# Patient Record
Sex: FEMALE | Race: WHITE | NOT HISPANIC OR LATINO | URBAN - METROPOLITAN AREA
[De-identification: names, ages, dates, MRNs, and addresses within clinical notes are randomized per-mention and may not be internally consistent; named-entity substitution may affect disease eponyms.]

---

## 2017-05-04 ENCOUNTER — OUTPATIENT (OUTPATIENT)
Dept: OUTPATIENT SERVICES | Facility: HOSPITAL | Age: 62
LOS: 1 days | Discharge: HOME | End: 2017-05-04

## 2017-05-04 DIAGNOSIS — Z98.89 OTHER SPECIFIED POSTPROCEDURAL STATES: Chronic | ICD-10-CM

## 2017-05-04 DIAGNOSIS — D49.5 NEOPLASM OF UNSPECIFIED BEHAVIOR OF OTHER GENITOURINARY ORGANS: Chronic | ICD-10-CM

## 2017-05-04 DIAGNOSIS — D59.0 DRUG-INDUCED AUTOIMMUNE HEMOLYTIC ANEMIA: ICD-10-CM

## 2017-05-08 ENCOUNTER — RESULT REVIEW (OUTPATIENT)
Age: 62
End: 2017-05-08

## 2017-05-08 ENCOUNTER — APPOINTMENT (OUTPATIENT)
Dept: HEMATOLOGY ONCOLOGY | Facility: CLINIC | Age: 62
End: 2017-05-08

## 2017-05-08 VITALS
WEIGHT: 196.21 LBS | SYSTOLIC BLOOD PRESSURE: 150 MMHG | RESPIRATION RATE: 16 BRPM | TEMPERATURE: 98.2 F | OXYGEN SATURATION: 98 % | DIASTOLIC BLOOD PRESSURE: 77 MMHG | BODY MASS INDEX: 34.76 KG/M2 | HEART RATE: 89 BPM

## 2017-05-08 LAB
BASOPHILS # BLD AUTO: 0.1 K/UL — SIGNIFICANT CHANGE UP (ref 0–0.2)
BASOPHILS NFR BLD AUTO: 0.9 % — SIGNIFICANT CHANGE UP (ref 0–2)
EOSINOPHIL # BLD AUTO: 0.2 K/UL — SIGNIFICANT CHANGE UP (ref 0–0.5)
EOSINOPHIL NFR BLD AUTO: 3.3 % — SIGNIFICANT CHANGE UP (ref 0–6)
HCT VFR BLD CALC: 33.5 % — LOW (ref 34.5–45)
HGB BLD-MCNC: 10.8 G/DL — LOW (ref 11.5–15.5)
LYMPHOCYTES # BLD AUTO: 2 K/UL — SIGNIFICANT CHANGE UP (ref 1–3.3)
LYMPHOCYTES # BLD AUTO: 34.4 % — SIGNIFICANT CHANGE UP (ref 13–44)
MCHC RBC-ENTMCNC: 20.5 PG — LOW (ref 27–34)
MCHC RBC-ENTMCNC: 32.3 G/DL — SIGNIFICANT CHANGE UP (ref 32–36)
MCV RBC AUTO: 63.5 FL — LOW (ref 80–100)
MONOCYTES # BLD AUTO: 0.2 K/UL — SIGNIFICANT CHANGE UP (ref 0–0.9)
MONOCYTES NFR BLD AUTO: 3.1 % — SIGNIFICANT CHANGE UP (ref 2–14)
NEUTROPHILS # BLD AUTO: 3.3 K/UL — SIGNIFICANT CHANGE UP (ref 1.8–7.4)
NEUTROPHILS NFR BLD AUTO: 58.2 % — SIGNIFICANT CHANGE UP (ref 43–77)
PLATELET # BLD AUTO: 227 K/UL — SIGNIFICANT CHANGE UP (ref 150–400)
RBC # BLD: 5.27 M/UL — HIGH (ref 3.8–5.2)
RBC # FLD: 14.1 % — SIGNIFICANT CHANGE UP (ref 10.3–14.5)
RETICS #: 144 K/UL — HIGH (ref 25–125)
RETICS/RBC NFR: 2.7 % — HIGH (ref 0.5–2.5)
WBC # BLD: 5.7 K/UL — SIGNIFICANT CHANGE UP (ref 3.8–10.5)
WBC # FLD AUTO: 5.7 K/UL — SIGNIFICANT CHANGE UP (ref 3.8–10.5)

## 2017-07-05 ENCOUNTER — RESULT REVIEW (OUTPATIENT)
Age: 62
End: 2017-07-05

## 2017-07-05 LAB
ALBUMIN SERPL ELPH-MCNC: 4.4 G/DL
ALP BLD-CCNC: 88 U/L
ALT SERPL-CCNC: 53 U/L
ANION GAP SERPL CALC-SCNC: 14 MMOL/L
AST SERPL-CCNC: 47 U/L
BILIRUB SERPL-MCNC: 0.3 MG/DL
BUN SERPL-MCNC: 15 MG/DL
CALCIUM SERPL-MCNC: 9.4 MG/DL
CHLORIDE SERPL-SCNC: 97 MMOL/L
CO2 SERPL-SCNC: 25 MMOL/L
CREAT SERPL-MCNC: 0.87 MG/DL
GLUCOSE SERPL-MCNC: 234 MG/DL
HAPTOGLOB SERPL-MCNC: 146 MG/DL
LDH SERPL-CCNC: 193 U/L
POTASSIUM SERPL-SCNC: 4.9 MMOL/L
PROT SERPL-MCNC: 7.6 G/DL
SODIUM SERPL-SCNC: 136 MMOL/L

## 2017-11-01 ENCOUNTER — APPOINTMENT (OUTPATIENT)
Dept: GYNECOLOGIC ONCOLOGY | Facility: CLINIC | Age: 62
End: 2017-11-01
Payer: MEDICARE

## 2017-11-01 VITALS
SYSTOLIC BLOOD PRESSURE: 142 MMHG | BODY MASS INDEX: 34.55 KG/M2 | DIASTOLIC BLOOD PRESSURE: 84 MMHG | WEIGHT: 195 LBS | HEIGHT: 63 IN

## 2017-11-01 PROCEDURE — 56606 BIOPSY OF VULVA/PERINEUM: CPT

## 2017-11-01 PROCEDURE — 99213 OFFICE O/P EST LOW 20 MIN: CPT | Mod: 25

## 2017-11-01 PROCEDURE — 56605 BIOPSY OF VULVA/PERINEUM: CPT

## 2017-11-09 LAB
CORE LAB BIOPSY: NORMAL
CYTOLOGY CVX/VAG DOC THIN PREP: NORMAL

## 2017-11-14 ENCOUNTER — APPOINTMENT (OUTPATIENT)
Dept: GYNECOLOGIC ONCOLOGY | Facility: CLINIC | Age: 62
End: 2017-11-14
Payer: MEDICARE

## 2017-11-14 PROCEDURE — 99213 OFFICE O/P EST LOW 20 MIN: CPT

## 2017-11-28 ENCOUNTER — OUTPATIENT (OUTPATIENT)
Dept: OUTPATIENT SERVICES | Facility: HOSPITAL | Age: 62
LOS: 1 days | End: 2017-11-28
Payer: MEDICARE

## 2017-11-28 VITALS
RESPIRATION RATE: 16 BRPM | WEIGHT: 192.9 LBS | HEIGHT: 61.5 IN | SYSTOLIC BLOOD PRESSURE: 164 MMHG | DIASTOLIC BLOOD PRESSURE: 62 MMHG | TEMPERATURE: 98 F | HEART RATE: 91 BPM

## 2017-11-28 DIAGNOSIS — Z98.89 OTHER SPECIFIED POSTPROCEDURAL STATES: Chronic | ICD-10-CM

## 2017-11-28 DIAGNOSIS — C51.9 MALIGNANT NEOPLASM OF VULVA, UNSPECIFIED: ICD-10-CM

## 2017-11-28 DIAGNOSIS — D07.1 CARCINOMA IN SITU OF VULVA: ICD-10-CM

## 2017-11-28 DIAGNOSIS — E11.9 TYPE 2 DIABETES MELLITUS WITHOUT COMPLICATIONS: ICD-10-CM

## 2017-11-28 DIAGNOSIS — E03.9 HYPOTHYROIDISM, UNSPECIFIED: ICD-10-CM

## 2017-11-28 DIAGNOSIS — I10 ESSENTIAL (PRIMARY) HYPERTENSION: ICD-10-CM

## 2017-11-28 DIAGNOSIS — D49.5 NEOPLASM OF UNSPECIFIED BEHAVIOR OF OTHER GENITOURINARY ORGANS: Chronic | ICD-10-CM

## 2017-11-28 LAB
BUN SERPL-MCNC: 21 MG/DL — SIGNIFICANT CHANGE UP (ref 7–23)
CALCIUM SERPL-MCNC: 9.2 MG/DL — SIGNIFICANT CHANGE UP (ref 8.4–10.5)
CHLORIDE SERPL-SCNC: 95 MMOL/L — LOW (ref 98–107)
CO2 SERPL-SCNC: 29 MMOL/L — SIGNIFICANT CHANGE UP (ref 22–31)
CREAT SERPL-MCNC: 1.01 MG/DL — SIGNIFICANT CHANGE UP (ref 0.5–1.3)
GLUCOSE SERPL-MCNC: 226 MG/DL — HIGH (ref 70–99)
HBA1C BLD-MCNC: 8.8 % — HIGH (ref 4–5.6)
HCT VFR BLD CALC: 34.8 % — SIGNIFICANT CHANGE UP (ref 34.5–45)
HGB BLD-MCNC: 11.1 G/DL — LOW (ref 11.5–15.5)
MCHC RBC-ENTMCNC: 20.4 PG — LOW (ref 27–34)
MCHC RBC-ENTMCNC: 31.9 % — LOW (ref 32–36)
MCV RBC AUTO: 63.9 FL — LOW (ref 80–100)
NRBC # FLD: 0 — SIGNIFICANT CHANGE UP
PLATELET # BLD AUTO: 241 K/UL — SIGNIFICANT CHANGE UP (ref 150–400)
PMV BLD: 10.7 FL — SIGNIFICANT CHANGE UP (ref 7–13)
POTASSIUM SERPL-MCNC: 4 MMOL/L — SIGNIFICANT CHANGE UP (ref 3.5–5.3)
POTASSIUM SERPL-SCNC: 4 MMOL/L — SIGNIFICANT CHANGE UP (ref 3.5–5.3)
RBC # BLD: 5.45 M/UL — HIGH (ref 3.8–5.2)
RBC # FLD: 15.7 % — HIGH (ref 10.3–14.5)
SODIUM SERPL-SCNC: 139 MMOL/L — SIGNIFICANT CHANGE UP (ref 135–145)
WBC # BLD: 7.22 K/UL — SIGNIFICANT CHANGE UP (ref 3.8–10.5)
WBC # FLD AUTO: 7.22 K/UL — SIGNIFICANT CHANGE UP (ref 3.8–10.5)

## 2017-11-28 PROCEDURE — 93010 ELECTROCARDIOGRAM REPORT: CPT

## 2017-11-28 NOTE — H&P PST ADULT - PMH
DM type 2 (diabetes mellitus, type 2)    Fibromyalgia    HTN (hypertension)    Hypothyroid    Lichen sclerosus of female genitalia    Strabismus    Thalassemia minor    TIA (transient ischemic attack)  8/2013  blurred vision/dizziness  Vulva cancer

## 2017-11-28 NOTE — H&P PST ADULT - ENDOCRINE COMMENTS
Hypothyroid - stable on current dose of med - Hx of DM for 20 years - on Basal / bolus - A1c in 9's and SMBG -

## 2017-11-28 NOTE — H&P PST ADULT - HISTORY OF PRESENT ILLNESS
Pt is a 62 yr old female scheduled for Exam Under anesthesia Excision of Vulvar Biopsies laser Ablation with Dr Sauer 12/4/17. Pt hx of Vulvar Ca 2013 - noted recent swelling and lump with burning 1 month ago  - biopsy done in office and surgery scheduled. Pt is poorly controlled DM on Basal / Bolus

## 2017-11-28 NOTE — H&P PST ADULT - FAMILY HISTORY
Aunt  Still living? Unknown  Family history of colon cancer, Age at diagnosis: Age Unknown  Family history of diabetes mellitus in mother, Age at diagnosis: Age Unknown     Mother  Still living? Unknown  Family history of diabetes mellitus in mother, Age at diagnosis: Age Unknown     Sibling  Still living? Unknown  Family history of diabetes mellitus in mother, Age at diagnosis: Age Unknown

## 2017-11-28 NOTE — H&P PST ADULT - PSH
S/P eye surgery  June 2014, for strabismus  S/P laparoscopic cholecystectomy  20 years ago  Vulvar neoplasm

## 2017-11-28 NOTE — H&P PST ADULT - PROBLEM SELECTOR PLAN 1
Patient Pt given pre-op instructions and Famotidine and verbalized understanding.   Request MC from MD - also sent to surgeon. Pt to stay on ASA 81 po for Hx of TIA.  OR Booking notified OSN precautions.

## 2017-11-28 NOTE — H&P PST ADULT - NEUROLOGICAL DETAILS
responds to verbal commands/sensation intact/responds to pain/superficial reflexes intact/normal strength/alert and oriented x 3

## 2017-11-28 NOTE — H&P PST ADULT - NSANTHOSAYNRD_GEN_A_CORE
No. SON screening performed.  STOP BANG Legend: 0-2 = LOW Risk; 3-4 = INTERMEDIATE Risk; 5-8 = HIGH Risk

## 2017-11-28 NOTE — H&P PST ADULT - CONSTITUTIONAL DETAILS
no distress/well-nourished/well-developed/well-groomed no distress/obese/well-nourished/well-developed/well-groomed

## 2017-11-28 NOTE — H&P PST ADULT - NEGATIVE NEUROLOGICAL SYMPTOMS
no focal seizures/no tremors/no paresthesias/no confusion/no weakness/no generalized seizures/no syncope

## 2017-12-04 ENCOUNTER — OTHER (OUTPATIENT)
Age: 62
End: 2017-12-04

## 2017-12-04 ENCOUNTER — RESULT REVIEW (OUTPATIENT)
Age: 62
End: 2017-12-04

## 2017-12-04 ENCOUNTER — OUTPATIENT (OUTPATIENT)
Dept: OUTPATIENT SERVICES | Facility: HOSPITAL | Age: 62
LOS: 1 days | Discharge: HOME | End: 2017-12-04
Payer: MEDICARE

## 2017-12-04 ENCOUNTER — APPOINTMENT (OUTPATIENT)
Dept: GYNECOLOGIC ONCOLOGY | Facility: HOSPITAL | Age: 62
End: 2017-12-04

## 2017-12-04 VITALS
HEIGHT: 61.5 IN | SYSTOLIC BLOOD PRESSURE: 143 MMHG | WEIGHT: 192.9 LBS | DIASTOLIC BLOOD PRESSURE: 60 MMHG | RESPIRATION RATE: 16 BRPM | TEMPERATURE: 98 F | OXYGEN SATURATION: 93 % | HEART RATE: 72 BPM

## 2017-12-04 VITALS
DIASTOLIC BLOOD PRESSURE: 58 MMHG | SYSTOLIC BLOOD PRESSURE: 135 MMHG | RESPIRATION RATE: 16 BRPM | OXYGEN SATURATION: 96 %

## 2017-12-04 DIAGNOSIS — D07.1 CARCINOMA IN SITU OF VULVA: ICD-10-CM

## 2017-12-04 DIAGNOSIS — Z98.89 OTHER SPECIFIED POSTPROCEDURAL STATES: Chronic | ICD-10-CM

## 2017-12-04 DIAGNOSIS — D49.5 NEOPLASM OF UNSPECIFIED BEHAVIOR OF OTHER GENITOURINARY ORGANS: Chronic | ICD-10-CM

## 2017-12-04 LAB — GLUCOSE BLDC GLUCOMTR-MCNC: 289 MG/DL — HIGH (ref 70–99)

## 2017-12-04 PROCEDURE — 88331 PATH CONSLTJ SURG 1 BLK 1SPC: CPT | Mod: 26

## 2017-12-04 PROCEDURE — 88309 TISSUE EXAM BY PATHOLOGIST: CPT | Mod: 26

## 2017-12-04 PROCEDURE — 88307 TISSUE EXAM BY PATHOLOGIST: CPT | Mod: 26

## 2017-12-04 PROCEDURE — 56620 VULVECTOMY SIMPLE PARTIAL: CPT

## 2017-12-04 RX ORDER — LANOLIN ALCOHOL/MO/W.PET/CERES
1 CREAM (GRAM) TOPICAL
Qty: 0 | Refills: 0 | COMMUNITY

## 2017-12-04 RX ORDER — INSULIN ASPART 100 [IU]/ML
0 INJECTION, SOLUTION SUBCUTANEOUS
Qty: 0 | Refills: 0 | COMMUNITY

## 2017-12-04 RX ORDER — INSULIN DETEMIR 100/ML (3)
0 INSULIN PEN (ML) SUBCUTANEOUS
Qty: 0 | Refills: 0 | COMMUNITY

## 2017-12-04 RX ORDER — SODIUM CHLORIDE 9 MG/ML
1000 INJECTION, SOLUTION INTRAVENOUS
Qty: 0 | Refills: 0 | Status: DISCONTINUED | OUTPATIENT
Start: 2017-12-04 | End: 2017-12-19

## 2017-12-04 RX ORDER — SODIUM CHLORIDE 9 MG/ML
1000 INJECTION, SOLUTION INTRAVENOUS
Qty: 0 | Refills: 0 | Status: DISCONTINUED | OUTPATIENT
Start: 2017-12-04 | End: 2017-12-04

## 2017-12-04 NOTE — BRIEF OPERATIVE NOTE - PROCEDURE
<<-----Click on this checkbox to enter Procedure Vulvar biopsy  12/04/2017    Active  [id number]  Wide local excision of vulva or simple vulvectomy  12/04/2017    Active  [id number]

## 2017-12-04 NOTE — ASU PREOP CHECKLIST - 3.
Pt has a sinus infection and is taking antibiotics Dr Simmons aware Pt has a sinus infection and is taking antibiotics Dr Simmons aware and MD Sauer aware

## 2017-12-04 NOTE — ASU DISCHARGE PLAN (ADULT/PEDIATRIC). - MEDICATION SUMMARY - MEDICATIONS TO TAKE
I will START or STAY ON the medications listed below when I get home from the hospital:    Motrin 600 mg oral tablet  -- 1 tab(s) by mouth every 6 hours, As Needed  -- Indication: For pain    Tylenol 325 mg oral tablet  -- 2 tab(s) by mouth every 6 hours, As Needed  -- Indication: For pain

## 2017-12-04 NOTE — BRIEF OPERATIVE NOTE - OPERATION/FINDINGS
Area of tissue and skin thickening involving prior vulvectomy scar on L anterior vulva, Lichen sclerosis of posterior and lateral vulva  Anterior vulvar biopsy with Frozen= negative margins

## 2017-12-04 NOTE — ASU DISCHARGE PLAN (ADULT/PEDIATRIC). - ACTIVITY LEVEL
nothing per vagina/no tub baths/no heavy lifting/nothing per rectum/no intercourse/2 weeks/no sports/gym

## 2017-12-04 NOTE — ASU DISCHARGE PLAN (ADULT/PEDIATRIC). - NOTIFY
GYN Fever>100.4/Unable to Urinate/Bleeding that does not stop Pain not relieved by Medications/GYN Fever>100.4/Fever greater than 101/Unable to Urinate/Persistent Nausea and Vomiting/Bleeding that does not stop/Numbness, color, or temperature change to extremity

## 2017-12-04 NOTE — ASU DISCHARGE PLAN (ADULT/PEDIATRIC). - ASU FOLLOWUP
call  PACU  [phone] ( open  24 hour  and weekend ) , [phone] ( open 6AM to 11 PM  closed weekend)/[facility]ERT[facility] ASU (Adult):

## 2017-12-04 NOTE — ASU PREOP CHECKLIST - 5.
Report from apryl at 10:51am Report from apryl at 10:51am / Report taken from Theresa VALERA at 0651

## 2017-12-05 ENCOUNTER — TRANSCRIPTION ENCOUNTER (OUTPATIENT)
Age: 62
End: 2017-12-05

## 2017-12-05 DIAGNOSIS — D07.1 CARCINOMA IN SITU OF VULVA: ICD-10-CM

## 2017-12-05 RX ORDER — OXYCODONE AND ACETAMINOPHEN 5; 325 MG/1; MG/1
5-325 TABLET ORAL EVERY 6 HOURS
Qty: 15 | Refills: 0 | Status: DISCONTINUED | OUTPATIENT
Start: 2017-12-05 | End: 2017-12-05

## 2017-12-14 LAB — SURGICAL PATHOLOGY STUDY: SIGNIFICANT CHANGE UP

## 2017-12-19 ENCOUNTER — APPOINTMENT (OUTPATIENT)
Dept: GYNECOLOGIC ONCOLOGY | Facility: CLINIC | Age: 62
End: 2017-12-19
Payer: MEDICARE

## 2017-12-19 PROCEDURE — 99024 POSTOP FOLLOW-UP VISIT: CPT

## 2018-01-16 ENCOUNTER — APPOINTMENT (OUTPATIENT)
Dept: GYNECOLOGIC ONCOLOGY | Facility: CLINIC | Age: 63
End: 2018-01-16
Payer: MEDICARE

## 2018-01-16 PROCEDURE — 99024 POSTOP FOLLOW-UP VISIT: CPT

## 2018-01-24 ENCOUNTER — FORM ENCOUNTER (OUTPATIENT)
Age: 63
End: 2018-01-24

## 2018-01-25 ENCOUNTER — APPOINTMENT (OUTPATIENT)
Dept: ULTRASOUND IMAGING | Facility: IMAGING CENTER | Age: 63
End: 2018-01-25
Payer: MEDICARE

## 2018-01-25 ENCOUNTER — OUTPATIENT (OUTPATIENT)
Dept: OUTPATIENT SERVICES | Facility: HOSPITAL | Age: 63
LOS: 1 days | End: 2018-01-25
Payer: MEDICARE

## 2018-01-25 DIAGNOSIS — N95.0 POSTMENOPAUSAL BLEEDING: ICD-10-CM

## 2018-01-25 DIAGNOSIS — Z98.89 OTHER SPECIFIED POSTPROCEDURAL STATES: Chronic | ICD-10-CM

## 2018-01-25 DIAGNOSIS — D49.5 NEOPLASM OF UNSPECIFIED BEHAVIOR OF OTHER GENITOURINARY ORGANS: Chronic | ICD-10-CM

## 2018-01-25 PROCEDURE — 76830 TRANSVAGINAL US NON-OB: CPT | Mod: 26

## 2018-01-25 PROCEDURE — 76830 TRANSVAGINAL US NON-OB: CPT

## 2018-01-25 PROCEDURE — 76856 US EXAM PELVIC COMPLETE: CPT | Mod: 26

## 2018-01-25 PROCEDURE — 76856 US EXAM PELVIC COMPLETE: CPT

## 2018-02-20 ENCOUNTER — OUTPATIENT (OUTPATIENT)
Dept: OUTPATIENT SERVICES | Facility: HOSPITAL | Age: 63
LOS: 1 days | Discharge: HOME | End: 2018-02-20

## 2018-02-20 DIAGNOSIS — Z98.89 OTHER SPECIFIED POSTPROCEDURAL STATES: Chronic | ICD-10-CM

## 2018-02-20 DIAGNOSIS — D49.5 NEOPLASM OF UNSPECIFIED BEHAVIOR OF OTHER GENITOURINARY ORGANS: Chronic | ICD-10-CM

## 2018-02-20 DIAGNOSIS — D59.0 DRUG-INDUCED AUTOIMMUNE HEMOLYTIC ANEMIA: ICD-10-CM

## 2018-02-26 ENCOUNTER — APPOINTMENT (OUTPATIENT)
Dept: HEMATOLOGY ONCOLOGY | Facility: CLINIC | Age: 63
End: 2018-02-26
Payer: MEDICARE

## 2018-02-26 ENCOUNTER — RESULT REVIEW (OUTPATIENT)
Age: 63
End: 2018-02-26

## 2018-02-26 ENCOUNTER — OUTPATIENT (OUTPATIENT)
Dept: OUTPATIENT SERVICES | Facility: HOSPITAL | Age: 63
LOS: 1 days | End: 2018-02-26
Payer: COMMERCIAL

## 2018-02-26 VITALS
OXYGEN SATURATION: 98 % | HEART RATE: 86 BPM | SYSTOLIC BLOOD PRESSURE: 140 MMHG | BODY MASS INDEX: 35.54 KG/M2 | RESPIRATION RATE: 16 BRPM | TEMPERATURE: 98.4 F | WEIGHT: 200.62 LBS | DIASTOLIC BLOOD PRESSURE: 82 MMHG

## 2018-02-26 DIAGNOSIS — D49.5 NEOPLASM OF UNSPECIFIED BEHAVIOR OF OTHER GENITOURINARY ORGANS: Chronic | ICD-10-CM

## 2018-02-26 DIAGNOSIS — Z98.89 OTHER SPECIFIED POSTPROCEDURAL STATES: Chronic | ICD-10-CM

## 2018-02-26 DIAGNOSIS — D59.0 DRUG-INDUCED AUTOIMMUNE HEMOLYTIC ANEMIA: ICD-10-CM

## 2018-02-26 LAB
BASOPHILS # BLD AUTO: 0 K/UL — SIGNIFICANT CHANGE UP (ref 0–0.2)
BASOPHILS NFR BLD AUTO: 0.7 % — SIGNIFICANT CHANGE UP (ref 0–2)
BLD GP AB SCN SERPL QL: NEGATIVE — SIGNIFICANT CHANGE UP
DAT POLY-SP REAG RBC QL: NEGATIVE — SIGNIFICANT CHANGE UP
EOSINOPHIL # BLD AUTO: 0.2 K/UL — SIGNIFICANT CHANGE UP (ref 0–0.5)
EOSINOPHIL NFR BLD AUTO: 2.7 % — SIGNIFICANT CHANGE UP (ref 0–6)
HCT VFR BLD CALC: 34.3 % — LOW (ref 34.5–45)
HGB BLD-MCNC: 11.6 G/DL — SIGNIFICANT CHANGE UP (ref 11.5–15.5)
LYMPHOCYTES # BLD AUTO: 2.5 K/UL — SIGNIFICANT CHANGE UP (ref 1–3.3)
LYMPHOCYTES # BLD AUTO: 36.3 % — SIGNIFICANT CHANGE UP (ref 13–44)
MCHC RBC-ENTMCNC: 20.8 PG — LOW (ref 27–34)
MCHC RBC-ENTMCNC: 33.7 G/DL — SIGNIFICANT CHANGE UP (ref 32–36)
MCV RBC AUTO: 61.6 FL — LOW (ref 80–100)
MONOCYTES # BLD AUTO: 0.2 K/UL — SIGNIFICANT CHANGE UP (ref 0–0.9)
MONOCYTES NFR BLD AUTO: 3.4 % — SIGNIFICANT CHANGE UP (ref 2–14)
NEUTROPHILS # BLD AUTO: 3.9 K/UL — SIGNIFICANT CHANGE UP (ref 1.8–7.4)
NEUTROPHILS NFR BLD AUTO: 56.9 % — SIGNIFICANT CHANGE UP (ref 43–77)
PLATELET # BLD AUTO: 243 K/UL — SIGNIFICANT CHANGE UP (ref 150–400)
RBC # BLD: 5.58 M/UL — HIGH (ref 3.8–5.2)
RBC # FLD: 13.6 % — SIGNIFICANT CHANGE UP (ref 10.3–14.5)
RH IG SCN BLD-IMP: POSITIVE — SIGNIFICANT CHANGE UP
WBC # BLD: 6.8 K/UL — SIGNIFICANT CHANGE UP (ref 3.8–10.5)
WBC # FLD AUTO: 6.8 K/UL — SIGNIFICANT CHANGE UP (ref 3.8–10.5)

## 2018-02-26 PROCEDURE — 86901 BLOOD TYPING SEROLOGIC RH(D): CPT

## 2018-02-26 PROCEDURE — 99215 OFFICE O/P EST HI 40 MIN: CPT

## 2018-02-26 PROCEDURE — 86900 BLOOD TYPING SEROLOGIC ABO: CPT

## 2018-02-26 PROCEDURE — 86880 COOMBS TEST DIRECT: CPT

## 2018-02-26 PROCEDURE — 86922 COMPATIBILITY TEST ANTIGLOB: CPT

## 2018-02-26 PROCEDURE — 86850 RBC ANTIBODY SCREEN: CPT

## 2018-04-17 ENCOUNTER — APPOINTMENT (OUTPATIENT)
Dept: GYNECOLOGIC ONCOLOGY | Facility: CLINIC | Age: 63
End: 2018-04-17
Payer: MEDICARE

## 2018-04-17 VITALS
WEIGHT: 200 LBS | SYSTOLIC BLOOD PRESSURE: 122 MMHG | HEIGHT: 63 IN | DIASTOLIC BLOOD PRESSURE: 72 MMHG | BODY MASS INDEX: 35.44 KG/M2

## 2018-04-17 DIAGNOSIS — Z87.42 PERSONAL HISTORY OF OTHER DISEASES OF THE FEMALE GENITAL TRACT: ICD-10-CM

## 2018-04-17 PROCEDURE — 56605 BIOPSY OF VULVA/PERINEUM: CPT

## 2018-04-17 PROCEDURE — 58100 BIOPSY OF UTERUS LINING: CPT

## 2018-04-17 PROCEDURE — 99214 OFFICE O/P EST MOD 30 MIN: CPT | Mod: 25

## 2018-04-20 LAB — CORE LAB BIOPSY: NORMAL

## 2018-07-17 ENCOUNTER — APPOINTMENT (OUTPATIENT)
Dept: GYNECOLOGIC ONCOLOGY | Facility: CLINIC | Age: 63
End: 2018-07-17
Payer: MEDICARE

## 2018-07-17 VITALS
SYSTOLIC BLOOD PRESSURE: 128 MMHG | HEIGHT: 63 IN | WEIGHT: 193 LBS | DIASTOLIC BLOOD PRESSURE: 72 MMHG | BODY MASS INDEX: 34.2 KG/M2

## 2018-07-17 PROCEDURE — 56605 BIOPSY OF VULVA/PERINEUM: CPT

## 2018-07-17 PROCEDURE — 99213 OFFICE O/P EST LOW 20 MIN: CPT | Mod: 25

## 2018-07-20 LAB — CORE LAB BIOPSY: NORMAL

## 2018-11-27 ENCOUNTER — APPOINTMENT (OUTPATIENT)
Dept: GYNECOLOGIC ONCOLOGY | Facility: CLINIC | Age: 63
End: 2018-11-27
Payer: MEDICARE

## 2018-11-27 VITALS
SYSTOLIC BLOOD PRESSURE: 128 MMHG | DIASTOLIC BLOOD PRESSURE: 70 MMHG | BODY MASS INDEX: 33.31 KG/M2 | HEIGHT: 63 IN | WEIGHT: 188 LBS

## 2018-11-27 PROCEDURE — 99213 OFFICE O/P EST LOW 20 MIN: CPT

## 2018-11-27 RX ORDER — OXYCODONE AND ACETAMINOPHEN 5; 325 MG/1; MG/1
5-325 TABLET ORAL
Qty: 15 | Refills: 0 | Status: DISCONTINUED | COMMUNITY
Start: 2017-12-05 | End: 2018-11-27

## 2019-01-07 ENCOUNTER — RESULT REVIEW (OUTPATIENT)
Age: 64
End: 2019-01-07

## 2019-04-03 ENCOUNTER — APPOINTMENT (OUTPATIENT)
Dept: GYNECOLOGIC ONCOLOGY | Facility: CLINIC | Age: 64
End: 2019-04-03
Payer: MEDICARE

## 2019-04-03 VITALS
HEIGHT: 63 IN | WEIGHT: 195 LBS | SYSTOLIC BLOOD PRESSURE: 179 MMHG | BODY MASS INDEX: 34.55 KG/M2 | DIASTOLIC BLOOD PRESSURE: 78 MMHG

## 2019-04-03 PROCEDURE — 56605 BIOPSY OF VULVA/PERINEUM: CPT

## 2019-04-03 PROCEDURE — 99213 OFFICE O/P EST LOW 20 MIN: CPT | Mod: 25

## 2019-04-03 NOTE — HISTORY OF PRESENT ILLNESS
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 Underwent radical anterior vulvectomy and bilateral SLNMB. Diagnosed with autoimmune hemolytic anemia during recuperation and treated with steroids and rituximab with resolution. Sporadic surveillance exams in our office. \par \par Developed a recurrence in the anterior vulva scar line and underwent repeat radical excision on 12/4/17. Path with 9 mm focus of invasive cancer within HOMERO 3. Margins negative for carcinoma but positive for dysplasia. \par \par History of tobacco use.\par \par Pelvic US 1/2018 thin EE with trace fluid in endometrial cavity. \par EMBx benign and vulvar biopsy benign granulation tissue  April 2018.  \par \par Biopsy at July 2018 visit with lichen sclerosis. Clobetasol ointment was prescribed. Has less itching in the vulva but occasionally experiences more severe burning pain. \par \par Today noted more pain and irritation in the vulva. Using clobetasol intermittently. \par

## 2019-04-03 NOTE — PROCEDURE
[Vulvar Biopsy] : a vulvar biopsy [Other: ___] : [unfilled] [Patient] : the patient [Verbal Consent] : verbal consent was obtained prior to the procedure and is detailed in the patient's record [Site Verification] : site verification performed. [Time Out] : Time Out Procedure:The following was confirmed prior to the procedure: Correct patient identity, correct site, agreement on the procedure to be done and correct patient position. [Allergies Reviewed] : allergies were reviewed [1% Lidocaine ___(ml)] :  [unfilled]Uml of 1% Lidocaine [Betadine] : betadine [Silver Nitrate] : silver nitrate [Yes] : the specimen was sent to pathology [No Complications] : none [Tolerated Well] : the patient tolerated the procedure well [Post procedure instructions and information given] : post procedure instructions and information given and reviewed with patient. [FreeTextEntry1] : Biopsy of midline vulvar scar thickened white epithelium.

## 2019-04-03 NOTE — PHYSICAL EXAM
[Abnormal] : External genitalia: Abnormal [Normal] : Anus and perineum: Normal sphincter tone, no masses, no prolapse. [Fully active, able to carry on all pre-disease performance without restriction] : Status 0 - Fully active, able to carry on all pre-disease performance without restriction

## 2019-04-09 NOTE — ASU DISCHARGE PLAN (ADULT/PEDIATRIC). - ***IN THE EVENT THAT YOU DEVELOP A COMPLICATION AND YOU ARE UNABLE TO REACH YOUR OWN PHYSICIAN, YOU MAY CONTACT:
Recommend increasing trazodone to 75 mg daily to help with sleep. Please let patient know   -Dr Guevara: Covering for Dr Guzman this week.    Statement Selected

## 2019-04-11 LAB — CORE LAB BIOPSY: NORMAL

## 2019-06-11 ENCOUNTER — APPOINTMENT (OUTPATIENT)
Dept: HEMATOLOGY ONCOLOGY | Facility: CLINIC | Age: 64
End: 2019-06-11

## 2019-06-29 ENCOUNTER — OUTPATIENT (OUTPATIENT)
Dept: OUTPATIENT SERVICES | Facility: HOSPITAL | Age: 64
LOS: 1 days | Discharge: HOME | End: 2019-06-29

## 2019-06-29 DIAGNOSIS — Z98.89 OTHER SPECIFIED POSTPROCEDURAL STATES: Chronic | ICD-10-CM

## 2019-06-29 DIAGNOSIS — D59.0 DRUG-INDUCED AUTOIMMUNE HEMOLYTIC ANEMIA: ICD-10-CM

## 2019-06-29 DIAGNOSIS — D49.5 NEOPLASM OF UNSPECIFIED BEHAVIOR OF OTHER GENITOURINARY ORGANS: Chronic | ICD-10-CM

## 2019-07-02 ENCOUNTER — APPOINTMENT (OUTPATIENT)
Dept: HEMATOLOGY ONCOLOGY | Facility: CLINIC | Age: 64
End: 2019-07-02
Payer: MEDICARE

## 2019-07-02 ENCOUNTER — RESULT REVIEW (OUTPATIENT)
Age: 64
End: 2019-07-02

## 2019-07-02 VITALS
WEIGHT: 196.43 LBS | RESPIRATION RATE: 17 BRPM | BODY MASS INDEX: 34.8 KG/M2 | DIASTOLIC BLOOD PRESSURE: 76 MMHG | SYSTOLIC BLOOD PRESSURE: 145 MMHG | TEMPERATURE: 99.1 F | OXYGEN SATURATION: 96 % | HEART RATE: 83 BPM

## 2019-07-02 LAB
BASOPHILS # BLD AUTO: 0.1 K/UL — SIGNIFICANT CHANGE UP (ref 0–0.2)
BASOPHILS NFR BLD AUTO: 1.5 % — SIGNIFICANT CHANGE UP (ref 0–2)
EOSINOPHIL # BLD AUTO: 0.2 K/UL — SIGNIFICANT CHANGE UP (ref 0–0.5)
EOSINOPHIL NFR BLD AUTO: 2.2 % — SIGNIFICANT CHANGE UP (ref 0–6)
HCT VFR BLD CALC: 34.6 % — SIGNIFICANT CHANGE UP (ref 34.5–45)
HGB BLD-MCNC: 11 G/DL — LOW (ref 11.5–15.5)
LYMPHOCYTES # BLD AUTO: 2.6 K/UL — SIGNIFICANT CHANGE UP (ref 1–3.3)
LYMPHOCYTES # BLD AUTO: 35.7 % — SIGNIFICANT CHANGE UP (ref 13–44)
MCHC RBC-ENTMCNC: 20.3 PG — LOW (ref 27–34)
MCHC RBC-ENTMCNC: 31.9 G/DL — LOW (ref 32–36)
MCV RBC AUTO: 63.8 FL — LOW (ref 80–100)
MONOCYTES # BLD AUTO: 0.3 K/UL — SIGNIFICANT CHANGE UP (ref 0–0.9)
MONOCYTES NFR BLD AUTO: 3.4 % — SIGNIFICANT CHANGE UP (ref 2–14)
NEUTROPHILS # BLD AUTO: 4.2 K/UL — SIGNIFICANT CHANGE UP (ref 1.8–7.4)
NEUTROPHILS NFR BLD AUTO: 57.3 % — SIGNIFICANT CHANGE UP (ref 43–77)
PLATELET # BLD AUTO: 253 K/UL — SIGNIFICANT CHANGE UP (ref 150–400)
RBC # BLD: 5.43 M/UL — HIGH (ref 3.8–5.2)
RBC # FLD: 14.2 % — SIGNIFICANT CHANGE UP (ref 10.3–14.5)
WBC # BLD: 7.3 K/UL — SIGNIFICANT CHANGE UP (ref 3.8–10.5)
WBC # FLD AUTO: 7.3 K/UL — SIGNIFICANT CHANGE UP (ref 3.8–10.5)

## 2019-07-02 PROCEDURE — 99213 OFFICE O/P EST LOW 20 MIN: CPT

## 2019-07-02 RX ORDER — AMOXICILLIN AND CLAVULANATE POTASSIUM 875; 125 MG/1; MG/1
875-125 TABLET, COATED ORAL
Qty: 20 | Refills: 0 | Status: DISCONTINUED | COMMUNITY
Start: 2017-12-02 | End: 2019-07-02

## 2019-07-02 NOTE — ASSESSMENT
[FreeTextEntry1] : Ms. Carranza had vulvar cancer surgery on December 13, 2014,  preoperatively her hemoglobin was 10.7 with an MCV of 62.7 the patient states that she has thalassemia trait\par \par  -History of  hemolytic anemia Amrit panel  at the time of diagnosis revealed a positive IgG and negative C3.\par    Counts are stable.\par \par - History of vulvar cancer: Patient is following with Dr Radha Sauer GYN Oncologist. No evidence of recurrent disease.  Last biopsy 7/20/18 negative. \par \par -Education on smoking cessation. \par \par RTC 12 months. \par \par

## 2019-07-02 NOTE — REVIEW OF SYSTEMS
[Negative] : Allergic/Immunologic [Fatigue] : fatigue [Wheezing] : wheezing [Cough] : cough [FreeTextEntry6] : mild congestion. Smokes 1/2  pack per day

## 2019-07-02 NOTE — HISTORY OF PRESENT ILLNESS
[0 - No Distress] : Distress Level: 0 [de-identified] : Ms. Carranza had vulvar cancer surgery on December 13, 2014,  preoperatively her hemoglobin was 10.7 with an MCV of 62.7 the patient states that she has thalassemia trait. 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 Reticulocyte count was elevated consistent with hemolysis treated with steroids with no  improvement over 2 or 3 days and  was given one treatment with rituximab; with appropriate response. \par \par Surgical Final Report\par \par \par Final Diagnosis\par 1. Anterior vulva, biopsy\par - Lichen sclerosus\par - Negative for dysplasia\par \par 2. Portion of anterior vulva, resection\par - Invasive well differentiated squamous cell carcinoma, see\par synoptic summary\par _________________________________________________________________\par \par Intraoperative Consultation\par 1. Anterior vulva\par - Negative for dysplasia\par By Dr. GRIS Abad\par \par Synoptic Summary\par Case Summary (Vulva, 7th edition and FIGO 2014 Annual Report,\par January 2016)\par \par Specimen Type: Anterior vulva\par Procedure: Wide excision\par Lymph Node Sampling: Not applicable\par Tumor Site: Anterior vulva\par Tumor Size: 0.9 cm\par Tumor Focality: Unifocal\par Histologic Type: Squamous cell carcinoma\par Histologic Grade: G1\par Microscopic Tumor Extension:\par Depth of Invasion: 4 mm\par Tumor Border: Pushing\par Margins:\par Uninvolved by invasive carcinoma\par Distance of invasive carcinoma from closest posterior\par margin: 4 mm\par Involved by vulvar intraepithelial lesion, differentiated\par type: all margins\par Lymph-Vascular Invasion: Not identified\par Lymph Nodes: Not applicable\par \par Pathologic Staging:\par TNM Descriptors: Not applicable\par Primary Tumor (pT): pT1b\par Regional Lymph Nodes (pN): pNx\par \par \par \par \par \par BALJINDER CARRANZA C 2\par \par \par \par Surgical Final Report\par \par \par \par \par Distant Metastasis (pM): pMx\par FIGO Stage: [id number]\par \par Additional Pathologic Findings: Lichen sclerosus, dVIN\par Comment(s): Deeper levels on block 1E are examined.\par \par No prior changes in her medical, social or surgical history since 2/26/18.  \par \par \par  \par  [de-identified] : Patient is feeling well. Following with Dr Radha Sauer GYN Oncologist for history of vulvar cancer, stable. \par \par Denies fever, night sweats or weight loss. \par \par Flu like symptoms on a course of Zithromax, minimal improvement. Patient is using Albuterol  inhaler.\par \par No changes in her medical, surgical or social history since 5/8/17.

## 2019-07-02 NOTE — PHYSICAL EXAM
[Restricted in physically strenuous activity but ambulatory and able to carry out work of a light or sedentary nature] : Status 1- Restricted in physically strenuous activity but ambulatory and able to carry out work of a light or sedentary nature, e.g., light house work, office work [Normal] : affect appropriate [Obese] : obese

## 2019-07-03 ENCOUNTER — RESULT REVIEW (OUTPATIENT)
Age: 64
End: 2019-07-03

## 2019-07-03 LAB
ALBUMIN SERPL ELPH-MCNC: 4.5 G/DL
ALP BLD-CCNC: 84 U/L
ALT SERPL-CCNC: 28 U/L
ANION GAP SERPL CALC-SCNC: 15 MMOL/L
AST SERPL-CCNC: 27 U/L
BILIRUB SERPL-MCNC: 0.4 MG/DL
BUN SERPL-MCNC: 11 MG/DL
CALCIUM SERPL-MCNC: 9.5 MG/DL
CHLORIDE SERPL-SCNC: 100 MMOL/L
CO2 SERPL-SCNC: 26 MMOL/L
CREAT SERPL-MCNC: 0.81 MG/DL
GLUCOSE SERPL-MCNC: 120 MG/DL
LDH SERPL-CCNC: 188 U/L
POTASSIUM SERPL-SCNC: 4.3 MMOL/L
PROT SERPL-MCNC: 7.2 G/DL
SODIUM SERPL-SCNC: 141 MMOL/L

## 2019-07-25 NOTE — H&P PST ADULT - TEMPERATURE IN CELSIUS (DEGREES C)
Patient's son returned call, he said he wasn't sure if it was from Dr. Kaushik Osborne or Poornima Stinson.       [phone] 36.6

## 2019-09-06 ENCOUNTER — EMERGENCY (EMERGENCY)
Facility: HOSPITAL | Age: 64
LOS: 1 days | Discharge: HOME | End: 2019-09-06
Attending: EMERGENCY MEDICINE | Admitting: STUDENT IN AN ORGANIZED HEALTH CARE EDUCATION/TRAINING PROGRAM
Payer: MEDICARE

## 2019-09-06 VITALS
RESPIRATION RATE: 16 BRPM | HEART RATE: 95 BPM | SYSTOLIC BLOOD PRESSURE: 168 MMHG | OXYGEN SATURATION: 100 % | TEMPERATURE: 99 F | DIASTOLIC BLOOD PRESSURE: 67 MMHG

## 2019-09-06 DIAGNOSIS — Z98.89 OTHER SPECIFIED POSTPROCEDURAL STATES: Chronic | ICD-10-CM

## 2019-09-06 DIAGNOSIS — D49.5 NEOPLASM OF UNSPECIFIED BEHAVIOR OF OTHER GENITOURINARY ORGANS: Chronic | ICD-10-CM

## 2019-09-06 PROCEDURE — 99283 EMERGENCY DEPT VISIT LOW MDM: CPT | Mod: GC

## 2019-09-06 RX ORDER — FAMOTIDINE 10 MG/ML
20 INJECTION INTRAVENOUS ONCE
Refills: 0 | Status: COMPLETED | OUTPATIENT
Start: 2019-09-06 | End: 2019-09-06

## 2019-09-06 RX ORDER — LIDOCAINE 4 G/100G
10 CREAM TOPICAL ONCE
Refills: 0 | Status: COMPLETED | OUTPATIENT
Start: 2019-09-06 | End: 2019-09-06

## 2019-09-06 RX ORDER — FAMOTIDINE 10 MG/ML
20 INJECTION INTRAVENOUS ONCE
Refills: 0 | Status: DISCONTINUED | OUTPATIENT
Start: 2019-09-06 | End: 2019-09-06

## 2019-09-06 RX ORDER — SUCRALFATE 1 G
1 TABLET ORAL ONCE
Refills: 0 | Status: COMPLETED | OUTPATIENT
Start: 2019-09-06 | End: 2019-09-06

## 2019-09-06 RX ORDER — SUCRALFATE 1 G
1 TABLET ORAL
Qty: 28 | Refills: 0
Start: 2019-09-06 | End: 2019-09-12

## 2019-09-06 RX ADMIN — Medication 1 GRAM(S): at 13:47

## 2019-09-06 RX ADMIN — FAMOTIDINE 20 MILLIGRAM(S): 10 INJECTION INTRAVENOUS at 14:08

## 2019-09-06 RX ADMIN — Medication 30 MILLILITER(S): at 13:40

## 2019-09-06 RX ADMIN — LIDOCAINE 10 MILLILITER(S): 4 CREAM TOPICAL at 13:41

## 2019-09-06 NOTE — ED PROVIDER NOTE - NS ED ROS FT
GENERAL: No fever or chills, EYES: no change in vision, HEENT: no trouble swallowing or speaking, CARDIAC: no chest pain, PULMONARY: no cough or SOB, GI: no abdominal pain, no nausea, no vomiting, no diarrhea or constipation, : No changes in urination, SKIN: no rashes, NEURO: no headache,  MSK: No joint pain ~Rodrigo Mike M.D. Resident

## 2019-09-06 NOTE — ED PROVIDER NOTE - NSFOLLOWUPINSTRUCTIONS_ED_ALL_ED_FT
Please follow up with your primary care physician in 24-48 hours  You have been prescribed Carafate: Please take as instructed  Please come back if any of the following: Fever, chills, abdominal pain, nausea, vomiting, or any major concern

## 2019-09-06 NOTE — ED PROVIDER NOTE - ATTENDING CONTRIBUTION TO CARE
Dr. Morales: 63 yo female with HTN, GERD, DM, recently upper endoscopy and colonoscopy for chronic abdominal pain (told there was "stomach irritation" but no official result yet), in ED with continued "burning" sensation in abdomen, which had resolved by the time pt was evaluated by myself.  Pt states that symptoms are associated with malodorous burping as well.  No CP/SOB, fever, N/V/D.  On exam pt well appearing, in NAD, heart RRR, lungs CTAB, abd NTND, extremities without swelling, strength 5/5 in all extremities and skin without rash.  Pt currently on a PPI--gave medication to prophylactically treat in ED and then added Rx carafate that pt will continue to take with her PPI.  Pt instructed that she must follow up with her GI doctor for endoscopy results and further recommendations.

## 2019-09-06 NOTE — ED PROVIDER NOTE - PHYSICAL EXAMINATION
Gen: AAOx3, non-toxic  Head: NCAT  HEENT: EOMI, oral mucosa moist, normal conjunctiva  Lung: CTAB, no respiratory distress, speaking in full sentences  CV: RRR, no murmurs, rubs or gallops  Abd: soft, NTND, no guarding, no CVA tenderness  MSK: no visible deformities  Skin: Warm, well perfused, no rash  Psych: normal affect.   ~Rodrigo Mike M.D. Resident

## 2019-09-06 NOTE — ED PROVIDER NOTE - CLINICAL SUMMARY MEDICAL DECISION MAKING FREE TEXT BOX
64yF with HTN, GERD, diabetes, presents with intermittent "burning like sensation" with increase in malodorous burping without any abd pain. Will treat symptoms with pepcid, maalox, viscous lido and reassess

## 2019-09-06 NOTE — ED PROVIDER NOTE - OBJECTIVE STATEMENT
64yF with HTN, GERD, diabetes, presents with intermittent "burning like sensation" with increase in malodorous burping. Recently underwent a upper endoscopy and colonoscopy but haven't received results yet. Currently not endorsing any abd pain, fever, chest pain, abd pain, emesis, urinary or bowel irregularities.

## 2019-09-06 NOTE — ED ADULT TRIAGE NOTE - CHIEF COMPLAINT QUOTE
Pt. c/o intermittent epigastric pain/spasm and nausea x few months. States she got endoscopy in July but didn't get results. Denies vomiting, diarrhea or fevers.

## 2019-09-06 NOTE — ED PROVIDER NOTE - PATIENT PORTAL LINK FT
You can access the FollowMyHealth Patient Portal offered by [facility] by registering at the following website: [email or web address]. By joining [facility]’s FollowMyHealth portal, you will also be able to view your health information using other applications (apps) compatible with our system.

## 2019-09-11 ENCOUNTER — APPOINTMENT (OUTPATIENT)
Dept: GYNECOLOGIC ONCOLOGY | Facility: CLINIC | Age: 64
End: 2019-09-11
Payer: MEDICARE

## 2019-09-11 PROCEDURE — 99213 OFFICE O/P EST LOW 20 MIN: CPT | Mod: 25

## 2019-09-11 PROCEDURE — 56605 BIOPSY OF VULVA/PERINEUM: CPT

## 2019-09-11 NOTE — HISTORY OF PRESENT ILLNESS
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 Underwent radical anterior vulvectomy and bilateral SLNMB. Diagnosed with autoimmune hemolytic anemia during recuperation and treated with steroids and rituximab with resolution. Sporadic surveillance exams in our office. \par \par Developed a recurrence in the anterior vulva scar line and underwent repeat radical excision on 12/4/17. Path with 9 mm focus of invasive cancer within HOMERO 3. Margins negative for carcinoma but positive for dysplasia. \par \par History of tobacco use.\par \par Pelvic US 1/2018 thin EE with trace fluid in endometrial cavity. \par EMBx benign and vulvar biopsy benign granulation tissue  April 2018.  \par \par Biopsy at July 2018 visit with lichen sclerosis. Clobetasol ointment was prescribed. Has less itching in the vulva but occasionally experiences more severe burning pain. \par Biopsy at 4/3/19 visit benign squamous mucosa. \par \par

## 2019-09-11 NOTE — PROCEDURE
[Other: ___] : [unfilled] [Vulvar Biopsy] : a vulvar biopsy [Patient] : the patient [Verbal Consent] : verbal consent was obtained prior to the procedure and is detailed in the patient's record [Site Verification] : site verification performed. [Time Out] : Time Out Procedure:The following was confirmed prior to the procedure: Correct patient identity, correct site, agreement on the procedure to be done and correct patient position. [Allergies Reviewed] : allergies were reviewed [1% Lidocaine ___(ml)] :  [unfilled]Uml of 1% Lidocaine [Betadine] : betadine [Silver Nitrate] : silver nitrate [No Complications] : none [Yes] : the specimen was sent to pathology [Post procedure instructions and information given] : post procedure instructions and information given and reviewed with patient. [Tolerated Well] : the patient tolerated the procedure well [FreeTextEntry1] : Biopsy of midline vulvar scar thickened white epithelium.

## 2019-09-18 LAB — CORE LAB BIOPSY: NORMAL

## 2019-12-03 ENCOUNTER — APPOINTMENT (OUTPATIENT)
Dept: GYNECOLOGIC ONCOLOGY | Facility: CLINIC | Age: 64
End: 2019-12-03

## 2020-03-03 ENCOUNTER — APPOINTMENT (OUTPATIENT)
Dept: GYNECOLOGIC ONCOLOGY | Facility: CLINIC | Age: 65
End: 2020-03-03
Payer: MEDICARE

## 2020-03-03 VITALS
BODY MASS INDEX: 34.2 KG/M2 | WEIGHT: 193 LBS | SYSTOLIC BLOOD PRESSURE: 171 MMHG | HEART RATE: 97 BPM | HEIGHT: 63 IN | DIASTOLIC BLOOD PRESSURE: 75 MMHG

## 2020-03-03 PROCEDURE — 56605 BIOPSY OF VULVA/PERINEUM: CPT

## 2020-03-03 PROCEDURE — 99213 OFFICE O/P EST LOW 20 MIN: CPT | Mod: 25

## 2020-03-17 LAB — CORE LAB BIOPSY: NORMAL

## 2020-03-17 NOTE — HISTORY OF PRESENT ILLNESS
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 Underwent radical anterior vulvectomy and bilateral SLNMB. Diagnosed with autoimmune hemolytic anemia during recuperation and treated with steroids and rituximab with resolution. Sporadic surveillance exams in our office. \par \par Developed a recurrence in the anterior vulva scar line and underwent repeat radical excision on 12/4/17. Path with 9 mm focus of invasive cancer within HOMERO 3. Margins negative for carcinoma but positive for dysplasia. \par \par History of tobacco use.\par \par Pelvic US 1/2018 thin EE with trace fluid in endometrial cavity. \par EMBx benign and vulvar biopsy benign granulation tissue  April 2018.  \par \par Biopsy at July 2018 visit with lichen sclerosis. Clobetasol ointment was prescribed. Has less itching in the vulva but occasionally experiences more severe burning pain. \par Biopsy at 4/3/19 visit benign squamous mucosa. \par 9/18/19 vulvar biopsy lichen sclerosis.\par \par 3/3/2020: Leana reports continued vulvar discomfort. Using Clobetasol but not applying to inner labia. Concerned about a new "bump" on the right labia, it is painful.

## 2020-03-17 NOTE — PROCEDURE
[Other: ___] : [unfilled] [Vulvar Biopsy] : a vulvar biopsy [Verbal Consent] : verbal consent was obtained prior to the procedure and is detailed in the patient's record [Patient] : the patient [Allergies Reviewed] : allergies were reviewed [Site Verification] : site verification performed. [Time Out] : Time Out Procedure:The following was confirmed prior to the procedure: Correct patient identity, correct site, agreement on the procedure to be done and correct patient position. [Betadine] : betadine [1% Lidocaine ___(ml)] :  [unfilled]Uml of 1% Lidocaine [Yes] : the specimen was sent to pathology [Silver Nitrate] : silver nitrate [No Complications] : none [Tolerated Well] : the patient tolerated the procedure well [Post procedure instructions and information given] : post procedure instructions and information given and reviewed with patient. [FreeTextEntry1] : 4 mm Whitman punch biopsy of right labia lesion

## 2020-06-09 ENCOUNTER — APPOINTMENT (OUTPATIENT)
Dept: GYNECOLOGIC ONCOLOGY | Facility: CLINIC | Age: 65
End: 2020-06-09
Payer: MEDICARE

## 2020-06-09 VITALS
HEART RATE: 80 BPM | WEIGHT: 197 LBS | BODY MASS INDEX: 34.9 KG/M2 | SYSTOLIC BLOOD PRESSURE: 147 MMHG | DIASTOLIC BLOOD PRESSURE: 75 MMHG

## 2020-06-09 PROCEDURE — 56605 BIOPSY OF VULVA/PERINEUM: CPT

## 2020-06-09 PROCEDURE — 99213 OFFICE O/P EST LOW 20 MIN: CPT | Mod: 25

## 2020-06-09 NOTE — HISTORY OF PRESENT ILLNESS
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 Underwent radical anterior vulvectomy and bilateral SLNMB. Diagnosed with autoimmune hemolytic anemia during recuperation and treated with steroids and rituximab with resolution. Sporadic surveillance exams in our office. \par \par Developed a recurrence in the anterior vulva scar line and underwent repeat radical excision on 12/4/17. Path with 9 mm focus of invasive cancer within HOMERO 3. Margins negative for carcinoma but positive for dysplasia. \par \par History of tobacco use.\par \par Pelvic US 1/2018 thin EE with trace fluid in endometrial cavity. \par EMBx benign and vulvar biopsy benign granulation tissue  April 2018.  \par \par Biopsy at July 2018 visit with lichen sclerosis. Clobetasol ointment was prescribed. Has less itching in the vulva but occasionally experiences more severe burning pain. \par Biopsy at 4/3/19 visit benign squamous mucosa. \par 9/18/19 vulvar biopsy lichen sclerosis.\par \par 3/3/2020: Leana reports continued vulvar discomfort. Using Clobetasol but not applying to inner labia. Concerned about a new "bump" on the right labia, it is painful. \par \par 6/9/20: Biopsy at last visit with lichen sclerosis. Today she returns with a new lump on the right labia. More painful. Clobetasol is not helping.

## 2020-06-09 NOTE — PHYSICAL EXAM
[Abnormal] : External genitalia: Abnormal [Normal] : Bimanual Exam: Normal [Fully active, able to carry on all pre-disease performance without restriction] : Status 0 - Fully active, able to carry on all pre-disease performance without restriction

## 2020-06-09 NOTE — PROCEDURE
[Vulvar Biopsy] : a vulvar biopsy [Other: ___] : [unfilled] [Patient] : the patient [Site Verification] : site verification performed. [Verbal Consent] : verbal consent was obtained prior to the procedure and is detailed in the patient's record [Time Out] : Time Out Procedure:The following was confirmed prior to the procedure: Correct patient identity, correct site, agreement on the procedure to be done and correct patient position. [Allergies Reviewed] : allergies were reviewed [1% Lidocaine ___(ml)] :  [unfilled]Uml of 1% Lidocaine [Betadine] : betadine [Silver Nitrate] : silver nitrate [Yes] : the specimen was sent to pathology

## 2020-06-19 ENCOUNTER — APPOINTMENT (OUTPATIENT)
Dept: GYNECOLOGIC ONCOLOGY | Facility: CLINIC | Age: 65
End: 2020-06-19
Payer: MEDICARE

## 2020-06-19 VITALS
SYSTOLIC BLOOD PRESSURE: 155 MMHG | HEART RATE: 78 BPM | HEIGHT: 63 IN | WEIGHT: 201 LBS | DIASTOLIC BLOOD PRESSURE: 73 MMHG | BODY MASS INDEX: 35.61 KG/M2

## 2020-06-19 PROCEDURE — 99212 OFFICE O/P EST SF 10 MIN: CPT

## 2020-06-22 RX ORDER — PEN NEEDLE, DIABETIC 29 G X1/2"
31G X 8 MM NEEDLE, DISPOSABLE MISCELLANEOUS
Qty: 100 | Refills: 0 | Status: ACTIVE | COMMUNITY
Start: 2020-01-07

## 2020-06-22 RX ORDER — BLOOD-GLUCOSE METER
KIT MISCELLANEOUS
Qty: 1 | Refills: 0 | Status: ACTIVE | COMMUNITY
Start: 2020-02-27

## 2020-06-22 RX ORDER — IBUPROFEN 600 MG/1
600 TABLET, FILM COATED ORAL
Qty: 30 | Refills: 0 | Status: ACTIVE | COMMUNITY
Start: 2020-05-07

## 2020-06-26 LAB — CORE LAB BIOPSY: NORMAL

## 2020-06-30 DIAGNOSIS — L30.9 DERMATITIS, UNSPECIFIED: ICD-10-CM

## 2020-08-18 ENCOUNTER — OUTPATIENT (OUTPATIENT)
Dept: OUTPATIENT SERVICES | Facility: HOSPITAL | Age: 65
LOS: 1 days | Discharge: HOME | End: 2020-08-18

## 2020-08-18 ENCOUNTER — APPOINTMENT (OUTPATIENT)
Dept: DERMATOLOGY | Facility: CLINIC | Age: 65
End: 2020-08-18
Payer: MEDICARE

## 2020-08-18 VITALS — HEIGHT: 63 IN | WEIGHT: 193 LBS | BODY MASS INDEX: 34.2 KG/M2

## 2020-08-18 VITALS — TEMPERATURE: 97.8 F

## 2020-08-18 DIAGNOSIS — D49.5 NEOPLASM OF UNSPECIFIED BEHAVIOR OF OTHER GENITOURINARY ORGANS: Chronic | ICD-10-CM

## 2020-08-18 DIAGNOSIS — Z77.22 CONTACT WITH AND (SUSPECTED) EXPOSURE TO ENVIRONMENTAL TOBACCO SMOKE (ACUTE) (CHRONIC): ICD-10-CM

## 2020-08-18 DIAGNOSIS — Z98.89 OTHER SPECIFIED POSTPROCEDURAL STATES: Chronic | ICD-10-CM

## 2020-08-18 DIAGNOSIS — D59.0 DRUG-INDUCED AUTOIMMUNE HEMOLYTIC ANEMIA: ICD-10-CM

## 2020-08-18 DIAGNOSIS — Z87.39 PERSONAL HISTORY OF OTHER DISEASES OF THE MUSCULOSKELETAL SYSTEM AND CONNECTIVE TISSUE: ICD-10-CM

## 2020-08-18 DIAGNOSIS — F17.210 NICOTINE DEPENDENCE, CIGARETTES, UNCOMPLICATED: ICD-10-CM

## 2020-08-18 PROCEDURE — 99203 OFFICE O/P NEW LOW 30 MIN: CPT | Mod: Q5

## 2020-08-24 ENCOUNTER — EMERGENCY (EMERGENCY)
Facility: HOSPITAL | Age: 65
LOS: 1 days | Discharge: HOME | End: 2020-08-24
Attending: EMERGENCY MEDICINE | Admitting: EMERGENCY MEDICINE
Payer: MEDICARE

## 2020-08-24 ENCOUNTER — APPOINTMENT (OUTPATIENT)
Dept: HEMATOLOGY ONCOLOGY | Facility: CLINIC | Age: 65
End: 2020-08-24

## 2020-08-24 ENCOUNTER — LABORATORY RESULT (OUTPATIENT)
Age: 65
End: 2020-08-24

## 2020-08-24 VITALS
SYSTOLIC BLOOD PRESSURE: 150 MMHG | OXYGEN SATURATION: 99 % | RESPIRATION RATE: 18 BRPM | HEART RATE: 95 BPM | DIASTOLIC BLOOD PRESSURE: 52 MMHG | TEMPERATURE: 99 F

## 2020-08-24 DIAGNOSIS — Z98.89 OTHER SPECIFIED POSTPROCEDURAL STATES: Chronic | ICD-10-CM

## 2020-08-24 DIAGNOSIS — D49.5 NEOPLASM OF UNSPECIFIED BEHAVIOR OF OTHER GENITOURINARY ORGANS: Chronic | ICD-10-CM

## 2020-08-24 PROCEDURE — 99283 EMERGENCY DEPT VISIT LOW MDM: CPT | Mod: GC

## 2020-08-24 RX ORDER — OXYCODONE HYDROCHLORIDE 5 MG/1
1 TABLET ORAL
Qty: 9 | Refills: 0
Start: 2020-08-24

## 2020-08-24 NOTE — ED PROVIDER NOTE - NSFOLLOWUPINSTRUCTIONS_ED_ALL_ED_FT
Please  the oxycodone medication for your designated pharmacy and take as prescribed for your pain. Please follow-up with OB/GYN and dermatology this week at your scheduled appointments to further evaluate the vulvar lump and pain. Please return to the ED if you develop fevers, bleeding, discharge, swelling, weight loss, night sweats, or loss of consciousness.

## 2020-08-24 NOTE — PHYSICAL EXAM
[Obese] : obese [Restricted in physically strenuous activity but ambulatory and able to carry out work of a light or sedentary nature] : Status 1- Restricted in physically strenuous activity but ambulatory and able to carry out work of a light or sedentary nature, e.g., light house work, office work [Normal] : grossly intact

## 2020-08-24 NOTE — ED PROVIDER NOTE - OBJECTIVE STATEMENT
66 y/o F presenting due to biopsy complications. Pt w/ hx of lichen sclerosus and was diagnosed w/ vulvar cancer 8 years ago. Pt had surgery done to remove L side of vulva and clitoris. Since then, has been following up w/ GYN every 3-4 months to get repeat biopsies, which have been negative for recurring cancer. This past february, she had a biopsy done again and felt there was residual pain and swelling in vulva area afterward. Pt had another biopsy done in June and felt pain after this one got even worse, and noticed her vulva became erythematous w/ associated swelling. Pt feels there is a bump on the R side of vulva now and suspects it may be infected. Pain is affecting pt's daily activities, including walking, sitting and urination. Pt also reports burning w/ urination. Was prescribed steroid cream and lidocaine by OB-GYN, which has not helped with pain. Pain is currently 9/10. Pt is scheduled to see OB-GYN and dermatology this week for further evaluation. Denies any vaginal bleeding or discharge. States pain in vulva is sharp and burning. NKDA. Smokes 1 PPD.

## 2020-08-24 NOTE — HISTORY OF PRESENT ILLNESS
[de-identified] : Patient is feeling well. Following with Dr Radha Sauer GYN Oncologist for history of vulvar cancer, stable. \par \par Denies fever, night sweats or weight loss. \par \par Flu like symptoms on a course of Zithromax, minimal improvement. Patient is using Albuterol  inhaler.\par \par No changes in her medical, surgical or social history since 5/8/17.  [0 - No Distress] : Distress Level: 0 [de-identified] : Ms. Carranza had vulvar cancer surgery on December 13, 2014,  preoperatively her hemoglobin was 10.7 with an MCV of 62.7 the patient states that she has thalassemia trait. 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 Reticulocyte count was elevated consistent with hemolysis treated with steroids with no  improvement over 2 or 3 days and  was given one treatment with rituximab; with appropriate response. \par \par Surgical Final Report\par \par \par Final Diagnosis\par 1. Anterior vulva, biopsy\par - Lichen sclerosus\par - Negative for dysplasia\par \par 2. Portion of anterior vulva, resection\par - Invasive well differentiated squamous cell carcinoma, see\par synoptic summary\par _________________________________________________________________\par \par Intraoperative Consultation\par 1. Anterior vulva\par - Negative for dysplasia\par By Dr. GRIS Abad\par \par Synoptic Summary\par Case Summary (Vulva, 7th edition and FIGO 2014 Annual Report,\par January 2016)\par \par Specimen Type: Anterior vulva\par Procedure: Wide excision\par Lymph Node Sampling: Not applicable\par Tumor Site: Anterior vulva\par Tumor Size: 0.9 cm\par Tumor Focality: Unifocal\par Histologic Type: Squamous cell carcinoma\par Histologic Grade: G1\par Microscopic Tumor Extension:\par Depth of Invasion: 4 mm\par Tumor Border: Pushing\par Margins:\par Uninvolved by invasive carcinoma\par Distance of invasive carcinoma from closest posterior\par margin: 4 mm\par Involved by vulvar intraepithelial lesion, differentiated\par type: all margins\par Lymph-Vascular Invasion: Not identified\par Lymph Nodes: Not applicable\par \par Pathologic Staging:\par TNM Descriptors: Not applicable\par Primary Tumor (pT): pT1b\par Regional Lymph Nodes (pN): pNx\par \par \par \par \par \par BALJINDER CARRANZA C 2\par \par \par \par Surgical Final Report\par \par \par \par \par Distant Metastasis (pM): pMx\par FIGO Stage: [id number]\par \par Additional Pathologic Findings: Lichen sclerosus, dVIN\par Comment(s): Deeper levels on block 1E are examined.\par \par No prior changes in her medical, social or surgical history since 2/26/18.  \par \par \par  \par

## 2020-08-24 NOTE — ED PROVIDER NOTE - CLINICAL SUMMARY MEDICAL DECISION MAKING FREE TEXT BOX
64 y/o F p/w vulvar lump after previous biopsy. Will prescribe Oxycodone for pain and have her follow up w/ OB-GYN and derm at her schedule appointments this week. Will not prescribe any steroids, as Ob-GYN and derm are already in the process of managing steroid regimen. Pt will be discharged home w/ return precautions.

## 2020-08-24 NOTE — ED PROVIDER NOTE - CHIEF COMPLAINT
The patient is a 65y Female complaining of The patient is a 66 yo female complaining of a biopsy complication.

## 2020-08-24 NOTE — ED PROVIDER NOTE - GENITOURINARY VULVA
+Vulva appears erythematous with a visible lump w/ drainage about [id number] that is raised on the R vulva. TTP. No active bleeding or signs of pus drainage. L vulva has been removed surgically. Vulva appears erythematous with a visible lump w/ drainage about 1cm x 1cm that is raised on the R vulva. TTP. No active bleeding or signs of pus drainage. L vulva has been removed surgically.

## 2020-08-24 NOTE — ED ADULT TRIAGE NOTE - CHIEF COMPLAINT QUOTE
Pt with PMH of cancer of vulva (not on chemo/rad), had biopsy done in Feb, which "didn't heal" and another in June, which "didn't heal", pt c/o pain and difficulty walking due to discomfort. Denies fevers.

## 2020-08-24 NOTE — ED PROVIDER NOTE - PATIENT PORTAL LINK FT
You can access the FollowMyHealth Patient Portal offered by [facility] by registering at the following website: [email or web address]. By joining [facility] [employer] [facility]’s FollowMyHealth portal, you will also be able to view your health information using other applications (apps) compatible with our system.

## 2020-08-24 NOTE — REVIEW OF SYSTEMS
[Wheezing] : wheezing [Fatigue] : fatigue [Cough] : cough [Negative] : Allergic/Immunologic [FreeTextEntry6] : mild congestion. Smokes 1/2  pack per day

## 2020-08-25 ENCOUNTER — APPOINTMENT (OUTPATIENT)
Dept: DERMATOLOGY | Facility: CLINIC | Age: 65
End: 2020-08-25
Payer: MEDICARE

## 2020-08-25 VITALS — TEMPERATURE: 97.3 F

## 2020-08-25 VITALS — WEIGHT: 193 LBS | BODY MASS INDEX: 34.2 KG/M2 | HEIGHT: 63 IN

## 2020-08-25 DIAGNOSIS — D48.9 NEOPLASM OF UNCERTAIN BEHAVIOR, UNSPECIFIED: ICD-10-CM

## 2020-08-25 PROCEDURE — 99213 OFFICE O/P EST LOW 20 MIN: CPT | Mod: 25,Q5

## 2020-08-25 PROCEDURE — 56605 BIOPSY OF VULVA/PERINEUM: CPT

## 2020-09-08 ENCOUNTER — APPOINTMENT (OUTPATIENT)
Dept: DERMATOLOGY | Facility: CLINIC | Age: 65
End: 2020-09-08
Payer: MEDICARE

## 2020-09-08 ENCOUNTER — APPOINTMENT (OUTPATIENT)
Dept: GYNECOLOGIC ONCOLOGY | Facility: CLINIC | Age: 65
End: 2020-09-08
Payer: MEDICARE

## 2020-09-08 VITALS — BODY MASS INDEX: 34.2 KG/M2 | HEIGHT: 63 IN | WEIGHT: 193 LBS

## 2020-09-08 VITALS
SYSTOLIC BLOOD PRESSURE: 173 MMHG | BODY MASS INDEX: 34.73 KG/M2 | HEIGHT: 63 IN | DIASTOLIC BLOOD PRESSURE: 71 MMHG | WEIGHT: 196 LBS | HEART RATE: 109 BPM

## 2020-09-08 PROCEDURE — 99215 OFFICE O/P EST HI 40 MIN: CPT

## 2020-09-08 PROCEDURE — 99214 OFFICE O/P EST MOD 30 MIN: CPT

## 2020-09-08 NOTE — HISTORY OF PRESENT ILLNESS
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 Underwent radical anterior vulvectomy and bilateral SLNMB. Diagnosed with autoimmune hemolytic anemia during recuperation and treated with steroids and rituximab with resolution. Sporadic surveillance exams in our office. \par \angela Developed a recurrence in the anterior vulva scar line and underwent repeat radical excision on 12/4/17. Path with 9 mm focus of invasive cancer within HOMERO 3. Margins negative for carcinoma but positive for dysplasia. \par \par 3/3/20 - vulvar biopsy lichen sclerosis\par 6/9/20 - more discomfort in the right vulva. Exam showed a new nodule. Biopsied with results showing psoriasiform spongiotic dermatitis, no cancer. \par \par Sent to Derm for evaluation given continued symptoms and unusual biopsy. \par \par Vulvar lesion apparently continued to grow - repeat biopsy now demonstrates SCCA of the vulva.\par \par History of longstanding tobacco use.\par \par Pelvic US 1/2018 thin EE with trace fluid in endometrial cavity. \par EMBx benign and vulvar biopsy benign granulation tissue  April 2018.  \par \par Biopsy at July 2018 visit with lichen sclerosis. Clobetasol ointment was prescribed. Has less itching in the vulva but occasionally experiences more severe burning pain. \par Biopsy at 4/3/19 visit benign squamous mucosa. \par 9/18/19 vulvar biopsy lichen sclerosis.\par \par 3/3/2020: Leana reports continued vulvar discomfort. Using Clobetasol but not applying to inner labia. Concerned about a new "bump" on the right labia, it is painful. \par \par 6/9/20: Biopsy at last visit with lichen sclerosis. Today she returns with a new lump on the right labia. More painful. Clobetasol is not helping. \par \par Today, concerned with infection from biopsy site on June 9th.\par

## 2020-09-11 ENCOUNTER — APPOINTMENT (OUTPATIENT)
Dept: NUCLEAR MEDICINE | Facility: IMAGING CENTER | Age: 65
End: 2020-09-11

## 2020-09-11 ENCOUNTER — OUTPATIENT (OUTPATIENT)
Dept: OUTPATIENT SERVICES | Facility: HOSPITAL | Age: 65
LOS: 1 days | End: 2020-09-11
Payer: COMMERCIAL

## 2020-09-11 DIAGNOSIS — D49.5 NEOPLASM OF UNSPECIFIED BEHAVIOR OF OTHER GENITOURINARY ORGANS: Chronic | ICD-10-CM

## 2020-09-11 DIAGNOSIS — C51.9 MALIGNANT NEOPLASM OF VULVA, UNSPECIFIED: ICD-10-CM

## 2020-09-11 DIAGNOSIS — Z98.89 OTHER SPECIFIED POSTPROCEDURAL STATES: Chronic | ICD-10-CM

## 2020-09-11 PROCEDURE — 78815 PET IMAGE W/CT SKULL-THIGH: CPT

## 2020-09-11 PROCEDURE — A9552: CPT

## 2020-09-11 PROCEDURE — 78815 PET IMAGE W/CT SKULL-THIGH: CPT | Mod: 26,PI

## 2020-09-15 ENCOUNTER — APPOINTMENT (OUTPATIENT)
Dept: PLASTIC SURGERY | Facility: CLINIC | Age: 65
End: 2020-09-15
Payer: MEDICARE

## 2020-09-15 VITALS
HEIGHT: 63 IN | TEMPERATURE: 98.2 F | SYSTOLIC BLOOD PRESSURE: 152 MMHG | OXYGEN SATURATION: 94 % | WEIGHT: 195 LBS | DIASTOLIC BLOOD PRESSURE: 74 MMHG | HEART RATE: 84 BPM | BODY MASS INDEX: 34.55 KG/M2

## 2020-09-15 PROCEDURE — 99204 OFFICE O/P NEW MOD 45 MIN: CPT

## 2020-09-18 NOTE — PHYSICAL EXAM
[de-identified] : External genitalia: Abnormal. 2 cm raised lesion on right posterior labia majora c/w SCCA

## 2020-09-18 NOTE — HISTORY OF PRESENT ILLNESS
[FreeTextEntry1] : 64 y/o female referred by Dr. Sauer, presents today for consultation of vulvar reconstruction, pt was initially diagnosed with stage IB SCCA of the vulva n 12/2014. Underwent radical anterior vulvectomy and bilateral SLNMB. \par     Pt developed a recurrence in the anterior vulva scar line and underwent repeat radical excision on 12/4/17. Path with 9 mm focus of invasive cancer within HOMERO 3. Margins negative for carcinoma but positive for dysplasia. \par 3/3/20 - vulvar biopsy lichen sclerosis\par 6/9/20 - more discomfort in the right vulva. Exam showed a new nodule. Biopsied with results showing psoriasiform spongiotic dermatitis, no cancer. \par Sent to Derm for evaluation given continued symptoms and unusual biopsy. \par Vulvar lesion apparently continued to grow - repeat biopsy now demonstrates SCCA of the vulva.\par

## 2020-09-18 NOTE — REVIEW OF SYSTEMS
[As Noted in HPI] : as noted in HPI [Fever] : no fever [Chills] : no chills [Chest Pain] : no chest pain [Palpitations] : no palpitations [Shortness Of Breath] : no shortness of breath [Wheezing] : no wheezing [Cough] : no cough [SOB on Exertion] : no shortness of breath during exertion

## 2020-10-12 ENCOUNTER — OUTPATIENT (OUTPATIENT)
Dept: OUTPATIENT SERVICES | Facility: HOSPITAL | Age: 65
LOS: 1 days | End: 2020-10-12
Payer: MEDICARE

## 2020-10-12 VITALS
RESPIRATION RATE: 16 BRPM | HEIGHT: 62 IN | SYSTOLIC BLOOD PRESSURE: 150 MMHG | WEIGHT: 195.99 LBS | OXYGEN SATURATION: 96 % | TEMPERATURE: 97 F | HEART RATE: 98 BPM | DIASTOLIC BLOOD PRESSURE: 80 MMHG

## 2020-10-12 DIAGNOSIS — C51.9 MALIGNANT NEOPLASM OF VULVA, UNSPECIFIED: ICD-10-CM

## 2020-10-12 DIAGNOSIS — Z98.89 OTHER SPECIFIED POSTPROCEDURAL STATES: Chronic | ICD-10-CM

## 2020-10-12 DIAGNOSIS — E03.9 HYPOTHYROIDISM, UNSPECIFIED: ICD-10-CM

## 2020-10-12 DIAGNOSIS — Z96.651 PRESENCE OF RIGHT ARTIFICIAL KNEE JOINT: Chronic | ICD-10-CM

## 2020-10-12 DIAGNOSIS — E11.9 TYPE 2 DIABETES MELLITUS WITHOUT COMPLICATIONS: ICD-10-CM

## 2020-10-12 DIAGNOSIS — I10 ESSENTIAL (PRIMARY) HYPERTENSION: ICD-10-CM

## 2020-10-12 DIAGNOSIS — D49.5 NEOPLASM OF UNSPECIFIED BEHAVIOR OF OTHER GENITOURINARY ORGANS: Chronic | ICD-10-CM

## 2020-10-12 LAB
ANION GAP SERPL CALC-SCNC: 12 MMO/L — SIGNIFICANT CHANGE UP (ref 7–14)
BLD GP AB SCN SERPL QL: NEGATIVE — SIGNIFICANT CHANGE UP
BUN SERPL-MCNC: 18 MG/DL — SIGNIFICANT CHANGE UP (ref 7–23)
CALCIUM SERPL-MCNC: 9.3 MG/DL — SIGNIFICANT CHANGE UP (ref 8.4–10.5)
CHLORIDE SERPL-SCNC: 102 MMOL/L — SIGNIFICANT CHANGE UP (ref 98–107)
CO2 SERPL-SCNC: 22 MMOL/L — SIGNIFICANT CHANGE UP (ref 22–31)
CREAT SERPL-MCNC: 0.93 MG/DL — SIGNIFICANT CHANGE UP (ref 0.5–1.3)
GLUCOSE SERPL-MCNC: 130 MG/DL — HIGH (ref 70–99)
HBA1C BLD-MCNC: 6.9 % — HIGH (ref 4–5.6)
HCT VFR BLD CALC: 37 % — SIGNIFICANT CHANGE UP (ref 34.5–45)
HGB BLD-MCNC: 10.9 G/DL — LOW (ref 11.5–15.5)
MCHC RBC-ENTMCNC: 19.5 PG — LOW (ref 27–34)
MCHC RBC-ENTMCNC: 29.5 % — LOW (ref 32–36)
MCV RBC AUTO: 66.2 FL — LOW (ref 80–100)
NRBC # FLD: 0 K/UL — SIGNIFICANT CHANGE UP (ref 0–0)
PLATELET # BLD AUTO: 232 K/UL — SIGNIFICANT CHANGE UP (ref 150–400)
PMV BLD: 10.8 FL — SIGNIFICANT CHANGE UP (ref 7–13)
POTASSIUM SERPL-MCNC: 4.3 MMOL/L — SIGNIFICANT CHANGE UP (ref 3.5–5.3)
POTASSIUM SERPL-SCNC: 4.3 MMOL/L — SIGNIFICANT CHANGE UP (ref 3.5–5.3)
RBC # BLD: 5.59 M/UL — HIGH (ref 3.8–5.2)
RBC # FLD: 15 % — HIGH (ref 10.3–14.5)
RH IG SCN BLD-IMP: POSITIVE — SIGNIFICANT CHANGE UP
SODIUM SERPL-SCNC: 136 MMOL/L — SIGNIFICANT CHANGE UP (ref 135–145)
WBC # BLD: 7.86 K/UL — SIGNIFICANT CHANGE UP (ref 3.8–10.5)
WBC # FLD AUTO: 7.86 K/UL — SIGNIFICANT CHANGE UP (ref 3.8–10.5)

## 2020-10-12 PROCEDURE — 93010 ELECTROCARDIOGRAM REPORT: CPT

## 2020-10-12 RX ORDER — ACETAMINOPHEN 500 MG
2 TABLET ORAL
Qty: 0 | Refills: 0 | DISCHARGE

## 2020-10-12 RX ORDER — SODIUM CHLORIDE 9 MG/ML
3 INJECTION INTRAMUSCULAR; INTRAVENOUS; SUBCUTANEOUS EVERY 8 HOURS
Refills: 0 | Status: DISCONTINUED | OUTPATIENT
Start: 2020-10-19 | End: 2020-10-22

## 2020-10-12 RX ORDER — LEVOTHYROXINE SODIUM 125 MCG
1 TABLET ORAL
Qty: 0 | Refills: 0 | DISCHARGE

## 2020-10-12 RX ORDER — SODIUM CHLORIDE 9 MG/ML
1000 INJECTION, SOLUTION INTRAVENOUS
Refills: 0 | Status: DISCONTINUED | OUTPATIENT
Start: 2020-10-19 | End: 2020-10-20

## 2020-10-12 RX ORDER — IBUPROFEN 200 MG
1 TABLET ORAL
Qty: 0 | Refills: 0 | DISCHARGE

## 2020-10-12 NOTE — H&P PST ADULT - NSICDXFAMILYHX_GEN_ALL_CORE_FT
FAMILY HISTORY:  Mother  Still living? Unknown  Family history of diabetes mellitus in mother, Age at diagnosis: Age Unknown    Sibling  Still living? Unknown  Family history of diabetes mellitus in mother, Age at diagnosis: Age Unknown    Aunt  Still living? Unknown  Family history of colon cancer, Age at diagnosis: Age Unknown  Family history of diabetes mellitus in mother, Age at diagnosis: Age Unknown

## 2020-10-12 NOTE — H&P PST ADULT - NSICDXPASTMEDICALHX_GEN_ALL_CORE_FT
PAST MEDICAL HISTORY:  Anxiety and depression     DM type 2 (diabetes mellitus, type 2)     Fibromyalgia     HLD (hyperlipidemia)     HTN (hypertension)     Hypothyroid     Lichen sclerosus of female genitalia     Strabismus     Thalassemia minor     TIA (transient ischemic attack) 8/2013  blurred vision/dizziness    Vulva cancer

## 2020-10-12 NOTE — H&P PST ADULT - HISTORY OF PRESENT ILLNESS
64 y/o female with Type 2 DM, HTN, HLD, Hypothyroidism, Fibromyalgia, Thalassemia Minor, and lichen sclerosus of female genitalia presents to PST preop for radical vulvectomy, bilateral lymph node dissection on 10/19/20. preop dx of malignant neoplasm of vulva. pt s/p radical anterior vulvectomy in 2014 with a repeat radical excision in 2017 for recurrence. pt monitored every few months by GYN oncology. she states a vulva lesion was biopsied 2 months ago and pathology revealed recurrence now for surgery. 66 y/o female with Type 2 DM, HTN, HLD, Hypothyroidism, Fibromyalgia, Thalassemia Minor, and lichen sclerosus of female genitalia presents to PST preop for radical vulvectomy, bilateral lymph node dissection on 10/19/20. preop dx of malignant neoplasm of vulva. pt s/p radical anterior vulvectomy in 2014 with a repeat radical excision in 2017 for recurrence. pt monitored every few months by GYN oncology. she states a vulva lesion was biopsied 2 months ago and pathology revealed recurrence. now for surgery.

## 2020-10-12 NOTE — H&P PST ADULT - RS GEN PE MLT RESP DETAILS PC
no wheezes/airway patent/breath sounds equal/clear to auscultation bilaterally/respirations non-labored

## 2020-10-12 NOTE — H&P PST ADULT - NSICDXPASTSURGICALHX_GEN_ALL_CORE_FT
PAST SURGICAL HISTORY:  H/O total knee replacement, right 4 yrs ago    S/P eye surgery June 2014, for strabismus    S/P laparoscopic cholecystectomy 20 years ago    Vulvar neoplasm s/p radical anterior vulvectomy in 2014 with radical excision in 2017

## 2020-10-12 NOTE — H&P PST ADULT - NSICDXPROBLEM_GEN_ALL_CORE_FT
PROBLEM DIAGNOSES  Problem: Malignant neoplasm of vulva  Assessment and Plan:     Problem: HTN (hypertension)  Assessment and Plan:     Problem: Type 2 diabetes mellitus  Assessment and Plan:        PROBLEM DIAGNOSES  Problem: Malignant neoplasm of vulva  Assessment and Plan: preop for radical vulvectomy, bilateral lymph node dissection on 10/19/20  preop instructions given, pt verbalized understanding   pt can take prescribed omeprazole for GI prophylaxis day of surgery  chlorhexidine wash not provided- pt reports open wound to vulva   pt scheduled for COVID testing on 10/17/20    Problem: HTN (hypertension)  Assessment and Plan: pt will take amlodipine and enalapril day of surgery as prescribed   /80- medical clearance requested. pt with elevated BP and multiple co-morbidities     Problem: Type 2 diabetes mellitus  Assessment and Plan: pt will hold Novolog while NPO  she will reduce tresiba by 20% night before surgery   accu check to be assessed on admission     Problem: Hypothyroidism  Assessment and Plan: pt will take escitalopram day of surgery     Problem: Hypothyroidism  Assessment and Plan: pt will take levothyroxine AM of surgery

## 2020-10-12 NOTE — H&P PST ADULT - CARDIOVASCULAR
Message from [place]:    Dr. Jazmin Pardo   Received: Today   Message Contents   Celioraina Sanderson, [address] [facility] Front Office Pool             Pt advised she does not like the side effects of \"metformin\". She advised she would like another medication.      Best contact [phone] details… detailed exam

## 2020-10-12 NOTE — H&P PST ADULT - ENDOCRINE COMMENTS
h/o hypothyroidism- stable on current dose of levothyroxine. Type 2 DM on insulin and oral hypoglycemics. fasting BS 140mg/dl. pt reports A1c in Jan 2020 was 7.0% h/o hypothyroidism- stable on current dose of levothyroxine. Type 2 DM on insulin. fasting BS 140mg/dl. pt reports A1c in Jan 2020 was 7.0%

## 2020-10-12 NOTE — H&P PST ADULT - NEGATIVE NEUROLOGICAL SYMPTOMS
no vertigo/no headache/no difficulty walking/no facial palsy/no confusion/no transient paralysis/no paresthesias/no loss of consciousness/no hemiparesis/no weakness/no generalized seizures/no focal seizures/no syncope/no tremors

## 2020-10-16 DIAGNOSIS — Z01.818 ENCOUNTER FOR OTHER PREPROCEDURAL EXAMINATION: ICD-10-CM

## 2020-10-16 NOTE — ASU PATIENT PROFILE, ADULT - PMH
Anxiety and depression    DM type 2 (diabetes mellitus, type 2)    Fibromyalgia    HLD (hyperlipidemia)    HTN (hypertension)    Hypothyroid    Lichen sclerosus of female genitalia    Strabismus    Thalassemia minor    TIA (transient ischemic attack)  8/2013  blurred vision/dizziness  Vulva cancer

## 2020-10-16 NOTE — ASU PATIENT PROFILE, ADULT - PSH
H/O total knee replacement, right  4 yrs ago  S/P eye surgery  June 2014, for strabismus  S/P laparoscopic cholecystectomy  20 years ago  Vulvar neoplasm  s/p radical anterior vulvectomy in 2014 with radical excision in 2017

## 2020-10-17 ENCOUNTER — APPOINTMENT (OUTPATIENT)
Dept: DISASTER EMERGENCY | Facility: CLINIC | Age: 65
End: 2020-10-17

## 2020-10-18 ENCOUNTER — TRANSCRIPTION ENCOUNTER (OUTPATIENT)
Age: 65
End: 2020-10-18

## 2020-10-18 LAB — SARS-COV-2 N GENE NPH QL NAA+PROBE: NOT DETECTED

## 2020-10-19 ENCOUNTER — APPOINTMENT (OUTPATIENT)
Dept: PLASTIC SURGERY | Facility: HOSPITAL | Age: 65
End: 2020-10-19
Payer: MEDICARE

## 2020-10-19 ENCOUNTER — RESULT REVIEW (OUTPATIENT)
Age: 65
End: 2020-10-19

## 2020-10-19 ENCOUNTER — APPOINTMENT (OUTPATIENT)
Dept: GYNECOLOGIC ONCOLOGY | Facility: HOSPITAL | Age: 65
End: 2020-10-19

## 2020-10-19 ENCOUNTER — INPATIENT (INPATIENT)
Facility: HOSPITAL | Age: 65
LOS: 2 days | Discharge: HOME | End: 2020-10-22
Attending: OBSTETRICS & GYNECOLOGY | Admitting: OBSTETRICS & GYNECOLOGY
Payer: MEDICARE

## 2020-10-19 VITALS
DIASTOLIC BLOOD PRESSURE: 61 MMHG | OXYGEN SATURATION: 96 % | TEMPERATURE: 99 F | HEIGHT: 62 IN | HEART RATE: 78 BPM | WEIGHT: 195.99 LBS | SYSTOLIC BLOOD PRESSURE: 155 MMHG | RESPIRATION RATE: 16 BRPM

## 2020-10-19 DIAGNOSIS — Z98.89 OTHER SPECIFIED POSTPROCEDURAL STATES: Chronic | ICD-10-CM

## 2020-10-19 DIAGNOSIS — C51.9 MALIGNANT NEOPLASM OF VULVA, UNSPECIFIED: ICD-10-CM

## 2020-10-19 DIAGNOSIS — D49.5 NEOPLASM OF UNSPECIFIED BEHAVIOR OF OTHER GENITOURINARY ORGANS: Chronic | ICD-10-CM

## 2020-10-19 DIAGNOSIS — Z96.651 PRESENCE OF RIGHT ARTIFICIAL KNEE JOINT: Chronic | ICD-10-CM

## 2020-10-19 LAB
GLUCOSE BLDC GLUCOMTR-MCNC: 215 MG/DL — HIGH (ref 70–99)
GLUCOSE BLDC GLUCOMTR-MCNC: 272 MG/DL — HIGH (ref 70–99)
GLUCOSE BLDC GLUCOMTR-MCNC: 275 MG/DL — HIGH (ref 70–99)
GLUCOSE BLDC GLUCOMTR-MCNC: 275 MG/DL — HIGH (ref 70–99)
RH IG SCN BLD-IMP: POSITIVE — SIGNIFICANT CHANGE UP

## 2020-10-19 PROCEDURE — 15734 MUSCLE-SKIN GRAFT TRUNK: CPT

## 2020-10-19 PROCEDURE — 88341 IMHCHEM/IMCYTCHM EA ADD ANTB: CPT | Mod: 26

## 2020-10-19 PROCEDURE — 88305 TISSUE EXAM BY PATHOLOGIST: CPT | Mod: 26

## 2020-10-19 PROCEDURE — 88342 IMHCHEM/IMCYTCHM 1ST ANTB: CPT | Mod: 26

## 2020-10-19 PROCEDURE — 88304 TISSUE EXAM BY PATHOLOGIST: CPT | Mod: 26

## 2020-10-19 PROCEDURE — 88331 PATH CONSLTJ SURG 1 BLK 1SPC: CPT | Mod: 26

## 2020-10-19 PROCEDURE — 88307 TISSUE EXAM BY PATHOLOGIST: CPT | Mod: 26

## 2020-10-19 PROCEDURE — 56631 VLVCTMY RAD PRTL UNI LYMPHAD: CPT

## 2020-10-19 RX ORDER — INSULIN LISPRO 100/ML
VIAL (ML) SUBCUTANEOUS AT BEDTIME
Refills: 0 | Status: DISCONTINUED | OUTPATIENT
Start: 2020-10-19 | End: 2020-10-22

## 2020-10-19 RX ORDER — LEVOTHYROXINE SODIUM 125 MCG
150 TABLET ORAL DAILY
Refills: 0 | Status: DISCONTINUED | OUTPATIENT
Start: 2020-10-19 | End: 2020-10-22

## 2020-10-19 RX ORDER — HEPARIN SODIUM 5000 [USP'U]/ML
5000 INJECTION INTRAVENOUS; SUBCUTANEOUS EVERY 8 HOURS
Refills: 0 | Status: DISCONTINUED | OUTPATIENT
Start: 2020-10-19 | End: 2020-10-20

## 2020-10-19 RX ORDER — OXYCODONE HYDROCHLORIDE 5 MG/1
2.5 TABLET ORAL EVERY 4 HOURS
Refills: 0 | Status: DISCONTINUED | OUTPATIENT
Start: 2020-10-19 | End: 2020-10-22

## 2020-10-19 RX ORDER — GENTAMICIN SULFATE 0.1 %
1 OINTMENT (GRAM) TOPICAL
Refills: 0 | Status: DISCONTINUED | OUTPATIENT
Start: 2020-10-19 | End: 2020-10-22

## 2020-10-19 RX ORDER — DEXTROSE 50 % IN WATER 50 %
25 SYRINGE (ML) INTRAVENOUS ONCE
Refills: 0 | Status: DISCONTINUED | OUTPATIENT
Start: 2020-10-19 | End: 2020-10-22

## 2020-10-19 RX ORDER — METOPROLOL TARTRATE 50 MG
5 TABLET ORAL EVERY 6 HOURS
Refills: 0 | Status: DISCONTINUED | OUTPATIENT
Start: 2020-10-19 | End: 2020-10-20

## 2020-10-19 RX ORDER — ACETAMINOPHEN 500 MG
975 TABLET ORAL EVERY 6 HOURS
Refills: 0 | Status: DISCONTINUED | OUTPATIENT
Start: 2020-10-19 | End: 2020-10-22

## 2020-10-19 RX ORDER — MEPERIDINE HYDROCHLORIDE 50 MG/ML
12.5 INJECTION INTRAMUSCULAR; INTRAVENOUS; SUBCUTANEOUS
Refills: 0 | Status: DISCONTINUED | OUTPATIENT
Start: 2020-10-19 | End: 2020-10-20

## 2020-10-19 RX ORDER — ESCITALOPRAM OXALATE 10 MG/1
30 TABLET, FILM COATED ORAL DAILY
Refills: 0 | Status: DISCONTINUED | OUTPATIENT
Start: 2020-10-19 | End: 2020-10-22

## 2020-10-19 RX ORDER — DEXTROSE 50 % IN WATER 50 %
15 SYRINGE (ML) INTRAVENOUS ONCE
Refills: 0 | Status: DISCONTINUED | OUTPATIENT
Start: 2020-10-19 | End: 2020-10-22

## 2020-10-19 RX ORDER — KETOROLAC TROMETHAMINE 30 MG/ML
15 SYRINGE (ML) INJECTION EVERY 6 HOURS
Refills: 0 | Status: DISCONTINUED | OUTPATIENT
Start: 2020-10-19 | End: 2020-10-22

## 2020-10-19 RX ORDER — FENTANYL CITRATE 50 UG/ML
50 INJECTION INTRAVENOUS
Refills: 0 | Status: DISCONTINUED | OUTPATIENT
Start: 2020-10-19 | End: 2020-10-20

## 2020-10-19 RX ORDER — DEXTROSE 50 % IN WATER 50 %
12.5 SYRINGE (ML) INTRAVENOUS ONCE
Refills: 0 | Status: DISCONTINUED | OUTPATIENT
Start: 2020-10-19 | End: 2020-10-22

## 2020-10-19 RX ORDER — FENTANYL CITRATE 50 UG/ML
25 INJECTION INTRAVENOUS
Refills: 0 | Status: DISCONTINUED | OUTPATIENT
Start: 2020-10-19 | End: 2020-10-20

## 2020-10-19 RX ORDER — INFLUENZA VIRUS VACCINE 15; 15; 15; 15 UG/.5ML; UG/.5ML; UG/.5ML; UG/.5ML
0.5 SUSPENSION INTRAMUSCULAR ONCE
Refills: 0 | Status: DISCONTINUED | OUTPATIENT
Start: 2020-10-19 | End: 2020-10-19

## 2020-10-19 RX ORDER — INSULIN LISPRO 100/ML
VIAL (ML) SUBCUTANEOUS
Refills: 0 | Status: DISCONTINUED | OUTPATIENT
Start: 2020-10-19 | End: 2020-10-20

## 2020-10-19 RX ORDER — HALOPERIDOL DECANOATE 100 MG/ML
1 INJECTION INTRAMUSCULAR ONCE
Refills: 0 | Status: DISCONTINUED | OUTPATIENT
Start: 2020-10-19 | End: 2020-10-20

## 2020-10-19 RX ORDER — OXYCODONE HYDROCHLORIDE 5 MG/1
5 TABLET ORAL EVERY 6 HOURS
Refills: 0 | Status: DISCONTINUED | OUTPATIENT
Start: 2020-10-19 | End: 2020-10-22

## 2020-10-19 RX ORDER — ESCITALOPRAM OXALATE 10 MG/1
30 TABLET, FILM COATED ORAL DAILY
Refills: 0 | Status: DISCONTINUED | OUTPATIENT
Start: 2020-10-19 | End: 2020-10-19

## 2020-10-19 RX ORDER — GABAPENTIN 400 MG/1
400 CAPSULE ORAL THREE TIMES A DAY
Refills: 0 | Status: DISCONTINUED | OUTPATIENT
Start: 2020-10-19 | End: 2020-10-22

## 2020-10-19 RX ORDER — INFLUENZA VIRUS VACCINE 15; 15; 15; 15 UG/.5ML; UG/.5ML; UG/.5ML; UG/.5ML
0.7 SUSPENSION INTRAMUSCULAR ONCE
Refills: 0 | Status: DISCONTINUED | OUTPATIENT
Start: 2020-10-19 | End: 2020-10-22

## 2020-10-19 RX ORDER — PANTOPRAZOLE SODIUM 20 MG/1
40 TABLET, DELAYED RELEASE ORAL
Refills: 0 | Status: DISCONTINUED | OUTPATIENT
Start: 2020-10-19 | End: 2020-10-22

## 2020-10-19 RX ORDER — GLUCAGON INJECTION, SOLUTION 0.5 MG/.1ML
1 INJECTION, SOLUTION SUBCUTANEOUS ONCE
Refills: 0 | Status: DISCONTINUED | OUTPATIENT
Start: 2020-10-19 | End: 2020-10-22

## 2020-10-19 RX ORDER — SODIUM CHLORIDE 9 MG/ML
1000 INJECTION, SOLUTION INTRAVENOUS
Refills: 0 | Status: DISCONTINUED | OUTPATIENT
Start: 2020-10-19 | End: 2020-10-22

## 2020-10-19 RX ORDER — SENNA PLUS 8.6 MG/1
2 TABLET ORAL AT BEDTIME
Refills: 0 | Status: DISCONTINUED | OUTPATIENT
Start: 2020-10-19 | End: 2020-10-22

## 2020-10-19 RX ADMIN — Medication 975 MILLIGRAM(S): at 17:20

## 2020-10-19 RX ADMIN — GABAPENTIN 400 MILLIGRAM(S): 400 CAPSULE ORAL at 23:44

## 2020-10-19 RX ADMIN — Medication 6: at 12:36

## 2020-10-19 RX ADMIN — Medication 975 MILLIGRAM(S): at 16:51

## 2020-10-19 RX ADMIN — HEPARIN SODIUM 5000 UNIT(S): 5000 INJECTION INTRAVENOUS; SUBCUTANEOUS at 16:52

## 2020-10-19 RX ADMIN — OXYCODONE HYDROCHLORIDE 5 MILLIGRAM(S): 5 TABLET ORAL at 23:44

## 2020-10-19 RX ADMIN — SODIUM CHLORIDE 3 MILLILITER(S): 9 INJECTION INTRAMUSCULAR; INTRAVENOUS; SUBCUTANEOUS at 16:48

## 2020-10-19 RX ADMIN — Medication 6: at 18:31

## 2020-10-19 NOTE — CHART NOTE - NSCHARTNOTEFT_GEN_A_CORE
PA POST OP NOTE:       SUBJECTIVE:  Patient seen and examined at bedside. Patient was asleep but easily aroused. Reports pain is under good control at this time. Denies c/o nausea, vomiting, sob, cp or palpitations. Zuhair sips of apple juice. Brown draining clear urine.    Vital Signs Last 24 Hrs  T(C): 36.7 (19 Oct 2020 11:15), Max: 37.4 (19 Oct 2020 06:04)  T(F): 98.1 (19 Oct 2020 11:15), Max: 99.3 (19 Oct 2020 06:04)  HR: 73 (19 Oct 2020 13:30) (73 - 95)  BP: 122/50 (19 Oct 2020 13:30) (110/45 - 173/66)  BP(mean): 70 (19 Oct 2020 13:30) (63 - 95)  RR: 12 (19 Oct 2020 13:30) (12 - 22)  SpO2: 95% (19 Oct 2020 13:30) (92% - 100%)    PHYSICAL EXAM:    LUNGS: Clear to auscultation bilaterally; No wheezing.  HEART: Regular, rate and rhythm; No murmurs.  ABDOMEN: Soft, + BS, nondistended and non tender on palpation.  INCISION:  No active vaginal bleeding noted. LIZBETH drain intact draining serosanguinous fluid.  EXTREMITIES: No swelling or calf tenderness bilaterally. Venodynes in place.  BROWN: Urine clear.     MEDICATIONS  (STANDING):  acetaminophen   Tablet .. 975 milliGRAM(s) Oral every 6 hours  dextrose 5%. 1000 milliLiter(s) (50 mL/Hr) IV Continuous <Continuous>  dextrose 50% Injectable 12.5 Gram(s) IV Push once  dextrose 50% Injectable 25 Gram(s) IV Push once  dextrose 50% Injectable 25 Gram(s) IV Push once  heparin   Injectable 5000 Unit(s) SubCutaneous every 8 hours  insulin lispro (HumaLOG) corrective regimen sliding scale   SubCutaneous three times a day before meals  insulin lispro (HumaLOG) corrective regimen sliding scale   SubCutaneous at bedtime  lactated ringers. 1000 milliLiter(s) (30 mL/Hr) IV Continuous <Continuous>  sodium chloride 0.9% lock flush 3 milliLiter(s) IV Push every 8 hours    MEDICATIONS  (PRN):  dextrose 40% Gel 15 Gram(s) Oral once PRN Blood Glucose LESS THAN 70 milliGRAM(s)/deciliter  fentaNYL    Injectable 25 MICROGram(s) IV Push every 5 minutes PRN Moderate Pain (4 - 6)  fentaNYL    Injectable 50 MICROGram(s) IV Push every 5 minutes PRN Severe Pain (7 - 10)  glucagon  Injectable 1 milliGRAM(s) IntraMuscular once PRN Glucose LESS THAN 70 milligrams/deciliter  haloperidol    Injectable 1 milliGRAM(s) IV Push once PRN naseau/vomiting  ketorolac   Injectable 15 milliGRAM(s) IV Push every 6 hours PRN Severe Pain (7 - 10)  meperidine     Injectable 12.5 milliGRAM(s) IV Push every 10 minutes PRN Shivering  metoprolol tartrate Injectable 5 milliGRAM(s) IV Push every 6 hours PRN Hypertension  oxyCODONE    IR 5 milliGRAM(s) Oral every 6 hours PRN Severe Pain (7 - 10)  oxyCODONE    IR 2.5 milliGRAM(s) Oral every 4 hours PRN Moderate Pain (4 - 6)  senna 2 Tablet(s) Oral at bedtime PRN Constipation      ASSESMENT/PLAN: 64y/o POD#0  from Radical Vulvectomy, R LND and Gluteal fold V-Y advancement flap in stable condition.    1. ADA Clears to Regular diet as tolerate.  2. IVF: RL @125cc/hr.  3. Activity: Bedrest must lay flat unless eating until POD#2.  4. Vital Signs Q routine.  5. Pulm:  pulse Ox monitoring and encourage incentive spirometer use.  6. Pain meds as ordered.  7. LABS: CBC+BMP in AM.  8. FS and ISS as ordered.  9. Brown to gravity.  10. Continue Heparin SQ and Venodynes for DVT prophylaxis.  11. Admit to floor and continue routine post op care. PA POST OP NOTE:       SUBJECTIVE:  Patient seen and examined at bedside. Patient was asleep but easily aroused. Reports pain is under good control at this time. Denies c/o nausea, vomiting, sob, cp or palpitations. Zuhair sips of apple juice. Brown draining clear urine.    Vital Signs Last 24 Hrs  T(C): 36.7 (19 Oct 2020 11:15), Max: 37.4 (19 Oct 2020 06:04)  T(F): 98.1 (19 Oct 2020 11:15), Max: 99.3 (19 Oct 2020 06:04)  HR: 73 (19 Oct 2020 13:30) (73 - 95)  BP: 122/50 (19 Oct 2020 13:30) (110/45 - 173/66)  BP(mean): 70 (19 Oct 2020 13:30) (63 - 95)  RR: 12 (19 Oct 2020 13:30) (12 - 22)  SpO2: 95% (19 Oct 2020 13:30) (92% - 100%)    PHYSICAL EXAM:    LUNGS: Clear to auscultation bilaterally; No wheezing.  HEART: Regular, rate and rhythm; No murmurs.  ABDOMEN: Soft, + BS, nondistended and non tender on palpation.  INCISION:  No active vaginal bleeding noted. Dressing dry and intact. LIZBETH drain intact draining serosanguinous fluid.  EXTREMITIES: No swelling or calf tenderness bilaterally. Venodynes in place.  BROWN: Urine clear.     MEDICATIONS  (STANDING):  acetaminophen   Tablet .. 975 milliGRAM(s) Oral every 6 hours  dextrose 5%. 1000 milliLiter(s) (50 mL/Hr) IV Continuous <Continuous>  dextrose 50% Injectable 12.5 Gram(s) IV Push once  dextrose 50% Injectable 25 Gram(s) IV Push once  dextrose 50% Injectable 25 Gram(s) IV Push once  heparin   Injectable 5000 Unit(s) SubCutaneous every 8 hours  insulin lispro (HumaLOG) corrective regimen sliding scale   SubCutaneous three times a day before meals  insulin lispro (HumaLOG) corrective regimen sliding scale   SubCutaneous at bedtime  lactated ringers. 1000 milliLiter(s) (30 mL/Hr) IV Continuous <Continuous>  sodium chloride 0.9% lock flush 3 milliLiter(s) IV Push every 8 hours    MEDICATIONS  (PRN):  dextrose 40% Gel 15 Gram(s) Oral once PRN Blood Glucose LESS THAN 70 milliGRAM(s)/deciliter  fentaNYL    Injectable 25 MICROGram(s) IV Push every 5 minutes PRN Moderate Pain (4 - 6)  fentaNYL    Injectable 50 MICROGram(s) IV Push every 5 minutes PRN Severe Pain (7 - 10)  glucagon  Injectable 1 milliGRAM(s) IntraMuscular once PRN Glucose LESS THAN 70 milligrams/deciliter  haloperidol    Injectable 1 milliGRAM(s) IV Push once PRN naseau/vomiting  ketorolac   Injectable 15 milliGRAM(s) IV Push every 6 hours PRN Severe Pain (7 - 10)  meperidine     Injectable 12.5 milliGRAM(s) IV Push every 10 minutes PRN Shivering  metoprolol tartrate Injectable 5 milliGRAM(s) IV Push every 6 hours PRN Hypertension  oxyCODONE    IR 5 milliGRAM(s) Oral every 6 hours PRN Severe Pain (7 - 10)  oxyCODONE    IR 2.5 milliGRAM(s) Oral every 4 hours PRN Moderate Pain (4 - 6)  senna 2 Tablet(s) Oral at bedtime PRN Constipation      ASSESMENT/PLAN: 64y/o POD#0  from Radical Vulvectomy, R LND and Gluteal fold V-Y advancement flap in stable condition.    1. ADA Clears to Regular diet as tolerate.  2. IVF: RL @125cc/hr.  3. Activity: Bedrest must lay flat unless eating until POD#2.  4. Vital Signs Q routine.  5. Pulm:  pulse Ox monitoring and encourage incentive spirometer use.  6. Pain meds as ordered.  7. LABS: CBC+BMP in AM.  8. FS and ISS as ordered.  9. Brown to gravity.  10. Continue Heparin SQ and Venodynes for DVT prophylaxis.  11. Admit to floor and continue routine post op care.

## 2020-10-19 NOTE — PATIENT PROFILE ADULT - NSTOBACCOCESSATIONEDU6_GEN_A_NUR
"You can access the [facility], offered by [facility], by registering with the following website: [email or web address]"
Use the 5 A's (Ask, Advise, Assess, Assist, Arrange)

## 2020-10-19 NOTE — BRIEF OPERATIVE NOTE - NSICDXBRIEFPROCEDURE_GEN_ALL_CORE_FT
PROCEDURES:  Radical vulvectomy with right lymphadenectomy 19-Oct-2020 10:23:59  Noah Hernandez  
PROCEDURES:  Adjacent tissue transfer, genitalia, defect 10.1 sq cm to 30.0 sq cm 19-Oct-2020 11:16:05  Justin Garcia

## 2020-10-19 NOTE — BRIEF OPERATIVE NOTE - NSICDXBRIEFPREOP_GEN_ALL_CORE_FT
PRE-OP DIAGNOSIS:  Recurrent vulvar cancer 19-Oct-2020 10:24:33  Noah Hernandez  
PRE-OP DIAGNOSIS:  Recurrent vulvar cancer 19-Oct-2020 10:24:33  Noah Hernandez

## 2020-10-19 NOTE — BRIEF OPERATIVE NOTE - OPERATION/FINDINGS
Large mass noted on right side of vulva measuring approximately [id number]. Lichen sclerosis noted on right vulva and portion of left vulva. Atrophic vaginal epithelium. ICG used for R. inguinal Lymph node mapping.
v to y advancement from right thigh to vulvar defect 14 by 8

## 2020-10-19 NOTE — BRIEF OPERATIVE NOTE - NSICDXBRIEFPOSTOP_GEN_ALL_CORE_FT
POST-OP DIAGNOSIS:  Recurrent vulvar cancer 19-Oct-2020 10:24:41  Noah Hernandez  
POST-OP DIAGNOSIS:  Recurrent vulvar cancer 19-Oct-2020 10:24:41  Noah Hernandez

## 2020-10-20 ENCOUNTER — TRANSCRIPTION ENCOUNTER (OUTPATIENT)
Age: 65
End: 2020-10-20

## 2020-10-20 DIAGNOSIS — C51.9 MALIGNANT NEOPLASM OF VULVA, UNSPECIFIED: ICD-10-CM

## 2020-10-20 LAB
ANION GAP SERPL CALC-SCNC: 9 MMO/L — SIGNIFICANT CHANGE UP (ref 7–14)
BASOPHILS # BLD AUTO: 0.03 K/UL — SIGNIFICANT CHANGE UP (ref 0–0.2)
BASOPHILS NFR BLD AUTO: 0.4 % — SIGNIFICANT CHANGE UP (ref 0–2)
BUN SERPL-MCNC: 15 MG/DL — SIGNIFICANT CHANGE UP (ref 7–23)
CALCIUM SERPL-MCNC: 9 MG/DL — SIGNIFICANT CHANGE UP (ref 8.4–10.5)
CHLORIDE SERPL-SCNC: 101 MMOL/L — SIGNIFICANT CHANGE UP (ref 98–107)
CO2 SERPL-SCNC: 29 MMOL/L — SIGNIFICANT CHANGE UP (ref 22–31)
CREAT SERPL-MCNC: 0.75 MG/DL — SIGNIFICANT CHANGE UP (ref 0.5–1.3)
EOSINOPHIL # BLD AUTO: 0.06 K/UL — SIGNIFICANT CHANGE UP (ref 0–0.5)
EOSINOPHIL NFR BLD AUTO: 0.9 % — SIGNIFICANT CHANGE UP (ref 0–6)
GLUCOSE BLDC GLUCOMTR-MCNC: 138 MG/DL — HIGH (ref 70–99)
GLUCOSE BLDC GLUCOMTR-MCNC: 210 MG/DL — HIGH (ref 70–99)
GLUCOSE BLDC GLUCOMTR-MCNC: 221 MG/DL — HIGH (ref 70–99)
GLUCOSE BLDC GLUCOMTR-MCNC: 236 MG/DL — HIGH (ref 70–99)
GLUCOSE SERPL-MCNC: 138 MG/DL — HIGH (ref 70–99)
HCT VFR BLD CALC: 29 % — LOW (ref 34.5–45)
HCT VFR BLD CALC: 29.3 % — LOW (ref 34.5–45)
HGB BLD-MCNC: 8.7 G/DL — LOW (ref 11.5–15.5)
HGB BLD-MCNC: 8.9 G/DL — LOW (ref 11.5–15.5)
IMM GRANULOCYTES NFR BLD AUTO: 0.6 % — SIGNIFICANT CHANGE UP (ref 0–1.5)
LDH SERPL L TO P-CCNC: 166 U/L — SIGNIFICANT CHANGE UP (ref 135–225)
LYMPHOCYTES # BLD AUTO: 1.67 K/UL — SIGNIFICANT CHANGE UP (ref 1–3.3)
LYMPHOCYTES # BLD AUTO: 24 % — SIGNIFICANT CHANGE UP (ref 13–44)
MAGNESIUM SERPL-MCNC: 1.7 MG/DL — SIGNIFICANT CHANGE UP (ref 1.6–2.6)
MCHC RBC-ENTMCNC: 19.2 PG — LOW (ref 27–34)
MCHC RBC-ENTMCNC: 29.7 % — LOW (ref 32–36)
MCV RBC AUTO: 64.7 FL — LOW (ref 80–100)
MONOCYTES # BLD AUTO: 0.33 K/UL — SIGNIFICANT CHANGE UP (ref 0–0.9)
MONOCYTES NFR BLD AUTO: 4.7 % — SIGNIFICANT CHANGE UP (ref 2–14)
NEUTROPHILS # BLD AUTO: 4.82 K/UL — SIGNIFICANT CHANGE UP (ref 1.8–7.4)
NEUTROPHILS NFR BLD AUTO: 69.4 % — SIGNIFICANT CHANGE UP (ref 43–77)
NRBC # FLD: 0 K/UL — SIGNIFICANT CHANGE UP (ref 0–0)
PHOSPHATE SERPL-MCNC: 3 MG/DL — SIGNIFICANT CHANGE UP (ref 2.5–4.5)
PLATELET # BLD AUTO: 170 K/UL — SIGNIFICANT CHANGE UP (ref 150–400)
PMV BLD: 10.2 FL — SIGNIFICANT CHANGE UP (ref 7–13)
POTASSIUM SERPL-MCNC: 4.1 MMOL/L — SIGNIFICANT CHANGE UP (ref 3.5–5.3)
POTASSIUM SERPL-SCNC: 4.1 MMOL/L — SIGNIFICANT CHANGE UP (ref 3.5–5.3)
RBC # BLD: 4.53 M/UL — SIGNIFICANT CHANGE UP (ref 3.8–5.2)
RBC # FLD: 15.3 % — HIGH (ref 10.3–14.5)
SODIUM SERPL-SCNC: 139 MMOL/L — SIGNIFICANT CHANGE UP (ref 135–145)
WBC # BLD: 6.95 K/UL — SIGNIFICANT CHANGE UP (ref 3.8–10.5)
WBC # FLD AUTO: 6.95 K/UL — SIGNIFICANT CHANGE UP (ref 3.8–10.5)

## 2020-10-20 PROCEDURE — 99223 1ST HOSP IP/OBS HIGH 75: CPT

## 2020-10-20 RX ORDER — INSULIN LISPRO 100/ML
VIAL (ML) SUBCUTANEOUS
Refills: 0 | Status: DISCONTINUED | OUTPATIENT
Start: 2020-10-20 | End: 2020-10-22

## 2020-10-20 RX ORDER — LIRAGLUTIDE 6 MG/ML
1.8 INJECTION SUBCUTANEOUS
Refills: 0 | Status: DISCONTINUED | OUTPATIENT
Start: 2020-10-20 | End: 2020-10-20

## 2020-10-20 RX ORDER — MAGNESIUM OXIDE 400 MG ORAL TABLET 241.3 MG
400 TABLET ORAL ONCE
Refills: 0 | Status: COMPLETED | OUTPATIENT
Start: 2020-10-20 | End: 2020-10-20

## 2020-10-20 RX ORDER — INSULIN LISPRO 100/ML
5 VIAL (ML) SUBCUTANEOUS
Refills: 0 | Status: DISCONTINUED | OUTPATIENT
Start: 2020-10-20 | End: 2020-10-21

## 2020-10-20 RX ORDER — NICOTINE POLACRILEX 2 MG
1 GUM BUCCAL DAILY
Refills: 0 | Status: DISCONTINUED | OUTPATIENT
Start: 2020-10-20 | End: 2020-10-22

## 2020-10-20 RX ORDER — ENOXAPARIN SODIUM 100 MG/ML
40 INJECTION SUBCUTANEOUS DAILY
Refills: 0 | Status: DISCONTINUED | OUTPATIENT
Start: 2020-10-20 | End: 2020-10-22

## 2020-10-20 RX ORDER — AMLODIPINE BESYLATE 2.5 MG/1
10 TABLET ORAL DAILY
Refills: 0 | Status: DISCONTINUED | OUTPATIENT
Start: 2020-10-20 | End: 2020-10-22

## 2020-10-20 RX ORDER — INSULIN GLARGINE 100 [IU]/ML
40 INJECTION, SOLUTION SUBCUTANEOUS AT BEDTIME
Refills: 0 | Status: DISCONTINUED | OUTPATIENT
Start: 2020-10-20 | End: 2020-10-21

## 2020-10-20 RX ORDER — TRIAMTERENE/HYDROCHLOROTHIAZID 75 MG-50MG
1 TABLET ORAL DAILY
Refills: 0 | Status: DISCONTINUED | OUTPATIENT
Start: 2020-10-20 | End: 2020-10-22

## 2020-10-20 RX ORDER — INSULIN GLARGINE 100 [IU]/ML
80 INJECTION, SOLUTION SUBCUTANEOUS AT BEDTIME
Refills: 0 | Status: DISCONTINUED | OUTPATIENT
Start: 2020-10-20 | End: 2020-10-20

## 2020-10-20 RX ADMIN — Medication 1 APPLICATION(S): at 17:00

## 2020-10-20 RX ADMIN — Medication 4: at 12:38

## 2020-10-20 RX ADMIN — INSULIN GLARGINE 40 UNIT(S): 100 INJECTION, SOLUTION SUBCUTANEOUS at 22:00

## 2020-10-20 RX ADMIN — Medication 975 MILLIGRAM(S): at 11:32

## 2020-10-20 RX ADMIN — ENOXAPARIN SODIUM 40 MILLIGRAM(S): 100 INJECTION SUBCUTANEOUS at 21:15

## 2020-10-20 RX ADMIN — Medication 975 MILLIGRAM(S): at 23:45

## 2020-10-20 RX ADMIN — OXYCODONE HYDROCHLORIDE 5 MILLIGRAM(S): 5 TABLET ORAL at 12:25

## 2020-10-20 RX ADMIN — Medication 1 APPLICATION(S): at 05:48

## 2020-10-20 RX ADMIN — OXYCODONE HYDROCHLORIDE 5 MILLIGRAM(S): 5 TABLET ORAL at 20:12

## 2020-10-20 RX ADMIN — Medication 975 MILLIGRAM(S): at 17:00

## 2020-10-20 RX ADMIN — HEPARIN SODIUM 5000 UNIT(S): 5000 INJECTION INTRAVENOUS; SUBCUTANEOUS at 13:02

## 2020-10-20 RX ADMIN — ESCITALOPRAM OXALATE 30 MILLIGRAM(S): 10 TABLET, FILM COATED ORAL at 11:32

## 2020-10-20 RX ADMIN — OXYCODONE HYDROCHLORIDE 5 MILLIGRAM(S): 5 TABLET ORAL at 11:32

## 2020-10-20 RX ADMIN — Medication 4: at 17:01

## 2020-10-20 RX ADMIN — Medication 975 MILLIGRAM(S): at 05:45

## 2020-10-20 RX ADMIN — GABAPENTIN 400 MILLIGRAM(S): 400 CAPSULE ORAL at 21:15

## 2020-10-20 RX ADMIN — Medication 150 MICROGRAM(S): at 05:45

## 2020-10-20 RX ADMIN — MAGNESIUM OXIDE 400 MG ORAL TABLET 400 MILLIGRAM(S): 241.3 TABLET ORAL at 11:32

## 2020-10-20 RX ADMIN — OXYCODONE HYDROCHLORIDE 5 MILLIGRAM(S): 5 TABLET ORAL at 22:37

## 2020-10-20 RX ADMIN — SODIUM CHLORIDE 3 MILLILITER(S): 9 INJECTION INTRAMUSCULAR; INTRAVENOUS; SUBCUTANEOUS at 00:22

## 2020-10-20 RX ADMIN — OXYCODONE HYDROCHLORIDE 5 MILLIGRAM(S): 5 TABLET ORAL at 01:00

## 2020-10-20 RX ADMIN — GABAPENTIN 400 MILLIGRAM(S): 400 CAPSULE ORAL at 05:45

## 2020-10-20 RX ADMIN — PANTOPRAZOLE SODIUM 40 MILLIGRAM(S): 20 TABLET, DELAYED RELEASE ORAL at 05:48

## 2020-10-20 RX ADMIN — HEPARIN SODIUM 5000 UNIT(S): 5000 INJECTION INTRAVENOUS; SUBCUTANEOUS at 05:47

## 2020-10-20 RX ADMIN — Medication 5 UNIT(S): at 17:10

## 2020-10-20 RX ADMIN — GABAPENTIN 400 MILLIGRAM(S): 400 CAPSULE ORAL at 13:02

## 2020-10-20 RX ADMIN — SODIUM CHLORIDE 3 MILLILITER(S): 9 INJECTION INTRAMUSCULAR; INTRAVENOUS; SUBCUTANEOUS at 16:06

## 2020-10-20 RX ADMIN — Medication 975 MILLIGRAM(S): at 06:36

## 2020-10-20 RX ADMIN — Medication 975 MILLIGRAM(S): at 12:25

## 2020-10-20 NOTE — PROGRESS NOTE ADULT - SUBJECTIVE AND OBJECTIVE BOX
GYN ONC Progress Note    HD#2            POD#1     Interval/Overnight Events:   Pt seen at bedside.  No acute events overnight.  Denies fevers, chills, nausea, vomiting, SOB, CP, lightheadedness, dizziness, pain is well controlled. Tolerating sips of apple juice. Montgomery draining clear urine.        MEDICATIONS:   --------------------------------------------------------------------------------------  Neurologic Medications  acetaminophen   Tablet .. 975 milliGRAM(s) Oral every 6 hours  escitalopram Solution 30 milliGRAM(s) Oral daily  fentaNYL    Injectable 25 MICROGram(s) IV Push every 5 minutes PRN Moderate Pain (4 - 6)  fentaNYL    Injectable 50 MICROGram(s) IV Push every 5 minutes PRN Severe Pain (7 - 10)  gabapentin 400 milliGRAM(s) Oral three times a day  haloperidol    Injectable 1 milliGRAM(s) IV Push once PRN naseau/vomiting  ketorolac   Injectable 15 milliGRAM(s) IV Push every 6 hours PRN Severe Pain (7 - 10)  meperidine     Injectable 12.5 milliGRAM(s) IV Push every 10 minutes PRN Shivering  oxyCODONE    IR 5 milliGRAM(s) Oral every 6 hours PRN Severe Pain (7 - 10)  oxyCODONE    IR 2.5 milliGRAM(s) Oral every 4 hours PRN Moderate Pain (4 - 6)    Respiratory Medications    Cardiovascular Medications  metoprolol tartrate Injectable 5 milliGRAM(s) IV Push every 6 hours PRN Hypertension    Gastrointestinal Medications  dextrose 5%. 1000 milliLiter(s) IV Continuous <Continuous>  lactated ringers. 1000 milliLiter(s) IV Continuous <Continuous>  pantoprazole    Tablet 40 milliGRAM(s) Oral before breakfast  senna 2 Tablet(s) Oral at bedtime PRN Constipation  sodium chloride 0.9% lock flush 3 milliLiter(s) IV Push every 8 hours    Genitourinary Medications    Hematologic/Oncologic Medications  heparin   Injectable 5000 Unit(s) SubCutaneous every 8 hours  influenza  Vaccine (HIGH DOSE) 0.7 milliLiter(s) IntraMuscular once    Antimicrobial/Immunologic Medications    Endocrine/Metabolic Medications  dextrose 40% Gel 15 Gram(s) Oral once PRN Blood Glucose LESS THAN 70 milliGRAM(s)/deciliter  dextrose 50% Injectable 12.5 Gram(s) IV Push once  dextrose 50% Injectable 25 Gram(s) IV Push once  dextrose 50% Injectable 25 Gram(s) IV Push once  glucagon  Injectable 1 milliGRAM(s) IntraMuscular once PRN Glucose LESS THAN 70 milligrams/deciliter  insulin lispro (HumaLOG) corrective regimen sliding scale   SubCutaneous three times a day before meals  insulin lispro (HumaLOG) corrective regimen sliding scale   SubCutaneous at bedtime  levothyroxine 150 MICROGram(s) Oral daily    Topical/Other Medications  gentamicin 0.1% Ointment 1 Application(s) Topical two times a day    --------------------------------------------------------------------------------------    VITAL SIGNS, INS/OUTS (last 24 hours):  --------------------------------------------------------------------------------------  ICU Vital Signs Last 24 Hrs  T(C): 36.6 (20 Oct 2020 05:40), Max: 36.7 (19 Oct 2020 11:15)  T(F): 97.8 (20 Oct 2020 05:40), Max: 98.1 (19 Oct 2020 11:15)  HR: 70 (20 Oct 2020 05:40) (60 - 95)  BP: 116/46 (20 Oct 2020 05:40) (101/55 - 173/66)  BP(mean): 70 (19 Oct 2020 18:30) (61 - 95)  ABP: --  ABP(mean): --  RR: 16 (20 Oct 2020 05:40) (11 - 22)  SpO2: 98% (20 Oct 2020 05:40) (92% - 100%)    I&O's Detail    19 Oct 2020 07:01  -  20 Oct 2020 06:18  --------------------------------------------------------  IN:    Lactated Ringers: 150 mL    Oral Fluid: 120 mL  Total IN: 270 mL    OUT:    Bulb (mL): 10 mL    Indwelling Catheter - Urethral (mL): 1705 mL  Total OUT: 1715 mL    Total NET: -1445 mL        --------------------------------------------------------------------------------------    PHYSICAL EXAM:   Gen: A&Ox3, NAD  Cardio: Regular, rate and rhythm; No murmurs.  Pulm: Clear to auscultation bilaterally; No wheezing.  Abdomen: Soft, + BS, nondistended and non tender on palpation.  Incision:  No active vaginal bleeding noted. Dressing dry and intact. LIZBETH drain intact draining serosanguinous fluid.  Extremities: No swelling or calf tenderness bilaterally. Venodynes in place.  Montgomery: Urine clear.    GYN ONC Progress Note    HD#2            POD#1     Interval/Overnight Events:   Pt seen at bedside.  No acute events overnight.  Denies fevers, chills, nausea, vomiting, SOB, CP, lightheadedness, dizziness, pain is well controlled. Tolerating sips of apple juice. Montgomery draining clear urine. Has been lying flat as instructed.       MEDICATIONS:   --------------------------------------------------------------------------------------  Neurologic Medications  acetaminophen   Tablet .. 975 milliGRAM(s) Oral every 6 hours  escitalopram Solution 30 milliGRAM(s) Oral daily  fentaNYL    Injectable 25 MICROGram(s) IV Push every 5 minutes PRN Moderate Pain (4 - 6)  fentaNYL    Injectable 50 MICROGram(s) IV Push every 5 minutes PRN Severe Pain (7 - 10)  gabapentin 400 milliGRAM(s) Oral three times a day  haloperidol    Injectable 1 milliGRAM(s) IV Push once PRN naseau/vomiting  ketorolac   Injectable 15 milliGRAM(s) IV Push every 6 hours PRN Severe Pain (7 - 10)  meperidine     Injectable 12.5 milliGRAM(s) IV Push every 10 minutes PRN Shivering  oxyCODONE    IR 5 milliGRAM(s) Oral every 6 hours PRN Severe Pain (7 - 10)  oxyCODONE    IR 2.5 milliGRAM(s) Oral every 4 hours PRN Moderate Pain (4 - 6)    Respiratory Medications    Cardiovascular Medications  metoprolol tartrate Injectable 5 milliGRAM(s) IV Push every 6 hours PRN Hypertension    Gastrointestinal Medications  dextrose 5%. 1000 milliLiter(s) IV Continuous <Continuous>  lactated ringers. 1000 milliLiter(s) IV Continuous <Continuous>  pantoprazole    Tablet 40 milliGRAM(s) Oral before breakfast  senna 2 Tablet(s) Oral at bedtime PRN Constipation  sodium chloride 0.9% lock flush 3 milliLiter(s) IV Push every 8 hours    Genitourinary Medications    Hematologic/Oncologic Medications  heparin   Injectable 5000 Unit(s) SubCutaneous every 8 hours  influenza  Vaccine (HIGH DOSE) 0.7 milliLiter(s) IntraMuscular once    Antimicrobial/Immunologic Medications    Endocrine/Metabolic Medications  dextrose 40% Gel 15 Gram(s) Oral once PRN Blood Glucose LESS THAN 70 milliGRAM(s)/deciliter  dextrose 50% Injectable 12.5 Gram(s) IV Push once  dextrose 50% Injectable 25 Gram(s) IV Push once  dextrose 50% Injectable 25 Gram(s) IV Push once  glucagon  Injectable 1 milliGRAM(s) IntraMuscular once PRN Glucose LESS THAN 70 milligrams/deciliter  insulin lispro (HumaLOG) corrective regimen sliding scale   SubCutaneous three times a day before meals  insulin lispro (HumaLOG) corrective regimen sliding scale   SubCutaneous at bedtime  levothyroxine 150 MICROGram(s) Oral daily    Topical/Other Medications  gentamicin 0.1% Ointment 1 Application(s) Topical two times a day    --------------------------------------------------------------------------------------    VITAL SIGNS, INS/OUTS (last 24 hours):  --------------------------------------------------------------------------------------  ICU Vital Signs Last 24 Hrs  T(C): 36.6 (20 Oct 2020 05:40), Max: 36.7 (19 Oct 2020 11:15)  T(F): 97.8 (20 Oct 2020 05:40), Max: 98.1 (19 Oct 2020 11:15)  HR: 70 (20 Oct 2020 05:40) (60 - 95)  BP: 116/46 (20 Oct 2020 05:40) (101/55 - 173/66)  BP(mean): 70 (19 Oct 2020 18:30) (61 - 95)  ABP: --  ABP(mean): --  RR: 16 (20 Oct 2020 05:40) (11 - 22)  SpO2: 98% (20 Oct 2020 05:40) (92% - 100%)    I&O's Detail    19 Oct 2020 07:01  -  20 Oct 2020 06:18  --------------------------------------------------------  IN:    Lactated Ringers: 150 mL    Oral Fluid: 120 mL  Total IN: 270 mL    OUT:    Bulb (mL): 10 mL    Indwelling Catheter - Urethral (mL): 1705 mL  Total OUT: 1715 mL    Total NET: -1445 mL    --------------------------------------------------------------------------------------    PHYSICAL EXAM:   Gen: A&Ox3, NAD  Cardio: Regular, rate and rhythm; No murmurs.  Pulm: Clear to auscultation bilaterally; No wheezing.  Abdomen: Soft, + BS, nondistended and non tender on palpation.  Incision:  No active vaginal bleeding noted. Incision CDI. LIZBETH drain intact draining serosanguinous fluid.  Extremities: No swelling or calf tenderness bilaterally. Venodynes in place.  Montgomery: Urine clear.

## 2020-10-20 NOTE — DISCHARGE NOTE PROVIDER - NSDCFUSCHEDAPPT_GEN_ALL_CORE_FT
BALJINDER GERMAN ; 11/03/2020 ; NPP Gynonc [address] BALJINDER Collazo ; 12/08/2020 ; NPP Derm [address]

## 2020-10-20 NOTE — PROGRESS NOTE ADULT - SUBJECTIVE AND OBJECTIVE BOX
Plastic Surgery    SUBJECTIVE: Pt seen and examined on rounds with team. No acute events overnight. Pain well controlled, patient continues to be on bedrest.      VITALS  T(C): 36.6 (10-20-20 @ 05:40), Max: 36.7 (10-19-20 @ 11:15)  HR: 70 (10-20-20 @ 05:40) (60 - 95)  BP: 116/46 (10-20-20 @ 05:40) (101/55 - 173/66)  RR: 16 (10-20-20 @ 05:40) (11 - 22)  SpO2: 98% (10-20-20 @ 05:40) (92% - 100%)  CAPILLARY BLOOD GLUCOSE      POCT Blood Glucose.: 138 mg/dL (20 Oct 2020 08:16)  POCT Blood Glucose.: 215 mg/dL (19 Oct 2020 21:42)  POCT Blood Glucose.: 275 mg/dL (19 Oct 2020 18:10)  POCT Blood Glucose.: 272 mg/dL (19 Oct 2020 18:07)  POCT Blood Glucose.: 275 mg/dL (19 Oct 2020 11:49)      Is/Os    10-19 @ 07:01  -  10-20 @ 07:00  --------------------------------------------------------  IN:    Lactated Ringers: 150 mL    Oral Fluid: 120 mL  Total IN: 270 mL    OUT:    Bulb (mL): 10 mL    Indwelling Catheter - Urethral (mL): 1705 mL  Total OUT: 1715 mL    Total NET: -1445 mL          PHYSICAL EXAM:   General: NAD, Lying in bed   Neuro: Awake and alert  Incision: incision lines c/d/i, skin flaps appear healthy, soft and pink, LIZBETH with SS output      MEDICATIONS (STANDING): acetaminophen   Tablet .. 975 milliGRAM(s) Oral every 6 hours  amLODIPine   Tablet 10 milliGRAM(s) Oral daily  dextrose 5%. 1000 milliLiter(s) IV Continuous <Continuous>  dextrose 50% Injectable 12.5 Gram(s) IV Push once  dextrose 50% Injectable 25 Gram(s) IV Push once  dextrose 50% Injectable 25 Gram(s) IV Push once  escitalopram Solution 30 milliGRAM(s) Oral daily  gabapentin 400 milliGRAM(s) Oral three times a day  heparin   Injectable 5000 Unit(s) SubCutaneous every 8 hours  influenza  Vaccine (HIGH DOSE) 0.7 milliLiter(s) IntraMuscular once  insulin lispro (ADMELOG) corrective regimen sliding scale   SubCutaneous three times a day before meals  insulin lispro (HumaLOG) corrective regimen sliding scale   SubCutaneous at bedtime  levothyroxine 150 MICROGram(s) Oral daily  pantoprazole    Tablet 40 milliGRAM(s) Oral before breakfast  sodium chloride 0.9% lock flush 3 milliLiter(s) IV Push every 8 hours    MEDICATIONS (PRN):dextrose 40% Gel 15 Gram(s) Oral once PRN Blood Glucose LESS THAN 70 milliGRAM(s)/deciliter  glucagon  Injectable 1 milliGRAM(s) IntraMuscular once PRN Glucose LESS THAN 70 milligrams/deciliter  haloperidol    Injectable 1 milliGRAM(s) IV Push once PRN naseau/vomiting  ketorolac   Injectable 15 milliGRAM(s) IV Push every 6 hours PRN Severe Pain (7 - 10)  oxyCODONE    IR 5 milliGRAM(s) Oral every 6 hours PRN Severe Pain (7 - 10)  oxyCODONE    IR 2.5 milliGRAM(s) Oral every 4 hours PRN Moderate Pain (4 - 6)  senna 2 Tablet(s) Oral at bedtime PRN Constipation

## 2020-10-20 NOTE — PROGRESS NOTE ADULT - PROBLEM SELECTOR PLAN 1
Neuro: transition to po pain meds  CV: Hemodynamically stable, AM CBC/BMP/Mg/Phos  Pulm: Saturating well on room air, encourage incentive spirometry  GI: Advance to regular diet  : UOP adequate, marcelle in  ID: gentamicin ointment BID, sitz bath w/ squirt bottle as per Plastics rec  Heme: c/w HSQ and SCDs for DVT ppx  Dispo: lay flat unless eating until POD2; POD2 can get into recliner, DC POD3 Neuro: transition to po pain meds in PM  CV: Hemodynamically stable, AM CBC/BMP/Mg/Phos. Continue to monitor VS  Pulm: Saturating well on room air, encourage incentive spirometry  GI: Continue regular diet  : UOP adequate, ibanez in. d/c ibanez when starts to ambulate  ID: gentamicin ointment BID, sitz bath w/ squirt bottle as per Plastics rec  Heme: c/w HSQ and SCDs for DVT ppx. switch to lovenox after AM labs  Dispo: lay flat unless eating until POD2; POD2 can get into recliner, DC POD3    Curly pgy3

## 2020-10-20 NOTE — DISCHARGE NOTE PROVIDER - CARE PROVIDER_API CALL
Radha Sauer)  Gynecologic Oncology; Obstetrics and Gynecology  [address] [place] [address]  Phone: [phone]  Fax: [phone]  Follow Up Time:    Radha Sauer)  Gynecologic Oncology; Obstetrics and Gynecology  [address] [place] [address]  Phone: [phone]  Fax: [phone]  Follow Up Time:     Russell Bryant  PLASTIC SURGERY  [address] [place] [id number]  Phone: [phone]  Fax: [phone]  Follow Up Time:

## 2020-10-20 NOTE — CONSULT NOTE ADULT - ASSESSMENT
64 yo F with anxiety/depression, HTN, DM2 on insulin, hypothyroidism, TIA, fibromyalgia presenting for recurrent vulvar cancer for radical vulvectomy with right lymphadenectomy and v to y advancement from right thigh to vulvar defect on 10/19 medicine evaluation for comanagement.     # Recurrent Vulvar Cancer:  s/p OR on 10/19 radical vulvectomy with right lymphadenectomy and v to y advancement from right thigh to vulvar defect   - bedrest unless eating  - c/w pain control: standing Tylenol 975 mg q6h and toradol and oxycodone 2.5/5 mg prn  - local wound care per plastics and gyn   - advancing diet, monitoring for PO tolerance     # Postoperative Anemia:  pre-op Hb was 10.9 now 8.7, likely due to expected OR losses  - trend CBC; transfuse for Hb <7, bleeding or symptomatic anemia    # DM2: HbA1c on Oct 12 2020 was 6.9%  - c/w gabapentin 400 mg TID  - home Tresiba replaced for Lantus ordered for 80 units, than am  without insulin, will likely dose reduce   - c/w DM diet and ALLEGRA    # HTN: BP 10/19 elevated likely 2/2 pain/anxiety, this am BP wnl   - amlodipine 10 mg daily, enalapril 2.5 mg daily, and triamterene 37.5 mG/hydrochlorothiazide 25 mG daily, agree with hold parameters     # Hypothyroidism  - c/w synthroid 150 mcg daily   - per PET possible thyroid nodule, consider OP US    # TIA  - resume statin; at home on simvastatin 20 mg, change to atorvastatin 10 mg as simvastatin & amlodipine have increase risk of rhabdo     # Anxiety/Depression  - escitalopram 30 mg daily      # Prophylactic Measure  - SQH per primary team 66 yo F smoker, obese, with anxiety/depression, HTN, DM2 on insulin, hypothyroidism, TIA, fibromyalgia, beta-thalassemia presenting for recurrent vulvar cancer (initially dx 2014) now here for radical vulvectomy with right lymphadenectomy and v to y advancement from right thigh to vulvar defect on 10/19 medicine evaluation for comanagement.      # Recurrent Vulvar Cancer:  s/p OR on 10/19 radical vulvectomy with right lymphadenectomy and v to y advancement from right thigh to vulvar defect   - bedrest unless eating  - c/w pain control: standing Tylenol 975 mg q6h and toradol and oxycodone 2.5/5 mg prn  - local wound care per plastics and gyn   - advancing diet, monitoring for PO tolerance     # Postoperative Anemia:  pre-op Hb was 10.9 now 8.7, likely due to expected OR losses  - trend CBC; transfuse for Hb <7, bleeding or symptomatic anemia  - f/u pm [facility]    # DM2: HbA1c on Oct 12 2020 was 6.9%  - c/w gabapentin 400 mg TID  - home Tresiba replaced for Lantus ordered for 80 units, than am  without insulin, will dose reduce to 40 units and titrate  - may need mealtime standing coverage will monitor trend  - c/w DM diet and ALLEGRA    # HTN: BP 10/19 elevated likely 2/2 pain/anxiety, this am BP wnl   - amlodipine 10 mg daily, enalapril 2.5 mg daily, and triamterene 37.5 mG/hydrochlorothiazide 25 mG daily, agree with hold parameters     # Hypothyroidism  - c/w synthroid 150 mcg daily   - per PET possible thyroid nodule, consider OP US    # TIA  - resume statin; at home on simvastatin 20 mg, change to atorvastatin 10 mg as simvastatin & amlodipine have increase risk of rhabdo     # Anxiety/Depression  - escitalopram 30 mg daily      # Smoker  - wants nicotine patch, 21 mcg/day    # Prophylactic Measure  - SQH per primary team 64 yo F smoker, obese, with anxiety/depression, HTN, DM2 on insulin, hypothyroidism, TIA, fibromyalgia, beta-thalassemia presenting for recurrent vulvar cancer (initially dx 2014) now here for radical vulvectomy with right lymphadenectomy and v to y advancement from right thigh to vulvar defect on 10/19 medicine evaluation for comanagement.      # Recurrent Vulvar Cancer:  s/p OR on 10/19 radical vulvectomy with right lymphadenectomy and v to y advancement from right thigh to vulvar defect   - bedrest x 48 hours   - c/w pain control: standing Tylenol 975 mg q6h and toradol and oxycodone 2.5/5 mg prn; consider bowel regimen  - local wound care per plastics and gyn   - advancing diet, monitoring for PO tolerance   - TOV per surgical teams, likely once can get OOB    # Postoperative Anemia:  pre-op Hb was 10.9 now 8.7, likely due to expected OR losses  - trend CBC; transfuse for Hb <7, bleeding or symptomatic anemia  - f/u pm CBC    # DM2: HbA1c on Oct 12 2020 was 6.9%  - c/w gabapentin 400 mg TID  - home Tresiba replaced for Lantus ordered for 80 units, than am  without insulin, will dose reduce to 40 units and titrate prn  - may need mealtime standing coverage will monitor trend  - c/w DM diet and ALLEGRA    # HTN: BP 10/19 elevated likely 2/2 pain/anxiety, this am BP wnl 124/48  - amlodipine 10 mg daily, enalapril 2.5 mg daily, and triamterene 37.5 mG/hydrochlorothiazide 25 mG daily, agree with hold parameters     # Hypothyroidism  - c/w synthroid 150 mcg daily   - per PET possible thyroid nodule, consider OP US    # TIA  - resume statin; at home on simvastatin 20 mg, change to atorvastatin 10 mg as simvastatin & amlodipine have increase risk of rhabdo     # Anxiety/Depression  - escitalopram 30 mg daily      # Smoker  - wants nicotine patch, 21 mcg/day    # Prophylactic Measure  - SQH per primary team 64 yo F smoker, obese, with anxiety/depression, HTN, DM2 on insulin, hypothyroidism, TIA, fibromyalgia, beta-thalassemia trait, and autoimmune hemolytic anemia presenting for recurrent vulvar cancer (initially dx 2014) now here for radical vulvectomy with right lymphadenectomy and v to y advancement from right thigh to vulvar defect on 10/19 medicine evaluation for comanagement.    # Recurrent Vulvar Cancer:  s/p OR on 10/19 radical vulvectomy with right lymphadenectomy and v to y advancement from right thigh to vulvar defect   - bedrest x 48 hours   - c/w pain control: standing Tylenol 975 mg q6h and toradol and oxycodone 2.5/5 mg prn; consider bowel regimen  - local wound care per plastics and gyn   - advancing diet, monitoring for PO tolerance   - TOV per surgical teams, likely once can get OOB    # Postoperative Anemia:  pre-op Hb was 10.9 now 8.7, likely due to expected OR losses;  patient also has history of beta-thalassemia trait and DEMARIO follows at [facility] with Dr. Auguste (Amrit panel at the time of diagnosis revealed a positive IgG and negative C3; s/p steroids and rituximab)  - trend CBC; transfuse for Hb <7, bleeding or symptomatic anemia  - f/u pm CBC    # DM2: HbA1c on Oct 12 2020 was 6.9%  - c/w gabapentin 400 mg TID  - home Tresiba replaced for Lantus ordered for 80 units, than am  without insulin, will dose reduce to 40 units and titrate prn  - may need mealtime standing coverage will monitor trend  - c/w DM diet and ALLEGRA    # HTN: BP 10/19 elevated likely 2/2 pain/anxiety, this am BP wnl 124/48  - amlodipine 10 mg daily, enalapril 2.5 mg daily, and triamterene 37.5 mG/hydrochlorothiazide 25 mG daily, agree with hold parameters     # Hypothyroidism  - c/w synthroid 150 mcg daily   - per PET possible thyroid nodule, consider OP US    # TIA  - resume statin; at home on simvastatin 20 mg, change to atorvastatin 10 mg as simvastatin & amlodipine have increase risk of rhabdo     # Anxiety/Depression  - escitalopram 30 mg daily      # Smoker  - wants nicotine patch, 21 mcg/day    # Prophylactic Measure  - SQH per primary team 64 yo F smoker, obese, with anxiety/depression, HTN, DM2 on insulin, hypothyroidism, TIA, fibromyalgia, beta-thalassemia trait, and autoimmune hemolytic anemia presenting for recurrent vulvar cancer (initially dx 2014) now here for radical vulvectomy with right lymphadenectomy and v to y advancement from right thigh to vulvar defect on 10/19 medicine evaluation for comanagement.    # Recurrent Vulvar Cancer:  s/p OR on 10/19 radical vulvectomy with right lymphadenectomy and v to y advancement from right thigh to vulvar defect   - bedrest x 48 hours   - c/w pain control: standing Tylenol 975 mg q6h and toradol and oxycodone 2.5/5 mg prn; consider bowel regimen  - local wound care per plastics and gyn   - advancing diet, monitoring for PO tolerance   - TOV per surgical teams, likely once can get OOB    # Postoperative Anemia:  pre-op Hb was 10.9 now 8.7, likely due to expected OR losses;  patient also has history of beta-thalassemia trait and DEMARIO follows at [facility] with Dr. Auguste (Amrit panel at the time of diagnosis revealed a positive IgG and negative C3; s/p steroids and rituximab)  - trend CBC; transfuse for Hb <7, bleeding or symptomatic anemia  - f/u pm CBC  - can check LDH and hapto with am labs    # DM2: HbA1c on Oct 12 2020 was 6.9%  - c/w gabapentin 400 mg TID  - home Tresiba replaced for Lantus ordered for 80 units, than am  without insulin, will dose reduce to 40 units and titrate prn  - may need mealtime standing coverage will monitor trend  - c/w DM diet and ALLEGRA    # HTN: BP 10/19 elevated likely 2/2 pain/anxiety, this am BP wnl 124/48  - amlodipine 10 mg daily, enalapril 2.5 mg daily, and triamterene 37.5 mG/hydrochlorothiazide 25 mG daily, agree with hold parameters     # Hypothyroidism  - c/w synthroid 150 mcg daily   - per PET possible thyroid nodule, consider OP US    # TIA  - resume statin; at home on simvastatin 20 mg, change to atorvastatin 10 mg as simvastatin & amlodipine have increase risk of rhabdo     # Anxiety/Depression  - escitalopram 30 mg daily      # Smoker  - wants nicotine patch, 21 mcg/day    # Prophylactic Measure  - SQH per primary team 66 yo F smoker, obese, with anxiety/depression, HTN, DM2 on insulin, hypothyroidism, TIA, fibromyalgia, beta-thalassemia trait, and autoimmune hemolytic anemia presenting for recurrent vulvar cancer (initially dx 2014) now here for radical vulvectomy with right lymphadenectomy and v to y advancement from right thigh to vulvar defect on 10/19 medicine evaluation for comanagement.    # Recurrent Vulvar Cancer:  s/p OR on 10/19 radical vulvectomy with right lymphadenectomy and v to y advancement from right thigh to vulvar defect   - bedrest x 48 hours   - c/w pain control: standing Tylenol 975 mg q6h and toradol and oxycodone 2.5/5 mg prn; consider bowel regimen  - bedrest and wound care (on topical gentamycin) per plastics  - advancing diet, monitoring for PO tolerance   - TOV per surgical teams, likely once can get OOB    # Postoperative Anemia:  pre-op Hb was 10.9 now 8.7, likely due to expected OR losses;  patient also has history of beta-thalassemia trait and DEMARIO follows at [facility] with Dr. Auguste (Amrit panel at the time of diagnosis revealed a positive IgG and negative C3; s/p steroids and rituximab)  - trend CBC; transfuse for Hb <7, bleeding or symptomatic anemia  - f/u pm CBC  - can check LDH and haptoglobin with am labs    # DM2: HbA1c on Oct 12 2020 was 6.9%  - c/w gabapentin 400 mg TID  - home Tresiba replaced for Lantus ordered for 80 units, than am  without insulin, will dose reduce to 40 units and titrate prn  - may need mealtime standing coverage will monitor trend  - c/w DM diet and ALLEGRA    # HTN: BP 10/19 elevated likely 2/2 pain/anxiety, this am BP wnl 124/48  - amlodipine 10 mg daily, enalapril 2.5 mg daily, and triamterene 37.5 mG/hydrochlorothiazide 25 mG daily, agree with hold parameters     # Hypothyroidism  - c/w synthroid 150 mcg daily   - per PET possible thyroid nodule, consider OP US    # TIA  - resume statin; at home on simvastatin 20 mg, change to atorvastatin 10 mg as simvastatin & amlodipine have increase risk of rhabdo     # Anxiety/Depression  - escitalopram 30 mg daily      # Smoker  - wants nicotine patch, 21 mcg/day    # Prophylactic Measure  - SQH per primary team 64 yo F smoker, obese, with anxiety/depression, HTN, DM2 on insulin, hypothyroidism, TIA, fibromyalgia, beta-thalassemia trait, and autoimmune hemolytic anemia presenting for recurrent vulvar cancer (initially dx 2014) now here for radical vulvectomy with right lymphadenectomy and v to y advancement from right thigh to vulvar defect on 10/19 medicine evaluation for comanagement.    # Recurrent Vulvar Cancer:  s/p OR on 10/19 radical vulvectomy with right lymphadenectomy and v to y advancement from right thigh to vulvar defect   - bedrest x 48 hours   - c/w pain control: standing Tylenol 975 mg q6h and toradol x 24 hours and oxycodone 2.5/5 mg prn; consider bowel regimen  - bedrest and wound care (on topical gentamycin) per plastics  - advancing diet, monitoring for PO tolerance   - TOV per surgical teams, likely once can get OOB    # Postoperative Anemia: pre-op Hb 10.9 now 8.7 likely due to expected OR losses; patient also has history of beta-thalassemia and DEMARIO follows at [facility] with Dr. Auguste (Amrit at time of diagnosis positive IgG and negative C3 s/p steroids and rituximab).   - trend CBC, transfuse <7, bleeding or symptomatic anemia  - CBC in pm stable  - LDH added on and wnl thus hemolysis unlikely cause of Hb drop     # DM2: HbA1c on Oct 12 2020 was 6.9%  - c/w gabapentin 400 mg TID  - home Tresiba replaced for Lantus ordered for 80 units, than am  without insulin, will dose reduce to 40 units and titrate prn  - may need mealtime standing coverage will monitor trend  - c/w DM diet and ALLEGRA    # HTN: BP 10/19 elevated likely 2/2 pain/anxiety, this am BP wnl 124/48  - amlodipine 10 mg daily, enalapril 2.5 mg daily, and triamterene 37.5 mG/hydrochlorothiazide 25 mG daily, agree with hold parameters     # Hypothyroidism  - c/w synthroid 150 mcg daily   - per PET possible thyroid nodule, consider OP US    # TIA  - resume statin; at home on simvastatin 20 mg, change to atorvastatin 10 mg as simvastatin & amlodipine have increase risk of rhabdo     # Anxiety/Depression  - escitalopram 30 mg daily      # Smoker  - wants nicotine patch, 21 mcg/day    # Prophylactic Measure  - SQH per primary team 66 yo F smoker, obese, with anxiety/depression, HTN, DM2 on insulin, hypothyroidism, TIA, fibromyalgia, beta-thalassemia trait, and autoimmune hemolytic anemia presenting for recurrent vulvar cancer (initially dx 2014) now here for radical vulvectomy with right lymphadenectomy and v to y advancement from right thigh to vulvar defect on 10/19 medicine evaluation for comanagement.    # Recurrent Vulvar Cancer:  s/p OR on 10/19 radical vulvectomy with right lymphadenectomy and v to y advancement from right thigh to vulvar defect   - bedrest x 48 hours   - c/w pain control: standing Tylenol 975 mg q6h and toradol x 24 hours and oxycodone 2.5/5 mg prn; consider bowel regimen  - bedrest and wound care (on topical gentamycin) per plastics  - advancing diet, monitoring for PO tolerance   - TOV per surgical teams, likely once can get OOB    # Postoperative Anemia: pre-op Hb 10.9 now 8.7 likely due to expected OR losses; patient also has history of beta-thalassemia and AIHA follows at [facility] with Dr. Auguste (Amrit at time of diagnosis positive IgG and negative C3 s/p steroids and rituximab).   - trend CBC, transfuse <7, bleeding or symptomatic anemia  - CBC in pm stable  - LDH added on and wnl thus hemolysis unlikely cause of Hb drop     # DM2:  20+ years, orginally on orals, HbA1c on Oct 12 2020 was 6.9%; reports LA at home 80 units and 15-30 units SA with meals and also on Victoza   - c/w gabapentin 400 mg TID  - am  without pm basal insulin, will dose reduce to 40 units and titrate prn (last dose 10/18 pm prior to OR day 50 units)  - 5 units short acting with meals, titrate prn  - c/w DM diet and ALLEGRA    # HTN: BP 10/19 elevated likely 2/2 pain/anxiety, this am BP wnl 124/48  - amlodipine 10 mg daily, enalapril 2.5 mg daily, and triamterene 37.5 mG/hydrochlorothiazide 25 mG daily, agree with hold parameters     # Hypothyroidism  - c/w synthroid 150 mcg daily   - per PET possible thyroid nodule, consider OP US    # TIA  - resume statin; at home on simvastatin 20 mg, change to atorvastatin 10 mg as simvastatin & amlodipine have increase risk of rhabdo     # Anxiety/Depression  - escitalopram 30 mg daily      # Smoker  - wants nicotine patch, 21 mcg/day    # Prophylactic Measure  - SQH per primary team

## 2020-10-20 NOTE — DISCHARGE NOTE PROVIDER - NSDCCPCAREPLAN_GEN_ALL_CORE_FT
PRINCIPAL DISCHARGE DIAGNOSIS  Diagnosis: Vulvar cancer  Assessment and Plan of Treatment: Vulvectomy

## 2020-10-20 NOTE — PROGRESS NOTE ADULT - ASSESSMENT
66y/o POD#1 from Radical Vulvectomy, R LND and Gluteal fold V-Y advancement flap in stable condition.

## 2020-10-20 NOTE — DISCHARGE NOTE PROVIDER - NSDCFUADDAPPT_GEN_ALL_CORE_FT
Follow up with Dr. Mcelroy on 10/27/2020  Follow up with Dr. Sauer on 11/3/2020 Follow up with Dr. Mcelroy on 10/27/2020 at 2:15pm  Follow up with Dr. Sauer on 11/3/2020

## 2020-10-20 NOTE — CONSULT NOTE ADULT - SUBJECTIVE AND OBJECTIVE BOX
HPI:  64 yo F with anxiety/depression, HTN, DM2 on insulin, hypothyroidism, TIA, fibromyalgia presenting for recurrent vulvar cancer (initially dx 2014) for radical vulvectomy with right lymphadenectomy and v to y advancement from right thigh to vulvar defect on 10/19 medicine evaluation for comanagement.     Allergies:  No Known Allergies    MEDICATIONS  (STANDING):  acetaminophen Tablet 975 milliGRAM(s) Oral every 6 hours  amLODIPine Tablet 10 milliGRAM(s) Oral daily  enalapril 2.5 milliGRAM(s) Oral daily  escitalopram Solution 30 milliGRAM(s) Oral daily  gabapentin 400 milliGRAM(s) Oral three times a day  gentamicin 0.1% Ointment 1 Application(s) Topical two times a day  heparin Injectable 5000 Unit(s) SubCutaneous every 8 hours  influenza  Vaccine (HIGH DOSE) 0.7 milliLiter(s) IntraMuscular once  insulin glargine Injectable (LANTUS) 80 Unit(s) SubCutaneous at bedtime  insulin lispro (ADMELOG) corrective regimen sliding scale   SubCutaneous three times a day before meals  insulin lispro (HumaLOG) corrective regimen sliding scale   SubCutaneous at bedtime  levothyroxine 150 MICROGram(s) Oral daily  magnesium oxide 400 milliGRAM(s) Oral once  pantoprazole    Tablet 40 milliGRAM(s) Oral before breakfast  sodium chloride 0.9% lock flush 3 milliLiter(s) IV Push every 8 hours  triamterene 37.5 mG/hydrochlorothiazide 25 mG Tablet 1 Tablet(s) Oral daily    MEDICATIONS  (PRN):  dextrose 40% Gel 15 Gram(s) Oral once PRN Blood Glucose LESS THAN 70 milliGRAM(s)/deciliter  glucagon  Injectable 1 milliGRAM(s) IntraMuscular once PRN Glucose LESS THAN 70 milligrams/deciliter  haloperidol    Injectable 1 milliGRAM(s) IV Push once PRN naseau/vomiting  ketorolac   Injectable 15 milliGRAM(s) IV Push every 6 hours PRN Severe Pain (7 - 10)  oxyCODONE    IR 5 milliGRAM(s) Oral every 6 hours PRN Severe Pain (7 - 10)  oxyCODONE    IR 2.5 milliGRAM(s) Oral every 4 hours PRN Moderate Pain (4 - 6)  senna 2 Tablet(s) Oral at bedtime PRN Constipation    PAST MEDICAL:   Anxiety and depression  HLD (hyperlipidemia)  Fibromyalgia  Strabismus  Thalassemia minor  Hypothyroid  DM type 2 (diabetes mellitus, type 2)  TIA (transient ischemic attack) 8/2013  blurred vision/dizziness  HTN (hypertension)  Vulva cancer  Lichen sclerosus of female genitalia    SURGICAL HISTORY:  H/O total knee replacement, right 4 yrs ago  Vulvar neoplasm s/p radical anterior vulvectomy in 2014 with radical excision in 2017  S/P eye surgery Jun2014, for strabismus  S/P laparoscopic cholecystectomy 20 years ago    FAMILY HISTORY:  Family history of diabetes mellitus in mother (Aunt, Mother, Sibling)  Family history of colon cancer (Aunt)    Social History:   , lives with    No EtOH, smoker, 1 ppd x 50 years    Review of Systems:   CONSTITUTIONAL: No fever, weight loss, or fatigue  EYES: No eye pain, visual disturbances, or discharge  ENMT:  No difficulty hearing, tinnitus, vertigo; No sinus or throat pain  NECK: No pain or stiffness  BREASTS: No pain, masses, or nipple discharge  RESPIRATORY: No cough, wheezing, chills or hemoptysis; No shortness of breath  CARDIOVASCULAR: No chest pain, palpitations, dizziness, or leg swelling  GASTROINTESTINAL: No abdominal or epigastric pain. No nausea, vomiting, or hematemesis; No diarrhea or constipation. No melena or hematochezia.  GENITOURINARY: No dysuria, frequency, hematuria, or incontinence  NEUROLOGICAL: No headaches, memory loss, loss of strength, numbness, or tremors  SKIN: No itching, burning, rashes, or lesions   LYMPH NODES: No enlarged glands  ENDOCRINE: No heat or cold intolerance; No hair loss  MUSCULOSKELETAL: No joint pain or swelling; No muscle, back, or extremity pain  HEME/LYMPH: No easy bruising, or bleeding gums  ALLERGY AND IMMUNOLOGIC: No hives or eczema    T(C): 36.6 (10-20-20 @ 05:40), Max: 36.7 (10-19-20 @ 11:15)  HR: 70 (10-20-20 @ 05:40) (60 - 95)  BP: 116/46 (10-20-20 @ 05:40) (101/55 - 173/66)  RR: 16 (10-20-20 @ 05:40) (11 - 22)  SpO2: 98% (10-20-20 @ 05:40) (92% - 100%)    CAPILLARY BLOOD GLUCOSE  POCT Blood Glucose.: 138 mg/dL (20 Oct 2020 08:16)  POCT Blood Glucose.: 215 mg/dL (19 Oct 2020 21:42)  POCT Blood Glucose.: 275 mg/dL (19 Oct 2020 18:10)  POCT Blood Glucose.: 272 mg/dL (19 Oct 2020 18:07)  POCT Blood Glucose.: 275 mg/dL (19 Oct 2020 11:49)    I&O's Summary    19 Oct 2020 07:01  -  20 Oct 2020 07:00  --------------------------------------------------------  IN: 270 mL / OUT: 1715 mL / NET: -1445 mL    PHYSICAL EXAM:  GENERAL: NAD, well-developed  HEAD:  Atraumatic, Normocephalic  EYES: EOMI, PERRLA, conjunctiva and sclera clear  NECK: Supple, No JVD  CHEST/LUNG: Clear to auscultation bilaterally; no wheeze  HEART: Regular rate and rhythm; no murmurs, rubs, or gallops  ABDOMEN: Soft, Nontender, Nondistended; Bowel sounds present  EXTREMITIES:  warm and well perfused, no clubbing, cyanosis, or edema  PSYCH: AAOx3  NEUROLOGY: non-focal  SKIN: No rashes or lesions    LABS:                        8.7    6.95  )-----------( 170      ( 20 Oct 2020 07:23 )             29.3     10-20    139  |  101  |  15  ----------------------------<  138<H>  4.1   |  29  |  0.75    Ca    9.0      20 Oct 2020 07:23  Phos  3.0     10-20  Mg     1.7     10-20    Care Discussed with Providers:  Gyn Onc   HPI:  64 yo F smoker, obese, with anxiety/depression, HTN, DM2 on insulin, hypothyroidism, TIA, fibromyalgia, beta-thalassemia presenting for recurrent vulvar cancer (initially dx 2014) now here for radical vulvectomy with right lymphadenectomy and v to y advancement from right thigh to vulvar defect on 10/19 medicine evaluation for comanagement.   Patient reports initial lichen planus and cancer s/p surgery in 2014 removing labia and clitoris. Reports another small issue on L in 2017 s/p local resection.  L site was being monitored closely and getting bx; starting in Feb 2020 had a small lesion that was bx and then bx site wouldn't heal, kept following up in June another bx still not healing; reportedly path wnl.  Eventually area was becoming more mass like and painful, saw a derm, bx + and then referred for this surgery; reports thinks also moved to LN so those were dissected.   Reports DM2 x 20+ years, initially on metformin (reports couldn't digest it) and glimepiride but now on Trujeo 80 units qhs and 15-30 units with meals;  reports has some lows at home in 30s, with shaking and feeling unwell.   Report pre-OR took 50 units of LA on pm of 10/18, no insulin since.  Started taking limited PO yesterday evening.  Reports no BM, passing flatus.  Pain is 7/10, reports this is actually better than pain at home, at home was taking oxycodone 5 mg 2x per day (afternoon, evening).  No CP, SOB, f/c/n/v.  Has an appetite.     Allergies:  No Known Allergies    MEDICATIONS  (STANDING):  acetaminophen Tablet 975 milliGRAM(s) Oral every 6 hours  amLODIPine Tablet 10 milliGRAM(s) Oral daily  enalapril 2.5 milliGRAM(s) Oral daily  escitalopram Solution 30 milliGRAM(s) Oral daily  gabapentin 400 milliGRAM(s) Oral three times a day  gentamicin 0.1% Ointment 1 Application(s) Topical two times a day  heparin Injectable 5000 Unit(s) SubCutaneous every 8 hours  influenza  Vaccine (HIGH DOSE) 0.7 milliLiter(s) IntraMuscular once  insulin glargine Injectable (LANTUS) 80 Unit(s) SubCutaneous at bedtime  insulin lispro (ADMELOG) corrective regimen sliding scale   SubCutaneous three times a day before meals  insulin lispro (HumaLOG) corrective regimen sliding scale   SubCutaneous at bedtime  levothyroxine 150 MICROGram(s) Oral daily  magnesium oxide 400 milliGRAM(s) Oral once  pantoprazole    Tablet 40 milliGRAM(s) Oral before breakfast  sodium chloride 0.9% lock flush 3 milliLiter(s) IV Push every 8 hours  triamterene 37.5 mG/hydrochlorothiazide 25 mG Tablet 1 Tablet(s) Oral daily    MEDICATIONS  (PRN):  dextrose 40% Gel 15 Gram(s) Oral once PRN Blood Glucose LESS THAN 70 milliGRAM(s)/deciliter  glucagon  Injectable 1 milliGRAM(s) IntraMuscular once PRN Glucose LESS THAN 70 milligrams/deciliter  haloperidol    Injectable 1 milliGRAM(s) IV Push once PRN naseau/vomiting  ketorolac   Injectable 15 milliGRAM(s) IV Push every 6 hours PRN Severe Pain (7 - 10)  oxyCODONE    IR 5 milliGRAM(s) Oral every 6 hours PRN Severe Pain (7 - 10)  oxyCODONE    IR 2.5 milliGRAM(s) Oral every 4 hours PRN Moderate Pain (4 - 6)  senna 2 Tablet(s) Oral at bedtime PRN Constipation    PAST MEDICAL:   Anxiety and depression  HLD (hyperlipidemia)  Fibromyalgia  Strabismus  Thalassemia minor  Hypothyroid  DM type 2 (diabetes mellitus, type 2)  TIA (transient ischemic attack) 8/2013  blurred vision/dizziness  HTN (hypertension)  Vulva cancer  Lichen sclerosus of female genitalia    SURGICAL HISTORY:  H/O total knee replacement, right 4 yrs ago  Vulvar neoplasm s/p radical anterior vulvectomy in 2014 with radical excision in 2017  S/P eye surgery Jun2014, for strabismus  S/P laparoscopic cholecystectomy 20 years ago    FAMILY HISTORY:  Family history of diabetes mellitus in mother (Aunt, Mother, Sibling)  Family history of colon cancer (Aunt)    Social History:   , lives with    Has 5 kids, 39 yo son had COVID got very sick (vented, trach, HD, no needs liver and heart tx?)   No EtOH, smoker, 1 ppd x 50 years    Review of Systems:   CONSTITUTIONAL: No fever, weight loss, or fatigue  EYES: No eye pain, visual disturbances, or discharge  ENMT:  No difficulty hearing, tinnitus, vertigo; No sinus or throat pain  NECK: No pain or stiffness  RESPIRATORY: No cough, wheezing, chills or hemoptysis; No shortness of breath  CARDIOVASCULAR: No chest pain, palpitations, dizziness, or leg swelling  GASTROINTESTINAL: No abdominal or epigastric pain. No nausea, vomiting, or hematemesis; No diarrhea or constipation. No melena or hematochezia.  GENITOURINARY: No dysuria, frequency, hematuria, or incontinence  NEUROLOGICAL: No headaches, memory loss, loss of strength, numbness, or tremors  SKIN: No itching, burning, rashes, or lesions   ENDOCRINE: No heat or cold intolerance; No hair loss  MUSCULOSKELETAL: No joint pain or swelling; No muscle, back, or extremity pain  HEME/LYMPH: No easy bruising, or bleeding gums  ALLERGY AND IMMUNOLOGIC: No hives or eczema    T(C): 36.6 (10-20-20 @ 05:40), Max: 36.7 (10-19-20 @ 11:15)  HR: 70 (10-20-20 @ 05:40) (60 - 95)  BP: 116/46 (10-20-20 @ 05:40) (101/55 - 173/66)  RR: 16 (10-20-20 @ 05:40) (11 - 22)  SpO2: 98% (10-20-20 @ 05:40) (92% - 100%)    CAPILLARY BLOOD GLUCOSE  POCT Blood Glucose.: 138 mg/dL (20 Oct 2020 08:16)  POCT Blood Glucose.: 215 mg/dL (19 Oct 2020 21:42)  POCT Blood Glucose.: 275 mg/dL (19 Oct 2020 18:10)  POCT Blood Glucose.: 272 mg/dL (19 Oct 2020 18:07)  POCT Blood Glucose.: 275 mg/dL (19 Oct 2020 11:49)    I&O's Summary    19 Oct 2020 07:01  -  20 Oct 2020 07:00  --------------------------------------------------------  IN: 270 mL / OUT: 1715 mL / NET: -1445 mL    PHYSICAL EXAM:  GENERAL: NAD, obese   HEAD:  Atraumatic, Normocephalic  EYES: EOMI, PERRLA, conjunctiva and sclera clear  NECK: Supple, No JVD  CHEST/LUNG: Clear to auscultation bilaterally; no wheeze  HEART: Regular rate and rhythm; no murmurs, rubs, or gallops  ABDOMEN: Soft, Nontender, Nondistended; Bowel sounds present  EXTREMITIES:  warm and well perfused, no clubbing, cyanosis, or edema  : BETSY lawlerin LIZBETH  PSYCH: AAOx3  NEUROLOGY: non-focal  SKIN: No rashes or lesions  + ibanez     LABS:                        8.7    6.95  )-----------( 170      ( 20 Oct 2020 07:23 )             29.3     10-20    139  |  101  |  15  ----------------------------<  138<H>  4.1   |  29  |  0.75    Ca    9.0      20 Oct 2020 07:23  Phos  3.0     10-20  Mg     1.7     10-20    Care Discussed with Providers:  Gyn Onc   HPI:  66 yo F smoker, obese, with anxiety/depression, HTN, DM2 on insulin, hypothyroidism, TIA, fibromyalgia, beta-thalassemia, and autoimmune hemolytic anemia presenting for recurrent vulvar cancer (initially dx 2014) now here for radical vulvectomy with right lymphadenectomy and v to y advancement from right thigh to vulvar defect on 10/19 medicine evaluation for comanagement.   Patient reports initial lichen planus and cancer s/p surgery in 2014 removing labia and clitoris. Reports another small issue on L in 2017 s/p local resection.  L site was being monitored closely and getting bx; starting in Feb 2020 had a small lesion that was bx and then bx site wouldn't heal, kept following up in June another bx still not healing; reportedly path wnl.  Eventually area was becoming more mass like and painful, saw a derm, bx + and then referred for this surgery; reports thinks also moved to LN so those were dissected.   Reports DM2 x 20+ years, initially on metformin (reports couldn't digest it) and glimepiride but now on Trujeo 80 units qhs and 15-30 units with meals;  reports has some lows at home in 30s, with shaking and feeling unwell.   Report pre-OR took 50 units of LA on pm of 10/18, no insulin since.  Started taking limited PO yesterday evening.  Reports no BM, passing flatus.  Pain is 7/10, reports this is actually better than pain at home, at home was taking oxycodone 5 mg 2x per day (afternoon, evening).  No CP, SOB, f/c/n/v.  Has an appetite.     Allergies:  No Known Allergies    MEDICATIONS  (STANDING):  acetaminophen Tablet 975 milliGRAM(s) Oral every 6 hours  amLODIPine Tablet 10 milliGRAM(s) Oral daily  enalapril 2.5 milliGRAM(s) Oral daily  escitalopram Solution 30 milliGRAM(s) Oral daily  gabapentin 400 milliGRAM(s) Oral three times a day  gentamicin 0.1% Ointment 1 Application(s) Topical two times a day  heparin Injectable 5000 Unit(s) SubCutaneous every 8 hours  influenza  Vaccine (HIGH DOSE) 0.7 milliLiter(s) IntraMuscular once  insulin glargine Injectable (LANTUS) 80 Unit(s) SubCutaneous at bedtime  insulin lispro (ADMELOG) corrective regimen sliding scale   SubCutaneous three times a day before meals  insulin lispro (HumaLOG) corrective regimen sliding scale   SubCutaneous at bedtime  levothyroxine 150 MICROGram(s) Oral daily  magnesium oxide 400 milliGRAM(s) Oral once  pantoprazole    Tablet 40 milliGRAM(s) Oral before breakfast  sodium chloride 0.9% lock flush 3 milliLiter(s) IV Push every 8 hours  triamterene 37.5 mG/hydrochlorothiazide 25 mG Tablet 1 Tablet(s) Oral daily    MEDICATIONS  (PRN):  dextrose 40% Gel 15 Gram(s) Oral once PRN Blood Glucose LESS THAN 70 milliGRAM(s)/deciliter  glucagon  Injectable 1 milliGRAM(s) IntraMuscular once PRN Glucose LESS THAN 70 milligrams/deciliter  haloperidol    Injectable 1 milliGRAM(s) IV Push once PRN naseau/vomiting  ketorolac   Injectable 15 milliGRAM(s) IV Push every 6 hours PRN Severe Pain (7 - 10)  oxyCODONE    IR 5 milliGRAM(s) Oral every 6 hours PRN Severe Pain (7 - 10)  oxyCODONE    IR 2.5 milliGRAM(s) Oral every 4 hours PRN Moderate Pain (4 - 6)  senna 2 Tablet(s) Oral at bedtime PRN Constipation    PAST MEDICAL:   Anxiety and depression  HLD (hyperlipidemia)  Fibromyalgia  Strabismus  Thalassemia minor  Hypothyroid  DM type 2 (diabetes mellitus, type 2)  TIA (transient ischemic attack) 8/2013  blurred vision/dizziness  HTN (hypertension)  Vulva cancer  Lichen sclerosus of female genitalia    SURGICAL HISTORY:  H/O total knee replacement, right 4 yrs ago  Vulvar neoplasm s/p radical anterior vulvectomy in 2014 with radical excision in 2017  S/P eye surgery Jun2014, for strabismus  S/P laparoscopic cholecystectomy 20 years ago    FAMILY HISTORY:  Family history of diabetes mellitus in mother (Aunt, Mother, Sibling)  Family history of colon cancer (Aunt)    Social History:   , lives with    Has 5 kids, 39 yo son had COVID got very sick (vented, trach, HD, no needs liver and heart tx?)   No EtOH, smoker, 1 ppd x 50 years    Review of Systems:   CONSTITUTIONAL: No fever, weight loss, or fatigue  EYES: No eye pain, visual disturbances, or discharge  ENMT:  No difficulty hearing, tinnitus, vertigo; No sinus or throat pain  NECK: No pain or stiffness  RESPIRATORY: No cough, wheezing, chills or hemoptysis; No shortness of breath  CARDIOVASCULAR: No chest pain, palpitations, dizziness, or leg swelling  GASTROINTESTINAL: No abdominal or epigastric pain. No nausea, vomiting, or hematemesis; No diarrhea or constipation. No melena or hematochezia.  GENITOURINARY: No dysuria, frequency, hematuria, or incontinence  NEUROLOGICAL: No headaches, memory loss, loss of strength, numbness, or tremors  SKIN: No itching, burning, rashes, or lesions   ENDOCRINE: No heat or cold intolerance; No hair loss  MUSCULOSKELETAL: No joint pain or swelling; No muscle, back, or extremity pain  HEME/LYMPH: No easy bruising, or bleeding gums  ALLERGY AND IMMUNOLOGIC: No hives or eczema    T(C): 36.6 (10-20-20 @ 05:40), Max: 36.7 (10-19-20 @ 11:15)  HR: 70 (10-20-20 @ 05:40) (60 - 95)  BP: 116/46 (10-20-20 @ 05:40) (101/55 - 173/66)  RR: 16 (10-20-20 @ 05:40) (11 - 22)  SpO2: 98% (10-20-20 @ 05:40) (92% - 100%)    CAPILLARY BLOOD GLUCOSE  POCT Blood Glucose.: 138 mg/dL (20 Oct 2020 08:16)  POCT Blood Glucose.: 215 mg/dL (19 Oct 2020 21:42)  POCT Blood Glucose.: 275 mg/dL (19 Oct 2020 18:10)  POCT Blood Glucose.: 272 mg/dL (19 Oct 2020 18:07)  POCT Blood Glucose.: 275 mg/dL (19 Oct 2020 11:49)    I&O's Summary    19 Oct 2020 07:01  -  20 Oct 2020 07:00  --------------------------------------------------------  IN: 270 mL / OUT: 1715 mL / NET: -1445 mL    PHYSICAL EXAM:  GENERAL: NAD, obese   HEAD:  Atraumatic, Normocephalic  EYES: EOMI, PERRLA, conjunctiva and sclera clear  NECK: Supple, No JVD  CHEST/LUNG: Clear to auscultation bilaterally; no wheeze  HEART: Regular rate and rhythm; no murmurs, rubs, or gallops  ABDOMEN: Soft, Nontender, Nondistended; Bowel sounds present  EXTREMITIES:  warm and well perfused, no clubbing, cyanosis, or edema  : R groin LIZBETH  PSYCH: AAOx3  NEUROLOGY: non-focal  SKIN: No rashes or lesions  + ibanez     LABS:                        8.7    6.95  )-----------( 170      ( 20 Oct 2020 07:23 )             29.3     10-20    139  |  101  |  15  ----------------------------<  138<H>  4.1   |  29  |  0.75    Ca    9.0      20 Oct 2020 07:23  Phos  3.0     10-20  Mg     1.7     10-20    Care Discussed with Providers:  Gyn Onc

## 2020-10-20 NOTE — DISCHARGE NOTE PROVIDER - PROVIDER TOKENS
PROVIDER:[TOKEN:[[id number]]] PROVIDER:[TOKEN:[[id number]]],PROVIDER:[TOKEN:[[id number]]]

## 2020-10-20 NOTE — DISCHARGE NOTE PROVIDER - NSDCFUADDINST_GEN_ALL_CORE_FT
Return to your regular way of eating.  Resume normal activity as tolerated, but no heavy lifting or strenuous activity for 2 weeks.  No driving for next 2 weeks and/or while on narcotic pain medication.  Complete vaginal rest, no tampons, no douching, no tub bathing, no sexual activities for 2 weeks unless otherwise instructed by your doctor.  Call your doctor with any signs and symptoms of infection such as fever, chills, nausea or vomiting.  Call your doctor with redness or swelling at the incision site, fluid leakage or wound separation.  Call your doctor if you're unable to tolerate food or have difficulty urinating.  Call your doctor if you have pain that is not relieved by your prescribed medications.  Notify your doctor with any other concerns.  Follow up with Dr. Sauer in 2 weeks.

## 2020-10-20 NOTE — PROGRESS NOTE ADULT - ASSESSMENT
ASSESSMENT/PLAN:   BALJINDER GERMAN is a 65yFemale s/p radical vulvectomy and V to Y gluteal fold advancement flap on 10/19    - Continue bedrest for 48 hours postoperatively, then patient can progress to shuffle walking  - Continue gentamicin to incision   - Advance diet as tolerated  - Pain control  - IS  - Continue DVT prophylaxis: SQH and SCDs  - Rest of care per gyn onc    Rody Delgado PGY1  Plastic Surgery   [facility]: [id number]  [facility]: [phone]

## 2020-10-20 NOTE — DISCHARGE NOTE PROVIDER - NSDCMRMEDTOKEN_GEN_ALL_CORE_FT
amLODIPine 10 mg oral tablet: 1 tab(s) orally once a day  enalapril 2.5 mg oral tablet: 1 tab(s) orally once a day  escitalopram: 30 milligram(s)  one tab orally once a day  gabapentin 400 mg oral capsule: 1 cap(s) orally 3 times a day  levothyroxine 150 mcg (0.15 mg) oral tablet: 1 tab(s) orally once a day  Melatonin 10 mg oral capsule: 1 cap(s) orally once a day (at bedtime)  NovoLOG 100 units/mL subcutaneous solution: sliding scale.   omeprazole 40 mg oral delayed release capsule: 1 cap(s) orally once a day  oxyCODONE 5 mg oral tablet: orally once a day (at bedtime), As Needed  simvastatin 20 mg oral tablet: 1 tab(s) orally once a day (at bedtime)  Tresiba: 80 unit(s) subcutaneous at bed time  triamterene: 37.5-25 milligram(s) one tab orally once a day  Victoza: 1.8 unit(s) subcutaneous after dinner   amLODIPine 10 mg oral tablet: 1 tab(s) orally once a day  enalapril 2.5 mg oral tablet: 1 tab(s) orally once a day  escitalopram: 30 milligram(s)  one tab orally once a day  gabapentin 400 mg oral capsule: 1 cap(s) orally 3 times a day  gentamicin 0.1% topical ointment: Apply topically to affected area 2 times a day     provider contact #[phone]  ibuprofen 600 mg oral tablet: 1 tab(s) orally every 6 hours  -for mild pain MDD:4   levothyroxine 150 mcg (0.15 mg) oral tablet: 1 tab(s) orally once a day  Melatonin 10 mg oral capsule: 1 cap(s) orally once a day (at bedtime)  nicotine 21 mg/24 hr transdermal film, extended release: 21 mg/24h transdermal once a day MDD:1 patch  provider contact # [phone]  NovoLOG 100 units/mL subcutaneous solution: sliding scale.   omeprazole 40 mg oral delayed release capsule: 1 cap(s) orally once a day  oxyCODONE 5 mg oral tablet: 1 tab(s) orally every 6 hours MDD:4  senna oral tablet: 2 tab(s) orally once a day (at bedtime), As needed, Constipation  simvastatin 20 mg oral tablet: 1 tab(s) orally once a day (at bedtime)  Tresiba: 80 unit(s) subcutaneous at bed time  triamterene: 37.5-25 milligram(s) one tab orally once a day  Tylenol Extra Strength 500 mg oral tablet: 2 tab(s) orally every 6 hours MDD:8 tabs  Victoza: 1.8 unit(s) subcutaneous after dinner

## 2020-10-20 NOTE — DISCHARGE NOTE PROVIDER - HOSPITAL COURSE
Patient underwent an uncomplicated Radical Vulvectomy, R LND and Gluteal fold V-Y advancement flap for vulvar cancer. EBL 10mL, H/H as below. Patient’s postoperative course was unremarkable and she remained hemodynamically stable and afebrile throughout. Upon discharge on POD#_, the patient is ambulating and voiding spontaneously, tolerating oral intake, pain was well controlled with oral medication, and vital signs were stable. Patient underwent an uncomplicated Radical Vulvectomy, R LND and Gluteal fold V-Y advancement flap for vulvar cancer. EBL 10mL, H/H as below. Patient’s postoperative course was unremarkable and she remained hemodynamically stable and afebrile throughout. Upon discharge on POD#3, the patient is ambulating and voiding spontaneously, tolerating oral intake, pain was well controlled with oral medication, and vital signs were stable.

## 2020-10-20 NOTE — DISCHARGE NOTE PROVIDER - CARE PROVIDERS DIRECT ADDRESSES
[email or web address]

## 2020-10-21 DIAGNOSIS — E11.65 TYPE 2 DIABETES MELLITUS WITH HYPERGLYCEMIA: ICD-10-CM

## 2020-10-21 DIAGNOSIS — D62 ACUTE POSTHEMORRHAGIC ANEMIA: ICD-10-CM

## 2020-10-21 DIAGNOSIS — C51.9 MALIGNANT NEOPLASM OF VULVA, UNSPECIFIED: ICD-10-CM

## 2020-10-21 DIAGNOSIS — E03.9 HYPOTHYROIDISM, UNSPECIFIED: ICD-10-CM

## 2020-10-21 DIAGNOSIS — F41.9 ANXIETY DISORDER, UNSPECIFIED: ICD-10-CM

## 2020-10-21 DIAGNOSIS — I10 ESSENTIAL (PRIMARY) HYPERTENSION: ICD-10-CM

## 2020-10-21 DIAGNOSIS — F17.200 NICOTINE DEPENDENCE, UNSPECIFIED, UNCOMPLICATED: ICD-10-CM

## 2020-10-21 DIAGNOSIS — Z29.9 ENCOUNTER FOR PROPHYLACTIC MEASURES, UNSPECIFIED: ICD-10-CM

## 2020-10-21 DIAGNOSIS — Z86.73 PERSONAL HISTORY OF TRANSIENT ISCHEMIC ATTACK (TIA), AND CEREBRAL INFARCTION WITHOUT RESIDUAL DEFICITS: ICD-10-CM

## 2020-10-21 LAB
GLUCOSE BLDC GLUCOMTR-MCNC: 126 MG/DL — HIGH (ref 70–99)
GLUCOSE BLDC GLUCOMTR-MCNC: 154 MG/DL — HIGH (ref 70–99)
GLUCOSE BLDC GLUCOMTR-MCNC: 162 MG/DL — HIGH (ref 70–99)
GLUCOSE BLDC GLUCOMTR-MCNC: 177 MG/DL — HIGH (ref 70–99)
GLUCOSE BLDC GLUCOMTR-MCNC: 217 MG/DL — HIGH (ref 70–99)

## 2020-10-21 PROCEDURE — 99233 SBSQ HOSP IP/OBS HIGH 50: CPT

## 2020-10-21 RX ORDER — INSULIN GLARGINE 100 [IU]/ML
50 INJECTION, SOLUTION SUBCUTANEOUS AT BEDTIME
Refills: 0 | Status: DISCONTINUED | OUTPATIENT
Start: 2020-10-21 | End: 2020-10-22

## 2020-10-21 RX ORDER — INSULIN LISPRO 100/ML
10 VIAL (ML) SUBCUTANEOUS
Refills: 0 | Status: DISCONTINUED | OUTPATIENT
Start: 2020-10-21 | End: 2020-10-22

## 2020-10-21 RX ORDER — INSULIN LISPRO 100/ML
10 VIAL (ML) SUBCUTANEOUS ONCE
Refills: 0 | Status: COMPLETED | OUTPATIENT
Start: 2020-10-21 | End: 2020-10-21

## 2020-10-21 RX ADMIN — OXYCODONE HYDROCHLORIDE 5 MILLIGRAM(S): 5 TABLET ORAL at 17:25

## 2020-10-21 RX ADMIN — OXYCODONE HYDROCHLORIDE 5 MILLIGRAM(S): 5 TABLET ORAL at 12:24

## 2020-10-21 RX ADMIN — GABAPENTIN 400 MILLIGRAM(S): 400 CAPSULE ORAL at 12:23

## 2020-10-21 RX ADMIN — Medication 975 MILLIGRAM(S): at 05:49

## 2020-10-21 RX ADMIN — OXYCODONE HYDROCHLORIDE 5 MILLIGRAM(S): 5 TABLET ORAL at 11:21

## 2020-10-21 RX ADMIN — SODIUM CHLORIDE 3 MILLILITER(S): 9 INJECTION INTRAMUSCULAR; INTRAVENOUS; SUBCUTANEOUS at 17:26

## 2020-10-21 RX ADMIN — Medication 2: at 08:46

## 2020-10-21 RX ADMIN — AMLODIPINE BESYLATE 10 MILLIGRAM(S): 2.5 TABLET ORAL at 05:50

## 2020-10-21 RX ADMIN — PANTOPRAZOLE SODIUM 40 MILLIGRAM(S): 20 TABLET, DELAYED RELEASE ORAL at 05:49

## 2020-10-21 RX ADMIN — OXYCODONE HYDROCHLORIDE 5 MILLIGRAM(S): 5 TABLET ORAL at 04:49

## 2020-10-21 RX ADMIN — GABAPENTIN 400 MILLIGRAM(S): 400 CAPSULE ORAL at 05:49

## 2020-10-21 RX ADMIN — ENOXAPARIN SODIUM 40 MILLIGRAM(S): 100 INJECTION SUBCUTANEOUS at 12:22

## 2020-10-21 RX ADMIN — Medication 10 UNIT(S): at 17:34

## 2020-10-21 RX ADMIN — ESCITALOPRAM OXALATE 30 MILLIGRAM(S): 10 TABLET, FILM COATED ORAL at 12:22

## 2020-10-21 RX ADMIN — Medication 1 PATCH: at 21:29

## 2020-10-21 RX ADMIN — Medication 2.5 MILLIGRAM(S): at 05:49

## 2020-10-21 RX ADMIN — Medication 1 TABLET(S): at 05:49

## 2020-10-21 RX ADMIN — Medication 975 MILLIGRAM(S): at 12:22

## 2020-10-21 RX ADMIN — OXYCODONE HYDROCHLORIDE 5 MILLIGRAM(S): 5 TABLET ORAL at 05:30

## 2020-10-21 RX ADMIN — OXYCODONE HYDROCHLORIDE 5 MILLIGRAM(S): 5 TABLET ORAL at 18:25

## 2020-10-21 RX ADMIN — Medication 2: at 12:23

## 2020-10-21 RX ADMIN — SODIUM CHLORIDE 3 MILLILITER(S): 9 INJECTION INTRAMUSCULAR; INTRAVENOUS; SUBCUTANEOUS at 08:46

## 2020-10-21 RX ADMIN — Medication 1 PATCH: at 12:22

## 2020-10-21 RX ADMIN — Medication 975 MILLIGRAM(S): at 17:25

## 2020-10-21 RX ADMIN — SODIUM CHLORIDE 3 MILLILITER(S): 9 INJECTION INTRAMUSCULAR; INTRAVENOUS; SUBCUTANEOUS at 00:33

## 2020-10-21 RX ADMIN — Medication 2: at 17:34

## 2020-10-21 RX ADMIN — Medication 1 APPLICATION(S): at 05:49

## 2020-10-21 RX ADMIN — Medication 150 MICROGRAM(S): at 05:50

## 2020-10-21 RX ADMIN — INSULIN GLARGINE 50 UNIT(S): 100 INJECTION, SOLUTION SUBCUTANEOUS at 21:33

## 2020-10-21 RX ADMIN — GABAPENTIN 400 MILLIGRAM(S): 400 CAPSULE ORAL at 21:33

## 2020-10-21 RX ADMIN — Medication 10 UNIT(S): at 12:27

## 2020-10-21 RX ADMIN — Medication 1 APPLICATION(S): at 17:25

## 2020-10-21 RX ADMIN — Medication 5 UNIT(S): at 08:46

## 2020-10-21 NOTE — PROGRESS NOTE ADULT - SUBJECTIVE AND OBJECTIVE BOX
Plastic Surgery    SUBJECTIVE: Pt seen and examined on rounds with team. No acute events overnight. Pain well controlled    T(C): 37 (10-21-20 @ 04:00), Max: 37.4 (10-20-20 @ 19:58)  HR: 65 (10-21-20 @ 04:00) (65 - 74)  BP: 130/49 (10-21-20 @ 04:00) (124/48 - 130/49)  BP(mean): 66 (10-20-20 @ 16:32) (66 - 66)  ABP: --  ABP(mean): --  RR: 18 (10-21-20 @ 04:00) (16 - 18)  SpO2: 97% (10-21-20 @ 04:00) (95% - 100%)  Wt(kg): --  CVP(mm Hg): --  CI: --  CAPILLARY BLOOD GLUCOSE      POCT Blood Glucose.: 236 mg/dL (20 Oct 2020 21:54)  POCT Blood Glucose.: 221 mg/dL (20 Oct 2020 16:57)  POCT Blood Glucose.: 210 mg/dL (20 Oct 2020 12:16)  POCT Blood Glucose.: 138 mg/dL (20 Oct 2020 08:16)   N/A      10-20 @ 07:01  -  10-21 @ 07:00  --------------------------------------------------------  IN:    Oral Fluid: 1200 mL  Total IN: 1200 mL    OUT:    Bulb (mL): 7.5 mL    Indwelling Catheter - Urethral (mL): 1820 mL  Total OUT: 1827.5 mL    Total NET: -627.5 mL          PHYSICAL EXAM:   General: NAD, Lying in bed   Neuro: Awake and alert  Incision: incision lines c/d/i, skin flaps appear healthy, soft and pink, LIZBETH with SS output      MEDICATIONS (STANDING): acetaminophen   Tablet .. 975 milliGRAM(s) Oral every 6 hours  amLODIPine   Tablet 10 milliGRAM(s) Oral daily  dextrose 5%. 1000 milliLiter(s) IV Continuous <Continuous>  dextrose 50% Injectable 12.5 Gram(s) IV Push once  dextrose 50% Injectable 25 Gram(s) IV Push once  dextrose 50% Injectable 25 Gram(s) IV Push once  escitalopram Solution 30 milliGRAM(s) Oral daily  gabapentin 400 milliGRAM(s) Oral three times a day  heparin   Injectable 5000 Unit(s) SubCutaneous every 8 hours  influenza  Vaccine (HIGH DOSE) 0.7 milliLiter(s) IntraMuscular once  insulin lispro (ADMELOG) corrective regimen sliding scale   SubCutaneous three times a day before meals  insulin lispro (HumaLOG) corrective regimen sliding scale   SubCutaneous at bedtime  levothyroxine 150 MICROGram(s) Oral daily  pantoprazole    Tablet 40 milliGRAM(s) Oral before breakfast  sodium chloride 0.9% lock flush 3 milliLiter(s) IV Push every 8 hours    MEDICATIONS (PRN):dextrose 40% Gel 15 Gram(s) Oral once PRN Blood Glucose LESS THAN 70 milliGRAM(s)/deciliter  glucagon  Injectable 1 milliGRAM(s) IntraMuscular once PRN Glucose LESS THAN 70 milligrams/deciliter  haloperidol    Injectable 1 milliGRAM(s) IV Push once PRN naseau/vomiting  ketorolac   Injectable 15 milliGRAM(s) IV Push every 6 hours PRN Severe Pain (7 - 10)  oxyCODONE    IR 5 milliGRAM(s) Oral every 6 hours PRN Severe Pain (7 - 10)  oxyCODONE    IR 2.5 milliGRAM(s) Oral every 4 hours PRN Moderate Pain (4 - 6)  senna 2 Tablet(s) Oral at bedtime PRN Constipation

## 2020-10-21 NOTE — PROGRESS NOTE ADULT - SUBJECTIVE AND OBJECTIVE BOX
R3 Gyn ONC Progress Note POD 2 HD 3    Subjective:   Pt seen and examined at bedside. No events overnight. Pain well controlled. She has not yet ambulated. [place] in place with adequate output. Passing flatus. Tolerating regular diet. Pt denies fever, chills, chest pain, SOB, nausea, vomiting, lightheadedness, dizziness.      Objective:  T(F): 98.6 (10-21-20 @ 04:00), Max: 99.4 (10-20-20 @ 19:58)  HR: 65 (10-21-20 @ 04:00) (65 - 74)  BP: 130/49 (10-21-20 @ 04:00) (124/48 - 130/49)  RR: 18 (10-21-20 @ 04:00) (16 - 18)  SpO2: 97% (10-21-20 @ 04:00) (95% - 100%)  Wt(kg): --  I&O's Summary    19 Oct 2020 07:01  -  20 Oct 2020 07:00  --------------------------------------------------------  IN: 270 mL / OUT: 1715 mL / NET: -1445 mL    20 Oct 2020 07:01  -  21 Oct 2020 06:58  --------------------------------------------------------  IN: 1200 mL / OUT: 1827.5 mL / NET: -627.5 mL      CAPILLARY BLOOD GLUCOSE      POCT Blood Glucose.: 236 mg/dL (20 Oct 2020 21:54)  POCT Blood Glucose.: 221 mg/dL (20 Oct 2020 16:57)  POCT Blood Glucose.: 210 mg/dL (20 Oct 2020 12:16)  POCT Blood Glucose.: 138 mg/dL (20 Oct 2020 08:16)      MEDICATIONS  (STANDING):  acetaminophen   Tablet .. 975 milliGRAM(s) Oral every 6 hours  amLODIPine   Tablet 10 milliGRAM(s) Oral daily  dextrose 5%. 1000 milliLiter(s) (50 mL/Hr) IV Continuous <Continuous>  dextrose 50% Injectable 12.5 Gram(s) IV Push once  dextrose 50% Injectable 25 Gram(s) IV Push once  dextrose 50% Injectable 25 Gram(s) IV Push once  enalapril 2.5 milliGRAM(s) Oral daily  enoxaparin Injectable 40 milliGRAM(s) SubCutaneous daily  escitalopram Solution 30 milliGRAM(s) Oral daily  gabapentin 400 milliGRAM(s) Oral three times a day  gentamicin 0.1% Ointment 1 Application(s) Topical two times a day  influenza  Vaccine (HIGH DOSE) 0.7 milliLiter(s) IntraMuscular once  insulin glargine Injectable (LANTUS) 40 Unit(s) SubCutaneous at bedtime  insulin lispro (ADMELOG) corrective regimen sliding scale   SubCutaneous three times a day before meals  insulin lispro (HumaLOG) corrective regimen sliding scale   SubCutaneous at bedtime  insulin lispro Injectable (ADMELOG) 5 Unit(s) SubCutaneous three times a day before meals  levothyroxine 150 MICROGram(s) Oral daily  nicotine - 21 mG/24Hr(s) Patch 1 patch Transdermal daily  pantoprazole    Tablet 40 milliGRAM(s) Oral before breakfast  sodium chloride 0.9% lock flush 3 milliLiter(s) IV Push every 8 hours  triamterene 37.5 mG/hydrochlorothiazide 25 mG Tablet 1 Tablet(s) Oral daily    MEDICATIONS  (PRN):  dextrose 40% Gel 15 Gram(s) Oral once PRN Blood Glucose LESS THAN 70 milliGRAM(s)/deciliter  glucagon  Injectable 1 milliGRAM(s) IntraMuscular once PRN Glucose LESS THAN 70 milligrams/deciliter  ketorolac   Injectable 15 milliGRAM(s) IV Push every 6 hours PRN Severe Pain (7 - 10)  oxyCODONE    IR 5 milliGRAM(s) Oral every 6 hours PRN Severe Pain (7 - 10)  oxyCODONE    IR 2.5 milliGRAM(s) Oral every 4 hours PRN Moderate Pain (4 - 6)  senna 2 Tablet(s) Oral at bedtime PRN Constipation      Physical Exam:  Constitutional: NAD, A+O x3  CV: RR S1S2 no m/r/g  Lungs: CTA b/l, good air flow b/l  Abdomen: soft, NTND, no guarding, no rebound, normal bowel sounds  VE: No vaginal bleeding noted on pad, clean, dry, intact, LIZBETH in place with serosanguinous fluids  Extremities: no lower extremity edema or calf tenderness bilaterally; venodynes in place    LABS:  10-20    139    |  101    |  15     ----------------------------<  138<H>  4.1     |  29     |  0.75     Ca    9.0        20 Oct 2020 07:23  Phos  3.0       10-20  Mg     1.7       10-20

## 2020-10-21 NOTE — PROGRESS NOTE ADULT - ASSESSMENT
ASSESSMENT/PLAN:   BALJINDER GERMAN is a 65yFemale s/p radical vulvectomy and V to Y gluteal fold advancement flap on 10/19    - Start shuffle walking today, may be in recliner, no sitting  - Continue gentamicin to incision   - Advance diet as tolerated  - Pain control  - IS  - Continue DVT prophylaxis: SQH and SCDs  - Rest of care per gyn onc    Plastic Surgery   [facility]: [id number]  [facility]: [phone]

## 2020-10-21 NOTE — PROGRESS NOTE ADULT - ASSESSMENT
64 yo F smoker, obese, with anxiety/depression, HTN, DM2 on insulin, hypothyroidism, TIA, fibromyalgia, beta-thalassemia trait, and autoimmune hemolytic anemia presenting for recurrent vulvar cancer (initially dx 2014) now here for radical vulvectomy with right lymphadenectomy and v to y advancement from right thigh to vulvar defect on 10/19 medicine evaluation for comanagement.    # Recurrent Vulvar Cancer:  s/p OR on 10/19 radical vulvectomy with right lymphadenectomy and v to y advancement from right thigh to vulvar defect   - s/p bedrest x 48 hours now OOB to recliner and shuffling walking   - c/w pain control: standing Tylenol 975 mg q6h; toradol 15 q6h prn and oxycodone 2.5/5 mg prn; consider bowel regimen (Miralax and senna)   - drain and wound care (on topical gentamycin) per surgical teams   - tolerating regular diet   - TOV per surgical teams, likely once can get OOB    # Postoperative Anemia: pre-op Hb 10.9 now 8.7 likely due to expected OR losses; patient also has history of beta-thalassemia and AIHA follows at [facility] with Dr. Auguste (Amrit at time of diagnosis positive IgG and negative C3 s/p steroids and rituximab).   - trend CBC, transfuse <7, bleeding or symptomatic anemia  - LDH added on and wnl thus hemolysis unlikely cause of Hb drop     # DM2:  20+ years, originally on orals, HbA1c on Oct 12 2020 was 6.9%; reports LA at home 80 units and 15-30 units SA with meals and also on Victoza  - c/w gabapentin 400 mg TID  - am , will increase Lantus to 50 units   - increase Admelog to 10 untis with meals   - c/w DM diet and ISS    # HTN: BP 10/19 elevated likely 2/2 pain/anxiety, this am BP wnl 124/48  - amlodipine 10 mg daily, enalapril 2.5 mg daily, and triamterene 37.5 mG/hydrochlorothiazide 25 mG daily, agree with hold parameters     # Hypothyroidism  - c/w synthroid 150 mcg daily   - per PET possible thyroid nodule, consider OP US    # TIA  - resume statin; at home on simvastatin 20 mg, change to atorvastatin 10 mg as simvastatin & amlodipine have increase risk of rhabdo     # Anxiety/Depression  - escitalopram 30 mg daily      # Smoker  - nicotine patch, 21 mcg/day    # Prophylactic Measure  - Lovenox ppx 64 yo F smoker, obese, with anxiety/depression, HTN, DM2 on insulin, hypothyroidism, TIA, fibromyalgia, beta-thalassemia trait, and autoimmune hemolytic anemia presenting for recurrent vulvar cancer (initially dx 2014) now here for radical vulvectomy with right lymphadenectomy and v to y advancement from right thigh to vulvar defect on 10/19 medicine evaluation for comanagement.

## 2020-10-21 NOTE — PROGRESS NOTE ADULT - PROBLEM SELECTOR PLAN 3
20+ years, originally on orals, HbA1c on Oct 12 2020 was 6.9%; reports LA at home 80 units and 15-30 units SA with meals and also on Victoza  - c/w gabapentin 400 mg TID  - am , will increase Lantus to 50 units   - increase Admelog to 10 units with meals   - c/w DM diet and ISS

## 2020-10-21 NOTE — PROGRESS NOTE ADULT - SUBJECTIVE AND OBJECTIVE BOX
Patient is a 65y old  Female who presents with a chief complaint of Vulvectomy for Vulvar Cancer    SUBJECTIVE / OVERNIGHT EVENTS:    In recliner, reports doing well, pain controlled  Slight dizziness and some pain when getting up today--first time OOB to recliner  Reports was able to eat breakfast, no nausea/vomiting, +flatus, no BM  No CP, SOB, f/c/n/v  Does get easily constipated at home 2/2 oxy, takes Miralax at home     MEDICATIONS  (STANDING):  acetaminophen Tablet 975 milliGRAM(s) Oral every 6 hours  amLODIPine Tablet 10 milliGRAM(s) Oral daily  enalapril 2.5 milliGRAM(s) Oral daily  enoxaparin Injectable 40 milliGRAM(s) SubCutaneous daily  escitalopram Solution 30 milliGRAM(s) Oral daily  gabapentin 400 milliGRAM(s) Oral three times a day  gentamicin 0.1% Ointment 1 Application(s) Topical two times a day  influenza  Vaccine (HIGH DOSE) 0.7 milliLiter(s) IntraMuscular once  insulin glargine Injectable (LANTUS) 40 Unit(s) SubCutaneous at bedtime  insulin lispro (ADMELOG) corrective regimen sliding scale   SubCutaneous three times a day before meals  insulin lispro (HumaLOG) corrective regimen sliding scale   SubCutaneous at bedtime  insulin lispro Injectable (ADMELOG) 5 Unit(s) SubCutaneous three times a day before meals  levothyroxine 150 MICROGram(s) Oral daily  nicotine - 21 mG/24Hr(s) Patch 1 patch Transdermal daily  pantoprazole    Tablet 40 milliGRAM(s) Oral before breakfast  sodium chloride 0.9% lock flush 3 milliLiter(s) IV Push every 8 hours  triamterene 37.5 mG/hydrochlorothiazide 25 mG Tablet 1 Tablet(s) Oral daily    MEDICATIONS  (PRN):  dextrose 40% Gel 15 Gram(s) Oral once PRN Blood Glucose LESS THAN 70 milliGRAM(s)/deciliter  glucagon  Injectable 1 milliGRAM(s) IntraMuscular once PRN Glucose LESS THAN 70 milligrams/deciliter  ketorolac   Injectable 15 milliGRAM(s) IV Push every 6 hours PRN Severe Pain (7 - 10)  oxyCODONE    IR 5 milliGRAM(s) Oral every 6 hours PRN Severe Pain (7 - 10)  oxyCODONE    IR 2.5 milliGRAM(s) Oral every 4 hours PRN Moderate Pain (4 - 6)  senna 2 Tablet(s) Oral at bedtime PRN Constipation    T(C): 37 (10-21-20 @ 04:00), Max: 37.4 (10-20-20 @ 19:58)  HR: 65 (10-21-20 @ 04:00) (65 - 74)  BP: 130/49 (10-21-20 @ 04:00) (125/42 - 130/49)  RR: 18 (10-21-20 @ 04:00) (16 - 18)  SpO2: 97% (10-21-20 @ 04:00) (95% - 97%)    CAPILLARY BLOOD GLUCOSE  POCT Blood Glucose.: 162 mg/dL (21 Oct 2020 08:17)  POCT Blood Glucose.: 236 mg/dL (20 Oct 2020 21:54)  POCT Blood Glucose.: 221 mg/dL (20 Oct 2020 16:57)  POCT Blood Glucose.: 210 mg/dL (20 Oct 2020 12:16)    I&O's Summary    20 Oct 2020 07:01  -  21 Oct 2020 07:00  --------------------------------------------------------  IN: 1200 mL / OUT: 1827.5 mL / NET: -627.5 mL    21 Oct 2020 07:01  -  21 Oct 2020 11:45  --------------------------------------------------------  IN: 0 mL / OUT: 500 mL / NET: -500 mL    PHYSICAL EXAM:  GENERAL: NAD, well-developed  HEAD:  Atraumatic, Normocephalic  EYES: EOMI, PERRLA, conjunctiva and sclera clear  NECK: Supple, No JVD  CHEST/LUNG: Clear to auscultation bilaterally; No wheeze  HEART: Regular rate and rhythm; No murmurs, rubs, or gallops  ABDOMEN: Soft, Nontender, Nondistended; Bowel sounds present  EXTREMITIES:   warm and well perfused, No clubbing, cyanosis, or edema  PSYCH: AAOx3  NEUROLOGY: non-focal  SKIN: No rashes or lesions    LABS:                        8.9    x     )-----------( x        ( 20 Oct 2020 12:21 )             29.0     10-20    139  |  101  |  15  ----------------------------<  138<H>  4.1   |  29  |  0.75    Ca    9.0      20 Oct 2020 07:23  Phos  3.0     10-20  Mg     1.7     10-20    Consultant(s) Notes Reviewed:  plastics  Care Discussed with Providers:  Gyn Onc    Patient is a 65y old  Female who presents with a chief complaint of Vulvectomy for Vulvar Cancer    SUBJECTIVE / OVERNIGHT EVENTS:    In recliner, reports doing well, pain controlled  Slight dizziness and some pain when getting up today--first time OOB to recliner  Reports was able to eat breakfast, no nausea/vomiting, +flatus, no BM  No CP, SOB, f/c/n/v  Does get easily constipated at home 2/2 oxy, takes Miralax at home     MEDICATIONS  (STANDING):  acetaminophen Tablet 975 milliGRAM(s) Oral every 6 hours  amLODIPine Tablet 10 milliGRAM(s) Oral daily  enalapril 2.5 milliGRAM(s) Oral daily  enoxaparin Injectable 40 milliGRAM(s) SubCutaneous daily  escitalopram Solution 30 milliGRAM(s) Oral daily  gabapentin 400 milliGRAM(s) Oral three times a day  gentamicin 0.1% Ointment 1 Application(s) Topical two times a day  influenza  Vaccine (HIGH DOSE) 0.7 milliLiter(s) IntraMuscular once  insulin glargine Injectable (LANTUS) 40 Unit(s) SubCutaneous at bedtime  insulin lispro (ADMELOG) corrective regimen sliding scale   SubCutaneous three times a day before meals  insulin lispro (HumaLOG) corrective regimen sliding scale   SubCutaneous at bedtime  insulin lispro Injectable (ADMELOG) 5 Unit(s) SubCutaneous three times a day before meals  levothyroxine 150 MICROGram(s) Oral daily  nicotine - 21 mG/24Hr(s) Patch 1 patch Transdermal daily  pantoprazole    Tablet 40 milliGRAM(s) Oral before breakfast  sodium chloride 0.9% lock flush 3 milliLiter(s) IV Push every 8 hours  triamterene 37.5 mG/hydrochlorothiazide 25 mG Tablet 1 Tablet(s) Oral daily    MEDICATIONS  (PRN):  dextrose 40% Gel 15 Gram(s) Oral once PRN Blood Glucose LESS THAN 70 milliGRAM(s)/deciliter  glucagon  Injectable 1 milliGRAM(s) IntraMuscular once PRN Glucose LESS THAN 70 milligrams/deciliter  ketorolac   Injectable 15 milliGRAM(s) IV Push every 6 hours PRN Severe Pain (7 - 10)  oxyCODONE    IR 5 milliGRAM(s) Oral every 6 hours PRN Severe Pain (7 - 10)  oxyCODONE    IR 2.5 milliGRAM(s) Oral every 4 hours PRN Moderate Pain (4 - 6)  senna 2 Tablet(s) Oral at bedtime PRN Constipation    T(C): 37 (10-21-20 @ 04:00), Max: 37.4 (10-20-20 @ 19:58)  HR: 65 (10-21-20 @ 04:00) (65 - 74)  BP: 130/49 (10-21-20 @ 04:00) (125/42 - 130/49)  RR: 18 (10-21-20 @ 04:00) (16 - 18)  SpO2: 97% (10-21-20 @ 04:00) (95% - 97%)    CAPILLARY BLOOD GLUCOSE  POCT Blood Glucose.: 162 mg/dL (21 Oct 2020 08:17)  POCT Blood Glucose.: 236 mg/dL (20 Oct 2020 21:54)  POCT Blood Glucose.: 221 mg/dL (20 Oct 2020 16:57)  POCT Blood Glucose.: 210 mg/dL (20 Oct 2020 12:16)    I&O's Summary    20 Oct 2020 07:01  -  21 Oct 2020 07:00  --------------------------------------------------------  IN: 1200 mL / OUT: 1827.5 mL / NET: -627.5 mL    21 Oct 2020 07:01  -  21 Oct 2020 11:45  --------------------------------------------------------  IN: 0 mL / OUT: 500 mL / NET: -500 mL    PHYSICAL EXAM:  GENERAL: NAD, obese   CHEST/LUNG: Clear to auscultation bilaterally; no wheeze  HEART: Regular rate and rhythm; no murmurs, rubs, or gallops  ABDOMEN: Soft, Nontender, Nondistended; Bowel sounds present  EXTREMITIES:  warm and well perfused, no clubbing, cyanosis, or edema  : R groin LIZBETH  PSYCH: AAOx3  NEUROLOGY: non-focal  SKIN: No rashes or lesions  + ibanez     LABS:                        8.9    x     )-----------( x        ( 20 Oct 2020 12:21 )             29.0     10-20    139  |  101  |  15  ----------------------------<  138<H>  4.1   |  29  |  0.75    Ca    9.0      20 Oct 2020 07:23  Phos  3.0     10-20  Mg     1.7     10-20    Consultant(s) Notes Reviewed:  plastics  Care Discussed with Providers:  Gyn Onc

## 2020-10-21 NOTE — PROGRESS NOTE ADULT - PROBLEM SELECTOR PLAN 1
s/p OR on 10/19 radical vulvectomy with right lymphadenectomy and v to y advancement from right thigh to vulvar defect   - s/p bedrest x 48 hours now OOB to recliner and shuffling walking   - c/w pain control: standing Tylenol 975 mg q6h; toradol 15 q6h prn and oxycodone 2.5/5 mg prn; consider bowel regimen (Miralax and senna)   - drain and wound care (on topical gentamycin) per surgical teams   - tolerating regular diet   - TOV per surgical teams, likely once can get OOB

## 2020-10-21 NOTE — PROGRESS NOTE ADULT - ASSESSMENT
Assessment/Plan: 65y POD# 2 , s/p Radical vulvectomy, R. inguinal LN dissection, Gluteal fold with V-Y advancement. She is stable and doing well post operatively.

## 2020-10-21 NOTE — PROGRESS NOTE ADULT - PROBLEM SELECTOR PLAN 1
Neuro: Continue PO pain medication  CV: Hemodynamically stable  Pulm: Saturating well on RA. Increase incentive spirometry.  GI: Continue reg diet  : Ibanez in place. Adequate UOP. Bedrest with recline for eating. Appreciate plastic's recs on ibanez and OOB to chair today.   Heme: Continue Lovenox/Venodynes for DVT ppx.   ID: Afebrile  Endo: ISS. FS elevated yesterday. Patient changed to home insulin dosing. Will monitor FS closely.  Dispo: continue inpatient care    Noah Hernandez PGY-3

## 2020-10-22 ENCOUNTER — TRANSCRIPTION ENCOUNTER (OUTPATIENT)
Age: 65
End: 2020-10-22

## 2020-10-22 VITALS
OXYGEN SATURATION: 98 % | HEART RATE: 70 BPM | RESPIRATION RATE: 18 BRPM | TEMPERATURE: 98 F | DIASTOLIC BLOOD PRESSURE: 39 MMHG | SYSTOLIC BLOOD PRESSURE: 124 MMHG

## 2020-10-22 DIAGNOSIS — Z98.890 OTHER SPECIFIED POSTPROCEDURAL STATES: ICD-10-CM

## 2020-10-22 DIAGNOSIS — C51.9 MALIGNANT NEOPLASM OF VULVA, UNSPECIFIED: ICD-10-CM

## 2020-10-22 LAB
GLUCOSE BLDC GLUCOMTR-MCNC: 109 MG/DL — HIGH (ref 70–99)
GLUCOSE BLDC GLUCOMTR-MCNC: 142 MG/DL — HIGH (ref 70–99)

## 2020-10-22 PROCEDURE — 99232 SBSQ HOSP IP/OBS MODERATE 35: CPT

## 2020-10-22 RX ORDER — OXYCODONE HYDROCHLORIDE 5 MG/1
0.5 TABLET ORAL
Qty: 20 | Refills: 0
Start: 2020-10-22

## 2020-10-22 RX ORDER — IBUPROFEN 200 MG
1 TABLET ORAL
Qty: 20 | Refills: 0
Start: 2020-10-22

## 2020-10-22 RX ORDER — OXYCODONE HYDROCHLORIDE 5 MG/1
1 TABLET ORAL
Qty: 15 | Refills: 0
Start: 2020-10-22

## 2020-10-22 RX ORDER — IBUPROFEN 200 MG
600 TABLET ORAL EVERY 6 HOURS
Refills: 0 | Status: DISCONTINUED | OUTPATIENT
Start: 2020-10-22 | End: 2020-10-22

## 2020-10-22 RX ORDER — OXYCODONE HYDROCHLORIDE 5 MG/1
0 TABLET ORAL
Qty: 0 | Refills: 0 | DISCHARGE

## 2020-10-22 RX ORDER — GENTAMICIN SULFATE 0.1 %
1 OINTMENT (GRAM) TOPICAL
Qty: 30 | Refills: 0
Start: 2020-10-22

## 2020-10-22 RX ORDER — OXYCODONE HYDROCHLORIDE 5 MG/1
1 TABLET ORAL
Qty: 10 | Refills: 0
Start: 2020-10-22

## 2020-10-22 RX ORDER — ACETAMINOPHEN 500 MG
2 TABLET ORAL
Qty: 30 | Refills: 0
Start: 2020-10-22

## 2020-10-22 RX ORDER — SENNA PLUS 8.6 MG/1
2 TABLET ORAL
Qty: 0 | Refills: 0 | DISCHARGE
Start: 2020-10-22

## 2020-10-22 RX ORDER — NICOTINE POLACRILEX 2 MG
21 GUM BUCCAL
Qty: 10 | Refills: 0
Start: 2020-10-22

## 2020-10-22 RX ORDER — OXYCODONE HYDROCHLORIDE 5 MG/1
1 TABLET ORAL
Qty: 8 | Refills: 0
Start: 2020-10-22 | End: 2020-10-23

## 2020-10-22 RX ADMIN — ESCITALOPRAM OXALATE 30 MILLIGRAM(S): 10 TABLET, FILM COATED ORAL at 12:59

## 2020-10-22 RX ADMIN — Medication 150 MICROGRAM(S): at 05:07

## 2020-10-22 RX ADMIN — Medication 1 PATCH: at 12:58

## 2020-10-22 RX ADMIN — OXYCODONE HYDROCHLORIDE 5 MILLIGRAM(S): 5 TABLET ORAL at 03:41

## 2020-10-22 RX ADMIN — GABAPENTIN 400 MILLIGRAM(S): 400 CAPSULE ORAL at 12:59

## 2020-10-22 RX ADMIN — GABAPENTIN 400 MILLIGRAM(S): 400 CAPSULE ORAL at 05:07

## 2020-10-22 RX ADMIN — Medication 975 MILLIGRAM(S): at 00:40

## 2020-10-22 RX ADMIN — Medication 1 APPLICATION(S): at 05:07

## 2020-10-22 RX ADMIN — ENOXAPARIN SODIUM 40 MILLIGRAM(S): 100 INJECTION SUBCUTANEOUS at 12:59

## 2020-10-22 RX ADMIN — Medication 975 MILLIGRAM(S): at 05:08

## 2020-10-22 RX ADMIN — Medication 10 UNIT(S): at 12:59

## 2020-10-22 RX ADMIN — OXYCODONE HYDROCHLORIDE 5 MILLIGRAM(S): 5 TABLET ORAL at 10:03

## 2020-10-22 RX ADMIN — PANTOPRAZOLE SODIUM 40 MILLIGRAM(S): 20 TABLET, DELAYED RELEASE ORAL at 05:07

## 2020-10-22 RX ADMIN — Medication 1 TABLET(S): at 05:07

## 2020-10-22 RX ADMIN — OXYCODONE HYDROCHLORIDE 5 MILLIGRAM(S): 5 TABLET ORAL at 04:28

## 2020-10-22 RX ADMIN — Medication 975 MILLIGRAM(S): at 12:59

## 2020-10-22 RX ADMIN — Medication 10 UNIT(S): at 09:04

## 2020-10-22 RX ADMIN — SODIUM CHLORIDE 3 MILLILITER(S): 9 INJECTION INTRAMUSCULAR; INTRAVENOUS; SUBCUTANEOUS at 00:42

## 2020-10-22 RX ADMIN — OXYCODONE HYDROCHLORIDE 5 MILLIGRAM(S): 5 TABLET ORAL at 11:00

## 2020-10-22 RX ADMIN — Medication 600 MILLIGRAM(S): at 12:59

## 2020-10-22 RX ADMIN — Medication 2.5 MILLIGRAM(S): at 05:07

## 2020-10-22 RX ADMIN — AMLODIPINE BESYLATE 10 MILLIGRAM(S): 2.5 TABLET ORAL at 05:07

## 2020-10-22 RX ADMIN — SODIUM CHLORIDE 3 MILLILITER(S): 9 INJECTION INTRAMUSCULAR; INTRAVENOUS; SUBCUTANEOUS at 11:11

## 2020-10-22 NOTE — PROGRESS NOTE ADULT - PROBLEM SELECTOR PLAN 6
- resume statin; at home on simvastatin 20 mg, change to atorvastatin 10 mg as simvastatin & amlodipine have increase risk of rhabdo
- resume statin; at home on simvastatin 20 mg, change to atorvastatin 10 mg as simvastatin & amlodipine have increase risk of rhabdo

## 2020-10-22 NOTE — PROGRESS NOTE ADULT - ATTENDING COMMENTS
Patient seen and examined, agree with gyn housestaff  POD#1  Doing well  Routine postop care  Labs stable  Exam benign  Bedrest as per plastic surgery
Patient seen and examined.   Findings at surgery reviewed.   Feeling well. Pain control adequate.   Discharge instructions reviewed.   Stable to DC home.

## 2020-10-22 NOTE — DISCHARGE NOTE NURSING/CASE MANAGEMENT/SOCIAL WORK - NSDCPEWEB_GEN_ALL_CORE
[facility] for Tobacco Control website --- [email or web address] --- [email or web address]

## 2020-10-22 NOTE — PROGRESS NOTE ADULT - SUBJECTIVE AND OBJECTIVE BOX
Patient is a 65y old  Female who presents with a chief complaint of scheduled surgery    SUBJECTIVE / OVERNIGHT EVENTS:    Feeling well, pain controlled, + flatus, no BM  Tolerating diet  Able to shuffle walk to bathroom, reports dizziness on standing, +voiding s/p ibanez removal, feels may need to have BM  Feels well enough to go home if able to, reports has daughter and  at home to provide assistance   Happy with her BG readings  Wondering if she has worsening anemia due to fatigue, denies bleeding     MEDICATIONS  (STANDING):  acetaminophen Tablet 975 milliGRAM(s) Oral every 6 hours  amLODIPine   Tablet 10 milliGRAM(s) Oral daily  enalapril 2.5 milliGRAM(s) Oral daily  enoxaparin Injectable 40 milliGRAM(s) SubCutaneous daily  escitalopram Solution 30 milliGRAM(s) Oral daily  gabapentin 400 milliGRAM(s) Oral three times a day  gentamicin 0.1% Ointment 1 Application(s) Topical two times a day  ibuprofen  Tablet. 600 milliGRAM(s) Oral every 6 hours  influenza  Vaccine (HIGH DOSE) 0.7 milliLiter(s) IntraMuscular once  insulin glargine Injectable (LANTUS) 50 Unit(s) SubCutaneous at bedtime  insulin lispro (ADMELOG) corrective regimen sliding scale   SubCutaneous three times a day before meals  insulin lispro (HumaLOG) corrective regimen sliding scale   SubCutaneous at bedtime  insulin lispro Injectable (ADMELOG) 10 Unit(s) SubCutaneous three times a day before meals  levothyroxine 150 MICROGram(s) Oral daily  nicotine - 21 mG/24Hr(s) Patch 1 patch Transdermal daily  pantoprazole    Tablet 40 milliGRAM(s) Oral before breakfast  sodium chloride 0.9% lock flush 3 milliLiter(s) IV Push every 8 hours  triamterene 37.5 mG/hydrochlorothiazide 25 mG Tablet 1 Tablet(s) Oral daily    MEDICATIONS  (PRN):  dextrose 40% Gel 15 Gram(s) Oral once PRN Blood Glucose LESS THAN 70 milliGRAM(s)/deciliter  glucagon  Injectable 1 milliGRAM(s) IntraMuscular once PRN Glucose LESS THAN 70 milligrams/deciliter  oxyCODONE    IR 5 milliGRAM(s) Oral every 6 hours PRN Severe Pain (7 - 10)  oxyCODONE    IR 2.5 milliGRAM(s) Oral every 4 hours PRN Moderate Pain (4 - 6)  senna 2 Tablet(s) Oral at bedtime PRN Constipation    T(C): 36.7 (10-22-20 @ 04:00), Max: 36.8 (10-21-20 @ 16:17)  HR: 64 (10-22-20 @ 04:00) (64 - 67)  BP: 133/56 (10-22-20 @ 04:00) (122/38 - 133/56)  RR: 18 (10-22-20 @ 04:00) (15 - 18)  SpO2: 97% (10-22-20 @ 04:00) (97% - 97%)    CAPILLARY BLOOD GLUCOSE  POCT Blood Glucose.: 109 mg/dL (22 Oct 2020 08:41)  POCT Blood Glucose.: 126 mg/dL (21 Oct 2020 21:19)  POCT Blood Glucose.: 154 mg/dL (21 Oct 2020 17:30)  POCT Blood Glucose.: 177 mg/dL (21 Oct 2020 11:51)    I&O's Summary    21 Oct 2020 07:01  -  22 Oct 2020 07:00  --------------------------------------------------------  IN: 720 mL / OUT: 2695 mL / NET: -1975 mL    22 Oct 2020 07:01  -  22 Oct 2020 10:21  --------------------------------------------------------  IN: 0 mL / OUT: 100 mL / NET: -100 mL    PHYSICAL EXAM:  GENERAL: NAD, obese   CHEST/LUNG: Clear to auscultation bilaterally; no wheeze  HEART: Regular rate and rhythm; no murmurs, rubs, or gallops  ABDOMEN: Soft, Nontender, Nondistended; Bowel sounds present  EXTREMITIES:  warm and well perfused, no clubbing, cyanosis, or edema  : R groin LIZBETH  PSYCH: AAOx3  NEUROLOGY: non-focal  SKIN: No rashes or lesions    LABS: no new labs              Care Discussed with Consultants/Other Providers:  Gyn Onc

## 2020-10-22 NOTE — PHYSICAL THERAPY INITIAL EVALUATION ADULT - STAIR PATTERN, REHAB EVAL
Leading with left LE to ascend, Right LE to descend to avoid over-stretching at surgical site/step to step

## 2020-10-22 NOTE — PROGRESS NOTE ADULT - SUBJECTIVE AND OBJECTIVE BOX
Plastic Surgery    SUBJECTIVE: Pt seen and examined on rounds with team. No acute events overnight.  Montgomery removed by gyn team this morning.    VITALS  T(C): 36.7 (10-22-20 @ 04:00), Max: 36.8 (10-21-20 @ 16:17)  HR: 64 (10-22-20 @ 04:00) (64 - 67)  BP: 133/56 (10-22-20 @ 04:00) (122/38 - 133/56)  RR: 18 (10-22-20 @ 04:00) (15 - 18)  SpO2: 97% (10-22-20 @ 04:00) (97% - 97%)  CAPILLARY BLOOD GLUCOSE      POCT Blood Glucose.: 126 mg/dL (21 Oct 2020 21:19)  POCT Blood Glucose.: 154 mg/dL (21 Oct 2020 17:30)  POCT Blood Glucose.: 177 mg/dL (21 Oct 2020 11:51)  POCT Blood Glucose.: 162 mg/dL (21 Oct 2020 08:17)      Is/Os    10-21 @ 07:01  -  10-22 @ 07:00  --------------------------------------------------------  IN:    Oral Fluid: 720 mL  Total IN: 720 mL    OUT:    Bulb (mL): 10 mL    Indwelling Catheter - Urethral (mL): 2610 mL  Total OUT: 2620 mL    Total NET: -1900 mL        PHYSICAL EXAM:   General: NAD, Lying in bed   Neuro: Awake and alert  Incision: incision lines c/d/i, skin flaps appear healthy, soft and pink, LIZBETH with SS output      MEDICATIONS (STANDING): acetaminophen   Tablet .. 975 milliGRAM(s) Oral every 6 hours  amLODIPine   Tablet 10 milliGRAM(s) Oral daily  dextrose 5%. 1000 milliLiter(s) IV Continuous <Continuous>  dextrose 50% Injectable 12.5 Gram(s) IV Push once  dextrose 50% Injectable 25 Gram(s) IV Push once  dextrose 50% Injectable 25 Gram(s) IV Push once  enalapril 2.5 milliGRAM(s) Oral daily  enoxaparin Injectable 40 milliGRAM(s) SubCutaneous daily  escitalopram Solution 30 milliGRAM(s) Oral daily  gabapentin 400 milliGRAM(s) Oral three times a day  influenza  Vaccine (HIGH DOSE) 0.7 milliLiter(s) IntraMuscular once  insulin glargine Injectable (LANTUS) 50 Unit(s) SubCutaneous at bedtime  insulin lispro (ADMELOG) corrective regimen sliding scale   SubCutaneous three times a day before meals  insulin lispro (HumaLOG) corrective regimen sliding scale   SubCutaneous at bedtime  insulin lispro Injectable (ADMELOG) 10 Unit(s) SubCutaneous three times a day before meals  levothyroxine 150 MICROGram(s) Oral daily  nicotine - 21 mG/24Hr(s) Patch 1 patch Transdermal daily  pantoprazole    Tablet 40 milliGRAM(s) Oral before breakfast  sodium chloride 0.9% lock flush 3 milliLiter(s) IV Push every 8 hours  triamterene 37.5 mG/hydrochlorothiazide 25 mG Tablet 1 Tablet(s) Oral daily    MEDICATIONS (PRN):dextrose 40% Gel 15 Gram(s) Oral once PRN Blood Glucose LESS THAN 70 milliGRAM(s)/deciliter  glucagon  Injectable 1 milliGRAM(s) IntraMuscular once PRN Glucose LESS THAN 70 milligrams/deciliter  ketorolac   Injectable 15 milliGRAM(s) IV Push every 6 hours PRN Severe Pain (7 - 10)  oxyCODONE    IR 5 milliGRAM(s) Oral every 6 hours PRN Severe Pain (7 - 10)  oxyCODONE    IR 2.5 milliGRAM(s) Oral every 4 hours PRN Moderate Pain (4 - 6)  senna 2 Tablet(s) Oral at bedtime PRN Constipation      LABS                  IMAGING STUDIES

## 2020-10-22 NOTE — PROGRESS NOTE ADULT - ASSESSMENT
66 yo F smoker, obese, with anxiety/depression, HTN, DM2 on insulin, hypothyroidism, TIA, fibromyalgia, beta-thalassemia trait, and autoimmune hemolytic anemia presenting for recurrent vulvar cancer (initially dx 2014) now here for radical vulvectomy with right lymphadenectomy and v to y advancement from right thigh to vulvar defect on 10/19 medicine evaluation for comanagement.

## 2020-10-22 NOTE — PROGRESS NOTE ADULT - PROBLEM SELECTOR PROBLEM 2
Postoperative anemia due to acute blood loss
Postoperative state
Postoperative anemia due to acute blood loss

## 2020-10-22 NOTE — PROGRESS NOTE ADULT - PROBLEM SELECTOR PLAN 2
pre-op Hb 10.9 now 8.7 likely due to expected OR losses; patient also has history of beta-thalassemia and AIHA follows at [facility] with Dr. Auguste (Amrit at time of diagnosis positive IgG and negative C3 s/p steroids and rituximab).   - trend CBC, transfuse <7, bleeding or symptomatic anemia  - LDH added on and wnl thus hemolysis unlikely cause of Hb drop  - remains HD stable on BP meds, no s/s of bleeding
Fellow Note    Patient seen and examined. Agree with above.     VS reviewed  No labs    DTV@3p  Shuffling gait  Reg diet  PO analgesia  VTE ppx    Brandon RODRIGUEZ
pre-op Hb 10.9 now 8.7 likely due to expected OR losses; patient also has history of beta-thalassemia and AIHA follows at [facility] with Dr. Auguste (Amrit at time of diagnosis positive IgG and negative C3 s/p steroids and rituximab).   - trend CBC, transfuse <7, bleeding or symptomatic anemia  - LDH added on and wnl thus hemolysis unlikely cause of Hb drop

## 2020-10-22 NOTE — PROGRESS NOTE ADULT - ASSESSMENT
ASSESSMENT/PLAN:   BALJINDER GERMAN is a 65yFemale s/p radical vulvectomy and V to Y gluteal fold advancement flap on 10/19    - shuffle walking, may be in recliner, no sitting  - Continue gentamicin to incision   - DC ibanez  - Advance diet as tolerated  - Pain control  - IS  - Continue DVT prophylaxis: Lovenox and SCDs  - Rest of care per gyn onc      Plastic Surgery   [facility]: [id number]  [facility]: [phone]

## 2020-10-22 NOTE — PROGRESS NOTE ADULT - PROBLEM SELECTOR PLAN 3
20+ years, originally on orals, HbA1c on Oct 12 2020 was 6.9%; reports LA at home 80 units and 15-30 units SA with meals and also on Victoza  - c/w gabapentin 400 mg TID  - BG at goal, c/w Lantus to 50 units  and Admelog to 10 units with meals   - c/w DM diet and ISS  - has OP endocrinologist 20+ years, originally on orals, HbA1c on Oct 12 2020 was 6.9%; reports LA at home 80 units and 15-30 units SA with meals and also on Victoza  - c/w gabapentin 400 mg TID  - BG at goal, c/w Lantus to 50 units  and Admelog to 10 units with meals   - c/w DM diet and ISS  - hold Victoza while admitted   - has OP endocrinologist

## 2020-10-22 NOTE — PROGRESS NOTE ADULT - PROBLEM SELECTOR PLAN 1
Neuro: Continue PO pain medication  CV: Hemodynamically stable  Pulm: Saturating well on RA. Increase incentive spirometry.  GI: Continue reg diet  : Adequate UOP. D/c ibanez this am. Shuffle walking, recliner ok, appreciate plastics recs.  Heme: Continue Lovenox/Venodynes for DVT ppx.   ID: Afebrile, continue gentamicin ointment BID  Endo: ISS. FS elevated yesterday. Patient changed to home insulin dosing. Will monitor FS closely.  Dispo: continue routine postop care, likely d/c today. Neuro: Continue PO pain medication  CV: Hemodynamically stable  Pulm: Saturating well on RA. Increase incentive spirometry.  GI: Continue reg diet  : Adequate UOP. DTV @ 2:30p, Shuffle walking, recliner ok, appreciate plastics recs.  Heme: Continue Lovenox/Venodynes for DVT ppx.   ID: Afebrile, continue gentamicin ointment BID  Endo: ISS. FS elevated yesterday. Patient changed to home insulin dosing. Will monitor FS closely.  Dispo: continue routine postop care, discharge planning for today.    ROSCOE Valle PGY3 Neuro: Continue PO pain medication  CV: Hemodynamically stable  Pulm: Saturating well on RA. Increase incentive spirometry.  GI: Continue reg diet  : Adequate UOP. DTV @ 2:30p, Shuffle walking, recliner ok, appreciate plastics recs.  Heme: Continue Lovenox/Venodynes for DVT ppx.   ID: Afebrile, continue gentamicin ointment BID  Endo: ISS. FS elevated yesterday. Patient changed to home insulin dosing. Will monitor FS closely.  Dispo: continue routine postop care, discharge planning for today.    ROSCOE Valle PGY4

## 2020-10-22 NOTE — PHYSICAL THERAPY INITIAL EVALUATION ADULT - ASSISTIVE DEVICE FOR TRANSFER: GAIT, REHAB EVAL
utilized assistive device for ~75 feet, ambulated additional 75 feet without assistive device using shuffling gait and small steps/rolling walker

## 2020-10-22 NOTE — PROGRESS NOTE ADULT - PROBLEM SELECTOR PLAN 4
BP 10/19 elevated likely 2/2 pain/anxiety, this am BP wnl 124/48  - amlodipine 10 mg daily, enalapril 2.5 mg daily, and triamterene 37.5mG/hydrochlorothiazide 25 mG daily, agree with hold parameters
BP 10/19 elevated likely 2/2 pain/anxiety, this am BP wnl 124/48  - amlodipine 10 mg daily, enalapril 2.5 mg daily, and triamterene 37.5mG/hydrochlorothiazide 25 mG daily, agree with hold parameters

## 2020-10-22 NOTE — PHYSICAL THERAPY INITIAL EVALUATION ADULT - MD ORDER
Pt only to sit in recliner (not upright), may ambulate with shuffling gait only. Discussed at length with Plastics [id number]-->pt may perform stair negotiation.

## 2020-10-22 NOTE — DISCHARGE NOTE NURSING/CASE MANAGEMENT/SOCIAL WORK - PATIENT PORTAL LINK FT
You can access the FollowMyHealth Patient Portal offered by [facility] by registering at the following website: [email or web address]. By joining [facility]’s FollowMyHealth portal, you will also be able to view your health information using other applications (apps) compatible with our system.

## 2020-10-22 NOTE — PROGRESS NOTE ADULT - SUBJECTIVE AND OBJECTIVE BOX
GYN ONC Progress Note    HD#3            POD#4     Interval/Overnight Events:   Pt seen and examined at bedside. No events overnight. Pain well controlled. She has not yet ambulated. Ibanez in place with adequate output. Passing flatus. Tolerating regular diet. Pt denies fever, chills, chest pain, SOB, nausea, vomiting, lightheadedness, dizziness.        MEDICATIONS:   --------------------------------------------------------------------------------------  Neurologic Medications  acetaminophen   Tablet .. 975 milliGRAM(s) Oral every 6 hours  escitalopram Solution 30 milliGRAM(s) Oral daily  gabapentin 400 milliGRAM(s) Oral three times a day  ketorolac   Injectable 15 milliGRAM(s) IV Push every 6 hours PRN Severe Pain (7 - 10)  oxyCODONE    IR 5 milliGRAM(s) Oral every 6 hours PRN Severe Pain (7 - 10)  oxyCODONE    IR 2.5 milliGRAM(s) Oral every 4 hours PRN Moderate Pain (4 - 6)    Respiratory Medications    Cardiovascular Medications  amLODIPine   Tablet 10 milliGRAM(s) Oral daily  enalapril 2.5 milliGRAM(s) Oral daily  triamterene 37.5 mG/hydrochlorothiazide 25 mG Tablet 1 Tablet(s) Oral daily    Gastrointestinal Medications  dextrose 5%. 1000 milliLiter(s) IV Continuous <Continuous>  pantoprazole    Tablet 40 milliGRAM(s) Oral before breakfast  senna 2 Tablet(s) Oral at bedtime PRN Constipation  sodium chloride 0.9% lock flush 3 milliLiter(s) IV Push every 8 hours    Genitourinary Medications    Hematologic/Oncologic Medications  enoxaparin Injectable 40 milliGRAM(s) SubCutaneous daily  influenza  Vaccine (HIGH DOSE) 0.7 milliLiter(s) IntraMuscular once    Antimicrobial/Immunologic Medications    Endocrine/Metabolic Medications  dextrose 40% Gel 15 Gram(s) Oral once PRN Blood Glucose LESS THAN 70 milliGRAM(s)/deciliter  dextrose 50% Injectable 12.5 Gram(s) IV Push once  dextrose 50% Injectable 25 Gram(s) IV Push once  dextrose 50% Injectable 25 Gram(s) IV Push once  glucagon  Injectable 1 milliGRAM(s) IntraMuscular once PRN Glucose LESS THAN 70 milligrams/deciliter  insulin glargine Injectable (LANTUS) 50 Unit(s) SubCutaneous at bedtime  insulin lispro (ADMELOG) corrective regimen sliding scale   SubCutaneous three times a day before meals  insulin lispro (HumaLOG) corrective regimen sliding scale   SubCutaneous at bedtime  insulin lispro Injectable (ADMELOG) 10 Unit(s) SubCutaneous three times a day before meals  levothyroxine 150 MICROGram(s) Oral daily    Topical/Other Medications  gentamicin 0.1% Ointment 1 Application(s) Topical two times a day  nicotine - 21 mG/24Hr(s) Patch 1 patch Transdermal daily    --------------------------------------------------------------------------------------    VITAL SIGNS, INS/OUTS (last 24 hours):  --------------------------------------------------------------------------------------  ICU Vital Signs Last 24 Hrs  T(C): 36.7 (22 Oct 2020 04:00), Max: 36.8 (21 Oct 2020 16:17)  T(F): 98 (22 Oct 2020 04:00), Max: 98.3 (21 Oct 2020 16:17)  HR: 64 (22 Oct 2020 04:00) (64 - 67)  BP: 133/56 (22 Oct 2020 04:00) (122/38 - 133/56)  BP(mean): --  ABP: --  ABP(mean): --  RR: 18 (22 Oct 2020 04:00) (15 - 18)  SpO2: 97% (22 Oct 2020 04:00) (97% - 97%)    I&O's Detail    20 Oct 2020 07:01  -  21 Oct 2020 07:00  --------------------------------------------------------  IN:    Oral Fluid: 1200 mL  Total IN: 1200 mL    OUT:    Bulb (mL): 7.5 mL    Indwelling Catheter - Urethral (mL): 1820 mL  Total OUT: 1827.5 mL    Total NET: -627.5 mL      21 Oct 2020 07:01  -  22 Oct 2020 06:11  --------------------------------------------------------  IN:    Oral Fluid: 720 mL  Total IN: 720 mL    OUT:    Bulb (mL): 10 mL    Indwelling Catheter - Urethral (mL): 2610 mL  Total OUT: 2620 mL    Total NET: -1900 mL        --------------------------------------------------------------------------------------    PHYSICAL EXAM:   Constitutional: NAD, A+O x3  CV: RR S1S2 no m/r/g  Lungs: CTA b/l, good air flow b/l  Abdomen: soft, NTND, no guarding, no rebound, normal bowel sounds  VE: No vaginal bleeding noted on pad, clean, dry, intact, LIZBETH in place with serosanguinous fluids, ibanez in place   Extremities: no lower extremity edema or calf tenderness bilaterally; venodynes in place        LABS  --------------------------------------------------------------------------------------                        8.9    x     )-----------( x        ( 20 Oct 2020 12:21 )             29.0                         8.7    6.95  )-----------( 170      ( 20 Oct 2020 07:23 )             29.3     10-20    139  |  101  |  15  ----------------------------<  138<H>  4.1   |  29  |  0.75    Ca    9.0      20 Oct 2020 07:23  Phos  3.0     10-20  Mg     1.7     10-20              ---------------------------------------------------------------------------------------    OTHER LABORATORY:     IMAGING STUDIES:    GYN ONC Progress Note    HD#3            POD#4     Interval/Overnight Events:   Pt seen and examined at bedside. No events overnight. Pain well controlled. She has not yet ambulated. Ibanez in place with adequate output. Passing flatus. Tolerating regular diet. Pt denies fever, chills, chest pain, SOB, nausea, vomiting, lightheadedness, dizziness.      Was out of bed to chair yesterday.  Today per plastics will start shuffling to bathroom.  Ibanez discontinued this AM.      MEDICATIONS:   --------------------------------------------------------------------------------------  Neurologic Medications  acetaminophen   Tablet .. 975 milliGRAM(s) Oral every 6 hours  escitalopram Solution 30 milliGRAM(s) Oral daily  gabapentin 400 milliGRAM(s) Oral three times a day  ketorolac   Injectable 15 milliGRAM(s) IV Push every 6 hours PRN Severe Pain (7 - 10)  oxyCODONE    IR 5 milliGRAM(s) Oral every 6 hours PRN Severe Pain (7 - 10)  oxyCODONE    IR 2.5 milliGRAM(s) Oral every 4 hours PRN Moderate Pain (4 - 6)    Respiratory Medications    Cardiovascular Medications  amLODIPine   Tablet 10 milliGRAM(s) Oral daily  enalapril 2.5 milliGRAM(s) Oral daily  triamterene 37.5 mG/hydrochlorothiazide 25 mG Tablet 1 Tablet(s) Oral daily    Gastrointestinal Medications  dextrose 5%. 1000 milliLiter(s) IV Continuous <Continuous>  pantoprazole    Tablet 40 milliGRAM(s) Oral before breakfast  senna 2 Tablet(s) Oral at bedtime PRN Constipation  sodium chloride 0.9% lock flush 3 milliLiter(s) IV Push every 8 hours    Genitourinary Medications    Hematologic/Oncologic Medications  enoxaparin Injectable 40 milliGRAM(s) SubCutaneous daily  influenza  Vaccine (HIGH DOSE) 0.7 milliLiter(s) IntraMuscular once    Antimicrobial/Immunologic Medications    Endocrine/Metabolic Medications  dextrose 40% Gel 15 Gram(s) Oral once PRN Blood Glucose LESS THAN 70 milliGRAM(s)/deciliter  dextrose 50% Injectable 12.5 Gram(s) IV Push once  dextrose 50% Injectable 25 Gram(s) IV Push once  dextrose 50% Injectable 25 Gram(s) IV Push once  glucagon  Injectable 1 milliGRAM(s) IntraMuscular once PRN Glucose LESS THAN 70 milligrams/deciliter  insulin glargine Injectable (LANTUS) 50 Unit(s) SubCutaneous at bedtime  insulin lispro (ADMELOG) corrective regimen sliding scale   SubCutaneous three times a day before meals  insulin lispro (HumaLOG) corrective regimen sliding scale   SubCutaneous at bedtime  insulin lispro Injectable (ADMELOG) 10 Unit(s) SubCutaneous three times a day before meals  levothyroxine 150 MICROGram(s) Oral daily    Topical/Other Medications  gentamicin 0.1% Ointment 1 Application(s) Topical two times a day  nicotine - 21 mG/24Hr(s) Patch 1 patch Transdermal daily    --------------------------------------------------------------------------------------    VITAL SIGNS, INS/OUTS (last 24 hours):  --------------------------------------------------------------------------------------  ICU Vital Signs Last 24 Hrs  T(C): 36.7 (22 Oct 2020 04:00), Max: 36.8 (21 Oct 2020 16:17)  T(F): 98 (22 Oct 2020 04:00), Max: 98.3 (21 Oct 2020 16:17)  HR: 64 (22 Oct 2020 04:00) (64 - 67)  BP: 133/56 (22 Oct 2020 04:00) (122/38 - 133/56)  RR: 18 (22 Oct 2020 04:00) (15 - 18)  SpO2: 97% (22 Oct 2020 04:00) (97% - 97%)    I&O's Detail    20 Oct 2020 07:01  -  21 Oct 2020 07:00  --------------------------------------------------------  IN:    Oral Fluid: 1200 mL  Total IN: 1200 mL    OUT:    Bulb (mL): 7.5 mL    Indwelling Catheter - Urethral (mL): 1820 mL  Total OUT: 1827.5 mL    Total NET: -627.5 mL      21 Oct 2020 07:01  -  22 Oct 2020 06:11  --------------------------------------------------------  IN:    Oral Fluid: 720 mL  Total IN: 720 mL    OUT:    Bulb (mL): 10 mL    Indwelling Catheter - Urethral (mL): 2610 mL  Total OUT: 2620 mL    Total NET: -1900 mL        --------------------------------------------------------------------------------------    PHYSICAL EXAM:   Constitutional: NAD, A+O x3  CV: RR S1S2 no m/r/g  Lungs: CTA b/l, good air flow b/l  Abdomen: soft, NTND, no guarding, no rebound, normal bowel sounds  VE: No vaginal bleeding noted on pad, clean, dry, intact, LIZBETH in place with serosanguinous fluids, ibanez in place, removed  Extremities: no lower extremity edema or calf tenderness bilaterally; venodynes in place        LABS  --------------------------------------------------------------------------------------                        8.9    x     )-----------( x        ( 20 Oct 2020 12:21 )             29.0                         8.7    6.95  )-----------( 170      ( 20 Oct 2020 07:23 )             29.3     10-20    139  |  101  |  15  ----------------------------<  138<H>  4.1   |  29  |  0.75    Ca    9.0      20 Oct 2020 07:23  Phos  3.0     10-20  Mg     1.7     10-20              ---------------------------------------------------------------------------------------    OTHER LABORATORY:     IMAGING STUDIES:

## 2020-10-22 NOTE — PROGRESS NOTE ADULT - PROBLEM SELECTOR PLAN 5
- c/w synthroid 150 mcg daily   - per PET possible thyroid nodule, consider OP US
- c/w synthroid 150 mcg daily   - per PET possible thyroid nodule, consider OP US

## 2020-10-22 NOTE — DISCHARGE NOTE NURSING/CASE MANAGEMENT/SOCIAL WORK - NSDCPEEMAIL_GEN_ALL_CORE
[facility] Tobacco Control email [email or web address]

## 2020-10-22 NOTE — PROGRESS NOTE ADULT - PROBLEM SELECTOR PLAN 1
s/p OR on 10/19 radical vulvectomy with right lymphadenectomy and v to y advancement from right thigh to vulvar defect   - s/p bedrest x 48 hours now OOB to recliner and shuffling walking   - c/w pain control: standing Tylenol 975 mg q6h; toradol 15 q6h prn and oxycodone 2.5/5 mg prn; consider bowel regimen (Miralax and senna)   - drain and wound care (on topical gentamycin) per surgical teams   - tolerating regular diet   - s/p marcelle breaux on 10/22, pending TOV

## 2020-10-22 NOTE — PHYSICAL THERAPY INITIAL EVALUATION ADULT - PATIENT PROFILE REVIEW, REHAB EVAL
PT orders received:  out of bed to chair. Consult with SOTO MACKEY, pt may participate in PT evaluation./yes

## 2020-10-22 NOTE — PHYSICAL THERAPY INITIAL EVALUATION ADULT - PERTINENT HX OF CURRENT PROBLEM, REHAB EVAL
Patient is a 65 year old female admitted to [facility] on 10/19 with tumor on vulva. Pt now s/p Radical Vulvectomy, R LND and Gluteal fold V-Y advancement flap.

## 2020-10-23 PROBLEM — F41.9 ANXIETY DISORDER, UNSPECIFIED: Chronic | Status: ACTIVE | Noted: 2020-10-12

## 2020-10-23 PROBLEM — E78.5 HYPERLIPIDEMIA, UNSPECIFIED: Chronic | Status: ACTIVE | Noted: 2020-10-12

## 2020-10-27 ENCOUNTER — APPOINTMENT (OUTPATIENT)
Dept: PLASTIC SURGERY | Facility: CLINIC | Age: 65
End: 2020-10-27
Payer: MEDICARE

## 2020-10-27 ENCOUNTER — APPOINTMENT (OUTPATIENT)
Dept: GYNECOLOGIC ONCOLOGY | Facility: CLINIC | Age: 65
End: 2020-10-27

## 2020-10-27 ENCOUNTER — NON-APPOINTMENT (OUTPATIENT)
Age: 65
End: 2020-10-27

## 2020-10-27 VITALS
BODY MASS INDEX: 34.55 KG/M2 | OXYGEN SATURATION: 96 % | HEART RATE: 98 BPM | HEIGHT: 63 IN | DIASTOLIC BLOOD PRESSURE: 64 MMHG | TEMPERATURE: 98.4 F | SYSTOLIC BLOOD PRESSURE: 156 MMHG | WEIGHT: 195 LBS

## 2020-10-27 PROCEDURE — 99024 POSTOP FOLLOW-UP VISIT: CPT

## 2020-10-29 ENCOUNTER — NON-APPOINTMENT (OUTPATIENT)
Age: 65
End: 2020-10-29

## 2020-10-29 ENCOUNTER — OUTPATIENT (OUTPATIENT)
Dept: OUTPATIENT SERVICES | Facility: HOSPITAL | Age: 65
LOS: 1 days | Discharge: HOME | End: 2020-10-29

## 2020-10-29 DIAGNOSIS — Z98.89 OTHER SPECIFIED POSTPROCEDURAL STATES: Chronic | ICD-10-CM

## 2020-10-29 DIAGNOSIS — D49.5 NEOPLASM OF UNSPECIFIED BEHAVIOR OF OTHER GENITOURINARY ORGANS: Chronic | ICD-10-CM

## 2020-10-29 DIAGNOSIS — D59.0 DRUG-INDUCED AUTOIMMUNE HEMOLYTIC ANEMIA: ICD-10-CM

## 2020-10-29 DIAGNOSIS — Z96.651 PRESENCE OF RIGHT ARTIFICIAL KNEE JOINT: Chronic | ICD-10-CM

## 2020-10-29 LAB — SURGICAL PATHOLOGY STUDY: SIGNIFICANT CHANGE UP

## 2020-10-30 ENCOUNTER — RESULT REVIEW (OUTPATIENT)
Age: 65
End: 2020-10-30

## 2020-10-30 ENCOUNTER — APPOINTMENT (OUTPATIENT)
Dept: HEMATOLOGY ONCOLOGY | Facility: CLINIC | Age: 65
End: 2020-10-30

## 2020-10-30 LAB
ALBUMIN SERPL ELPH-MCNC: 4 G/DL
ALP BLD-CCNC: 77 U/L
ALT SERPL-CCNC: 12 U/L
ANION GAP SERPL CALC-SCNC: 15 MMOL/L
AST SERPL-CCNC: 12 U/L
BASOPHILS # BLD AUTO: 0.07 K/UL — SIGNIFICANT CHANGE UP (ref 0–0.2)
BASOPHILS NFR BLD AUTO: 0.9 % — SIGNIFICANT CHANGE UP (ref 0–2)
BILIRUB SERPL-MCNC: 0.7 MG/DL
BUN SERPL-MCNC: 19 MG/DL
CALCIUM SERPL-MCNC: 9.5 MG/DL
CHLORIDE SERPL-SCNC: 98 MMOL/L
CO2 SERPL-SCNC: 24 MMOL/L
CREAT SERPL-MCNC: 0.96 MG/DL
DAT C3-SP REAG RBC QL: NEGATIVE — SIGNIFICANT CHANGE UP
DAT POLY-SP REAG RBC QL: POSITIVE — SIGNIFICANT CHANGE UP
EOSINOPHIL # BLD AUTO: 0.21 K/UL — SIGNIFICANT CHANGE UP (ref 0–0.5)
EOSINOPHIL NFR BLD AUTO: 2.6 % — SIGNIFICANT CHANGE UP (ref 0–6)
FERRITIN SERPL-MCNC: 212 NG/ML
GLUCOSE SERPL-MCNC: 292 MG/DL
HAPTOGLOB SERPL-MCNC: 225 MG/DL
HCT VFR BLD CALC: 28.2 % — LOW (ref 34.5–45)
HGB BLD-MCNC: 8.8 G/DL — LOW (ref 11.5–15.5)
IMM GRANULOCYTES NFR BLD AUTO: 2.1 % — HIGH (ref 0–1.5)
IRON SATN MFR SERPL: 27 %
IRON SERPL-MCNC: 79 UG/DL
LDH SERPL-CCNC: 238 U/L
LYMPHOCYTES # BLD AUTO: 1.94 K/UL — SIGNIFICANT CHANGE UP (ref 1–3.3)
LYMPHOCYTES # BLD AUTO: 24.2 % — SIGNIFICANT CHANGE UP (ref 13–44)
MCHC RBC-ENTMCNC: 20.1 PG — LOW (ref 27–34)
MCHC RBC-ENTMCNC: 31.2 G/DL — LOW (ref 32–36)
MCV RBC AUTO: 64.4 FL — LOW (ref 80–100)
MONOCYTES # BLD AUTO: 0.4 K/UL — SIGNIFICANT CHANGE UP (ref 0–0.9)
MONOCYTES NFR BLD AUTO: 5 % — SIGNIFICANT CHANGE UP (ref 2–14)
NEUTROPHILS # BLD AUTO: 5.24 K/UL — SIGNIFICANT CHANGE UP (ref 1.8–7.4)
NEUTROPHILS NFR BLD AUTO: 65.2 % — SIGNIFICANT CHANGE UP (ref 43–77)
NRBC # BLD: 0 /100 WBCS — SIGNIFICANT CHANGE UP (ref 0–0)
PLATELET # BLD AUTO: 305 K/UL — SIGNIFICANT CHANGE UP (ref 150–400)
POTASSIUM SERPL-SCNC: 4.6 MMOL/L
PROT SERPL-MCNC: 7 G/DL
RBC # BLD: 4.38 M/UL — SIGNIFICANT CHANGE UP (ref 3.8–5.2)
RBC # FLD: 15 % — HIGH (ref 10.3–14.5)
SODIUM SERPL-SCNC: 137 MMOL/L
TIBC SERPL-MCNC: 288 UG/DL
UIBC SERPL-MCNC: 209 UG/DL
WBC # BLD: 8.03 K/UL — SIGNIFICANT CHANGE UP (ref 3.8–10.5)
WBC # FLD AUTO: 8.03 K/UL — SIGNIFICANT CHANGE UP (ref 3.8–10.5)

## 2020-10-30 PROCEDURE — 86077 PHYS BLOOD BANK SERV XMATCH: CPT

## 2020-11-03 ENCOUNTER — APPOINTMENT (OUTPATIENT)
Dept: GYNECOLOGIC ONCOLOGY | Facility: CLINIC | Age: 65
End: 2020-11-03
Payer: MEDICARE

## 2020-11-03 VITALS
WEIGHT: 191 LBS | TEMPERATURE: 97.5 F | DIASTOLIC BLOOD PRESSURE: 69 MMHG | SYSTOLIC BLOOD PRESSURE: 152 MMHG | HEART RATE: 97 BPM | BODY MASS INDEX: 33.83 KG/M2

## 2020-11-03 DIAGNOSIS — T81.49XA INFECTION FOLLOWING A PROCEDURE, OTHER SURGICAL SITE, INITIAL ENCOUNTER: ICD-10-CM

## 2020-11-03 PROCEDURE — 99024 POSTOP FOLLOW-UP VISIT: CPT

## 2020-11-03 NOTE — REASON FOR VISIT
[Post Op] : post op visit [de-identified] : 10/19/2020 [de-identified] : Radical vulvectomy, right SLNMB

## 2020-11-03 NOTE — DISCUSSION/SUMMARY
[Malodorus] : was a malodorous [Normal Skin] : normal appearance [Slow Progress] : is progressing slowly [FreeTextEntry1] : two areas of separation on the right vulva reconstruction line, purulent drainage with necrotic slough on the posterior edge. Sharply debrided. mild swelling. Groin incision well healed.  [de-identified] : Path: SCCA of the vulva. 13 mm invasion, Widely negative margins. Negative LVSI. SLN negative. dVIN of left labia minora.

## 2020-11-04 NOTE — HISTORY OF PRESENT ILLNESS
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

## 2020-11-04 NOTE — PHYSICAL EXAM
[NI] : Normal [de-identified] : vulvar incision: mostly c/d/i, 2cm dehiscence, gentamicin ointment applied.

## 2020-11-04 NOTE — REASON FOR VISIT
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

## 2020-11-10 ENCOUNTER — APPOINTMENT (OUTPATIENT)
Dept: PLASTIC SURGERY | Facility: CLINIC | Age: 65
End: 2020-11-10
Payer: MEDICARE

## 2020-11-10 VITALS
TEMPERATURE: 98.6 F | HEART RATE: 95 BPM | OXYGEN SATURATION: 99 % | SYSTOLIC BLOOD PRESSURE: 144 MMHG | BODY MASS INDEX: 33.84 KG/M2 | DIASTOLIC BLOOD PRESSURE: 65 MMHG | HEIGHT: 63 IN | WEIGHT: 191 LBS

## 2020-11-10 PROCEDURE — 99024 POSTOP FOLLOW-UP VISIT: CPT

## 2020-11-11 ENCOUNTER — NON-APPOINTMENT (OUTPATIENT)
Age: 65
End: 2020-11-11

## 2020-11-16 NOTE — HISTORY OF PRESENT ILLNESS
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

## 2020-11-16 NOTE — PHYSICAL EXAM
[NI] : Normal [de-identified] : vulvar incision: mostly c/d/i, 1cm dehiscence, gentamicin ointment applied.

## 2020-11-18 ENCOUNTER — APPOINTMENT (OUTPATIENT)
Dept: GYNECOLOGIC ONCOLOGY | Facility: CLINIC | Age: 65
End: 2020-11-18
Payer: MEDICARE

## 2020-11-18 VITALS
DIASTOLIC BLOOD PRESSURE: 69 MMHG | WEIGHT: 189 LBS | HEART RATE: 89 BPM | SYSTOLIC BLOOD PRESSURE: 168 MMHG | BODY MASS INDEX: 33.49 KG/M2 | HEIGHT: 63 IN

## 2020-11-18 PROCEDURE — 99024 POSTOP FOLLOW-UP VISIT: CPT

## 2020-11-18 NOTE — DISCUSSION/SUMMARY
[Clean] : was clean [Dry] : was dry [Intact] : was intact [None] : had no drainage [Normal Skin] : normal appearance [Doing Well] : is doing well [Excellent Pain Control] : has excellent pain control [No Sign of Infection] : is showing no signs of infection [FreeTextEntry1] : medial dehiscence areas now almost completely granulated in

## 2020-11-18 NOTE — REASON FOR VISIT
[Post Op] : post op visit [de-identified] : 10/19/2020 [de-identified] : Radical vulvectomy, right SLNMB [de-identified] : Feeling much better. Minimal pain. Less drainage from the vulva

## 2020-12-01 ENCOUNTER — APPOINTMENT (OUTPATIENT)
Dept: PLASTIC SURGERY | Facility: CLINIC | Age: 65
End: 2020-12-01
Payer: MEDICARE

## 2020-12-01 PROCEDURE — 99024 POSTOP FOLLOW-UP VISIT: CPT

## 2020-12-03 NOTE — PHYSICAL EXAM
[NI] : Normal [de-identified] : vulvar incision: mostly c/d/i, dehiscence healing well, gentamicin ointment applied.

## 2020-12-08 ENCOUNTER — APPOINTMENT (OUTPATIENT)
Dept: DERMATOLOGY | Facility: CLINIC | Age: 65
End: 2020-12-08

## 2020-12-09 ENCOUNTER — APPOINTMENT (OUTPATIENT)
Dept: GYNECOLOGIC ONCOLOGY | Facility: CLINIC | Age: 65
End: 2020-12-09
Payer: MEDICARE

## 2020-12-09 VITALS
WEIGHT: 189 LBS | SYSTOLIC BLOOD PRESSURE: 154 MMHG | TEMPERATURE: 97.5 F | DIASTOLIC BLOOD PRESSURE: 72 MMHG | HEART RATE: 80 BPM | BODY MASS INDEX: 33.48 KG/M2

## 2020-12-09 PROCEDURE — 99024 POSTOP FOLLOW-UP VISIT: CPT

## 2020-12-09 NOTE — DISCUSSION/SUMMARY
[Clean] : was clean [Dry] : was dry [Intact] : was intact [None] : had no drainage [Normal Skin] : normal appearance [Doing Well] : is doing well [Excellent Pain Control] : has excellent pain control [No Sign of Infection] : is showing no signs of infection [FreeTextEntry1] : completely granulated in

## 2020-12-09 NOTE — REASON FOR VISIT
[Post Op] : post op visit [de-identified] : 10/19/2020 [de-identified] : Radical vulvectomy, right SLNMB [de-identified] : Feeling much better. Minimal pain. No drainage

## 2020-12-22 ENCOUNTER — APPOINTMENT (OUTPATIENT)
Dept: PLASTIC SURGERY | Facility: CLINIC | Age: 65
End: 2020-12-22
Payer: MEDICARE

## 2020-12-22 VITALS
BODY MASS INDEX: 33.49 KG/M2 | WEIGHT: 189 LBS | HEIGHT: 63 IN | OXYGEN SATURATION: 98 % | DIASTOLIC BLOOD PRESSURE: 78 MMHG | HEART RATE: 80 BPM | SYSTOLIC BLOOD PRESSURE: 155 MMHG | TEMPERATURE: 98.2 F

## 2020-12-22 PROCEDURE — 99024 POSTOP FOLLOW-UP VISIT: CPT

## 2020-12-31 NOTE — REVIEW OF SYSTEMS
[Fever] : no fever [Chills] : no chills [Chest Pain] : no chest pain [Palpitations] : no palpitations [Shortness Of Breath] : no shortness of breath [Wheezing] : no wheezing [Cough] : no cough [SOB on Exertion] : no shortness of breath during exertion [As Noted in HPI] : as noted in HPI

## 2020-12-31 NOTE — PHYSICAL EXAM
[NI] : Normal [de-identified] : vulvar incisions healing well,  c/d/i, no signs of infection, drainage or erythema.\par

## 2020-12-31 NOTE — HISTORY OF PRESENT ILLNESS
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

## 2021-01-01 ENCOUNTER — NON-APPOINTMENT (OUTPATIENT)
Age: 66
End: 2021-01-01

## 2021-01-01 ENCOUNTER — RESULT REVIEW (OUTPATIENT)
Age: 66
End: 2021-01-01

## 2021-01-01 ENCOUNTER — APPOINTMENT (OUTPATIENT)
Dept: INFUSION THERAPY | Facility: HOSPITAL | Age: 66
End: 2021-01-01

## 2021-01-01 ENCOUNTER — APPOINTMENT (OUTPATIENT)
Dept: HEMATOLOGY ONCOLOGY | Facility: CLINIC | Age: 66
End: 2021-01-01

## 2021-01-01 ENCOUNTER — LABORATORY RESULT (OUTPATIENT)
Age: 66
End: 2021-01-01

## 2021-01-01 ENCOUNTER — APPOINTMENT (OUTPATIENT)
Dept: CARDIOLOGY | Facility: CLINIC | Age: 66
End: 2021-01-01
Payer: MEDICARE

## 2021-01-01 ENCOUNTER — APPOINTMENT (OUTPATIENT)
Dept: RHEUMATOLOGY | Facility: CLINIC | Age: 66
End: 2021-01-01

## 2021-01-01 ENCOUNTER — APPOINTMENT (OUTPATIENT)
Dept: HEMATOLOGY ONCOLOGY | Facility: CLINIC | Age: 66
End: 2021-01-01
Payer: MEDICARE

## 2021-01-01 ENCOUNTER — APPOINTMENT (OUTPATIENT)
Dept: RHEUMATOLOGY | Facility: CLINIC | Age: 66
End: 2021-01-01
Payer: MEDICARE

## 2021-01-01 ENCOUNTER — OUTPATIENT (OUTPATIENT)
Dept: OUTPATIENT SERVICES | Facility: HOSPITAL | Age: 66
LOS: 1 days | Discharge: HOME | End: 2021-01-01

## 2021-01-01 ENCOUNTER — OUTPATIENT (OUTPATIENT)
Dept: OUTPATIENT SERVICES | Facility: HOSPITAL | Age: 66
LOS: 1 days | End: 2021-01-01
Payer: COMMERCIAL

## 2021-01-01 ENCOUNTER — APPOINTMENT (OUTPATIENT)
Dept: NUCLEAR MEDICINE | Facility: IMAGING CENTER | Age: 66
End: 2021-01-01

## 2021-01-01 ENCOUNTER — APPOINTMENT (OUTPATIENT)
Dept: RADIOLOGY | Facility: IMAGING CENTER | Age: 66
End: 2021-01-01
Payer: MEDICARE

## 2021-01-01 ENCOUNTER — APPOINTMENT (OUTPATIENT)
Dept: NUCLEAR MEDICINE | Facility: IMAGING CENTER | Age: 66
End: 2021-01-01
Payer: MEDICARE

## 2021-01-01 ENCOUNTER — OUTPATIENT (OUTPATIENT)
Dept: OUTPATIENT SERVICES | Facility: HOSPITAL | Age: 66
LOS: 1 days | Discharge: HOME | End: 2021-01-01
Payer: MEDICARE

## 2021-01-01 ENCOUNTER — APPOINTMENT (OUTPATIENT)
Dept: COLORECTAL SURGERY | Facility: HOSPITAL | Age: 66
End: 2021-01-01
Payer: MEDICARE

## 2021-01-01 ENCOUNTER — APPOINTMENT (OUTPATIENT)
Dept: COLORECTAL SURGERY | Facility: CLINIC | Age: 66
End: 2021-01-01
Payer: MEDICARE

## 2021-01-01 ENCOUNTER — OUTPATIENT (OUTPATIENT)
Dept: OUTPATIENT SERVICES | Facility: HOSPITAL | Age: 66
LOS: 1 days | End: 2021-01-01

## 2021-01-01 ENCOUNTER — APPOINTMENT (OUTPATIENT)
Dept: CV DIAGNOSITCS | Facility: HOSPITAL | Age: 66
End: 2021-01-01
Payer: MEDICARE

## 2021-01-01 ENCOUNTER — APPOINTMENT (OUTPATIENT)
Dept: GYNECOLOGIC ONCOLOGY | Facility: CLINIC | Age: 66
End: 2021-01-01
Payer: MEDICARE

## 2021-01-01 ENCOUNTER — APPOINTMENT (OUTPATIENT)
Dept: MRI IMAGING | Facility: CLINIC | Age: 66
End: 2021-01-01
Payer: MEDICARE

## 2021-01-01 ENCOUNTER — RX RENEWAL (OUTPATIENT)
Age: 66
End: 2021-01-01

## 2021-01-01 ENCOUNTER — APPOINTMENT (OUTPATIENT)
Dept: ENDOCRINOLOGY | Facility: CLINIC | Age: 66
End: 2021-01-01

## 2021-01-01 ENCOUNTER — APPOINTMENT (OUTPATIENT)
Dept: ENDOCRINOLOGY | Facility: CLINIC | Age: 66
End: 2021-01-01
Payer: MEDICARE

## 2021-01-01 ENCOUNTER — INPATIENT (INPATIENT)
Facility: HOSPITAL | Age: 66
LOS: 24 days | End: 2022-01-07
Admitting: STUDENT IN AN ORGANIZED HEALTH CARE EDUCATION/TRAINING PROGRAM
Payer: MEDICARE

## 2021-01-01 ENCOUNTER — APPOINTMENT (OUTPATIENT)
Dept: GYNECOLOGIC ONCOLOGY | Facility: CLINIC | Age: 66
End: 2021-01-01

## 2021-01-01 ENCOUNTER — APPOINTMENT (OUTPATIENT)
Dept: RADIATION ONCOLOGY | Facility: CLINIC | Age: 66
End: 2021-01-01
Payer: MEDICARE

## 2021-01-01 ENCOUNTER — TRANSCRIPTION ENCOUNTER (OUTPATIENT)
Age: 66
End: 2021-01-01

## 2021-01-01 ENCOUNTER — EMERGENCY (EMERGENCY)
Facility: HOSPITAL | Age: 66
LOS: 1 days | Discharge: HOME | End: 2021-01-01
Attending: EMERGENCY MEDICINE
Payer: COMMERCIAL

## 2021-01-01 VITALS
DIASTOLIC BLOOD PRESSURE: 67 MMHG | TEMPERATURE: 98 F | HEART RATE: 73 BPM | SYSTOLIC BLOOD PRESSURE: 130 MMHG | OXYGEN SATURATION: 98 %

## 2021-01-01 VITALS
DIASTOLIC BLOOD PRESSURE: 75 MMHG | WEIGHT: 194 LBS | SYSTOLIC BLOOD PRESSURE: 187 MMHG | BODY MASS INDEX: 34.38 KG/M2 | HEART RATE: 77 BPM | HEIGHT: 63 IN

## 2021-01-01 VITALS
OXYGEN SATURATION: 96 % | SYSTOLIC BLOOD PRESSURE: 164 MMHG | DIASTOLIC BLOOD PRESSURE: 64 MMHG | HEART RATE: 70 BPM | TEMPERATURE: 97.4 F | RESPIRATION RATE: 16 BRPM

## 2021-01-01 VITALS
SYSTOLIC BLOOD PRESSURE: 147 MMHG | HEIGHT: 63 IN | WEIGHT: 188.47 LBS | BODY MASS INDEX: 33.39 KG/M2 | HEART RATE: 83 BPM | DIASTOLIC BLOOD PRESSURE: 73 MMHG | RESPIRATION RATE: 16 BRPM | OXYGEN SATURATION: 99 % | TEMPERATURE: 97.3 F

## 2021-01-01 VITALS
SYSTOLIC BLOOD PRESSURE: 131 MMHG | HEART RATE: 68 BPM | DIASTOLIC BLOOD PRESSURE: 58 MMHG | RESPIRATION RATE: 16 BRPM | BODY MASS INDEX: 34.16 KG/M2 | WEIGHT: 192.79 LBS | OXYGEN SATURATION: 97 % | HEIGHT: 63 IN | TEMPERATURE: 97.9 F

## 2021-01-01 VITALS
RESPIRATION RATE: 17 BRPM | SYSTOLIC BLOOD PRESSURE: 137 MMHG | TEMPERATURE: 97.6 F | DIASTOLIC BLOOD PRESSURE: 54 MMHG | HEART RATE: 66 BPM | OXYGEN SATURATION: 97 %

## 2021-01-01 VITALS
RESPIRATION RATE: 16 BRPM | HEART RATE: 82 BPM | OXYGEN SATURATION: 97 % | SYSTOLIC BLOOD PRESSURE: 118 MMHG | DIASTOLIC BLOOD PRESSURE: 63 MMHG | WEIGHT: 193.01 LBS | BODY MASS INDEX: 34.19 KG/M2

## 2021-01-01 VITALS
BODY MASS INDEX: 33.49 KG/M2 | HEART RATE: 75 BPM | RESPIRATION RATE: 17 BRPM | SYSTOLIC BLOOD PRESSURE: 106 MMHG | DIASTOLIC BLOOD PRESSURE: 58 MMHG | WEIGHT: 189.04 LBS | OXYGEN SATURATION: 97 % | TEMPERATURE: 97.5 F

## 2021-01-01 VITALS
OXYGEN SATURATION: 98 % | DIASTOLIC BLOOD PRESSURE: 70 MMHG | BODY MASS INDEX: 33.3 KG/M2 | SYSTOLIC BLOOD PRESSURE: 176 MMHG | HEIGHT: 63 IN | HEART RATE: 81 BPM | WEIGHT: 187.94 LBS | RESPIRATION RATE: 17 BRPM | TEMPERATURE: 97.2 F

## 2021-01-01 VITALS
WEIGHT: 180.1 LBS | HEART RATE: 72 BPM | HEIGHT: 63.07 IN | DIASTOLIC BLOOD PRESSURE: 74 MMHG | TEMPERATURE: 97.9 F | OXYGEN SATURATION: 97 % | BODY MASS INDEX: 31.91 KG/M2 | SYSTOLIC BLOOD PRESSURE: 132 MMHG | RESPIRATION RATE: 18 BRPM

## 2021-01-01 VITALS
DIASTOLIC BLOOD PRESSURE: 48 MMHG | TEMPERATURE: 98 F | HEART RATE: 100 BPM | HEIGHT: 63 IN | OXYGEN SATURATION: 99 % | SYSTOLIC BLOOD PRESSURE: 127 MMHG | RESPIRATION RATE: 18 BRPM

## 2021-01-01 VITALS
SYSTOLIC BLOOD PRESSURE: 148 MMHG | OXYGEN SATURATION: 98 % | TEMPERATURE: 97.9 F | HEART RATE: 73 BPM | DIASTOLIC BLOOD PRESSURE: 67 MMHG | RESPIRATION RATE: 17 BRPM

## 2021-01-01 VITALS
TEMPERATURE: 98 F | HEIGHT: 63 IN | OXYGEN SATURATION: 96 % | DIASTOLIC BLOOD PRESSURE: 66 MMHG | WEIGHT: 190.04 LBS | RESPIRATION RATE: 18 BRPM | SYSTOLIC BLOOD PRESSURE: 135 MMHG | HEART RATE: 65 BPM

## 2021-01-01 VITALS
SYSTOLIC BLOOD PRESSURE: 140 MMHG | OXYGEN SATURATION: 99 % | HEART RATE: 83 BPM | TEMPERATURE: 99 F | DIASTOLIC BLOOD PRESSURE: 53 MMHG | RESPIRATION RATE: 17 BRPM

## 2021-01-01 VITALS
HEART RATE: 68 BPM | HEIGHT: 63 IN | WEIGHT: 190 LBS | BODY MASS INDEX: 33.66 KG/M2 | SYSTOLIC BLOOD PRESSURE: 110 MMHG | TEMPERATURE: 98.2 F | DIASTOLIC BLOOD PRESSURE: 56 MMHG | OXYGEN SATURATION: 95 %

## 2021-01-01 VITALS
DIASTOLIC BLOOD PRESSURE: 73 MMHG | TEMPERATURE: 98.5 F | RESPIRATION RATE: 16 BRPM | SYSTOLIC BLOOD PRESSURE: 137 MMHG | OXYGEN SATURATION: 100 % | HEART RATE: 60 BPM

## 2021-01-01 VITALS — BODY MASS INDEX: 31.64 KG/M2 | WEIGHT: 179 LBS

## 2021-01-01 VITALS
OXYGEN SATURATION: 94 % | DIASTOLIC BLOOD PRESSURE: 62 MMHG | HEART RATE: 80 BPM | TEMPERATURE: 99 F | RESPIRATION RATE: 18 BRPM | HEIGHT: 63 IN | WEIGHT: 184.09 LBS | SYSTOLIC BLOOD PRESSURE: 132 MMHG

## 2021-01-01 VITALS
HEIGHT: 63 IN | WEIGHT: 190.04 LBS | SYSTOLIC BLOOD PRESSURE: 148 MMHG | HEART RATE: 71 BPM | RESPIRATION RATE: 16 BRPM | DIASTOLIC BLOOD PRESSURE: 71 MMHG | TEMPERATURE: 98 F

## 2021-01-01 VITALS
TEMPERATURE: 98 F | DIASTOLIC BLOOD PRESSURE: 61 MMHG | BODY MASS INDEX: 33.92 KG/M2 | HEART RATE: 69 BPM | OXYGEN SATURATION: 99 % | WEIGHT: 191.47 LBS | RESPIRATION RATE: 17 BRPM | SYSTOLIC BLOOD PRESSURE: 123 MMHG

## 2021-01-01 VITALS
BODY MASS INDEX: 31.71 KG/M2 | HEART RATE: 95 BPM | DIASTOLIC BLOOD PRESSURE: 60 MMHG | WEIGHT: 179 LBS | OXYGEN SATURATION: 98 % | HEIGHT: 63 IN | SYSTOLIC BLOOD PRESSURE: 150 MMHG | TEMPERATURE: 97.1 F

## 2021-01-01 VITALS
OXYGEN SATURATION: 97 % | BODY MASS INDEX: 31.63 KG/M2 | HEART RATE: 109 BPM | SYSTOLIC BLOOD PRESSURE: 165 MMHG | TEMPERATURE: 98 F | DIASTOLIC BLOOD PRESSURE: 88 MMHG | RESPIRATION RATE: 16 BRPM | WEIGHT: 178.57 LBS

## 2021-01-01 VITALS
HEIGHT: 63.07 IN | BODY MASS INDEX: 31.64 KG/M2 | HEART RATE: 66 BPM | SYSTOLIC BLOOD PRESSURE: 151 MMHG | DIASTOLIC BLOOD PRESSURE: 72 MMHG | OXYGEN SATURATION: 99 %

## 2021-01-01 VITALS
SYSTOLIC BLOOD PRESSURE: 143 MMHG | TEMPERATURE: 98.4 F | BODY MASS INDEX: 34.02 KG/M2 | HEIGHT: 63 IN | HEART RATE: 73 BPM | RESPIRATION RATE: 16 BRPM | DIASTOLIC BLOOD PRESSURE: 80 MMHG | WEIGHT: 192 LBS | OXYGEN SATURATION: 98 %

## 2021-01-01 VITALS
TEMPERATURE: 98 F | OXYGEN SATURATION: 97 % | DIASTOLIC BLOOD PRESSURE: 61 MMHG | RESPIRATION RATE: 16 BRPM | SYSTOLIC BLOOD PRESSURE: 125 MMHG | HEART RATE: 84 BPM

## 2021-01-01 VITALS
HEART RATE: 86 BPM | OXYGEN SATURATION: 97 % | BODY MASS INDEX: 32.25 KG/M2 | DIASTOLIC BLOOD PRESSURE: 73 MMHG | SYSTOLIC BLOOD PRESSURE: 154 MMHG | HEIGHT: 63 IN | TEMPERATURE: 98.2 F | WEIGHT: 182 LBS

## 2021-01-01 VITALS
HEART RATE: 80 BPM | BODY MASS INDEX: 31.79 KG/M2 | DIASTOLIC BLOOD PRESSURE: 67 MMHG | TEMPERATURE: 98.1 F | WEIGHT: 179.44 LBS | OXYGEN SATURATION: 98 % | HEIGHT: 63 IN | SYSTOLIC BLOOD PRESSURE: 123 MMHG | RESPIRATION RATE: 17 BRPM

## 2021-01-01 VITALS
DIASTOLIC BLOOD PRESSURE: 80 MMHG | TEMPERATURE: 97.4 F | SYSTOLIC BLOOD PRESSURE: 140 MMHG | BODY MASS INDEX: 31.89 KG/M2 | HEART RATE: 82 BPM | WEIGHT: 180 LBS | HEIGHT: 63 IN | OXYGEN SATURATION: 96 %

## 2021-01-01 VITALS
TEMPERATURE: 98.1 F | DIASTOLIC BLOOD PRESSURE: 80 MMHG | RESPIRATION RATE: 18 BRPM | WEIGHT: 181.88 LBS | OXYGEN SATURATION: 100 % | HEART RATE: 102 BPM | SYSTOLIC BLOOD PRESSURE: 179 MMHG | BODY MASS INDEX: 32.23 KG/M2 | HEIGHT: 63 IN

## 2021-01-01 VITALS
HEIGHT: 63 IN | DIASTOLIC BLOOD PRESSURE: 55 MMHG | SYSTOLIC BLOOD PRESSURE: 93 MMHG | BODY MASS INDEX: 34.16 KG/M2 | HEART RATE: 70 BPM | WEIGHT: 192.79 LBS | TEMPERATURE: 97.9 F | RESPIRATION RATE: 16 BRPM

## 2021-01-01 VITALS
TEMPERATURE: 97.6 F | SYSTOLIC BLOOD PRESSURE: 122 MMHG | BODY MASS INDEX: 31.64 KG/M2 | WEIGHT: 179 LBS | HEART RATE: 78 BPM | OXYGEN SATURATION: 97 % | DIASTOLIC BLOOD PRESSURE: 70 MMHG

## 2021-01-01 DIAGNOSIS — Z98.89 OTHER SPECIFIED POSTPROCEDURAL STATES: Chronic | ICD-10-CM

## 2021-01-01 DIAGNOSIS — Z11.52 ENCOUNTER FOR SCREENING FOR COVID-19: ICD-10-CM

## 2021-01-01 DIAGNOSIS — D07.1 CARCINOMA IN SITU OF VULVA: ICD-10-CM

## 2021-01-01 DIAGNOSIS — D49.5 NEOPLASM OF UNSPECIFIED BEHAVIOR OF OTHER GENITOURINARY ORGANS: Chronic | ICD-10-CM

## 2021-01-01 DIAGNOSIS — Z96.651 PRESENCE OF RIGHT ARTIFICIAL KNEE JOINT: Chronic | ICD-10-CM

## 2021-01-01 DIAGNOSIS — M31.5 GIANT CELL ARTERITIS WITH POLYMYALGIA RHEUMATICA: ICD-10-CM

## 2021-01-01 DIAGNOSIS — Z00.8 ENCOUNTER FOR OTHER GENERAL EXAMINATION: ICD-10-CM

## 2021-01-01 DIAGNOSIS — D59.0 DRUG-INDUCED AUTOIMMUNE HEMOLYTIC ANEMIA: ICD-10-CM

## 2021-01-01 DIAGNOSIS — Z01.818 ENCOUNTER FOR OTHER PREPROCEDURAL EXAMINATION: ICD-10-CM

## 2021-01-01 DIAGNOSIS — E78.5 HYPERLIPIDEMIA, UNSPECIFIED: ICD-10-CM

## 2021-01-01 DIAGNOSIS — C51.9 MALIGNANT NEOPLASM OF VULVA, UNSPECIFIED: ICD-10-CM

## 2021-01-01 DIAGNOSIS — Z79.52 LONG TERM (CURRENT) USE OF SYSTEMIC STEROIDS: ICD-10-CM

## 2021-01-01 DIAGNOSIS — D59.10 AUTOIMMUNE HEMOLYTIC ANEMIA, UNSPECIFIED: ICD-10-CM

## 2021-01-01 DIAGNOSIS — J91.0 MALIGNANT PLEURAL EFFUSION: ICD-10-CM

## 2021-01-01 DIAGNOSIS — E55.9 VITAMIN D DEFICIENCY, UNSPECIFIED: ICD-10-CM

## 2021-01-01 DIAGNOSIS — K64.4 RESIDUAL HEMORRHOIDAL SKIN TAGS: ICD-10-CM

## 2021-01-01 DIAGNOSIS — Z51.11 ENCOUNTER FOR ANTINEOPLASTIC CHEMOTHERAPY: ICD-10-CM

## 2021-01-01 DIAGNOSIS — R94.31 ABNORMAL ELECTROCARDIOGRAM [ECG] [EKG]: ICD-10-CM

## 2021-01-01 DIAGNOSIS — S31.109A UNSPECIFIED OPEN WOUND OF ABDOMINAL WALL, UNSPECIFIED QUADRANT WITHOUT PENETRATION INTO PERITONEAL CAVITY, INITIAL ENCOUNTER: ICD-10-CM

## 2021-01-01 DIAGNOSIS — I10 ESSENTIAL (PRIMARY) HYPERTENSION: ICD-10-CM

## 2021-01-01 DIAGNOSIS — E83.52 HYPERCALCEMIA: ICD-10-CM

## 2021-01-01 DIAGNOSIS — E86.0 DEHYDRATION: ICD-10-CM

## 2021-01-01 DIAGNOSIS — T80.89XA OTHER COMPLICATIONS FOLLOWING INFUSION, TRANSFUSION AND THERAPEUTIC INJECTION, INITIAL ENCOUNTER: ICD-10-CM

## 2021-01-01 DIAGNOSIS — E87.1 HYPO-OSMOLALITY AND HYPONATREMIA: ICD-10-CM

## 2021-01-01 DIAGNOSIS — R06.02 SHORTNESS OF BREATH: ICD-10-CM

## 2021-01-01 DIAGNOSIS — R42 DIZZINESS AND GIDDINESS: ICD-10-CM

## 2021-01-01 DIAGNOSIS — L90.0 LICHEN SCLEROSUS ET ATROPHICUS: ICD-10-CM

## 2021-01-01 DIAGNOSIS — D64.9 ANEMIA, UNSPECIFIED: ICD-10-CM

## 2021-01-01 DIAGNOSIS — E03.9 HYPOTHYROIDISM, UNSPECIFIED: ICD-10-CM

## 2021-01-01 DIAGNOSIS — D48.5 NEOPLASM OF UNCERTAIN BEHAVIOR OF SKIN: ICD-10-CM

## 2021-01-01 DIAGNOSIS — E11.65 TYPE 2 DIABETES MELLITUS WITH HYPERGLYCEMIA: ICD-10-CM

## 2021-01-01 DIAGNOSIS — R06.00 DYSPNEA, UNSPECIFIED: ICD-10-CM

## 2021-01-01 DIAGNOSIS — E66.9 OBESITY, UNSPECIFIED: ICD-10-CM

## 2021-01-01 DIAGNOSIS — M79.7 FIBROMYALGIA: ICD-10-CM

## 2021-01-01 DIAGNOSIS — E11.9 TYPE 2 DIABETES MELLITUS WITHOUT COMPLICATIONS: ICD-10-CM

## 2021-01-01 DIAGNOSIS — M19.90 UNSPECIFIED OSTEOARTHRITIS, UNSPECIFIED SITE: ICD-10-CM

## 2021-01-01 DIAGNOSIS — Z51.89 ENCOUNTER FOR OTHER SPECIFIED AFTERCARE: ICD-10-CM

## 2021-01-01 DIAGNOSIS — R79.89 OTHER SPECIFIED ABNORMAL FINDINGS OF BLOOD CHEMISTRY: ICD-10-CM

## 2021-01-01 DIAGNOSIS — I95.1 ORTHOSTATIC HYPOTENSION: ICD-10-CM

## 2021-01-01 DIAGNOSIS — E78.00 PURE HYPERCHOLESTEROLEMIA, UNSPECIFIED: ICD-10-CM

## 2021-01-01 DIAGNOSIS — E11.9 TYPE 2 DIABETES MELLITUS W/OUT COMPLICATIONS: ICD-10-CM

## 2021-01-01 DIAGNOSIS — Z13.6 ENCOUNTER FOR SCREENING FOR CARDIOVASCULAR DISORDERS: ICD-10-CM

## 2021-01-01 DIAGNOSIS — R07.89 OTHER CHEST PAIN: ICD-10-CM

## 2021-01-01 DIAGNOSIS — G62.9 POLYNEUROPATHY, UNSPECIFIED: ICD-10-CM

## 2021-01-01 DIAGNOSIS — F32.A ANXIETY DISORDER, UNSPECIFIED: ICD-10-CM

## 2021-01-01 DIAGNOSIS — Z00.00 ENCOUNTER FOR GENERAL ADULT MEDICAL EXAMINATION W/OUT ABNORMAL FINDINGS: ICD-10-CM

## 2021-01-01 DIAGNOSIS — T14.8XXA OTHER INJURY OF UNSPECIFIED BODY REGION, INITIAL ENCOUNTER: ICD-10-CM

## 2021-01-01 DIAGNOSIS — N17.9 ACUTE KIDNEY FAILURE, UNSPECIFIED: ICD-10-CM

## 2021-01-01 DIAGNOSIS — D56.3 THALASSEMIA MINOR: ICD-10-CM

## 2021-01-01 DIAGNOSIS — M25.50 PAIN IN UNSPECIFIED JOINT: ICD-10-CM

## 2021-01-01 DIAGNOSIS — R11.2 NAUSEA WITH VOMITING, UNSPECIFIED: ICD-10-CM

## 2021-01-01 DIAGNOSIS — I20.9 ANGINA PECTORIS, UNSPECIFIED: ICD-10-CM

## 2021-01-01 DIAGNOSIS — F41.9 ANXIETY DISORDER, UNSPECIFIED: ICD-10-CM

## 2021-01-01 LAB
24R-OH-CALCIDIOL SERPL-MCNC: 12.2 NG/ML — LOW (ref 30–80)
25(OH)D3 SERPL-MCNC: 5.8 NG/ML
A1C WITH ESTIMATED AVERAGE GLUCOSE RESULT: 7.5 % — HIGH (ref 4–5.6)
A1C WITH ESTIMATED AVERAGE GLUCOSE RESULT: 7.7 % — HIGH (ref 4–5.6)
A1C WITH ESTIMATED AVERAGE GLUCOSE RESULT: 8.2 % — HIGH (ref 4–5.6)
ALBUMIN SERPL ELPH-MCNC: 3.1 G/DL — LOW (ref 3.3–5)
ALBUMIN SERPL ELPH-MCNC: 3.2 G/DL — LOW (ref 3.3–5)
ALBUMIN SERPL ELPH-MCNC: 3.3 G/DL — SIGNIFICANT CHANGE UP (ref 3.3–5)
ALBUMIN SERPL ELPH-MCNC: 3.5 G/DL — SIGNIFICANT CHANGE UP (ref 3.3–5)
ALBUMIN SERPL ELPH-MCNC: 3.7 G/DL
ALBUMIN SERPL ELPH-MCNC: 3.8 G/DL
ALBUMIN SERPL ELPH-MCNC: 3.9 G/DL
ALBUMIN SERPL ELPH-MCNC: 3.9 G/DL
ALBUMIN SERPL ELPH-MCNC: 3.9 G/DL — SIGNIFICANT CHANGE UP (ref 3.3–5)
ALBUMIN SERPL ELPH-MCNC: 4.1 G/DL
ALBUMIN SERPL ELPH-MCNC: 4.1 G/DL
ALBUMIN SERPL ELPH-MCNC: 4.2 G/DL
ALBUMIN SERPL ELPH-MCNC: 4.2 G/DL
ALBUMIN SERPL ELPH-MCNC: 4.3 G/DL
ALBUMIN SERPL ELPH-MCNC: 4.3 G/DL
ALBUMIN SERPL ELPH-MCNC: 4.4 G/DL
ALP BLD-CCNC: 62 U/L
ALP BLD-CCNC: 67 U/L
ALP BLD-CCNC: 67 U/L
ALP BLD-CCNC: 72 U/L
ALP BLD-CCNC: 76 U/L
ALP BLD-CCNC: 77 U/L
ALP BLD-CCNC: 77 U/L
ALP BLD-CCNC: 84 U/L
ALP BLD-CCNC: 85 U/L
ALP BLD-CCNC: 85 U/L
ALP BLD-CCNC: 92 U/L
ALP SERPL-CCNC: 63 U/L — SIGNIFICANT CHANGE UP (ref 40–120)
ALP SERPL-CCNC: 65 U/L — SIGNIFICANT CHANGE UP (ref 40–120)
ALP SERPL-CCNC: 69 U/L — SIGNIFICANT CHANGE UP (ref 40–120)
ALP SERPL-CCNC: 70 U/L — SIGNIFICANT CHANGE UP (ref 40–120)
ALP SERPL-CCNC: 72 U/L — SIGNIFICANT CHANGE UP (ref 40–120)
ALP SERPL-CCNC: 74 U/L — SIGNIFICANT CHANGE UP (ref 40–120)
ALT FLD-CCNC: 12 U/L — SIGNIFICANT CHANGE UP (ref 4–33)
ALT FLD-CCNC: 13 U/L — SIGNIFICANT CHANGE UP (ref 10–45)
ALT FLD-CCNC: 7 U/L — SIGNIFICANT CHANGE UP (ref 4–33)
ALT FLD-CCNC: 7 U/L — SIGNIFICANT CHANGE UP (ref 4–33)
ALT FLD-CCNC: 8 U/L — SIGNIFICANT CHANGE UP (ref 4–33)
ALT FLD-CCNC: 9 U/L — SIGNIFICANT CHANGE UP (ref 4–33)
ALT SERPL-CCNC: 10 U/L
ALT SERPL-CCNC: 11 U/L
ALT SERPL-CCNC: 14 U/L
ALT SERPL-CCNC: 15 U/L
ALT SERPL-CCNC: 19 U/L
ALT SERPL-CCNC: 8 U/L
ALT SERPL-CCNC: 9 U/L
ANION GAP SERPL CALC-SCNC: 10 MMOL/L
ANION GAP SERPL CALC-SCNC: 10 MMOL/L — SIGNIFICANT CHANGE UP (ref 5–17)
ANION GAP SERPL CALC-SCNC: 10 MMOL/L — SIGNIFICANT CHANGE UP (ref 7–14)
ANION GAP SERPL CALC-SCNC: 10 MMOL/L — SIGNIFICANT CHANGE UP (ref 7–14)
ANION GAP SERPL CALC-SCNC: 11 MMOL/L
ANION GAP SERPL CALC-SCNC: 11 MMOL/L — SIGNIFICANT CHANGE UP (ref 7–14)
ANION GAP SERPL CALC-SCNC: 11 MMOL/L — SIGNIFICANT CHANGE UP (ref 7–14)
ANION GAP SERPL CALC-SCNC: 12 MMOL/L — SIGNIFICANT CHANGE UP (ref 7–14)
ANION GAP SERPL CALC-SCNC: 13 MMOL/L
ANION GAP SERPL CALC-SCNC: 13 MMOL/L — SIGNIFICANT CHANGE UP (ref 5–17)
ANION GAP SERPL CALC-SCNC: 14 MMOL/L
ANION GAP SERPL CALC-SCNC: 14 MMOL/L — SIGNIFICANT CHANGE UP (ref 7–14)
ANION GAP SERPL CALC-SCNC: 15 MMOL/L
ANION GAP SERPL CALC-SCNC: 16 MMOL/L
ANION GAP SERPL CALC-SCNC: 17 MMOL/L
ANION GAP SERPL CALC-SCNC: 17 MMOL/L
ANION GAP SERPL CALC-SCNC: 6 MMOL/L — LOW (ref 7–14)
ANION GAP SERPL CALC-SCNC: 7 MMOL/L — SIGNIFICANT CHANGE UP (ref 7–14)
ANION GAP SERPL CALC-SCNC: 7 MMOL/L — SIGNIFICANT CHANGE UP (ref 7–14)
ANION GAP SERPL CALC-SCNC: 8 MMOL/L — SIGNIFICANT CHANGE UP (ref 7–14)
ANION GAP SERPL CALC-SCNC: 9 MMOL/L — SIGNIFICANT CHANGE UP (ref 7–14)
ANISOCYTOSIS BLD QL: SIGNIFICANT CHANGE UP
ANISOCYTOSIS BLD QL: SLIGHT — SIGNIFICANT CHANGE UP
APPEARANCE UR: CLEAR — SIGNIFICANT CHANGE UP
APTT BLD: 22.7 SEC — LOW (ref 27–36.3)
APTT BLD: 26.5 SEC — LOW (ref 27.5–35.5)
AST SERPL-CCNC: 10 U/L
AST SERPL-CCNC: 10 U/L
AST SERPL-CCNC: 10 U/L — SIGNIFICANT CHANGE UP (ref 4–32)
AST SERPL-CCNC: 11 U/L
AST SERPL-CCNC: 11 U/L
AST SERPL-CCNC: 12 U/L
AST SERPL-CCNC: 13 U/L
AST SERPL-CCNC: 13 U/L
AST SERPL-CCNC: 15 U/L — SIGNIFICANT CHANGE UP (ref 10–40)
AST SERPL-CCNC: 16 U/L
AST SERPL-CCNC: 5 U/L
AST SERPL-CCNC: 6 U/L
AST SERPL-CCNC: 7 U/L
AST SERPL-CCNC: 7 U/L — SIGNIFICANT CHANGE UP (ref 4–32)
AST SERPL-CCNC: 7 U/L — SIGNIFICANT CHANGE UP (ref 4–32)
AST SERPL-CCNC: 8 U/L
AST SERPL-CCNC: 8 U/L — SIGNIFICANT CHANGE UP (ref 4–32)
AST SERPL-CCNC: 8 U/L — SIGNIFICANT CHANGE UP (ref 4–32)
AST SERPL-CCNC: 9 U/L
B PERT DNA SPEC QL NAA+PROBE: SIGNIFICANT CHANGE UP
B PERT+PARAPERT DNA PNL SPEC NAA+PROBE: SIGNIFICANT CHANGE UP
BACTERIA # UR AUTO: NEGATIVE — SIGNIFICANT CHANGE UP
BASO STIPL BLD QL SMEAR: PRESENT — SIGNIFICANT CHANGE UP
BASO STIPL BLD QL SMEAR: PRESENT — SIGNIFICANT CHANGE UP
BASOPHILS # BLD AUTO: 0 K/UL
BASOPHILS # BLD AUTO: 0 K/UL — SIGNIFICANT CHANGE UP (ref 0–0.2)
BASOPHILS # BLD AUTO: 0.01 K/UL — SIGNIFICANT CHANGE UP (ref 0–0.2)
BASOPHILS # BLD AUTO: 0.02 K/UL — SIGNIFICANT CHANGE UP (ref 0–0.2)
BASOPHILS # BLD AUTO: 0.03 K/UL — SIGNIFICANT CHANGE UP (ref 0–0.2)
BASOPHILS # BLD AUTO: 0.04 K/UL — SIGNIFICANT CHANGE UP (ref 0–0.2)
BASOPHILS # BLD AUTO: 0.06 K/UL — SIGNIFICANT CHANGE UP (ref 0–0.2)
BASOPHILS NFR BLD AUTO: 0 %
BASOPHILS NFR BLD AUTO: 0 % — SIGNIFICANT CHANGE UP (ref 0–2)
BASOPHILS NFR BLD AUTO: 0.2 % — SIGNIFICANT CHANGE UP (ref 0–2)
BASOPHILS NFR BLD AUTO: 0.2 % — SIGNIFICANT CHANGE UP (ref 0–2)
BASOPHILS NFR BLD AUTO: 0.3 % — SIGNIFICANT CHANGE UP (ref 0–2)
BASOPHILS NFR BLD AUTO: 0.4 % — SIGNIFICANT CHANGE UP (ref 0–2)
BASOPHILS NFR BLD AUTO: 0.4 % — SIGNIFICANT CHANGE UP (ref 0–2)
BASOPHILS NFR BLD AUTO: 0.5 % — SIGNIFICANT CHANGE UP (ref 0–2)
BASOPHILS NFR BLD AUTO: 0.5 % — SIGNIFICANT CHANGE UP (ref 0–2)
BASOPHILS NFR BLD AUTO: 0.6 % — SIGNIFICANT CHANGE UP (ref 0–2)
BASOPHILS NFR BLD AUTO: 0.6 % — SIGNIFICANT CHANGE UP (ref 0–2)
BASOPHILS NFR BLD AUTO: 0.7 % — SIGNIFICANT CHANGE UP (ref 0–2)
BASOPHILS NFR BLD AUTO: 0.8 % — SIGNIFICANT CHANGE UP (ref 0–2)
BASOPHILS NFR BLD AUTO: 0.9 % — SIGNIFICANT CHANGE UP (ref 0–2)
BASOPHILS NFR BLD AUTO: 0.9 % — SIGNIFICANT CHANGE UP (ref 0–2)
BASOPHILS NFR BLD AUTO: 1 % — SIGNIFICANT CHANGE UP (ref 0–2)
BASOPHILS NFR BLD AUTO: 1.3 % — SIGNIFICANT CHANGE UP (ref 0–2)
BILIRUB SERPL-MCNC: 0.2 MG/DL
BILIRUB SERPL-MCNC: 0.3 MG/DL
BILIRUB SERPL-MCNC: 0.3 MG/DL — SIGNIFICANT CHANGE UP (ref 0.2–1.2)
BILIRUB SERPL-MCNC: 0.4 MG/DL
BILIRUB SERPL-MCNC: 0.4 MG/DL — SIGNIFICANT CHANGE UP (ref 0.2–1.2)
BILIRUB SERPL-MCNC: 0.4 MG/DL — SIGNIFICANT CHANGE UP (ref 0.2–1.2)
BILIRUB SERPL-MCNC: 0.5 MG/DL
BILIRUB SERPL-MCNC: 0.5 MG/DL — SIGNIFICANT CHANGE UP (ref 0.2–1.2)
BILIRUB SERPL-MCNC: 0.5 MG/DL — SIGNIFICANT CHANGE UP (ref 0.2–1.2)
BILIRUB SERPL-MCNC: 0.6 MG/DL — SIGNIFICANT CHANGE UP (ref 0.2–1.2)
BILIRUB UR-MCNC: NEGATIVE — SIGNIFICANT CHANGE UP
BLD GP AB SCN SERPL QL: NEGATIVE — SIGNIFICANT CHANGE UP
BLD GP AB SCN SERPL QL: NEGATIVE — SIGNIFICANT CHANGE UP
BLD GP AB SCN SERPL QL: POSITIVE — SIGNIFICANT CHANGE UP
BLD GP AB SCN SERPL QL: POSITIVE — SIGNIFICANT CHANGE UP
BLOOD GAS VENOUS COMPREHENSIVE RESULT: SIGNIFICANT CHANGE UP
BORDETELLA PARAPERTUSSIS (RAPRVP): SIGNIFICANT CHANGE UP
BUN SERPL-MCNC: 15 MG/DL
BUN SERPL-MCNC: 18 MG/DL
BUN SERPL-MCNC: 19 MG/DL
BUN SERPL-MCNC: 20 MG/DL
BUN SERPL-MCNC: 20 MG/DL — SIGNIFICANT CHANGE UP (ref 7–23)
BUN SERPL-MCNC: 21 MG/DL — SIGNIFICANT CHANGE UP (ref 7–23)
BUN SERPL-MCNC: 21 MG/DL — SIGNIFICANT CHANGE UP (ref 7–23)
BUN SERPL-MCNC: 22 MG/DL — SIGNIFICANT CHANGE UP (ref 7–23)
BUN SERPL-MCNC: 22 MG/DL — SIGNIFICANT CHANGE UP (ref 7–23)
BUN SERPL-MCNC: 23 MG/DL
BUN SERPL-MCNC: 24 MG/DL — HIGH (ref 7–23)
BUN SERPL-MCNC: 25 MG/DL
BUN SERPL-MCNC: 25 MG/DL — HIGH (ref 7–23)
BUN SERPL-MCNC: 26 MG/DL
BUN SERPL-MCNC: 27 MG/DL
BUN SERPL-MCNC: 27 MG/DL — HIGH (ref 7–23)
BUN SERPL-MCNC: 28 MG/DL
BUN SERPL-MCNC: 28 MG/DL — HIGH (ref 7–23)
BUN SERPL-MCNC: 28 MG/DL — HIGH (ref 7–23)
BUN SERPL-MCNC: 29 MG/DL — HIGH (ref 7–23)
BUN SERPL-MCNC: 30 MG/DL
BUN SERPL-MCNC: 30 MG/DL — HIGH (ref 7–23)
BUN SERPL-MCNC: 31 MG/DL
BUN SERPL-MCNC: 31 MG/DL — HIGH (ref 7–23)
BUN SERPL-MCNC: 32 MG/DL — HIGH (ref 7–23)
BUN SERPL-MCNC: 33 MG/DL — HIGH (ref 7–23)
BUN SERPL-MCNC: 34 MG/DL
BUN SERPL-MCNC: 35 MG/DL — HIGH (ref 7–23)
BUN SERPL-MCNC: 37 MG/DL — HIGH (ref 7–23)
BUN SERPL-MCNC: 38 MG/DL
BUN SERPL-MCNC: 40 MG/DL — HIGH (ref 7–23)
BUN SERPL-MCNC: 40 MG/DL — HIGH (ref 7–23)
BUN SERPL-MCNC: 44 MG/DL — HIGH (ref 7–23)
BUN SERPL-MCNC: 46 MG/DL — HIGH (ref 7–23)
BUN SERPL-MCNC: 46 MG/DL — HIGH (ref 7–23)
C PNEUM DNA SPEC QL NAA+PROBE: SIGNIFICANT CHANGE UP
C3 SERPL-MCNC: 112 MG/DL
C4 SERPL-MCNC: 36 MG/DL
CA-I BLDA-SCNC: 0.93 MMOL/L — LOW (ref 1.12–1.3)
CALCIUM SERPL-MCNC: 10.2 MG/DL — SIGNIFICANT CHANGE UP (ref 8.4–10.5)
CALCIUM SERPL-MCNC: 10.2 MG/DL — SIGNIFICANT CHANGE UP (ref 8.4–10.5)
CALCIUM SERPL-MCNC: 10.3 MG/DL — SIGNIFICANT CHANGE UP (ref 8.4–10.5)
CALCIUM SERPL-MCNC: 10.4 MG/DL — SIGNIFICANT CHANGE UP (ref 8.4–10.5)
CALCIUM SERPL-MCNC: 10.5 MG/DL — SIGNIFICANT CHANGE UP (ref 8.4–10.5)
CALCIUM SERPL-MCNC: 10.6 MG/DL — HIGH (ref 8.4–10.5)
CALCIUM SERPL-MCNC: 10.9 MG/DL — HIGH (ref 8.4–10.5)
CALCIUM SERPL-MCNC: 11.2 MG/DL — HIGH (ref 8.4–10.5)
CALCIUM SERPL-MCNC: 11.5 MG/DL — HIGH (ref 8.4–10.5)
CALCIUM SERPL-MCNC: 11.7 MG/DL — HIGH (ref 8.4–10.5)
CALCIUM SERPL-MCNC: 12.3 MG/DL — HIGH (ref 8.4–10.5)
CALCIUM SERPL-MCNC: 12.5 MG/DL — HIGH (ref 8.4–10.5)
CALCIUM SERPL-MCNC: 7.6 MG/DL — LOW (ref 8.4–10.5)
CALCIUM SERPL-MCNC: 7.9 MG/DL — LOW (ref 8.4–10.5)
CALCIUM SERPL-MCNC: 7.9 MG/DL — LOW (ref 8.4–10.5)
CALCIUM SERPL-MCNC: 8 MG/DL — LOW (ref 8.4–10.5)
CALCIUM SERPL-MCNC: 8.1 MG/DL — LOW (ref 8.4–10.5)
CALCIUM SERPL-MCNC: 8.2 MG/DL
CALCIUM SERPL-MCNC: 8.3 MG/DL — LOW (ref 8.4–10.5)
CALCIUM SERPL-MCNC: 8.6 MG/DL
CALCIUM SERPL-MCNC: 8.6 MG/DL
CALCIUM SERPL-MCNC: 8.7 MG/DL
CALCIUM SERPL-MCNC: 8.7 MG/DL — SIGNIFICANT CHANGE UP (ref 8.4–10.5)
CALCIUM SERPL-MCNC: 8.9 MG/DL
CALCIUM SERPL-MCNC: 9 MG/DL
CALCIUM SERPL-MCNC: 9 MG/DL
CALCIUM SERPL-MCNC: 9.1 MG/DL
CALCIUM SERPL-MCNC: 9.2 MG/DL — SIGNIFICANT CHANGE UP (ref 8.4–10.5)
CALCIUM SERPL-MCNC: 9.4 MG/DL
CALCIUM SERPL-MCNC: 9.4 MG/DL
CALCIUM SERPL-MCNC: 9.4 MG/DL — SIGNIFICANT CHANGE UP (ref 8.4–10.5)
CALCIUM SERPL-MCNC: 9.5 MG/DL
CALCIUM SERPL-MCNC: 9.5 MG/DL
CALCIUM SERPL-MCNC: 9.5 MG/DL — SIGNIFICANT CHANGE UP (ref 8.4–10.5)
CALCIUM SERPL-MCNC: 9.9 MG/DL
CALCIUM SERPL-MCNC: 9.9 MG/DL — SIGNIFICANT CHANGE UP (ref 8.4–10.5)
CANCER AG125 SERPL-ACNC: 11 U/ML
CANCER AG125 SERPL-ACNC: 12 U/ML
CANCER AG125 SERPL-ACNC: 13 U/ML
CCP AB SER IA-ACNC: <8 UNITS
CCP AB SER IA-ACNC: <8 UNITS
CEA SERPL-MCNC: 1.6 NG/ML
CHLORIDE SERPL-SCNC: 100 MMOL/L — SIGNIFICANT CHANGE UP (ref 96–108)
CHLORIDE SERPL-SCNC: 104 MMOL/L — SIGNIFICANT CHANGE UP (ref 96–108)
CHLORIDE SERPL-SCNC: 89 MMOL/L — LOW (ref 98–107)
CHLORIDE SERPL-SCNC: 90 MMOL/L — LOW (ref 98–107)
CHLORIDE SERPL-SCNC: 91 MMOL/L
CHLORIDE SERPL-SCNC: 91 MMOL/L — LOW (ref 98–107)
CHLORIDE SERPL-SCNC: 92 MMOL/L
CHLORIDE SERPL-SCNC: 92 MMOL/L
CHLORIDE SERPL-SCNC: 92 MMOL/L — LOW (ref 98–107)
CHLORIDE SERPL-SCNC: 93 MMOL/L — LOW (ref 98–107)
CHLORIDE SERPL-SCNC: 94 MMOL/L
CHLORIDE SERPL-SCNC: 94 MMOL/L
CHLORIDE SERPL-SCNC: 94 MMOL/L — LOW (ref 96–108)
CHLORIDE SERPL-SCNC: 94 MMOL/L — LOW (ref 98–107)
CHLORIDE SERPL-SCNC: 95 MMOL/L
CHLORIDE SERPL-SCNC: 95 MMOL/L — LOW (ref 98–107)
CHLORIDE SERPL-SCNC: 97 MMOL/L
CHLORIDE SERPL-SCNC: 97 MMOL/L
CHLORIDE SERPL-SCNC: 97 MMOL/L — LOW (ref 98–107)
CHLORIDE SERPL-SCNC: 98 MMOL/L
CHLORIDE SERPL-SCNC: 98 MMOL/L
CHLORIDE SERPL-SCNC: 98 MMOL/L — SIGNIFICANT CHANGE UP (ref 98–107)
CHLORIDE SERPL-SCNC: 99 MMOL/L
CHLORIDE SERPL-SCNC: 99 MMOL/L — SIGNIFICANT CHANGE UP (ref 98–107)
CHOLEST SERPL-MCNC: 193 MG/DL — SIGNIFICANT CHANGE UP
CO2 SERPL-SCNC: 21 MMOL/L
CO2 SERPL-SCNC: 22 MMOL/L
CO2 SERPL-SCNC: 22 MMOL/L
CO2 SERPL-SCNC: 23 MMOL/L — SIGNIFICANT CHANGE UP (ref 22–31)
CO2 SERPL-SCNC: 23 MMOL/L — SIGNIFICANT CHANGE UP (ref 22–31)
CO2 SERPL-SCNC: 24 MMOL/L
CO2 SERPL-SCNC: 25 MMOL/L
CO2 SERPL-SCNC: 25 MMOL/L
CO2 SERPL-SCNC: 25 MMOL/L — SIGNIFICANT CHANGE UP (ref 22–31)
CO2 SERPL-SCNC: 25 MMOL/L — SIGNIFICANT CHANGE UP (ref 22–31)
CO2 SERPL-SCNC: 26 MMOL/L
CO2 SERPL-SCNC: 26 MMOL/L
CO2 SERPL-SCNC: 26 MMOL/L — SIGNIFICANT CHANGE UP (ref 22–31)
CO2 SERPL-SCNC: 26 MMOL/L — SIGNIFICANT CHANGE UP (ref 22–31)
CO2 SERPL-SCNC: 27 MMOL/L
CO2 SERPL-SCNC: 27 MMOL/L — SIGNIFICANT CHANGE UP (ref 22–31)
CO2 SERPL-SCNC: 28 MMOL/L — SIGNIFICANT CHANGE UP (ref 22–31)
CO2 SERPL-SCNC: 29 MMOL/L — SIGNIFICANT CHANGE UP (ref 22–31)
CO2 SERPL-SCNC: 30 MMOL/L — SIGNIFICANT CHANGE UP (ref 22–31)
CO2 SERPL-SCNC: 32 MMOL/L — HIGH (ref 22–31)
COLOR SPEC: YELLOW — SIGNIFICANT CHANGE UP
CORE LAB BIOPSY: NORMAL
COVID-19 SPIKE DOMAIN ANTIBODY INTERPRETATION: POSITIVE
CREAT SERPL-MCNC: 0.73 MG/DL — SIGNIFICANT CHANGE UP (ref 0.5–1.3)
CREAT SERPL-MCNC: 0.78 MG/DL — SIGNIFICANT CHANGE UP (ref 0.5–1.3)
CREAT SERPL-MCNC: 0.81 MG/DL
CREAT SERPL-MCNC: 0.81 MG/DL — SIGNIFICANT CHANGE UP (ref 0.5–1.3)
CREAT SERPL-MCNC: 0.85 MG/DL — SIGNIFICANT CHANGE UP (ref 0.5–1.3)
CREAT SERPL-MCNC: 0.86 MG/DL — SIGNIFICANT CHANGE UP (ref 0.5–1.3)
CREAT SERPL-MCNC: 0.88 MG/DL — SIGNIFICANT CHANGE UP (ref 0.5–1.3)
CREAT SERPL-MCNC: 0.88 MG/DL — SIGNIFICANT CHANGE UP (ref 0.5–1.3)
CREAT SERPL-MCNC: 0.9 MG/DL
CREAT SERPL-MCNC: 0.92 MG/DL
CREAT SERPL-MCNC: 0.92 MG/DL — SIGNIFICANT CHANGE UP (ref 0.5–1.3)
CREAT SERPL-MCNC: 0.92 MG/DL — SIGNIFICANT CHANGE UP (ref 0.5–1.3)
CREAT SERPL-MCNC: 0.93 MG/DL — SIGNIFICANT CHANGE UP (ref 0.5–1.3)
CREAT SERPL-MCNC: 0.94 MG/DL — SIGNIFICANT CHANGE UP (ref 0.5–1.3)
CREAT SERPL-MCNC: 0.95 MG/DL — SIGNIFICANT CHANGE UP (ref 0.5–1.3)
CREAT SERPL-MCNC: 1 MG/DL — SIGNIFICANT CHANGE UP (ref 0.5–1.3)
CREAT SERPL-MCNC: 1.01 MG/DL
CREAT SERPL-MCNC: 1.01 MG/DL — SIGNIFICANT CHANGE UP (ref 0.5–1.3)
CREAT SERPL-MCNC: 1.02 MG/DL — SIGNIFICANT CHANGE UP (ref 0.5–1.3)
CREAT SERPL-MCNC: 1.02 MG/DL — SIGNIFICANT CHANGE UP (ref 0.5–1.3)
CREAT SERPL-MCNC: 1.03 MG/DL — SIGNIFICANT CHANGE UP (ref 0.5–1.3)
CREAT SERPL-MCNC: 1.05 MG/DL — SIGNIFICANT CHANGE UP (ref 0.5–1.3)
CREAT SERPL-MCNC: 1.08 MG/DL
CREAT SERPL-MCNC: 1.09 MG/DL — SIGNIFICANT CHANGE UP (ref 0.5–1.3)
CREAT SERPL-MCNC: 1.1 MG/DL — SIGNIFICANT CHANGE UP (ref 0.5–1.3)
CREAT SERPL-MCNC: 1.13 MG/DL
CREAT SERPL-MCNC: 1.16 MG/DL
CREAT SERPL-MCNC: 1.24 MG/DL
CREAT SERPL-MCNC: 1.26 MG/DL
CREAT SERPL-MCNC: 1.27 MG/DL — SIGNIFICANT CHANGE UP (ref 0.5–1.3)
CREAT SERPL-MCNC: 1.28 MG/DL
CREAT SERPL-MCNC: 1.33 MG/DL
CREAT SERPL-MCNC: 1.37 MG/DL
CREAT SERPL-MCNC: 1.61 MG/DL
CREAT SERPL-MCNC: 1.66 MG/DL — HIGH (ref 0.5–1.3)
CRP SERPL-MCNC: 268 MG/L
CRP SERPL-MCNC: <3 MG/L
CULTURE RESULTS: NO GROWTH — SIGNIFICANT CHANGE UP
CULTURE RESULTS: SIGNIFICANT CHANGE UP
CULTURE RESULTS: SIGNIFICANT CHANGE UP
DACRYOCYTES BLD QL SMEAR: SIGNIFICANT CHANGE UP
DACRYOCYTES BLD QL SMEAR: SLIGHT — SIGNIFICANT CHANGE UP
DAT C3-SP REAG RBC QL: NEGATIVE — SIGNIFICANT CHANGE UP
DAT C3-SP REAG RBC QL: POSITIVE — SIGNIFICANT CHANGE UP
DAT C3-SP REAG RBC QL: POSITIVE — SIGNIFICANT CHANGE UP
DAT POLY-SP REAG RBC QL: POSITIVE — SIGNIFICANT CHANGE UP
DAT POLY-SP REAG RBC QL: POSITIVE — SIGNIFICANT CHANGE UP
DIFF PNL FLD: NEGATIVE — SIGNIFICANT CHANGE UP
DSDNA AB SER-ACNC: 13 IU/ML
ELLIPTOCYTES BLD QL SMEAR: SLIGHT — SIGNIFICANT CHANGE UP
ELUATE ANTIBODY 1: SIGNIFICANT CHANGE UP
ELUATE ANTIBODY 1: SIGNIFICANT CHANGE UP
EOSINOPHIL # BLD AUTO: 0 K/UL
EOSINOPHIL # BLD AUTO: 0 K/UL — SIGNIFICANT CHANGE UP (ref 0–0.5)
EOSINOPHIL # BLD AUTO: 0.01 K/UL — SIGNIFICANT CHANGE UP (ref 0–0.5)
EOSINOPHIL # BLD AUTO: 0.02 K/UL — SIGNIFICANT CHANGE UP (ref 0–0.5)
EOSINOPHIL # BLD AUTO: 0.03 K/UL — SIGNIFICANT CHANGE UP (ref 0–0.5)
EOSINOPHIL # BLD AUTO: 0.04 K/UL — SIGNIFICANT CHANGE UP (ref 0–0.5)
EOSINOPHIL # BLD AUTO: 0.05 K/UL — SIGNIFICANT CHANGE UP (ref 0–0.5)
EOSINOPHIL # BLD AUTO: 0.05 K/UL — SIGNIFICANT CHANGE UP (ref 0–0.5)
EOSINOPHIL # BLD AUTO: 0.07 K/UL — SIGNIFICANT CHANGE UP (ref 0–0.5)
EOSINOPHIL # BLD AUTO: 0.08 K/UL — SIGNIFICANT CHANGE UP (ref 0–0.5)
EOSINOPHIL # BLD AUTO: 0.11 K/UL — SIGNIFICANT CHANGE UP (ref 0–0.5)
EOSINOPHIL # BLD AUTO: 0.12 K/UL — SIGNIFICANT CHANGE UP (ref 0–0.5)
EOSINOPHIL # BLD AUTO: 0.12 K/UL — SIGNIFICANT CHANGE UP (ref 0–0.5)
EOSINOPHIL # BLD AUTO: 0.13 K/UL — SIGNIFICANT CHANGE UP (ref 0–0.5)
EOSINOPHIL # BLD AUTO: 0.14 K/UL — SIGNIFICANT CHANGE UP (ref 0–0.5)
EOSINOPHIL # BLD AUTO: 0.15 K/UL — SIGNIFICANT CHANGE UP (ref 0–0.5)
EOSINOPHIL # BLD AUTO: 0.15 K/UL — SIGNIFICANT CHANGE UP (ref 0–0.5)
EOSINOPHIL # BLD AUTO: 0.17 K/UL — SIGNIFICANT CHANGE UP (ref 0–0.5)
EOSINOPHIL # BLD AUTO: 0.17 K/UL — SIGNIFICANT CHANGE UP (ref 0–0.5)
EOSINOPHIL # BLD AUTO: 0.18 K/UL — SIGNIFICANT CHANGE UP (ref 0–0.5)
EOSINOPHIL # BLD AUTO: 0.19 K/UL — SIGNIFICANT CHANGE UP (ref 0–0.5)
EOSINOPHIL # BLD AUTO: 0.21 K/UL — SIGNIFICANT CHANGE UP (ref 0–0.5)
EOSINOPHIL NFR BLD AUTO: 0 %
EOSINOPHIL NFR BLD AUTO: 0 % — SIGNIFICANT CHANGE UP (ref 0–6)
EOSINOPHIL NFR BLD AUTO: 0.1 % — SIGNIFICANT CHANGE UP (ref 0–6)
EOSINOPHIL NFR BLD AUTO: 0.2 % — SIGNIFICANT CHANGE UP (ref 0–6)
EOSINOPHIL NFR BLD AUTO: 0.4 % — SIGNIFICANT CHANGE UP (ref 0–6)
EOSINOPHIL NFR BLD AUTO: 0.5 % — SIGNIFICANT CHANGE UP (ref 0–6)
EOSINOPHIL NFR BLD AUTO: 0.5 % — SIGNIFICANT CHANGE UP (ref 0–6)
EOSINOPHIL NFR BLD AUTO: 0.8 % — SIGNIFICANT CHANGE UP (ref 0–6)
EOSINOPHIL NFR BLD AUTO: 1 % — SIGNIFICANT CHANGE UP (ref 0–6)
EOSINOPHIL NFR BLD AUTO: 1.1 % — SIGNIFICANT CHANGE UP (ref 0–6)
EOSINOPHIL NFR BLD AUTO: 1.4 % — SIGNIFICANT CHANGE UP (ref 0–6)
EOSINOPHIL NFR BLD AUTO: 1.5 % — SIGNIFICANT CHANGE UP (ref 0–6)
EOSINOPHIL NFR BLD AUTO: 2 % — SIGNIFICANT CHANGE UP (ref 0–6)
EOSINOPHIL NFR BLD AUTO: 2.1 % — SIGNIFICANT CHANGE UP (ref 0–6)
EOSINOPHIL NFR BLD AUTO: 2.3 % — SIGNIFICANT CHANGE UP (ref 0–6)
EOSINOPHIL NFR BLD AUTO: 2.9 % — SIGNIFICANT CHANGE UP (ref 0–6)
EOSINOPHIL NFR BLD AUTO: 2.9 % — SIGNIFICANT CHANGE UP (ref 0–6)
EOSINOPHIL NFR BLD AUTO: 3.2 % — SIGNIFICANT CHANGE UP (ref 0–6)
EOSINOPHIL NFR BLD AUTO: 3.3 % — SIGNIFICANT CHANGE UP (ref 0–6)
EOSINOPHIL NFR BLD AUTO: 3.9 % — SIGNIFICANT CHANGE UP (ref 0–6)
EOSINOPHIL NFR BLD AUTO: 4 % — SIGNIFICANT CHANGE UP (ref 0–6)
EOSINOPHIL NFR BLD AUTO: 4.2 % — SIGNIFICANT CHANGE UP (ref 0–6)
EOSINOPHIL NFR BLD AUTO: 4.4 % — SIGNIFICANT CHANGE UP (ref 0–6)
EOSINOPHIL NFR BLD AUTO: 4.5 % — SIGNIFICANT CHANGE UP (ref 0–6)
EOSINOPHIL NFR BLD AUTO: 4.7 % — SIGNIFICANT CHANGE UP (ref 0–6)
EOSINOPHIL NFR BLD AUTO: 5 % — SIGNIFICANT CHANGE UP (ref 0–6)
EPI CELLS # UR: 4 /HPF — SIGNIFICANT CHANGE UP (ref 0–5)
ERYTHROCYTE [SEDIMENTATION RATE] IN BLOOD BY WESTERGREN METHOD: 22 MM/HR
ERYTHROCYTE [SEDIMENTATION RATE] IN BLOOD: 129 MM/HR — HIGH (ref 0–20)
ESTIMATED AVERAGE GLUCOSE: 169 MG/DL — HIGH (ref 68–114)
ESTIMATED AVERAGE GLUCOSE: 174 — SIGNIFICANT CHANGE UP
ESTIMATED AVERAGE GLUCOSE: 189 MG/DL — HIGH (ref 68–114)
FERRITIN SERPL-MCNC: 1361 NG/ML — HIGH (ref 15–150)
FERRITIN SERPL-MCNC: 329 NG/ML — HIGH (ref 15–150)
FERRITIN SERPL-MCNC: 804 NG/ML
FERRITIN SERPL-MCNC: 898 NG/ML
FLUAV SUBTYP SPEC NAA+PROBE: SIGNIFICANT CHANGE UP
FLUBV RNA SPEC QL NAA+PROBE: SIGNIFICANT CHANGE UP
FOLATE SERPL-MCNC: 13.7 NG/ML
FOLATE SERPL-MCNC: 15.1 NG/ML — SIGNIFICANT CHANGE UP (ref 3.1–17.5)
FOLATE SERPL-MCNC: 2.9 NG/ML
GLUCOSE BLDC GLUCOMTR-MCNC: 104 MG/DL — HIGH (ref 70–99)
GLUCOSE BLDC GLUCOMTR-MCNC: 106 MG/DL — HIGH (ref 70–99)
GLUCOSE BLDC GLUCOMTR-MCNC: 114 MG/DL — HIGH (ref 70–99)
GLUCOSE BLDC GLUCOMTR-MCNC: 117 MG/DL — HIGH (ref 70–99)
GLUCOSE BLDC GLUCOMTR-MCNC: 118 MG/DL — HIGH (ref 70–99)
GLUCOSE BLDC GLUCOMTR-MCNC: 119 MG/DL — HIGH (ref 70–99)
GLUCOSE BLDC GLUCOMTR-MCNC: 120 MG/DL — HIGH (ref 70–99)
GLUCOSE BLDC GLUCOMTR-MCNC: 120 MG/DL — HIGH (ref 70–99)
GLUCOSE BLDC GLUCOMTR-MCNC: 134 MG/DL — HIGH (ref 70–99)
GLUCOSE BLDC GLUCOMTR-MCNC: 134 MG/DL — HIGH (ref 70–99)
GLUCOSE BLDC GLUCOMTR-MCNC: 152 MG/DL — HIGH (ref 70–99)
GLUCOSE BLDC GLUCOMTR-MCNC: 154 MG/DL — HIGH (ref 70–99)
GLUCOSE BLDC GLUCOMTR-MCNC: 156 MG/DL — HIGH (ref 70–99)
GLUCOSE BLDC GLUCOMTR-MCNC: 158 MG/DL — HIGH (ref 70–99)
GLUCOSE BLDC GLUCOMTR-MCNC: 162 MG/DL — HIGH (ref 70–99)
GLUCOSE BLDC GLUCOMTR-MCNC: 166 MG/DL — HIGH (ref 70–99)
GLUCOSE BLDC GLUCOMTR-MCNC: 168 MG/DL — HIGH (ref 70–99)
GLUCOSE BLDC GLUCOMTR-MCNC: 169 MG/DL — HIGH (ref 70–99)
GLUCOSE BLDC GLUCOMTR-MCNC: 174 MG/DL — HIGH (ref 70–99)
GLUCOSE BLDC GLUCOMTR-MCNC: 178 MG/DL — HIGH (ref 70–99)
GLUCOSE BLDC GLUCOMTR-MCNC: 183 MG/DL — HIGH (ref 70–99)
GLUCOSE BLDC GLUCOMTR-MCNC: 187 MG/DL — HIGH (ref 70–99)
GLUCOSE BLDC GLUCOMTR-MCNC: 190 MG/DL — HIGH (ref 70–99)
GLUCOSE BLDC GLUCOMTR-MCNC: 197 MG/DL — HIGH (ref 70–99)
GLUCOSE BLDC GLUCOMTR-MCNC: 197 MG/DL — HIGH (ref 70–99)
GLUCOSE BLDC GLUCOMTR-MCNC: 200 MG/DL — HIGH (ref 70–99)
GLUCOSE BLDC GLUCOMTR-MCNC: 201 MG/DL — HIGH (ref 70–99)
GLUCOSE BLDC GLUCOMTR-MCNC: 201 MG/DL — HIGH (ref 70–99)
GLUCOSE BLDC GLUCOMTR-MCNC: 202 MG/DL — HIGH (ref 70–99)
GLUCOSE BLDC GLUCOMTR-MCNC: 205 MG/DL — HIGH (ref 70–99)
GLUCOSE BLDC GLUCOMTR-MCNC: 206 MG/DL — HIGH (ref 70–99)
GLUCOSE BLDC GLUCOMTR-MCNC: 209 MG/DL — HIGH (ref 70–99)
GLUCOSE BLDC GLUCOMTR-MCNC: 209 MG/DL — HIGH (ref 70–99)
GLUCOSE BLDC GLUCOMTR-MCNC: 210 MG/DL — HIGH (ref 70–99)
GLUCOSE BLDC GLUCOMTR-MCNC: 211 MG/DL — HIGH (ref 70–99)
GLUCOSE BLDC GLUCOMTR-MCNC: 213 MG/DL — HIGH (ref 70–99)
GLUCOSE BLDC GLUCOMTR-MCNC: 218 MG/DL — HIGH (ref 70–99)
GLUCOSE BLDC GLUCOMTR-MCNC: 218 MG/DL — HIGH (ref 70–99)
GLUCOSE BLDC GLUCOMTR-MCNC: 221 MG/DL — HIGH (ref 70–99)
GLUCOSE BLDC GLUCOMTR-MCNC: 226 MG/DL — HIGH (ref 70–99)
GLUCOSE BLDC GLUCOMTR-MCNC: 247 MG/DL — HIGH (ref 70–99)
GLUCOSE BLDC GLUCOMTR-MCNC: 248 MG/DL — HIGH (ref 70–99)
GLUCOSE BLDC GLUCOMTR-MCNC: 250 MG/DL — HIGH (ref 70–99)
GLUCOSE BLDC GLUCOMTR-MCNC: 251 MG/DL — HIGH (ref 70–99)
GLUCOSE BLDC GLUCOMTR-MCNC: 254 MG/DL — HIGH (ref 70–99)
GLUCOSE BLDC GLUCOMTR-MCNC: 262 MG/DL — HIGH (ref 70–99)
GLUCOSE BLDC GLUCOMTR-MCNC: 266 MG/DL — HIGH (ref 70–99)
GLUCOSE BLDC GLUCOMTR-MCNC: 274 MG/DL — HIGH (ref 70–99)
GLUCOSE BLDC GLUCOMTR-MCNC: 275 MG/DL — HIGH (ref 70–99)
GLUCOSE BLDC GLUCOMTR-MCNC: 278 MG/DL — HIGH (ref 70–99)
GLUCOSE BLDC GLUCOMTR-MCNC: 278 MG/DL — HIGH (ref 70–99)
GLUCOSE BLDC GLUCOMTR-MCNC: 284 MG/DL — HIGH (ref 70–99)
GLUCOSE BLDC GLUCOMTR-MCNC: 297 MG/DL — HIGH (ref 70–99)
GLUCOSE BLDC GLUCOMTR-MCNC: 298 MG/DL — HIGH (ref 70–99)
GLUCOSE BLDC GLUCOMTR-MCNC: 320 MG/DL — HIGH (ref 70–99)
GLUCOSE BLDC GLUCOMTR-MCNC: 323 MG/DL — HIGH (ref 70–99)
GLUCOSE BLDC GLUCOMTR-MCNC: 357 MG/DL — HIGH (ref 70–99)
GLUCOSE BLDC GLUCOMTR-MCNC: 370 MG/DL — HIGH (ref 70–99)
GLUCOSE BLDC GLUCOMTR-MCNC: 383 MG/DL — HIGH (ref 70–99)
GLUCOSE BLDC GLUCOMTR-MCNC: 53 MG/DL — CRITICAL LOW (ref 70–99)
GLUCOSE BLDC GLUCOMTR-MCNC: 55 MG/DL — LOW (ref 70–99)
GLUCOSE BLDC GLUCOMTR-MCNC: 74 MG/DL — SIGNIFICANT CHANGE UP (ref 70–99)
GLUCOSE BLDC GLUCOMTR-MCNC: 77 MG/DL — SIGNIFICANT CHANGE UP (ref 70–99)
GLUCOSE BLDC GLUCOMTR-MCNC: 81 MG/DL — SIGNIFICANT CHANGE UP (ref 70–99)
GLUCOSE BLDC GLUCOMTR-MCNC: 82 MG/DL — SIGNIFICANT CHANGE UP (ref 70–99)
GLUCOSE SERPL-MCNC: 102 MG/DL — HIGH (ref 70–99)
GLUCOSE SERPL-MCNC: 107 MG/DL — HIGH (ref 70–99)
GLUCOSE SERPL-MCNC: 110 MG/DL — HIGH (ref 70–99)
GLUCOSE SERPL-MCNC: 111 MG/DL
GLUCOSE SERPL-MCNC: 116 MG/DL
GLUCOSE SERPL-MCNC: 122 MG/DL
GLUCOSE SERPL-MCNC: 122 MG/DL — HIGH (ref 70–99)
GLUCOSE SERPL-MCNC: 132 MG/DL — HIGH (ref 70–99)
GLUCOSE SERPL-MCNC: 136 MG/DL
GLUCOSE SERPL-MCNC: 139 MG/DL — HIGH (ref 70–99)
GLUCOSE SERPL-MCNC: 140 MG/DL
GLUCOSE SERPL-MCNC: 141 MG/DL — HIGH (ref 70–99)
GLUCOSE SERPL-MCNC: 141 MG/DL — HIGH (ref 70–99)
GLUCOSE SERPL-MCNC: 142 MG/DL
GLUCOSE SERPL-MCNC: 147 MG/DL
GLUCOSE SERPL-MCNC: 149 MG/DL
GLUCOSE SERPL-MCNC: 157 MG/DL — HIGH (ref 70–99)
GLUCOSE SERPL-MCNC: 163 MG/DL — HIGH (ref 70–99)
GLUCOSE SERPL-MCNC: 165 MG/DL — HIGH (ref 70–99)
GLUCOSE SERPL-MCNC: 172 MG/DL — HIGH (ref 70–99)
GLUCOSE SERPL-MCNC: 197 MG/DL — HIGH (ref 70–99)
GLUCOSE SERPL-MCNC: 201 MG/DL — HIGH (ref 70–99)
GLUCOSE SERPL-MCNC: 219 MG/DL — HIGH (ref 70–99)
GLUCOSE SERPL-MCNC: 222 MG/DL
GLUCOSE SERPL-MCNC: 230 MG/DL — HIGH (ref 70–99)
GLUCOSE SERPL-MCNC: 240 MG/DL — HIGH (ref 70–99)
GLUCOSE SERPL-MCNC: 255 MG/DL
GLUCOSE SERPL-MCNC: 259 MG/DL
GLUCOSE SERPL-MCNC: 269 MG/DL
GLUCOSE SERPL-MCNC: 278 MG/DL — HIGH (ref 70–99)
GLUCOSE SERPL-MCNC: 282 MG/DL — HIGH (ref 70–99)
GLUCOSE SERPL-MCNC: 313 MG/DL — HIGH (ref 70–99)
GLUCOSE SERPL-MCNC: 376 MG/DL — HIGH (ref 70–99)
GLUCOSE SERPL-MCNC: 62 MG/DL — LOW (ref 70–99)
GLUCOSE SERPL-MCNC: 65 MG/DL — LOW (ref 70–99)
GLUCOSE SERPL-MCNC: 90 MG/DL
GLUCOSE SERPL-MCNC: 95 MG/DL — SIGNIFICANT CHANGE UP (ref 70–99)
GLUCOSE UR QL: NEGATIVE — SIGNIFICANT CHANGE UP
GRAM STN FLD: SIGNIFICANT CHANGE UP
HADV DNA SPEC QL NAA+PROBE: SIGNIFICANT CHANGE UP
HAPTOGLOB SERPL-MCNC: 163 MG/DL
HAPTOGLOB SERPL-MCNC: 194 MG/DL
HAPTOGLOB SERPL-MCNC: 398 MG/DL
HAPTOGLOB SERPL-MCNC: 405 MG/DL
HAV IGM SER QL: NONREACTIVE
HBV CORE IGG+IGM SER QL: NONREACTIVE
HBV CORE IGM SER QL: NONREACTIVE
HBV SURFACE AB SER QL: NONREACTIVE
HBV SURFACE AG SER QL: NONREACTIVE
HBV SURFACE AG SER QL: NONREACTIVE
HCOV 229E RNA SPEC QL NAA+PROBE: SIGNIFICANT CHANGE UP
HCOV HKU1 RNA SPEC QL NAA+PROBE: SIGNIFICANT CHANGE UP
HCOV NL63 RNA SPEC QL NAA+PROBE: SIGNIFICANT CHANGE UP
HCOV OC43 RNA SPEC QL NAA+PROBE: SIGNIFICANT CHANGE UP
HCT VFR BLD CALC: 19.5 % — CRITICAL LOW (ref 34.5–45)
HCT VFR BLD CALC: 20.2 % — CRITICAL LOW (ref 34.5–45)
HCT VFR BLD CALC: 21.6 % — LOW (ref 34.5–45)
HCT VFR BLD CALC: 21.6 % — LOW (ref 34.5–45)
HCT VFR BLD CALC: 22.4 % — LOW (ref 34.5–45)
HCT VFR BLD CALC: 22.5 % — LOW (ref 34.5–45)
HCT VFR BLD CALC: 23 % — LOW (ref 34.5–45)
HCT VFR BLD CALC: 23.4 % — LOW (ref 34.5–45)
HCT VFR BLD CALC: 23.5 % — LOW (ref 34.5–45)
HCT VFR BLD CALC: 23.9 %
HCT VFR BLD CALC: 24 % — LOW (ref 34.5–45)
HCT VFR BLD CALC: 24 % — LOW (ref 34.5–45)
HCT VFR BLD CALC: 24.1 % — LOW (ref 34.5–45)
HCT VFR BLD CALC: 24.4 % — LOW (ref 34.5–45)
HCT VFR BLD CALC: 24.9 % — LOW (ref 34.5–45)
HCT VFR BLD CALC: 24.9 % — LOW (ref 34.5–45)
HCT VFR BLD CALC: 25 % — LOW (ref 34.5–45)
HCT VFR BLD CALC: 25.1 % — LOW (ref 34.5–45)
HCT VFR BLD CALC: 25.2 % — LOW (ref 34.5–45)
HCT VFR BLD CALC: 25.4 % — LOW (ref 34.5–45)
HCT VFR BLD CALC: 25.7 % — LOW (ref 34.5–45)
HCT VFR BLD CALC: 25.9 % — LOW (ref 34.5–45)
HCT VFR BLD CALC: 26 % — LOW (ref 34.5–45)
HCT VFR BLD CALC: 26 % — LOW (ref 34.5–45)
HCT VFR BLD CALC: 26.1 % — LOW (ref 34.5–45)
HCT VFR BLD CALC: 26.2 % — LOW (ref 34.5–45)
HCT VFR BLD CALC: 26.5 % — LOW (ref 34.5–45)
HCT VFR BLD CALC: 26.5 % — LOW (ref 34.5–45)
HCT VFR BLD CALC: 26.6 % — LOW (ref 34.5–45)
HCT VFR BLD CALC: 26.6 % — LOW (ref 34.5–45)
HCT VFR BLD CALC: 26.7 % — LOW (ref 34.5–45)
HCT VFR BLD CALC: 27.1 % — LOW (ref 34.5–45)
HCT VFR BLD CALC: 27.4 % — LOW (ref 34.5–45)
HCT VFR BLD CALC: 27.8 % — LOW (ref 34.5–45)
HCT VFR BLD CALC: 27.8 % — LOW (ref 34.5–45)
HCT VFR BLD CALC: 27.9 % — LOW (ref 34.5–45)
HCT VFR BLD CALC: 28.1 % — LOW (ref 34.5–45)
HCT VFR BLD CALC: 28.2 % — LOW (ref 34.5–45)
HCT VFR BLD CALC: 28.5 % — LOW (ref 34.5–45)
HCT VFR BLD CALC: 28.7 % — LOW (ref 34.5–45)
HCT VFR BLD CALC: 29.1 % — LOW (ref 34.5–45)
HCT VFR BLD CALC: 30.6 % — LOW (ref 34.5–45)
HCT VFR BLD CALC: 31.2 % — LOW (ref 34.5–45)
HCT VFR BLD CALC: 31.7 % — LOW (ref 34.5–45)
HCT VFR BLD CALC: 32.3 % — LOW (ref 34.5–45)
HCT VFR BLD CALC: 33.2 % — LOW (ref 34.5–45)
HCV AB S/CO SERPL IA: 0.11 S/CO — SIGNIFICANT CHANGE UP (ref 0–0.99)
HCV AB SER QL: NONREACTIVE
HCV AB SER QL: NONREACTIVE
HCV AB SERPL-IMP: SIGNIFICANT CHANGE UP
HCV S/CO RATIO: 0.14 S/CO
HCV S/CO RATIO: 0.14 S/CO
HDLC SERPL-MCNC: 37 MG/DL — LOW
HGB A MFR BLD: 95.7 %
HGB A2 MFR BLD: 4.3 %
HGB BLD-MCNC: 10.1 G/DL — LOW (ref 11.5–15.5)
HGB BLD-MCNC: 10.2 G/DL — LOW (ref 11.5–15.5)
HGB BLD-MCNC: 6.4 G/DL — CRITICAL LOW (ref 11.5–15.5)
HGB BLD-MCNC: 6.6 G/DL — CRITICAL LOW (ref 11.5–15.5)
HGB BLD-MCNC: 6.9 G/DL — CRITICAL LOW (ref 11.5–15.5)
HGB BLD-MCNC: 7.1 G/DL — LOW (ref 11.5–15.5)
HGB BLD-MCNC: 7.2 G/DL — LOW (ref 11.5–15.5)
HGB BLD-MCNC: 7.3 G/DL
HGB BLD-MCNC: 7.3 G/DL — LOW (ref 11.5–15.5)
HGB BLD-MCNC: 7.3 G/DL — LOW (ref 11.5–15.5)
HGB BLD-MCNC: 7.5 G/DL — LOW (ref 11.5–15.5)
HGB BLD-MCNC: 7.6 G/DL — LOW (ref 11.5–15.5)
HGB BLD-MCNC: 7.6 G/DL — LOW (ref 11.5–15.5)
HGB BLD-MCNC: 7.7 G/DL — LOW (ref 11.5–15.5)
HGB BLD-MCNC: 7.8 G/DL — LOW (ref 11.5–15.5)
HGB BLD-MCNC: 8 G/DL — LOW (ref 11.5–15.5)
HGB BLD-MCNC: 8.1 G/DL — LOW (ref 11.5–15.5)
HGB BLD-MCNC: 8.2 G/DL — LOW (ref 11.5–15.5)
HGB BLD-MCNC: 8.3 G/DL — LOW (ref 11.5–15.5)
HGB BLD-MCNC: 8.4 G/DL — LOW (ref 11.5–15.5)
HGB BLD-MCNC: 8.4 G/DL — LOW (ref 11.5–15.5)
HGB BLD-MCNC: 8.5 G/DL — LOW (ref 11.5–15.5)
HGB BLD-MCNC: 8.5 G/DL — LOW (ref 11.5–15.5)
HGB BLD-MCNC: 8.6 G/DL — LOW (ref 11.5–15.5)
HGB BLD-MCNC: 8.7 G/DL — LOW (ref 11.5–15.5)
HGB BLD-MCNC: 8.9 G/DL — LOW (ref 11.5–15.5)
HGB BLD-MCNC: 8.9 G/DL — LOW (ref 11.5–15.5)
HGB BLD-MCNC: 9 G/DL — LOW (ref 11.5–15.5)
HGB BLD-MCNC: 9.2 G/DL — LOW (ref 11.5–15.5)
HGB BLD-MCNC: 9.2 G/DL — LOW (ref 11.5–15.5)
HGB BLD-MCNC: 9.5 G/DL — LOW (ref 11.5–15.5)
HGB BLD-MCNC: 9.8 G/DL — LOW (ref 11.5–15.5)
HGB BLD-MCNC: 9.8 G/DL — LOW (ref 11.5–15.5)
HGB FRACT BLD-IMP: NORMAL
HISTONE AB SER QL: 0.2 UNITS
HMPV RNA SPEC QL NAA+PROBE: SIGNIFICANT CHANGE UP
HPIV1 RNA SPEC QL NAA+PROBE: SIGNIFICANT CHANGE UP
HPIV2 RNA SPEC QL NAA+PROBE: SIGNIFICANT CHANGE UP
HPIV3 RNA SPEC QL NAA+PROBE: SIGNIFICANT CHANGE UP
HPIV4 RNA SPEC QL NAA+PROBE: SIGNIFICANT CHANGE UP
HYALINE CASTS # UR AUTO: 1 /LPF — SIGNIFICANT CHANGE UP (ref 0–7)
HYPOCHROMIA BLD QL: SLIGHT — SIGNIFICANT CHANGE UP
IANC: 10.11 K/UL — HIGH (ref 1.5–8.5)
IANC: 11.11 K/UL — HIGH (ref 1.5–8.5)
IANC: 7.07 K/UL — SIGNIFICANT CHANGE UP (ref 1.5–8.5)
IANC: 7.85 K/UL — SIGNIFICANT CHANGE UP (ref 1.5–8.5)
IANC: 8.42 K/UL — SIGNIFICANT CHANGE UP (ref 1.5–8.5)
IGG SUBSET TOTAL IGG: 897 MG/DL
IGG1 SER-MCNC: 409 MG/DL
IGG2 SER-MCNC: 369 MG/DL
IGG3 SER-MCNC: 43 MG/DL
IGG4 SER-MCNC: 26 MG/DL
IL6 SERPL-MCNC: <2.5 PG/ML
IMM GRANULOCYTES NFR BLD AUTO: 0.5 % — SIGNIFICANT CHANGE UP (ref 0–1.5)
IMM GRANULOCYTES NFR BLD AUTO: 0.6 % — SIGNIFICANT CHANGE UP (ref 0–1.5)
IMM GRANULOCYTES NFR BLD AUTO: 0.7 % — SIGNIFICANT CHANGE UP (ref 0–1.5)
IMM GRANULOCYTES NFR BLD AUTO: 0.8 % — SIGNIFICANT CHANGE UP (ref 0–1.5)
IMM GRANULOCYTES NFR BLD AUTO: 0.9 % — SIGNIFICANT CHANGE UP (ref 0–1.5)
IMM GRANULOCYTES NFR BLD AUTO: 1 % — SIGNIFICANT CHANGE UP (ref 0–1.5)
IMM GRANULOCYTES NFR BLD AUTO: 1.1 % — SIGNIFICANT CHANGE UP (ref 0–1.5)
IMM GRANULOCYTES NFR BLD AUTO: 1.1 % — SIGNIFICANT CHANGE UP (ref 0–1.5)
IMM GRANULOCYTES NFR BLD AUTO: 1.2 % — SIGNIFICANT CHANGE UP (ref 0–1.5)
IMM GRANULOCYTES NFR BLD AUTO: 1.7 % — HIGH (ref 0–1.5)
IMM GRANULOCYTES NFR BLD AUTO: 2.4 % — HIGH (ref 0–1.5)
IMM GRANULOCYTES NFR BLD AUTO: 2.5 % — HIGH (ref 0–1.5)
IMM GRANULOCYTES NFR BLD AUTO: 2.9 % — HIGH (ref 0–1.5)
IMM GRANULOCYTES NFR BLD AUTO: 2.9 % — HIGH (ref 0–1.5)
IMM GRANULOCYTES NFR BLD AUTO: 3.4 % — HIGH (ref 0–1.5)
IMM GRANULOCYTES NFR BLD AUTO: 3.4 % — HIGH (ref 0–1.5)
IMM GRANULOCYTES NFR BLD AUTO: 3.5 % — HIGH (ref 0–1.5)
IMM GRANULOCYTES NFR BLD AUTO: 4.1 % — HIGH (ref 0–1.5)
IMM GRANULOCYTES NFR BLD AUTO: 4.2 % — HIGH (ref 0–1.5)
IMM GRANULOCYTES NFR BLD AUTO: 4.9 % — HIGH (ref 0–1.5)
IMM GRANULOCYTES NFR BLD AUTO: 5.2 % — HIGH (ref 0–1.5)
INR BLD: 1.07 RATIO — SIGNIFICANT CHANGE UP (ref 0.88–1.16)
INR BLD: 1.18 RATIO — HIGH (ref 0.88–1.16)
INR PPP: 0.99 RATIO
IRON SATN MFR SERPL: 13 % — LOW (ref 14–50)
IRON SATN MFR SERPL: 15 %
IRON SATN MFR SERPL: 19 % — SIGNIFICANT CHANGE UP (ref 14–50)
IRON SATN MFR SERPL: 35 UG/DL — SIGNIFICANT CHANGE UP (ref 30–160)
IRON SATN MFR SERPL: 37 UG/DL — SIGNIFICANT CHANGE UP (ref 30–160)
IRON SERPL-MCNC: 118 UG/DL
IRON SERPL-MCNC: 35 UG/DL
KETONES UR-MCNC: NEGATIVE — SIGNIFICANT CHANGE UP
LACTATE SERPL-SCNC: 0.7 MMOL/L — SIGNIFICANT CHANGE UP (ref 0.5–2)
LDH SERPL-CCNC: 129 U/L
LDH SERPL-CCNC: 189 U/L
LDH SERPL-CCNC: 200 U/L
LDH SERPL-CCNC: 221 U/L
LEUKOCYTE ESTERASE UR-ACNC: ABNORMAL
LIPID PNL WITH DIRECT LDL SERPL: 130 MG/DL — HIGH
LYMPHOCYTES # BLD AUTO: 0 % — LOW (ref 13–44)
LYMPHOCYTES # BLD AUTO: 0 K/UL — LOW (ref 1–3.3)
LYMPHOCYTES # BLD AUTO: 0.12 K/UL — LOW (ref 1–3.3)
LYMPHOCYTES # BLD AUTO: 0.14 K/UL
LYMPHOCYTES # BLD AUTO: 0.14 K/UL — LOW (ref 1–3.3)
LYMPHOCYTES # BLD AUTO: 0.18 K/UL — LOW (ref 1–3.3)
LYMPHOCYTES # BLD AUTO: 0.22 K/UL — LOW (ref 1–3.3)
LYMPHOCYTES # BLD AUTO: 0.23 K/UL — LOW (ref 1–3.3)
LYMPHOCYTES # BLD AUTO: 0.28 K/UL — LOW (ref 1–3.3)
LYMPHOCYTES # BLD AUTO: 0.29 K/UL — LOW (ref 1–3.3)
LYMPHOCYTES # BLD AUTO: 0.3 K/UL — LOW (ref 1–3.3)
LYMPHOCYTES # BLD AUTO: 0.31 K/UL — LOW (ref 1–3.3)
LYMPHOCYTES # BLD AUTO: 0.32 K/UL — LOW (ref 1–3.3)
LYMPHOCYTES # BLD AUTO: 0.33 K/UL — LOW (ref 1–3.3)
LYMPHOCYTES # BLD AUTO: 0.34 K/UL — LOW (ref 1–3.3)
LYMPHOCYTES # BLD AUTO: 0.35 K/UL — LOW (ref 1–3.3)
LYMPHOCYTES # BLD AUTO: 0.39 K/UL — LOW (ref 1–3.3)
LYMPHOCYTES # BLD AUTO: 0.41 K/UL — LOW (ref 1–3.3)
LYMPHOCYTES # BLD AUTO: 0.42 K/UL — LOW (ref 1–3.3)
LYMPHOCYTES # BLD AUTO: 0.42 K/UL — LOW (ref 1–3.3)
LYMPHOCYTES # BLD AUTO: 0.47 K/UL — LOW (ref 1–3.3)
LYMPHOCYTES # BLD AUTO: 0.51 K/UL — LOW (ref 1–3.3)
LYMPHOCYTES # BLD AUTO: 0.52 K/UL — LOW (ref 1–3.3)
LYMPHOCYTES # BLD AUTO: 0.6 K/UL — LOW (ref 1–3.3)
LYMPHOCYTES # BLD AUTO: 0.6 K/UL — LOW (ref 1–3.3)
LYMPHOCYTES # BLD AUTO: 0.72 K/UL — LOW (ref 1–3.3)
LYMPHOCYTES # BLD AUTO: 0.9 % — LOW (ref 13–44)
LYMPHOCYTES # BLD AUTO: 1.33 K/UL — SIGNIFICANT CHANGE UP (ref 1–3.3)
LYMPHOCYTES # BLD AUTO: 10.7 % — LOW (ref 13–44)
LYMPHOCYTES # BLD AUTO: 10.8 % — LOW (ref 13–44)
LYMPHOCYTES # BLD AUTO: 11 % — LOW (ref 13–44)
LYMPHOCYTES # BLD AUTO: 11.1 % — LOW (ref 13–44)
LYMPHOCYTES # BLD AUTO: 11.2 % — LOW (ref 13–44)
LYMPHOCYTES # BLD AUTO: 15.1 % — SIGNIFICANT CHANGE UP (ref 13–44)
LYMPHOCYTES # BLD AUTO: 2.3 K/UL — SIGNIFICANT CHANGE UP (ref 1–3.3)
LYMPHOCYTES # BLD AUTO: 2.41 K/UL — SIGNIFICANT CHANGE UP (ref 1–3.3)
LYMPHOCYTES # BLD AUTO: 2.6 % — LOW (ref 13–44)
LYMPHOCYTES # BLD AUTO: 20 % — SIGNIFICANT CHANGE UP (ref 13–44)
LYMPHOCYTES # BLD AUTO: 3.5 % — LOW (ref 13–44)
LYMPHOCYTES # BLD AUTO: 3.6 % — LOW (ref 13–44)
LYMPHOCYTES # BLD AUTO: 3.8 % — LOW (ref 13–44)
LYMPHOCYTES # BLD AUTO: 3.9 % — LOW (ref 13–44)
LYMPHOCYTES # BLD AUTO: 30.7 % — SIGNIFICANT CHANGE UP (ref 13–44)
LYMPHOCYTES # BLD AUTO: 36.2 % — SIGNIFICANT CHANGE UP (ref 13–44)
LYMPHOCYTES # BLD AUTO: 4.5 % — LOW (ref 13–44)
LYMPHOCYTES # BLD AUTO: 5.9 % — LOW (ref 13–44)
LYMPHOCYTES # BLD AUTO: 6.7 % — LOW (ref 13–44)
LYMPHOCYTES # BLD AUTO: 7 % — LOW (ref 13–44)
LYMPHOCYTES # BLD AUTO: 7 % — LOW (ref 13–44)
LYMPHOCYTES # BLD AUTO: 7.5 % — LOW (ref 13–44)
LYMPHOCYTES # BLD AUTO: 7.8 % — LOW (ref 13–44)
LYMPHOCYTES # BLD AUTO: 7.8 % — LOW (ref 13–44)
LYMPHOCYTES # BLD AUTO: 8 % — LOW (ref 13–44)
LYMPHOCYTES # BLD AUTO: 8.3 % — LOW (ref 13–44)
LYMPHOCYTES # BLD AUTO: 8.5 % — LOW (ref 13–44)
LYMPHOCYTES # BLD AUTO: 8.7 % — LOW (ref 13–44)
LYMPHOCYTES # BLD AUTO: 9 % — LOW (ref 13–44)
LYMPHOCYTES # BLD AUTO: 9.2 % — LOW (ref 13–44)
LYMPHOCYTES # BLD AUTO: 9.5 % — LOW (ref 13–44)
LYMPHOCYTES NFR BLD AUTO: 2.6 %
M PNEUMO DNA SPEC QL NAA+PROBE: SIGNIFICANT CHANGE UP
MAGNESIUM SERPL-MCNC: 1.3 MG/DL
MAGNESIUM SERPL-MCNC: 1.3 MG/DL
MAGNESIUM SERPL-MCNC: 1.3 MG/DL — LOW (ref 1.6–2.6)
MAGNESIUM SERPL-MCNC: 1.5 MG/DL
MAGNESIUM SERPL-MCNC: 1.5 MG/DL — LOW (ref 1.6–2.6)
MAGNESIUM SERPL-MCNC: 1.5 MG/DL — LOW (ref 1.6–2.6)
MAGNESIUM SERPL-MCNC: 1.6 MG/DL
MAGNESIUM SERPL-MCNC: 1.6 MG/DL — SIGNIFICANT CHANGE UP (ref 1.6–2.6)
MAGNESIUM SERPL-MCNC: 1.7 MG/DL
MAGNESIUM SERPL-MCNC: 1.7 MG/DL
MAGNESIUM SERPL-MCNC: 1.7 MG/DL — SIGNIFICANT CHANGE UP (ref 1.6–2.6)
MAGNESIUM SERPL-MCNC: 1.7 MG/DL — SIGNIFICANT CHANGE UP (ref 1.6–2.6)
MAGNESIUM SERPL-MCNC: 1.8 MG/DL
MAGNESIUM SERPL-MCNC: 1.8 MG/DL — SIGNIFICANT CHANGE UP (ref 1.6–2.6)
MAGNESIUM SERPL-MCNC: 1.9 MG/DL — SIGNIFICANT CHANGE UP (ref 1.6–2.6)
MAGNESIUM SERPL-MCNC: 1.9 MG/DL — SIGNIFICANT CHANGE UP (ref 1.6–2.6)
MAGNESIUM SERPL-MCNC: 2 MG/DL — SIGNIFICANT CHANGE UP (ref 1.6–2.6)
MAGNESIUM SERPL-MCNC: 2.1 MG/DL — SIGNIFICANT CHANGE UP (ref 1.6–2.6)
MAN DIFF?: NORMAL
MANUAL SMEAR VERIFICATION: SIGNIFICANT CHANGE UP
MCHC RBC-ENTMCNC: 19.6 PG — LOW (ref 27–34)
MCHC RBC-ENTMCNC: 19.7 PG — LOW (ref 27–34)
MCHC RBC-ENTMCNC: 19.7 PG — LOW (ref 27–34)
MCHC RBC-ENTMCNC: 19.9 PG — LOW (ref 27–34)
MCHC RBC-ENTMCNC: 20.1 PG — LOW (ref 27–34)
MCHC RBC-ENTMCNC: 20.2 PG — LOW (ref 27–34)
MCHC RBC-ENTMCNC: 21.1 PG — LOW (ref 27–34)
MCHC RBC-ENTMCNC: 21.4 PG — LOW (ref 27–34)
MCHC RBC-ENTMCNC: 21.7 PG — LOW (ref 27–34)
MCHC RBC-ENTMCNC: 21.8 PG — LOW (ref 27–34)
MCHC RBC-ENTMCNC: 22 PG — LOW (ref 27–34)
MCHC RBC-ENTMCNC: 22 PG — LOW (ref 27–34)
MCHC RBC-ENTMCNC: 22.1 PG — LOW (ref 27–34)
MCHC RBC-ENTMCNC: 22.1 PG — LOW (ref 27–34)
MCHC RBC-ENTMCNC: 22.2 PG — LOW (ref 27–34)
MCHC RBC-ENTMCNC: 22.2 PG — LOW (ref 27–34)
MCHC RBC-ENTMCNC: 22.3 PG — LOW (ref 27–34)
MCHC RBC-ENTMCNC: 22.4 PG — LOW (ref 27–34)
MCHC RBC-ENTMCNC: 22.6 PG — LOW (ref 27–34)
MCHC RBC-ENTMCNC: 22.6 PG — LOW (ref 27–34)
MCHC RBC-ENTMCNC: 22.7 PG — LOW (ref 27–34)
MCHC RBC-ENTMCNC: 22.7 PG — LOW (ref 27–34)
MCHC RBC-ENTMCNC: 22.8 PG — LOW (ref 27–34)
MCHC RBC-ENTMCNC: 22.9 PG — LOW (ref 27–34)
MCHC RBC-ENTMCNC: 22.9 PG — LOW (ref 27–34)
MCHC RBC-ENTMCNC: 23 PG — LOW (ref 27–34)
MCHC RBC-ENTMCNC: 23 PG — LOW (ref 27–34)
MCHC RBC-ENTMCNC: 23.1 PG — LOW (ref 27–34)
MCHC RBC-ENTMCNC: 23.2 PG — LOW (ref 27–34)
MCHC RBC-ENTMCNC: 23.3 PG — LOW (ref 27–34)
MCHC RBC-ENTMCNC: 23.4 PG — LOW (ref 27–34)
MCHC RBC-ENTMCNC: 23.4 PG — LOW (ref 27–34)
MCHC RBC-ENTMCNC: 23.5 PG — LOW (ref 27–34)
MCHC RBC-ENTMCNC: 23.6 PG — LOW (ref 27–34)
MCHC RBC-ENTMCNC: 23.7 PG
MCHC RBC-ENTMCNC: 23.8 PG — LOW (ref 27–34)
MCHC RBC-ENTMCNC: 23.9 PG — LOW (ref 27–34)
MCHC RBC-ENTMCNC: 24 PG — LOW (ref 27–34)
MCHC RBC-ENTMCNC: 30.2 GM/DL — LOW (ref 32–36)
MCHC RBC-ENTMCNC: 30.3 G/DL — LOW (ref 32–36)
MCHC RBC-ENTMCNC: 30.3 GM/DL — LOW (ref 32–36)
MCHC RBC-ENTMCNC: 30.5 GM/DL
MCHC RBC-ENTMCNC: 30.5 GM/DL — LOW (ref 32–36)
MCHC RBC-ENTMCNC: 30.7 G/DL — LOW (ref 32–36)
MCHC RBC-ENTMCNC: 30.8 G/DL — LOW (ref 32–36)
MCHC RBC-ENTMCNC: 30.9 GM/DL — LOW (ref 32–36)
MCHC RBC-ENTMCNC: 30.9 GM/DL — LOW (ref 32–36)
MCHC RBC-ENTMCNC: 31 G/DL — LOW (ref 32–36)
MCHC RBC-ENTMCNC: 31 GM/DL — LOW (ref 32–36)
MCHC RBC-ENTMCNC: 31 GM/DL — LOW (ref 32–36)
MCHC RBC-ENTMCNC: 31.1 GM/DL — LOW (ref 32–36)
MCHC RBC-ENTMCNC: 31.2 G/DL — LOW (ref 32–36)
MCHC RBC-ENTMCNC: 31.2 G/DL — LOW (ref 32–36)
MCHC RBC-ENTMCNC: 31.2 GM/DL — LOW (ref 32–36)
MCHC RBC-ENTMCNC: 31.3 G/DL — LOW (ref 32–36)
MCHC RBC-ENTMCNC: 31.3 G/DL — LOW (ref 32–36)
MCHC RBC-ENTMCNC: 31.3 GM/DL — LOW (ref 32–36)
MCHC RBC-ENTMCNC: 31.4 G/DL — LOW (ref 32–36)
MCHC RBC-ENTMCNC: 31.5 G/DL — LOW (ref 32–36)
MCHC RBC-ENTMCNC: 31.5 GM/DL — LOW (ref 32–36)
MCHC RBC-ENTMCNC: 31.6 G/DL — LOW (ref 32–36)
MCHC RBC-ENTMCNC: 31.7 G/DL — LOW (ref 32–36)
MCHC RBC-ENTMCNC: 31.7 GM/DL — LOW (ref 32–36)
MCHC RBC-ENTMCNC: 31.7 GM/DL — LOW (ref 32–36)
MCHC RBC-ENTMCNC: 31.9 G/DL — LOW (ref 32–36)
MCHC RBC-ENTMCNC: 32 G/DL — SIGNIFICANT CHANGE UP (ref 32–36)
MCHC RBC-ENTMCNC: 32 G/DL — SIGNIFICANT CHANGE UP (ref 32–36)
MCHC RBC-ENTMCNC: 32 GM/DL — SIGNIFICANT CHANGE UP (ref 32–36)
MCHC RBC-ENTMCNC: 32.1 G/DL — SIGNIFICANT CHANGE UP (ref 32–36)
MCHC RBC-ENTMCNC: 32.1 GM/DL — SIGNIFICANT CHANGE UP (ref 32–36)
MCHC RBC-ENTMCNC: 32.3 GM/DL — SIGNIFICANT CHANGE UP (ref 32–36)
MCHC RBC-ENTMCNC: 32.4 G/DL — SIGNIFICANT CHANGE UP (ref 32–36)
MCHC RBC-ENTMCNC: 32.4 GM/DL — SIGNIFICANT CHANGE UP (ref 32–36)
MCHC RBC-ENTMCNC: 32.5 G/DL — SIGNIFICANT CHANGE UP (ref 32–36)
MCHC RBC-ENTMCNC: 32.7 G/DL — SIGNIFICANT CHANGE UP (ref 32–36)
MCHC RBC-ENTMCNC: 32.8 G/DL — SIGNIFICANT CHANGE UP (ref 32–36)
MCHC RBC-ENTMCNC: 32.8 G/DL — SIGNIFICANT CHANGE UP (ref 32–36)
MCHC RBC-ENTMCNC: 32.8 GM/DL — SIGNIFICANT CHANGE UP (ref 32–36)
MCHC RBC-ENTMCNC: 32.9 G/DL — SIGNIFICANT CHANGE UP (ref 32–36)
MCHC RBC-ENTMCNC: 32.9 GM/DL — SIGNIFICANT CHANGE UP (ref 32–36)
MCHC RBC-ENTMCNC: 33 G/DL — SIGNIFICANT CHANGE UP (ref 32–36)
MCV RBC AUTO: 62.1 FL — LOW (ref 80–100)
MCV RBC AUTO: 63 FL — LOW (ref 80–100)
MCV RBC AUTO: 63.4 FL — LOW (ref 80–100)
MCV RBC AUTO: 63.7 FL — LOW (ref 80–100)
MCV RBC AUTO: 63.9 FL — LOW (ref 80–100)
MCV RBC AUTO: 64.2 FL — LOW (ref 80–100)
MCV RBC AUTO: 64.3 FL — LOW (ref 80–100)
MCV RBC AUTO: 64.5 FL — LOW (ref 80–100)
MCV RBC AUTO: 64.9 FL — LOW (ref 80–100)
MCV RBC AUTO: 66.7 FL — LOW (ref 80–100)
MCV RBC AUTO: 66.8 FL — LOW (ref 80–100)
MCV RBC AUTO: 67.1 FL — LOW (ref 80–100)
MCV RBC AUTO: 67.5 FL — LOW (ref 80–100)
MCV RBC AUTO: 68 FL — LOW (ref 80–100)
MCV RBC AUTO: 68.2 FL — LOW (ref 80–100)
MCV RBC AUTO: 68.5 FL — LOW (ref 80–100)
MCV RBC AUTO: 68.8 FL — LOW (ref 80–100)
MCV RBC AUTO: 69.4 FL — LOW (ref 80–100)
MCV RBC AUTO: 69.7 FL — LOW (ref 80–100)
MCV RBC AUTO: 70.1 FL — LOW (ref 80–100)
MCV RBC AUTO: 70.5 FL — LOW (ref 80–100)
MCV RBC AUTO: 70.8 FL — LOW (ref 80–100)
MCV RBC AUTO: 71 FL — LOW (ref 80–100)
MCV RBC AUTO: 71.2 FL — LOW (ref 80–100)
MCV RBC AUTO: 71.4 FL — LOW (ref 80–100)
MCV RBC AUTO: 71.6 FL — LOW (ref 80–100)
MCV RBC AUTO: 71.9 FL — LOW (ref 80–100)
MCV RBC AUTO: 71.9 FL — LOW (ref 80–100)
MCV RBC AUTO: 72.1 FL — LOW (ref 80–100)
MCV RBC AUTO: 72.5 FL — LOW (ref 80–100)
MCV RBC AUTO: 72.6 FL — LOW (ref 80–100)
MCV RBC AUTO: 72.6 FL — LOW (ref 80–100)
MCV RBC AUTO: 72.7 FL — LOW (ref 80–100)
MCV RBC AUTO: 72.9 FL — LOW (ref 80–100)
MCV RBC AUTO: 73 FL — LOW (ref 80–100)
MCV RBC AUTO: 73 FL — LOW (ref 80–100)
MCV RBC AUTO: 73.5 FL — LOW (ref 80–100)
MCV RBC AUTO: 73.8 FL — LOW (ref 80–100)
MCV RBC AUTO: 73.9 FL — LOW (ref 80–100)
MCV RBC AUTO: 73.9 FL — LOW (ref 80–100)
MCV RBC AUTO: 74.1 FL — LOW (ref 80–100)
MCV RBC AUTO: 75.3 FL — LOW (ref 80–100)
MCV RBC AUTO: 75.5 FL — LOW (ref 80–100)
MCV RBC AUTO: 76 FL — LOW (ref 80–100)
MCV RBC AUTO: 76.7 FL — LOW (ref 80–100)
MCV RBC AUTO: 77.6 FL
METAMYELOCYTES # FLD: 0.9 % — SIGNIFICANT CHANGE UP (ref 0–1)
METAMYELOCYTES # FLD: 1 % — HIGH (ref 0–0)
METAMYELOCYTES # FLD: 2 % — HIGH (ref 0–0)
MICROCYTES BLD QL: SIGNIFICANT CHANGE UP
MICROCYTES BLD QL: SLIGHT — SIGNIFICANT CHANGE UP
MONOCYTES # BLD AUTO: 0.09 K/UL
MONOCYTES # BLD AUTO: 0.09 K/UL — SIGNIFICANT CHANGE UP (ref 0–0.9)
MONOCYTES # BLD AUTO: 0.1 K/UL — SIGNIFICANT CHANGE UP (ref 0–0.9)
MONOCYTES # BLD AUTO: 0.13 K/UL — SIGNIFICANT CHANGE UP (ref 0–0.9)
MONOCYTES # BLD AUTO: 0.13 K/UL — SIGNIFICANT CHANGE UP (ref 0–0.9)
MONOCYTES # BLD AUTO: 0.15 K/UL — SIGNIFICANT CHANGE UP (ref 0–0.9)
MONOCYTES # BLD AUTO: 0.16 K/UL — SIGNIFICANT CHANGE UP (ref 0–0.9)
MONOCYTES # BLD AUTO: 0.18 K/UL — SIGNIFICANT CHANGE UP (ref 0–0.9)
MONOCYTES # BLD AUTO: 0.19 K/UL — SIGNIFICANT CHANGE UP (ref 0–0.9)
MONOCYTES # BLD AUTO: 0.19 K/UL — SIGNIFICANT CHANGE UP (ref 0–0.9)
MONOCYTES # BLD AUTO: 0.21 K/UL — SIGNIFICANT CHANGE UP (ref 0–0.9)
MONOCYTES # BLD AUTO: 0.21 K/UL — SIGNIFICANT CHANGE UP (ref 0–0.9)
MONOCYTES # BLD AUTO: 0.22 K/UL — SIGNIFICANT CHANGE UP (ref 0–0.9)
MONOCYTES # BLD AUTO: 0.24 K/UL — SIGNIFICANT CHANGE UP (ref 0–0.9)
MONOCYTES # BLD AUTO: 0.26 K/UL — SIGNIFICANT CHANGE UP (ref 0–0.9)
MONOCYTES # BLD AUTO: 0.28 K/UL — SIGNIFICANT CHANGE UP (ref 0–0.9)
MONOCYTES # BLD AUTO: 0.33 K/UL — SIGNIFICANT CHANGE UP (ref 0–0.9)
MONOCYTES # BLD AUTO: 0.33 K/UL — SIGNIFICANT CHANGE UP (ref 0–0.9)
MONOCYTES # BLD AUTO: 0.34 K/UL — SIGNIFICANT CHANGE UP (ref 0–0.9)
MONOCYTES # BLD AUTO: 0.42 K/UL — SIGNIFICANT CHANGE UP (ref 0–0.9)
MONOCYTES # BLD AUTO: 0.43 K/UL — SIGNIFICANT CHANGE UP (ref 0–0.9)
MONOCYTES NFR BLD AUTO: 0.8 % — LOW (ref 2–14)
MONOCYTES NFR BLD AUTO: 1.7 %
MONOCYTES NFR BLD AUTO: 1.7 % — LOW (ref 2–14)
MONOCYTES NFR BLD AUTO: 1.8 % — LOW (ref 2–14)
MONOCYTES NFR BLD AUTO: 3.3 % — SIGNIFICANT CHANGE UP (ref 2–14)
MONOCYTES NFR BLD AUTO: 3.5 % — SIGNIFICANT CHANGE UP (ref 2–14)
MONOCYTES NFR BLD AUTO: 3.7 % — SIGNIFICANT CHANGE UP (ref 2–14)
MONOCYTES NFR BLD AUTO: 3.7 % — SIGNIFICANT CHANGE UP (ref 2–14)
MONOCYTES NFR BLD AUTO: 3.9 % — SIGNIFICANT CHANGE UP (ref 2–14)
MONOCYTES NFR BLD AUTO: 4 % — SIGNIFICANT CHANGE UP (ref 2–14)
MONOCYTES NFR BLD AUTO: 4.1 % — SIGNIFICANT CHANGE UP (ref 2–14)
MONOCYTES NFR BLD AUTO: 4.3 % — SIGNIFICANT CHANGE UP (ref 2–14)
MONOCYTES NFR BLD AUTO: 4.4 % — SIGNIFICANT CHANGE UP (ref 2–14)
MONOCYTES NFR BLD AUTO: 4.5 % — SIGNIFICANT CHANGE UP (ref 2–14)
MONOCYTES NFR BLD AUTO: 4.6 % — SIGNIFICANT CHANGE UP (ref 2–14)
MONOCYTES NFR BLD AUTO: 4.6 % — SIGNIFICANT CHANGE UP (ref 2–14)
MONOCYTES NFR BLD AUTO: 4.7 % — SIGNIFICANT CHANGE UP (ref 2–14)
MONOCYTES NFR BLD AUTO: 5 % — SIGNIFICANT CHANGE UP (ref 2–14)
MONOCYTES NFR BLD AUTO: 5.3 % — SIGNIFICANT CHANGE UP (ref 2–14)
MONOCYTES NFR BLD AUTO: 5.4 % — SIGNIFICANT CHANGE UP (ref 2–14)
MONOCYTES NFR BLD AUTO: 5.7 % — SIGNIFICANT CHANGE UP (ref 2–14)
MONOCYTES NFR BLD AUTO: 5.7 % — SIGNIFICANT CHANGE UP (ref 2–14)
MONOCYTES NFR BLD AUTO: 5.9 % — SIGNIFICANT CHANGE UP (ref 2–14)
MONOCYTES NFR BLD AUTO: 6.7 % — SIGNIFICANT CHANGE UP (ref 2–14)
MONOCYTES NFR BLD AUTO: 7 % — SIGNIFICANT CHANGE UP (ref 2–14)
MYELOCYTES NFR BLD: 0.9 % — HIGH (ref 0–0)
MYELOCYTES NFR BLD: 1 % — HIGH (ref 0–0)
MYELOCYTES NFR BLD: 1.7 % — HIGH (ref 0–0)
MYELOCYTES NFR BLD: 1.8 % — HIGH (ref 0–0)
MYELOCYTES NFR BLD: 2 % — HIGH (ref 0–0)
MYELOCYTES NFR BLD: 3 % — HIGH (ref 0–0)
MYELOCYTES NFR BLD: 6 % — HIGH (ref 0–0)
NEUTROPHILS # BLD AUTO: 11.04 K/UL — HIGH (ref 1.8–7.4)
NEUTROPHILS # BLD AUTO: 12.09 K/UL — HIGH (ref 1.8–7.4)
NEUTROPHILS # BLD AUTO: 2.1 K/UL — SIGNIFICANT CHANGE UP (ref 1.8–7.4)
NEUTROPHILS # BLD AUTO: 2.75 K/UL — SIGNIFICANT CHANGE UP (ref 1.8–7.4)
NEUTROPHILS # BLD AUTO: 2.75 K/UL — SIGNIFICANT CHANGE UP (ref 1.8–7.4)
NEUTROPHILS # BLD AUTO: 2.87 K/UL — SIGNIFICANT CHANGE UP (ref 1.8–7.4)
NEUTROPHILS # BLD AUTO: 2.97 K/UL — SIGNIFICANT CHANGE UP (ref 1.8–7.4)
NEUTROPHILS # BLD AUTO: 3.09 K/UL — SIGNIFICANT CHANGE UP (ref 1.8–7.4)
NEUTROPHILS # BLD AUTO: 3.12 K/UL — SIGNIFICANT CHANGE UP (ref 1.8–7.4)
NEUTROPHILS # BLD AUTO: 3.14 K/UL — SIGNIFICANT CHANGE UP (ref 1.8–7.4)
NEUTROPHILS # BLD AUTO: 3.29 K/UL — SIGNIFICANT CHANGE UP (ref 1.8–7.4)
NEUTROPHILS # BLD AUTO: 3.3 K/UL — SIGNIFICANT CHANGE UP (ref 1.8–7.4)
NEUTROPHILS # BLD AUTO: 3.44 K/UL — SIGNIFICANT CHANGE UP (ref 1.8–7.4)
NEUTROPHILS # BLD AUTO: 3.53 K/UL — SIGNIFICANT CHANGE UP (ref 1.8–7.4)
NEUTROPHILS # BLD AUTO: 3.57 K/UL — SIGNIFICANT CHANGE UP (ref 1.8–7.4)
NEUTROPHILS # BLD AUTO: 3.58 K/UL — SIGNIFICANT CHANGE UP (ref 1.8–7.4)
NEUTROPHILS # BLD AUTO: 3.62 K/UL — SIGNIFICANT CHANGE UP (ref 1.8–7.4)
NEUTROPHILS # BLD AUTO: 3.66 K/UL — SIGNIFICANT CHANGE UP (ref 1.8–7.4)
NEUTROPHILS # BLD AUTO: 3.67 K/UL — SIGNIFICANT CHANGE UP (ref 1.8–7.4)
NEUTROPHILS # BLD AUTO: 3.69 K/UL — SIGNIFICANT CHANGE UP (ref 1.8–7.4)
NEUTROPHILS # BLD AUTO: 3.72 K/UL — SIGNIFICANT CHANGE UP (ref 1.8–7.4)
NEUTROPHILS # BLD AUTO: 4.06 K/UL — SIGNIFICANT CHANGE UP (ref 1.8–7.4)
NEUTROPHILS # BLD AUTO: 4.27 K/UL — SIGNIFICANT CHANGE UP (ref 1.8–7.4)
NEUTROPHILS # BLD AUTO: 4.69 K/UL — SIGNIFICANT CHANGE UP (ref 1.8–7.4)
NEUTROPHILS # BLD AUTO: 4.92 K/UL — SIGNIFICANT CHANGE UP (ref 1.8–7.4)
NEUTROPHILS # BLD AUTO: 5.03 K/UL
NEUTROPHILS # BLD AUTO: 5.2 K/UL — SIGNIFICANT CHANGE UP (ref 1.8–7.4)
NEUTROPHILS # BLD AUTO: 5.37 K/UL — SIGNIFICANT CHANGE UP (ref 1.8–7.4)
NEUTROPHILS # BLD AUTO: 7.07 K/UL — SIGNIFICANT CHANGE UP (ref 1.8–7.4)
NEUTROPHILS # BLD AUTO: 7.49 K/UL — HIGH (ref 1.8–7.4)
NEUTROPHILS # BLD AUTO: 7.85 K/UL — HIGH (ref 1.8–7.4)
NEUTROPHILS # BLD AUTO: 8.42 K/UL — HIGH (ref 1.8–7.4)
NEUTROPHILS NFR BLD AUTO: 55.2 % — SIGNIFICANT CHANGE UP (ref 43–77)
NEUTROPHILS NFR BLD AUTO: 62.5 % — SIGNIFICANT CHANGE UP (ref 43–77)
NEUTROPHILS NFR BLD AUTO: 74 % — SIGNIFICANT CHANGE UP (ref 43–77)
NEUTROPHILS NFR BLD AUTO: 75.2 % — SIGNIFICANT CHANGE UP (ref 43–77)
NEUTROPHILS NFR BLD AUTO: 76 % — SIGNIFICANT CHANGE UP (ref 43–77)
NEUTROPHILS NFR BLD AUTO: 76.2 % — SIGNIFICANT CHANGE UP (ref 43–77)
NEUTROPHILS NFR BLD AUTO: 77.6 % — HIGH (ref 43–77)
NEUTROPHILS NFR BLD AUTO: 78 % — HIGH (ref 43–77)
NEUTROPHILS NFR BLD AUTO: 79.3 % — HIGH (ref 43–77)
NEUTROPHILS NFR BLD AUTO: 79.3 % — HIGH (ref 43–77)
NEUTROPHILS NFR BLD AUTO: 79.5 % — HIGH (ref 43–77)
NEUTROPHILS NFR BLD AUTO: 80 % — HIGH (ref 43–77)
NEUTROPHILS NFR BLD AUTO: 80 % — HIGH (ref 43–77)
NEUTROPHILS NFR BLD AUTO: 80.3 % — HIGH (ref 43–77)
NEUTROPHILS NFR BLD AUTO: 81.2 % — HIGH (ref 43–77)
NEUTROPHILS NFR BLD AUTO: 81.3 % — HIGH (ref 43–77)
NEUTROPHILS NFR BLD AUTO: 81.3 % — HIGH (ref 43–77)
NEUTROPHILS NFR BLD AUTO: 82 % — HIGH (ref 43–77)
NEUTROPHILS NFR BLD AUTO: 83.8 % — HIGH (ref 43–77)
NEUTROPHILS NFR BLD AUTO: 83.9 % — HIGH (ref 43–77)
NEUTROPHILS NFR BLD AUTO: 84.6 % — HIGH (ref 43–77)
NEUTROPHILS NFR BLD AUTO: 84.7 % — HIGH (ref 43–77)
NEUTROPHILS NFR BLD AUTO: 86 % — HIGH (ref 43–77)
NEUTROPHILS NFR BLD AUTO: 86.8 % — HIGH (ref 43–77)
NEUTROPHILS NFR BLD AUTO: 87 % — HIGH (ref 43–77)
NEUTROPHILS NFR BLD AUTO: 88.2 % — HIGH (ref 43–77)
NEUTROPHILS NFR BLD AUTO: 88.7 % — HIGH (ref 43–77)
NEUTROPHILS NFR BLD AUTO: 89.6 % — HIGH (ref 43–77)
NEUTROPHILS NFR BLD AUTO: 93.9 % — HIGH (ref 43–77)
NEUTROPHILS NFR BLD AUTO: 94.8 %
NEUTROPHILS NFR BLD AUTO: 94.8 % — HIGH (ref 43–77)
NEUTROPHILS NFR BLD AUTO: 96.4 % — HIGH (ref 43–77)
NIGHT BLUE STAIN TISS: SIGNIFICANT CHANGE UP
NITRITE UR-MCNC: NEGATIVE — SIGNIFICANT CHANGE UP
NON HDL CHOLESTEROL: 156 MG/DL — HIGH
NON-GYNECOLOGICAL CYTOLOGY STUDY: SIGNIFICANT CHANGE UP
NRBC # BLD: 0 /100 WBCS — SIGNIFICANT CHANGE UP
NRBC # BLD: 0 /100 WBCS — SIGNIFICANT CHANGE UP (ref 0–0)
NRBC # BLD: 0 /100 — SIGNIFICANT CHANGE UP (ref 0–0)
NRBC # BLD: 0 /100 — SIGNIFICANT CHANGE UP (ref 0–0)
NRBC # BLD: 1 /100 WBCS — SIGNIFICANT CHANGE UP
NRBC # BLD: 1 /100 — HIGH (ref 0–0)
NRBC # BLD: 1 /100 — HIGH (ref 0–0)
NRBC # BLD: 2 /100 — HIGH (ref 0–0)
NRBC # BLD: 2 /100 — HIGH (ref 0–0)
NRBC # BLD: SIGNIFICANT CHANGE UP /100 WBCS (ref 0–0)
NRBC # FLD: 0 K/UL — SIGNIFICANT CHANGE UP
NRBC # FLD: 0.02 K/UL — HIGH
NRBC # FLD: 0.03 K/UL — HIGH
NRBC # FLD: 0.04 K/UL — HIGH
NRBC # FLD: 0.06 K/UL — HIGH
NRBC # FLD: 0.08 K/UL — HIGH
NRBC # FLD: 0.16 K/UL — HIGH
NT-PROBNP SERPL-SCNC: 173 PG/ML — SIGNIFICANT CHANGE UP
NT-PROBNP SERPL-SCNC: 206 PG/ML — SIGNIFICANT CHANGE UP
NT-PROBNP SERPL-SCNC: 372 PG/ML — HIGH
OSMOLALITY SERPL: 277 MOSM/KG — SIGNIFICANT CHANGE UP (ref 275–295)
OVALOCYTES BLD QL SMEAR: SIGNIFICANT CHANGE UP
OVALOCYTES BLD QL SMEAR: SLIGHT — SIGNIFICANT CHANGE UP
PH UR: 5.5 — SIGNIFICANT CHANGE UP (ref 5–8)
PHOSPHATE SERPL-MCNC: 1.4 MG/DL — LOW (ref 2.5–4.5)
PHOSPHATE SERPL-MCNC: 1.4 MG/DL — LOW (ref 2.5–4.5)
PHOSPHATE SERPL-MCNC: 1.7 MG/DL — LOW (ref 2.5–4.5)
PHOSPHATE SERPL-MCNC: 1.8 MG/DL — LOW (ref 2.5–4.5)
PHOSPHATE SERPL-MCNC: 2.2 MG/DL — LOW (ref 2.5–4.5)
PHOSPHATE SERPL-MCNC: 2.2 MG/DL — LOW (ref 2.5–4.5)
PHOSPHATE SERPL-MCNC: 2.3 MG/DL — LOW (ref 2.5–4.5)
PHOSPHATE SERPL-MCNC: 2.4 MG/DL — LOW (ref 2.5–4.5)
PHOSPHATE SERPL-MCNC: 2.6 MG/DL — SIGNIFICANT CHANGE UP (ref 2.5–4.5)
PHOSPHATE SERPL-MCNC: 2.6 MG/DL — SIGNIFICANT CHANGE UP (ref 2.5–4.5)
PHOSPHATE SERPL-MCNC: 2.7 MG/DL — SIGNIFICANT CHANGE UP (ref 2.5–4.5)
PHOSPHATE SERPL-MCNC: 2.7 MG/DL — SIGNIFICANT CHANGE UP (ref 2.5–4.5)
PHOSPHATE SERPL-MCNC: 3 MG/DL — SIGNIFICANT CHANGE UP (ref 2.5–4.5)
PHOSPHATE SERPL-MCNC: 3.2 MG/DL — SIGNIFICANT CHANGE UP (ref 2.5–4.5)
PHOSPHATE SERPL-MCNC: 3.3 MG/DL — SIGNIFICANT CHANGE UP (ref 2.5–4.5)
PHOSPHATE SERPL-MCNC: 3.9 MG/DL — SIGNIFICANT CHANGE UP (ref 2.5–4.5)
PLAT MORPH BLD: NORMAL — SIGNIFICANT CHANGE UP
PLATELET # BLD AUTO: 130 K/UL — LOW (ref 150–400)
PLATELET # BLD AUTO: 143 K/UL — LOW (ref 150–400)
PLATELET # BLD AUTO: 143 K/UL — LOW (ref 150–400)
PLATELET # BLD AUTO: 154 K/UL — SIGNIFICANT CHANGE UP (ref 150–400)
PLATELET # BLD AUTO: 157 K/UL — SIGNIFICANT CHANGE UP (ref 150–400)
PLATELET # BLD AUTO: 158 K/UL — SIGNIFICANT CHANGE UP (ref 150–400)
PLATELET # BLD AUTO: 160 K/UL — SIGNIFICANT CHANGE UP (ref 150–400)
PLATELET # BLD AUTO: 160 K/UL — SIGNIFICANT CHANGE UP (ref 150–400)
PLATELET # BLD AUTO: 161 K/UL — SIGNIFICANT CHANGE UP (ref 150–400)
PLATELET # BLD AUTO: 169 K/UL — SIGNIFICANT CHANGE UP (ref 150–400)
PLATELET # BLD AUTO: 171 K/UL — SIGNIFICANT CHANGE UP (ref 150–400)
PLATELET # BLD AUTO: 172 K/UL
PLATELET # BLD AUTO: 176 K/UL — SIGNIFICANT CHANGE UP (ref 150–400)
PLATELET # BLD AUTO: 178 K/UL — SIGNIFICANT CHANGE UP (ref 150–400)
PLATELET # BLD AUTO: 180 K/UL — SIGNIFICANT CHANGE UP (ref 150–400)
PLATELET # BLD AUTO: 194 K/UL — SIGNIFICANT CHANGE UP (ref 150–400)
PLATELET # BLD AUTO: 197 K/UL — SIGNIFICANT CHANGE UP (ref 150–400)
PLATELET # BLD AUTO: 199 K/UL — SIGNIFICANT CHANGE UP (ref 150–400)
PLATELET # BLD AUTO: 201 K/UL — SIGNIFICANT CHANGE UP (ref 150–400)
PLATELET # BLD AUTO: 204 K/UL — SIGNIFICANT CHANGE UP (ref 150–400)
PLATELET # BLD AUTO: 205 K/UL — SIGNIFICANT CHANGE UP (ref 150–400)
PLATELET # BLD AUTO: 210 K/UL — SIGNIFICANT CHANGE UP (ref 150–400)
PLATELET # BLD AUTO: 210 K/UL — SIGNIFICANT CHANGE UP (ref 150–400)
PLATELET # BLD AUTO: 211 K/UL — SIGNIFICANT CHANGE UP (ref 150–400)
PLATELET # BLD AUTO: 211 K/UL — SIGNIFICANT CHANGE UP (ref 150–400)
PLATELET # BLD AUTO: 224 K/UL — SIGNIFICANT CHANGE UP (ref 150–400)
PLATELET # BLD AUTO: 241 K/UL — SIGNIFICANT CHANGE UP (ref 150–400)
PLATELET # BLD AUTO: 243 K/UL — SIGNIFICANT CHANGE UP (ref 150–400)
PLATELET # BLD AUTO: 243 K/UL — SIGNIFICANT CHANGE UP (ref 150–400)
PLATELET # BLD AUTO: 245 K/UL — SIGNIFICANT CHANGE UP (ref 150–400)
PLATELET # BLD AUTO: 245 K/UL — SIGNIFICANT CHANGE UP (ref 150–400)
PLATELET # BLD AUTO: 254 K/UL — SIGNIFICANT CHANGE UP (ref 150–400)
PLATELET # BLD AUTO: 256 K/UL — SIGNIFICANT CHANGE UP (ref 150–400)
PLATELET # BLD AUTO: 263 K/UL — SIGNIFICANT CHANGE UP (ref 150–400)
PLATELET # BLD AUTO: 268 K/UL — SIGNIFICANT CHANGE UP (ref 150–400)
PLATELET # BLD AUTO: 270 K/UL — SIGNIFICANT CHANGE UP (ref 150–400)
PLATELET # BLD AUTO: 282 K/UL — SIGNIFICANT CHANGE UP (ref 150–400)
PLATELET # BLD AUTO: 287 K/UL — SIGNIFICANT CHANGE UP (ref 150–400)
PLATELET # BLD AUTO: 292 K/UL — SIGNIFICANT CHANGE UP (ref 150–400)
PLATELET # BLD AUTO: 293 K/UL — SIGNIFICANT CHANGE UP (ref 150–400)
PLATELET # BLD AUTO: 307 K/UL — SIGNIFICANT CHANGE UP (ref 150–400)
PLATELET # BLD AUTO: 308 K/UL — SIGNIFICANT CHANGE UP (ref 150–400)
PLATELET # BLD AUTO: 311 K/UL — SIGNIFICANT CHANGE UP (ref 150–400)
PLATELET # BLD AUTO: 321 K/UL — SIGNIFICANT CHANGE UP (ref 150–400)
PLATELET # BLD AUTO: 336 K/UL — SIGNIFICANT CHANGE UP (ref 150–400)
PLATELET # BLD AUTO: 337 K/UL — SIGNIFICANT CHANGE UP (ref 150–400)
PLATELET # BLD AUTO: 343 K/UL — SIGNIFICANT CHANGE UP (ref 150–400)
PLATELET # BLD AUTO: 353 K/UL — SIGNIFICANT CHANGE UP (ref 150–400)
PLATELET COUNT - ESTIMATE: NORMAL — SIGNIFICANT CHANGE UP
PLATELET COUNT - ESTIMATE: NORMAL — SIGNIFICANT CHANGE UP
POIKILOCYTOSIS BLD QL AUTO: SIGNIFICANT CHANGE UP
POIKILOCYTOSIS BLD QL AUTO: SLIGHT — SIGNIFICANT CHANGE UP
POLYCHROMASIA BLD QL SMEAR: SLIGHT — SIGNIFICANT CHANGE UP
POTASSIUM SERPL-MCNC: 3.5 MMOL/L — SIGNIFICANT CHANGE UP (ref 3.5–5.3)
POTASSIUM SERPL-MCNC: 3.7 MMOL/L — SIGNIFICANT CHANGE UP (ref 3.5–5.3)
POTASSIUM SERPL-MCNC: 4 MMOL/L — SIGNIFICANT CHANGE UP (ref 3.5–5.3)
POTASSIUM SERPL-MCNC: 4.1 MMOL/L — SIGNIFICANT CHANGE UP (ref 3.5–5.3)
POTASSIUM SERPL-MCNC: 4.3 MMOL/L — SIGNIFICANT CHANGE UP (ref 3.5–5.3)
POTASSIUM SERPL-MCNC: 4.4 MMOL/L — SIGNIFICANT CHANGE UP (ref 3.5–5.3)
POTASSIUM SERPL-MCNC: 4.5 MMOL/L — SIGNIFICANT CHANGE UP (ref 3.5–5.3)
POTASSIUM SERPL-MCNC: 4.6 MMOL/L — SIGNIFICANT CHANGE UP (ref 3.5–5.3)
POTASSIUM SERPL-MCNC: 4.7 MMOL/L — SIGNIFICANT CHANGE UP (ref 3.5–5.3)
POTASSIUM SERPL-MCNC: 4.8 MMOL/L — SIGNIFICANT CHANGE UP (ref 3.5–5.3)
POTASSIUM SERPL-MCNC: 4.8 MMOL/L — SIGNIFICANT CHANGE UP (ref 3.5–5.3)
POTASSIUM SERPL-MCNC: 4.9 MMOL/L — SIGNIFICANT CHANGE UP (ref 3.5–5.3)
POTASSIUM SERPL-MCNC: 5 MMOL/L — SIGNIFICANT CHANGE UP (ref 3.5–5.3)
POTASSIUM SERPL-MCNC: 5.1 MMOL/L — SIGNIFICANT CHANGE UP (ref 3.5–5.3)
POTASSIUM SERPL-MCNC: 5.2 MMOL/L — SIGNIFICANT CHANGE UP (ref 3.5–5.3)
POTASSIUM SERPL-MCNC: 5.3 MMOL/L — SIGNIFICANT CHANGE UP (ref 3.5–5.3)
POTASSIUM SERPL-MCNC: 5.6 MMOL/L — HIGH (ref 3.5–5.3)
POTASSIUM SERPL-MCNC: 5.6 MMOL/L — HIGH (ref 3.5–5.3)
POTASSIUM SERPL-SCNC: 3.5 MMOL/L — SIGNIFICANT CHANGE UP (ref 3.5–5.3)
POTASSIUM SERPL-SCNC: 3.7 MMOL/L — SIGNIFICANT CHANGE UP (ref 3.5–5.3)
POTASSIUM SERPL-SCNC: 3.9 MMOL/L
POTASSIUM SERPL-SCNC: 3.9 MMOL/L
POTASSIUM SERPL-SCNC: 4 MMOL/L
POTASSIUM SERPL-SCNC: 4 MMOL/L — SIGNIFICANT CHANGE UP (ref 3.5–5.3)
POTASSIUM SERPL-SCNC: 4.1 MMOL/L — SIGNIFICANT CHANGE UP (ref 3.5–5.3)
POTASSIUM SERPL-SCNC: 4.2 MMOL/L
POTASSIUM SERPL-SCNC: 4.3 MMOL/L — SIGNIFICANT CHANGE UP (ref 3.5–5.3)
POTASSIUM SERPL-SCNC: 4.4 MMOL/L — SIGNIFICANT CHANGE UP (ref 3.5–5.3)
POTASSIUM SERPL-SCNC: 4.5 MMOL/L
POTASSIUM SERPL-SCNC: 4.5 MMOL/L — SIGNIFICANT CHANGE UP (ref 3.5–5.3)
POTASSIUM SERPL-SCNC: 4.6 MMOL/L — SIGNIFICANT CHANGE UP (ref 3.5–5.3)
POTASSIUM SERPL-SCNC: 4.7 MMOL/L — SIGNIFICANT CHANGE UP (ref 3.5–5.3)
POTASSIUM SERPL-SCNC: 4.8 MMOL/L
POTASSIUM SERPL-SCNC: 4.8 MMOL/L
POTASSIUM SERPL-SCNC: 4.8 MMOL/L — SIGNIFICANT CHANGE UP (ref 3.5–5.3)
POTASSIUM SERPL-SCNC: 4.8 MMOL/L — SIGNIFICANT CHANGE UP (ref 3.5–5.3)
POTASSIUM SERPL-SCNC: 4.9 MMOL/L
POTASSIUM SERPL-SCNC: 4.9 MMOL/L — SIGNIFICANT CHANGE UP (ref 3.5–5.3)
POTASSIUM SERPL-SCNC: 5 MMOL/L — SIGNIFICANT CHANGE UP (ref 3.5–5.3)
POTASSIUM SERPL-SCNC: 5.1 MMOL/L — SIGNIFICANT CHANGE UP (ref 3.5–5.3)
POTASSIUM SERPL-SCNC: 5.2 MMOL/L — SIGNIFICANT CHANGE UP (ref 3.5–5.3)
POTASSIUM SERPL-SCNC: 5.3 MMOL/L — SIGNIFICANT CHANGE UP (ref 3.5–5.3)
POTASSIUM SERPL-SCNC: 5.6 MMOL/L — HIGH (ref 3.5–5.3)
POTASSIUM SERPL-SCNC: 5.6 MMOL/L — HIGH (ref 3.5–5.3)
PROMYELOCYTES # FLD: 1 % — HIGH (ref 0–0)
PROT SERPL-MCNC: 5.9 G/DL — LOW (ref 6–8.3)
PROT SERPL-MCNC: 5.9 G/DL — LOW (ref 6–8.3)
PROT SERPL-MCNC: 6.3 G/DL
PROT SERPL-MCNC: 6.3 G/DL
PROT SERPL-MCNC: 6.4 G/DL — SIGNIFICANT CHANGE UP (ref 6–8.3)
PROT SERPL-MCNC: 6.5 G/DL
PROT SERPL-MCNC: 6.5 G/DL — SIGNIFICANT CHANGE UP (ref 6–8.3)
PROT SERPL-MCNC: 6.6 G/DL
PROT SERPL-MCNC: 6.6 G/DL
PROT SERPL-MCNC: 6.7 G/DL
PROT SERPL-MCNC: 6.7 G/DL
PROT SERPL-MCNC: 6.7 G/DL — SIGNIFICANT CHANGE UP (ref 6–8.3)
PROT SERPL-MCNC: 6.8 G/DL
PROT SERPL-MCNC: 6.8 G/DL
PROT SERPL-MCNC: 6.9 G/DL
PROT SERPL-MCNC: 6.9 G/DL — SIGNIFICANT CHANGE UP (ref 6–8.3)
PROT SERPL-MCNC: 7.2 G/DL
PROT SERPL-MCNC: 7.4 G/DL
PROT SERPL-MCNC: 7.4 G/DL
PROT UR-MCNC: ABNORMAL
PROTHROM AB SERPL-ACNC: 12.8 SEC — SIGNIFICANT CHANGE UP (ref 10.6–13.6)
PROTHROM AB SERPL-ACNC: 13.3 SEC — SIGNIFICANT CHANGE UP (ref 10.6–13.6)
PT BLD: 11.8 SEC
PTH RELATED PROT SERPL-MCNC: <2 PMOL/L — SIGNIFICANT CHANGE UP
PTH-INTACT FLD-MCNC: 7 PG/ML — LOW (ref 15–65)
RAPID RVP RESULT: SIGNIFICANT CHANGE UP
RBC # BLD: 2.69 M/UL — LOW (ref 3.8–5.2)
RBC # BLD: 2.96 M/UL — LOW (ref 3.8–5.2)
RBC # BLD: 2.96 M/UL — LOW (ref 3.8–5.2)
RBC # BLD: 3.05 M/UL — LOW (ref 3.8–5.2)
RBC # BLD: 3.08 M/UL
RBC # BLD: 3.09 M/UL — LOW (ref 3.8–5.2)
RBC # BLD: 3.24 M/UL — LOW (ref 3.8–5.2)
RBC # BLD: 3.26 M/UL — LOW (ref 3.8–5.2)
RBC # BLD: 3.31 M/UL — LOW (ref 3.8–5.2)
RBC # BLD: 3.32 M/UL — LOW (ref 3.8–5.2)
RBC # BLD: 3.34 M/UL — LOW (ref 3.8–5.2)
RBC # BLD: 3.35 M/UL — LOW (ref 3.8–5.2)
RBC # BLD: 3.36 M/UL — LOW (ref 3.8–5.2)
RBC # BLD: 3.42 M/UL — LOW (ref 3.8–5.2)
RBC # BLD: 3.48 M/UL — LOW (ref 3.8–5.2)
RBC # BLD: 3.48 M/UL — LOW (ref 3.8–5.2)
RBC # BLD: 3.49 M/UL — LOW (ref 3.8–5.2)
RBC # BLD: 3.5 M/UL — LOW (ref 3.8–5.2)
RBC # BLD: 3.52 M/UL — LOW (ref 3.8–5.2)
RBC # BLD: 3.53 M/UL — LOW (ref 3.8–5.2)
RBC # BLD: 3.58 M/UL — LOW (ref 3.8–5.2)
RBC # BLD: 3.58 M/UL — LOW (ref 3.8–5.2)
RBC # BLD: 3.62 M/UL — LOW (ref 3.8–5.2)
RBC # BLD: 3.62 M/UL — LOW (ref 3.8–5.2)
RBC # BLD: 3.63 M/UL — LOW (ref 3.8–5.2)
RBC # BLD: 3.64 M/UL — LOW (ref 3.8–5.2)
RBC # BLD: 3.66 M/UL — LOW (ref 3.8–5.2)
RBC # BLD: 3.68 M/UL — LOW (ref 3.8–5.2)
RBC # BLD: 3.7 M/UL — LOW (ref 3.8–5.2)
RBC # BLD: 3.74 M/UL — LOW (ref 3.8–5.2)
RBC # BLD: 3.75 M/UL — LOW (ref 3.8–5.2)
RBC # BLD: 3.76 M/UL — LOW (ref 3.8–5.2)
RBC # BLD: 3.82 M/UL — SIGNIFICANT CHANGE UP (ref 3.8–5.2)
RBC # BLD: 3.88 M/UL — SIGNIFICANT CHANGE UP (ref 3.8–5.2)
RBC # BLD: 3.93 M/UL — SIGNIFICANT CHANGE UP (ref 3.8–5.2)
RBC # BLD: 4.01 M/UL — SIGNIFICANT CHANGE UP (ref 3.8–5.2)
RBC # BLD: 4.02 M/UL — SIGNIFICANT CHANGE UP (ref 3.8–5.2)
RBC # BLD: 4.1 M/UL — SIGNIFICANT CHANGE UP (ref 3.8–5.2)
RBC # BLD: 4.12 M/UL — SIGNIFICANT CHANGE UP (ref 3.8–5.2)
RBC # BLD: 4.55 M/UL — SIGNIFICANT CHANGE UP (ref 3.8–5.2)
RBC # BLD: 4.62 M/UL — SIGNIFICANT CHANGE UP (ref 3.8–5.2)
RBC # BLD: 4.83 M/UL — SIGNIFICANT CHANGE UP (ref 3.8–5.2)
RBC # BLD: 4.88 M/UL — SIGNIFICANT CHANGE UP (ref 3.8–5.2)
RBC # BLD: 4.98 M/UL — SIGNIFICANT CHANGE UP (ref 3.8–5.2)
RBC # BLD: 5.21 M/UL — HIGH (ref 3.8–5.2)
RBC # FLD: 13.9 % — SIGNIFICANT CHANGE UP (ref 10.3–14.5)
RBC # FLD: 14 % — SIGNIFICANT CHANGE UP (ref 10.3–14.5)
RBC # FLD: 14.1 % — SIGNIFICANT CHANGE UP (ref 10.3–14.5)
RBC # FLD: 14.3 % — SIGNIFICANT CHANGE UP (ref 10.3–14.5)
RBC # FLD: 14.4 % — SIGNIFICANT CHANGE UP (ref 10.3–14.5)
RBC # FLD: 14.4 % — SIGNIFICANT CHANGE UP (ref 10.3–14.5)
RBC # FLD: 14.5 % — SIGNIFICANT CHANGE UP (ref 10.3–14.5)
RBC # FLD: 14.6 % — HIGH (ref 10.3–14.5)
RBC # FLD: 14.6 % — HIGH (ref 10.3–14.5)
RBC # FLD: 14.7 % — HIGH (ref 10.3–14.5)
RBC # FLD: 14.8 % — HIGH (ref 10.3–14.5)
RBC # FLD: 15 % — HIGH (ref 10.3–14.5)
RBC # FLD: 15.1 % — HIGH (ref 10.3–14.5)
RBC # FLD: 15.2 % — HIGH (ref 10.3–14.5)
RBC # FLD: 15.4 % — HIGH (ref 10.3–14.5)
RBC # FLD: 15.4 % — HIGH (ref 10.3–14.5)
RBC # FLD: 15.5 % — HIGH (ref 10.3–14.5)
RBC # FLD: 15.6 % — HIGH (ref 10.3–14.5)
RBC # FLD: 15.7 % — HIGH (ref 10.3–14.5)
RBC # FLD: 15.7 % — HIGH (ref 10.3–14.5)
RBC # FLD: 15.8 % — HIGH (ref 10.3–14.5)
RBC # FLD: 16.3 % — HIGH (ref 10.3–14.5)
RBC # FLD: 16.6 % — HIGH (ref 10.3–14.5)
RBC # FLD: 16.8 % — HIGH (ref 10.3–14.5)
RBC # FLD: 18.3 %
RBC # FLD: 20 % — HIGH (ref 10.3–14.5)
RBC # FLD: 20 % — HIGH (ref 10.3–14.5)
RBC # FLD: 20.1 % — HIGH (ref 10.3–14.5)
RBC # FLD: 20.1 % — HIGH (ref 10.3–14.5)
RBC # FLD: 20.5 % — HIGH (ref 10.3–14.5)
RBC # FLD: 20.6 % — HIGH (ref 10.3–14.5)
RBC # FLD: 20.7 % — HIGH (ref 10.3–14.5)
RBC # FLD: 20.8 % — HIGH (ref 10.3–14.5)
RBC # FLD: 21.2 % — HIGH (ref 10.3–14.5)
RBC # FLD: 21.4 % — HIGH (ref 10.3–14.5)
RBC # FLD: 21.8 % — HIGH (ref 10.3–14.5)
RBC # FLD: 23 % — HIGH (ref 10.3–14.5)
RBC # FLD: 23.4 % — HIGH (ref 10.3–14.5)
RBC # FLD: 23.6 % — HIGH (ref 10.3–14.5)
RBC # FLD: 23.7 % — HIGH (ref 10.3–14.5)
RBC # FLD: 23.9 % — HIGH (ref 10.3–14.5)
RBC BLD AUTO: ABNORMAL
RBC BLD AUTO: NORMAL — SIGNIFICANT CHANGE UP
RBC BLD AUTO: SIGNIFICANT CHANGE UP
RBC CASTS # UR COMP ASSIST: 7 /HPF — HIGH (ref 0–4)
RETICS #: 108.7 K/UL — SIGNIFICANT CHANGE UP (ref 25–125)
RETICS #: 45.4 K/UL — SIGNIFICANT CHANGE UP (ref 25–125)
RETICS #: 57.8 K/UL — SIGNIFICANT CHANGE UP (ref 25–125)
RETICS #: 83.4 K/UL — SIGNIFICANT CHANGE UP (ref 25–125)
RETICS/RBC NFR: 1.4 % — SIGNIFICANT CHANGE UP (ref 0.5–2.5)
RETICS/RBC NFR: 1.5 % — SIGNIFICANT CHANGE UP (ref 0.5–2.5)
RETICS/RBC NFR: 2.3 % — SIGNIFICANT CHANGE UP (ref 0.5–2.5)
RETICS/RBC NFR: 3.7 % — HIGH (ref 0.5–2.5)
RF+CCP IGG SER-IMP: NEGATIVE
RF+CCP IGG SER-IMP: NEGATIVE
RH IG SCN BLD-IMP: POSITIVE — SIGNIFICANT CHANGE UP
RHEUMATOID FACT SER QL: <10 IU/ML
RSV RNA SPEC QL NAA+PROBE: SIGNIFICANT CHANGE UP
RV+EV RNA SPEC QL NAA+PROBE: SIGNIFICANT CHANGE UP
SARS-COV-2 AB SERPL IA-ACNC: >250 U/ML
SARS-COV-2 RNA SPEC QL NAA+PROBE: SIGNIFICANT CHANGE UP
SCHISTOCYTES BLD QL AUTO: SLIGHT — SIGNIFICANT CHANGE UP
SODIUM SERPL-SCNC: 127 MMOL/L — LOW (ref 135–145)
SODIUM SERPL-SCNC: 128 MMOL/L — LOW (ref 135–145)
SODIUM SERPL-SCNC: 130 MMOL/L — LOW (ref 135–145)
SODIUM SERPL-SCNC: 131 MMOL/L
SODIUM SERPL-SCNC: 131 MMOL/L — LOW (ref 135–145)
SODIUM SERPL-SCNC: 131 MMOL/L — LOW (ref 135–145)
SODIUM SERPL-SCNC: 132 MMOL/L
SODIUM SERPL-SCNC: 132 MMOL/L
SODIUM SERPL-SCNC: 132 MMOL/L — LOW (ref 135–145)
SODIUM SERPL-SCNC: 133 MMOL/L
SODIUM SERPL-SCNC: 133 MMOL/L
SODIUM SERPL-SCNC: 133 MMOL/L — LOW (ref 135–145)
SODIUM SERPL-SCNC: 134 MMOL/L
SODIUM SERPL-SCNC: 134 MMOL/L — LOW (ref 135–145)
SODIUM SERPL-SCNC: 135 MMOL/L
SODIUM SERPL-SCNC: 135 MMOL/L — SIGNIFICANT CHANGE UP (ref 135–145)
SODIUM SERPL-SCNC: 136 MMOL/L
SODIUM SERPL-SCNC: 137 MMOL/L
SODIUM SERPL-SCNC: 137 MMOL/L
SODIUM SERPL-SCNC: 137 MMOL/L — SIGNIFICANT CHANGE UP (ref 135–145)
SODIUM SERPL-SCNC: 137 MMOL/L — SIGNIFICANT CHANGE UP (ref 135–145)
SODIUM SERPL-SCNC: 139 MMOL/L — SIGNIFICANT CHANGE UP (ref 135–145)
SP GR SPEC: 1.03 — SIGNIFICANT CHANGE UP (ref 1–1.05)
SPECIMEN SOURCE: SIGNIFICANT CHANGE UP
SURGICAL PATHOLOGY STUDY: SIGNIFICANT CHANGE UP
T4 FREE SERPL-MCNC: 0.8 NG/DL — LOW (ref 0.9–1.8)
T4 FREE SERPL-MCNC: 0.9 NG/DL — SIGNIFICANT CHANGE UP (ref 0.9–1.8)
T4 FREE SERPL-MCNC: 1 NG/DL
TIBC SERPL-MCNC: 182 UG/DL — LOW (ref 220–430)
TIBC SERPL-MCNC: 236 UG/DL
TIBC SERPL-MCNC: 278 UG/DL — SIGNIFICANT CHANGE UP (ref 220–430)
TRIGL SERPL-MCNC: 130 MG/DL — SIGNIFICANT CHANGE UP
TROPONIN T, HIGH SENSITIVITY RESULT: 16 NG/L — SIGNIFICANT CHANGE UP
TROPONIN T, HIGH SENSITIVITY RESULT: 18 NG/L — SIGNIFICANT CHANGE UP
TROPONIN T, HIGH SENSITIVITY RESULT: 20 NG/L — SIGNIFICANT CHANGE UP
TSH SERPL-ACNC: 42.8 UIU/ML
TSH SERPL-ACNC: 7.09 UIU/ML
TSH SERPL-ACNC: 7.2 UIU/ML
TSH SERPL-MCNC: 10.03 UIU/ML — HIGH (ref 0.27–4.2)
TSH SERPL-MCNC: 11.94 UIU/ML — HIGH (ref 0.27–4.2)
UIBC SERPL-MCNC: 147 UG/DL — SIGNIFICANT CHANGE UP (ref 110–370)
UIBC SERPL-MCNC: 202 UG/DL
UIBC SERPL-MCNC: 241 UG/DL — SIGNIFICANT CHANGE UP (ref 110–370)
UROBILINOGEN FLD QL: SIGNIFICANT CHANGE UP
VARIANT LYMPHS # BLD: 0.9 % — SIGNIFICANT CHANGE UP (ref 0–6)
VARIANT LYMPHS # BLD: 0.9 % — SIGNIFICANT CHANGE UP (ref 0–6)
VIT B12 SERPL-MCNC: 1071 PG/ML
VIT B12 SERPL-MCNC: 1812 PG/ML
VIT B12 SERPL-MCNC: 502 PG/ML — SIGNIFICANT CHANGE UP (ref 200–900)
VIT D25+D1,25 OH+D1,25 PNL SERPL-MCNC: 25.6 PG/ML — SIGNIFICANT CHANGE UP (ref 19.9–79.3)
WBC # BLD: 10.63 K/UL — HIGH (ref 3.8–10.5)
WBC # BLD: 11.65 K/UL — HIGH (ref 3.8–10.5)
WBC # BLD: 11.86 K/UL — HIGH (ref 3.8–10.5)
WBC # BLD: 12.33 K/UL — HIGH (ref 3.8–10.5)
WBC # BLD: 12.58 K/UL — HIGH (ref 3.8–10.5)
WBC # BLD: 12.88 K/UL — HIGH (ref 3.8–10.5)
WBC # BLD: 13.35 K/UL — HIGH (ref 3.8–10.5)
WBC # BLD: 13.72 K/UL — HIGH (ref 3.8–10.5)
WBC # BLD: 14.28 K/UL — HIGH (ref 3.8–10.5)
WBC # BLD: 14.36 K/UL — HIGH (ref 3.8–10.5)
WBC # BLD: 16.07 K/UL — HIGH (ref 3.8–10.5)
WBC # BLD: 16.76 K/UL — HIGH (ref 3.8–10.5)
WBC # BLD: 17.01 K/UL — HIGH (ref 3.8–10.5)
WBC # BLD: 2.63 K/UL — LOW (ref 3.8–10.5)
WBC # BLD: 3.42 K/UL — LOW (ref 3.8–10.5)
WBC # BLD: 3.59 K/UL — LOW (ref 3.8–10.5)
WBC # BLD: 3.61 K/UL — LOW (ref 3.8–10.5)
WBC # BLD: 3.71 K/UL — LOW (ref 3.8–10.5)
WBC # BLD: 3.74 K/UL — LOW (ref 3.8–10.5)
WBC # BLD: 3.78 K/UL — LOW (ref 3.8–10.5)
WBC # BLD: 3.84 K/UL — SIGNIFICANT CHANGE UP (ref 3.8–10.5)
WBC # BLD: 3.89 K/UL — SIGNIFICANT CHANGE UP (ref 3.8–10.5)
WBC # BLD: 4.03 K/UL — SIGNIFICANT CHANGE UP (ref 3.8–10.5)
WBC # BLD: 4.11 K/UL — SIGNIFICANT CHANGE UP (ref 3.8–10.5)
WBC # BLD: 4.21 K/UL — SIGNIFICANT CHANGE UP (ref 3.8–10.5)
WBC # BLD: 4.43 K/UL — SIGNIFICANT CHANGE UP (ref 3.8–10.5)
WBC # BLD: 4.46 K/UL — SIGNIFICANT CHANGE UP (ref 3.8–10.5)
WBC # BLD: 4.5 K/UL — SIGNIFICANT CHANGE UP (ref 3.8–10.5)
WBC # BLD: 4.69 K/UL — SIGNIFICANT CHANGE UP (ref 3.8–10.5)
WBC # BLD: 4.76 K/UL — SIGNIFICANT CHANGE UP (ref 3.8–10.5)
WBC # BLD: 4.76 K/UL — SIGNIFICANT CHANGE UP (ref 3.8–10.5)
WBC # BLD: 5.2 K/UL — SIGNIFICANT CHANGE UP (ref 3.8–10.5)
WBC # BLD: 5.27 K/UL — SIGNIFICANT CHANGE UP (ref 3.8–10.5)
WBC # BLD: 5.38 K/UL — SIGNIFICANT CHANGE UP (ref 3.8–10.5)
WBC # BLD: 5.39 K/UL — SIGNIFICANT CHANGE UP (ref 3.8–10.5)
WBC # BLD: 5.74 K/UL — SIGNIFICANT CHANGE UP (ref 3.8–10.5)
WBC # BLD: 6.09 K/UL — SIGNIFICANT CHANGE UP (ref 3.8–10.5)
WBC # BLD: 6.65 K/UL — SIGNIFICANT CHANGE UP (ref 3.8–10.5)
WBC # BLD: 6.65 K/UL — SIGNIFICANT CHANGE UP (ref 3.8–10.5)
WBC # BLD: 7.5 K/UL — SIGNIFICANT CHANGE UP (ref 3.8–10.5)
WBC # BLD: 7.89 K/UL — SIGNIFICANT CHANGE UP (ref 3.8–10.5)
WBC # BLD: 7.93 K/UL — SIGNIFICANT CHANGE UP (ref 3.8–10.5)
WBC # BLD: 7.96 K/UL — SIGNIFICANT CHANGE UP (ref 3.8–10.5)
WBC # BLD: 8.41 K/UL — SIGNIFICANT CHANGE UP (ref 3.8–10.5)
WBC # BLD: 8.61 K/UL — SIGNIFICANT CHANGE UP (ref 3.8–10.5)
WBC # BLD: 9.06 K/UL — SIGNIFICANT CHANGE UP (ref 3.8–10.5)
WBC # BLD: 9.5 K/UL — SIGNIFICANT CHANGE UP (ref 3.8–10.5)
WBC # FLD AUTO: 10.63 K/UL — HIGH (ref 3.8–10.5)
WBC # FLD AUTO: 11.65 K/UL — HIGH (ref 3.8–10.5)
WBC # FLD AUTO: 11.86 K/UL — HIGH (ref 3.8–10.5)
WBC # FLD AUTO: 12.33 K/UL — HIGH (ref 3.8–10.5)
WBC # FLD AUTO: 12.58 K/UL — HIGH (ref 3.8–10.5)
WBC # FLD AUTO: 12.88 K/UL — HIGH (ref 3.8–10.5)
WBC # FLD AUTO: 13.35 K/UL — HIGH (ref 3.8–10.5)
WBC # FLD AUTO: 13.72 K/UL — HIGH (ref 3.8–10.5)
WBC # FLD AUTO: 14.28 K/UL — HIGH (ref 3.8–10.5)
WBC # FLD AUTO: 14.36 K/UL — HIGH (ref 3.8–10.5)
WBC # FLD AUTO: 16.07 K/UL — HIGH (ref 3.8–10.5)
WBC # FLD AUTO: 16.76 K/UL — HIGH (ref 3.8–10.5)
WBC # FLD AUTO: 17.01 K/UL — HIGH (ref 3.8–10.5)
WBC # FLD AUTO: 2.63 K/UL — LOW (ref 3.8–10.5)
WBC # FLD AUTO: 3.42 K/UL — LOW (ref 3.8–10.5)
WBC # FLD AUTO: 3.59 K/UL — LOW (ref 3.8–10.5)
WBC # FLD AUTO: 3.61 K/UL — LOW (ref 3.8–10.5)
WBC # FLD AUTO: 3.71 K/UL — LOW (ref 3.8–10.5)
WBC # FLD AUTO: 3.74 K/UL — LOW (ref 3.8–10.5)
WBC # FLD AUTO: 3.78 K/UL — LOW (ref 3.8–10.5)
WBC # FLD AUTO: 3.84 K/UL — SIGNIFICANT CHANGE UP (ref 3.8–10.5)
WBC # FLD AUTO: 3.89 K/UL — SIGNIFICANT CHANGE UP (ref 3.8–10.5)
WBC # FLD AUTO: 4.03 K/UL — SIGNIFICANT CHANGE UP (ref 3.8–10.5)
WBC # FLD AUTO: 4.11 K/UL — SIGNIFICANT CHANGE UP (ref 3.8–10.5)
WBC # FLD AUTO: 4.21 K/UL — SIGNIFICANT CHANGE UP (ref 3.8–10.5)
WBC # FLD AUTO: 4.43 K/UL — SIGNIFICANT CHANGE UP (ref 3.8–10.5)
WBC # FLD AUTO: 4.46 K/UL — SIGNIFICANT CHANGE UP (ref 3.8–10.5)
WBC # FLD AUTO: 4.5 K/UL — SIGNIFICANT CHANGE UP (ref 3.8–10.5)
WBC # FLD AUTO: 4.69 K/UL — SIGNIFICANT CHANGE UP (ref 3.8–10.5)
WBC # FLD AUTO: 4.76 K/UL — SIGNIFICANT CHANGE UP (ref 3.8–10.5)
WBC # FLD AUTO: 4.76 K/UL — SIGNIFICANT CHANGE UP (ref 3.8–10.5)
WBC # FLD AUTO: 5.2 K/UL — SIGNIFICANT CHANGE UP (ref 3.8–10.5)
WBC # FLD AUTO: 5.27 K/UL — SIGNIFICANT CHANGE UP (ref 3.8–10.5)
WBC # FLD AUTO: 5.31 K/UL
WBC # FLD AUTO: 5.38 K/UL — SIGNIFICANT CHANGE UP (ref 3.8–10.5)
WBC # FLD AUTO: 5.39 K/UL — SIGNIFICANT CHANGE UP (ref 3.8–10.5)
WBC # FLD AUTO: 5.74 K/UL — SIGNIFICANT CHANGE UP (ref 3.8–10.5)
WBC # FLD AUTO: 6.09 K/UL — SIGNIFICANT CHANGE UP (ref 3.8–10.5)
WBC # FLD AUTO: 6.65 K/UL — SIGNIFICANT CHANGE UP (ref 3.8–10.5)
WBC # FLD AUTO: 6.65 K/UL — SIGNIFICANT CHANGE UP (ref 3.8–10.5)
WBC # FLD AUTO: 7.5 K/UL — SIGNIFICANT CHANGE UP (ref 3.8–10.5)
WBC # FLD AUTO: 7.89 K/UL — SIGNIFICANT CHANGE UP (ref 3.8–10.5)
WBC # FLD AUTO: 7.93 K/UL — SIGNIFICANT CHANGE UP (ref 3.8–10.5)
WBC # FLD AUTO: 7.96 K/UL — SIGNIFICANT CHANGE UP (ref 3.8–10.5)
WBC # FLD AUTO: 8.41 K/UL — SIGNIFICANT CHANGE UP (ref 3.8–10.5)
WBC # FLD AUTO: 8.61 K/UL — SIGNIFICANT CHANGE UP (ref 3.8–10.5)
WBC # FLD AUTO: 9.06 K/UL — SIGNIFICANT CHANGE UP (ref 3.8–10.5)
WBC # FLD AUTO: 9.5 K/UL — SIGNIFICANT CHANGE UP (ref 3.8–10.5)
WBC UR QL: 19 /HPF — HIGH (ref 0–5)

## 2021-01-01 PROCEDURE — 86902 BLOOD TYPE ANTIGEN DONOR EA: CPT

## 2021-01-01 PROCEDURE — 77014: CPT | Mod: 26

## 2021-01-01 PROCEDURE — 56606 BIOPSY OF VULVA/PERINEUM: CPT

## 2021-01-01 PROCEDURE — 99233 SBSQ HOSP IP/OBS HIGH 50: CPT | Mod: GC

## 2021-01-01 PROCEDURE — 82962 GLUCOSE BLOOD TEST: CPT

## 2021-01-01 PROCEDURE — 86922 COMPATIBILITY TEST ANTIGLOB: CPT

## 2021-01-01 PROCEDURE — G6002: CPT | Mod: 26

## 2021-01-01 PROCEDURE — 85027 COMPLETE CBC AUTOMATED: CPT

## 2021-01-01 PROCEDURE — 99233 SBSQ HOSP IP/OBS HIGH 50: CPT

## 2021-01-01 PROCEDURE — 99213 OFFICE O/P EST LOW 20 MIN: CPT

## 2021-01-01 PROCEDURE — 86901 BLOOD TYPING SEROLOGIC RH(D): CPT

## 2021-01-01 PROCEDURE — 99221 1ST HOSP IP/OBS SF/LOW 40: CPT

## 2021-01-01 PROCEDURE — 77427 RADIATION TX MANAGEMENT X5: CPT

## 2021-01-01 PROCEDURE — 73100 X-RAY EXAM OF WRIST: CPT | Mod: 26,LT

## 2021-01-01 PROCEDURE — 99220: CPT

## 2021-01-01 PROCEDURE — 78815 PET IMAGE W/CT SKULL-THIGH: CPT | Mod: 26,PI

## 2021-01-01 PROCEDURE — 86880 COOMBS TEST DIRECT: CPT

## 2021-01-01 PROCEDURE — 83550 IRON BINDING TEST: CPT

## 2021-01-01 PROCEDURE — U0003: CPT

## 2021-01-01 PROCEDURE — 11402 EXC TR-EXT B9+MARG 1.1-2 CM: CPT

## 2021-01-01 PROCEDURE — 99232 SBSQ HOSP IP/OBS MODERATE 35: CPT | Mod: GC

## 2021-01-01 PROCEDURE — A9552: CPT

## 2021-01-01 PROCEDURE — 74018 RADEX ABDOMEN 1 VIEW: CPT | Mod: 26

## 2021-01-01 PROCEDURE — 86860 RBC ANTIBODY ELUTION: CPT

## 2021-01-01 PROCEDURE — C1889: CPT

## 2021-01-01 PROCEDURE — 99215 OFFICE O/P EST HI 40 MIN: CPT

## 2021-01-01 PROCEDURE — 86850 RBC ANTIBODY SCREEN: CPT

## 2021-01-01 PROCEDURE — 80048 BASIC METABOLIC PNL TOTAL CA: CPT

## 2021-01-01 PROCEDURE — 99214 OFFICE O/P EST MOD 30 MIN: CPT

## 2021-01-01 PROCEDURE — 99222 1ST HOSP IP/OBS MODERATE 55: CPT | Mod: GC

## 2021-01-01 PROCEDURE — 96376 TX/PRO/DX INJ SAME DRUG ADON: CPT

## 2021-01-01 PROCEDURE — 99442: CPT

## 2021-01-01 PROCEDURE — 77301 RADIOTHERAPY DOSE PLAN IMRT: CPT | Mod: 26

## 2021-01-01 PROCEDURE — 85730 THROMBOPLASTIN TIME PARTIAL: CPT

## 2021-01-01 PROCEDURE — 46600 DIAGNOSTIC ANOSCOPY SPX: CPT

## 2021-01-01 PROCEDURE — 99213 OFFICE O/P EST LOW 20 MIN: CPT | Mod: 25

## 2021-01-01 PROCEDURE — 99232 SBSQ HOSP IP/OBS MODERATE 35: CPT

## 2021-01-01 PROCEDURE — 99223 1ST HOSP IP/OBS HIGH 75: CPT | Mod: GC

## 2021-01-01 PROCEDURE — 93000 ELECTROCARDIOGRAM COMPLETE: CPT

## 2021-01-01 PROCEDURE — 71250 CT THORAX DX C-: CPT | Mod: 26

## 2021-01-01 PROCEDURE — 32555 ASPIRATE PLEURA W/ IMAGING: CPT | Mod: LT

## 2021-01-01 PROCEDURE — 71045 X-RAY EXAM CHEST 1 VIEW: CPT | Mod: 26

## 2021-01-01 PROCEDURE — 80053 COMPREHEN METABOLIC PANEL: CPT

## 2021-01-01 PROCEDURE — 82728 ASSAY OF FERRITIN: CPT

## 2021-01-01 PROCEDURE — 83036 HEMOGLOBIN GLYCOSYLATED A1C: CPT

## 2021-01-01 PROCEDURE — 74177 CT ABD & PELVIS W/CONTRAST: CPT | Mod: 26

## 2021-01-01 PROCEDURE — 83615 LACTATE (LD) (LDH) ENZYME: CPT

## 2021-01-01 PROCEDURE — 76770 US EXAM ABDO BACK WALL COMP: CPT | Mod: 26

## 2021-01-01 PROCEDURE — 83540 ASSAY OF IRON: CPT

## 2021-01-01 PROCEDURE — 88312 SPECIAL STAINS GROUP 1: CPT | Mod: 26

## 2021-01-01 PROCEDURE — 86900 BLOOD TYPING SEROLOGIC ABO: CPT

## 2021-01-01 PROCEDURE — C9803: CPT

## 2021-01-01 PROCEDURE — 99215 OFFICE O/P EST HI 40 MIN: CPT | Mod: 25

## 2021-01-01 PROCEDURE — 36430 TRANSFUSION BLD/BLD COMPNT: CPT

## 2021-01-01 PROCEDURE — U0005: CPT

## 2021-01-01 PROCEDURE — 99072 ADDL SUPL MATRL&STAF TM PHE: CPT

## 2021-01-01 PROCEDURE — 72197 MRI PELVIS W/O & W/DYE: CPT | Mod: 26

## 2021-01-01 PROCEDURE — 77334 RADIATION TREATMENT AID(S): CPT | Mod: 26

## 2021-01-01 PROCEDURE — 99024 POSTOP FOLLOW-UP VISIT: CPT | Mod: GC

## 2021-01-01 PROCEDURE — 46255 REMOVE INT/EXT HEM 1 GROUP: CPT

## 2021-01-01 PROCEDURE — 88305 TISSUE EXAM BY PATHOLOGIST: CPT | Mod: 26

## 2021-01-01 PROCEDURE — 86870 RBC ANTIBODY IDENTIFICATION: CPT

## 2021-01-01 PROCEDURE — 99222 1ST HOSP IP/OBS MODERATE 55: CPT

## 2021-01-01 PROCEDURE — 88342 IMHCHEM/IMCYTCHM 1ST ANTB: CPT | Mod: 26

## 2021-01-01 PROCEDURE — 96375 TX/PRO/DX INJ NEW DRUG ADDON: CPT

## 2021-01-01 PROCEDURE — 99223 1ST HOSP IP/OBS HIGH 75: CPT

## 2021-01-01 PROCEDURE — 78815 PET IMAGE W/CT SKULL-THIGH: CPT

## 2021-01-01 PROCEDURE — 71275 CT ANGIOGRAPHY CHEST: CPT | Mod: 26,MA

## 2021-01-01 PROCEDURE — A9585: CPT

## 2021-01-01 PROCEDURE — 99204 OFFICE O/P NEW MOD 45 MIN: CPT

## 2021-01-01 PROCEDURE — 93010 ELECTROCARDIOGRAM REPORT: CPT

## 2021-01-01 PROCEDURE — 86077 PHYS BLOOD BANK SERV XMATCH: CPT

## 2021-01-01 PROCEDURE — 88341 IMHCHEM/IMCYTCHM EA ADD ANTB: CPT | Mod: 26

## 2021-01-01 PROCEDURE — 93306 TTE W/DOPPLER COMPLETE: CPT | Mod: 26

## 2021-01-01 PROCEDURE — 77338 DESIGN MLC DEVICE FOR IMRT: CPT | Mod: 26

## 2021-01-01 PROCEDURE — G0378: CPT

## 2021-01-01 PROCEDURE — 99285 EMERGENCY DEPT VISIT HI MDM: CPT | Mod: 25

## 2021-01-01 PROCEDURE — 93880 EXTRACRANIAL BILAT STUDY: CPT | Mod: 26

## 2021-01-01 PROCEDURE — 11104 PUNCH BX SKIN SINGLE LESION: CPT | Mod: 59

## 2021-01-01 PROCEDURE — 88173 CYTOPATH EVAL FNA REPORT: CPT | Mod: 26

## 2021-01-01 PROCEDURE — 46260 REMOVE IN/EX HEM GROUPS 2+: CPT

## 2021-01-01 PROCEDURE — 76604 US EXAM CHEST: CPT | Mod: 26,GC

## 2021-01-01 PROCEDURE — 99205 OFFICE O/P NEW HI 60 MIN: CPT

## 2021-01-01 PROCEDURE — 72197 MRI PELVIS W/O & W/DYE: CPT

## 2021-01-01 PROCEDURE — P9040: CPT

## 2021-01-01 PROCEDURE — 85025 COMPLETE CBC W/AUTO DIFF WBC: CPT

## 2021-01-01 PROCEDURE — 88112 CYTOPATH CELL ENHANCE TECH: CPT | Mod: 26

## 2021-01-01 PROCEDURE — 85045 AUTOMATED RETICULOCYTE COUNT: CPT

## 2021-01-01 PROCEDURE — 99284 EMERGENCY DEPT VISIT MOD MDM: CPT | Mod: 25

## 2021-01-01 PROCEDURE — 85610 PROTHROMBIN TIME: CPT

## 2021-01-01 PROCEDURE — 77263 THER RADIOLOGY TX PLNG CPLX: CPT

## 2021-01-01 PROCEDURE — 83010 ASSAY OF HAPTOGLOBIN QUANT: CPT

## 2021-01-01 PROCEDURE — 84100 ASSAY OF PHOSPHORUS: CPT

## 2021-01-01 PROCEDURE — 99217: CPT

## 2021-01-01 PROCEDURE — 96374 THER/PROPH/DIAG INJ IV PUSH: CPT

## 2021-01-01 PROCEDURE — 73100 X-RAY EXAM OF WRIST: CPT

## 2021-01-01 PROCEDURE — 99024 POSTOP FOLLOW-UP VISIT: CPT

## 2021-01-01 PROCEDURE — 11105 PUNCH BX SKIN EA SEP/ADDL: CPT

## 2021-01-01 PROCEDURE — 56605 BIOPSY OF VULVA/PERINEUM: CPT

## 2021-01-01 PROCEDURE — 99204 OFFICE O/P NEW MOD 45 MIN: CPT | Mod: 25

## 2021-01-01 PROCEDURE — 83735 ASSAY OF MAGNESIUM: CPT

## 2021-01-01 PROCEDURE — 32408 CORE NDL BX LNG/MED PERQ: CPT

## 2021-01-01 PROCEDURE — 77300 RADIATION THERAPY DOSE PLAN: CPT | Mod: 26

## 2021-01-01 PROCEDURE — 77470 SPECIAL RADIATION TREATMENT: CPT | Mod: 26

## 2021-01-01 PROCEDURE — 38222 DX BONE MARROW BX & ASPIR: CPT | Mod: LT

## 2021-01-01 PROCEDURE — G0463: CPT

## 2021-01-01 RX ORDER — INSULIN DEGLUDEC INJECTION 100 U/ML
100 INJECTION, SOLUTION SUBCUTANEOUS
Qty: 4 | Refills: 3 | Status: ACTIVE | COMMUNITY
Start: 1900-01-01 | End: 1900-01-01

## 2021-01-01 RX ORDER — SODIUM CHLORIDE 9 MG/ML
1000 INJECTION, SOLUTION INTRAVENOUS
Refills: 0 | Status: DISCONTINUED | OUTPATIENT
Start: 2021-01-01 | End: 2021-01-01

## 2021-01-01 RX ORDER — SIMVASTATIN 20 MG/1
1 TABLET, FILM COATED ORAL
Qty: 0 | Refills: 0 | DISCHARGE

## 2021-01-01 RX ORDER — MAGNESIUM SULFATE 500 MG/ML
2 VIAL (ML) INJECTION ONCE
Refills: 0 | Status: COMPLETED | OUTPATIENT
Start: 2021-01-01 | End: 2021-01-01

## 2021-01-01 RX ORDER — IBUPROFEN 200 MG
600 TABLET ORAL EVERY 6 HOURS
Refills: 0 | Status: DISCONTINUED | OUTPATIENT
Start: 2021-01-01 | End: 2021-01-01

## 2021-01-01 RX ORDER — ACETAMINOPHEN 500 MG
1000 TABLET ORAL ONCE
Refills: 0 | Status: COMPLETED | OUTPATIENT
Start: 2021-01-01 | End: 2021-01-01

## 2021-01-01 RX ORDER — INSULIN LISPRO 100/ML
26 VIAL (ML) SUBCUTANEOUS
Refills: 0 | Status: DISCONTINUED | OUTPATIENT
Start: 2021-01-01 | End: 2021-01-01

## 2021-01-01 RX ORDER — INSULIN LISPRO 100/ML
VIAL (ML) SUBCUTANEOUS
Refills: 0 | Status: DISCONTINUED | OUTPATIENT
Start: 2021-01-01 | End: 2022-01-01

## 2021-01-01 RX ORDER — GLUCAGON INJECTION, SOLUTION 0.5 MG/.1ML
1 INJECTION, SOLUTION SUBCUTANEOUS ONCE
Refills: 0 | Status: DISCONTINUED | OUTPATIENT
Start: 2021-01-01 | End: 2022-01-01

## 2021-01-01 RX ORDER — ONDANSETRON 8 MG/1
4 TABLET, FILM COATED ORAL ONCE
Refills: 0 | Status: COMPLETED | OUTPATIENT
Start: 2021-01-01 | End: 2021-01-01

## 2021-01-01 RX ORDER — ESCITALOPRAM OXALATE 10 MG/1
1.5 TABLET, FILM COATED ORAL
Qty: 0 | Refills: 0 | DISCHARGE

## 2021-01-01 RX ORDER — ACETAMINOPHEN 500 MG
650 TABLET ORAL ONCE
Refills: 0 | Status: COMPLETED | OUTPATIENT
Start: 2021-01-01 | End: 2021-01-01

## 2021-01-01 RX ORDER — PACLITAXEL 6 MG/ML
240 INJECTION, SOLUTION, CONCENTRATE INTRAVENOUS ONCE
Refills: 0 | Status: COMPLETED | OUTPATIENT
Start: 2021-01-01 | End: 2021-01-01

## 2021-01-01 RX ORDER — AMLODIPINE BESYLATE 2.5 MG/1
10 TABLET ORAL DAILY
Refills: 0 | Status: DISCONTINUED | OUTPATIENT
Start: 2021-01-01 | End: 2022-01-01

## 2021-01-01 RX ORDER — MORPHINE SULFATE 50 MG/1
2 CAPSULE, EXTENDED RELEASE ORAL EVERY 6 HOURS
Refills: 0 | Status: DISCONTINUED | OUTPATIENT
Start: 2021-01-01 | End: 2021-01-01

## 2021-01-01 RX ORDER — ERGOCALCIFEROL 1.25 MG/1
1 CAPSULE ORAL
Qty: 0 | Refills: 0 | DISCHARGE

## 2021-01-01 RX ORDER — ESCITALOPRAM OXALATE 10 MG/1
1 TABLET, FILM COATED ORAL
Qty: 0 | Refills: 0 | DISCHARGE

## 2021-01-01 RX ORDER — OXYCODONE AND ACETAMINOPHEN 5; 325 MG/1; MG/1
5-325 TABLET ORAL
Qty: 28 | Refills: 0 | Status: DISCONTINUED | COMMUNITY
Start: 2021-01-01 | End: 2021-01-01

## 2021-01-01 RX ORDER — INSULIN GLARGINE 100 [IU]/ML
56 INJECTION, SOLUTION SUBCUTANEOUS AT BEDTIME
Refills: 0 | Status: DISCONTINUED | OUTPATIENT
Start: 2021-01-01 | End: 2021-01-01

## 2021-01-01 RX ORDER — LEVOTHYROXINE SODIUM 0.03 MG/1
25 TABLET ORAL DAILY
Qty: 30 | Refills: 1 | Status: DISCONTINUED | COMMUNITY
Start: 2021-01-01 | End: 2021-01-01

## 2021-01-01 RX ORDER — LIRAGLUTIDE 6 MG/ML
1.8 INJECTION SUBCUTANEOUS
Qty: 0 | Refills: 0 | DISCHARGE

## 2021-01-01 RX ORDER — ACETAMINOPHEN 500 MG
650 TABLET ORAL EVERY 6 HOURS
Refills: 0 | Status: DISCONTINUED | OUTPATIENT
Start: 2021-01-01 | End: 2021-01-01

## 2021-01-01 RX ORDER — FUROSEMIDE 40 MG
20 TABLET ORAL ONCE
Refills: 0 | Status: COMPLETED | OUTPATIENT
Start: 2021-01-01 | End: 2021-01-01

## 2021-01-01 RX ORDER — PROCHLORPERAZINE MALEATE 10 MG/1
10 TABLET ORAL
Qty: 30 | Refills: 2 | Status: ACTIVE | COMMUNITY
Start: 2021-01-01 | End: 1900-01-01

## 2021-01-01 RX ORDER — OXYCODONE HYDROCHLORIDE 5 MG/1
5 TABLET ORAL ONCE
Refills: 0 | Status: DISCONTINUED | OUTPATIENT
Start: 2021-01-01 | End: 2021-01-01

## 2021-01-01 RX ORDER — LEVOTHYROXINE SODIUM 125 MCG
1 TABLET ORAL
Qty: 0 | Refills: 0 | DISCHARGE

## 2021-01-01 RX ORDER — ESCITALOPRAM OXALATE 20 MG/1
20 TABLET ORAL DAILY
Refills: 0 | Status: ACTIVE | COMMUNITY

## 2021-01-01 RX ORDER — PROCHLORPERAZINE MALEATE 5 MG
1 TABLET ORAL
Qty: 0 | Refills: 0 | DISCHARGE

## 2021-01-01 RX ORDER — TRIAMTERENE/HYDROCHLOROTHIAZID 75 MG-50MG
1 TABLET ORAL
Qty: 0 | Refills: 0 | DISCHARGE

## 2021-01-01 RX ORDER — HYDROXYZINE HYDROCHLORIDE 25 MG/1
25 TABLET ORAL
Qty: 30 | Refills: 0 | Status: DISCONTINUED | COMMUNITY
Start: 2020-09-08 | End: 2021-01-01

## 2021-01-01 RX ORDER — INSULIN GLARGINE 100 [IU]/ML
40 INJECTION, SOLUTION SUBCUTANEOUS AT BEDTIME
Refills: 0 | Status: DISCONTINUED | OUTPATIENT
Start: 2021-01-01 | End: 2021-01-01

## 2021-01-01 RX ORDER — ONDANSETRON 8 MG/1
4 TABLET, FILM COATED ORAL THREE TIMES A DAY
Refills: 0 | Status: DISCONTINUED | OUTPATIENT
Start: 2021-01-01 | End: 2021-01-01

## 2021-01-01 RX ORDER — DEXTROSE 50 % IN WATER 50 %
15 SYRINGE (ML) INTRAVENOUS ONCE
Refills: 0 | Status: DISCONTINUED | OUTPATIENT
Start: 2021-01-01 | End: 2021-01-01

## 2021-01-01 RX ORDER — FUROSEMIDE 40 MG
40 TABLET ORAL DAILY
Refills: 0 | Status: DISCONTINUED | OUTPATIENT
Start: 2021-01-01 | End: 2022-01-01

## 2021-01-01 RX ORDER — INSULIN DEGLUDEC 100 U/ML
90 INJECTION, SOLUTION SUBCUTANEOUS
Qty: 0 | Refills: 0 | DISCHARGE

## 2021-01-01 RX ORDER — INSULIN DEGLUDEC 100 U/ML
80 INJECTION, SOLUTION SUBCUTANEOUS
Qty: 0 | Refills: 0 | DISCHARGE

## 2021-01-01 RX ORDER — INSULIN LISPRO 100/ML
22 VIAL (ML) SUBCUTANEOUS
Refills: 0 | Status: DISCONTINUED | OUTPATIENT
Start: 2021-01-01 | End: 2021-01-01

## 2021-01-01 RX ORDER — INSULIN GLARGINE 100 [IU]/ML
34 INJECTION, SOLUTION SUBCUTANEOUS AT BEDTIME
Refills: 0 | Status: DISCONTINUED | OUTPATIENT
Start: 2021-01-01 | End: 2021-01-01

## 2021-01-01 RX ORDER — PREDNISONE 5 MG/1
5 TABLET ORAL
Qty: 120 | Refills: 1 | Status: ACTIVE | COMMUNITY
Start: 2021-01-01 | End: 1900-01-01

## 2021-01-01 RX ORDER — ALBUTEROL SULFATE 90 UG/1
108 (90 BASE) AEROSOL, METERED RESPIRATORY (INHALATION)
Qty: 36 | Refills: 0 | Status: DISCONTINUED | COMMUNITY
Start: 2017-08-02 | End: 2021-01-01

## 2021-01-01 RX ORDER — INSULIN LISPRO 100/ML
24 VIAL (ML) SUBCUTANEOUS
Refills: 0 | Status: DISCONTINUED | OUTPATIENT
Start: 2021-01-01 | End: 2021-01-01

## 2021-01-01 RX ORDER — POLYETHYLENE GLYCOL 3350 17 G/17G
17 POWDER, FOR SOLUTION ORAL DAILY
Refills: 0 | Status: DISCONTINUED | OUTPATIENT
Start: 2021-01-01 | End: 2022-01-01

## 2021-01-01 RX ORDER — FOSAPREPITANT DIMEGLUMINE 150 MG/5ML
150 INJECTION, POWDER, LYOPHILIZED, FOR SOLUTION INTRAVENOUS ONCE
Refills: 0 | Status: COMPLETED | OUTPATIENT
Start: 2021-01-01 | End: 2021-01-01

## 2021-01-01 RX ORDER — DEXTROSE 50 % IN WATER 50 %
25 SYRINGE (ML) INTRAVENOUS ONCE
Refills: 0 | Status: DISCONTINUED | OUTPATIENT
Start: 2021-01-01 | End: 2022-01-01

## 2021-01-01 RX ORDER — METFORMIN ER 500 MG 500 MG/1
500 TABLET ORAL
Qty: 180 | Refills: 0 | Status: DISCONTINUED | COMMUNITY
Start: 2020-02-27 | End: 2021-01-01

## 2021-01-01 RX ORDER — PREDNISONE 10 MG/1
10 TABLET ORAL
Qty: 180 | Refills: 1 | Status: ACTIVE | COMMUNITY
Start: 1900-01-01 | End: 1900-01-01

## 2021-01-01 RX ORDER — SULFAMETHOXAZOLE AND TRIMETHOPRIM 400; 80 MG/1; MG/1
400-80 TABLET ORAL DAILY
Qty: 7 | Refills: 7 | Status: ACTIVE | COMMUNITY
Start: 2021-01-01 | End: 1900-01-01

## 2021-01-01 RX ORDER — INSULIN GLARGINE 100 [IU]/ML
54 INJECTION, SOLUTION SUBCUTANEOUS AT BEDTIME
Refills: 0 | Status: DISCONTINUED | OUTPATIENT
Start: 2021-01-01 | End: 2021-01-01

## 2021-01-01 RX ORDER — SODIUM CHLORIDE 9 MG/ML
1000 INJECTION INTRAMUSCULAR; INTRAVENOUS; SUBCUTANEOUS
Refills: 0 | Status: DISCONTINUED | OUTPATIENT
Start: 2021-01-01 | End: 2021-01-01

## 2021-01-01 RX ORDER — MAGNESIUM SULFATE 500 MG/ML
1 VIAL (ML) INJECTION ONCE
Refills: 0 | Status: COMPLETED | OUTPATIENT
Start: 2021-01-01 | End: 2021-01-01

## 2021-01-01 RX ORDER — METHYLPREDNISOLONE 4 MG/1
4 TABLET ORAL
Qty: 21 | Refills: 0 | Status: DISCONTINUED | COMMUNITY
Start: 2020-05-18 | End: 2021-01-01

## 2021-01-01 RX ORDER — INSULIN LISPRO 100/ML
VIAL (ML) SUBCUTANEOUS
Refills: 0 | Status: DISCONTINUED | OUTPATIENT
Start: 2021-01-01 | End: 2021-01-01

## 2021-01-01 RX ORDER — LEVOTHYROXINE SODIUM 125 MCG
112 TABLET ORAL DAILY
Refills: 0 | Status: DISCONTINUED | OUTPATIENT
Start: 2021-01-01 | End: 2021-01-01

## 2021-01-01 RX ORDER — INSULIN GLARGINE 100 [IU]/ML
60 INJECTION, SOLUTION SUBCUTANEOUS AT BEDTIME
Refills: 0 | Status: DISCONTINUED | OUTPATIENT
Start: 2021-01-01 | End: 2021-01-01

## 2021-01-01 RX ORDER — DIPHENHYDRAMINE HCL 50 MG
25 CAPSULE ORAL ONCE
Refills: 0 | Status: COMPLETED | OUTPATIENT
Start: 2021-01-01 | End: 2021-01-01

## 2021-01-01 RX ORDER — MORPHINE SULFATE 50 MG/1
3 CAPSULE, EXTENDED RELEASE ORAL EVERY 6 HOURS
Refills: 0 | Status: DISCONTINUED | OUTPATIENT
Start: 2021-01-01 | End: 2021-01-01

## 2021-01-01 RX ORDER — INSULIN LISPRO 100/ML
VIAL (ML) SUBCUTANEOUS AT BEDTIME
Refills: 0 | Status: DISCONTINUED | OUTPATIENT
Start: 2021-01-01 | End: 2022-01-01

## 2021-01-01 RX ORDER — ONDANSETRON 8 MG/1
4 TABLET, FILM COATED ORAL EVERY 24 HOURS
Refills: 0 | Status: DISCONTINUED | OUTPATIENT
Start: 2021-01-01 | End: 2021-01-01

## 2021-01-01 RX ORDER — DIPHENHYDRAMINE HCL 50 MG
50 CAPSULE ORAL ONCE
Refills: 0 | Status: COMPLETED | OUTPATIENT
Start: 2021-01-01 | End: 2021-01-01

## 2021-01-01 RX ORDER — MORPHINE SULFATE 50 MG/1
2 CAPSULE, EXTENDED RELEASE ORAL EVERY 4 HOURS
Refills: 0 | Status: DISCONTINUED | OUTPATIENT
Start: 2021-01-01 | End: 2021-01-01

## 2021-01-01 RX ORDER — ESCITALOPRAM OXALATE 10 MG/1
10 TABLET ORAL
Qty: 30 | Refills: 0 | Status: DISCONTINUED | COMMUNITY
Start: 2019-11-10 | End: 2021-01-01

## 2021-01-01 RX ORDER — LANOLIN ALCOHOL/MO/W.PET/CERES
3 CREAM (GRAM) TOPICAL AT BEDTIME
Refills: 0 | Status: DISCONTINUED | OUTPATIENT
Start: 2021-01-01 | End: 2022-01-01

## 2021-01-01 RX ORDER — GABAPENTIN 400 MG/1
1 CAPSULE ORAL
Qty: 0 | Refills: 0 | DISCHARGE

## 2021-01-01 RX ORDER — INSULIN ASPART 100 [IU]/ML
1 INJECTION, SOLUTION SUBCUTANEOUS
Qty: 0 | Refills: 0 | DISCHARGE

## 2021-01-01 RX ORDER — CHROMIUM 200 MCG
800 TABLET ORAL DAILY
Qty: 30 | Refills: 1 | Status: ACTIVE | COMMUNITY
Start: 2021-01-01 | End: 1900-01-01

## 2021-01-01 RX ORDER — OMEPRAZOLE 10 MG/1
1 CAPSULE, DELAYED RELEASE ORAL
Qty: 0 | Refills: 0 | DISCHARGE

## 2021-01-01 RX ORDER — CHOLECALCIFEROL (VITAMIN D3) 125 MCG
2000 CAPSULE ORAL DAILY
Refills: 0 | Status: DISCONTINUED | OUTPATIENT
Start: 2021-01-01 | End: 2022-01-01

## 2021-01-01 RX ORDER — INSULIN LISPRO 100/ML
12 VIAL (ML) SUBCUTANEOUS ONCE
Refills: 0 | Status: COMPLETED | OUTPATIENT
Start: 2021-01-01 | End: 2021-01-01

## 2021-01-01 RX ORDER — ESCITALOPRAM OXALATE 10 MG/1
20 TABLET, FILM COATED ORAL DAILY
Refills: 0 | Status: DISCONTINUED | OUTPATIENT
Start: 2021-01-01 | End: 2021-01-01

## 2021-01-01 RX ORDER — DEXTROSE 50 % IN WATER 50 %
12.5 SYRINGE (ML) INTRAVENOUS ONCE
Refills: 0 | Status: DISCONTINUED | OUTPATIENT
Start: 2021-01-01 | End: 2022-01-01

## 2021-01-01 RX ORDER — VANCOMYCIN HCL 1 G
1000 VIAL (EA) INTRAVENOUS ONCE
Refills: 0 | Status: COMPLETED | OUTPATIENT
Start: 2021-01-01 | End: 2021-01-01

## 2021-01-01 RX ORDER — INSULIN GLARGINE 100 [IU]/ML
44 INJECTION, SOLUTION SUBCUTANEOUS ONCE
Refills: 0 | Status: COMPLETED | OUTPATIENT
Start: 2021-01-01 | End: 2021-01-01

## 2021-01-01 RX ORDER — TRIAMTERENE 100 MG/1
37.5 CAPSULE ORAL
Qty: 0 | Refills: 0 | DISCHARGE

## 2021-01-01 RX ORDER — CARBOPLATIN 50 MG
480 VIAL (EA) INTRAVENOUS ONCE
Refills: 0 | Status: COMPLETED | OUTPATIENT
Start: 2021-01-01 | End: 2021-01-01

## 2021-01-01 RX ORDER — INSULIN GLARGINE 100 [IU]/ML
68 INJECTION, SOLUTION SUBCUTANEOUS AT BEDTIME
Refills: 0 | Status: DISCONTINUED | OUTPATIENT
Start: 2021-01-01 | End: 2021-01-01

## 2021-01-01 RX ORDER — SODIUM HYPOCHLORITE 1.25 MG/ML
0.12 SOLUTION TOPICAL
Qty: 1 | Refills: 1 | Status: DISCONTINUED | COMMUNITY
Start: 2020-11-03 | End: 2021-01-01

## 2021-01-01 RX ORDER — GABAPENTIN 400 MG/1
400 CAPSULE ORAL THREE TIMES A DAY
Refills: 0 | Status: DISCONTINUED | OUTPATIENT
Start: 2021-01-01 | End: 2021-01-01

## 2021-01-01 RX ORDER — MORPHINE SULFATE 50 MG/1
3 CAPSULE, EXTENDED RELEASE ORAL EVERY 4 HOURS
Refills: 0 | Status: DISCONTINUED | OUTPATIENT
Start: 2021-01-01 | End: 2022-01-01

## 2021-01-01 RX ORDER — BUDESONIDE AND FORMOTEROL FUMARATE DIHYDRATE 160; 4.5 UG/1; UG/1
2 AEROSOL RESPIRATORY (INHALATION)
Refills: 0 | Status: DISCONTINUED | OUTPATIENT
Start: 2021-01-01 | End: 2022-01-01

## 2021-01-01 RX ORDER — SODIUM CHLORIDE 9 MG/ML
1000 INJECTION, SOLUTION INTRAVENOUS
Refills: 0 | Status: DISCONTINUED | OUTPATIENT
Start: 2021-01-01 | End: 2022-01-01

## 2021-01-01 RX ORDER — LEVOTHYROXINE SODIUM 112 UG/1
112 CAPSULE ORAL DAILY
Qty: 30 | Refills: 1 | Status: DISCONTINUED | COMMUNITY
Start: 2021-01-01 | End: 2021-01-01

## 2021-01-01 RX ORDER — OXYCODONE HYDROCHLORIDE 5 MG/1
5 CAPSULE ORAL
Qty: 25 | Refills: 0 | Status: DISCONTINUED | COMMUNITY
Start: 2020-09-23 | End: 2021-01-01

## 2021-01-01 RX ORDER — INSULIN LISPRO 100/ML
VIAL (ML) SUBCUTANEOUS AT BEDTIME
Refills: 0 | Status: DISCONTINUED | OUTPATIENT
Start: 2021-01-01 | End: 2021-01-01

## 2021-01-01 RX ORDER — OXYCODONE 5 MG/1
5 TABLET ORAL
Qty: 60 | Refills: 0 | Status: DISCONTINUED | COMMUNITY
Start: 2021-01-01 | End: 2021-01-01

## 2021-01-01 RX ORDER — PALONOSETRON HYDROCHLORIDE 0.25 MG/5ML
0.25 INJECTION, SOLUTION INTRAVENOUS ONCE
Refills: 0 | Status: COMPLETED | OUTPATIENT
Start: 2021-01-01 | End: 2021-01-01

## 2021-01-01 RX ORDER — INSULIN DEGLUDEC 100 U/ML
70 INJECTION, SOLUTION SUBCUTANEOUS
Qty: 0 | Refills: 0 | DISCHARGE

## 2021-01-01 RX ORDER — LACTULOSE 10 G/15ML
20 SOLUTION ORAL DAILY
Refills: 0 | Status: DISCONTINUED | OUTPATIENT
Start: 2021-01-01 | End: 2021-01-01

## 2021-01-01 RX ORDER — SODIUM,POTASSIUM PHOSPHATES 278-250MG
1 POWDER IN PACKET (EA) ORAL ONCE
Refills: 0 | Status: COMPLETED | OUTPATIENT
Start: 2021-01-01 | End: 2021-01-01

## 2021-01-01 RX ORDER — TRAMADOL HYDROCHLORIDE 50 MG/1
25 TABLET ORAL ONCE
Refills: 0 | Status: DISCONTINUED | OUTPATIENT
Start: 2021-01-01 | End: 2021-01-01

## 2021-01-01 RX ORDER — INSULIN DEGLUDEC 100 U/ML
60 INJECTION, SOLUTION SUBCUTANEOUS
Qty: 0 | Refills: 0 | DISCHARGE

## 2021-01-01 RX ORDER — INSULIN ASPART 100 [IU]/ML
0 INJECTION, SOLUTION SUBCUTANEOUS
Qty: 0 | Refills: 0 | DISCHARGE

## 2021-01-01 RX ORDER — INSULIN GLARGINE 100 [IU]/ML
38 INJECTION, SOLUTION SUBCUTANEOUS AT BEDTIME
Refills: 0 | Status: DISCONTINUED | OUTPATIENT
Start: 2021-01-01 | End: 2021-01-01

## 2021-01-01 RX ORDER — OSELTAMIVIR PHOSPHATE 75 MG/1
75 CAPSULE ORAL
Qty: 10 | Refills: 0 | Status: DISCONTINUED | COMMUNITY
Start: 2020-01-24 | End: 2021-01-01

## 2021-01-01 RX ORDER — POLYETHYLENE GLYCOL 3350 17 G/17G
17 POWDER, FOR SOLUTION ORAL DAILY
Refills: 0 | Status: DISCONTINUED | OUTPATIENT
Start: 2021-01-01 | End: 2021-01-01

## 2021-01-01 RX ORDER — OXYCODONE 10 MG/1
10 TABLET ORAL EVERY 6 HOURS
Qty: 60 | Refills: 0 | Status: DISCONTINUED | COMMUNITY
Start: 2021-01-01 | End: 2021-01-01

## 2021-01-01 RX ORDER — INSULIN GLARGINE 100 [IU]/ML
54 INJECTION, SOLUTION SUBCUTANEOUS ONCE
Refills: 0 | Status: COMPLETED | OUTPATIENT
Start: 2021-01-01 | End: 2021-01-01

## 2021-01-01 RX ORDER — CALCITONIN SALMON 200 [IU]/ML
310 INJECTION, SOLUTION INTRAMUSCULAR EVERY 12 HOURS
Refills: 0 | Status: COMPLETED | OUTPATIENT
Start: 2021-01-01 | End: 2021-01-01

## 2021-01-01 RX ORDER — FAMOTIDINE 10 MG/ML
20 INJECTION INTRAVENOUS ONCE
Refills: 0 | Status: COMPLETED | OUTPATIENT
Start: 2021-01-01 | End: 2021-01-01

## 2021-01-01 RX ORDER — INSULIN LISPRO 100/ML
1 VIAL (ML) SUBCUTANEOUS ONCE
Refills: 0 | Status: COMPLETED | OUTPATIENT
Start: 2021-01-01 | End: 2021-01-01

## 2021-01-01 RX ORDER — INSULIN GLARGINE 100 [IU]/ML
44 INJECTION, SOLUTION SUBCUTANEOUS AT BEDTIME
Refills: 0 | Status: DISCONTINUED | OUTPATIENT
Start: 2021-01-01 | End: 2022-01-01

## 2021-01-01 RX ORDER — CALCITONIN SALMON 200 [IU]/ML
310 INJECTION, SOLUTION INTRAMUSCULAR EVERY 12 HOURS
Refills: 0 | Status: DISCONTINUED | OUTPATIENT
Start: 2021-01-01 | End: 2021-01-01

## 2021-01-01 RX ORDER — LIDOCAINE AND PRILOCAINE CREAM 25; 25 MG/G; MG/G
1 CREAM TOPICAL DAILY
Refills: 0 | Status: DISCONTINUED | OUTPATIENT
Start: 2021-01-01 | End: 2022-01-01

## 2021-01-01 RX ORDER — GABAPENTIN 400 MG/1
400 CAPSULE ORAL
Qty: 270 | Refills: 3 | Status: ACTIVE | COMMUNITY
Start: 2021-01-01

## 2021-01-01 RX ORDER — SENNA PLUS 8.6 MG/1
2 TABLET ORAL AT BEDTIME
Refills: 0 | Status: DISCONTINUED | OUTPATIENT
Start: 2021-01-01 | End: 2022-01-01

## 2021-01-01 RX ORDER — DEXTROSE 50 % IN WATER 50 %
25 SYRINGE (ML) INTRAVENOUS ONCE
Refills: 0 | Status: DISCONTINUED | OUTPATIENT
Start: 2021-01-01 | End: 2021-01-01

## 2021-01-01 RX ORDER — SODIUM HYPOCHLORITE 0.125 %
1 SOLUTION, NON-ORAL MISCELLANEOUS DAILY
Refills: 0 | Status: DISCONTINUED | OUTPATIENT
Start: 2021-01-01 | End: 2022-01-01

## 2021-01-01 RX ORDER — PAMIDRONATE DISODIUM 9 MG/ML
60 INJECTION, SOLUTION INTRAVENOUS ONCE
Refills: 0 | Status: DISCONTINUED | OUTPATIENT
Start: 2021-01-01 | End: 2021-01-01

## 2021-01-01 RX ORDER — DEXTROSE 50 % IN WATER 50 %
15 SYRINGE (ML) INTRAVENOUS ONCE
Refills: 0 | Status: DISCONTINUED | OUTPATIENT
Start: 2021-01-01 | End: 2022-01-01

## 2021-01-01 RX ORDER — ESCITALOPRAM OXALATE 10 MG/1
30 TABLET, FILM COATED ORAL
Qty: 0 | Refills: 0 | DISCHARGE

## 2021-01-01 RX ORDER — SODIUM ZIRCONIUM CYCLOSILICATE 10 G/10G
10 POWDER, FOR SUSPENSION ORAL ONCE
Refills: 0 | Status: COMPLETED | OUTPATIENT
Start: 2021-01-01 | End: 2021-01-01

## 2021-01-01 RX ORDER — INSULIN LISPRO 100/ML
16 VIAL (ML) SUBCUTANEOUS ONCE
Refills: 0 | Status: COMPLETED | OUTPATIENT
Start: 2021-01-01 | End: 2021-01-01

## 2021-01-01 RX ORDER — ZOLEDRONIC ACID 5 MG/100ML
4 INJECTION, SOLUTION INTRAVENOUS ONCE
Refills: 0 | Status: COMPLETED | OUTPATIENT
Start: 2021-01-01 | End: 2021-01-01

## 2021-01-01 RX ORDER — INSULIN LISPRO 100/ML
20 VIAL (ML) SUBCUTANEOUS
Refills: 0 | Status: DISCONTINUED | OUTPATIENT
Start: 2021-01-01 | End: 2021-01-01

## 2021-01-01 RX ORDER — PANTOPRAZOLE SODIUM 20 MG/1
40 TABLET, DELAYED RELEASE ORAL
Refills: 0 | Status: DISCONTINUED | OUTPATIENT
Start: 2021-01-01 | End: 2022-01-01

## 2021-01-01 RX ORDER — DEXAMETHASONE 0.5 MG/5ML
20 ELIXIR ORAL ONCE
Refills: 0 | Status: COMPLETED | OUTPATIENT
Start: 2021-01-01 | End: 2021-01-01

## 2021-01-01 RX ORDER — CETIRIZINE HYDROCHLORIDE 10 MG/1
10 TABLET, COATED ORAL DAILY
Refills: 0 | Status: ACTIVE | COMMUNITY
Start: 2017-09-25

## 2021-01-01 RX ORDER — LACTULOSE 10 G/15ML
10 SOLUTION ORAL EVERY 6 HOURS
Qty: 237 | Refills: 1 | Status: ACTIVE | COMMUNITY
Start: 2021-01-01 | End: 1900-01-01

## 2021-01-01 RX ORDER — LEVOTHYROXINE SODIUM 125 MCG
175 TABLET ORAL DAILY
Refills: 0 | Status: DISCONTINUED | OUTPATIENT
Start: 2021-01-01 | End: 2021-01-01

## 2021-01-01 RX ORDER — METHOCARBAMOL 750 MG/1
750 TABLET, FILM COATED ORAL
Qty: 20 | Refills: 0 | Status: DISCONTINUED | COMMUNITY
Start: 2020-05-07 | End: 2021-01-01

## 2021-01-01 RX ORDER — IBUPROFEN 200 MG
1 TABLET ORAL
Qty: 0 | Refills: 0 | DISCHARGE

## 2021-01-01 RX ORDER — LIRAGLUTIDE 6 MG/ML
18 INJECTION SUBCUTANEOUS DAILY
Qty: 1 | Refills: 0 | Status: ACTIVE | COMMUNITY
Start: 1900-01-01 | End: 1900-01-01

## 2021-01-01 RX ORDER — LEVOTHYROXINE SODIUM 125 MCG
125 TABLET ORAL DAILY
Refills: 0 | Status: DISCONTINUED | OUTPATIENT
Start: 2021-01-01 | End: 2022-01-01

## 2021-01-01 RX ORDER — DESOXIMETASONE 0.5 MG/G
0.05 CREAM TOPICAL TWICE DAILY
Qty: 1 | Refills: 2 | Status: DISCONTINUED | COMMUNITY
Start: 2020-08-18 | End: 2021-01-01

## 2021-01-01 RX ORDER — METOCLOPRAMIDE HCL 10 MG
10 TABLET ORAL EVERY 6 HOURS
Refills: 0 | Status: DISCONTINUED | OUTPATIENT
Start: 2021-01-01 | End: 2022-01-01

## 2021-01-01 RX ORDER — LIRAGLUTIDE 6 MG/ML
18 INJECTION SUBCUTANEOUS DAILY
Qty: 27 | Refills: 0 | Status: DISCONTINUED | COMMUNITY
Start: 2019-10-22 | End: 2021-01-01

## 2021-01-01 RX ORDER — INSULIN GLARGINE 100 [IU]/ML
35 INJECTION, SOLUTION SUBCUTANEOUS AT BEDTIME
Refills: 0 | Status: DISCONTINUED | OUTPATIENT
Start: 2021-01-01 | End: 2021-01-01

## 2021-01-01 RX ORDER — TRAMADOL HYDROCHLORIDE AND ACETAMINOPHEN 37.5; 325 MG/1; MG/1
37.5-325 TABLET, FILM COATED ORAL
Qty: 15 | Refills: 0 | Status: DISCONTINUED | COMMUNITY
Start: 2020-05-07 | End: 2021-01-01

## 2021-01-01 RX ORDER — INSULIN LISPRO 100/ML
23 VIAL (ML) SUBCUTANEOUS
Refills: 0 | Status: DISCONTINUED | OUTPATIENT
Start: 2021-01-01 | End: 2022-01-01

## 2021-01-01 RX ORDER — LIDOCAINE AND PRILOCAINE 25; 25 MG/G; MG/G
2.5-2.5 CREAM TOPICAL
Qty: 1 | Refills: 0 | Status: DISCONTINUED | COMMUNITY
Start: 2020-08-18 | End: 2021-01-01

## 2021-01-01 RX ORDER — LIDOCAINE 5 G/100G
5 OINTMENT TOPICAL
Qty: 35 | Refills: 0 | Status: DISCONTINUED | COMMUNITY
Start: 2020-05-07 | End: 2021-01-01

## 2021-01-01 RX ORDER — OXYCODONE AND ACETAMINOPHEN 5; 325 MG/1; MG/1
1 TABLET ORAL EVERY 6 HOURS
Refills: 0 | Status: DISCONTINUED | OUTPATIENT
Start: 2021-01-01 | End: 2021-01-01

## 2021-01-01 RX ORDER — OXYCODONE HYDROCHLORIDE 15 MG/1
15 TABLET, FILM COATED, EXTENDED RELEASE ORAL
Qty: 60 | Refills: 0 | Status: DISCONTINUED | COMMUNITY
Start: 2021-01-01 | End: 2021-01-01

## 2021-01-01 RX ORDER — AMLODIPINE BESYLATE 2.5 MG/1
1 TABLET ORAL
Qty: 0 | Refills: 0 | DISCHARGE

## 2021-01-01 RX ORDER — LACTULOSE 10 G/15ML
10 SOLUTION ORAL EVERY 6 HOURS
Refills: 0 | Status: DISCONTINUED | OUTPATIENT
Start: 2021-01-01 | End: 2022-01-01

## 2021-01-01 RX ORDER — FAMOTIDINE 10 MG/ML
40 INJECTION INTRAVENOUS ONCE
Refills: 0 | Status: DISCONTINUED | OUTPATIENT
Start: 2021-01-01 | End: 2021-01-01

## 2021-01-01 RX ORDER — SIMETHICONE 80 MG/1
80 TABLET, CHEWABLE ORAL ONCE
Refills: 0 | Status: COMPLETED | OUTPATIENT
Start: 2021-01-01 | End: 2021-01-01

## 2021-01-01 RX ORDER — OXYCODONE AND ACETAMINOPHEN TABLETS 5; 300 MG/1; MG/1
5-300 TABLET ORAL EVERY 6 HOURS
Refills: 0 | Status: ACTIVE | COMMUNITY

## 2021-01-01 RX ORDER — INSULIN ASPART 100 [IU]/ML
20 INJECTION, SOLUTION SUBCUTANEOUS
Qty: 0 | Refills: 0 | DISCHARGE

## 2021-01-01 RX ORDER — IPRATROPIUM/ALBUTEROL SULFATE 18-103MCG
3 AEROSOL WITH ADAPTER (GRAM) INHALATION EVERY 6 HOURS
Refills: 0 | Status: DISCONTINUED | OUTPATIENT
Start: 2021-01-01 | End: 2022-01-01

## 2021-01-01 RX ORDER — ESCITALOPRAM OXALATE 10 MG/1
30 TABLET, FILM COATED ORAL DAILY
Refills: 0 | Status: DISCONTINUED | OUTPATIENT
Start: 2021-01-01 | End: 2022-01-01

## 2021-01-01 RX ORDER — ACETAMINOPHEN 500 MG
650 TABLET ORAL ONCE
Refills: 0 | Status: DISCONTINUED | OUTPATIENT
Start: 2021-01-01 | End: 2021-01-01

## 2021-01-01 RX ORDER — ONDANSETRON 8 MG/1
4 TABLET, FILM COATED ORAL THREE TIMES A DAY
Refills: 0 | Status: DISCONTINUED | OUTPATIENT
Start: 2021-01-01 | End: 2022-01-01

## 2021-01-01 RX ORDER — PIPERACILLIN AND TAZOBACTAM 4; .5 G/20ML; G/20ML
3.38 INJECTION, POWDER, LYOPHILIZED, FOR SOLUTION INTRAVENOUS EVERY 8 HOURS
Refills: 0 | Status: DISCONTINUED | OUTPATIENT
Start: 2021-01-01 | End: 2021-01-01

## 2021-01-01 RX ORDER — ESCITALOPRAM OXALATE 10 MG/1
10 TABLET ORAL DAILY
Refills: 0 | Status: ACTIVE | COMMUNITY

## 2021-01-01 RX ORDER — SODIUM CHLORIDE 9 MG/ML
3 INJECTION INTRAMUSCULAR; INTRAVENOUS; SUBCUTANEOUS EVERY 8 HOURS
Refills: 0 | Status: DISCONTINUED | OUTPATIENT
Start: 2021-01-01 | End: 2021-01-01

## 2021-01-01 RX ORDER — MINERAL OIL
133 OIL (ML) MISCELLANEOUS ONCE
Refills: 0 | Status: COMPLETED | OUTPATIENT
Start: 2021-01-01 | End: 2021-01-01

## 2021-01-01 RX ORDER — SIMVASTATIN 20 MG/1
20 TABLET, FILM COATED ORAL AT BEDTIME
Refills: 0 | Status: DISCONTINUED | OUTPATIENT
Start: 2021-01-01 | End: 2022-01-01

## 2021-01-01 RX ORDER — OXYCODONE HYDROCHLORIDE 15 MG/1
15 TABLET ORAL
Qty: 60 | Refills: 0 | Status: DISCONTINUED | COMMUNITY
Start: 2021-01-01 | End: 2021-01-01

## 2021-01-01 RX ORDER — INFLUENZA VIRUS VACCINE 15; 15; 15; 15 UG/.5ML; UG/.5ML; UG/.5ML; UG/.5ML
0.7 SUSPENSION INTRAMUSCULAR ONCE
Refills: 0 | Status: DISCONTINUED | OUTPATIENT
Start: 2021-01-01 | End: 2022-01-01

## 2021-01-01 RX ORDER — SODIUM,POTASSIUM PHOSPHATES 278-250MG
1 POWDER IN PACKET (EA) ORAL
Refills: 0 | Status: COMPLETED | OUTPATIENT
Start: 2021-01-01 | End: 2021-01-01

## 2021-01-01 RX ORDER — PANTOPRAZOLE SODIUM 20 MG/1
40 TABLET, DELAYED RELEASE ORAL
Refills: 0 | Status: DISCONTINUED | OUTPATIENT
Start: 2021-01-01 | End: 2021-01-01

## 2021-01-01 RX ORDER — PIPERACILLIN AND TAZOBACTAM 4; .5 G/20ML; G/20ML
3.38 INJECTION, POWDER, LYOPHILIZED, FOR SOLUTION INTRAVENOUS ONCE
Refills: 0 | Status: COMPLETED | OUTPATIENT
Start: 2021-01-01 | End: 2021-01-01

## 2021-01-01 RX ORDER — PIPERACILLIN AND TAZOBACTAM 4; .5 G/20ML; G/20ML
3.38 INJECTION, POWDER, LYOPHILIZED, FOR SOLUTION INTRAVENOUS EVERY 12 HOURS
Refills: 0 | Status: DISCONTINUED | OUTPATIENT
Start: 2021-01-01 | End: 2021-01-01

## 2021-01-01 RX ORDER — METOCLOPRAMIDE 10 MG/1
10 TABLET ORAL EVERY 6 HOURS
Qty: 30 | Refills: 1 | Status: DISCONTINUED | COMMUNITY
Start: 2021-01-01 | End: 2021-01-01

## 2021-01-01 RX ORDER — LIDOCAINE HCL 20 MG/ML
0.2 VIAL (ML) INJECTION ONCE
Refills: 0 | Status: DISCONTINUED | OUTPATIENT
Start: 2021-01-01 | End: 2021-01-01

## 2021-01-01 RX ORDER — DEXTROSE 50 % IN WATER 50 %
12.5 SYRINGE (ML) INTRAVENOUS ONCE
Refills: 0 | Status: DISCONTINUED | OUTPATIENT
Start: 2021-01-01 | End: 2021-01-01

## 2021-01-01 RX ORDER — IBUPROFEN 800 MG/1
800 TABLET, FILM COATED ORAL
Qty: 12 | Refills: 0 | Status: DISCONTINUED | COMMUNITY
Start: 2021-01-01 | End: 2021-01-01

## 2021-01-01 RX ORDER — AMOXICILLIN 500 MG/1
500 CAPSULE ORAL
Qty: 21 | Refills: 0 | Status: DISCONTINUED | COMMUNITY
Start: 2021-01-01 | End: 2021-01-01

## 2021-01-01 RX ORDER — INSULIN GLARGINE 100 [IU]/ML
30 INJECTION, SOLUTION SUBCUTANEOUS AT BEDTIME
Refills: 0 | Status: DISCONTINUED | OUTPATIENT
Start: 2021-01-01 | End: 2021-01-01

## 2021-01-01 RX ORDER — PROCHLORPERAZINE MALEATE 5 MG
10 TABLET ORAL DAILY
Refills: 0 | Status: DISCONTINUED | OUTPATIENT
Start: 2021-01-01 | End: 2021-01-01

## 2021-01-01 RX ORDER — INSULIN ASPART 100 [IU]/ML
100 INJECTION, SOLUTION INTRAVENOUS; SUBCUTANEOUS
Qty: 15 | Refills: 0 | Status: DISCONTINUED | COMMUNITY
Start: 2017-08-14 | End: 2021-01-01

## 2021-01-01 RX ORDER — SILVER SULFADIAZINE 10 MG/G
1 CREAM TOPICAL
Qty: 1 | Refills: 1 | Status: DISCONTINUED | COMMUNITY
Start: 2021-01-01 | End: 2021-01-01

## 2021-01-01 RX ORDER — BUDESONIDE, MICRONIZED 100 %
0.5 POWDER (GRAM) MISCELLANEOUS
Refills: 0 | Status: DISCONTINUED | OUTPATIENT
Start: 2021-01-01 | End: 2022-01-01

## 2021-01-01 RX ORDER — GABAPENTIN 600 MG/1
600 TABLET, COATED ORAL 3 TIMES DAILY
Qty: 90 | Refills: 0 | Status: DISCONTINUED | COMMUNITY
Start: 2021-01-01 | End: 2021-01-01

## 2021-01-01 RX ORDER — HEPARIN SODIUM 5000 [USP'U]/ML
5000 INJECTION INTRAVENOUS; SUBCUTANEOUS EVERY 8 HOURS
Refills: 0 | Status: DISCONTINUED | OUTPATIENT
Start: 2021-01-01 | End: 2022-01-01

## 2021-01-01 RX ORDER — LEVOTHYROXINE SODIUM 0.11 MG/1
112 TABLET ORAL
Qty: 30 | Refills: 1 | Status: ACTIVE | COMMUNITY
Start: 2021-01-01 | End: 1900-01-01

## 2021-01-01 RX ORDER — MAGNESIUM OXIDE 241.3 MG/1000MG
400 TABLET ORAL
Qty: 30 | Refills: 1 | Status: DISCONTINUED | COMMUNITY
Start: 2021-01-01 | End: 2021-01-01

## 2021-01-01 RX ORDER — DIPHENHYDRAMINE HCL 50 MG
25 CAPSULE ORAL DAILY
Refills: 0 | Status: DISCONTINUED | OUTPATIENT
Start: 2021-01-01 | End: 2022-01-01

## 2021-01-01 RX ORDER — GLUCAGON INJECTION, SOLUTION 0.5 MG/.1ML
1 INJECTION, SOLUTION SUBCUTANEOUS ONCE
Refills: 0 | Status: DISCONTINUED | OUTPATIENT
Start: 2021-01-01 | End: 2021-01-01

## 2021-01-01 RX ORDER — SIMVASTATIN 20 MG/1
20 TABLET, FILM COATED ORAL AT BEDTIME
Refills: 0 | Status: DISCONTINUED | OUTPATIENT
Start: 2021-01-01 | End: 2021-01-01

## 2021-01-01 RX ORDER — OXYCODONE AND ACETAMINOPHEN 5; 325 MG/1; MG/1
1 TABLET ORAL
Qty: 10 | Refills: 0
Start: 2021-01-01

## 2021-01-01 RX ORDER — AMLODIPINE BESYLATE 2.5 MG/1
10 TABLET ORAL DAILY
Refills: 0 | Status: DISCONTINUED | OUTPATIENT
Start: 2021-01-01 | End: 2021-01-01

## 2021-01-01 RX ORDER — TRIAMTERENE/HYDROCHLOROTHIAZID 75 MG-50MG
1 TABLET ORAL DAILY
Refills: 0 | Status: DISCONTINUED | OUTPATIENT
Start: 2021-01-01 | End: 2021-01-01

## 2021-01-01 RX ORDER — DEXTROSE 50 % IN WATER 50 %
12.5 SYRINGE (ML) INTRAVENOUS ONCE
Refills: 0 | Status: COMPLETED | OUTPATIENT
Start: 2021-01-01 | End: 2021-01-01

## 2021-01-01 RX ORDER — SENNA PLUS 8.6 MG/1
2 TABLET ORAL AT BEDTIME
Refills: 0 | Status: DISCONTINUED | OUTPATIENT
Start: 2021-01-01 | End: 2021-01-01

## 2021-01-01 RX ORDER — OXYCODONE HYDROCHLORIDE 5 MG/1
5 TABLET ORAL EVERY 6 HOURS
Refills: 0 | Status: DISCONTINUED | OUTPATIENT
Start: 2021-01-01 | End: 2021-01-01

## 2021-01-01 RX ORDER — LIDOCAINE 50 MG/G
5 CREAM TOPICAL
Qty: 1 | Refills: 0 | Status: DISCONTINUED | COMMUNITY
Start: 2020-09-08 | End: 2021-01-01

## 2021-01-01 RX ORDER — LANOLIN ALCOHOL/MO/W.PET/CERES
1 CREAM (GRAM) TOPICAL
Qty: 0 | Refills: 0 | DISCHARGE

## 2021-01-01 RX ORDER — GABAPENTIN 400 MG/1
400 CAPSULE ORAL THREE TIMES A DAY
Refills: 0 | Status: DISCONTINUED | OUTPATIENT
Start: 2021-01-01 | End: 2022-01-01

## 2021-01-01 RX ADMIN — PANTOPRAZOLE SODIUM 40 MILLIGRAM(S): 20 TABLET, DELAYED RELEASE ORAL at 05:50

## 2021-01-01 RX ADMIN — Medication 40 MILLIGRAM(S): at 06:22

## 2021-01-01 RX ADMIN — HEPARIN SODIUM 5000 UNIT(S): 5000 INJECTION INTRAVENOUS; SUBCUTANEOUS at 14:19

## 2021-01-01 RX ADMIN — Medication 2000 UNIT(S): at 12:01

## 2021-01-01 RX ADMIN — LACTULOSE 20 GRAM(S): 10 SOLUTION ORAL at 18:37

## 2021-01-01 RX ADMIN — ESCITALOPRAM OXALATE 30 MILLIGRAM(S): 10 TABLET, FILM COATED ORAL at 16:06

## 2021-01-01 RX ADMIN — ONDANSETRON 4 MILLIGRAM(S): 8 TABLET, FILM COATED ORAL at 21:08

## 2021-01-01 RX ADMIN — Medication 10 MILLIGRAM(S): at 15:52

## 2021-01-01 RX ADMIN — PANTOPRAZOLE SODIUM 40 MILLIGRAM(S): 20 TABLET, DELAYED RELEASE ORAL at 06:09

## 2021-01-01 RX ADMIN — PIPERACILLIN AND TAZOBACTAM 25 GRAM(S): 4; .5 INJECTION, POWDER, LYOPHILIZED, FOR SOLUTION INTRAVENOUS at 12:40

## 2021-01-01 RX ADMIN — Medication 2: at 12:30

## 2021-01-01 RX ADMIN — Medication 1 TABLET(S): at 05:02

## 2021-01-01 RX ADMIN — HEPARIN SODIUM 5000 UNIT(S): 5000 INJECTION INTRAVENOUS; SUBCUTANEOUS at 05:29

## 2021-01-01 RX ADMIN — AMLODIPINE BESYLATE 10 MILLIGRAM(S): 2.5 TABLET ORAL at 05:08

## 2021-01-01 RX ADMIN — HEPARIN SODIUM 5000 UNIT(S): 5000 INJECTION INTRAVENOUS; SUBCUTANEOUS at 05:50

## 2021-01-01 RX ADMIN — MORPHINE SULFATE 3 MILLIGRAM(S): 50 CAPSULE, EXTENDED RELEASE ORAL at 13:58

## 2021-01-01 RX ADMIN — GABAPENTIN 400 MILLIGRAM(S): 400 CAPSULE ORAL at 14:05

## 2021-01-01 RX ADMIN — GABAPENTIN 400 MILLIGRAM(S): 400 CAPSULE ORAL at 16:04

## 2021-01-01 RX ADMIN — Medication 20 MILLIGRAM(S): at 11:12

## 2021-01-01 RX ADMIN — Medication 2000 UNIT(S): at 12:23

## 2021-01-01 RX ADMIN — Medication 650 MILLIGRAM(S): at 23:20

## 2021-01-01 RX ADMIN — ONDANSETRON 4 MILLIGRAM(S): 8 TABLET, FILM COATED ORAL at 21:37

## 2021-01-01 RX ADMIN — FOSAPREPITANT DIMEGLUMINE 310 MILLIGRAM(S): 150 INJECTION, POWDER, LYOPHILIZED, FOR SOLUTION INTRAVENOUS at 11:10

## 2021-01-01 RX ADMIN — ESCITALOPRAM OXALATE 30 MILLIGRAM(S): 10 TABLET, FILM COATED ORAL at 13:32

## 2021-01-01 RX ADMIN — Medication 6: at 17:37

## 2021-01-01 RX ADMIN — Medication 3 MILLILITER(S): at 16:00

## 2021-01-01 RX ADMIN — GABAPENTIN 400 MILLIGRAM(S): 400 CAPSULE ORAL at 05:50

## 2021-01-01 RX ADMIN — Medication 133 MILLILITER(S): at 16:21

## 2021-01-01 RX ADMIN — Medication 3 MILLIGRAM(S): at 21:51

## 2021-01-01 RX ADMIN — ESCITALOPRAM OXALATE 30 MILLIGRAM(S): 10 TABLET, FILM COATED ORAL at 11:53

## 2021-01-01 RX ADMIN — Medication 20 MILLIGRAM(S): at 10:42

## 2021-01-01 RX ADMIN — Medication 20 UNIT(S): at 08:53

## 2021-01-01 RX ADMIN — GABAPENTIN 400 MILLIGRAM(S): 400 CAPSULE ORAL at 06:08

## 2021-01-01 RX ADMIN — Medication 10 MILLIGRAM(S): at 05:52

## 2021-01-01 RX ADMIN — Medication 2: at 12:06

## 2021-01-01 RX ADMIN — Medication 1 APPLICATION(S): at 13:34

## 2021-01-01 RX ADMIN — PANTOPRAZOLE SODIUM 40 MILLIGRAM(S): 20 TABLET, DELAYED RELEASE ORAL at 06:21

## 2021-01-01 RX ADMIN — Medication 10 MILLIGRAM(S): at 18:52

## 2021-01-01 RX ADMIN — Medication 24 UNIT(S): at 17:28

## 2021-01-01 RX ADMIN — GABAPENTIN 400 MILLIGRAM(S): 400 CAPSULE ORAL at 22:20

## 2021-01-01 RX ADMIN — PIPERACILLIN AND TAZOBACTAM 25 GRAM(S): 4; .5 INJECTION, POWDER, LYOPHILIZED, FOR SOLUTION INTRAVENOUS at 03:20

## 2021-01-01 RX ADMIN — INSULIN GLARGINE 44 UNIT(S): 100 INJECTION, SOLUTION SUBCUTANEOUS at 22:08

## 2021-01-01 RX ADMIN — BUDESONIDE AND FORMOTEROL FUMARATE DIHYDRATE 2 PUFF(S): 160; 4.5 AEROSOL RESPIRATORY (INHALATION) at 10:08

## 2021-01-01 RX ADMIN — SIMVASTATIN 20 MILLIGRAM(S): 20 TABLET, FILM COATED ORAL at 21:10

## 2021-01-01 RX ADMIN — POLYETHYLENE GLYCOL 3350 17 GRAM(S): 17 POWDER, FOR SOLUTION ORAL at 12:22

## 2021-01-01 RX ADMIN — PIPERACILLIN AND TAZOBACTAM 25 GRAM(S): 4; .5 INJECTION, POWDER, LYOPHILIZED, FOR SOLUTION INTRAVENOUS at 14:54

## 2021-01-01 RX ADMIN — Medication 10 MILLIGRAM(S): at 17:21

## 2021-01-01 RX ADMIN — ONDANSETRON 4 MILLIGRAM(S): 8 TABLET, FILM COATED ORAL at 07:43

## 2021-01-01 RX ADMIN — Medication 2: at 21:48

## 2021-01-01 RX ADMIN — Medication 650 MILLIGRAM(S): at 23:00

## 2021-01-01 RX ADMIN — HEPARIN SODIUM 5000 UNIT(S): 5000 INJECTION INTRAVENOUS; SUBCUTANEOUS at 21:35

## 2021-01-01 RX ADMIN — ONDANSETRON 4 MILLIGRAM(S): 8 TABLET, FILM COATED ORAL at 07:48

## 2021-01-01 RX ADMIN — PIPERACILLIN AND TAZOBACTAM 25 GRAM(S): 4; .5 INJECTION, POWDER, LYOPHILIZED, FOR SOLUTION INTRAVENOUS at 01:24

## 2021-01-01 RX ADMIN — GABAPENTIN 400 MILLIGRAM(S): 400 CAPSULE ORAL at 13:48

## 2021-01-01 RX ADMIN — Medication 3 MILLIGRAM(S): at 01:27

## 2021-01-01 RX ADMIN — Medication 5 MILLIGRAM(S): at 05:53

## 2021-01-01 RX ADMIN — Medication 2: at 17:21

## 2021-01-01 RX ADMIN — HEPARIN SODIUM 5000 UNIT(S): 5000 INJECTION INTRAVENOUS; SUBCUTANEOUS at 14:36

## 2021-01-01 RX ADMIN — ONDANSETRON 4 MILLIGRAM(S): 8 TABLET, FILM COATED ORAL at 15:19

## 2021-01-01 RX ADMIN — ZOLEDRONIC ACID 400 MILLIGRAM(S): 5 INJECTION, SOLUTION INTRAVENOUS at 18:01

## 2021-01-01 RX ADMIN — Medication 3 MILLIGRAM(S): at 21:21

## 2021-01-01 RX ADMIN — Medication 40 MILLIGRAM(S): at 06:02

## 2021-01-01 RX ADMIN — MORPHINE SULFATE 3 MILLIGRAM(S): 50 CAPSULE, EXTENDED RELEASE ORAL at 17:46

## 2021-01-01 RX ADMIN — INSULIN GLARGINE 60 UNIT(S): 100 INJECTION, SOLUTION SUBCUTANEOUS at 22:20

## 2021-01-01 RX ADMIN — Medication 1 APPLICATION(S): at 12:40

## 2021-01-01 RX ADMIN — GABAPENTIN 400 MILLIGRAM(S): 400 CAPSULE ORAL at 05:45

## 2021-01-01 RX ADMIN — HEPARIN SODIUM 5000 UNIT(S): 5000 INJECTION INTRAVENOUS; SUBCUTANEOUS at 14:42

## 2021-01-01 RX ADMIN — SIMVASTATIN 20 MILLIGRAM(S): 20 TABLET, FILM COATED ORAL at 21:21

## 2021-01-01 RX ADMIN — Medication 2.5 MILLIGRAM(S): at 05:04

## 2021-01-01 RX ADMIN — ESCITALOPRAM OXALATE 20 MILLIGRAM(S): 10 TABLET, FILM COATED ORAL at 16:08

## 2021-01-01 RX ADMIN — Medication 125 MICROGRAM(S): at 06:21

## 2021-01-01 RX ADMIN — Medication 1 TABLET(S): at 05:08

## 2021-01-01 RX ADMIN — Medication 650 MILLIGRAM(S): at 10:30

## 2021-01-01 RX ADMIN — Medication 650 MILLIGRAM(S): at 22:20

## 2021-01-01 RX ADMIN — Medication 650 MILLIGRAM(S): at 02:27

## 2021-01-01 RX ADMIN — Medication 6: at 12:39

## 2021-01-01 RX ADMIN — Medication 650 MILLIGRAM(S): at 18:47

## 2021-01-01 RX ADMIN — Medication 650 MILLIGRAM(S): at 09:10

## 2021-01-01 RX ADMIN — ONDANSETRON 4 MILLIGRAM(S): 8 TABLET, FILM COATED ORAL at 14:42

## 2021-01-01 RX ADMIN — GABAPENTIN 400 MILLIGRAM(S): 400 CAPSULE ORAL at 05:09

## 2021-01-01 RX ADMIN — PANTOPRAZOLE SODIUM 40 MILLIGRAM(S): 20 TABLET, DELAYED RELEASE ORAL at 06:02

## 2021-01-01 RX ADMIN — Medication 6: at 17:28

## 2021-01-01 RX ADMIN — Medication 125 MICROGRAM(S): at 05:17

## 2021-01-01 RX ADMIN — GABAPENTIN 400 MILLIGRAM(S): 400 CAPSULE ORAL at 21:06

## 2021-01-01 RX ADMIN — SIMVASTATIN 20 MILLIGRAM(S): 20 TABLET, FILM COATED ORAL at 21:34

## 2021-01-01 RX ADMIN — Medication 40 MILLIGRAM(S): at 17:38

## 2021-01-01 RX ADMIN — Medication 2000 UNIT(S): at 13:57

## 2021-01-01 RX ADMIN — Medication 26 UNIT(S): at 08:14

## 2021-01-01 RX ADMIN — Medication 2: at 08:30

## 2021-01-01 RX ADMIN — Medication 2000 UNIT(S): at 13:29

## 2021-01-01 RX ADMIN — ONDANSETRON 4 MILLIGRAM(S): 8 TABLET, FILM COATED ORAL at 05:51

## 2021-01-01 RX ADMIN — Medication 24 UNIT(S): at 12:36

## 2021-01-01 RX ADMIN — Medication 5 MILLIGRAM(S): at 05:57

## 2021-01-01 RX ADMIN — Medication 125 MICROGRAM(S): at 05:45

## 2021-01-01 RX ADMIN — AMLODIPINE BESYLATE 10 MILLIGRAM(S): 2.5 TABLET ORAL at 05:50

## 2021-01-01 RX ADMIN — INSULIN GLARGINE 40 UNIT(S): 100 INJECTION, SOLUTION SUBCUTANEOUS at 21:33

## 2021-01-01 RX ADMIN — GABAPENTIN 400 MILLIGRAM(S): 400 CAPSULE ORAL at 21:08

## 2021-01-01 RX ADMIN — Medication 0.5 MILLIGRAM(S): at 09:55

## 2021-01-01 RX ADMIN — GABAPENTIN 400 MILLIGRAM(S): 400 CAPSULE ORAL at 22:35

## 2021-01-01 RX ADMIN — Medication 2000 UNIT(S): at 12:05

## 2021-01-01 RX ADMIN — Medication 6: at 12:52

## 2021-01-01 RX ADMIN — Medication 20 UNIT(S): at 12:55

## 2021-01-01 RX ADMIN — Medication 650 MILLIGRAM(S): at 06:03

## 2021-01-01 RX ADMIN — Medication 4: at 08:37

## 2021-01-01 RX ADMIN — Medication 2: at 08:59

## 2021-01-01 RX ADMIN — Medication 3 MILLILITER(S): at 15:42

## 2021-01-01 RX ADMIN — Medication 2: at 08:12

## 2021-01-01 RX ADMIN — GABAPENTIN 400 MILLIGRAM(S): 400 CAPSULE ORAL at 14:46

## 2021-01-01 RX ADMIN — MORPHINE SULFATE 3 MILLIGRAM(S): 50 CAPSULE, EXTENDED RELEASE ORAL at 04:49

## 2021-01-01 RX ADMIN — ONDANSETRON 4 MILLIGRAM(S): 8 TABLET, FILM COATED ORAL at 14:17

## 2021-01-01 RX ADMIN — Medication 2.5 MILLIGRAM(S): at 05:47

## 2021-01-01 RX ADMIN — PIPERACILLIN AND TAZOBACTAM 25 GRAM(S): 4; .5 INJECTION, POWDER, LYOPHILIZED, FOR SOLUTION INTRAVENOUS at 03:55

## 2021-01-01 RX ADMIN — INSULIN GLARGINE 56 UNIT(S): 100 INJECTION, SOLUTION SUBCUTANEOUS at 23:03

## 2021-01-01 RX ADMIN — SIMVASTATIN 20 MILLIGRAM(S): 20 TABLET, FILM COATED ORAL at 21:49

## 2021-01-01 RX ADMIN — Medication 10 MILLIGRAM(S): at 17:34

## 2021-01-01 RX ADMIN — PANTOPRAZOLE SODIUM 40 MILLIGRAM(S): 20 TABLET, DELAYED RELEASE ORAL at 15:36

## 2021-01-01 RX ADMIN — Medication 40 MILLIGRAM(S): at 05:09

## 2021-01-01 RX ADMIN — Medication 650 MILLIGRAM(S): at 17:47

## 2021-01-01 RX ADMIN — GABAPENTIN 400 MILLIGRAM(S): 400 CAPSULE ORAL at 06:22

## 2021-01-01 RX ADMIN — Medication 250 MILLIGRAM(S): at 08:13

## 2021-01-01 RX ADMIN — ESCITALOPRAM OXALATE 30 MILLIGRAM(S): 10 TABLET, FILM COATED ORAL at 13:08

## 2021-01-01 RX ADMIN — GABAPENTIN 400 MILLIGRAM(S): 400 CAPSULE ORAL at 21:40

## 2021-01-01 RX ADMIN — GABAPENTIN 400 MILLIGRAM(S): 400 CAPSULE ORAL at 14:36

## 2021-01-01 RX ADMIN — Medication 5 MILLIGRAM(S): at 06:02

## 2021-01-01 RX ADMIN — Medication 3: at 13:41

## 2021-01-01 RX ADMIN — MORPHINE SULFATE 2 MILLIGRAM(S): 50 CAPSULE, EXTENDED RELEASE ORAL at 19:20

## 2021-01-01 RX ADMIN — SENNA PLUS 2 TABLET(S): 8.6 TABLET ORAL at 21:07

## 2021-01-01 RX ADMIN — Medication 2.5 MILLIGRAM(S): at 05:57

## 2021-01-01 RX ADMIN — Medication 3 MILLIGRAM(S): at 23:37

## 2021-01-01 RX ADMIN — Medication 650 MILLIGRAM(S): at 08:24

## 2021-01-01 RX ADMIN — HEPARIN SODIUM 5000 UNIT(S): 5000 INJECTION INTRAVENOUS; SUBCUTANEOUS at 21:49

## 2021-01-01 RX ADMIN — Medication 100 GRAM(S): at 15:58

## 2021-01-01 RX ADMIN — Medication 125 MICROGRAM(S): at 05:08

## 2021-01-01 RX ADMIN — SODIUM CHLORIDE 75 MILLILITER(S): 9 INJECTION INTRAMUSCULAR; INTRAVENOUS; SUBCUTANEOUS at 13:50

## 2021-01-01 RX ADMIN — HEPARIN SODIUM 5000 UNIT(S): 5000 INJECTION INTRAVENOUS; SUBCUTANEOUS at 21:07

## 2021-01-01 RX ADMIN — Medication 650 MILLIGRAM(S): at 22:34

## 2021-01-01 RX ADMIN — AMLODIPINE BESYLATE 10 MILLIGRAM(S): 2.5 TABLET ORAL at 06:09

## 2021-01-01 RX ADMIN — MORPHINE SULFATE 3 MILLIGRAM(S): 50 CAPSULE, EXTENDED RELEASE ORAL at 21:54

## 2021-01-01 RX ADMIN — PANTOPRAZOLE SODIUM 40 MILLIGRAM(S): 20 TABLET, DELAYED RELEASE ORAL at 05:51

## 2021-01-01 RX ADMIN — Medication 4: at 18:11

## 2021-01-01 RX ADMIN — Medication 650 MILLIGRAM(S): at 17:08

## 2021-01-01 RX ADMIN — MORPHINE SULFATE 3 MILLIGRAM(S): 50 CAPSULE, EXTENDED RELEASE ORAL at 04:32

## 2021-01-01 RX ADMIN — Medication 4: at 08:11

## 2021-01-01 RX ADMIN — ONDANSETRON 4 MILLIGRAM(S): 8 TABLET, FILM COATED ORAL at 14:36

## 2021-01-01 RX ADMIN — HEPARIN SODIUM 5000 UNIT(S): 5000 INJECTION INTRAVENOUS; SUBCUTANEOUS at 06:01

## 2021-01-01 RX ADMIN — Medication 3 MILLIGRAM(S): at 00:08

## 2021-01-01 RX ADMIN — Medication 25 MILLIGRAM(S): at 11:09

## 2021-01-01 RX ADMIN — HEPARIN SODIUM 5000 UNIT(S): 5000 INJECTION INTRAVENOUS; SUBCUTANEOUS at 21:41

## 2021-01-01 RX ADMIN — SENNA PLUS 2 TABLET(S): 8.6 TABLET ORAL at 21:49

## 2021-01-01 RX ADMIN — GABAPENTIN 400 MILLIGRAM(S): 400 CAPSULE ORAL at 13:29

## 2021-01-01 RX ADMIN — OXYCODONE HYDROCHLORIDE 5 MILLIGRAM(S): 5 TABLET ORAL at 15:35

## 2021-01-01 RX ADMIN — PIPERACILLIN AND TAZOBACTAM 200 GRAM(S): 4; .5 INJECTION, POWDER, LYOPHILIZED, FOR SOLUTION INTRAVENOUS at 00:41

## 2021-01-01 RX ADMIN — PANTOPRAZOLE SODIUM 40 MILLIGRAM(S): 20 TABLET, DELAYED RELEASE ORAL at 05:16

## 2021-01-01 RX ADMIN — Medication 40 MILLIGRAM(S): at 06:01

## 2021-01-01 RX ADMIN — Medication 10 MILLIGRAM(S): at 18:07

## 2021-01-01 RX ADMIN — ONDANSETRON 4 MILLIGRAM(S): 8 TABLET, FILM COATED ORAL at 13:49

## 2021-01-01 RX ADMIN — HEPARIN SODIUM 5000 UNIT(S): 5000 INJECTION INTRAVENOUS; SUBCUTANEOUS at 05:57

## 2021-01-01 RX ADMIN — HEPARIN SODIUM 5000 UNIT(S): 5000 INJECTION INTRAVENOUS; SUBCUTANEOUS at 05:05

## 2021-01-01 RX ADMIN — Medication 4: at 12:54

## 2021-01-01 RX ADMIN — INSULIN GLARGINE 68 UNIT(S): 100 INJECTION, SOLUTION SUBCUTANEOUS at 21:27

## 2021-01-01 RX ADMIN — Medication 40 MILLIGRAM(S): at 05:14

## 2021-01-01 RX ADMIN — Medication 22 UNIT(S): at 08:08

## 2021-01-01 RX ADMIN — Medication 85 MILLIMOLE(S): at 14:28

## 2021-01-01 RX ADMIN — MORPHINE SULFATE 3 MILLIGRAM(S): 50 CAPSULE, EXTENDED RELEASE ORAL at 22:27

## 2021-01-01 RX ADMIN — HEPARIN SODIUM 5000 UNIT(S): 5000 INJECTION INTRAVENOUS; SUBCUTANEOUS at 05:47

## 2021-01-01 RX ADMIN — MORPHINE SULFATE 3 MILLIGRAM(S): 50 CAPSULE, EXTENDED RELEASE ORAL at 17:58

## 2021-01-01 RX ADMIN — ONDANSETRON 4 MILLIGRAM(S): 8 TABLET, FILM COATED ORAL at 10:44

## 2021-01-01 RX ADMIN — POLYETHYLENE GLYCOL 3350 17 GRAM(S): 17 POWDER, FOR SOLUTION ORAL at 12:41

## 2021-01-01 RX ADMIN — ESCITALOPRAM OXALATE 30 MILLIGRAM(S): 10 TABLET, FILM COATED ORAL at 12:39

## 2021-01-01 RX ADMIN — GABAPENTIN 400 MILLIGRAM(S): 400 CAPSULE ORAL at 21:50

## 2021-01-01 RX ADMIN — Medication 2.5 MILLIGRAM(S): at 05:08

## 2021-01-01 RX ADMIN — GABAPENTIN 400 MILLIGRAM(S): 400 CAPSULE ORAL at 21:27

## 2021-01-01 RX ADMIN — SIMVASTATIN 20 MILLIGRAM(S): 20 TABLET, FILM COATED ORAL at 22:08

## 2021-01-01 RX ADMIN — Medication 5 MILLIGRAM(S): at 05:16

## 2021-01-01 RX ADMIN — Medication 400 MILLIGRAM(S): at 10:14

## 2021-01-01 RX ADMIN — MORPHINE SULFATE 3 MILLIGRAM(S): 50 CAPSULE, EXTENDED RELEASE ORAL at 10:36

## 2021-01-01 RX ADMIN — Medication 0.5 MILLIGRAM(S): at 21:48

## 2021-01-01 RX ADMIN — ESCITALOPRAM OXALATE 30 MILLIGRAM(S): 10 TABLET, FILM COATED ORAL at 13:50

## 2021-01-01 RX ADMIN — PANTOPRAZOLE SODIUM 40 MILLIGRAM(S): 20 TABLET, DELAYED RELEASE ORAL at 06:23

## 2021-01-01 RX ADMIN — Medication 0.5 MILLIGRAM(S): at 20:14

## 2021-01-01 RX ADMIN — ESCITALOPRAM OXALATE 30 MILLIGRAM(S): 10 TABLET, FILM COATED ORAL at 11:33

## 2021-01-01 RX ADMIN — Medication 1 APPLICATION(S): at 12:23

## 2021-01-01 RX ADMIN — Medication 20 UNIT(S): at 12:30

## 2021-01-01 RX ADMIN — POLYETHYLENE GLYCOL 3350 17 GRAM(S): 17 POWDER, FOR SOLUTION ORAL at 13:07

## 2021-01-01 RX ADMIN — POLYETHYLENE GLYCOL 3350 17 GRAM(S): 17 POWDER, FOR SOLUTION ORAL at 14:19

## 2021-01-01 RX ADMIN — GABAPENTIN 400 MILLIGRAM(S): 400 CAPSULE ORAL at 13:31

## 2021-01-01 RX ADMIN — Medication 2: at 08:45

## 2021-01-01 RX ADMIN — GABAPENTIN 400 MILLIGRAM(S): 400 CAPSULE ORAL at 21:33

## 2021-01-01 RX ADMIN — Medication 22 UNIT(S): at 17:38

## 2021-01-01 RX ADMIN — OXYCODONE HYDROCHLORIDE 5 MILLIGRAM(S): 5 TABLET ORAL at 07:48

## 2021-01-01 RX ADMIN — MORPHINE SULFATE 3 MILLIGRAM(S): 50 CAPSULE, EXTENDED RELEASE ORAL at 16:11

## 2021-01-01 RX ADMIN — HEPARIN SODIUM 5000 UNIT(S): 5000 INJECTION INTRAVENOUS; SUBCUTANEOUS at 21:09

## 2021-01-01 RX ADMIN — Medication 3 MILLIGRAM(S): at 21:37

## 2021-01-01 RX ADMIN — GABAPENTIN 400 MILLIGRAM(S): 400 CAPSULE ORAL at 16:11

## 2021-01-01 RX ADMIN — Medication 40 MILLIGRAM(S): at 05:50

## 2021-01-01 RX ADMIN — Medication 20 UNIT(S): at 17:35

## 2021-01-01 RX ADMIN — Medication 650 MILLIGRAM(S): at 21:23

## 2021-01-01 RX ADMIN — HEPARIN SODIUM 5000 UNIT(S): 5000 INJECTION INTRAVENOUS; SUBCUTANEOUS at 13:33

## 2021-01-01 RX ADMIN — HEPARIN SODIUM 5000 UNIT(S): 5000 INJECTION INTRAVENOUS; SUBCUTANEOUS at 05:52

## 2021-01-01 RX ADMIN — ESCITALOPRAM OXALATE 30 MILLIGRAM(S): 10 TABLET, FILM COATED ORAL at 14:46

## 2021-01-01 RX ADMIN — Medication 10 MILLIGRAM(S): at 17:29

## 2021-01-01 RX ADMIN — Medication 125 MICROGRAM(S): at 06:01

## 2021-01-01 RX ADMIN — Medication 3 MILLIGRAM(S): at 22:27

## 2021-01-01 RX ADMIN — Medication 20 UNIT(S): at 08:38

## 2021-01-01 RX ADMIN — SIMVASTATIN 20 MILLIGRAM(S): 20 TABLET, FILM COATED ORAL at 21:05

## 2021-01-01 RX ADMIN — HEPARIN SODIUM 5000 UNIT(S): 5000 INJECTION INTRAVENOUS; SUBCUTANEOUS at 21:20

## 2021-01-01 RX ADMIN — Medication 650 MILLIGRAM(S): at 13:30

## 2021-01-01 RX ADMIN — Medication 4: at 08:16

## 2021-01-01 RX ADMIN — HEPARIN SODIUM 5000 UNIT(S): 5000 INJECTION INTRAVENOUS; SUBCUTANEOUS at 16:08

## 2021-01-01 RX ADMIN — TRAMADOL HYDROCHLORIDE 25 MILLIGRAM(S): 50 TABLET ORAL at 20:05

## 2021-01-01 RX ADMIN — Medication 4: at 12:00

## 2021-01-01 RX ADMIN — LIDOCAINE AND PRILOCAINE CREAM 1 APPLICATION(S): 25; 25 CREAM TOPICAL at 17:21

## 2021-01-01 RX ADMIN — ONDANSETRON 4 MILLIGRAM(S): 8 TABLET, FILM COATED ORAL at 15:03

## 2021-01-01 RX ADMIN — Medication 20 UNIT(S): at 11:53

## 2021-01-01 RX ADMIN — GABAPENTIN 400 MILLIGRAM(S): 400 CAPSULE ORAL at 05:56

## 2021-01-01 RX ADMIN — Medication 125 MICROGRAM(S): at 05:56

## 2021-01-01 RX ADMIN — SENNA PLUS 2 TABLET(S): 8.6 TABLET ORAL at 21:28

## 2021-01-01 RX ADMIN — Medication 3 MILLILITER(S): at 15:45

## 2021-01-01 RX ADMIN — INSULIN GLARGINE 54 UNIT(S): 100 INJECTION, SOLUTION SUBCUTANEOUS at 22:33

## 2021-01-01 RX ADMIN — MORPHINE SULFATE 3 MILLIGRAM(S): 50 CAPSULE, EXTENDED RELEASE ORAL at 10:56

## 2021-01-01 RX ADMIN — Medication 4: at 12:19

## 2021-01-01 RX ADMIN — Medication 3: at 23:04

## 2021-01-01 RX ADMIN — ONDANSETRON 4 MILLIGRAM(S): 8 TABLET, FILM COATED ORAL at 14:14

## 2021-01-01 RX ADMIN — ONDANSETRON 4 MILLIGRAM(S): 8 TABLET, FILM COATED ORAL at 21:48

## 2021-01-01 RX ADMIN — Medication 650 MILLIGRAM(S): at 05:03

## 2021-01-01 RX ADMIN — GABAPENTIN 400 MILLIGRAM(S): 400 CAPSULE ORAL at 21:09

## 2021-01-01 RX ADMIN — MORPHINE SULFATE 3 MILLIGRAM(S): 50 CAPSULE, EXTENDED RELEASE ORAL at 01:03

## 2021-01-01 RX ADMIN — BUDESONIDE AND FORMOTEROL FUMARATE DIHYDRATE 2 PUFF(S): 160; 4.5 AEROSOL RESPIRATORY (INHALATION) at 21:50

## 2021-01-01 RX ADMIN — GABAPENTIN 400 MILLIGRAM(S): 400 CAPSULE ORAL at 16:35

## 2021-01-01 RX ADMIN — HEPARIN SODIUM 5000 UNIT(S): 5000 INJECTION INTRAVENOUS; SUBCUTANEOUS at 05:54

## 2021-01-01 RX ADMIN — MORPHINE SULFATE 3 MILLIGRAM(S): 50 CAPSULE, EXTENDED RELEASE ORAL at 17:40

## 2021-01-01 RX ADMIN — Medication 25 MILLIGRAM(S): at 21:23

## 2021-01-01 RX ADMIN — Medication 20 UNIT(S): at 12:07

## 2021-01-01 RX ADMIN — Medication 40 MILLIGRAM(S): at 06:21

## 2021-01-01 RX ADMIN — Medication 6: at 08:08

## 2021-01-01 RX ADMIN — GABAPENTIN 400 MILLIGRAM(S): 400 CAPSULE ORAL at 14:42

## 2021-01-01 RX ADMIN — AMLODIPINE BESYLATE 10 MILLIGRAM(S): 2.5 TABLET ORAL at 05:04

## 2021-01-01 RX ADMIN — Medication 20 UNIT(S): at 17:03

## 2021-01-01 RX ADMIN — Medication 1 APPLICATION(S): at 13:09

## 2021-01-01 RX ADMIN — PIPERACILLIN AND TAZOBACTAM 25 GRAM(S): 4; .5 INJECTION, POWDER, LYOPHILIZED, FOR SOLUTION INTRAVENOUS at 13:30

## 2021-01-01 RX ADMIN — SODIUM ZIRCONIUM CYCLOSILICATE 10 GRAM(S): 10 POWDER, FOR SUSPENSION ORAL at 08:42

## 2021-01-01 RX ADMIN — Medication 12.5 GRAM(S): at 20:45

## 2021-01-01 RX ADMIN — HEPARIN SODIUM 5000 UNIT(S): 5000 INJECTION INTRAVENOUS; SUBCUTANEOUS at 21:24

## 2021-01-01 RX ADMIN — Medication 10 MILLIGRAM(S): at 13:41

## 2021-01-01 RX ADMIN — Medication 20 MILLIGRAM(S): at 00:39

## 2021-01-01 RX ADMIN — HEPARIN SODIUM 5000 UNIT(S): 5000 INJECTION INTRAVENOUS; SUBCUTANEOUS at 14:05

## 2021-01-01 RX ADMIN — Medication 3 MILLILITER(S): at 09:46

## 2021-01-01 RX ADMIN — Medication 20 UNIT(S): at 18:12

## 2021-01-01 RX ADMIN — AMLODIPINE BESYLATE 10 MILLIGRAM(S): 2.5 TABLET ORAL at 05:45

## 2021-01-01 RX ADMIN — Medication 2000 UNIT(S): at 11:53

## 2021-01-01 RX ADMIN — HEPARIN SODIUM 5000 UNIT(S): 5000 INJECTION INTRAVENOUS; SUBCUTANEOUS at 13:29

## 2021-01-01 RX ADMIN — Medication 10 MILLIGRAM(S): at 06:09

## 2021-01-01 RX ADMIN — MORPHINE SULFATE 3 MILLIGRAM(S): 50 CAPSULE, EXTENDED RELEASE ORAL at 06:30

## 2021-01-01 RX ADMIN — Medication 125 MICROGRAM(S): at 05:20

## 2021-01-01 RX ADMIN — AMLODIPINE BESYLATE 10 MILLIGRAM(S): 2.5 TABLET ORAL at 05:20

## 2021-01-01 RX ADMIN — ESCITALOPRAM OXALATE 30 MILLIGRAM(S): 10 TABLET, FILM COATED ORAL at 12:10

## 2021-01-01 RX ADMIN — PIPERACILLIN AND TAZOBACTAM 25 GRAM(S): 4; .5 INJECTION, POWDER, LYOPHILIZED, FOR SOLUTION INTRAVENOUS at 02:16

## 2021-01-01 RX ADMIN — SIMVASTATIN 20 MILLIGRAM(S): 20 TABLET, FILM COATED ORAL at 21:48

## 2021-01-01 RX ADMIN — PANTOPRAZOLE SODIUM 40 MILLIGRAM(S): 20 TABLET, DELAYED RELEASE ORAL at 05:09

## 2021-01-01 RX ADMIN — Medication 1 APPLICATION(S): at 19:03

## 2021-01-01 RX ADMIN — POLYETHYLENE GLYCOL 3350 17 GRAM(S): 17 POWDER, FOR SOLUTION ORAL at 11:52

## 2021-01-01 RX ADMIN — Medication 650 MILLIGRAM(S): at 00:50

## 2021-01-01 RX ADMIN — GABAPENTIN 400 MILLIGRAM(S): 400 CAPSULE ORAL at 05:15

## 2021-01-01 RX ADMIN — Medication 650 MILLIGRAM(S): at 18:14

## 2021-01-01 RX ADMIN — HEPARIN SODIUM 5000 UNIT(S): 5000 INJECTION INTRAVENOUS; SUBCUTANEOUS at 13:53

## 2021-01-01 RX ADMIN — Medication 3 MILLIGRAM(S): at 21:50

## 2021-01-01 RX ADMIN — ONDANSETRON 4 MILLIGRAM(S): 8 TABLET, FILM COATED ORAL at 05:45

## 2021-01-01 RX ADMIN — Medication 40 MILLIGRAM(S): at 17:13

## 2021-01-01 RX ADMIN — ONDANSETRON 4 MILLIGRAM(S): 8 TABLET, FILM COATED ORAL at 18:01

## 2021-01-01 RX ADMIN — SIMVASTATIN 20 MILLIGRAM(S): 20 TABLET, FILM COATED ORAL at 21:52

## 2021-01-01 RX ADMIN — PANTOPRAZOLE SODIUM 40 MILLIGRAM(S): 20 TABLET, DELAYED RELEASE ORAL at 05:22

## 2021-01-01 RX ADMIN — PANTOPRAZOLE SODIUM 40 MILLIGRAM(S): 20 TABLET, DELAYED RELEASE ORAL at 05:02

## 2021-01-01 RX ADMIN — AMLODIPINE BESYLATE 10 MILLIGRAM(S): 2.5 TABLET ORAL at 06:21

## 2021-01-01 RX ADMIN — SENNA PLUS 2 TABLET(S): 8.6 TABLET ORAL at 21:50

## 2021-01-01 RX ADMIN — Medication 1 APPLICATION(S): at 21:39

## 2021-01-01 RX ADMIN — Medication 2000 UNIT(S): at 17:28

## 2021-01-01 RX ADMIN — Medication 6: at 19:03

## 2021-01-01 RX ADMIN — GABAPENTIN 400 MILLIGRAM(S): 400 CAPSULE ORAL at 22:08

## 2021-01-01 RX ADMIN — Medication 2: at 13:36

## 2021-01-01 RX ADMIN — PANTOPRAZOLE SODIUM 40 MILLIGRAM(S): 20 TABLET, DELAYED RELEASE ORAL at 07:11

## 2021-01-01 RX ADMIN — Medication 125 MICROGRAM(S): at 05:09

## 2021-01-01 RX ADMIN — Medication 2: at 17:32

## 2021-01-01 RX ADMIN — AMLODIPINE BESYLATE 10 MILLIGRAM(S): 2.5 TABLET ORAL at 05:53

## 2021-01-01 RX ADMIN — Medication 2000 UNIT(S): at 16:07

## 2021-01-01 RX ADMIN — ESCITALOPRAM OXALATE 30 MILLIGRAM(S): 10 TABLET, FILM COATED ORAL at 12:23

## 2021-01-01 RX ADMIN — Medication 0.5 MILLIGRAM(S): at 22:10

## 2021-01-01 RX ADMIN — ESCITALOPRAM OXALATE 30 MILLIGRAM(S): 10 TABLET, FILM COATED ORAL at 13:53

## 2021-01-01 RX ADMIN — Medication 2000 UNIT(S): at 14:32

## 2021-01-01 RX ADMIN — OXYCODONE HYDROCHLORIDE 5 MILLIGRAM(S): 5 TABLET ORAL at 15:19

## 2021-01-01 RX ADMIN — PIPERACILLIN AND TAZOBACTAM 25 GRAM(S): 4; .5 INJECTION, POWDER, LYOPHILIZED, FOR SOLUTION INTRAVENOUS at 12:19

## 2021-01-01 RX ADMIN — Medication 26 UNIT(S): at 12:20

## 2021-01-01 RX ADMIN — Medication 20 UNIT(S): at 12:39

## 2021-01-01 RX ADMIN — PIPERACILLIN AND TAZOBACTAM 25 GRAM(S): 4; .5 INJECTION, POWDER, LYOPHILIZED, FOR SOLUTION INTRAVENOUS at 01:01

## 2021-01-01 RX ADMIN — Medication 5 MILLIGRAM(S): at 06:21

## 2021-01-01 RX ADMIN — INSULIN GLARGINE 40 UNIT(S): 100 INJECTION, SOLUTION SUBCUTANEOUS at 21:40

## 2021-01-01 RX ADMIN — PANTOPRAZOLE SODIUM 40 MILLIGRAM(S): 20 TABLET, DELAYED RELEASE ORAL at 06:01

## 2021-01-01 RX ADMIN — GABAPENTIN 400 MILLIGRAM(S): 400 CAPSULE ORAL at 05:53

## 2021-01-01 RX ADMIN — SIMVASTATIN 20 MILLIGRAM(S): 20 TABLET, FILM COATED ORAL at 21:08

## 2021-01-01 RX ADMIN — HEPARIN SODIUM 5000 UNIT(S): 5000 INJECTION INTRAVENOUS; SUBCUTANEOUS at 13:51

## 2021-01-01 RX ADMIN — HEPARIN SODIUM 5000 UNIT(S): 5000 INJECTION INTRAVENOUS; SUBCUTANEOUS at 05:20

## 2021-01-01 RX ADMIN — ESCITALOPRAM OXALATE 30 MILLIGRAM(S): 10 TABLET, FILM COATED ORAL at 14:30

## 2021-01-01 RX ADMIN — Medication 1 APPLICATION(S): at 13:54

## 2021-01-01 RX ADMIN — Medication 1 TABLET(S): at 08:37

## 2021-01-01 RX ADMIN — AMLODIPINE BESYLATE 10 MILLIGRAM(S): 2.5 TABLET ORAL at 05:46

## 2021-01-01 RX ADMIN — LIDOCAINE AND PRILOCAINE CREAM 1 APPLICATION(S): 25; 25 CREAM TOPICAL at 13:15

## 2021-01-01 RX ADMIN — INSULIN GLARGINE 54 UNIT(S): 100 INJECTION, SOLUTION SUBCUTANEOUS at 23:50

## 2021-01-01 RX ADMIN — Medication 100 MILLIGRAM(S): at 10:13

## 2021-01-01 RX ADMIN — AMLODIPINE BESYLATE 10 MILLIGRAM(S): 2.5 TABLET ORAL at 05:57

## 2021-01-01 RX ADMIN — HEPARIN SODIUM 5000 UNIT(S): 5000 INJECTION INTRAVENOUS; SUBCUTANEOUS at 21:26

## 2021-01-01 RX ADMIN — Medication 5 MILLIGRAM(S): at 05:45

## 2021-01-01 RX ADMIN — Medication 40 MILLIGRAM(S): at 05:45

## 2021-01-01 RX ADMIN — PALONOSETRON HYDROCHLORIDE 0.25 MILLIGRAM(S): 0.25 INJECTION, SOLUTION INTRAVENOUS at 11:11

## 2021-01-01 RX ADMIN — AMLODIPINE BESYLATE 10 MILLIGRAM(S): 2.5 TABLET ORAL at 05:14

## 2021-01-01 RX ADMIN — HEPARIN SODIUM 5000 UNIT(S): 5000 INJECTION INTRAVENOUS; SUBCUTANEOUS at 05:03

## 2021-01-01 RX ADMIN — Medication 650 MILLIGRAM(S): at 06:45

## 2021-01-01 RX ADMIN — Medication 112 MICROGRAM(S): at 05:02

## 2021-01-01 RX ADMIN — PANTOPRAZOLE SODIUM 40 MILLIGRAM(S): 20 TABLET, DELAYED RELEASE ORAL at 05:06

## 2021-01-01 RX ADMIN — INSULIN GLARGINE 56 UNIT(S): 100 INJECTION, SOLUTION SUBCUTANEOUS at 21:08

## 2021-01-01 RX ADMIN — FAMOTIDINE 20 MILLIGRAM(S): 10 INJECTION INTRAVENOUS at 11:10

## 2021-01-01 RX ADMIN — GABAPENTIN 400 MILLIGRAM(S): 400 CAPSULE ORAL at 21:49

## 2021-01-01 RX ADMIN — GABAPENTIN 400 MILLIGRAM(S): 400 CAPSULE ORAL at 05:08

## 2021-01-01 RX ADMIN — MORPHINE SULFATE 3 MILLIGRAM(S): 50 CAPSULE, EXTENDED RELEASE ORAL at 17:42

## 2021-01-01 RX ADMIN — Medication 40 MILLIGRAM(S): at 17:21

## 2021-01-01 RX ADMIN — AMLODIPINE BESYLATE 10 MILLIGRAM(S): 2.5 TABLET ORAL at 15:39

## 2021-01-01 RX ADMIN — PIPERACILLIN AND TAZOBACTAM 25 GRAM(S): 4; .5 INJECTION, POWDER, LYOPHILIZED, FOR SOLUTION INTRAVENOUS at 00:52

## 2021-01-01 RX ADMIN — HEPARIN SODIUM 5000 UNIT(S): 5000 INJECTION INTRAVENOUS; SUBCUTANEOUS at 21:08

## 2021-01-01 RX ADMIN — Medication 1 TABLET(S): at 09:21

## 2021-01-01 RX ADMIN — Medication 5 MILLIGRAM(S): at 05:50

## 2021-01-01 RX ADMIN — PIPERACILLIN AND TAZOBACTAM 25 GRAM(S): 4; .5 INJECTION, POWDER, LYOPHILIZED, FOR SOLUTION INTRAVENOUS at 01:39

## 2021-01-01 RX ADMIN — AMLODIPINE BESYLATE 10 MILLIGRAM(S): 2.5 TABLET ORAL at 05:48

## 2021-01-01 RX ADMIN — SIMVASTATIN 20 MILLIGRAM(S): 20 TABLET, FILM COATED ORAL at 22:21

## 2021-01-01 RX ADMIN — BUDESONIDE AND FORMOTEROL FUMARATE DIHYDRATE 2 PUFF(S): 160; 4.5 AEROSOL RESPIRATORY (INHALATION) at 21:49

## 2021-01-01 RX ADMIN — ESCITALOPRAM OXALATE 30 MILLIGRAM(S): 10 TABLET, FILM COATED ORAL at 16:11

## 2021-01-01 RX ADMIN — GABAPENTIN 400 MILLIGRAM(S): 400 CAPSULE ORAL at 13:50

## 2021-01-01 RX ADMIN — Medication 125 MICROGRAM(S): at 21:42

## 2021-01-01 RX ADMIN — ONDANSETRON 4 MILLIGRAM(S): 8 TABLET, FILM COATED ORAL at 06:00

## 2021-01-01 RX ADMIN — Medication 100 MILLIGRAM(S): at 17:19

## 2021-01-01 RX ADMIN — ONDANSETRON 4 MILLIGRAM(S): 8 TABLET, FILM COATED ORAL at 21:34

## 2021-01-01 RX ADMIN — Medication 16 UNIT(S): at 17:22

## 2021-01-01 RX ADMIN — Medication 2.5 MILLIGRAM(S): at 05:02

## 2021-01-01 RX ADMIN — SENNA PLUS 2 TABLET(S): 8.6 TABLET ORAL at 21:06

## 2021-01-01 RX ADMIN — INSULIN GLARGINE 44 UNIT(S): 100 INJECTION, SOLUTION SUBCUTANEOUS at 21:47

## 2021-01-01 RX ADMIN — ESCITALOPRAM OXALATE 30 MILLIGRAM(S): 10 TABLET, FILM COATED ORAL at 14:18

## 2021-01-01 RX ADMIN — LIDOCAINE AND PRILOCAINE CREAM 1 APPLICATION(S): 25; 25 CREAM TOPICAL at 19:06

## 2021-01-01 RX ADMIN — Medication 3 MILLILITER(S): at 21:48

## 2021-01-01 RX ADMIN — Medication 3 MILLILITER(S): at 09:55

## 2021-01-01 RX ADMIN — MORPHINE SULFATE 3 MILLIGRAM(S): 50 CAPSULE, EXTENDED RELEASE ORAL at 00:50

## 2021-01-01 RX ADMIN — Medication 1 TABLET(S): at 16:08

## 2021-01-01 RX ADMIN — GABAPENTIN 400 MILLIGRAM(S): 400 CAPSULE ORAL at 21:20

## 2021-01-01 RX ADMIN — Medication 3 MILLILITER(S): at 09:25

## 2021-01-01 RX ADMIN — SIMVASTATIN 20 MILLIGRAM(S): 20 TABLET, FILM COATED ORAL at 21:37

## 2021-01-01 RX ADMIN — Medication 3 MILLILITER(S): at 20:20

## 2021-01-01 RX ADMIN — AMLODIPINE BESYLATE 10 MILLIGRAM(S): 2.5 TABLET ORAL at 06:01

## 2021-01-01 RX ADMIN — SIMVASTATIN 20 MILLIGRAM(S): 20 TABLET, FILM COATED ORAL at 21:29

## 2021-01-01 RX ADMIN — Medication 1 TABLET(S): at 06:03

## 2021-01-01 RX ADMIN — PIPERACILLIN AND TAZOBACTAM 25 GRAM(S): 4; .5 INJECTION, POWDER, LYOPHILIZED, FOR SOLUTION INTRAVENOUS at 13:49

## 2021-01-01 RX ADMIN — Medication 3: at 12:46

## 2021-01-01 RX ADMIN — INSULIN GLARGINE 44 UNIT(S): 100 INJECTION, SOLUTION SUBCUTANEOUS at 21:49

## 2021-01-01 RX ADMIN — PIPERACILLIN AND TAZOBACTAM 25 GRAM(S): 4; .5 INJECTION, POWDER, LYOPHILIZED, FOR SOLUTION INTRAVENOUS at 01:11

## 2021-01-01 RX ADMIN — OXYCODONE HYDROCHLORIDE 5 MILLIGRAM(S): 5 TABLET ORAL at 17:29

## 2021-01-01 RX ADMIN — HEPARIN SODIUM 5000 UNIT(S): 5000 INJECTION INTRAVENOUS; SUBCUTANEOUS at 14:14

## 2021-01-01 RX ADMIN — HEPARIN SODIUM 5000 UNIT(S): 5000 INJECTION INTRAVENOUS; SUBCUTANEOUS at 13:31

## 2021-01-01 RX ADMIN — Medication 3 MILLIGRAM(S): at 22:34

## 2021-01-01 RX ADMIN — ONDANSETRON 4 MILLIGRAM(S): 8 TABLET, FILM COATED ORAL at 14:39

## 2021-01-01 RX ADMIN — Medication 650 MILLIGRAM(S): at 08:08

## 2021-01-01 RX ADMIN — SODIUM CHLORIDE 75 MILLILITER(S): 9 INJECTION INTRAMUSCULAR; INTRAVENOUS; SUBCUTANEOUS at 22:25

## 2021-01-01 RX ADMIN — Medication 2: at 08:53

## 2021-01-01 RX ADMIN — Medication 50 MILLIGRAM(S): at 17:20

## 2021-01-01 RX ADMIN — SENNA PLUS 2 TABLET(S): 8.6 TABLET ORAL at 21:48

## 2021-01-01 RX ADMIN — ONDANSETRON 4 MILLIGRAM(S): 8 TABLET, FILM COATED ORAL at 06:21

## 2021-01-01 RX ADMIN — INSULIN GLARGINE 35 UNIT(S): 100 INJECTION, SOLUTION SUBCUTANEOUS at 21:44

## 2021-01-01 RX ADMIN — Medication 23 UNIT(S): at 17:22

## 2021-01-01 RX ADMIN — HEPARIN SODIUM 5000 UNIT(S): 5000 INJECTION INTRAVENOUS; SUBCUTANEOUS at 16:12

## 2021-01-01 RX ADMIN — Medication 650 MILLIGRAM(S): at 17:18

## 2021-01-01 RX ADMIN — Medication 40 MILLIGRAM(S): at 05:12

## 2021-01-01 RX ADMIN — Medication 50 MILLIGRAM(S): at 10:13

## 2021-01-01 RX ADMIN — Medication 650 MILLIGRAM(S): at 22:07

## 2021-01-01 RX ADMIN — Medication 4: at 08:25

## 2021-01-01 RX ADMIN — POLYETHYLENE GLYCOL 3350 17 GRAM(S): 17 POWDER, FOR SOLUTION ORAL at 06:12

## 2021-01-01 RX ADMIN — SIMVASTATIN 20 MILLIGRAM(S): 20 TABLET, FILM COATED ORAL at 21:51

## 2021-01-01 RX ADMIN — Medication 3 MILLIGRAM(S): at 21:56

## 2021-01-01 RX ADMIN — Medication 20 UNIT(S): at 17:51

## 2021-01-01 RX ADMIN — Medication 650 MILLIGRAM(S): at 05:53

## 2021-01-01 RX ADMIN — MORPHINE SULFATE 3 MILLIGRAM(S): 50 CAPSULE, EXTENDED RELEASE ORAL at 13:28

## 2021-01-01 RX ADMIN — Medication 1 APPLICATION(S): at 14:59

## 2021-01-01 RX ADMIN — Medication 1 PACKET(S): at 05:51

## 2021-01-01 RX ADMIN — Medication 0.5 MILLIGRAM(S): at 09:26

## 2021-01-01 RX ADMIN — Medication 20 UNIT(S): at 08:05

## 2021-01-01 RX ADMIN — Medication 1 TABLET(S): at 05:45

## 2021-01-01 RX ADMIN — Medication 10 MILLIGRAM(S): at 08:38

## 2021-01-01 RX ADMIN — Medication 40 MILLIGRAM(S): at 05:21

## 2021-01-01 RX ADMIN — Medication 2: at 12:20

## 2021-01-01 RX ADMIN — TRAMADOL HYDROCHLORIDE 25 MILLIGRAM(S): 50 TABLET ORAL at 04:56

## 2021-01-01 RX ADMIN — Medication 2: at 16:01

## 2021-01-01 RX ADMIN — Medication 1 UNIT(S): at 00:39

## 2021-01-01 RX ADMIN — Medication 650 MILLIGRAM(S): at 21:44

## 2021-01-01 RX ADMIN — INSULIN GLARGINE 40 UNIT(S): 100 INJECTION, SOLUTION SUBCUTANEOUS at 21:08

## 2021-01-01 RX ADMIN — HEPARIN SODIUM 5000 UNIT(S): 5000 INJECTION INTRAVENOUS; SUBCUTANEOUS at 06:22

## 2021-01-01 RX ADMIN — GABAPENTIN 400 MILLIGRAM(S): 400 CAPSULE ORAL at 13:51

## 2021-01-01 RX ADMIN — Medication 0.5 MILLIGRAM(S): at 20:20

## 2021-01-01 RX ADMIN — MORPHINE SULFATE 2 MILLIGRAM(S): 50 CAPSULE, EXTENDED RELEASE ORAL at 09:01

## 2021-01-01 RX ADMIN — Medication 3: at 17:17

## 2021-01-01 RX ADMIN — Medication 650 MILLIGRAM(S): at 17:38

## 2021-01-01 RX ADMIN — Medication 125 MICROGRAM(S): at 06:35

## 2021-01-01 RX ADMIN — HEPARIN SODIUM 5000 UNIT(S): 5000 INJECTION INTRAVENOUS; SUBCUTANEOUS at 13:08

## 2021-01-01 RX ADMIN — GABAPENTIN 400 MILLIGRAM(S): 400 CAPSULE ORAL at 21:51

## 2021-01-01 RX ADMIN — Medication 20 UNIT(S): at 17:43

## 2021-01-01 RX ADMIN — Medication 0: at 21:40

## 2021-01-01 RX ADMIN — Medication 1 TABLET(S): at 21:26

## 2021-01-01 RX ADMIN — Medication 0.5 MILLIGRAM(S): at 09:42

## 2021-01-01 RX ADMIN — MORPHINE SULFATE 3 MILLIGRAM(S): 50 CAPSULE, EXTENDED RELEASE ORAL at 06:16

## 2021-01-01 RX ADMIN — AMLODIPINE BESYLATE 10 MILLIGRAM(S): 2.5 TABLET ORAL at 05:02

## 2021-01-01 RX ADMIN — Medication 4: at 17:34

## 2021-01-01 RX ADMIN — Medication 40 MILLIGRAM(S): at 18:12

## 2021-01-01 RX ADMIN — Medication 2: at 08:04

## 2021-01-01 RX ADMIN — INSULIN GLARGINE 30 UNIT(S): 100 INJECTION, SOLUTION SUBCUTANEOUS at 22:30

## 2021-01-01 RX ADMIN — MORPHINE SULFATE 3 MILLIGRAM(S): 50 CAPSULE, EXTENDED RELEASE ORAL at 23:34

## 2021-01-01 RX ADMIN — LIDOCAINE AND PRILOCAINE CREAM 1 APPLICATION(S): 25; 25 CREAM TOPICAL at 21:42

## 2021-01-01 RX ADMIN — Medication 2: at 08:29

## 2021-01-01 RX ADMIN — Medication 10 MILLIGRAM(S): at 15:37

## 2021-01-01 RX ADMIN — MORPHINE SULFATE 3 MILLIGRAM(S): 50 CAPSULE, EXTENDED RELEASE ORAL at 22:55

## 2021-01-01 RX ADMIN — Medication 1 APPLICATION(S): at 12:42

## 2021-01-01 RX ADMIN — MORPHINE SULFATE 2 MILLIGRAM(S): 50 CAPSULE, EXTENDED RELEASE ORAL at 09:25

## 2021-01-01 RX ADMIN — Medication 112 MICROGRAM(S): at 05:57

## 2021-01-01 RX ADMIN — Medication 650 MILLIGRAM(S): at 17:00

## 2021-01-01 RX ADMIN — Medication 1 PACKET(S): at 17:09

## 2021-01-01 RX ADMIN — INSULIN GLARGINE 54 UNIT(S): 100 INJECTION, SOLUTION SUBCUTANEOUS at 21:20

## 2021-01-01 RX ADMIN — GABAPENTIN 400 MILLIGRAM(S): 400 CAPSULE ORAL at 13:53

## 2021-01-01 RX ADMIN — Medication 650 MILLIGRAM(S): at 22:24

## 2021-01-01 RX ADMIN — Medication 650 MILLIGRAM(S): at 07:47

## 2021-01-01 RX ADMIN — ESCITALOPRAM OXALATE 30 MILLIGRAM(S): 10 TABLET, FILM COATED ORAL at 12:06

## 2021-01-01 RX ADMIN — SODIUM CHLORIDE 75 MILLILITER(S): 9 INJECTION INTRAMUSCULAR; INTRAVENOUS; SUBCUTANEOUS at 08:40

## 2021-01-01 RX ADMIN — Medication 125 MICROGRAM(S): at 07:11

## 2021-01-01 RX ADMIN — GABAPENTIN 400 MILLIGRAM(S): 400 CAPSULE ORAL at 05:20

## 2021-01-01 RX ADMIN — HEPARIN SODIUM 5000 UNIT(S): 5000 INJECTION INTRAVENOUS; SUBCUTANEOUS at 13:49

## 2021-01-01 RX ADMIN — PANTOPRAZOLE SODIUM 40 MILLIGRAM(S): 20 TABLET, DELAYED RELEASE ORAL at 05:45

## 2021-01-01 RX ADMIN — Medication 1 PACKET(S): at 10:30

## 2021-01-01 RX ADMIN — Medication 10 MILLIGRAM(S): at 05:56

## 2021-01-01 RX ADMIN — Medication 1 TABLET(S): at 06:09

## 2021-01-01 RX ADMIN — Medication 650 MILLIGRAM(S): at 09:24

## 2021-01-01 RX ADMIN — Medication 20 UNIT(S): at 12:54

## 2021-01-01 RX ADMIN — INSULIN GLARGINE 30 UNIT(S): 100 INJECTION, SOLUTION SUBCUTANEOUS at 21:11

## 2021-01-01 RX ADMIN — FAMOTIDINE 20 MILLIGRAM(S): 10 INJECTION INTRAVENOUS at 17:20

## 2021-01-01 RX ADMIN — ONDANSETRON 4 MILLIGRAM(S): 8 TABLET, FILM COATED ORAL at 14:30

## 2021-01-01 RX ADMIN — Medication 600 MILLIGRAM(S): at 17:29

## 2021-01-01 RX ADMIN — PIPERACILLIN AND TAZOBACTAM 25 GRAM(S): 4; .5 INJECTION, POWDER, LYOPHILIZED, FOR SOLUTION INTRAVENOUS at 06:12

## 2021-01-01 RX ADMIN — PANTOPRAZOLE SODIUM 40 MILLIGRAM(S): 20 TABLET, DELAYED RELEASE ORAL at 05:57

## 2021-01-01 RX ADMIN — GABAPENTIN 400 MILLIGRAM(S): 400 CAPSULE ORAL at 21:26

## 2021-01-01 RX ADMIN — Medication 2.5 MILLIGRAM(S): at 06:09

## 2021-01-01 RX ADMIN — Medication 650 MILLIGRAM(S): at 18:04

## 2021-01-01 RX ADMIN — Medication 20 UNIT(S): at 17:32

## 2021-01-01 RX ADMIN — Medication 10 MILLIGRAM(S): at 13:42

## 2021-01-01 RX ADMIN — Medication 1 APPLICATION(S): at 13:32

## 2021-01-01 RX ADMIN — ESCITALOPRAM OXALATE 30 MILLIGRAM(S): 10 TABLET, FILM COATED ORAL at 13:30

## 2021-01-01 RX ADMIN — Medication 10 MILLIGRAM(S): at 17:39

## 2021-01-01 RX ADMIN — Medication 1 TABLET(S): at 05:57

## 2021-01-01 RX ADMIN — Medication 2000 UNIT(S): at 14:18

## 2021-01-01 RX ADMIN — Medication 5 MILLIGRAM(S): at 05:21

## 2021-01-01 RX ADMIN — Medication 650 MILLIGRAM(S): at 19:10

## 2021-01-01 RX ADMIN — Medication 10: at 12:35

## 2021-01-01 RX ADMIN — SODIUM CHLORIDE 75 MILLILITER(S): 9 INJECTION INTRAMUSCULAR; INTRAVENOUS; SUBCUTANEOUS at 16:08

## 2021-01-01 RX ADMIN — Medication 650 MILLIGRAM(S): at 04:10

## 2021-01-01 RX ADMIN — Medication 20 UNIT(S): at 08:15

## 2021-01-01 RX ADMIN — GABAPENTIN 400 MILLIGRAM(S): 400 CAPSULE ORAL at 06:01

## 2021-01-01 RX ADMIN — Medication 20 UNIT(S): at 17:40

## 2021-01-01 RX ADMIN — HEPARIN SODIUM 5000 UNIT(S): 5000 INJECTION INTRAVENOUS; SUBCUTANEOUS at 14:31

## 2021-01-01 RX ADMIN — Medication 650 MILLIGRAM(S): at 01:27

## 2021-01-01 RX ADMIN — CALCITONIN SALMON 310 INTERNATIONAL UNIT(S): 200 INJECTION, SOLUTION INTRAMUSCULAR at 18:06

## 2021-01-01 RX ADMIN — MORPHINE SULFATE 3 MILLIGRAM(S): 50 CAPSULE, EXTENDED RELEASE ORAL at 22:08

## 2021-01-01 RX ADMIN — Medication 20 UNIT(S): at 07:53

## 2021-01-01 RX ADMIN — Medication 3 MILLILITER(S): at 22:12

## 2021-01-01 RX ADMIN — Medication 10 MILLIGRAM(S): at 05:02

## 2021-01-01 RX ADMIN — Medication 20 UNIT(S): at 12:35

## 2021-01-01 RX ADMIN — SIMVASTATIN 20 MILLIGRAM(S): 20 TABLET, FILM COATED ORAL at 21:24

## 2021-01-01 RX ADMIN — Medication 2: at 16:53

## 2021-01-01 RX ADMIN — Medication 250 MILLIGRAM(S): at 16:16

## 2021-01-01 RX ADMIN — ONDANSETRON 4 MILLIGRAM(S): 8 TABLET, FILM COATED ORAL at 18:38

## 2021-01-01 RX ADMIN — Medication 2: at 17:39

## 2021-01-01 RX ADMIN — Medication 2: at 21:38

## 2021-01-01 RX ADMIN — SIMVASTATIN 20 MILLIGRAM(S): 20 TABLET, FILM COATED ORAL at 21:41

## 2021-01-01 RX ADMIN — Medication 1 PACKET(S): at 00:00

## 2021-01-01 RX ADMIN — Medication 50 GRAM(S): at 13:29

## 2021-01-01 RX ADMIN — Medication 1 APPLICATION(S): at 12:06

## 2021-01-01 RX ADMIN — Medication 650 MILLIGRAM(S): at 16:07

## 2021-01-01 RX ADMIN — TRAMADOL HYDROCHLORIDE 25 MILLIGRAM(S): 50 TABLET ORAL at 21:05

## 2021-01-01 RX ADMIN — Medication 4: at 12:25

## 2021-01-01 RX ADMIN — GABAPENTIN 400 MILLIGRAM(S): 400 CAPSULE ORAL at 14:18

## 2021-01-01 RX ADMIN — Medication 1 PACKET(S): at 11:53

## 2021-01-01 RX ADMIN — Medication 5 MILLIGRAM(S): at 16:07

## 2021-01-01 RX ADMIN — Medication 5 MILLIGRAM(S): at 05:09

## 2021-01-01 RX ADMIN — Medication 10 MILLIGRAM(S): at 05:04

## 2021-01-01 RX ADMIN — Medication 2000 UNIT(S): at 12:43

## 2021-01-01 RX ADMIN — SIMVASTATIN 20 MILLIGRAM(S): 20 TABLET, FILM COATED ORAL at 21:36

## 2021-01-01 RX ADMIN — Medication 8: at 17:02

## 2021-01-01 RX ADMIN — HEPARIN SODIUM 5000 UNIT(S): 5000 INJECTION INTRAVENOUS; SUBCUTANEOUS at 21:28

## 2021-01-01 RX ADMIN — PANTOPRAZOLE SODIUM 40 MILLIGRAM(S): 20 TABLET, DELAYED RELEASE ORAL at 05:29

## 2021-01-01 RX ADMIN — HEPARIN SODIUM 5000 UNIT(S): 5000 INJECTION INTRAVENOUS; SUBCUTANEOUS at 05:14

## 2021-01-01 RX ADMIN — HEPARIN SODIUM 5000 UNIT(S): 5000 INJECTION INTRAVENOUS; SUBCUTANEOUS at 21:51

## 2021-01-01 RX ADMIN — SIMETHICONE 80 MILLIGRAM(S): 80 TABLET, CHEWABLE ORAL at 02:20

## 2021-01-01 RX ADMIN — Medication 5 MILLIGRAM(S): at 05:29

## 2021-01-01 RX ADMIN — Medication 175 MICROGRAM(S): at 08:49

## 2021-01-01 RX ADMIN — Medication 4: at 11:52

## 2021-01-01 RX ADMIN — Medication 10 MILLIGRAM(S): at 17:08

## 2021-01-01 RX ADMIN — Medication 3 MILLIGRAM(S): at 21:38

## 2021-01-01 RX ADMIN — MORPHINE SULFATE 3 MILLIGRAM(S): 50 CAPSULE, EXTENDED RELEASE ORAL at 23:43

## 2021-01-01 RX ADMIN — Medication 650 MILLIGRAM(S): at 11:30

## 2021-01-01 RX ADMIN — Medication 2: at 08:23

## 2021-01-01 RX ADMIN — LACTULOSE 10 GRAM(S): 10 SOLUTION ORAL at 15:33

## 2021-01-01 RX ADMIN — Medication 10 MILLIGRAM(S): at 05:08

## 2021-01-01 RX ADMIN — Medication 10 MILLIGRAM(S): at 18:35

## 2021-01-01 RX ADMIN — AMLODIPINE BESYLATE 10 MILLIGRAM(S): 2.5 TABLET ORAL at 05:56

## 2021-01-01 RX ADMIN — Medication 2.5 MILLIGRAM(S): at 15:37

## 2021-01-01 RX ADMIN — Medication 1 TABLET(S): at 05:04

## 2021-01-01 RX ADMIN — HEPARIN SODIUM 5000 UNIT(S): 5000 INJECTION INTRAVENOUS; SUBCUTANEOUS at 13:21

## 2021-01-01 RX ADMIN — GABAPENTIN 400 MILLIGRAM(S): 400 CAPSULE ORAL at 13:33

## 2021-01-01 RX ADMIN — Medication 24 UNIT(S): at 12:52

## 2021-01-01 RX ADMIN — GABAPENTIN 400 MILLIGRAM(S): 400 CAPSULE ORAL at 05:05

## 2021-01-01 RX ADMIN — CALCITONIN SALMON 310 INTERNATIONAL UNIT(S): 200 INJECTION, SOLUTION INTRAMUSCULAR at 18:18

## 2021-01-01 RX ADMIN — AMLODIPINE BESYLATE 10 MILLIGRAM(S): 2.5 TABLET ORAL at 05:29

## 2021-01-01 RX ADMIN — SENNA PLUS 2 TABLET(S): 8.6 TABLET ORAL at 22:24

## 2021-01-01 RX ADMIN — GABAPENTIN 400 MILLIGRAM(S): 400 CAPSULE ORAL at 13:07

## 2021-01-01 RX ADMIN — HEPARIN SODIUM 5000 UNIT(S): 5000 INJECTION INTRAVENOUS; SUBCUTANEOUS at 21:48

## 2021-01-01 RX ADMIN — Medication 1 APPLICATION(S): at 12:02

## 2021-01-01 RX ADMIN — Medication 1: at 08:17

## 2021-01-01 RX ADMIN — INSULIN GLARGINE 56 UNIT(S): 100 INJECTION, SOLUTION SUBCUTANEOUS at 21:42

## 2021-01-01 RX ADMIN — Medication 175 MICROGRAM(S): at 16:08

## 2021-01-01 RX ADMIN — HEPARIN SODIUM 5000 UNIT(S): 5000 INJECTION INTRAVENOUS; SUBCUTANEOUS at 06:08

## 2021-01-01 RX ADMIN — ONDANSETRON 4 MILLIGRAM(S): 8 TABLET, FILM COATED ORAL at 10:08

## 2021-01-01 RX ADMIN — Medication 10 MILLIGRAM(S): at 17:18

## 2021-01-01 RX ADMIN — Medication 24 UNIT(S): at 19:04

## 2021-01-01 RX ADMIN — Medication 650 MILLIGRAM(S): at 12:38

## 2021-01-01 RX ADMIN — HEPARIN SODIUM 5000 UNIT(S): 5000 INJECTION INTRAVENOUS; SUBCUTANEOUS at 05:09

## 2021-01-01 RX ADMIN — GABAPENTIN 400 MILLIGRAM(S): 400 CAPSULE ORAL at 21:48

## 2021-01-01 RX ADMIN — Medication 2: at 12:35

## 2021-01-01 RX ADMIN — Medication 10 MILLIGRAM(S): at 05:57

## 2021-01-01 RX ADMIN — Medication 650 MILLIGRAM(S): at 23:15

## 2021-01-01 RX ADMIN — Medication 650 MILLIGRAM(S): at 00:00

## 2021-01-01 RX ADMIN — GABAPENTIN 400 MILLIGRAM(S): 400 CAPSULE ORAL at 21:38

## 2021-01-01 RX ADMIN — Medication 1 APPLICATION(S): at 16:10

## 2021-01-01 RX ADMIN — HEPARIN SODIUM 5000 UNIT(S): 5000 INJECTION INTRAVENOUS; SUBCUTANEOUS at 21:37

## 2021-01-01 RX ADMIN — SODIUM CHLORIDE 75 MILLILITER(S): 9 INJECTION INTRAMUSCULAR; INTRAVENOUS; SUBCUTANEOUS at 18:08

## 2021-01-01 RX ADMIN — Medication 23 UNIT(S): at 08:29

## 2021-01-01 RX ADMIN — Medication 2.5 MILLIGRAM(S): at 05:54

## 2021-01-01 RX ADMIN — PACLITAXEL 166.67 MILLIGRAM(S): 6 INJECTION, SOLUTION, CONCENTRATE INTRAVENOUS at 12:42

## 2021-01-01 RX ADMIN — Medication 20 MILLIGRAM(S): at 18:06

## 2021-01-01 RX ADMIN — Medication 125 MICROGRAM(S): at 05:50

## 2021-01-01 RX ADMIN — INSULIN GLARGINE 40 UNIT(S): 100 INJECTION, SOLUTION SUBCUTANEOUS at 21:22

## 2021-01-01 RX ADMIN — Medication 2: at 12:03

## 2021-01-01 RX ADMIN — Medication 112 MICROGRAM(S): at 06:09

## 2021-01-01 RX ADMIN — HEPARIN SODIUM 5000 UNIT(S): 5000 INJECTION INTRAVENOUS; SUBCUTANEOUS at 06:03

## 2021-01-01 RX ADMIN — Medication 20 UNIT(S): at 08:23

## 2021-01-01 RX ADMIN — SIMVASTATIN 20 MILLIGRAM(S): 20 TABLET, FILM COATED ORAL at 21:35

## 2021-01-01 RX ADMIN — Medication 650 MILLIGRAM(S): at 05:45

## 2021-01-01 RX ADMIN — Medication 1 APPLICATION(S): at 16:07

## 2021-01-01 RX ADMIN — Medication 3 MILLILITER(S): at 04:25

## 2021-01-01 RX ADMIN — ESCITALOPRAM OXALATE 30 MILLIGRAM(S): 10 TABLET, FILM COATED ORAL at 12:02

## 2021-01-01 RX ADMIN — HEPARIN SODIUM 5000 UNIT(S): 5000 INJECTION INTRAVENOUS; SUBCUTANEOUS at 05:08

## 2021-01-01 RX ADMIN — GABAPENTIN 400 MILLIGRAM(S): 400 CAPSULE ORAL at 21:35

## 2021-01-01 RX ADMIN — GABAPENTIN 400 MILLIGRAM(S): 400 CAPSULE ORAL at 21:32

## 2021-01-01 RX ADMIN — Medication 650 MILLIGRAM(S): at 17:52

## 2021-01-01 RX ADMIN — Medication 1 APPLICATION(S): at 11:34

## 2021-01-01 RX ADMIN — Medication 20 UNIT(S): at 12:00

## 2021-01-01 RX ADMIN — Medication 3 MILLIGRAM(S): at 21:30

## 2021-01-01 RX ADMIN — Medication 10 MILLIGRAM(S): at 17:37

## 2021-01-01 RX ADMIN — ONDANSETRON 4 MILLIGRAM(S): 8 TABLET, FILM COATED ORAL at 05:09

## 2021-01-01 RX ADMIN — Medication 3 MILLILITER(S): at 20:14

## 2021-01-01 RX ADMIN — Medication 40 MILLIGRAM(S): at 17:47

## 2021-01-01 RX ADMIN — GABAPENTIN 400 MILLIGRAM(S): 400 CAPSULE ORAL at 05:02

## 2021-01-01 RX ADMIN — AMLODIPINE BESYLATE 10 MILLIGRAM(S): 2.5 TABLET ORAL at 16:05

## 2021-01-01 RX ADMIN — ESCITALOPRAM OXALATE 30 MILLIGRAM(S): 10 TABLET, FILM COATED ORAL at 12:41

## 2021-01-01 RX ADMIN — GABAPENTIN 400 MILLIGRAM(S): 400 CAPSULE ORAL at 05:26

## 2021-01-01 RX ADMIN — Medication 650 MILLIGRAM(S): at 17:20

## 2021-01-01 RX ADMIN — MORPHINE SULFATE 3 MILLIGRAM(S): 50 CAPSULE, EXTENDED RELEASE ORAL at 07:39

## 2021-01-01 RX ADMIN — TRAMADOL HYDROCHLORIDE 25 MILLIGRAM(S): 50 TABLET ORAL at 05:50

## 2021-01-01 RX ADMIN — AMLODIPINE BESYLATE 10 MILLIGRAM(S): 2.5 TABLET ORAL at 05:09

## 2021-01-01 RX ADMIN — Medication 25 GRAM(S): at 12:40

## 2021-01-01 RX ADMIN — MORPHINE SULFATE 3 MILLIGRAM(S): 50 CAPSULE, EXTENDED RELEASE ORAL at 16:41

## 2021-01-01 RX ADMIN — ONDANSETRON 4 MILLIGRAM(S): 8 TABLET, FILM COATED ORAL at 14:06

## 2021-01-01 RX ADMIN — CALCITONIN SALMON 310 INTERNATIONAL UNIT(S): 200 INJECTION, SOLUTION INTRAMUSCULAR at 06:03

## 2021-01-01 RX ADMIN — Medication 5 MILLIGRAM(S): at 06:01

## 2021-01-01 RX ADMIN — CALCITONIN SALMON 310 INTERNATIONAL UNIT(S): 200 INJECTION, SOLUTION INTRAMUSCULAR at 20:41

## 2021-01-01 RX ADMIN — Medication 12 UNIT(S): at 08:38

## 2021-01-01 RX ADMIN — INSULIN GLARGINE 34 UNIT(S): 100 INJECTION, SOLUTION SUBCUTANEOUS at 21:33

## 2021-01-01 RX ADMIN — ESCITALOPRAM OXALATE 20 MILLIGRAM(S): 10 TABLET, FILM COATED ORAL at 16:35

## 2021-01-01 RX ADMIN — Medication 40 MILLIGRAM(S): at 05:33

## 2021-01-01 RX ADMIN — Medication 1 APPLICATION(S): at 11:54

## 2021-01-01 RX ADMIN — HEPARIN SODIUM 5000 UNIT(S): 5000 INJECTION INTRAVENOUS; SUBCUTANEOUS at 22:09

## 2021-01-01 RX ADMIN — GABAPENTIN 400 MILLIGRAM(S): 400 CAPSULE ORAL at 14:14

## 2021-01-01 RX ADMIN — Medication 20 UNIT(S): at 17:13

## 2021-01-01 RX ADMIN — Medication 23 UNIT(S): at 12:25

## 2021-01-01 RX ADMIN — Medication 2000 UNIT(S): at 13:32

## 2021-01-01 RX ADMIN — ONDANSETRON 4 MILLIGRAM(S): 8 TABLET, FILM COATED ORAL at 15:59

## 2021-01-01 RX ADMIN — SIMVASTATIN 20 MILLIGRAM(S): 20 TABLET, FILM COATED ORAL at 21:26

## 2021-01-01 RX ADMIN — SIMVASTATIN 20 MILLIGRAM(S): 20 TABLET, FILM COATED ORAL at 22:20

## 2021-01-01 RX ADMIN — GABAPENTIN 400 MILLIGRAM(S): 400 CAPSULE ORAL at 14:31

## 2021-01-01 RX ADMIN — Medication 2: at 22:22

## 2021-01-01 RX ADMIN — INSULIN GLARGINE 40 UNIT(S): 100 INJECTION, SOLUTION SUBCUTANEOUS at 21:49

## 2021-01-01 RX ADMIN — Medication 40 MILLIGRAM(S): at 17:26

## 2021-01-01 RX ADMIN — Medication 600 MILLIGRAM(S): at 15:36

## 2021-01-01 RX ADMIN — HEPARIN SODIUM 5000 UNIT(S): 5000 INJECTION INTRAVENOUS; SUBCUTANEOUS at 14:47

## 2021-01-01 RX ADMIN — GABAPENTIN 400 MILLIGRAM(S): 400 CAPSULE ORAL at 21:24

## 2021-01-01 RX ADMIN — ONDANSETRON 4 MILLIGRAM(S): 8 TABLET, FILM COATED ORAL at 21:24

## 2021-01-01 RX ADMIN — PIPERACILLIN AND TAZOBACTAM 25 GRAM(S): 4; .5 INJECTION, POWDER, LYOPHILIZED, FOR SOLUTION INTRAVENOUS at 18:37

## 2021-01-01 RX ADMIN — ONDANSETRON 4 MILLIGRAM(S): 8 TABLET, FILM COATED ORAL at 06:01

## 2021-01-01 RX ADMIN — Medication 125 MICROGRAM(S): at 05:52

## 2021-01-01 RX ADMIN — Medication 125 MICROGRAM(S): at 05:30

## 2021-01-01 RX ADMIN — GABAPENTIN 400 MILLIGRAM(S): 400 CAPSULE ORAL at 13:22

## 2021-01-01 RX ADMIN — SENNA PLUS 2 TABLET(S): 8.6 TABLET ORAL at 21:34

## 2021-01-01 RX ADMIN — MORPHINE SULFATE 3 MILLIGRAM(S): 50 CAPSULE, EXTENDED RELEASE ORAL at 17:12

## 2021-01-01 RX ADMIN — HEPARIN SODIUM 5000 UNIT(S): 5000 INJECTION INTRAVENOUS; SUBCUTANEOUS at 22:20

## 2021-01-01 RX ADMIN — Medication 3 MILLIGRAM(S): at 02:12

## 2021-01-01 RX ADMIN — Medication 6: at 17:40

## 2021-02-10 PROBLEM — D48.5 NEOPLASM OF UNCERTAIN BEHAVIOR OF SKIN: Status: ACTIVE | Noted: 2020-08-25

## 2021-02-10 NOTE — PROCEDURE
[Vulvar Biopsy] : a vulvar biopsy [Other: ___] : [unfilled] [Patient] : the patient [Written consent] : written consent was obtained prior to the procedure and is detailed in the patient's record [Site Verification] : site verification performed. [Time Out] : Time Out Procedure:The following was confirmed prior to the procedure: Correct patient identity, correct site, agreement on the procedure to be done and correct patient position. [Allergies Reviewed] : allergies were reviewed [1% Lidocaine ___(ml)] :  [unfilled]Uml of 1% Lidocaine [Betadine] : betadine [Yes] : the specimen was sent to pathology [Silver Nitrate] : silver nitrate [No Complications] : none [Tolerated Well] : the patient tolerated the procedure well [Post procedure instructions and information given] : post procedure instructions and information given and reviewed with patient. [FreeTextEntry1] : Biopsies of left labia and anterior perianal skin

## 2021-02-10 NOTE — PHYSICAL EXAM
[Normal] : No focal neurologic defects observed [Abnormal] : Anus, perineum, and rectum: Abnormal [de-identified] : white and slightly ulcerated plaques on left labia minora extending onto posterior fourchette [de-identified] : white epithelium with a small raised area on an anterior hemorrhoid [Fully active, able to carry on all pre-disease performance without restriction] : Status 0 - Fully active, able to carry on all pre-disease performance without restriction

## 2021-02-10 NOTE — HISTORY OF PRESENT ILLNESS
[FreeTextEntry1] : History of locally recurrence vulvar cancer. \par \par Initial diagnosis of IB SCCA anterior vulva in 2014 treated with radical anterior vulvectomy and bilateral SLNMB. \par Developed autoimmune hemolytic anemia during recovery. \par \par Recurrence with microscopic invasive SCCA within dysplasia in scar line resected in 12/2017. \par \par 3/2020 began to have increased vulvar pain and itching. Biopsies in 3/2020 and 6/2020 with lichen sclerosis only. \par \par Ultimately visible lesion developed and biopsy in 9/2020 demonstrated recurrence. \par \par Underwent radical excision and V-Y advancement flap reconstruction with right SLNMB 10/18/20. Negative margins. Uncomplicated postoperative recuperation. dVIN on right sided biopsy. \par \par She has a longstanding history of tobacco use. \par \par Today returns for a surveillance visit. \par Complains of increased burning and irritation of the left vulva. Also notes more hemorrhoidal pain.

## 2021-02-11 NOTE — PHYSICAL THERAPY INITIAL EVALUATION ADULT - ADDITIONAL COMMENTS
Patients wife on verbal calling in regards to symptoms that started yesterday, he has black stools, very loose and liquid. He did have some abdominal pain. He did try pepto bismol this morning which did not help much, please see GI screening per below. They have not contacted GI in regards to this, wanted to start with Dr. Ashton. Advised that they should contact Dr. Dent's office, to Dr. Ashotn any recommendations at this time for patient, patient to come in and see you or refer to GI?        1-How many stools and what is the consistency? 8-10 episodes of loose stools  2- Are having the same symptoms when seen by the doctor? NA  3- What color is the stool,  bright red or black or tarry? Black and tarry  4-Any nausea or vomiting ? Nausea   5-Any cramping or rectal pain or abdominal pain? Abdominal pain/cramping and abdomen feels hard  6-Is the blood on the toilet paper /in the stool/ in the toilet water? NA  7-Are they on any blood thinners? Aspirin   8-Any weakness or lethargy? Weakness, no dizziness     Patient reports she lives with her , daughter and grandsons in a private house, 4 steps to enter and 1 flight within. Patient reports she was previously independent in all ADLs and did not use an assistive device for ambulation.     Patient was left sitting in recliner as found, all lines/tubes intact and call salazar within reach, [facility]

## 2021-03-17 NOTE — HISTORY OF PRESENT ILLNESS
[FreeTextEntry1] : Patient is 65 yo female, presents for consultation of vulvar cancer possible mets to anorectal. Patient with h/o vulvar cancer dx in 2014. Had a recent follow up with Dr. NESSA Sauer last month, had a biopsy of vulva and perianal skin done, path of perianal skin showed SCC in situ. Patient having perianal pain, soreness, bleeding and clear drainage which began in January 2021. She also has abdominal bloating and constipation, BM every 2-3 days. Patient had colonoscopy 2 years ago but reports it was incomplete. \par

## 2021-03-17 NOTE — PHYSICAL EXAM
[Normal Breath Sounds] : Normal breath sounds [Normal Heart Sounds] : normal heart sounds [Normal Rate and Rhythm] : normal rate and rhythm [Alert] : alert [Oriented to Person] : oriented to person [Oriented to Place] : oriented to place [Oriented to Time] : oriented to time [Anxious] : anxious [Abdomen Masses] : No abdominal masses [Tender] : nontender [de-identified] : round, +BS [de-identified] : Vulvar skin changes noted, right anterior hemorrhoid with suspicious lesion on anoscopy [de-identified] : well nourished [de-identified] : NC/AT [de-identified] : +ROM/HAMIDA [de-identified] : intact

## 2021-03-17 NOTE — ASSESSMENT
[FreeTextEntry1] : 58-year-old female with a history of squamous cell carcinoma of the vulva presents for evaluation of suspicious lesion on anus.\par \par I explained to the patient that I will perform an excisional biopsy which includes her right anterior hemorrhoid as well as excision of gluteal skin tag. The risks and benefits including but not limited to bleeding infection as well as worsening incontinence were explained. The patient understands and is willing to proceed.\par \par The patient should ultimately also have a colonoscopy.\par \par I had an extensive discussion of the risks and benefits of performing a colonoscopy with the patient.  I explained the risks, which include but are not limited to, bleeding, infection, as well as perforation requiring emergent operative intervention and likely a colostomy. the patient understands these risks and is willing to proceed with their colonoscopy.\par

## 2021-03-17 NOTE — REVIEW OF SYSTEMS
[As Noted in HPI] : as noted in HPI [Arthralgias] : arthralgias [Negative] : Heme/Lymph [FreeTextEntry5] : HTN, HLD [FreeTextEntry8] : vulvar cancer [de-identified] : DM, hypothyroidism

## 2021-03-25 NOTE — H&P PST ADULT - NSICDXPROBLEM_GEN_ALL_CORE_FT
PROBLEM DIAGNOSES  Problem: Residual hemorrhoidal skin tag  Assessment and Plan: coming in for hemorrhoidectomy excision of skin tags  excisional biopsy    Problem: Diabetes mellitus, type 2  Assessment and Plan: reduce Tresiba 20% finggerstick DOS

## 2021-03-25 NOTE — H&P PST ADULT - LAST ECHOCARDIOGRAM
Office Visit Note  The patient is an 88 year old  female who presents to the office today for  6-month followup of chronic health concerns including essential  hypertension, impaired fasting glucose, hyperparathyroidism,  hypothyroidism, GERD and degenerative arthritis. The patient reports that  overall she has been feeling well. She has been working on making healthy  food choices and portion control. The patient has had labs completed  prior to her appointment. These will be reviewed with her. She also  needs refills on her medications.   1. Essential hypertension, well controlled at this time on Lisinopril 30  mg 1 tab p.o. daily and Metoprolol 50 mg 1 tab p.o. daily. The patient reports complaints of chronic fatigue, feelings of lightheadedness and disequilibrium. She reports that she does not tolerate activity as well as she would like. She states that she is no longer ambulating to the end of the driveway to  her mail do to feelings of \"woozy\".  She does have chronic headaches related to her neck pain and arthritic  condition. Patient reports that she is working with a chiropractor at this time.  2. Impaired fasting glucose, stable at this time. Diet controlled. The  patient continues to adhere to a diet with healthy food choices and  portion control.  Kidney function remains stable. She has not noted any concerns with delayed wound healing. Denies having any visual changes. She  chronically is struggling with feelings of lightheadedness and disequilibrium, had been under the care of audiology. Reports that audiology indicated no additional treatments would be indicated. Patient reports that she is frustrated and would like to pursue additional diagnostic workup.  3. Hypothyroidism. Patient's TSH level is therapeutic, despite this being WNL patient does have multiple complaints referencing fatigue, headache, intermittent heart palpitations, dry skin, and overall just not feeling well.  4. Degenerative  arthritis, mainly to shoulders and cervical spine. The  patient had been taking Celebrex. She is unsure if all that therapeutic. She chose not to take the medication when she found out the expense. Therefore, never picked it up from the pharmacy. She also believes that the Celebrex was causing GI upset. Therefore, she has resorted to taking Ibuprofen 800 mg p.o. B.i.d. and she finds this to be fairly therapeutic without any GI upset.  5. Hyperparathyroidism. We are finding that the patient's overall  calcium level is stable.  She has had diagnostic workup in reference to hyperparathyroidism, including a thyroid/neck ultrasound as well as CT scan which was negative for any parathyroid adenomas.    Additional review of systems: Patient reports that her balance is bad yet not related to dizziness. Patient reports that she utilizes her walker at all times. Patient indicates that she does have underlying history of significant arthritis to bilateral knees, hands, fingers, and wrist. Patient reports that the left knee is quite painful; patient reports now that she has healed from the skin excision of melanoma she is cleared to proceed with left total knee replacement. Patient reports that she is quite concerned about pursuing this and is not ready to proceed. Patient states that her low back has been \"pretty good\" and patient plans to continue seeing the chiropractor. Patient feels as though she still does express intermittent swelling to bilateral ankles left greater than right. Patient feels as though her balance isn't the greatest and she does tend to stumble at times. Patient does take Excedrin 1 tablet p.o. q.24 hours for headaches in addition to 4 ibuprofen b.i.d. for arthritic pain in the left knee and other joints. Patient is also adds that she does experience ringing in her left ear in addition to urinary incontinence. Patient does wear a protective incontinence pad at all times.    Past Medical History:    Diagnosis Date   • Arthritis     degenerative   • Body mass index (BMI) of 36.0-36.9 in adult 8/8/2016   • Chronic migraine 8/27/2015   • CKD (chronic kidney disease) stage 3, GFR 30-59 ml/min 2/20/2017   • Diverticulosis    • Essential hypertension    • GERD (gastroesophageal reflux disease)    • History of CHF (congestive heart failure) 1/5/2017   • Hyperparathyroidism (CMS/[facility])    • Hypothyroidism    • Left leg cellulitis 3/20/15   • Melanoma (CMS/[facility]) Aug. 2016    left lower extremity   • Osteopenia 07/26/2010   • Venous stasis dermatitis of left lower extremity 8/27/2015   • Vertigo    • Vitamin D deficiency          MEDICATIONS:  Current Outpatient Prescriptions   Medication Sig Dispense Refill   • Cholecalciferol (VITAMIN D3) 5000 UNITS Tab Take 1 tablet by mouth daily.     • levothyroxine (SYNTHROID, LEVOTHROID) 137 MCG tablet Take 1 tablet by mouth daily. 30 tablet 5   • lisinopril (PRINIVIL,ZESTRIL) 30 MG tablet Take 1 tablet by mouth daily. Indications: High Blood Pressure Disorder 30 tablet 5   • metoPROLOL (TOPROL-XL) 50 MG 24 hr tablet Take 1 tablet by mouth daily. Indications: High Blood Pressure Disorder 30 tablet 5   • NYSTATIN, TOPICAL, Powder Apply to area as directed three times daily 1 each 1   • Carboxymeth-Glycerin-Polysorb (REFRESH OPTIVE ADVANCED OP) Apply to eye 3 times daily.     • triamcinolone (ARISTOCORT) 0.1 % ointment Indications: Inflammation Apply in the morning and the evening as needed to the left lower leg. 30 g 2   • Aspirin-Acetaminophen-Caffeine (EXCEDRIN EXTRA STRENGTH PO) Take 2 tablets by mouth every 6 hours as needed. Take for headaches.       No current facility-administered medications for this visit.      ALLERGIES:   Allergen Reactions   • Adhesive Other (See Comments)     tape adhesive  red raised inflammation, itching  looks like a burn   • Amoxicillin RASH   • Blue Dyes Other (See Comments)     Disorientation, confuseion, dizziness   • Green Dyes Other (See  Comments)     Disorientation, confuseion, dizziness   • Red Dye Other (See Comments)     Causes disorientation, confusion, dizziness   • Wound Dressing Adhesive RASH   • Yellow Dye      Yellow dye No. 6 (Sunset yellow). Dyspnea.        IMMUNIZATIONS:  Immunization History   Administered Date(s) Administered   • Hepatitis B Adult 2017, 2017, 2017   • Influenza 2000, 10/31/2002, 10/26/2009, 10/21/2010, 2011, 10/05/2012, 2013, 2016   • Influenza High Dose Pres Free 2016   • Pneumococcal Conjugate 13 Valent 2015   • Pneumococcal Polysaccharide Adult 10/26/1999   • Td:Adult type tetanus/diphtheria 2000, 2010   • Tdap 2013   :    DIAGNOSTIC LABORATORY:  Lab Results   Component Value Date    SODIUM 142 2017    POTASSIUM 4.8 2017    CHLORIDE 107 2017    CO2 26 2017    BUN 21 (H) 2017    CREATININE 0.90 2017    GLUCOSE 97 2017       TSH (mcUnits/mL)   Date Value   2017 4.263     Lab Results   Component Value Date    CHOLESTEROL 169 2017    HDL 45 (L) 2017    CALCLDL 100 2017    TRIGLYCERIDE 120 2017        PAST FAMILY MEDICAL HISTORY:  Mother with history of bladder cancer, diagnosed at age 75,  at  age 76. Father with history of HTN and MI,  in his 80s. She had  6 sisters, one with history of breast cancer diagnosed in her 50s,   at age 58, another sister with history of hyperlipidemia and  another sister with history of thyroid disease. She had 3 brothers, one  with history of leukemia,  at the age of 39, 2 with underlying  history of heart disease,  secondary to MI, one at age 60 and one  at age 58.  Family History   Problem Relation Age of Onset   • Cancer Mother 75     bladder   • Hypertension Father    • Heart Father      MI    • Cancer Sister      breast   • Lipids Sister    • Thyroid Sister    • Cancer Brother      leukemia   • Cancer  Daughter 45     breast   • Diabetes Son    • Endocrine Disorder Sister      Lupus   • Breast cancer Daughter      bilateral; recurrent (chemo therapy)     Past Surgical History:   Procedure Laterality Date   • ANES I&D HEMATOMA/SEROMA/FLUID COLLEC Left 10/04/2016    left groin evacuation, hematoma, seroma and lymph node   • BIOPSY OF SKIN LESION Left 07/28/2016    left lower extremity-punch biopsy   • CHOLECYSTECTOMY  age 48   • COLONOSCOPY W BIOPSY  02/17/2009   • DEXA BONE DENSITY AXIAL SKELETON  07/26/2010   • EYE SURGERY  2011    bilateral cataract   • FLEXIBLE SIGMOIDOSCOPY  02/17/2009   • HYSTERECTOMY  age 48    bilateral oophorectomy   • SENTINEL LYMPH NODE BIOPSY Left 08/12/16   • SKIN LESION EXCISION Left 08/12/16    excision left lower leg melanoma   • TOTAL KNEE REPLACEMENT      Knee replacement, right         SOCIAL HISTORY:  The patient is . She resides on her own. She does have 2  children. She does have issues with hearing. She ambulates with a cane  as well as a walker. Her bathroom does have assistive devices in regards  to grab bars. She states that she does not utilize a tub. She manages  her own medications and meal preparation. She denies any recreational  drug use or other prescription narcotics that have not been prescribed to  her. She does not have a regular exercise regimen. She is a nonsmoker  and does not consume any alcohol.  Social History     Social History   • Marital status:      Spouse name: N/A   • Number of children: 2   • Years of education: 8     Occupational History   • Retired      factory work, housewife     Social History Main Topics   • Smoking status: Never Smoker   • Smokeless tobacco: Never Used   • Alcohol use No   • Drug use: No   • Sexual activity: Not Currently     Partners: Male     Other Topics Concern   •  Service No   • Blood Transfusions No   • Caffeine Concern No   • Occupational Exposure No   • Hobby Hazards No   • Sleep Concern No   •  Stress Concern No   • Weight Concern No   • Special Diet No   • Back Care No   • Exercise No   • Bike Helmet No   • Seat Belt Yes   • Self-Exams No     Social History Narrative   • No narrative on file     PHYSICAL EXAMINATION:  GENERAL: The patient is a pleasant, well-nourished, well-developed  88 year old   female who is in no acute distress. She is able to  clearly articulate her needs and concerns. She is clean and well groomed.  Mood and affect appropriate.  Ambulatory with a wheeled walker  Visit Vitals  /86 (BP [place] [facility] [place] Position: Sitting, Cuff Size: Regular)   Pulse 84   Ht 5' 4\" (1.626 m)   Wt 99.3 kg   BMI 37.59 kg/m²       SKIN: Overall is pink, warm and dry without concerning rash.  Patient does have underlying history of melanoma to the left lower extremities which is now status post excision. Patient has healed now without incident and has been discharged from Gen. surgery.  HEENT: Normocephalic. Eyes: Pupils are equal and reactive to light.  Sclerae and conjunctivae without injection, crusting, or drainage. All  extraocular movements are intact. The patient is wearing glasses. Ears:  Bilateral TMs are intact. Light reflex is present. Bony landmarks  identified. Oral mucosa is pink and moist. Posterior oropharynx without  erythema.  NECK: Supple, nontender, without any lymphadenopathy, no thyromegaly, no  nuchal rigidity. There is +2 carotid pulse, no carotid bruit.  CARDIOVASCULAR: Regular heart rate and rhythm without murmur. S1 and S2 heart tones are present.  LUNGS: Lung sounds are clear throughout bilaterally without wheezes,  rhonchi or rales. Respirations are even and unlabored. Chest expansion  was symmetrical.  ABDOMEN: Obese, soft, nontender with active and present bowel sounds x4  quadrants. No rebound or guarding. No masses or hepatosplenomegaly. No  pulsatile masses or bruit. No CVA tenderness.  PERIPHERAL VASCULAR: Cap refill less than 3 seconds. No dependent  edema. No clubbing or cyanosis. No calf pain or tenderness, +2 dorsalis pedal  and posterior tibial pulses.    ASSESSMENT AND PLAN:  1. Essential hypertension, well controlled on current medication regimen.  The patient is encouraged to continue with her healthy lifestyle choices.  She is to continue with the Metoprolol XL50 mg 1 tab p.o. daily in addition  to the Lisinopril 30 mg 1 tab p.o. daily. She is tolerating these well  without any adverse side effects. Refills provided x6 months. The  patient is to follow up in 6 months' time with fasting basic metabolic  panel prior.  2. History of dyslipidemia, stable at this time. Diet control.  3. Hypothyroidism: TSH level is therapeutic. Patient is to continue with current medication regimen. Repeat TSH with reflex in 6 months time with reevaluation.  4. Vitamin D deficiency. The patient is tolerating Vitamin D3 2000  international units p.o. OTC daily. Her values have stabilized. She is  to continue with this and follow up in 6 months' time.  5. Impaired fasting glucose, stable at this time. The patient encouraged  to continue with healthy lifestyle choices, specific to weight loss,  portion control and healthy food choices. Will repeat a basic metabolic  panel, fasting in 6 months' time with followup appointment.  6. Generalized arthralgia to neck and shoulder secondary to arthritis.  The patient did not feel Celebrex was therapeutic and was far too  expensive.   7.Health maintenance: colorectal cancer screening is up to date as of 02/17/2009, to be repeated on a p.r.n. basis. Mammogram updated 5/18/1917. She is status post hysterectomy with bilateral oophorectomy. Pap and pelvic are no longer indicated. Cholesterol and glucose are checked every 6 months. Abdominal aortic aneurysm screening is not indicated. Eye exam is to be updated 4/4/2017, per patient report. Bone density scan completed 4/4/2017.   Patient also has underlying history of hyperparathyroidism. EKG  and nutritional services are not indicated.   Patient is declining Zostavax. The patient is a nonsmoker. Patient does have advance directives on file.  Annual subsequent wellness exam to be repeated in 02/2018. [employer] information is provided.  8. Hyperparathyroidism. Stable at this time  9. Melanoma left lower extremity: Status post excision. Discharged from Gen. surgery.  10. Chronic neck pain and migraine headaches. Patient will continue with Tylenol arthritis OTC at this time  Dana was seen today for medication refill.    Diagnoses and all orders for this visit:    Chronic migraine    Neck pain    Benign essential hypertension  -     BASIC METABOLIC PANEL; Future  -     lisinopril (PRINIVIL,ZESTRIL) 30 MG tablet; Take 1 tablet by mouth daily. Indications: High Blood Pressure Disorder  -     metoPROLOL (TOPROL-XL) 50 MG 24 hr tablet; Take 1 tablet by mouth daily. Indications: High Blood Pressure Disorder    Gastroesophageal reflux disease without esophagitis    Diverticulosis of large intestine without hemorrhage    CKD (chronic kidney disease) stage 3, GFR 30-59 ml/min  -     BASIC METABOLIC PANEL; Future    Arthritis    Osteopenia of multiple sites    Peripheral edema    Acquired hypothyroidism  -     THYROID STIMULATING HORMONE; Future  -     levothyroxine (SYNTHROID, LEVOTHROID) 137 MCG tablet; Take 1 tablet by mouth daily.    Mixed hyperlipidemia  -     LIPID PANEL WITH REFLEX; Future  -     SGPT; Future    Impaired fasting glucose  -     BASIC METABOLIC PANEL; Future    Hyperparathyroidism (CMS/HCC)  -     BASIC METABOLIC PANEL; Future    Malignant melanoma of left lower extremity including hip (CMS/HCC)    Hx of vertigo    Obesity (BMI 30-39.9)    History of CHF (congestive heart failure)           denies

## 2021-03-25 NOTE — H&P PST ADULT - HISTORY OF PRESENT ILLNESS
66yr old female with residual hemorrhoidal skin tags with hx of  vulva cancer tx with surgery and multiple biopsies. Pt started with  symptoms of pruritis and discomfort in rectal area which needs further  evaluation. So Pt is coming in for hemorrhoidectomy excision of skin  tags and biopsies. Med hx sig for Diabetes HTN Thalassemia minor  TIA 2013. Pt has covid vaccine x2 recently.    Note; covid test 4/2/21 [facility]

## 2021-03-25 NOTE — H&P PST ADULT - NSICDXPASTSURGICALHX_GEN_ALL_CORE_FT
PAST SURGICAL HISTORY:  H/O total knee replacement, right 2016    S/P eye surgery June 2014, for strabismus    S/P laparoscopic cholecystectomy 2000    Vulvar neoplasm s/p radical anterior vulvectomy in 2014 with radical excision in 2017

## 2021-03-25 NOTE — H&P PST ADULT - NSICDXPASTMEDICALHX_GEN_ALL_CORE_FT
PAST MEDICAL HISTORY:  Anxiety and depression     DM type 2 (diabetes mellitus, type 2) 1995    Fibromyalgia     GERD (gastroesophageal reflux disease)     HLD (hyperlipidemia)     HTN (hypertension)     Hypothyroid     Lichen sclerosus of female genitalia     Obesity     Strabismus     Thalassemia minor     TIA (transient ischemic attack) 8/2013  blurred vision/dizziness no treatment    Vulva cancer bilateral 2014 reoccurance 2017

## 2021-03-30 PROBLEM — K21.9 GASTRO-ESOPHAGEAL REFLUX DISEASE WITHOUT ESOPHAGITIS: Chronic | Status: ACTIVE | Noted: 2021-01-01

## 2021-03-30 PROBLEM — E66.9 OBESITY, UNSPECIFIED: Chronic | Status: ACTIVE | Noted: 2021-01-01

## 2021-04-05 NOTE — ASU PATIENT PROFILE, ADULT - PMH
Anxiety and depression    DM type 2 (diabetes mellitus, type 2)  1995  Fibromyalgia    GERD (gastroesophageal reflux disease)    HLD (hyperlipidemia)    HTN (hypertension)    Hypothyroid    Lichen sclerosus of female genitalia    Obesity    Strabismus    Thalassemia minor    TIA (transient ischemic attack)  8/2013  blurred vision/dizziness no treatment  Vulva cancer  bilateral 2014 reoccurance 2017

## 2021-04-05 NOTE — ASU DISCHARGE PLAN (ADULT/PEDIATRIC) - PROCEDURE
Hemorrhoidectomy, excision of skin tags Hemorrhoidectomy, excision of skin tags, punch biopsies of perianal skin

## 2021-04-05 NOTE — BRIEF OPERATIVE NOTE - NSICDXBRIEFPROCEDURE_GEN_ALL_CORE_FT
PROCEDURES:  Hemorrhoidectomy, external, thrombotic 05-Apr-2021 15:12:08  Ara Mao  Punch biopsy 05-Apr-2021 15:12:36 Perianal Ara Mao

## 2021-04-05 NOTE — ASU DISCHARGE PLAN (ADULT/PEDIATRIC) - PAIN MANAGEMENT
Take over the counter pain medication/Prescriptions electronically submitted to pharmacy from [facility] 0 OF 10.

## 2021-04-05 NOTE — BRIEF OPERATIVE NOTE - SPECIMENS
perianal skin tag; right perianal punch biopsy; left perianal punch biopsy; anterior hemorrhoid with suspicious lesion

## 2021-04-05 NOTE — ASU DISCHARGE PLAN (ADULT/PEDIATRIC) - CARE PROVIDER_API CALL
Carlitos Little)  ColonRectal Surgery; Surgery  [address], [address]  [place] [address]  Phone: [phone]  Fax: [phone]  Follow Up Time:    No

## 2021-04-05 NOTE — PRE-ANESTHESIA EVALUATION ADULT - HEART RATE (BEATS/MIN)
71
aerobic capacity/endurance/gait, locomotion, and balance/muscle strength/ventilation and respiration/gas exchange

## 2021-04-05 NOTE — BRIEF OPERATIVE NOTE - OPERATION/FINDINGS
Excision of anterior hemorrhoid with abnormal lesion, excision of right perianal skin tag, right and left perianal punch biopsies.

## 2021-04-05 NOTE — ASU DISCHARGE PLAN (ADULT/PEDIATRIC) - NURSING INSTRUCTIONS
******************************************************************************************  Next dose of TYLENOL may be taken at or after_8:15_PM if needed. DO NOT take any additional products containing TYLENOL or ACETAMINOPHEN, such as VICODIN, PERCOCET, NORCO, EXCEDRIN, and any over-the-counter cold medications until this time. DO NOT CONSUME MORE THAN 5024-8597 MG OF TYLENOL (acetaminophen) in a 24-hour period.    Follow preprinted instructions from your doctor

## 2021-04-05 NOTE — ASU PATIENT PROFILE, ADULT - PSH
H/O total knee replacement, right  2016  S/P eye surgery  June 2014, for strabismus  S/P laparoscopic cholecystectomy  2000  Vulvar neoplasm  s/p radical anterior vulvectomy in 2014 with radical excision in 2017

## 2021-04-19 NOTE — HISTORY OF PRESENT ILLNESS
[FreeTextEntry1] : 66-year-old female here for his first postoperative visit after undergoing excisional biopsy of lesion on her hemorrhoid, excision of skin tag bleeders, and punch biopsies of perianal area. She only complains of sensitivity in the area. She denies fever.

## 2021-04-19 NOTE — ASSESSMENT
[FreeTextEntry1] : 58-year-old female postop excisional biopsy and Punch biopsies of the perianal areas recovering. No pathology report is available for review at this time.

## 2021-05-14 NOTE — VITALS
[Maximal Pain Intensity: 5/10] : 5/10 [Least Pain Intensity: 4/10] : 4/10 [Pain Description/Quality: ___] : Pain description/quality: [unfilled] [Pain Location: ___] : Pain Location: [unfilled] [Pain Interferes with ADLs] : Pain interferes with activities of daily living. [NoTreatment Scheduled] : no treatment scheduled [80: Normal activity with effort; some signs or symptoms of disease.] : 80: Normal activity with effort; some signs or symptoms of disease.  [ECOG Performance Status: 1 - Restricted in physically strenuous activity but ambulatory and able to carry out work of a light or sedentary nature] : Performance Status: 1 - Restricted in physically strenuous activity but ambulatory and able to carry out work of a light or sedentary nature, e.g., light house work, office work

## 2021-05-16 PROBLEM — D07.1 VIN III (VULVAR INTRAEPITHELIAL NEOPLASIA III): Status: ACTIVE | Noted: 2017-11-14

## 2021-05-16 NOTE — HISTORY OF PRESENT ILLNESS
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 tumor was poorly differentiated, 4mm with 4mm depth of invasion. The nearest margin was 6mm.\par She has had multiple biopsies over the years. In dec,2017 she underwent wide local excision of a 9mm G1 scc with 4 mm invasion.\par MArgin was 4mm with HOMERO at all margins. No LVSi was seen.\par She continue to be followed with multiple biopsies over the years\par  In June,2020 she had more discomfort in the right vulva.with a new nodule on exam. Bx read as psoriasiform spongiotic dermatitis.   She was een in Dermatology and rebiopsied. Bx done in Aug2020  showed SCCA of the vulva\par She was seen for gyn onc f/u and eval in Sept,2020 exam revealed 2 cm raised lesion post labia majora and extensive white epithelium posterior fourchette.  PET done without evidence of metastatic diseas(OCt,2020)  definitive surgery with plastics reconstruction planned.\par She underwent surgery with right inguinal node assessment right partial vulvectomy left labial scar excision and additional deep margin excision. as planned on Oct19,2020. Pathology confirmed 2 benign right sentinel nodes,VIN3 of leftt labial sca and a 2.4 cm G1 scc with 13 mm depth of invasion.  Closest margin was 9 mm,.  the was no LVSI and no high grade HOMERO at margins.\par When seen for f/i on Feb10,2021 white and slightly ulcerated plaques on left labia minor were seen and biopsied. these revealed early evolving CIS associated with lechen sclerosis et atrophicus in both the left vulva and perianal skin. \par She was seen by Dr. Hahn who noted right anterior hemorrhoid with suspicious lesion on anoscopy. 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 \par Given these extensive changes, she has been referred for radiation consult.\par She is feeling well in herself. She does report feeling a ball in her groin which she has not felt before.

## 2021-05-16 NOTE — DISEASE MANAGEMENT
[Pathological] : TNM Stage: p [N/A] : Currently not applicable [FreeTextEntry4] : vulva [TTNM] : is [NTNM] : 0 [MTNM] : x

## 2021-05-16 NOTE — REVIEW OF SYSTEMS
[Constipation: Grade 1 - Occasional or intermittent symptoms; occasional use of stool softeners, laxatives, dietary modification, or enema] : Constipation: Grade 1 - Occasional or intermittent symptoms; occasional use of stool softeners, laxatives, dietary modification, or enema [Dermatitis Radiation: Grade 1 - Faint erythema or dry desquamation] : Dermatitis Radiation: Grade 1 - Faint erythema or dry desquamation [Negative] : Genitourinary

## 2021-05-16 NOTE — PHYSICAL EXAM
[General Appearance - In No Acute Distress] : in no acute distress [] : no respiratory distress [Abdomen Soft] : soft [de-identified] : inguinal lesion fibrosis vs node [de-identified] : s/p mutiple partial vulvectomies with no obvious leson sween now s/p hemorrhoidectomy

## 2021-07-13 NOTE — REVIEW OF SYSTEMS
Pt. slept throughout most of the shift. No complaints of pain. Will continue to monitor. Plan for left heart cath on Monday.      Problem: Patient Centered Care  Goal: Patient preferences are identified and integrated in the patient's plan of care  Descript bleeding, hypotension and signs of decreased cardiac output  - Evaluate effectiveness of vasoactive medications to optimize hemodynamic stability  - Monitor arterial and/or venous puncture sites for bleeding and/or hematoma  - Assess quality of pulses, ski [Diarrhea: Grade 1 - Increase of <4 stools per day over baseline; mild increase in ostomy output compared to baseline] : Diarrhea: Grade 1 - Increase of <4 stools per day over baseline; mild increase in ostomy output compared to baseline [Fatigue: Grade 0] : Fatigue: Grade 0

## 2021-07-13 NOTE — PHYSICAL EXAM
[] : no respiratory distress [Normal] : oriented to person, place and time, the affect was normal, the mood was normal and not anxious

## 2021-07-13 NOTE — VITALS
[Maximal Pain Intensity: 6/10] : 6/10 [Least Pain Intensity: 6/10] : 6/10 [Pain Description/Quality: ___] : Pain description/quality: [unfilled] [Pain Duration: ___] : Pain duration: [unfilled] [80: Normal activity with effort; some signs or symptoms of disease.] : 80: Normal activity with effort; some signs or symptoms of disease.  [ECOG Performance Status: 1 - Restricted in physically strenuous activity but ambulatory and able to carry out work of a light or sedentary nature] : Performance Status: 1 - Restricted in physically strenuous activity but ambulatory and able to carry out work of a light or sedentary nature, e.g., light house work, office work

## 2021-07-15 NOTE — HISTORY OF PRESENT ILLNESS
[FreeTextEntry1] : This is a 66 year old lady with a PMH of HTN, HLD, Hypothyroidism and SCC presents today for cardiac evaluation. Patient states that she is overall feeling good. Patient with recent re-occurence of Squamous Cell carcinoma in the Vulva area. Patient currently undergoing radiation treatment and plans to start chemotherapy. Patient admits to atypical chest discomfort last week. The symptoms onset at random and resolved instantly. Patient denies dyspnea, palpitations, nausea, vomiting, dizziness and lightheadedness.\par

## 2021-07-20 NOTE — REVIEW OF SYSTEMS
[Diarrhea: Grade 1 - Increase of <4 stools per day over baseline; mild increase in ostomy output compared to baseline] : Diarrhea: Grade 1 - Increase of <4 stools per day over baseline; mild increase in ostomy output compared to baseline [Fatigue: Grade 0] : Fatigue: Grade 0 [Dermatitis Radiation: Grade 1 - Faint erythema or dry desquamation] : Dermatitis Radiation: Grade 1 - Faint erythema or dry desquamation

## 2021-07-20 NOTE — VITALS
[Least Pain Intensity: 6/10] : 6/10 [Pain Description/Quality: ___] : Pain description/quality: [unfilled] [Pain Duration: ___] : Pain duration: [unfilled] [80: Normal activity with effort; some signs or symptoms of disease.] : 80: Normal activity with effort; some signs or symptoms of disease.  [ECOG Performance Status: 1 - Restricted in physically strenuous activity but ambulatory and able to carry out work of a light or sedentary nature] : Performance Status: 1 - Restricted in physically strenuous activity but ambulatory and able to carry out work of a light or sedentary nature, e.g., light house work, office work [Maximal Pain Intensity: 7/10] : 7/10 [Pain Location: ___] : Pain Location: [unfilled] [Opioid] : opioid [NSAID/Non-Opioid] : NSAID/Non-Opioid

## 2021-07-26 NOTE — HISTORY OF PRESENT ILLNESS
[FreeTextEntry1] : Mrs. Carranza is a 58 yr old woman with h/o squamous cell carcinoma dating back to 2014, with second occurrence in 2017, 3rd occurrence in 2019, and now with extensive carcinoma in situ involving vulva and perianal are. Additionally, a groin lesion is noted.\par \par 7/20/21: Tolerating treatment. Reports mild fatigue. Has increasing vulva pain, taking ibuprofen 600mg BID and oxycodone at night. Had episode of urinary leakage after chemo which subsided. Moving bowels normally. No vaginal bleeding or discharge.  Tolerating chemotherapy. Skin care discussed and will provide Aquaphor.\par \par 7/13/21: Tolerating treatment. She had episodes of diarrhea yesterday and today. Discussed antidiarrheal medications, LRD, dietary support prn. Pain has been managed with Motrin. Will start chemo this week\par

## 2021-07-26 NOTE — DISEASE MANAGEMENT
[Pathological] : TNM Stage: p [FreeTextEntry4] : recurrent [TTNM] : is [NTNM] : 0 [MTNM] : x [N/A] : Currently not applicable [de-identified] : 7731 [de-identified] : 8584 [de-identified] : pelvis/vulva

## 2021-07-26 NOTE — DISEASE MANAGEMENT
[Pathological] : TNM Stage: p [TTNM] : is [FreeTextEntry4] : recurrent [NTNM] : 0 [MTNM] : x [N/A] : Currently not applicable [de-identified] : 400 [de-identified] : 9412 [de-identified] : pelvis/vulva

## 2021-07-26 NOTE — PHYSICAL EXAM
[] : no respiratory distress [Normal] : oriented to person, place and time, the affect was normal, the mood was normal and not anxious [de-identified] : ri palpable inguinal adenopathy extending to mons subcutaneous lesion rt vulva tender

## 2021-07-26 NOTE — HISTORY OF PRESENT ILLNESS
[FreeTextEntry1] : Mrs. Carranza is a 58 yr old woman with h/o squamous cell carcinoma dating back to 2014, with second occurrence in 2017, 3rd occurrence in 2019, and now with extensive carcinoma in situ involving vulva and perianal are. Additionally, a groin lesion is noted.\par \par 7/13/21: Tolerating treatment. She had episodes of diarrhea yesterday and today. Discussed antidiarrheal medications, LRD, dietary support prn. Pain has been managed with Motrin. Will start chemo this week\par

## 2021-07-27 NOTE — VITALS
[Maximal Pain Intensity: 7/10] : 7/10 [Least Pain Intensity: 6/10] : 6/10 [Pain Description/Quality: ___] : Pain description/quality: [unfilled] [Pain Duration: ___] : Pain duration: [unfilled] [Pain Location: ___] : Pain Location: [unfilled] [Opioid] : opioid [NSAID/Non-Opioid] : NSAID/Non-Opioid [80: Normal activity with effort; some signs or symptoms of disease.] : 80: Normal activity with effort; some signs or symptoms of disease.  [ECOG Performance Status: 1 - Restricted in physically strenuous activity but ambulatory and able to carry out work of a light or sedentary nature] : Performance Status: 1 - Restricted in physically strenuous activity but ambulatory and able to carry out work of a light or sedentary nature, e.g., light house work, office work

## 2021-08-03 NOTE — PHYSICAL EXAM
[] : no respiratory distress [Normal] : oriented to person, place and time, the affect was normal, the mood was normal and not anxious [de-identified] : ri palpable inguinal adenopathy extending to mons subcutaneous lesion rt vulva tender

## 2021-08-03 NOTE — DISEASE MANAGEMENT
[Pathological] : TNM Stage: p [N/A] : Currently not applicable [FreeTextEntry4] : recurrent [TTNM] : is [NTNM] : 0 [MTNM] : x [de-identified] : 7523 [de-identified] : 5138 [de-identified] : pelvis/vulva

## 2021-08-03 NOTE — HISTORY OF PRESENT ILLNESS
[FreeTextEntry1] : Mrs. Carranza is a 58 yr old woman with h/o squamous cell carcinoma dating back to 2014, with second occurrence in 2017, 3rd occurrence in 2019, and now with extensive carcinoma in situ involving vulva and perianal are. Additionally, a groin lesion is noted.\par \par 7/27/21: Tolerating treatment. Reports burning pain to the vulva. Taking oxycodone 10 mg at night which has been helping with pain and sleep. Occasional nausea, taking antiemetic prn. No episodes of emesis. She is experiencing dysuria and also burning to the vulva after urination, using tiffanie-bottle and Aquaphor. Had multiple episodes of diarrhea over the weekend, took Pepto first then Imodium which helped. Last BM was yesterday. \par \par 7/20/21: Tolerating treatment. Reports mild fatigue. Has increasing vulva pain, taking ibuprofen 600mg BID and oxycodone at night. Had episode of urinary leakage after chemo which subsided. Moving bowels normally. No vaginal bleeding or discharge.  Tolerating chemotherapy. Skin care discussed and will provide Aquaphor.\par \par 7/13/21: Tolerating treatment. She had episodes of diarrhea yesterday and today. Discussed antidiarrheal medications, LRD, dietary support prn. Pain has been managed with Motrin. Will start chemo this week\par

## 2021-08-09 NOTE — DISEASE MANAGEMENT
[Pathological] : TNM Stage: p [N/A] : Currently not applicable [FreeTextEntry4] : recurrent [TTNM] : is [NTNM] : 0 [MTNM] : x [de-identified] : 9408 [de-identified] : 7112 [de-identified] : pelvis/vulva

## 2021-08-09 NOTE — REVIEW OF SYSTEMS
[Diarrhea: Grade 1 - Increase of <4 stools per day over baseline; mild increase in ostomy output compared to baseline] : Diarrhea: Grade 1 - Increase of <4 stools per day over baseline; mild increase in ostomy output compared to baseline [Fatigue: Grade 0] : Fatigue: Grade 0 [Genital Edema: Grade 1 - Mild swelling or obscuration of anatomic architecture on close inspection] : Genital Edema: Grade 1 - Mild swelling or obscuration of anatomic architecture on close inspection [Dermatitis Radiation: Grade 2 - Moderate to brisk erythema; patchy moist desquamation, mostly confined to skin folds and creases; moderate edema] : Dermatitis Radiation: Grade 2 - Moderate to brisk erythema; patchy moist desquamation, mostly confined to skin folds and creases; moderate edema

## 2021-08-09 NOTE — HISTORY OF PRESENT ILLNESS
[FreeTextEntry1] : Mrs. 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 Additionally, a groin lesion is noted.\par \par 8/3/21: Tolerating treatment. Burning pain to vulva persists, along with dysuria. Also notes fatigue. Continuing to take antiemetic prn for nausea. Endorses poor appetite, and feeling a little lightheaded. Ate crackers after arrival, and this improved her lightheadedness. BP initially low at 93/55, repeated with orthostatics, unremarkable. Sitting /58, and standing /52. Continues to have intermittent diarrhea, relieved by imodium. Last BM was Sunday, had 4 loose stools then. No BMs yesterday. \par \par 7/27/21: Tolerating treatment. Reports burning pain to the vulva. Taking oxycodone 10 mg at night which has been helping with pain and sleep. Occasional nausea, taking antiemetic prn. No episodes of emesis.She is experiencing dysuria and also burning to the vulva after urination, using tiffanie-bottle and Aquaphor. Had multiple episodes of diarrhea over the weekend, took Pepto first then Imodium which helped. Last BM was yesterday. \par \par 7/20/21: Tolerating treatment. Reports mild fatigue. Has increasing vulva pain, taking ibuprofen 600mg BID and oxycodone at night. Had episode of urinary leakage after chemo which subsided. Moving bowels normally. No vaginal bleeding or discharge.  Tolerating chemotherapy. Skin care discussed and will provide Aquaphor.\par \par 7/13/21: Tolerating treatment. She had episodes of diarrhea yesterday and today. Discussed antidiarrheal medications, LRD, dietary support prn. Pain has been managed with Motrin. Will start chemo this week\par

## 2021-08-09 NOTE — PHYSICAL EXAM
[] : no respiratory distress [Normal] : oriented to person, place and time, the affect was normal, the mood was normal and not anxious [de-identified] : ri palpable inguinal adenopathy extending to mons subcutaneous lesion rt vulva tender

## 2021-08-13 NOTE — REVIEW OF SYSTEMS
[Fatigue] : fatigue [Negative] : Allergic/Immunologic [Lower Ext Edema] : no lower extremity edema [SOB on Exertion] : no shortness of breath during exertion [Abdominal Pain] : no abdominal pain [Constipation] : no constipation [Diarrhea] : no diarrhea [Skin Rash] : no skin rash [Difficulty Walking] : no difficulty walking

## 2021-08-13 NOTE — HISTORY OF PRESENT ILLNESS
[Disease: _____________________] : Disease: [unfilled] [Home] : at home, [unfilled] , at the time of the visit. [Medical Office: ([facility])___] : at the medical office located in  [Verbal consent obtained from patient] : the patient, [unfilled] [de-identified] : 65 y/o F with h/o SCC dating back to 2014 with second occurrence in 2017 and 3rd in 2019 and now with extensive carcinoma in situ of vulva and perianal area along with groin lesion.  \par \par She is s/p anterior partial vulvectomy with bilateral sentinel node excision on 12/12/2014 and pathology showed tumor was poorly differentiated, 4mm with 4mm depth of invasion. The nearest margin was 6mm.  She has had multiple biopsies over the years and in Dec 2017, she underwent wide local excision of a 9 mm G1 scc with 4 mm invasion.  Margin was 4mm with HOMERO at all margins. No LVSi was seen.  She continue to be followed with multiple biopsies over the year.   In June,2020 she had more discomfort in the right vulva with a new nodule on exam and biopsy read as psoriasiform spongiotic dermatitis. She was seen in Dermatology and rebiopsied in Aug 2020 showed SCCA of the vulva.  She was seen for Gyn onc f/u and eval in Sept 2020 exam revealed 2 cm raised lesion post labia majora and extensive white epithelium posterior fourchette. PET done without evidence of metastatic disease (Oct 2020) definitive surgery with plastics reconstruction planned.  She underwent surgery with right inguinal node assessment right partial vulvectomy left labial scar excision and additional deep margin excision as planned on Oct 19, 2020. Pathology confirmed 2 benign right sentinel nodes, VIN3 of left labial sca and a 2.4 cm G1 scc with 13 mm depth of invasion. Closest margin was 9 mm,. the was no LVSI and no high grade HOMERO at margins.   When seen for f/u on Feb10,2021 white and slightly ulcerated plaques on left labia minor were seen and biopsied. these revealed early evolving CIS associated with lechen sclerosis et atrophicus in both the left vulva and perianal skin.   She was seen by Dr. Hahn who noted right anterior hemorrhoid with suspicious lesion on anoscopy. 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   Given these extensive changes, she has been referred for Med Onc consult\par \par \par \par PMH:  HLD, HTN, Hypothyroidism, DM\par PSH:  Lap Minnie, Knee replacement, radial partial vulvectomy, tonsillectomy \par Fhx:  Maternal Aunt Colon Cancer, Mother Vulva Cancer \par Social:  former smoker, lives with spouse, occasional alcohol,  [FreeTextEntry1] : RT 7/12-8/24/2021 w/weekly Cisplatin started 7/15/2021 [de-identified] : Leana was called for follow up and in the interim, her RT/chemo was held due to side effects and she is set to resume RT today.  She received a blood transfusion last week and states she had chills and n/v when she went home after that but she did not call to discuss with us. She still has burning and clear discharge from her groin lesion which is being monitored daily with Dr Guerrero but she denies any fevers/chills over this week.  She continues to take Motrin and Oxycodone for the pain which help and has been taking 10 mg of Oxycodone.  She endorses a good appetite and keeps well hydrated although last CMP showed signs of mild dehydration.

## 2021-08-16 NOTE — REASON FOR VISIT
[Routine On-Treatment] : a routine on-treatment visit for [Routine Follow-Up] : routine follow-up visit for [Other: ___] : [unfilled]

## 2021-08-17 NOTE — VITALS
[Maximal Pain Intensity: 7/10] : 7/10 [Opioid] : opioid [NSAID/Non-Opioid] : NSAID/Non-Opioid [80: Normal activity with effort; some signs or symptoms of disease.] : 80: Normal activity with effort; some signs or symptoms of disease.

## 2021-08-17 NOTE — REVIEW OF SYSTEMS
[Diarrhea: Grade 1 - Increase of <4 stools per day over baseline; mild increase in ostomy output compared to baseline] : Diarrhea: Grade 1 - Increase of <4 stools per day over baseline; mild increase in ostomy output compared to baseline [Genital Edema: Grade 1 - Mild swelling or obscuration of anatomic architecture on close inspection] : Genital Edema: Grade 1 - Mild swelling or obscuration of anatomic architecture on close inspection [Dermatitis Radiation: Grade 2 - Moderate to brisk erythema; patchy moist desquamation, mostly confined to skin folds and creases; moderate edema] : Dermatitis Radiation: Grade 2 - Moderate to brisk erythema; patchy moist desquamation, mostly confined to skin folds and creases; moderate edema [Fatigue: Grade 2 - Fatigue not relieved by rest; limiting instrumental ADL] : Fatigue: Grade 2 - Fatigue not relieved by rest; limiting instrumental ADL

## 2021-08-23 NOTE — HISTORY OF PRESENT ILLNESS
[FreeTextEntry1] : Mrs. 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 Additionally, a groin lesion is noted.\par \par 8/17/21: Burning persists in vulvar area. No dysuria. Intermittent diarrhea. Taking oxycodone and ibuprofen for pain , imodium for diarrhea. PRBC transfusion last week and IV hydration  over weekend. Labs due today with med/onc\par Moist desquamation vulva\par Rt inguinal adenopathy slightly decreased\par 8/3/21: Tolerating treatment. Burning pain to vulva persists, along with dysuria. Also notes fatigue. Continuing to take antiemetic prn for nausea. Endorses poor appetite, and feeling a little lightheaded. Ate crackers after arrival, and this improved her lightheadedness. BP initially low at 93/55, repeated with orthostatics, unremarkable. Sitting /58, and standing /52. Continues to have intermittent diarrhea, relieved by imodium. Last BM was Sunday, had 4 loose stools then. No BMs yesterday. \par \par 7/27/21: Tolerating treatment. Reports burning pain to the vulva. Taking oxycodone 10 mg at night which has been helping with pain and sleep. Occasional nausea, taking antiemetic prn. No episodes of emesis.She is experiencing dysuria and also burning to the vulva after urination, using tiffanie-bottle and Aquaphor. Had multiple episodes of diarrhea over the weekend, took Pepto first then Imodium which helped. Last BM was yesterday. \par \par 7/20/21: Tolerating treatment. Reports mild fatigue. Has increasing vulva pain, taking ibuprofen 600mg BID and oxycodone at night. Had episode of urinary leakage after chemo which subsided. Moving bowels normally. No vaginal bleeding or discharge.  Tolerating chemotherapy. Skin care discussed and will provide Aquaphor.\par \par 7/13/21: Tolerating treatment. She had episodes of diarrhea yesterday and today. Discussed antidiarrheal medications, LRD, dietary support prn. Pain has been managed with Motrin. Will start chemo this week\par

## 2021-08-23 NOTE — PHYSICAL EXAM
[Normal] : oriented to person, place and time, the affect was normal, the mood was normal and not anxious [] : no respiratory distress [General Appearance - In No Acute Distress] : in no acute distress [General Appearance - Alert] : alert [de-identified] : ri palpable inguinal adenopathy extending to mons subcutaneous lesion rt vulva tender [de-identified] : moderate erythema, desquamation to treated  site

## 2021-08-23 NOTE — HISTORY OF PRESENT ILLNESS
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  \par \par She is s/p anterior partial vulvectomy with bilateral sentinel node excision on 12/12/2014 and pathology showed tumor was poorly differentiated, 4mm with 4mm depth of invasion. The nearest margin was 6mm.  She has had multiple biopsies over the years and in Dec 2017, she underwent wide local excision of a 9 mm G1 scc with 4 mm invasion.  Margin was 4mm with HOMERO at all margins. No LVSi was seen.  She continue to be followed with multiple biopsies over the year.   In June,2020 she had more discomfort in the right vulva with a new nodule on exam and biopsy read as psoriasiform spongiotic dermatitis. She was seen in Dermatology and rebiopsied in Aug 2020 showed SCCA of the vulva.  She was seen for Gyn onc f/u and eval in Sept 2020 exam revealed 2 cm raised lesion post labia majora and extensive white epithelium posterior fourchette. PET done without evidence of metastatic disease (Oct 2020) definitive surgery with plastics reconstruction planned.  She underwent surgery with right inguinal node assessment right partial vulvectomy left labial scar excision and additional deep margin excision as planned on Oct 19, 2020. Pathology confirmed 2 benign right sentinel nodes, VIN3 of left labial sca and a 2.4 cm G1 scc with 13 mm depth of invasion. Closest margin was 9 mm,. the was no LVSI and no high grade HOMERO at margins.   When seen for f/u on Feb10,2021 white and slightly ulcerated plaques on left labia minor were seen and biopsied. these revealed early evolving CIS associated with lechen sclerosis et atrophicus in both the left vulva and perianal skin.   She was seen by Dr. Hahn who noted right anterior hemorrhoid with suspicious lesion on anoscopy. 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   Given these extensive changes, she has been referred for Med Onc consult\par \par \par \par PMH:  HLD, HTN, Hypothyroidism, DM\par PSH:  Lap Minnie, Knee replacement, radial partial vulvectomy, tonsillectomy \par Fhx:  Maternal Aunt Colon Cancer, Mother Vulva Cancer \par Social:  former smoker, lives with spouse, occasional alcohol,  [FreeTextEntry1] : RT 7/12-8/24/2021 w/weekly Cisplatin started 7/15/2021 cycle 4 today  [de-identified] : Leana is seen for follow up since resuming radiation in the treatment room. She continues to have RT related side effects, mainly increased feeling of burning and sensitivity in the groin and vulvar skin areas after RT.  She takes Oxycodone 1-2 x a day and it helps.  She has mild loose stools that is stable. She is trying to eat/drink frequently throughout the day. She denies any mouth sores, CP, palpitations, cough, HERNANDEZ, abdominal pain, LE edema, dizziness, urinary symptoms, weight/appetite changes and keeps active.\par \par

## 2021-08-23 NOTE — PHYSICAL EXAM
[Normal] : oriented to person, place and time, the affect was normal, the mood was normal and not anxious [] : no respiratory distress [General Appearance - Alert] : alert [General Appearance - In No Acute Distress] : in no acute distress [de-identified] : ri palpable inguinal adenopathy extending to mons subcutaneous lesion rt vulva tender [de-identified] : moderate erythema, desquamation to treated  site

## 2021-08-25 PROBLEM — T80.89XA: Status: ACTIVE | Noted: 2021-01-01

## 2021-08-25 NOTE — HISTORY OF PRESENT ILLNESS
[FreeTextEntry1] : Mrs. 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 Additionally, a groin lesion is noted.\par \par 8/24/21: Burning persists, denies any dysuria except for when urine occurs contacts skin. Still experiences intermittent diarrhea relieved by imodium. Denies any vaginal bleeding or discharge. Rt inguinal adenopathy persists\par \par 8/17/21: Burning persists in vulvar area. No dysuria. Intermittent diarrhea. Taking oxycodone and ibuprofen for pain , imodium for diarrhea. PRBC transfusion last week and IV hydration over weekend. Labs due today with med/onc\par Moist desquamation vulva\par Rt inguinal adenopathy slightly decreased\par 8/3/21: Tolerating treatment. Burning pain to vulva persists, along with dysuria. Also notes fatigue. Continuing to take antiemetic prn for nausea. Endorses poor appetite, and feeling a little lightheaded. Ate crackers after arrival, and this improved her lightheadedness. BP initially low at 93/55, repeated with orthostatics, unremarkable. Sitting /58, and standing /52. Continues to have intermittent diarrhea, relieved by imodium. Last BM was Sunday, had 4 loose stools then. No BMs yesterday. \par \par 7/27/21: Tolerating treatment. Reports burning pain to the vulva. Taking oxycodone 10 mg at night which has been helping with pain and sleep. Occasional nausea, taking antiemetic prn. No episodes of emesis.She is experiencing dysuria and also burning to the vulva after urination, using tiffanie-bottle and Aquaphor. Had multiple episodes of diarrhea over the weekend, took Pepto first then Imodium which helped. Last BM was yesterday. \par \par 7/20/21: Tolerating treatment. Reports mild fatigue. Has increasing vulva pain, taking ibuprofen 600mg BID and oxycodone at night. Had episode of urinary leakage after chemo which subsided. Moving bowels normally. No vaginal bleeding or discharge. Tolerating chemotherapy. Skin care discussed and will provide Aquaphor.\par \par 7/13/21: Tolerating treatment. She had episodes of diarrhea yesterday and today. Discussed antidiarrheal medications, LRD, dietary support prn. Pain has been managed with Motrin. Will start chemo this week\par

## 2021-08-26 PROBLEM — Z00.00 HEALTH CARE MAINTENANCE: Status: ACTIVE | Noted: 2021-01-01

## 2021-08-26 NOTE — HISTORY OF PRESENT ILLNESS
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  \par \par She is s/p anterior partial vulvectomy with bilateral sentinel node excision on 12/12/2014 and pathology showed tumor was poorly differentiated, 4mm with 4mm depth of invasion. The nearest margin was 6mm.  She has had multiple biopsies over the years and in Dec 2017, she underwent wide local excision of a 9 mm G1 scc with 4 mm invasion.  Margin was 4mm with HOMERO at all margins. No LVSi was seen.  She continue to be followed with multiple biopsies over the year.   In June,2020 she had more discomfort in the right vulva with a new nodule on exam and biopsy read as psoriasiform spongiotic dermatitis. She was seen in Dermatology and rebiopsied in Aug 2020 showed SCCA of the vulva.  She was seen for Gyn onc f/u and eval in Sept 2020 exam revealed 2 cm raised lesion post labia majora and extensive white epithelium posterior fourchette. PET done without evidence of metastatic disease (Oct 2020) definitive surgery with plastics reconstruction planned.  She underwent surgery with right inguinal node assessment right partial vulvectomy left labial scar excision and additional deep margin excision as planned on Oct 19, 2020. Pathology confirmed 2 benign right sentinel nodes, VIN3 of left labial sca and a 2.4 cm G1 scc with 13 mm depth of invasion. Closest margin was 9 mm,. the was no LVSI and no high grade HOMERO at margins.   When seen for f/u on Feb10,2021 white and slightly ulcerated plaques on left labia minor were seen and biopsied. these revealed early evolving CIS associated with lechen sclerosis et atrophicus in both the left vulva and perianal skin.   She was seen by Dr. Hahn who noted right anterior hemorrhoid with suspicious lesion on anoscopy. 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   Given these extensive changes, she has been referred for Med Onc consult\par \par \par \par PMH:  HLD, HTN, Hypothyroidism, DM\par PSH:  Lap Minnie, Knee replacement, radial partial vulvectomy, tonsillectomy \par Fhx:  Maternal Aunt Colon Cancer, Mother Vulva Cancer \par Social:  former smoker, lives with spouse, occasional alcohol,  [FreeTextEntry1] : RT 7/12-9/1/2021 w/weekly Cisplatin started 7/15/2021 cycle 5 today  [de-identified] : Leana is seen for follow up and was here yesterday for fluids due to hyponatremia and KRISTIN.  She continues to c/o burning pain around RT site as well as discharge from her inguinal node.  She notes her fatigue is increasing and has been having intermittent nausea for which Reglan helps.  She continues to have frequent BMs and has been using Motrin alternating with Oxycodone for pain.  Her RT is set to finish mid next week. She denies any mouth sores, CP, palpitations, cough, HERNANDEZ, abdominal pain, LE edema, dizziness, urinary symptoms. \par

## 2021-08-26 NOTE — REVIEW OF SYSTEMS
[Fatigue] : fatigue [Negative] : Allergic/Immunologic [Recent Change In Weight] : ~T no recent weight change [Lower Ext Edema] : no lower extremity edema [SOB on Exertion] : no shortness of breath during exertion [Abdominal Pain] : no abdominal pain [Constipation] : no constipation [Diarrhea] : no diarrhea [Skin Rash] : no skin rash [Difficulty Walking] : no difficulty walking

## 2021-08-31 NOTE — REVIEW OF SYSTEMS
[Skin Hyperpigmentation: Grade 2 - Hyperpigmentation covering >10% BSA; associated psychosocial impact] : Skin Hyperpigmentation: Grade 2 - Hyperpigmentation covering >10% BSA; associated psychosocial impact [Dermatitis Radiation: Grade 2 - Moderate to brisk erythema; patchy moist desquamation, mostly confined to skin folds and creases; moderate edema] : Dermatitis Radiation: Grade 2 - Moderate to brisk erythema; patchy moist desquamation, mostly confined to skin folds and creases; moderate edema

## 2021-08-31 NOTE — VITALS
[Maximal Pain Intensity: 9/10] : 9/10 [Least Pain Intensity: 8/10] : 8/10 [Opioid] : opioid [80: Normal activity with effort; some signs or symptoms of disease.] : 80: Normal activity with effort; some signs or symptoms of disease.  [ECOG Performance Status: 1 - Restricted in physically strenuous activity but ambulatory and able to carry out work of a light or sedentary nature] : Performance Status: 1 - Restricted in physically strenuous activity but ambulatory and able to carry out work of a light or sedentary nature, e.g., light house work, office work

## 2021-09-03 NOTE — ED ADULT NURSE REASSESSMENT NOTE - NS ED NURSE REASSESS COMMENT FT1
Pt awake alert and orientedx4 Premed prior to PRBC. Afebrile Denies chest pain or SOB Denies any discomfort.

## 2021-09-03 NOTE — ED ADULT NURSE REASSESSMENT NOTE - NS ED NURSE REASSESS COMMENT FT1
Pt c/o vag discomfort s/p radiation Med for pain Made comfortable Oriented to CDU Assist to BR for toileting Amb with steady gait. Denies chest pain or SOB PRBC not avail at 1650.

## 2021-09-03 NOTE — ED ADULT NURSE REASSESSMENT NOTE - NS ED NURSE REASSESS COMMENT FT1
Report received from [facility]. Patient resting comfortably, PRBCs infusing. Patient has no complaints of transfusion reaction/no signs or symptoms of transfusion reaction noted.

## 2021-09-03 NOTE — ED CDU PROVIDER INITIAL DAY NOTE - MEDICAL DECISION MAKING DETAILS
**ATTENDING MEDICAL DECISION MAKING/SYNTHESIS (Dr. Noah Zuniga): I have reviewed the Chief Concern(s), the HPI, the ROS, and have directly performed and confirmed the findings on the Physical Examination. I have reviewed the medical decision making with all providers, as applicable. The PROBLEM REPRESENTATION at this time is: 66-year-old woman with past medical/surgical history of vulvar cancer s/p surgical resection and chemotherapy, beta-thalassemia minor, Diabetes Mellitus t1, hypothyroidism, Hypertension, hyperlipidemia, fibromyalgia, and other medical problems noted in the EMR now presenting with concern for days for progressively worsening fatigue, malaise and generalized weakness. Noted with symptomatic anemia on outpatient workup.  Same noted in ED. To CDU for blood component therapy (PRBC transfusion and replacement). NO adverse events prior to arrival in the CDU. The MOST LIKELY DIAGNOSIS, and the LIST OF DIFFERENTIAL DIAGNOSES, includes (but is not limited to) the following: CAUSES FOR SYMPTOMATIC ANEMIA: aplastic anemia (bone marrow suppression), nutritional deficiency e.g. microcytic v. macrocytic anemia from B6, B12, iron, folate, or other nutritional deficit, hemolytic anemia, anemia of chronic disease, sequestration or equivalent process, acute known or occult hemorrhage e,g. GI bleed or equivalent (secondary to UGI bleed in context of possible PUD, DUD, gastritis, or equivalent, LGI bleed secondary to polyp, AV malformation, fistula or equivalent), coagulopathy (congenital or acquired, known or occult), chemotherapy-associated or other ADR/SE, OTHER CAUSES FOR FATIGUE/WEAKNESS OR SEQUELA OF ANEMIA: acute coronary syndrome, unstable angina, transient ischemic attack, cerebrovascular accident, other infarction or ischemia, electrolyte-metabolic-endocrine derangements, dehydration. The likelihood of each of these diagnoses has been appropriately considered in the context of this patient's presentation and my evaluation. PLAN: as described in EMR, including diagnostics, therapeutics and consultation as clinically warranted. I will continue to reevaluate the patient, including the results of all testing, and monitor response to therapy throughout the patient's course in the CDU.

## 2021-09-03 NOTE — ED PROVIDER NOTE - CARE PLAN
Goal:	**ATTENDING ADDENDUM (Dr. Noah Zuniga): Goals of care include resolution of emergent/urgent symptoms and concerns, and restoration to baseline level of homeostasis.   1 Principal Discharge DX:	Anemia  Goal:	**ATTENDING ADDENDUM (Dr. Noah Zuniga): Goals of care include resolution of emergent/urgent symptoms and concerns, and restoration to baseline level of homeostasis.

## 2021-09-03 NOTE — ED CDU PROVIDER INITIAL DAY NOTE - ATTENDING CONTRIBUTION TO CARE
**CDU ATTENDING ADDENDUM (Dr. Noah Zuniga): I attest that I have directly examined this patient and reviewed and formulated the diagnostic and therapeutic management plan in collaboration with the advanced practitioner (NP, PA). Please see MDM note and remainder of EMR for findings from CC, HPI, ROS, and PE. ([place])

## 2021-09-03 NOTE — ED PROVIDER NOTE - CHIEF COMPLAINT
The patient is a 66y Female complaining of  The patient is a 66-year-old woman presenting with concern for low hemoglobin and hematocrit as noted on outpatient diagnostic testing.

## 2021-09-03 NOTE — CONSULT NOTE ADULT - ASSESSMENT
66F HTN, T2DM, with h/o SCC, extensive carcinoma in situ of vulva, sent to ER for acute on chronic anemia, eval of weakness:     #microcytic anemia, delayed transfusion reaction   -h/o beta thal minor and hemolytic anemia in past. previously seen by Dr. Auguste for hemolytic anemia  -sent in for debilitating fatigue, found to have acute on chronic anemia, hgb 7.1 and baseline 9-10  -recommend checking LDH, hapto, retic, direct shalonda profile, direct bili normal, lowers suspicion for hemolysis   -can transfuse 1 unit PRBC given significant weakness, recommend pre-med with tylenol 650 mg PO and IV benadryl 50 mg x1 given h/o delayed transfusion reaction   -recommend she take folic acid daily for h/o beta thal     #SCC, vulva carcinoma  -h/o SCC dating back to 2014 with second occurrence in 2017 and 3rd in 2019 and now with extensive carcinoma in situ of vulva and perianal area along with groin lesion  -s/p anterior partial vulvectomy with bilateral sentinel node excision on 12/12/2014 and pathology showed tumor was poorly differentiated, 4mm with 4mm depth of invasion. The nearest margin was 6mm.   -She has had multiple biopsies over the years and in Dec 2017, she underwent wide local excision of a 9 mm G1 scc with 4 mm invasion. Margin was 4mm with HOMERO at all margins. No LVSi was seen.   -She continue to be followed with multiple biopsies over the year.   -In June,2020 she had more discomfort in the right vulva with a new nodule on exam and biopsy read as psoriasiform spongiotic dermatitis. She was seen in Dermatology and rebiopsied in Aug 2020 showed SCCA of the vulva. S  -he was seen for Gyn onc f/u and eval in Sept 2020 exam revealed 2 cm raised lesion post labia majora and extensive white epithelium posterior fourchette. PET done without evidence of metastatic disease (Oct 2020) definitive surgery with plastics reconstruction planned.   -She underwent surgery with right inguinal node assessment right partial vulvectomy left labial scar excision and additional deep margin excision as planned on Oct 19, 2020.   -Pathology confirmed 2 benign right sentinel nodes, VIN3 of left labial sca and a 2.4 cm G1 scc with 13 mm depth of invasion. Closest margin was 9 mm,. there was no LVSI and no high grade HOMERO at margins.   -When seen for f/u on Feb 10,2021 white and slightly ulcerated plaques on left labia minor were seen and biopsied. these revealed early evolving CIS associated with lechen sclerosis et atrophicus in both the left vulva and perianal skin.   -She was seen by Dr. Hahn who noted right anterior hemorrhoid with suspicious lesion on anoscopy. 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   Given extensive carcinoma in situ involving vulva and perianal are, she was referred for radiation and med onc eval   -undergoing RT with cisplatin. completed cisplatin on 8/26   -can f/u with Dr. Carpenter and rad onc as outpt. no plan for inpatient chemo     Onc will continue to follow     Buddy Thompson Heme/onc PGY5 [phone]    66F HTN, T2DM, with h/o SCC, extensive carcinoma in situ of vulva, sent to ER for acute on chronic anemia, eval of weakness:     #microcytic anemia, delayed transfusion reaction   -h/o beta thal minor and hemolytic anemia in past. previously seen by Dr. Auguste for hemolytic anemia  -sent in for debilitating fatigue, found to have acute on chronic anemia, hgb 7.1 and baseline 9-10  -recommend checking LDH, hapto, retic, direct shalonda profile, direct bili normal, lowers suspicion for hemolysis   -can transfuse 1 unit PRBC given significant weakness, recommend pre-med with tylenol, benadryl, solumedrol, given h/o delayed transfusion reaction   -recommend she take folic acid daily for h/o beta thal     #SCC, vulva carcinoma  -h/o SCC dating back to 2014 with second occurrence in 2017 and 3rd in 2019 and now with extensive carcinoma in situ of vulva and perianal area along with groin lesion  -s/p anterior partial vulvectomy with bilateral sentinel node excision on 12/12/2014 and pathology showed tumor was poorly differentiated, 4mm with 4mm depth of invasion. The nearest margin was 6mm.   -She has had multiple biopsies over the years and in Dec 2017, she underwent wide local excision of a 9 mm G1 scc with 4 mm invasion. Margin was 4mm with HOMERO at all margins. No LVSi was seen.   -She continue to be followed with multiple biopsies over the year.   -In June,2020 she had more discomfort in the right vulva with a new nodule on exam and biopsy read as psoriasiform spongiotic dermatitis. She was seen in Dermatology and rebiopsied in Aug 2020 showed SCCA of the vulva. S  -he was seen for Gyn onc f/u and eval in Sept 2020 exam revealed 2 cm raised lesion post labia majora and extensive white epithelium posterior fourchette. PET done without evidence of metastatic disease (Oct 2020) definitive surgery with plastics reconstruction planned.   -She underwent surgery with right inguinal node assessment right partial vulvectomy left labial scar excision and additional deep margin excision as planned on Oct 19, 2020.   -Pathology confirmed 2 benign right sentinel nodes, VIN3 of left labial sca and a 2.4 cm G1 scc with 13 mm depth of invasion. Closest margin was 9 mm,. there was no LVSI and no high grade HOMERO at margins.   -When seen for f/u on Feb 10,2021 white and slightly ulcerated plaques on left labia minor were seen and biopsied. these revealed early evolving CIS associated with lechen sclerosis et atrophicus in both the left vulva and perianal skin.   -She was seen by Dr. Hahn who noted right anterior hemorrhoid with suspicious lesion on anoscopy. 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   Given extensive carcinoma in situ involving vulva and perianal are, she was referred for radiation and med onc eval   -undergoing RT with cisplatin. completed cisplatin on 8/26   -can f/u with Dr. Carpenter and rad onc as outpt. no plan for inpatient chemo     Onc will continue to follow     Buddy Thompson Heme/onc PGY5 [phone]    66F HTN, T2DM, with h/o SCC, extensive carcinoma in situ of vulva, sent to ER for acute on chronic anemia, eval of weakness:     #microcytic anemia, delayed transfusion reaction   -h/o beta thal minor and hemolytic anemia in past. previously seen by Dr. Auguste for hemolytic anemia  -sent in for debilitating fatigue, found to have acute on chronic anemia, hgb 7.1 and baseline 9. not too far from baseline, may be from recent chemo  -recommend checking LDH, hapto, retic, direct shalonda profile, direct bili normal, lowers suspicion for hemolysis   -can transfuse 1 unit PRBC given significant weakness, recommend pre-med with tylenol, benadryl, solumedrol, given h/o delayed transfusion reaction   -recommend she take folic acid daily for h/o beta thal     #SCC, vulva carcinoma  -h/o SCC dating back to 2014 with second occurrence in 2017 and 3rd in 2019 and now with extensive carcinoma in situ of vulva and perianal area along with groin lesion  -s/p anterior partial vulvectomy with bilateral sentinel node excision on 12/12/2014 and pathology showed tumor was poorly differentiated, 4mm with 4mm depth of invasion. The nearest margin was 6mm.   -She has had multiple biopsies over the years and in Dec 2017, she underwent wide local excision of a 9 mm G1 scc with 4 mm invasion. Margin was 4mm with HOMERO at all margins. No LVSi was seen.   -She continue to be followed with multiple biopsies over the year.   -In June,2020 she had more discomfort in the right vulva with a new nodule on exam and biopsy read as psoriasiform spongiotic dermatitis. She was seen in Dermatology and rebiopsied in Aug 2020 showed SCCA of the vulva. S  -he was seen for Gyn onc f/u and eval in Sept 2020 exam revealed 2 cm raised lesion post labia majora and extensive white epithelium posterior fourchette. PET done without evidence of metastatic disease (Oct 2020) definitive surgery with plastics reconstruction planned.   -She underwent surgery with right inguinal node assessment right partial vulvectomy left labial scar excision and additional deep margin excision as planned on Oct 19, 2020.   -Pathology confirmed 2 benign right sentinel nodes, VIN3 of left labial sca and a 2.4 cm G1 scc with 13 mm depth of invasion. Closest margin was 9 mm,. there was no LVSI and no high grade HOMERO at margins.   -When seen for f/u on Feb 10,2021 white and slightly ulcerated plaques on left labia minor were seen and biopsied. these revealed early evolving CIS associated with lechen sclerosis et atrophicus in both the left vulva and perianal skin.   -She was seen by Dr. Hahn who noted right anterior hemorrhoid with suspicious lesion on anoscopy. 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   Given extensive carcinoma in situ involving vulva and perianal are, she was referred for radiation and med onc eval   -undergoing RT with cisplatin. completed cisplatin on 8/26   -can f/u with Dr. Carpenter and rad onc as outpt. no plan for inpatient chemo     Onc will continue to follow     Buddy Thompson Heme/onc PGY5 [phone]

## 2021-09-03 NOTE — ED PROVIDER NOTE - CLINICAL SUMMARY MEDICAL DECISION MAKING FREE TEXT BOX
**ATTENDING MEDICAL DECISION MAKING/SYNTHESIS (Dr. Noah Zuniga): I have reviewed the Chief Concern(s), the HPI, the ROS, and have directly performed and confirmed the findings on the Physical Examination. I have reviewed the medical decision making with all providers, as applicable. The PROBLEM REPRESENTATION at this time is: 66-year-old woman with past medical/surgical history of vulvar cancer s/p surgical resection and chemotherapy, beta-thalassemia minor, Diabetes Mellitus t1, Hypertension, hyperlipidemia, fibromyalgia, and other medical problems noted in the EMR now presenting with concern for days for progressively worsening fatigue, malaise and generalized weakness. Noted with symptomatic anemia on outpatient workup. Here for blood component therapy (PRBC transfusion and replacement). The MOST LIKELY DIAGNOSIS, and the LIST OF DIFFERENTIAL DIAGNOSES, includes (but is not limited to) the following: CAUSES FOR SYMPTOMATIC ANEMIA:   OTHER CAUSES FOR FATIGUE/WEAKNESS:    The likelihood of each of these diagnoses has been appropriately considered in the context of this patient's presentation and my evaluation. PLAN: as described in EMR, including diagnostics, therapeutics and consultation as clinically warranted. I will continue to reevaluate the patient, including the results of all testing, and monitor response to therapy throughout the patient's course in the ED. **ATTENDING MEDICAL DECISION MAKING/SYNTHESIS (Dr. Noah Zuniga): I have reviewed the Chief Concern(s), the HPI, the ROS, and have directly performed and confirmed the findings on the Physical Examination. I have reviewed the medical decision making with all providers, as applicable. The PROBLEM REPRESENTATION at this time is: 66-year-old woman with past medical/surgical history of vulvar cancer s/p surgical resection and chemotherapy, beta-thalassemia minor, Diabetes Mellitus t1, hypothyroidism, Hypertension, hyperlipidemia, fibromyalgia, and other medical problems noted in the EMR now presenting with concern for days for progressively worsening fatigue, malaise and generalized weakness. Noted with symptomatic anemia on outpatient workup. Here for blood component therapy (PRBC transfusion and replacement). The MOST LIKELY DIAGNOSIS, and the LIST OF DIFFERENTIAL DIAGNOSES, includes (but is not limited to) the following: CAUSES FOR SYMPTOMATIC ANEMIA: aplastic anemia (bone marrow suppression), nutritional deficiency e.g. microcytic v. macrocytic anemia from B6, B12, iron, folate, or other nutritional deficit, hemolytic anemia, anemia of chronic disease, sequestration or equivalent process, acute known or occult hemorrhage e,g. GI bleed or equivalent (secondary to UGI bleed in context of possible PUD, DUD, gastritis, or equivalent, LGI bleed secondary to polyp, AV malformation, fistula or equivalent), coagulopathy (congenital or acquired, known or occult), chemotherapy-associated or other ADR/SE, OTHER CAUSES FOR FATIGUE/WEAKNESS OR SEQUELA OF ANEMIA: acute coronary syndrome, unstable angina, transient ischemic attack, cerebrovascular accident, other infarction or ischemia, electrolyte-metabolic-endocrine derangements, dehydration. The likelihood of each of these diagnoses has been appropriately considered in the context of this patient's presentation and my evaluation. PLAN: as described in EMR, including diagnostics, therapeutics and consultation as clinically warranted. I will continue to reevaluate the patient, including the results of all testing, and monitor response to therapy throughout the patient's course in the ED.

## 2021-09-03 NOTE — ED CDU PROVIDER DISPOSITION NOTE - PATIENT PORTAL LINK FT
You can access the FollowMyHealth Patient Portal offered by [facility] by registering at the following website: [email or web address]. By joining [employer]’s FollowMyHealth portal, you will also be able to view your health information using other applications (apps) compatible with our system.

## 2021-09-03 NOTE — ED PROVIDER NOTE - NS ED ROS FT
**ATTENDING ADDENDUM (Dr. Noah Zuniga): During my interview with the patient, I have personally obtained and/or have directly verified the elements in the past medical/surgical history and other histories as noted earlier in the EMR, in conjunction with the other members (EM resident/PA/NP) of the patient care team. I have also personally obtained and/or have directly verified/reviewed the review of systems as documented below, in conjunction with the other members (EM resident/PA/NP) of the patient care team. **ATTENDING ADDENDUM (Dr. Noah Zuniga): During my interview with the patient, I have personally obtained and/or have directly verified the elements in the past medical/surgical history and other histories as noted earlier in the EMR, in conjunction with the other members (EM resident/PA/NP) of the patient care team. I have also personally obtained and/or have directly verified/reviewed the review of systems as documented below, in conjunction with the other members (EM resident/PA/NP) of the patient care team.    CONSTITUTIONAL:  +FATIGUE No weight loss, fever, chills, weakness.  HEENT:  Eyes:  No visual loss, blurred vision, double vision or yellow sclerae. Ears, Nose, Throat:  No hearing loss, sneezing, congestion, runny nose or sore throat.  SKIN:  No rash or itching.  CARDIOVASCULAR:  No chest pain, chest pressure or chest discomfort. No palpitations.  RESPIRATORY:  No shortness of breath, cough or sputum.  GASTROINTESTINAL:  No anorexia, nausea, vomiting or diarrhea. No abdominal pain or blood.  GENITOURINARY:  Denies hematuria, dysuria.   NEUROLOGICAL:  No headache, dizziness, syncope, paralysis, ataxia, numbness or tingling in the extremities. No change in bowel or bladder control.  MUSCULOSKELETAL:  No muscle, back pain, joint pain or stiffness.  HEMATOLOGIC:  No anemia, bleeding or bruising.  LYMPHATICS:  No enlarged nodes.   PSYCHIATRIC:  No history of depression or anxiety.  ENDOCRINOLOGIC:  No reports of sweating, cold or heat intolerance. No polyuria or polydipsia.  ALLERGIES:  No history of asthma, hives, eczema or rhinitis. CONSTITUTIONAL:  +FATIGUE No weight loss, fever, chills, weakness.  HEENT:  Eyes:  No visual loss, blurred vision, double vision or yellow sclerae. Ears, Nose, Throat:  No hearing loss, sneezing, congestion, runny nose or sore throat.  SKIN:  No rash or itching.  CARDIOVASCULAR:  No chest pain, chest pressure or chest discomfort. No palpitations.  RESPIRATORY:  No shortness of breath, cough or sputum.  GASTROINTESTINAL:  No anorexia, nausea, vomiting or diarrhea. No abdominal pain or blood.  GENITOURINARY:  Denies hematuria, dysuria.   NEUROLOGICAL:  No headache, dizziness, syncope, paralysis, ataxia, numbness or tingling in the extremities. No change in bowel or bladder control.  MUSCULOSKELETAL:  No muscle, back pain, joint pain or stiffness.  HEMATOLOGIC:  No anemia, bleeding or bruising.  LYMPHATICS:  No enlarged nodes.   PSYCHIATRIC:  No history of depression or anxiety.  ENDOCRINOLOGIC:  No reports of sweating, cold or heat intolerance. No polyuria or polydipsia.  ALLERGIES:  No history of asthma, hives, eczema or rhinitis.  **ATTENDING ADDENDUM (Dr. Noah Zuniga): During my interview with the patient, I have personally obtained and/or have directly verified the elements in the past medical/surgical history and other histories as noted earlier in the EMR, in conjunction with the other members (IM resident/PA/NP) of the patient care team. I have also personally obtained and/or have directly verified/reviewed the review of systems as documented below, in conjunction with the other members (IM resident/PA/NP) of the patient care team.

## 2021-09-03 NOTE — ED ADULT NURSE REASSESSMENT NOTE - NS ED NURSE REASSESS COMMENT FT1
Spoke to blood bank at #[id number], patient has antibodies and it will be "a few hours" before blood is ready to be transfused. This is communicated to SOTO Desai in CDU. Report given to SOTO Desai. Also it is given in report that patient is to be pre-medicated with the ordered medications approximately 15 minutes prior to starting the transfusion as per doctor Peter.

## 2021-09-03 NOTE — ED PROVIDER NOTE - ASSOCIATED SYMPTOMS
**ATTENDING ADDENDUM (Dr. Noah Zuniga): POSITIVE low hemoglobin and hematocrit as noted on outpatient workup

## 2021-09-03 NOTE — ED PROVIDER NOTE - NEUROLOGICAL, MLM
Alert and oriented, no focal deficits, no motor or sensory deficits. **ATTENDING ADDENDUM (Dr. Noah Zuniga): NO new focal or generalized weakness. NO new numbness, tingling, weakness, or paresthesias.

## 2021-09-03 NOTE — ED CDU PROVIDER INITIAL DAY NOTE - GASTROINTESTINAL, MLM
Abdomen soft, non-tender **CDU ATTENDING ADDENDUM (Dr. Noah Zuniga): NO guarding, rebound, or rigidity. NO pulsatile or non-pulsatile masses. NO hernias. NO obvious hepatosplenomegaly.

## 2021-09-03 NOTE — ED CDU PROVIDER INITIAL DAY NOTE - EYES, MLM
Clear bilaterally, pupils equal, round and reactive to light. **CDU ATTENDING ADDENDUM (Dr. Noah Zuniga): Extraocular muscle movements intact. Clear corneas bilaterally, pupils equal and round. POSITIVE bilateral conjunctival pallor.

## 2021-09-03 NOTE — ED PROVIDER NOTE - ENDOCRINE [+], MLM
**ATTENDING ADDENDUM (Dr. Noah Zuniga): POSITIVE history of hypothyroidism and Diabetes Mellitus t2.

## 2021-09-03 NOTE — ED PROVIDER NOTE - MUSCULOSKELETAL, MLM
Spine appears normal, range of motion is not limited, no muscle or joint tenderness **ATTENDING ADDENDUM (Dr. Noah Zuniga): Symmetrically equal strength, 5/5, in the bilateral upper and lower extremities. NO cords, soft-tissue swelling or calf tenderness in the bilateral lower extremities.

## 2021-09-03 NOTE — ED CDU PROVIDER INITIAL DAY NOTE - OBJECTIVE STATEMENT
66F PMH of vulvar ca s/p surgery on radiation/chemo with cisplatin (last chemo 8/26/21), T2DM, HTN, HLD, fibromyalgia presenting to ED for low H/H (hg 7.2) on outpatient labs. Feels weak x 1 week, has had multiple transfusions in the past, usually feels this fatigued when her blood is low she states. Denies fever, chills, SOB, CP, cough, abdominal pain, N/V/D, hematuria, bloody stool. 66F PMH of vulvar ca s/p surgery on radiation/chemo with cisplatin (last chemo 8/26/21), T2DM, HTN, HLD, fibromyalgia presenting to ED for low H/H (hg 7.2) on outpatient labs. Feels weak x 1 week, has had multiple transfusions in the past, usually feels this fatigued when her blood is low she states. Denies fever, chills, SOB, CP, cough, abdominal pain, N/V/D, hematuria, bloody stool.  **CDU ATTENDING ADDENDUM (Dr. Noah Zuniga): I attest that I have directly examined this patient and elicited a comparable history of present illness and review of systems with my collaborating provider (NP/PA). I attest that I have made significant contributions to the documentation where necessary and as noted in the EMR. Of note, and in addition to the above, I was the initial ED Provider of record for this patient's presentation prior to the CDU admission. Please refer to ED Provider Note for additional details.

## 2021-09-03 NOTE — CONSULT NOTE ADULT - SUBJECTIVE AND OBJECTIVE BOX
HPI: 66F PMH of vulvar ca s/p surgery on radiation/chemo with cisplatin (last chemo 8/26/21), T2DM, HTN, HLD, fibromyalgia presenting to ED for low H/H (hg 7.2) on outpatient labs. Feels weak x 1 week, has had multiple transfusions in the past, usually feels this fatigued when her blood is low she states. Denies fever, chills, SOB, CP, cough, abdominal pain, N/V/D, hematuria, bloody stool.    Allergies  No Known Allergies    MEDICATIONS  (STANDING):  diphenhydrAMINE   Injectable 50 milliGRAM(s) IV Push once  magnesium sulfate  IVPB 2 Gram(s) IV Intermittent Once    MEDICATIONS  (PRN):    PAST MEDICAL & SURGICAL HISTORY:  Lichen sclerosus of female genitalia    Vulva cancer  bilateral 2014 reoccurance 2017    HTN (hypertension)    TIA (transient ischemic attack)  8/2013  blurred vision/dizziness no treatment    DM type 2 (diabetes mellitus, type 2)  1995    Hypothyroid    Thalassemia minor    Strabismus    Fibromyalgia    HLD (hyperlipidemia)    Anxiety and depression    GERD (gastroesophageal reflux disease)    Obesity    S/P laparoscopic cholecystectomy  2000    S/P eye surgery  June 2014, for strabismus    Vulvar neoplasm  s/p radical anterior vulvectomy in 2014 with radical excision in 2017    H/O total knee replacement, right  2016      FAMILY HISTORY:  Family history of colon cancer (Aunt)    Family history of diabetes mellitus in mother (Aunt, Mother, Sibling)        SOCIAL HISTORY: No EtOH, no tobacco    REVIEW OF SYSTEMS:    CONSTITUTIONAL: no fever. (+) weakness  EYES/ENT: No visual changes;  no throat pain   NECK: No pain or stiffness  RESPIRATORY: no sob  CARDIOVASCULAR: No chest pain or palpitations  GASTROINTESTINAL: No abdominal or epigastric pain. No NV/D/C  GENITOURINARY: No dysuria, change in frequency or hematuria  NEUROLOGICAL: No numbness or weakness  SKIN: No itching, burning, rashes, or lesions   MSK: (+) joint pain  Allergy: no urticaria     Height (cm): 160 (09-03 @ 11:09)  Weight (kg): 83.5 (09-03 @ 11:09)  BMI (kg/m2): 32.6 (09-03 @ 11:09)  BSA (m2): 1.87 (09-03 @ 11:09)    T(F): 98.7 (09-03-21 @ 11:09), Max: 98.7 (09-03-21 @ 11:09)  HR: 80 (09-03-21 @ 11:09)  BP: 132/62 (09-03-21 @ 11:09)  RR: 18 (09-03-21 @ 11:09)  SpO2: 94% (09-03-21 @ 11:09)  Wt(kg): --    GENERAL: NAD  HEENT: EOMI, MMM,   Pulm: no inc wob  CV: RRR  ABDOMEN: soft, nt, nd  MSK: nl ROM  EXTREMITIES:  no appreciable edema in b/l LE  Neuro: A&Ox3, no focal deficits  SKIN: warm and dry, no visible rash                          7.1    5.27  )-----------( 194      ( 03 Sep 2021 12:11 )             22.4       09-03    132<L>  |  94<L>  |  22  ----------------------------<  197<H>  4.3   |  25  |  1.09    Ca    9.4      03 Sep 2021 12:11  Phos  2.7     09-03  Mg     1.3     09-03    TPro  6.9  /  Alb  3.9  /  TBili  0.5  /  DBili  x   /  AST  15  /  ALT  13  /  AlkPhos  74  09-03      Magnesium, Serum: 1.3 mg/dL (09-03 @ 12:11)  Phosphorus Level, Serum: 2.7 mg/dL (09-03 @ 12:11)

## 2021-09-03 NOTE — ED PROVIDER NOTE - RESPIRATORY, MLM
Breath sounds clear and equal bilaterally. **ATTENDING ADDENDUM (Dr. Noah Zuniga): NO wheezing, rales, rhonchi, crackles, stridor, drooling, retractions, nasal flaring, or tripoding.

## 2021-09-03 NOTE — ED PROVIDER NOTE - EYES, MLM
Clear bilaterally, pupils equal, round and reactive to light. **ATTENDING ADDENDUM (Dr. Noah Zuniga): Extraocular muscle movements intact. Clear corneas bilaterally, pupils equal and round. POSITIVE pale conjunctiva bilaterally. NO scleral icterus bilaterally.

## 2021-09-03 NOTE — ED PROVIDER NOTE - LAB INTERPRETATION
**ATTENDING ADDENDUM (Dr. Noah Zuniga): Labs reviewed and analyzed. Pertinent findings include: POSITIVE anemia. NO severe electrolyte-metabolic-endocrine derangements or profound leukocytosis.

## 2021-09-03 NOTE — CONSULT NOTE ADULT - ATTENDING COMMENTS
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 recommend checking LDH, hapto, retic. Direct bili normal, lowers suspicion for hemolysis. can transfuse 1 unit PRBC given significant weakness, recommend pre-med with tylenol, benadryl, solumedrol, given h/o delayed transfusion reaction

## 2021-09-03 NOTE — ED PROVIDER NOTE - PHYSICAL EXAMINATION
**ATTENDING ADDENDUM (Dr. Noah Zuniga): I have reviewed and substantially contributed to the elements of the PE as documented above. I have directly performed an examination of this patient in conjunction with the other members (EM resident/PA/NP) of the patient care team. I have personally reviewed the patient's vital signs at the time of the patient's initial presentation to the ED and repeatedly throughout the ED course. T(C): 37.1 (09-03-21 @ 11:09), Max: 37.1 (09-03-21 @ 11:09)  HR: 80 (09-03-21 @ 11:09) (80 - 80)  BP: 132/62 (09-03-21 @ 11:09) (132/62 - 132/62)  RR: 18 (09-03-21 @ 11:09) (18 - 18)  SpO2: 94% (09-03-21 @ 11:09) (94% - 94%)    GENERAL: Laying comfortably, NAD  EYES: pale conjunctiva. EOMI, PERRL, no scleral icterus  NECK: No JVD  LUNG: Clear to auscultation bilaterally; No wheeze, crackles or rhonci  HEART: Regular rate and rhythm; No murmurs, rubs, or gallops  ABDOMEN: Soft, Nontender, Nondistended  EXTREMITIES:  No LE edema, Peripheral Pulses intact, No clubbing, cyanosis, or edema  PSYCH: AAOx3  NEUROLOGY: non-focal, strength 5/5 in all extremities, sensation intact  SKIN: No rashes or lesions    **ATTENDING ADDENDUM (Dr. Noah Zuniga): I have reviewed and substantially contributed to the elements of the PE as documented above. I have directly performed an examination of this patient in conjunction with the other members (EM resident/PA/NP) of the patient care team. I have personally reviewed the patient's vital signs at the time of the patient's initial presentation to the ED and repeatedly throughout the ED course. T(C): 37.1 (09-03-21 @ 11:09), Max: 37.1 (09-03-21 @ 11:09)  HR: 80 (09-03-21 @ 11:09) (80 - 80)  BP: 132/62 (09-03-21 @ 11:09) (132/62 - 132/62)  RR: 18 (09-03-21 @ 11:09) (18 - 18)  SpO2: 94% (09-03-21 @ 11:09) (94% - 94%)    GENERAL: Laying comfortably, NAD  EYES: pale conjunctiva. EOMI, PERRL, no scleral icterus  NECK: No JVD  LUNG: Clear to auscultation bilaterally; No wheeze, crackles or rhonci  HEART: Regular rate and rhythm; No murmurs, rubs, or gallops  ABDOMEN: Soft, Nontender, Nondistended  EXTREMITIES:  No LE edema, Peripheral Pulses intact, No clubbing, cyanosis, or edema  PSYCH: AAOx3  NEUROLOGY: non-focal, strength 5/5 in all extremities, sensation intact  SKIN: No rashes or lesions  **ATTENDING ADDENDUM (Dr. Noah Zuniga): I have reviewed and substantially contributed to the elements of the PE as documented above. I have directly performed an examination of this patient in conjunction with the other members (IM resident/PA/NP) of the patient care team. I have personally reviewed the patient's vital signs at the time of the patient's initial presentation to the ED and repeatedly throughout the ED course.

## 2021-09-03 NOTE — ED CDU PROVIDER INITIAL DAY NOTE - PHYSICAL EXAMINATION
T(C): 37.1 (09-03-21 @ 11:09), Max: 37.1 (09-03-21 @ 11:09)  HR: 80 (09-03-21 @ 11:09) (80 - 80)  BP: 132/62 (09-03-21 @ 11:09) (132/62 - 132/62)  RR: 18 (09-03-21 @ 11:09) (18 - 18)  SpO2: 94% (09-03-21 @ 11:09) (94% - 94%)    GENERAL: Laying comfortably, NAD  EYES: pale conjunctiva. EOMI, PERRL, no scleral icterus  NECK: No JVD  LUNG: Clear to auscultation bilaterally; No wheeze, crackles or rhonci  HEART: Regular rate and rhythm; No murmurs, rubs, or gallops  ABDOMEN: Soft, Nontender, Nondistended  EXTREMITIES:  No LE edema, Peripheral Pulses intact, No clubbing, cyanosis, or edema  PSYCH: AAOx3  NEUROLOGY: non-focal, strength 5/5 in all extremities, sensation intact  SKIN: No rashes or lesions T(C): 37.1 (09-03-21 @ 11:09), Max: 37.1 (09-03-21 @ 11:09)  HR: 80 (09-03-21 @ 11:09) (80 - 80)  BP: 132/62 (09-03-21 @ 11:09) (132/62 - 132/62)  RR: 18 (09-03-21 @ 11:09) (18 - 18)  SpO2: 94% (09-03-21 @ 11:09) (94% - 94%)    GENERAL: Laying comfortably, NAD  EYES: pale conjunctiva. EOMI, PERRL, no scleral icterus  NECK: No JVD  LUNG: Clear to auscultation bilaterally; No wheeze, crackles or rhonci  HEART: Regular rate and rhythm; No murmurs, rubs, or gallops  ABDOMEN: Soft, Nontender, Nondistended  EXTREMITIES:  No LE edema, Peripheral Pulses intact, No clubbing, cyanosis, or edema  PSYCH: AAOx3  NEUROLOGY: non-focal, strength 5/5 in all extremities, sensation intact  SKIN: No rashes or lesions  **CDU ATTENDING ADDENDUM (Dr. Noah Zuniga): I have reviewed and substantially contributed to the elements of the PE as documented above. I have directly performed an examination of this patient in conjunction with the other members (EM resident/PA/NP) of the patient care team.

## 2021-09-03 NOTE — ED PROVIDER NOTE - CROS ED ENDOCRINE ALL NEG
Render Post-Care Instructions In Note?: no
Duration Of Freeze Thaw-Cycle (Seconds): 0
Post-Care Instructions: I reviewed with the patient in detail post-care instructions. Patient is to wear sunprotection, and avoid picking at any of the treated lesions. Pt may apply Vaseline to crusted or scabbing areas.
Number Of Freeze-Thaw Cycles: 3 freeze-thaw cycles
Detail Level: Simple
Consent: The patient's consent was obtained including but not limited to risks of crusting, scabbing, blistering, scarring, darker or lighter pigmentary change, recurrence, incomplete removal and infection.
- - -

## 2021-09-03 NOTE — ED CDU PROVIDER INITIAL DAY NOTE - PROGRESS NOTE DETAILS
Pt PO temp 100.1F 30min post transfusion. States that she feels a little warm, although denies chest pain, sob, rash, pruritis. Otherwise patient states that she feels a little improved overall after 1 unit PRBC. Repeat temp 98.1F. Discussed with Dr. Mckeon, will continue to monitor. - Griselda Anaya PA-C

## 2021-09-03 NOTE — ED PROVIDER NOTE - GASTROINTESTINAL, MLM
Abdomen soft, non-tender **ATTENDING ADDENDUM (Dr. Noah Zuniga): non-distended. NO guarding, rebound, or rigidity. NO pulsatile or non-pulsatile masses. NO hernias. NO obvious hepatosplenomegaly.

## 2021-09-03 NOTE — ED CDU PROVIDER INITIAL DAY NOTE - THROAT FINDINGS
**CDU ATTENDING ADDENDUM (Dr. Noah Zuniga): POSITIVE pale lips./no exudate/no redness/uvula midline/NO DROOLING/NO TONGUE ELEVATION

## 2021-09-03 NOTE — ED PROVIDER NOTE - PLAN OF CARE
**ATTENDING ADDENDUM (Dr. Noah Zuniga): Goals of care include resolution of emergent/urgent symptoms and concerns, and restoration to baseline level of homeostasis.

## 2021-09-03 NOTE — ED CDU PROVIDER DISPOSITION NOTE - CLINICAL COURSE
66F PMH of vulvar ca s/p surgery on radiation/chemo with cisplatin (last chemo 8/26/21), T2DM, HTN, HLD, fibromyalgia presenting to ED for low H/H (hg 7.2) on outpatient labs. Feels weak x 1 week, has had multiple transfusions in the past, usually feels this fatigued when her blood is low she states. Denies fever, chills, SOB, CP, cough, abdominal pain, N/V/D, hematuria, bloody stool. sent to CDU for transfusion of PRBCs. 66F PMH of vulvar ca s/p surgery on radiation/chemo with cisplatin (last chemo 8/26/21), T2DM, HTN, HLD, fibromyalgia presenting to ED for low H/H (hg 7.2) on outpatient labs. Feels weak x 1 week, has had multiple transfusions in the past, usually feels this fatigued when her blood is low she states. Denies fever, chills, SOB, CP, cough, abdominal pain, N/V/D, hematuria, bloody stool. sent to CDU for transfusion of PRBCs.    ED course: patient premedicated prior to 1 unit of blood transfusion and repeat CBC showed hbg 7.7 therefore heme onc recommended hbg goal >8. PAtient required another premedication and then 2nd unit with improvement of H/H. Cleared for discharge home per ED attending Dr. Yu

## 2021-09-03 NOTE — ED PROVIDER NOTE - SIGNIFICANT NEGATIVE FINDINGS
**ATTENDING ADDENDUM (Dr. Noah Zuniga): NO chest pain, palpitations, shortness of breath, dyspnea on exertion, abdominal pain, back pain, syncope, or near-syncope.

## 2021-09-03 NOTE — ED CDU PROVIDER INITIAL DAY NOTE - MUSCULOSKELETAL, MLM
Spine appears normal, range of motion is not limited, no muscle or joint tenderness **CDU ATTENDING ADDENDUM (Dr. Noah Zuniga): NO focal or generalized weakness. NO numbness, tingling, weakness, or paresthesias. Symmetrically equal strength, 5/5, in the bilateral upper and lower extremities.

## 2021-09-03 NOTE — ED PROVIDER NOTE - OBJECTIVE STATEMENT
66F PMH of vulvar ca s/p surgery on radiation/chemo with cisplatin (last chemo 8/26/21), T2DM, HTN, HLD, fibromyalgia presenting to ED for low H/H (hg 7.2) on outpatient labs. Feels weak x 1 week, has had multiple transfusions in the past, usually feels this fatigued when her blood is low she states. Denies fever, chills, SOB, CP, cough, abdominal pain, N/V/D, hematuria, bloody stool. 66F PMH of vulvar ca s/p surgery on radiation/chemo with cisplatin (last chemo 8/26/21), T2DM, HTN, HLD, fibromyalgia presenting to ED for low H/H (hg 7.2) on outpatient labs. Feels weak x 1 week, has had multiple transfusions in the past, usually feels this fatigued when her blood is low she states. Denies fever, chills, SOB, CP, cough, abdominal pain, N/V/D, hematuria, bloody stool.  **ATTENDING ADDENDUM (Dr. Noah Zuniga): I attest that I have directly and personally interviewed and examined this patient and elicited a comparable history of present illness and review of systems as documented, along with my IM resident on rotation in the ED. I attest that I have made significant contributions to the documentation where necessary and as noted in the EMR.

## 2021-09-03 NOTE — ED PROVIDER NOTE - PROGRESS NOTE DETAILS
**ATTENDING ADDENDUM (Dr. Noah Zuniga): personally assessed at time of ED presentation. Without acute signs or symptoms but with concern for progressively worsening anemia. Agree with goals/plan of ED care as described in EMR, including diagnostics, therapeutics and consultation as clinically warranted. Will continue to observe and monitor closely. Anticipatory guidance provided by ED team at time of initial presentation. **ATTENDING ADDENDUM (Dr. Noah Zuniga): patient serially evaluated throughout ED course by ED team. NO acute deterioration up to this time in the ED. Personally reassessed; assessed by CDU team and Hematology/Oncology fellow. ED diagnostics up to this time acknowledged, reviewed and noted. Agree with admission to CDU for therapeutic intensity, including transfusion of 2-3 PRBC units. Extensive anticipatory guidance provided to patient +/or family member(s) personally by me and other ED team members throughout ED course. Agree with CDU admission at this time. Will continue to observe and monitor closely until definitive placement is made.

## 2021-09-03 NOTE — ED ADULT NURSE NOTE - OBJECTIVE STATEMENT
66F to ED c/o of need for blood transfusion. Patient had Hgb of 7.3 earlier this week as per EMS. Patient was scheduled to get a transfusion tomorrow but felt weak today, and dizzy while ambulating. Patient denies SOB, chest pain, fever. Patient states possible transfusion reaction during her last transfusion where she experienced chills. Patient has hx of cancer and finished her final chemo treatment recently.   18 gauge to RAC. Patient is given blankets for warmth, and verbalizes understanding of how to use call bell.

## 2021-09-03 NOTE — ED CDU PROVIDER INITIAL DAY NOTE - ENDOCRINE [+], MLM
**CDU ATTENDING ADDENDUM (Dr. Noah Zuniga): POSITIVE history of hypothyroidism and Diabetes Mellitus t2.

## 2021-09-03 NOTE — ED CDU PROVIDER DISPOSITION NOTE - NSFOLLOWUPINSTRUCTIONS_ED_ALL_ED_FT
1. Rest. Stay hydrated.  2. Continue your current home medications.  3. Follow up with your Hematologist/Oncologist in 2-3 days. Follow-up with your medical doctor in 2-3 days.  Bring your results with you for follow-up.  4. Return to the ER if you have any worsening symptoms, chest pain, difficulty breathing, fevers, chills, weakness, or any other concerns.

## 2021-09-03 NOTE — ED CLERICAL - NS ED CLERK NOTE PRE-ARRIVAL INFORMATION; ADDITIONAL PRE-ARRIVAL INFORMATION
CC/Reason For referral: Anemia, Body Aches, Fatigue, Weakness  Preferred Consultant(if applicable):  Who admits for you (if needed):  Do you have documents you would like to fax over? No  Would you still like to speak to an ED attending? Yes

## 2021-09-03 NOTE — ED ADULT NURSE NOTE - NSIMPLEMENTINTERV_GEN_ALL_ED
Implemented All Fall Risk Interventions:  [place] to call system. Call bell, personal items and telephone within reach. Instruct patient to call for assistance. Room bathroom lighting operational. Non-slip footwear when patient is off stretcher. Physically safe environment: no spills, clutter or unnecessary equipment. Stretcher in lowest position, wheels locked, appropriate side rails in place. Provide visual cue, wrist band, yellow gown, etc. Monitor gait and stability. Monitor for mental status changes and reorient to person, place, and time. Review medications for side effects contributing to fall risk. Reinforce activity limits and safety measures with patient and family.

## 2021-09-03 NOTE — ED PROVIDER NOTE - ATTENDING CONTRIBUTION TO CARE
**ATTENDING ADDENDUM (Dr. Noah Zuniga): I attest that I have directly examined this patient and reviewed and formulated the diagnostic and therapeutic management plan in collaboration with the IM resident on rotation in the ED. Please see MDM note and remainder of EMR for findings from CC, HPI, ROS, and PE. **ATTENDING ADDENDUM (Dr. Noah Zuniga): I attest that I have directly examined this patient and reviewed and formulated the diagnostic and therapeutic management plan in collaboration with the IM resident on rotation in the ED. Please see MDM note and remainder of EMR for findings from CC, HPI, ROS, and PE. (Peter)

## 2021-09-03 NOTE — ED CDU PROVIDER INITIAL DAY NOTE - NSCAREINITIATED _GEN_ER
"Patient presents for second venofer infusion. IV placed without difficulty and she tolerated venofer without issue. She has multiple c/o today and did call her PCP. She was advised to go to the ED for chest pain, however refuses. SHe also refused to go when we discussed it today reporting \"nothing is new, just worse.\" Gudelia Gilliland    "
Noah Zuniga(Attending)

## 2021-09-03 NOTE — ED CDU PROVIDER INITIAL DAY NOTE - NS ED ROS FT
CONSTITUTIONAL:  +FATIGUE No weight loss, fever, chills, weakness.  HEENT:  Eyes:  No visual loss, blurred vision, double vision or yellow sclerae. Ears, Nose, Throat:  No hearing loss, sneezing, congestion, runny nose or sore throat.  SKIN:  No rash or itching.  CARDIOVASCULAR:  No chest pain, chest pressure or chest discomfort. No palpitations.  RESPIRATORY:  No shortness of breath, cough or sputum.  GASTROINTESTINAL:  No anorexia, nausea, vomiting or diarrhea. No abdominal pain or blood.  GENITOURINARY:  Denies hematuria, dysuria.   NEUROLOGICAL:  No headache, dizziness, syncope, paralysis, ataxia, numbness or tingling in the extremities. No change in bowel or bladder control.  MUSCULOSKELETAL:  No muscle, back pain, joint pain or stiffness.  HEMATOLOGIC:  No anemia, bleeding or bruising.  LYMPHATICS:  No enlarged nodes.   PSYCHIATRIC:  No history of depression or anxiety.  ENDOCRINOLOGIC:  No reports of sweating, cold or heat intolerance. No polyuria or polydipsia.  ALLERGIES:  No history of asthma, hives, eczema or rhinitis. CONSTITUTIONAL:  +FATIGUE No weight loss, fever, chills, weakness.  HEENT:  Eyes:  No visual loss, blurred vision, double vision or yellow sclerae. Ears, Nose, Throat:  No hearing loss, sneezing, congestion, runny nose or sore throat.  SKIN:  No rash or itching.  CARDIOVASCULAR:  No chest pain, chest pressure or chest discomfort. No palpitations.  RESPIRATORY:  No shortness of breath, cough or sputum.  GASTROINTESTINAL:  No anorexia, nausea, vomiting or diarrhea. No abdominal pain or blood.  GENITOURINARY:  Denies hematuria, dysuria.   NEUROLOGICAL:  No headache, dizziness, syncope, paralysis, ataxia, numbness or tingling in the extremities. No change in bowel or bladder control.  MUSCULOSKELETAL:  No muscle, back pain, joint pain or stiffness.  HEMATOLOGIC:  No anemia, bleeding or bruising.  LYMPHATICS:  No enlarged nodes.   PSYCHIATRIC:  No history of depression or anxiety.  ENDOCRINOLOGIC:  No reports of sweating, cold or heat intolerance. No polyuria or polydipsia.  ALLERGIES:  No history of asthma, hives, eczema or rhinitis.  **CDU ATTENDING ADDENDUM (Dr. Noah Zuniga): During my interview with the patient, I have personally obtained and/or have directly verified the elements in the past medical/surgical history and other histories as noted earlier in the EMR, in conjunction with the other members (EM resident/PA/NP) of the patient care team. I have also personally obtained and/or have directly verified/reviewed the review of systems as documented below, in conjunction with the other members (EM resident/PA/NP) of the patient care team.

## 2021-09-04 NOTE — ED CDU PROVIDER SUBSEQUENT DAY NOTE - PHYSICAL EXAMINATION
GENERAL: Laying comfortably, NAD  	EYES: pale conjunctiva. no scleral icterus  	LUNG: Clear to auscultation bilaterally; No wheeze, crackles or rhonci  	HEART: Regular rate and rhythm; No murmurs, rubs, or gallops  	ABDOMEN: Soft, Nontender, Nondistended  	EXTREMITIES:  No LE edema, moving all extremities   	PSYCH: AAOx3, appropriate mood and affect   	NEUROLOGY: Moving all extremities  SKIN: No visible rashes or lesions  PSYCH: Appropriate mood and affect

## 2021-09-04 NOTE — ED ADULT NURSE REASSESSMENT NOTE - NS ED NURSE REASSESS COMMENT FT1
Patient educated on blood transfusion reaction. Instructed to notify RN or provider if signs of symptom of reaction arise. Consent in chart. Pt premedicated as ordered prior to infusion. VSS, afebrile. IV is clean/dry/intact, flushes without difficulty.

## 2021-09-04 NOTE — ED ADULT NURSE REASSESSMENT NOTE - NS ED NURSE REASSESS COMMENT FT1
report received from Eren VALERA. Patient is a/ox4, reporting 7/10 pain to vulva region, and nausea. Medicated as ordered, no acute distress noted. Safety maintained.

## 2021-09-04 NOTE — ED CDU PROVIDER SUBSEQUENT DAY NOTE - NS ED ROS FT
Constitutional: No fever or chills +fatigue   Eyes: No visual changes, eye pain  CV: No chest pain or lower extremity edema  Resp: No SOB no cough  GI: No abd pain. No nausea or vomiting.   : No dysuria, hematuria.   MSK: No musculoskeletal pain  Skin: No rash  Psych: No complaints   Neuro: No headache. + generalized weakness.

## 2021-09-04 NOTE — ED CDU PROVIDER SUBSEQUENT DAY NOTE - PROGRESS NOTE DETAILS
pt feeling better after one unit PRBCs  Hgb increased to 7.7.  pt had low grade fever during transfusion after premedication.  Will discuss with Hematology for plan and poss d/c. BRITTNEY Paige: spoke to heme onc Dr. Perez who recommended to transfuse above 8 therefore suggested to premedicate and administer another unit of blood then repeat CBC

## 2021-09-04 NOTE — ED CDU PROVIDER SUBSEQUENT DAY NOTE - HISTORY
No interval changes since initial CDU provider note. Pt feels well without complaint. NAD VSS. Pending repeat CBC post PRBC. Heme following.- BRITTNEY Anaya

## 2021-09-17 PROBLEM — M25.50 JOINT PAIN: Status: ACTIVE | Noted: 2021-01-01

## 2021-09-17 NOTE — REASON FOR VISIT
[Follow-Up Visit] : a follow-up [Urgent Visit] : an urgent  [Spouse] : spouse [FreeTextEntry2] : Vulvar cancer

## 2021-09-17 NOTE — PHYSICAL EXAM
[Capable of only limited self care, confined to bed or chair more than 50% of waking hours] : Status 3- Capable of only limited self care, confined to bed or chair more than 50% of waking hours [Obese] : obese [Normal] : normal spine exam without palpable tenderness, no kyphosis or scoliosis [de-identified] : nontoxic, comfortable appearing  [de-identified] : L wrist swelling with warm to touch, improved compared to last week

## 2021-09-17 NOTE — PHYSICAL EXAM
[Normal] : affect appropriate [Capable of only limited self care, confined to bed or chair more than 50% of waking hours] : Status 3- Capable of only limited self care, confined to bed or chair more than 50% of waking hours [Obese] : obese [de-identified] : nontoxic  [de-identified] : L wrist swelling with warm to touch

## 2021-09-17 NOTE — REVIEW OF SYSTEMS
[Fatigue] : fatigue [Negative] : Allergic/Immunologic [Chills] : chills [Night Sweats] : night sweats [Recent Change In Weight] : ~T recent weight change [Joint Pain] : joint pain [Joint Stiffness] : joint stiffness [Muscle Weakness] : muscle weakness [Lower Ext Edema] : no lower extremity edema [SOB on Exertion] : no shortness of breath during exertion [Abdominal Pain] : no abdominal pain [Constipation] : no constipation [Diarrhea] : no diarrhea [Skin Rash] : no skin rash [Difficulty Walking] : no difficulty walking

## 2021-09-17 NOTE — REVIEW OF SYSTEMS
[Chills] : chills [Night Sweats] : night sweats [Fatigue] : fatigue [Joint Pain] : joint pain [Joint Stiffness] : joint stiffness [Muscle Weakness] : muscle weakness [Negative] : Allergic/Immunologic [Chest Pain] : chest pain [Palpitations] : no palpitations [Lower Ext Edema] : no lower extremity edema [Shortness Of Breath] : no shortness of breath [Wheezing] : no wheezing [Cough] : no cough [SOB on Exertion] : no shortness of breath during exertion [Abdominal Pain] : no abdominal pain [Constipation] : no constipation [Diarrhea] : no diarrhea [Skin Rash] : no skin rash [Dizziness] : no dizziness [Difficulty Walking] : no difficulty walking [Insomnia] : no insomnia [Anxiety] : no anxiety

## 2021-09-17 NOTE — HISTORY OF PRESENT ILLNESS
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  \par \par She is s/p anterior partial vulvectomy with bilateral sentinel node excision on 12/12/2014 and pathology showed tumor was poorly differentiated, 4mm with 4mm depth of invasion. The nearest margin was 6mm.  She has had multiple biopsies over the years and in Dec 2017, she underwent wide local excision of a 9 mm G1 scc with 4 mm invasion.  Margin was 4mm with HOMERO at all margins. No LVSi was seen.  She continue to be followed with multiple biopsies over the year.   In June,2020 she had more discomfort in the right vulva with a new nodule on exam and biopsy read as psoriasiform spongiotic dermatitis. She was seen in Dermatology and rebiopsied in Aug 2020 showed SCCA of the vulva.  She was seen for Gyn onc f/u and eval in Sept 2020 exam revealed 2 cm raised lesion post labia majora and extensive white epithelium posterior fourchette. PET done without evidence of metastatic disease (Oct 2020) definitive surgery with plastics reconstruction planned.  She underwent surgery with right inguinal node assessment right partial vulvectomy left labial scar excision and additional deep margin excision as planned on Oct 19, 2020. Pathology confirmed 2 benign right sentinel nodes, VIN3 of left labial sca and a 2.4 cm G1 scc with 13 mm depth of invasion. Closest margin was 9 mm,. the was no LVSI and no high grade HOMERO at margins.   When seen for f/u on Feb10,2021 white and slightly ulcerated plaques on left labia minor were seen and biopsied. these revealed early evolving CIS associated with lechen sclerosis et atrophicus in both the left vulva and perianal skin.   She was seen by Dr. Hahn who noted right anterior hemorrhoid with suspicious lesion on anoscopy. 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   Given these extensive changes, she has been referred for Med Onc consult\par \par \par \par PMH:  HLD, HTN, Hypothyroidism, DM\par PSH:  Lap Minnie, Knee replacement, radial partial vulvectomy, tonsillectomy \par Fhx:  Maternal Aunt Colon Cancer, Mother Vulva Cancer \par Social:  former smoker, lives with spouse, occasional alcohol,  [FreeTextEntry1] : RT 7/12-9/1/2021 w/weekly Cisplatin started 7/15/2021 cycle 5 today  [de-identified] : Leana is seen for urgent visit today.  Patient was being observed after her second unit of PRBCs and c/o mid sternal CP.  She states it felt like pressure, nonradiating and not associated with n/v, diaphoresis, dizziness, palpitations, SOB or back pain.  It was short lived and self resolved after a couple of minutes.  \par \par

## 2021-09-17 NOTE — HISTORY OF PRESENT ILLNESS
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  \par \par She is s/p anterior partial vulvectomy with bilateral sentinel node excision on 12/12/2014 and pathology showed tumor was poorly differentiated, 4mm with 4mm depth of invasion. The nearest margin was 6mm.  She has had multiple biopsies over the years and in Dec 2017, she underwent wide local excision of a 9 mm G1 scc with 4 mm invasion.  Margin was 4mm with HOMERO at all margins. No LVSi was seen.  She continue to be followed with multiple biopsies over the year.   In June,2020 she had more discomfort in the right vulva with a new nodule on exam and biopsy read as psoriasiform spongiotic dermatitis. She was seen in Dermatology and rebiopsied in Aug 2020 showed SCCA of the vulva.  She was seen for Gyn onc f/u and eval in Sept 2020 exam revealed 2 cm raised lesion post labia majora and extensive white epithelium posterior fourchette. PET done without evidence of metastatic disease (Oct 2020) definitive surgery with plastics reconstruction planned.  She underwent surgery with right inguinal node assessment right partial vulvectomy left labial scar excision and additional deep margin excision as planned on Oct 19, 2020. Pathology confirmed 2 benign right sentinel nodes, VIN3 of left labial sca and a 2.4 cm G1 scc with 13 mm depth of invasion. Closest margin was 9 mm,. the was no LVSI and no high grade HOMERO at margins.   When seen for f/u on Feb10,2021 white and slightly ulcerated plaques on left labia minor were seen and biopsied. these revealed early evolving CIS associated with lechen sclerosis et atrophicus in both the left vulva and perianal skin.   She was seen by Dr. Hahn who noted right anterior hemorrhoid with suspicious lesion on anoscopy. 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   Given these extensive changes, she has been referred for Med Onc consult\par \par \par \par PMH:  HLD, HTN, Hypothyroidism, DM\par PSH:  Lap Minnie, Knee replacement, radial partial vulvectomy, tonsillectomy \par Fhx:  Maternal Aunt Colon Cancer, Mother Vulva Cancer \par Social:  former smoker, lives with spouse, occasional alcohol,  [FreeTextEntry1] : RT 7/12-9/1/2021 w/weekly Cisplatin started 7/15/2021 cycle 5 today  [de-identified] : Leana is seen for follow up and in the interim, she was admitted to [facility] 9/3/2021 and received transfusion.  She was discharged and then a day later, developed body aches, L shoulder soreness/swelling which then shifted to R wrist, upper thighs and today her L wrist is swollen.  She does have a history of untreated Fibromyalgia and also hemolytic anemia for which she had seen Heme but has not followed up in over 1.5 years.  She was prescribed Cymbalta by her PMD for the fibromyalgia but she self discontinued it due to side effects.  She has been taking Ibuprofen 3-4 times a day, using Reglan PRN and Oxycodone 20 mg Q7-8 hours for body aches/joint pains which helps make the pain more tolerable but does not take it away.  Since pain meds make her drowsy, she has had poor po intake.  She presents in wheelchair today and needed her 's help in getting OOB to car today although she does feel better today compared to yesterday.   She states her need to take Oxycodone for breakthrough pain is decreasing and her L wrist looks less swollen.   She denies any mouth sores, CP, palpitations, cough, HERNANDEZ, abdominal pain, LE edema, dizziness, urinary symptoms. \par

## 2021-09-21 PROBLEM — N17.9 ACUTE RENAL FAILURE: Status: ACTIVE | Noted: 2021-01-01

## 2021-09-21 PROBLEM — M19.90 INFLAMMATORY ARTHROPATHY: Status: ACTIVE | Noted: 2021-01-01

## 2021-09-22 NOTE — HISTORY OF PRESENT ILLNESS
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  \par \par She is s/p anterior partial vulvectomy with bilateral sentinel node excision on 12/12/2014 and pathology showed tumor was poorly differentiated, 4mm with 4mm depth of invasion. The nearest margin was 6mm.  She has had multiple biopsies over the years and in Dec 2017, she underwent wide local excision of a 9 mm G1 scc with 4 mm invasion.  Margin was 4mm with HOMERO at all margins. No LVSi was seen.  She continue to be followed with multiple biopsies over the year.   In June,2020 she had more discomfort in the right vulva with a new nodule on exam and biopsy read as psoriasiform spongiotic dermatitis. She was seen in Dermatology and rebiopsied in Aug 2020 showed SCCA of the vulva.  She was seen for Gyn onc f/u and eval in Sept 2020 exam revealed 2 cm raised lesion post labia majora and extensive white epithelium posterior fourchette. PET done without evidence of metastatic disease (Oct 2020) definitive surgery with plastics reconstruction planned.  She underwent surgery with right inguinal node assessment right partial vulvectomy left labial scar excision and additional deep margin excision as planned on Oct 19, 2020. Pathology confirmed 2 benign right sentinel nodes, VIN3 of left labial sca and a 2.4 cm G1 scc with 13 mm depth of invasion. Closest margin was 9 mm,. the was no LVSI and no high grade HOMERO at margins.   When seen for f/u on Feb10,2021 white and slightly ulcerated plaques on left labia minor were seen and biopsied. these revealed early evolving CIS associated with lechen sclerosis et atrophicus in both the left vulva and perianal skin.   She was seen by Dr. Hahn who noted right anterior hemorrhoid with suspicious lesion on anoscopy. 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   Given these extensive changes, she has been referred for Med Onc consult\par \par \par \par PMH:  HLD, HTN, Hypothyroidism, DM\par PSH:  Lap Minnie, Knee replacement, radial partial vulvectomy, tonsillectomy \par Fhx:  Maternal Aunt Colon Cancer, Mother Vulva Cancer \par Social:  former smoker, lives with spouse, occasional alcohol,  [FreeTextEntry1] : RT 7/12-9/1/2021 w/weekly Cisplatin started 7/15/2021--> RT completed 9/1/2021 [de-identified] : Leana is seen for urgent visit today while here for IV hydration.  She states her joint/body pains are much improved and she has not needed to take any narcotics over the last couple of weeks, her constipation is improved.  She continues to have nausea but her antiemetics help with that.  She is moving around more at home and has not been bedbound as much compared to last couple of weeks.  She admits to poor po intake due to decreased appetite and states she feels dizzy when she changes positions.  She denies any mouth sores, CP, palpitations, cough, HERNANDEZ, constipation/diarrhea, abdominal pain, LE edema, vaginal discharge or bleeding, urinary symptoms, excessive bruising, headaches, arthralgias, myalgias and keeps active. \par \par

## 2021-09-22 NOTE — REVIEW OF SYSTEMS
[Fatigue] : fatigue [Joint Pain] : joint pain [Negative] : Allergic/Immunologic [Recent Change In Weight] : ~T recent weight change [Palpitations] : no palpitations [Lower Ext Edema] : no lower extremity edema [Shortness Of Breath] : no shortness of breath [Wheezing] : no wheezing [Cough] : no cough [SOB on Exertion] : no shortness of breath during exertion [Abdominal Pain] : no abdominal pain [Constipation] : no constipation [Diarrhea] : no diarrhea [Skin Rash] : no skin rash [Dizziness] : no dizziness [Difficulty Walking] : no difficulty walking [Insomnia] : no insomnia [Anxiety] : no anxiety

## 2021-09-22 NOTE — PHYSICAL EXAM
[Obese] : obese [Normal] : affect appropriate [Ambulatory and capable of all self care but unable to carry out any work activities] : Status 2- Ambulatory and capable of all self care but unable to carry out any work activities. Up and about more than 50% of waking hours [de-identified] : nontoxic, comfortable appearing  [de-identified] : L wrist swelling almost normal

## 2021-09-26 NOTE — ASSESSMENT
[FreeTextEntry1] : Ms. Carranza is a 67 y/o postmenopausal F with long history of vulvar SCC that was initially diagnosed in 2014, s/p partial vulvectomy and b/l sentinel node excision with several recurrences in 2017, 2020 and and currently with extensive SCC carcinoma in situ involving the vulva and perianal area. \par \par I reviewed the patient's clinical course, pathology findings and imaging studies with the patient and her  in detail, and based on these findings our recommendations for concurrent chemoradiation with weekly chemotherapy with cisplatin. We discussed the rationale, schedule, potential side effects, risks, benefits and potential outcomes. We reviewed the potential side effects and toxicities of chemotherapy with low dose cisplatin which include but not limited to: fatigue, nausea, vomiting, neurotoxicities, decreased appetite, hair loss, cytopenias with increased risk of infections, renal failure, and allergic reactions. We reviewed the potential need for growth factors and transfusions if necessary.\par \par  The patient has been seen by Dr. Guerrero with plans underway for simulation for RT treatment planning. We will coordinate her care with radiation onc to start weekly cisplatin concurrently once RT planning is complete.\par \par The patient and her  demonstrated good understanding of all that was discussed above, and agreed with the plan.\par \par Plan:\par -prechemotherapy bloodwork and EKG today\par -chemotherapy teaching and scheduling provided\par -to start weekly cisplatin in coordination with radiation oncology, tentatively 2-3 weeks\par \par All of the patient and her daughter's questions and concerns were addressed in full\par \par

## 2021-09-26 NOTE — REASON FOR VISIT
[Initial Consultation] : an initial consultation [Spouse] : spouse [FreeTextEntry2] : vulvar cancer

## 2021-09-26 NOTE — PHYSICAL EXAM
[Restricted in physically strenuous activity but ambulatory and able to carry out work of a light or sedentary nature] : Status 1- Restricted in physically strenuous activity but ambulatory and able to carry out work of a light or sedentary nature, e.g., light house work, office work [Normal] : affect appropriate [de-identified] : s/p post-surgical changes s/p vulvectomies  [de-identified] : +inguinal adenopathy, mildly tender on palpation

## 2021-09-26 NOTE — REVIEW OF SYSTEMS
[Anxiety] : anxiety [Negative] : Allergic/Immunologic [FreeTextEntry8] : vaginal and b/l groin pain

## 2021-09-26 NOTE — HISTORY OF PRESENT ILLNESS
[de-identified] : Referred by Dr. Radha Sauer\par \par Ms. Carranza is a 65 y/o postmenopausal F with recently diagnosed recurrent vulvar cancer who is referred to medical oncology for treatment considerations. \par \par The patient underwent anterior partial vulvectomy with b/l sentinel node excision on 12/12/2014. Pathology was c/w poorly differentiated SCC, 4mm with 4mm depth of invasion. \par Over the subsequent years, she underwent multiple biopsies and in 12/2017 she had wide local excision of a 9mm G1 SCC with 4mm invasion. Margins were negative (4mm) with HOMERO involvement, no LVI. She was continued to be followed up with multiple biopsies.\par \par In June 2020, she experienced increased discomfort in the R vulva with a new nodule noted on exam. Pathology from the biopsy was c/w psoriasiform spongiotic dermatitis. She was seen in dermatology and the lesion was re-boipsied in 8/2020, which was c/w SCC of the vulva. \par \par She was seen in gyn onc clinic for f/u in 9/2020 and a 2cm raised lesion post labia majora was noted with extensive white epithelium. PET CT scan demonstrated no evidence of metastatic disease in 10/2020 and definitive surgery with plastics reconstruction was planned.\par \par She underwent surgery with right inguinal node assessment, right partial vulvectomy, left labial scar excision and additional deep margin excision in 10/2020 with Dr. Sauer. Pathology confirmed 2 benign right sentinel nodes, VIN3 of left labial scar, and a 2.4 cm G1 SCC with 13 mm depth of invasion. Closest margin was 9 mm,. the was no LVSI and no high grade HOMERO at margins.\par \par She was seen for f/u in February 2021, and areas of white and slightly ulcerated plaques on left labia minor were seen and biopsied. Pathology from these biopsies revealed early evolving CIS associated with lechen sclerosis et atrophicus in both the left vulva and perianal skin. She was seen by Dr. Hanh who noted right anterior hemorrhoid with a suspicious lesion on anoscopy. She the underwent excision of a skin tag and hemorrhoidectomy on 4/21/2021. 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 Given these extensive changes, she was referred to medical oncology and radiation oncology for further treatment considerations. \par \par Surgical pathology 4/21/21:\par \par  Pathology             Final\par \par No Documents Attached\par \par \par \par \par   Curt Accession Number : [id number]\par \par BALJINDER CARRANZA C                    3\par \par \par \par Surgical Final Report\par \par \par \par \par Final Diagnosis\par 1. Perianal, shave biopsy\par - Intradermal melanocytic nevus\par \par Note: HMB-45 and Ki-67 immunohistochemical stains have been\par examined.\par \par 2. Anterior hemorrhoid, hemorrhoidectomy\par - Ulcerated squamous epithelium with focal reactive cytologic atypia and underlying vascular ectasia consistent with hemorrhoid\par - Focal changes of lichen sclerosus\par \par Note: p16 and Ki-67 immunohistochemical stains have been examined.\par \par 3. Perianal, right, shave biopsy\par - Squamous cell carcinoma in situ\par \par 4. Perianal, left, shave biopsy\par - Lichen sclerosus\par \par She was seen by Dr. Guerrero in radiation oncology for consideration of RT. She underwent further imaging studies with MRI pelvis and PET scan as summarized below. She was recommended concurrent chemoradiation to the pelvis/vulva. \par \par MRI pelvis 5/24/21:\par 3.5 x 1.2 cm scar at the right vulva without masslike enhancement or restricted diffusion to suggest residual/recurrent tumor. No other discrete mass or skin thickening in the vulva or perianal region. No discrete anal or rectal mass.\par No uterine or adnexal mass. Normal thickness endometrium. Peritoneocele is seen entering the rectovaginal space.\par -Metastatic right external iliac lymph node measures 3.0 x 2.2 cm (6-17), FDG avid\par -Metastatic right inguinal lymph nodes measure up to 3.3 x 2.8 cm (6-28), FDG avid\par \par PET CT: 5/25/21:\par 1. FDG-avid right anterior obturator lymph node, increased in size with new FDG avidity, and new FDG-avid, partially necrotic right inguinal lymphadenopathy, compatible with metastatic disease. A new small FDG-avid left inguinal lymph node is indeterminate. Further evaluation may be performed with ultrasound-guided percutaneous needle biopsy.\par 2. Nonspecific mildly FDG-avid skin thickening, perianal region unchanged. Resolution of FDG avid thickening along left posterior aspect of vulva.\par 3. Nonspecific, mildly FDG-avid skin thickening, left lower anterior abdominal wall, unchanged. Please correlate with direct visualization.\par 4. Few nonspecific small FDG-avid bilateral level I and II cervical lymph nodes, slightly increased in size, with new FDG avidity.\par 5. Right maxillary sinus polyp or retention cyst, new as compared to prior study. Small focus of increased FDG activity, inferior aspect of right maxillary sinus, not well evaluated on CT, is indeterminate, possibly related to dental disease. Dental exam and ENT consultation suggested for further evaluation.\par 6. Nonspecific diffuse thyroid hypermetabolism, unchanged, suggests thyroiditis.\par \par She is feeling well overall. However she had slowly progression vaginal labial pain and increasing R groin discomfort for the past month. She denies fevers, chills, n/v, abdominal pain, neuropathy, vaginal discharge or bleeding, urinary symptoms, cough, CP, SOB.\par \par  \par

## 2021-09-26 NOTE — RESULTS/DATA
[FreeTextEntry1] : Imaging studies and pathology findings reviewed in detail \par \par PET CT scan 5/25/21:\par \par FINDINGS:\par \par HEAD/NECK: Physiologic FDG activity in visualized brain.\par \par Right maxillary sinus polyp or retention cyst, new as compared to prior study. Small focus of increased FDG activity, inferior aspect of right maxillary sinus, not well evaluated on CT (SUV 4.0; image 22).\par \par Few small FDG-avid bilateral level I and II cervical lymph nodes, slightly increased in size, with new FDG avidity, measuring up to 1.3 x 0.8 cm with SUV 3.9 in left level Ib region (image 39); previously 1.2 x 0.4 cm and not FDG-avid.\par \par Mild, diffuse FDG activity in the thyroid gland, unchanged (SUV 4.5; image 54; previous SUV 5.7).\par \par LUNGS: No abnormal FDG activity. No lung nodule.\par \par PLEURA/PERICARDIUM:No abnormal FDG activity. No pleural or pericardial effusion.\par \par HEPATOBILIARY/PANCREAS: Physiologic FDG activity. Liver SUV mean is 3.2; previously 3.1. Cholecystectomy.\par \par SPLEEN: Physiologic FDG activity. Normal in size.\par \par ADRENAL GLANDS: No abnormal FDG activity. Non-FDG-avid 2 cm low-attenuation right adrenal nodule, unchanged, likely an adenoma. Unremarkable left adrenal gland.\par \par KIDNEYS/URINARY BLADDER: Physiologic excreted FDG activity.\par \par PELVIC ORGANS: Resolution of FDG-avid skin thickening, left posterior vulvar region. Mildly FDG-avid skin thickening, perianal region, unchanged (SUV 3.8; image 236; previous SUV 3.2).\par \par ABDOMINOPELVIC NODES: FDG-avid right anterior obturator lymph node is increased in size and demonstrates new FDG avidity, measuring 2.4 x 1.7 cm (SUV 7.9; image 210); previously 1.7 x 1.0 cm.\par \par Few adjacent FDG-avid right inguinal lymph nodes are new as compared to prior study, measuring up to approximately 3.2 x 2.5 cm with SUV 9.0 (image 233). This lymph node demonstrates decreased FDG activity centrally, compatible with necrosis. New small FDG-avid left inguinal lymph nodes adjacent to surgical clips, measures 1.0 x 0.8 cm (SUV 2.9; image 230). New surgical clips in right inguinal region.\par \par Minimally FDG avid small bilateral inguinal lymph nodes. A reference 0.8 cm right inguinal node (SUV 2.8; image 214). A 1 cm left inguinal node (SUV 2.4; image 212).\par \par BOWEL/PERITONEUM/MESENTERY: No abnormal FDG activity.\par \par BONES/SOFT TISSUES: No abnormal FDG activity.\par \par Mildly FDG-avid skin thickening, left lower anterior abdominal wall, also present previously, is not significantly changed (SUV 2.8; image 199; previous SUV 2.7).\par \par IMPRESSION: Compared to FDG-PET/CT scan dated 9/11/2020:\par \par 1. FDG-avid right anterior obturator lymph node, increased in size with new FDG avidity, and new FDG-avid, partially necrotic right inguinal lymphadenopathy, compatible with metastatic disease. A new small FDG-avid left inguinal lymph node is indeterminate. Further evaluation may be performed with ultrasound-guided percutaneous needle biopsy.\par \par 2. Nonspecific mildly FDG-avid skin thickening, perianal region unchanged. Resolution of FDG avid thickening along left posterior aspect of vulva.\par \par 3. Nonspecific, mildly FDG-avid skin thickening, left lower anterior abdominal wall, unchanged. Please correlate with direct visualization.\par \par 4. Few nonspecific small FDG-avid bilateral level I and II cervical lymph nodes, slightly increased in size, with new FDG avidity.\par \par 5. Right maxillary sinus polyp or retention cyst, new as compared to prior study. Small focus of increased FDG activity, inferior aspect of right maxillary sinus, not well evaluated on CT, is indeterminate, possibly related to dental disease. Dental exam and ENT consultation suggested for further evaluation.\par \par 6. Nonspecific diffuse thyroid hypermetabolism, unchanged, suggests thyroiditis.\par

## 2021-09-27 NOTE — CONSULT LETTER
[Dear  ___] : Dear  [unfilled], [Consult Letter:] : I had the pleasure of evaluating your patient, [unfilled]. [Consult Closing:] : Thank you very much for allowing me to participate in the care of this patient.  If you have any questions, please do not hesitate to contact me. [Sincerely,] : Sincerely, [FreeTextEntry2] : Jessica Sales MD  [FreeTextEntry1] : Please see below for summary of  recent rheumatology evaluation and recommendations.\par \par 65 yo f  with HTN, T2DM, Obesity, Hemolytic anemia,  mild depression, chronic pain and recent hx squamous cell carcinoma of vulva- s/p Cisplatin and radiation tx completed 921  referred to rheum for c/o polyarthritis following chemo/ radiation, with  severe anemia.. 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  Initially presented with diffuse symmetrical large and small joint overt synovitis.. s/p steroids/ NSAIDs with some but incomplete response. Now reports sx more prominent in shoulder/ girdle distribution.  Peripheral swelling greatly resolved\par Absolutely denies HA, visual change, jaw claudication or scalp tenderness or cp/ arm pain or abd angina. \par Symptoms highly suggestive of PMR at this time w/out evidence of GCA. Emphasized need to monitor for this and call immediately if new sx arise\par Will start low dose pred- ideally 10 mg and repeat labs in 1 wk.. \par NOTE: \par neg RF, CCP, nl Hep C Uric acid nl 6.5, nl Fe and folate, with elevated ferritin 804, Haptoglobin 225-398, nl retic ct and LDH (repeatedly)\par \par 2) Hemolytic anemia:  + Amrit (repeatedly) severe in past with evidence of hemolysis and elevated retic ct requiring high dose steroids and RTX several ys ago.  At this time haptoglobin high, LDH nl and nl retic ct without evidence of hemolysis despite profound anemia.  \par Severe inflammatory state suggest anemia of chronic disease but working closely with Hem/ onc as well to monitor.  Will get full serologies to eval for other autoimmune condition, assess complements and APS studies  \par \par 3) Hx significant  Squamous Cell Carcinoma (dx Jan 2021)  of the vulvar... on chemo/radiation w/ wkly cisplatin 7/21-9/1/21.. x 32 tx radiation/ 6 chemo.. lymph nodes R groin.. continue to be enlarged \par \par 3) Obesity BMI 31 with \par T2DM:  monitor glucose closely for increase r/t steroids and adjust insulin as needed \par HTN well controlled on current regimen\par HLD:  also controlled at this time \par \par 4) Hypothyroidism:  profound w/ TSH 42  on 9/10/21... adjusting replacement dose w/ endo\par \par 5) Chronic pain:  neuropathic pain at times severe worse with overt inflammation. On/ off opiates and titrating gabapentin to effective and tolerated dose.  \par \par \par Plan: \par - start prednisone 5 mg twice daily.. check sugar once daily on steroids.. \par \par - adjust as needed with steroids..\par \par - Labs in 1 wk from now\par \par - continue other meds as ordered.. \par \par  [FreeTextEntry3] : Carolyn Fischer DNP, ANP-C\par Division or Rheumatology\par [facility]\par \par

## 2021-09-27 NOTE — ASSESSMENT
[FreeTextEntry1] : 65 yo f  with HTN, T2DM, Obesity, Hemolytic anemia,  mild depression, chronic pain and recent hx squamous cell carcinoma of vulva- s/p Cisplatin and radiation tx completed 921  referred to rheum for c/o polyarthritis following chemo/ radiation, with  severe anemia.. 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  Initially presented with diffuse symmetrical large and small joint overt synovitis.. s/p steroids/ NSAIDs with some but incomplete response. Now reports sx more prominent in shoulder/ girdle distribution.  Peripheral swelling greatly resolved\par Absolutely denies HA, visual change, jaw claudication or scalp tenderness or cp/ arm pain or abd angina. \par Symptoms highly suggestive of PMR at this time w/out evidence of GCA. Emphasized need to monitor for this and call immediately if new sx arise\par Will start low dose pred- ideally 10 mg and repeat labs in 1 wk.. \par NOTE: \par neg RF, CCP, nl Hep C Uric acid nl 6.5, nl Fe and folate, with elevated ferritin 804, Haptoglobin 225-398, nl retic ct and LDH (repeatedly)\par \par 2) Hemolytic anemia:  + Amrit (repeatedly) severe in past with evidence of hemolysis and elevated retic ct requiring high dose steroids and RTX several ys ago.  At this time haptoglobin high, LDH nl and nl retic ct without evidence of hemolysis despite profound anemia.  \par Severe inflammatory state suggest anemia of chronic disease but working closely with Hem/ onc as well to monitor.  Will get full serologies to eval for other autoimmune condition, assess complements and APS studies  \par \par 3) Hx significant  Squamous Cell Carcinoma (dx Jan 2021)  of the vulvar... on chemo/radiation w/ wkly cisplatin 7/21-9/1/21.. x 32 tx radiation/ 6 chemo.. lymph nodes R groin.. continue to be enlarged \par \par 3) Obesity BMI 31 with \par T2DM:  monitor glucose closely for increase r/t steroids and adjust insulin as needed \par HTN well controlled on current regimen\par HLD:  also controlled at this time \par \par 4) Hypothyroidism:  profound w/ TSH 42  on 9/10/21... adjusting replacement dose w/ endo\par \par 5) Chronic pain:  neuropathic pain at times severe worse with overt inflammation. On/ off opiates and titrating gabapentin to effective and tolerated dose.  \par \par \par Plan: \par - start prednisone 5 mg twice daily.. check sugar once daily on steroids.. \par \par - adjust as needed with steroids..\par \par - Labs in 1 wk from now\par \par - continue other meds as ordered.. \par \par

## 2021-09-27 NOTE — DATA REVIEWED
[FreeTextEntry1] : Labs: \par 9/14 Hb 6.6 w/ MCV 74 nl wbc and plt 170-190 neg RF, CCP, nl Hep C , ,Uric acid nl 6.5, nl Fe and folate, with elevated ferritin 804, Haptoglobin 225-398, nl retic ct and LDH (repeatedly)\par \par past: \par repeatedly + Amrit and warm antibody ID1/2, neg , CEA , HCV, HIV

## 2021-09-27 NOTE — HISTORY OF PRESENT ILLNESS
[0 - No Distress] : Distress Level: 0 [de-identified] : Patient is feeling well. Following with Dr Carpenter GYN Oncologist for history of vulvar cancer, stable. 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 Recurrent disease on 10/2020, treated with chemotherapy and RT. Last treatment was 9/1/21 , concurrent chemo/RT. 7/15/21 to 9/1/21. (Cisplatin). Patient required frequent blood transfusions approx 6 in the last few weeks. \par \par Patient's hemoglobin on 9/25/21 was 6.4 g/dl, complaints of dizziness, denies chest pain, palpitations. \par \par Denies fever, night sweats or weight loss. \par \par \par No changes in her medical, surgical or social history since 7/2/2019.  [de-identified] : Ms. Carranza had vulvar cancer surgery on December 13, 2014,  preoperatively her hemoglobin was 10.7 with an MCV of 62.7 the patient states that she has thalassemia trait. 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 Reticulocyte count was elevated consistent with hemolysis treated with steroids with no  improvement over 2 or 3 days and  was given one treatment with rituximab; with appropriate response. \par \par Surgical Final Report\par \par \par Final Diagnosis\par 1. Anterior vulva, biopsy\par - Lichen sclerosus\par - Negative for dysplasia\par \par 2. Portion of anterior vulva, resection\par - Invasive well differentiated squamous cell carcinoma, see\par synoptic summary\par _________________________________________________________________\par \par Intraoperative Consultation\par 1. Anterior vulva\par - Negative for dysplasia\par By Dr. GRIS Abad\par \par Synoptic Summary\par Case Summary (Vulva, 7th edition and FIGO 2014 Annual Report,\par January 2016)\par \par Specimen Type: Anterior vulva\par Procedure: Wide excision\par Lymph Node Sampling: Not applicable\par Tumor Site: Anterior vulva\par Tumor Size: 0.9 cm\par Tumor Focality: Unifocal\par Histologic Type: Squamous cell carcinoma\par Histologic Grade: G1\par Microscopic Tumor Extension:\par Depth of Invasion: 4 mm\par Tumor Border: Pushing\par Margins:\par Uninvolved by invasive carcinoma\par Distance of invasive carcinoma from closest posterior\par margin: 4 mm\par Involved by vulvar intraepithelial lesion, differentiated\par type: all margins\par Lymph-Vascular Invasion: Not identified\par Lymph Nodes: Not applicable\par \par Pathologic Staging:\par TNM Descriptors: Not applicable\par Primary Tumor (pT): pT1b\par Regional Lymph Nodes (pN): pNx\par \par \par \par \par \par BALJINDER CARRANZA C 2\par \par \par \par Surgical Final Report\par \par \par \par \par Distant Metastasis (pM): pMx\par FIGO Stage: sH0ePtIn\par \par Additional Pathologic Findings: Lichen sclerosus, dVIN\par Comment(s): Deeper levels on block 1E are examined.\par \par \par \par  \par

## 2021-09-27 NOTE — PHYSICAL EXAM
[General Appearance - Alert] : alert [General Appearance - In No Acute Distress] : in no acute distress [Sclera] : the sclera and conjunctiva were normal [PERRL With Normal Accommodation] : pupils were equal in size, round, and reactive to light [Extraocular Movements] : extraocular movements were intact [Outer Ear] : the ears and nose were normal in appearance [Oropharynx] : the oropharynx was normal [Neck Appearance] : the appearance of the neck was normal [Neck Cervical Mass (___cm)] : no neck mass was observed [Jugular Venous Distention Increased] : there was no jugular-venous distention [Thyroid Diffuse Enlargement] : the thyroid was not enlarged [Thyroid Nodule] : there were no palpable thyroid nodules [Auscultation Breath Sounds / Voice Sounds] : lungs were clear to auscultation bilaterally [Heart Rate And Rhythm] : heart rate was normal and rhythm regular [Heart Sounds] : normal S1 and S2 [Heart Sounds Gallop] : no gallops [Murmurs] : no murmurs [Heart Sounds Pericardial Friction Rub] : no pericardial rub [Bowel Sounds] : normal bowel sounds [Abdomen Soft] : soft [Abdomen Tenderness] : non-tender [Abdomen Mass (___ Cm)] : no abdominal mass palpated [Cervical Lymph Nodes Enlarged Posterior Bilaterally] : posterior cervical [Cervical Lymph Nodes Enlarged Anterior Bilaterally] : anterior cervical [Supraclavicular Lymph Nodes Enlarged Bilaterally] : supraclavicular [Axillary Lymph Nodes Enlarged Bilaterally] : axillary [Inguinal Lymph Nodes Enlarged Bilaterally] : inguinal [No CVA Tenderness] : no ~M costovertebral angle tenderness [No Spinal Tenderness] : no spinal tenderness [Skin Color & Pigmentation] : normal skin color and pigmentation [Skin Turgor] : normal skin turgor [] : no rash [Sensation] : the sensory exam was normal to light touch and pinprick [No Focal Deficits] : no focal deficits [Oriented To Time, Place, And Person] : oriented to person, place, and time [Impaired Insight] : insight and judgment were intact [Affect] : the affect was normal [FreeTextEntry1] : despite events, calm, appropriate, pleasant

## 2021-09-27 NOTE — ASSESSMENT
[FreeTextEntry1] : 65 yo woman with vulvar cancer on chemoradiation with weekly Cisplatin between 7/15/21 to 9/1/21. History of hemolytic anemia. \par \par  -History of  hemolytic anemia Amrit panel  at the time of diagnosis revealed a positive IgG and negative C3.\par   \par Hemolysis work up done by Dr Carpenter on 9/10/21- was negative, repeated today. \par \par Patient's hemoglobin on 9/25/21 was 6.4 g/dl, complaints of dizziness, denies chest pain, palpitations. \par 9/27/21 - 6.9g/dl. Patient was scheduled for two units of PRBCs. \par \par Patient is feeling well. Following with Dr Carpenter GYN Oncologist for history of vulvar cancer, stable. 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 Recurrent disease on 10/2020, treated with chemotherapy and RT. Last treatment was 9/1/21 , concurrent chemo/RT. 7/15/21 to 9/1/21. (Cisplatin). Patient required frequent blood transfusions approx 6 in the last few weeks. \par \par \par RTC 4 months. \par \par

## 2021-09-27 NOTE — REVIEW OF SYSTEMS
[Fatigue] : fatigue [Dizziness] : dizziness [Negative] : Respiratory [Wheezing] : no wheezing [Cough] : no cough

## 2021-09-27 NOTE — REVIEW OF SYSTEMS
[Feeling Poorly] : feeling poorly [Feeling Tired] : feeling tired [Arthralgias] : arthralgias [Joint Pain] : joint pain [Joint Swelling] : joint swelling [Joint Stiffness] : joint stiffness [Depression] : depression [As Noted in HPI] : as noted in HPI [Negative] : Heme/Lymph [FreeTextEntry5] : dizziness  [FreeTextEntry7] : wt stable [de-identified] : hx depression fairly well controlled despite events over past yr [de-identified] : + shalonda w/ evidence of hemolysis in past- not active now

## 2021-09-27 NOTE — HISTORY OF PRESENT ILLNESS
[FreeTextEntry1] : (Initial HPI 9/21/21) \par 67 yo f  referred to rheum for c/o polyarthritis following chemo/ radiation, with  severe anemia.. \par L shoulder most severely limited ROM and persistent pain  with b/l wrist pain/ w/ overt severe swelling "like a vanessa"  eventually with pain and stiffness in thighs.. still present but not as intense.. tx w/ NSAIds and analgesic but incomplete response\par Absolutely denies HA, visual change, jaw claudication or scalp tenderness \par Hx significant  Squamous Cell Carcinoma (dx Jan 2021)  of the vulvar... on chemo/radiation w/ wkly cisplatin 7/21-9/1/21.. x 32 tx radiation/ 6 chemo.. lymph nodes R groin.. contnue to be enlarged \par Hx hemolytic anemia ys ago 2016..with recent severe anemia now at 6.6 Hb s/p 2 u PRBC to 8 Hb (baseline 10),  required steroids/ RTX in past ... .. now with severe anemia associated w/ dizziness and malaise.. requiring blood transfusion.  Amrit + repeatedly and thalassemia trait,  Updated haptoglobin high / LDH nl, Iron 45  w/ ferritin 804- retic ct nl- NOT suggestive of active hemolysis... \par Aside from lymphadenopathy in groin, wt stable, denies other areas, no night sweats, fever, chills- though consider temp insensitivity, overall feeling poorly- fatigue overwhelming.  \par Neuropathic pain throughout arms/ legs- feet (not hands) on gabapentin, adjusting dose for efficacy as tolerated, NO weakness currently on 400 mg TID\par Denies:  rash, raynauds, mucositis, cardiopulmonary, GI/ or renal sx\par \par When first presented with joint swelling 9/14 Hb 6.6 w/ MCV 74 nl wbc and plt 170-190 neg RF, CCP, nl Hep C , ,Uric acid nl 6.5, nl Fe and folate, with elevated ferritin 804, Haptoglobin 225-398, - was given steroids w/ immediate and nearly full resolution. \par \par Meds: \par - gabapentin  Never

## 2021-10-06 PROBLEM — R42 DIZZINESS: Status: ACTIVE | Noted: 2021-01-01

## 2021-10-06 PROBLEM — D56.3 THALASSEMIA MINOR: Status: ACTIVE | Noted: 2021-01-01

## 2021-10-13 NOTE — ASSESSMENT
[FreeTextEntry1] : 67 yo f  with HTN, T2DM, Obesity, Hemolytic anemia,  mild depression, chronic pain and recent hx squamous cell carcinoma of vulva- s/p Cisplatin and radiation tx completed 921  referred to rheum for c/o polyarthritis following chemo/ radiation, with  severe anemia.. 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  Initially presented with diffuse symmetrical large and small joint overt synovitis.. s/p steroids/ NSAIDs with some but incomplete response. Now reports sx more prominent in shoulder/ girdle distribution.  Peripheral swelling greatly resolved\par Absolutely denies HA, visual change, jaw claudication or scalp tenderness or cp/ arm pain or abd angina. \par Symptoms highly suggestive of PMR at this time w/out evidence of GCA. Emphasized need to monitor for this and call immediately if new sx arise\par Will increase pred to 10 mg BID for now and get US of great vessels Dr. Kwong, appt scheduled for end of day \par NOTE: \par neg RF, CCP, nl Hep C Uric acid nl 6.5, nl Fe and folate, with elevated ferritin 804, Haptoglobin 225-398, nl retic ct and LDH (repeatedly)\par \par Discussion:  Seen today with Dr. Diandra Nicholson who agrees with assessment and plan as described. Will review results with her when available \par \par 2) Hemolytic anemia:  + Amrit (repeatedly) severe in past with evidence of hemolysis and elevated retic ct requiring high dose steroids and RTX several ys ago.  At this time haptoglobin high, LDH nl and nl retic ct without evidence of hemolysis despite profound anemia.  \par Severe inflammatory state suggest anemia of chronic disease but working closely with Hem/ onc as well to monitor.  Will get full serologies to eval for other autoimmune condition, assess complements and APS studies  \par \par 3) Hx significant  Squamous Cell Carcinoma (dx Jan 2021)  of the vulvar... on chemo/radiation w/ wkly cisplatin 7/21-9/1/21.. x 32 tx radiation/ 6 chemo.. lymph nodes R groin.. continue to be enlarged \par \par 3) Obesity BMI 31 with \par T2DM:  monitor glucose closely for increase r/t steroids and adjust insulin as needed \par HTN well controlled on current regimen\par HLD:  also controlled at this time \par \par 4) Hypothyroidism:  profound w/ TSH 42  on 9/10/21... adjusting replacement dose w/ endo- up to 64 now, needs to contact endo immediately \par \par 5) Chronic pain:  neuropathic pain at times severe worse with overt inflammation. On/ off opiates and titrating gabapentin to effective and tolerated dose.  \par \par \par Plan: \par - Continue prednisone 10 mg twice daily.. check sugar once daily on steroids.. \par \par - adjust as needed with steroids.especially if anything changes.. Any worsening of head ache, visual loss and or scalp tenderness, jaw pain when chewing.. Call immediately if these present .\par \par - US of neck and temporal arteries/ and great vessels to assess for possible GCA.. \par \par - You need updated eye exam to look for inflammation of optic nerve ideally this week.  Call immediately if you can't get into see the eye dr\par \par - you need to talk to your primary care provider:  YOUR THYROID IS NOT CONTROLLED.. Your T4 is low and your TSH is 64 (should be less than 5) \par \par - You do not have lupus and there is no sign of hemolysis at this time. That is not why you are anemic.. i believe the inflammation is causing the severe anemia  \par \par - continue other meds as ordered.. \par \par

## 2021-10-13 NOTE — HISTORY OF PRESENT ILLNESS
[FreeTextEntry1] : 10/5/21\par - joints controlled\par - anemia stable slightly better without tx.. \par - Thyroid NOT controlled with TSH 64... needs to be seen by endo.. will be following.. \par \par 1) Inflammatory polyarthropathy\par 2) Hypothyroidism.. \par 3) vullvar cancer\par 4) Hemolytic anemia\par 5) Constipation\par 6) Anxiety / depression \par ___________________________________________________________________________\par \par (Initial HPI 9/21/21) \par 67 yo f  referred to rheum for c/o polyarthritis following chemo/ radiation, with  severe anemia.. \par L shoulder most severely limited ROM and persistent pain  with b/l wrist pain/ w/ overt severe swelling "like a vanessa"  eventually with pain and stiffness in thighs.. still present but not as intense.. tx w/ NSAIds and analgesic but incomplete response\par Absolutely denies HA, visual change, jaw claudication or scalp tenderness \par Hx significant  Squamous Cell Carcinoma (dx Jan 2021)  of the vulvar... on chemo/radiation w/ wkly cisplatin 7/21-9/1/21.. x 32 tx radiation/ 6 chemo.. lymph nodes R groin.. contnue to be enlarged \par Hx hemolytic anemia ys ago 2016..with recent severe anemia now at 6.6 Hb s/p 2 u PRBC to 8 Hb (baseline 10),  required steroids/ RTX in past ... .. now with severe anemia associated w/ dizziness and malaise.. requiring blood transfusion.  Amrit + repeatedly and thalassemia trait,  Updated haptoglobin high / LDH nl, Iron 45  w/ ferritin 804- retic ct nl- NOT suggestive of active hemolysis... \par Aside from lymphadenopathy in groin, wt stable, denies other areas, no night sweats, fever, chills- though consider temp insensitivity, overall feeling poorly- fatigue overwhelming.  \par Neuropathic pain throughout arms/ legs- feet (not hands) on gabapentin, adjusting dose for efficacy as tolerated, NO weakness currently on 400 mg TID\par Denies:  rash, raynauds, mucositis, cardiopulmonary, GI/ or renal sx\par \par When first presented with joint swelling 9/14 Hb 6.6 w/ MCV 74 nl wbc and plt 170-190 neg RF, CCP, nl Hep C , ,Uric acid nl 6.5, nl Fe and folate, with elevated ferritin 804, Haptoglobin 225-398, - was given steroids w/ immediate and nearly full resolution. \par \par Meds: \par - gabapentin

## 2021-10-13 NOTE — REVIEW OF SYSTEMS
[Feeling Poorly] : feeling poorly [Feeling Tired] : feeling tired [Arthralgias] : arthralgias [Joint Pain] : joint pain [Joint Swelling] : joint swelling [Joint Stiffness] : joint stiffness [Depression] : depression [As Noted in HPI] : as noted in HPI [Negative] : Heme/Lymph [FreeTextEntry5] : dizziness  [FreeTextEntry7] : wt stable [de-identified] : hx depression fairly well controlled despite events over past yr [de-identified] : + shalonda w/ evidence of hemolysis in past- not active now

## 2021-10-14 NOTE — PHYSICAL EXAM
[Alert] : alert [Obese] : obese [No Acute Distress] : no acute distress [Normal Sclera/Conjunctiva] : normal sclera/conjunctiva [No Proptosis] : no proptosis [No Lid Lag] : no lid lag [Supple] : the neck was supple [Thyroid Not Enlarged] : the thyroid was not enlarged [No Thyroid Nodules] : no palpable thyroid nodules [No Respiratory Distress] : no respiratory distress [Clear to Auscultation] : lungs were clear to auscultation bilaterally [Normal S1, S2] : normal S1 and S2 [No Murmurs] : no murmurs [Normal Rate] : heart rate was normal [Regular Rhythm] : with a regular rhythm [Normal Bowel Sounds] : normal bowel sounds [Not Tender] : non-tender [Not Distended] : not distended [Soft] : abdomen soft [Normal Gait] : normal gait [No Rash] : no rash [No Skin Lesions] : no skin lesions [Normal Reflexes] : deep tendon reflexes were 2+ and symmetric [Oriented x3] : oriented to person, place, and time [Acanthosis Nigricans] : no acanthosis nigricans [de-identified] : mild periorbital edema noted. [de-identified] : no macroglossia.

## 2021-10-14 NOTE — HISTORY OF PRESENT ILLNESS
[FreeTextEntry1] : BALJINDER GERMAN is a 65 yo female with past medical history of hypothyroidism, HTN, HLD, type 2 DM, h/o hemolytic anemia, vulvar squamous cell carcinoma s/p chemo and RT, fibromyalgia, obesity, and polyarthropathy who presents to clinic today for management of hypothyroidism.  \par \par Patient states she is being followed for treatment of vulvar cancer and she has completed RT and chemo in September 2021. Around that time, patient admits due to the frequent workup and chemo sessions, she became forgetful about her thyroid medication and admits she may have skipped doses of her levothyroxine for about 1-2 months. She was initially diagnosed with hypothyroidism around 20 years ago, used to be taking stable dose of levothyroxine 150 mcg for about 4 years however then stopped taking medication around the end of August 2021. She noted another doctor restarted her on levothyroxine  25mcg daily about a month ago. Patient however notes 2 days ago, she had conversation with PCP and dose was increased to 75mcg daily.\par She says she was administering levothyroxine with meals and with her other medications in the morning.\par in terms of hypothyroidism, patient denies heat or cold intolerance, dyspnea, dysphagia, dysphonia. She admits to occasional constipation and has struggled with weight gain for a few years. \par \par in regards to DM, she has been diabetic for many years, she checks glucose 3x/day, am fasting usually ranges from 130-159, today it is 140mg/dl. pre lunch ranges from 150-180s and pre dinner ranges from. Patient denies any recent hypoglycemic episodes or symptoms. She is taking Tresiba 60U qhs, novolog insulin sliding scale premeals (unclear how much on average patient is taking) and Victoza 1.8mg daily. She notes PCP is managing her DM care.\par \par PMH: as above\par PSH: 2020- vulvar resection, hemorrhoidectomy in April 2021, cholecystectomy, s/p right knee replacement, eye surgery 8 yrs ago for "lazy eye"\par FamHx: no known thyroid disease or cancer, mom- dm, father - DM, brother- DM\par Social Hx: quit tobacco 1 year ago (smoked cigs intermittently for about 40 years), denies ETOH or recreational drug use.  with 5 kids.\par ALL: nkda-\par Home Meds: prednisone 20mg daily, gabapentin 400mg tid, amlodipine 10mg daily, cetirizinre 10mg prn, enalapril 2.5mg po daily, escitalopram 10mg daily, folic acid 800mcg daily, levothyroxine 75mcg daily, novolog 3 tid, omepraziole 40, simvastatin 20mg tresiba 60IU qhs, triamterene-hctz 37.25mg daily, novolog sliding scale premeals, Victoza 1.8mg daily. \par \par Patient pharmacy: [facility] on [address], [place].

## 2021-10-14 NOTE — ASSESSMENT
[Levothyroxine] : The patient was instructed to take Levothyroxine on an empty stomach, separate from vitamins, and wait at least 30 minutes before eating [FreeTextEntry1] : 65 yo female with Hashimoto hypothyroidism with TFTs in hypothyroid range from underlying noncompliance.\par \par 1. Primary hypothyroidism secondary to Hashimoto's\par Patient is biochemically and clinically hypothyroid due to medication noncompliance for past 1-2 months,\par Patient however not in myxedematous state at this time. TSH noted 64, not at goal\par 2 days prior to this visit, PCP recently increased dose of levothyroxine from 25mcg to 75mcg daily\par Agree with continuing on recent dose levothyroxine 75mcg daily. \par Counseled patient regarding correct administration of levothyroxine (to be taken fasting in morning, separate from all other medications, and to avoid meals for 1 hour afterwards).\par Also emphasized importance of medication compliance with thyroid medication and educated regarding consequences of worsening of thyroid function, including myxedema coma and death.\par Informed patient and her spouse should she experience worsening symptoms, she needs to go to ER immediately.\par Will repeat TFTs prior to next clinic visit and will adjust dose accordingly.\par \par RTC in 1 month with repeat TFTs.

## 2021-10-28 PROBLEM — Z79.52 LONG TERM (CURRENT) USE OF SYSTEMIC STEROIDS: Status: ACTIVE | Noted: 2021-01-01

## 2021-10-29 NOTE — ADDENDUM
[FreeTextEntry1] : Confirmed CT done Tuesday OCt 26 at [facility] in [place].\par Awaitng results.\par Discussed plan for PET in December and followup before that.

## 2021-10-29 NOTE — HISTORY OF PRESENT ILLNESS
[Home] : at home, [unfilled] , at the time of the visit. [Medical Office: ([facility])___] : at the medical office located in  [Verbal consent obtained from patient] : the patient, [unfilled] [FreeTextEntry1] : Note: this PTE was a telehealth / phone visit.

## 2021-10-29 NOTE — DISEASE MANAGEMENT
[Pathological] : TNM Stage: p [N/A] : Currently not applicable [TTNM] : is [NTNM] : 0 [MTNM] : x [de-identified] : cGy [de-identified] : Pelvis/Vulva

## 2021-10-30 PROBLEM — Z13.6 SCREENING FOR CARDIOVASCULAR CONDITION: Status: ACTIVE | Noted: 2021-01-01

## 2021-10-30 PROBLEM — R94.31 ABNORMAL EKG: Status: ACTIVE | Noted: 2021-01-01

## 2021-10-30 PROBLEM — R07.89 ATYPICAL CHEST PAIN: Status: ACTIVE | Noted: 2021-01-01

## 2021-10-30 PROBLEM — E78.5 HLD (HYPERLIPIDEMIA): Status: ACTIVE | Noted: 2021-01-01

## 2021-10-30 PROBLEM — Z13.6 ENCOUNTER FOR SCREENING FOR VASCULAR DISEASE: Status: ACTIVE | Noted: 2021-01-01

## 2021-11-02 NOTE — HISTORY OF PRESENT ILLNESS
[de-identified] : Referred by Dr. Radha Sauer\par \par Ms. Carranza is a 65 y/o postmenopausal F with recently diagnosed recurrent vulvar cancer who is referred to medical oncology for treatment considerations. \par \par The patient underwent anterior partial vulvectomy with b/l sentinel node excision on 12/12/2014. Pathology was c/w poorly differentiated SCC, 4mm with 4mm depth of invasion. \par Over the subsequent years, she underwent multiple biopsies and in 12/2017 she had wide local excision of a 9mm G1 SCC with 4mm invasion. Margins were negative (4mm) with HOMERO involvement, no LVI. She was continued to be followed up with multiple biopsies.\par \par In June 2020, she experienced increased discomfort in the R vulva with a new nodule noted on exam. Pathology from the biopsy was c/w psoriasiform spongiotic dermatitis. She was seen in dermatology and the lesion was re-boipsied in 8/2020, which was c/w SCC of the vulva. \par \par She was seen in gyn onc clinic for f/u in 9/2020 and a 2cm raised lesion post labia majora was noted with extensive white epithelium. PET CT scan demonstrated no evidence of metastatic disease in 10/2020 and definitive surgery with plastics reconstruction was planned.\par \par She underwent surgery with right inguinal node assessment, right partial vulvectomy, left labial scar excision and additional deep margin excision in 10/2020 with Dr. Sauer. Pathology confirmed 2 benign right sentinel nodes, VIN3 of left labial scar, and a 2.4 cm G1 SCC with 13 mm depth of invasion. Closest margin was 9 mm,. the was no LVSI and no high grade HOMERO at margins.\par \par She was seen for f/u in February 2021, and areas of white and slightly ulcerated plaques on left labia minor were seen and biopsied. Pathology from these biopsies revealed early evolving CIS associated with lechen sclerosis et atrophicus in both the left vulva and perianal skin. She was seen by Dr. Hahn who noted right anterior hemorrhoid with a suspicious lesion on anoscopy. She the underwent excision of a skin tag and hemorrhoidectomy on 4/21/2021. 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 Given these extensive changes, she was referred to medical oncology and radiation oncology for further treatment considerations. \par \par Surgical pathology 4/21/21:\par \par  Pathology             Final\par \par No Documents Attached\par \par \par \par \par   Curt Accession Number : [id number]\par \par BALJINDER CARRANZA C                    3\par \par \par \par Surgical Final Report\par \par \par \par \par Final Diagnosis\par 1. Perianal, shave biopsy\par - Intradermal melanocytic nevus\par \par Note: HMB-45 and Ki-67 immunohistochemical stains have been\par examined.\par \par 2. Anterior hemorrhoid, hemorrhoidectomy\par - Ulcerated squamous epithelium with focal reactive cytologic atypia and underlying vascular ectasia consistent with hemorrhoid\par - Focal changes of lichen sclerosus\par \par Note: p16 and Ki-67 immunohistochemical stains have been examined.\par \par 3. Perianal, right, shave biopsy\par - Squamous cell carcinoma in situ\par \par 4. Perianal, left, shave biopsy\par - Lichen sclerosus\par \par She was seen by Dr. Guerrero in radiation oncology for consideration of RT. She underwent further imaging studies with MRI pelvis and PET scan as summarized below. She was recommended concurrent chemoradiation to the pelvis/vulva. \par \par MRI pelvis 5/24/21:\par 3.5 x 1.2 cm scar at the right vulva without masslike enhancement or restricted diffusion to suggest residual/recurrent tumor. No other discrete mass or skin thickening in the vulva or perianal region. No discrete anal or rectal mass.\par No uterine or adnexal mass. Normal thickness endometrium. Peritoneocele is seen entering the rectovaginal space.\par -Metastatic right external iliac lymph node measures 3.0 x 2.2 cm (6-17), FDG avid\par -Metastatic right inguinal lymph nodes measure up to 3.3 x 2.8 cm (6-28), FDG avid\par \par PET CT: 5/25/21:\par 1. FDG-avid right anterior obturator lymph node, increased in size with new FDG avidity, and new FDG-avid, partially necrotic right inguinal lymphadenopathy, compatible with metastatic disease. A new small FDG-avid left inguinal lymph node is indeterminate. Further evaluation may be performed with ultrasound-guided percutaneous needle biopsy.\par 2. Nonspecific mildly FDG-avid skin thickening, perianal region unchanged. Resolution of FDG avid thickening along left posterior aspect of vulva.\par 3. Nonspecific, mildly FDG-avid skin thickening, left lower anterior abdominal wall, unchanged. Please correlate with direct visualization.\par 4. Few nonspecific small FDG-avid bilateral level I and II cervical lymph nodes, slightly increased in size, with new FDG avidity.\par 5. Right maxillary sinus polyp or retention cyst, new as compared to prior study. Small focus of increased FDG activity, inferior aspect of right maxillary sinus, not well evaluated on CT, is indeterminate, possibly related to dental disease. Dental exam and ENT consultation suggested for further evaluation.\par 6. Nonspecific diffuse thyroid hypermetabolism, unchanged, suggests thyroiditis.\par \par She is feeling well overall. However she had slowly progression vaginal labial pain and increasing R groin discomfort for the past month. She denies fevers, chills, n/v, abdominal pain, neuropathy, vaginal discharge or bleeding, urinary symptoms, cough, CP, SOB.\par \par  \par  [de-identified] : Patient seen in the treatment room, who is accompanied by her . She presents for weekly cisplatin, C2. She feels more tired this week, diarrhea has improved with anti-diarrheals. She denies nausea but decreased appetite. She is trying to stay well-hydrated. She also has increased vulvar pain and is taking oxycodone and ibuprofen with improvement. She has discomfort in her groin lymph nodes, stable. Taking Motrin and oxycodone with good effect. \par \par She denies fevers, chills, n/v, abdominal pain, neuropathy, vaginal discharge or bleeding, urinary symptoms, cough, CP, SOB.

## 2021-11-02 NOTE — PHYSICAL EXAM
[Restricted in physically strenuous activity but ambulatory and able to carry out work of a light or sedentary nature] : Status 1- Restricted in physically strenuous activity but ambulatory and able to carry out work of a light or sedentary nature, e.g., light house work, office work [Normal] : affect appropriate [de-identified] : s/p post-surgical changes s/p vulvectomies  [de-identified] : +inguinal adenopathy, mildly tender on palpation

## 2021-11-02 NOTE — ASSESSMENT
[FreeTextEntry1] : Ms. Carranza is a 65 y/o postmenopausal F with long history of vulvar SCC that was initially diagnosed in 2014, s/p partial vulvectomy and b/l sentinel node excision with several recurrences in 2017, 2020 and and currently with extensive SCC carcinoma in situ involving the vulva and perianal area. \par \par She continues to tolerate treatment however this week has increased nausea, vulvar burning and watery diarrhea. We again discussed use of antidiarrheals and anti-emetics, and different ways to manage side effects and toxicities associated with chemotherapy and RT with supportive measures and medications. \par \par Plan:\par -bloodwork reviewed with the patient\par -proceed with C3 weekly cisplatin\par -reviewed use of antidiarrheals for diarrhea; antiemetic regimen  \par -f/u Dr. Guerrero for continued daily RT \par -f/u Dr. Gordillo for atypical CP and abnormal EKG findings\par -f/u Dr. Sauer in gyn onc clinic \par -RTC 1 week \par \par All of the patient and her 's questions and concerns were addressed in full\par \par

## 2021-11-02 NOTE — ASSESSMENT
[FreeTextEntry1] : Ms. Carranza is a 67 y/o postmenopausal F with long history of vulvar SCC that was initially diagnosed in 2014, s/p partial vulvectomy and b/l sentinel node excision with several recurrences in 2017, 2020 and and currently with extensive SCC carcinoma in situ involving the vulva and perianal area. \par \par She is here to start weekly cisplatin for concurrent chemoradiation. \par We again reviewed the potential side effects and toxicities of chemotherapy with low dose cisplatin. We discussed ways to manage side effects and toxicities associated with chemotherapy in detail, including fatigue, decreased appetite, nausea, myalgias and bowel regimen with supportive care and medications.\par \par Plan:\par -proceed with C1 weekly cisplatin\par -trial of antidiarrheals for loose BMs, likely related to RT \par -f/u Dr. Guerrero for continued daily RT \par -patient to see cardiology for atypical CP \par -f/u Dr. Sauer in gyn onc clinic \par -RTC 1 week \par \par All of the patient and her 's questions and concerns were addressed in full\par \par

## 2021-11-02 NOTE — HISTORY OF PRESENT ILLNESS
[de-identified] : Referred by Dr. Radha Sauer\par \par Ms. Carranza is a 67 y/o postmenopausal F with recently diagnosed recurrent vulvar cancer who is referred to medical oncology for treatment considerations. \par \par The patient underwent anterior partial vulvectomy with b/l sentinel node excision on 12/12/2014. Pathology was c/w poorly differentiated SCC, 4mm with 4mm depth of invasion. \par Over the subsequent years, she underwent multiple biopsies and in 12/2017 she had wide local excision of a 9mm G1 SCC with 4mm invasion. Margins were negative (4mm) with HOMERO involvement, no LVI. She was continued to be followed up with multiple biopsies.\par \par In June 2020, she experienced increased discomfort in the R vulva with a new nodule noted on exam. Pathology from the biopsy was c/w psoriasiform spongiotic dermatitis. She was seen in dermatology and the lesion was re-boipsied in 8/2020, which was c/w SCC of the vulva. \par \par She was seen in gyn onc clinic for f/u in 9/2020 and a 2cm raised lesion post labia majora was noted with extensive white epithelium. PET CT scan demonstrated no evidence of metastatic disease in 10/2020 and definitive surgery with plastics reconstruction was planned.\par \par She underwent surgery with right inguinal node assessment, right partial vulvectomy, left labial scar excision and additional deep margin excision in 10/2020 with Dr. Sauer. Pathology confirmed 2 benign right sentinel nodes, VIN3 of left labial scar, and a 2.4 cm G1 SCC with 13 mm depth of invasion. Closest margin was 9 mm,. the was no LVSI and no high grade HOMERO at margins.\par \par She was seen for f/u in February 2021, and areas of white and slightly ulcerated plaques on left labia minor were seen and biopsied. Pathology from these biopsies revealed early evolving CIS associated with lechen sclerosis et atrophicus in both the left vulva and perianal skin. She was seen by Dr. Hahn who noted right anterior hemorrhoid with a suspicious lesion on anoscopy. She the underwent excision of a skin tag and hemorrhoidectomy on 4/21/2021. 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 Given these extensive changes, she was referred to medical oncology and radiation oncology for further treatment considerations. \par \par Surgical pathology 4/21/21:\par \par  Pathology             Final\par \par No Documents Attached\par \par \par \par \par   Curt Accession Number : [id number]\par \par BALJINDER CARRANZA C                    3\par \par \par \par Surgical Final Report\par \par \par \par \par Final Diagnosis\par 1. Perianal, shave biopsy\par - Intradermal melanocytic nevus\par \par Note: HMB-45 and Ki-67 immunohistochemical stains have been\par examined.\par \par 2. Anterior hemorrhoid, hemorrhoidectomy\par - Ulcerated squamous epithelium with focal reactive cytologic atypia and underlying vascular ectasia consistent with hemorrhoid\par - Focal changes of lichen sclerosus\par \par Note: p16 and Ki-67 immunohistochemical stains have been examined.\par \par 3. Perianal, right, shave biopsy\par - Squamous cell carcinoma in situ\par \par 4. Perianal, left, shave biopsy\par - Lichen sclerosus\par \par She was seen by Dr. Guerrero in radiation oncology for consideration of RT. She underwent further imaging studies with MRI pelvis and PET scan as summarized below. She was recommended concurrent chemoradiation to the pelvis/vulva. \par \par MRI pelvis 5/24/21:\par 3.5 x 1.2 cm scar at the right vulva without masslike enhancement or restricted diffusion to suggest residual/recurrent tumor. No other discrete mass or skin thickening in the vulva or perianal region. No discrete anal or rectal mass.\par No uterine or adnexal mass. Normal thickness endometrium. Peritoneocele is seen entering the rectovaginal space.\par -Metastatic right external iliac lymph node measures 3.0 x 2.2 cm (6-17), FDG avid\par -Metastatic right inguinal lymph nodes measure up to 3.3 x 2.8 cm (6-28), FDG avid\par \par PET CT: 5/25/21:\par 1. FDG-avid right anterior obturator lymph node, increased in size with new FDG avidity, and new FDG-avid, partially necrotic right inguinal lymphadenopathy, compatible with metastatic disease. A new small FDG-avid left inguinal lymph node is indeterminate. Further evaluation may be performed with ultrasound-guided percutaneous needle biopsy.\par 2. Nonspecific mildly FDG-avid skin thickening, perianal region unchanged. Resolution of FDG avid thickening along left posterior aspect of vulva.\par 3. Nonspecific, mildly FDG-avid skin thickening, left lower anterior abdominal wall, unchanged. Please correlate with direct visualization.\par 4. Few nonspecific small FDG-avid bilateral level I and II cervical lymph nodes, slightly increased in size, with new FDG avidity.\par 5. Right maxillary sinus polyp or retention cyst, new as compared to prior study. Small focus of increased FDG activity, inferior aspect of right maxillary sinus, not well evaluated on CT, is indeterminate, possibly related to dental disease. Dental exam and ENT consultation suggested for further evaluation.\par 6. Nonspecific diffuse thyroid hypermetabolism, unchanged, suggests thyroiditis.\par \par She is feeling well overall. However she had slowly progression vaginal labial pain and increasing R groin discomfort for the past month. She denies fevers, chills, n/v, abdominal pain, neuropathy, vaginal discharge or bleeding, urinary symptoms, cough, CP, SOB.\par \par  \par  [de-identified] : Patient seen in the treatment room, who is accompanied by her . Here to start cisplatin, C1.\par She feels very well, and since last visit started radiation treatment this past Monday. Has had increased loose BMs since starting 3 days ago, but not watery diarrhea. Going to try peptobismol. She has noticed that her groin lymph nodes look bigger and sometimes causes discomfort. Taking Motrin with good effect. \par \par She denies fevers, chills, n/v, abdominal pain, neuropathy, vaginal discharge or bleeding, urinary symptoms, cough, CP, SOB.

## 2021-11-02 NOTE — ASSESSMENT
[FreeTextEntry1] : Ms. Carranza is a 65 y/o postmenopausal F with long history of vulvar SCC that was initially diagnosed in 2014, s/p partial vulvectomy and b/l sentinel node excision with several recurrences in 2017, 2020 and and currently with extensive SCC carcinoma in situ involving the vulva and perianal area. \par \par She is tolerating treatment without significant toxicities so far. Grade 1 fatigue, vulvar pain, and diarrhea.\par We will proceed with weekly cisplatin for concurrent chemoradiation. \par We again discussed ways to manage side effects and toxicities associated with chemotherapy in detail, including fatigue, decreased appetite, nausea, myalgias and bowel regimen with supportive care and medications.\par \par Plan:\par -bloodwork reviewed with the patient\par -proceed with C2 weekly cisplatin\par -trial of antidiarrheals for loose BMs, likely related to RT \par -f/u Dr. Guerrero for continued daily RT \par -patient to see cardiology for atypical CP and abnormal EKG findings\par -f/u Dr. Sauer in gyn onc clinic \par -RTC 1 week \par \par All of the patient and her 's questions and concerns were addressed in full\par \par

## 2021-11-02 NOTE — REASON FOR VISIT
[Follow-Up Visit] : a follow-up [Initial Consultation] : an initial consultation [Spouse] : spouse [FreeTextEntry2] : vulvar cancer

## 2021-11-02 NOTE — PHYSICAL EXAM
[Restricted in physically strenuous activity but ambulatory and able to carry out work of a light or sedentary nature] : Status 1- Restricted in physically strenuous activity but ambulatory and able to carry out work of a light or sedentary nature, e.g., light house work, office work [Normal] : affect appropriate [de-identified] : s/p post-surgical changes s/p vulvectomies  [de-identified] : +inguinal adenopathy, mildly tender on palpation

## 2021-11-02 NOTE — HISTORY OF PRESENT ILLNESS
[de-identified] : Referred by Dr. Radha Sauer\par \par Ms. Carranza is a 65 y/o postmenopausal F with recently diagnosed recurrent vulvar cancer who is referred to medical oncology for treatment considerations. \par \par The patient underwent anterior partial vulvectomy with b/l sentinel node excision on 12/12/2014. Pathology was c/w poorly differentiated SCC, 4mm with 4mm depth of invasion. \par Over the subsequent years, she underwent multiple biopsies and in 12/2017 she had wide local excision of a 9mm G1 SCC with 4mm invasion. Margins were negative (4mm) with HOMERO involvement, no LVI. She was continued to be followed up with multiple biopsies.\par \par In June 2020, she experienced increased discomfort in the R vulva with a new nodule noted on exam. Pathology from the biopsy was c/w psoriasiform spongiotic dermatitis. She was seen in dermatology and the lesion was re-boipsied in 8/2020, which was c/w SCC of the vulva. \par \par She was seen in gyn onc clinic for f/u in 9/2020 and a 2cm raised lesion post labia majora was noted with extensive white epithelium. PET CT scan demonstrated no evidence of metastatic disease in 10/2020 and definitive surgery with plastics reconstruction was planned.\par \par She underwent surgery with right inguinal node assessment, right partial vulvectomy, left labial scar excision and additional deep margin excision in 10/2020 with Dr. Sauer. Pathology confirmed 2 benign right sentinel nodes, VIN3 of left labial scar, and a 2.4 cm G1 SCC with 13 mm depth of invasion. Closest margin was 9 mm,. the was no LVSI and no high grade HOMERO at margins.\par \par She was seen for f/u in February 2021, and areas of white and slightly ulcerated plaques on left labia minor were seen and biopsied. Pathology from these biopsies revealed early evolving CIS associated with lechen sclerosis et atrophicus in both the left vulva and perianal skin. She was seen by Dr. Hahn who noted right anterior hemorrhoid with a suspicious lesion on anoscopy. She the underwent excision of a skin tag and hemorrhoidectomy on 4/21/2021. 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 Given these extensive changes, she was referred to medical oncology and radiation oncology for further treatment considerations. \par \par Surgical pathology 4/21/21:\par \par  Pathology             Final\par \par No Documents Attached\par \par \par \par \par   Curt Accession Number : [id number]\par \par BALJINDER CARRANZA C                    3\par \par \par \par Surgical Final Report\par \par \par \par \par Final Diagnosis\par 1. Perianal, shave biopsy\par - Intradermal melanocytic nevus\par \par Note: HMB-45 and Ki-67 immunohistochemical stains have been\par examined.\par \par 2. Anterior hemorrhoid, hemorrhoidectomy\par - Ulcerated squamous epithelium with focal reactive cytologic atypia and underlying vascular ectasia consistent with hemorrhoid\par - Focal changes of lichen sclerosus\par \par Note: p16 and Ki-67 immunohistochemical stains have been examined.\par \par 3. Perianal, right, shave biopsy\par - Squamous cell carcinoma in situ\par \par 4. Perianal, left, shave biopsy\par - Lichen sclerosus\par \par She was seen by Dr. Guerrero in radiation oncology for consideration of RT. She underwent further imaging studies with MRI pelvis and PET scan as summarized below. She was recommended concurrent chemoradiation to the pelvis/vulva. \par \par MRI pelvis 5/24/21:\par 3.5 x 1.2 cm scar at the right vulva without masslike enhancement or restricted diffusion to suggest residual/recurrent tumor. No other discrete mass or skin thickening in the vulva or perianal region. No discrete anal or rectal mass.\par No uterine or adnexal mass. Normal thickness endometrium. Peritoneocele is seen entering the rectovaginal space.\par -Metastatic right external iliac lymph node measures 3.0 x 2.2 cm (6-17), FDG avid\par -Metastatic right inguinal lymph nodes measure up to 3.3 x 2.8 cm (6-28), FDG avid\par \par PET CT: 5/25/21:\par 1. FDG-avid right anterior obturator lymph node, increased in size with new FDG avidity, and new FDG-avid, partially necrotic right inguinal lymphadenopathy, compatible with metastatic disease. A new small FDG-avid left inguinal lymph node is indeterminate. Further evaluation may be performed with ultrasound-guided percutaneous needle biopsy.\par 2. Nonspecific mildly FDG-avid skin thickening, perianal region unchanged. Resolution of FDG avid thickening along left posterior aspect of vulva.\par 3. Nonspecific, mildly FDG-avid skin thickening, left lower anterior abdominal wall, unchanged. Please correlate with direct visualization.\par 4. Few nonspecific small FDG-avid bilateral level I and II cervical lymph nodes, slightly increased in size, with new FDG avidity.\par 5. Right maxillary sinus polyp or retention cyst, new as compared to prior study. Small focus of increased FDG activity, inferior aspect of right maxillary sinus, not well evaluated on CT, is indeterminate, possibly related to dental disease. Dental exam and ENT consultation suggested for further evaluation.\par 6. Nonspecific diffuse thyroid hypermetabolism, unchanged, suggests thyroiditis.\par \par She is feeling well overall. However she had slowly progression vaginal labial pain and increasing R groin discomfort for the past month. She denies fevers, chills, n/v, abdominal pain, neuropathy, vaginal discharge or bleeding, urinary symptoms, cough, CP, SOB.\par \par  \par  [de-identified] : Patient seen in the treatment room, who is accompanied by her . Here for weekly cisplatin.\par She had more symptoms this week. Having watery diarrhea since the weekend, which improves with imodium, sometimes clear liquid. \par Also had increased nausea two days after chemotherapy, which improves with compazine but not completely. She is maintaining oral intake.\par She also feels skin is more sore b/l groin area and perineal area. Applying aquaphor is helpful. \par She is taking oxycodone prn for discomfort in the groin. \par \par She denies fevers, chills, n/v, abdominal pain, neuropathy, rectal bleeding, urinary symptoms, cough, CP, SOB.\par

## 2021-11-05 PROBLEM — M31.5 GIANT CELL ARTERITIS WITH POLYMYALGIA RHEUMATICA: Status: ACTIVE | Noted: 2021-01-01

## 2021-11-05 PROBLEM — F41.9 ANXIETY AND DEPRESSION: Status: ACTIVE | Noted: 2021-01-01

## 2021-11-05 PROBLEM — R79.89 LOW VITAMIN D LEVEL: Status: ACTIVE | Noted: 2021-01-01

## 2021-11-05 NOTE — ASSESSMENT
[FreeTextEntry1] : 65 yo f  with HTN, T2DM, Obesity, Hemolytic anemia,  mild depression, chronic pain and recent hx squamous cell carcinoma of vulva- s/p Cisplatin and radiation tx completed 921  referred to rheum for c/o polyarthritis following chemo/ radiation, with  severe anemia.. 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 - again today 10/21  Hb 7.3 w/ MCV 77,  ESR 22/ nl CRP, nl haptoglobin, LDH, with Iron 118, nl B12/ folate w/ Ferritin still  elevated 898 and vit D 5.8.  STill no evidence of hemolysis.. despite severe anemia persisting \par Carotid US/ GCA protocol without thickening by Dr. Kwong though TA not well visualized \par -Initially presented with diffuse symmetrical large and small joint overt synovitis.. s/p steroids/ NSAIDs with some but incomplete response. Now reports sx more prominent in shoulder/ girdle distribution.  Peripheral swelling greatly resolved\par Still absolutely denies HA, visual change, jaw claudication or scalp tenderness or cp/ arm pain or abd angina. \par Symptoms highly suggestive of PMR at this time w/out evidence of GCA. Emphasized need to monitor for this and call immediately if new sx arise\par Will increase pred to 10 mg BID for now and get US of great vessels Dr. Kwong, scheduled last visit\par NOTE: \par neg RF, CCP, nl Hep C Uric acid nl 6.5, nl Fe and folate, with elevated ferritin 804, Haptoglobin 225-398, nl retic ct and LDH (repeatedly)\par \par \par 2) Hemolytic anemia:  + Amrit (repeatedly) severe in past with evidence of hemolysis and elevated retic ct requiring high dose steroids and RTX several ys ago.  At this time haptoglobin high, LDH nl and nl retic ct  and APS w/u neg without evidence of hemolysis despite profound anemia, still 10/21 \par Severe inflammatory state now improved on steroids.. but anemia persists.  Iron levels also adequate.  \par Still overall suggestion of anemia of chronic disease but working closely with Hem/ onc as well to monitor (need to call to discuss)  \par \par 3) Hx significant  Squamous Cell Carcinoma (dx Jan 2021)  of the vulvar... on chemo/radiation w/ wkly cisplatin 7/21-9/1/21.. x 32 tx radiation/ 6 chemo.. lymph nodes R groin.. continue to be enlarged \par \par 3) Obesity BMI 31 with \par T2DM:  monitor glucose closely for increase r/t steroids and adjust insulin as needed \par HTN well controlled on current regimen\par HLD:  also controlled at this time \par \par 4) Hypothyroidism:  profound w/ TSH 42  on 9/10/21... adjusting replacement dose w/ endo- up to 64 now, needs to continue to work with endo \par \par 5) Chronic pain:  neuropathic pain at times severe worse with overt inflammation. On/ off opiates and titrating gabapentin to effective and tolerated dose.  \par \par 6) Low Vit D:  measure today 5!  will order high dose replacement \par \par \par Plan: \par - for Muscle cramps: \par Start w/ 500 mg Mg Oxide\par Increase natural potassium with banana or orange daily\par Tonic water- the brand Q has the highest quinine which helps to prevent muscle cramps\par Stay well hydrated... keep your urine clear \par Call if these strategies don't work\par \par - Continue prednisone 10 mg twice daily.. check sugar once daily on steroids.. \par \par - adjust as needed with steroids.especially if anything changes.. Any worsening of head ache, visual loss and or scalp tenderness, jaw pain when chewing.. Call immediately if these present .\par \par - waiting on results.. US of neck and temporal arteries/ and great vessels to assess for possible GCA..w/u was negative but study was unable to assess TA b/l  \par \par - You do not have lupus and there is no sign of hemolysis at this time. That is not why you are anemic.. i believe the inflammation is causing the severe anemia  but need to review w/ hem/onc\par \par - continue other meds as ordered.. \par

## 2021-11-05 NOTE — REVIEW OF SYSTEMS
[Feeling Poorly] : feeling poorly [Feeling Tired] : feeling tired [Arthralgias] : arthralgias [Joint Pain] : joint pain [Joint Swelling] : joint swelling [Joint Stiffness] : joint stiffness [Depression] : depression [As Noted in HPI] : as noted in HPI [Negative] : Heme/Lymph [FreeTextEntry5] : dizziness  [FreeTextEntry7] : wt stable [de-identified] : hx depression fairly well controlled despite events over past yr [de-identified] : + shalonda w/ evidence of hemolysis in past- not active now

## 2021-11-05 NOTE — HISTORY OF PRESENT ILLNESS
[FreeTextEntry1] : 10/28/21 \par - still anemic but stable, no Fe infusions needed \par - mild dizziness but not severe-\par - No overt joint inflammation again.. \par - Muscle cramping still present.. \par - TSH remains elevated, working w/ endo\par \par 1) Inflammatory polyarthropathy\par 2) Hypothyroidism.. \par 3) vullvar cancer\par 4) Hemolytic anemia\par 5) Constipation\par 6) Anxiety / depression \par ___________________________________________________________________________\par \par (Initial HPI 9/21/21) \par 65 yo f  referred to rheum for c/o polyarthritis following chemo/ radiation, with  severe anemia.. \par L shoulder most severely limited ROM and persistent pain  with b/l wrist pain/ w/ overt severe swelling "like a vanessa"  eventually with pain and stiffness in thighs.. still present but not as intense.. tx w/ NSAIds and analgesic but incomplete response\par Absolutely denies HA, visual change, jaw claudication or scalp tenderness \par Hx significant  Squamous Cell Carcinoma (dx Jan 2021)  of the vulvar... on chemo/radiation w/ wkly cisplatin 7/21-9/1/21.. x 32 tx radiation/ 6 chemo.. lymph nodes R groin.. contnue to be enlarged \par Hx hemolytic anemia ys ago 2016..with recent severe anemia now at 6.6 Hb s/p 2 u PRBC to 8 Hb (baseline 10),  required steroids/ RTX in past ... .. now with severe anemia associated w/ dizziness and malaise.. requiring blood transfusion.  Amrit + repeatedly and thalassemia trait,  Updated haptoglobin high / LDH nl, Iron 45  w/ ferritin 804- retic ct nl- NOT suggestive of active hemolysis... \par Aside from lymphadenopathy in groin, wt stable, denies other areas, no night sweats, fever, chills- though consider temp insensitivity, overall feeling poorly- fatigue overwhelming.  \par Neuropathic pain throughout arms/ legs- feet (not hands) on gabapentin, adjusting dose for efficacy as tolerated, NO weakness currently on 400 mg TID\par Denies:  rash, raynauds, mucositis, cardiopulmonary, GI/ or renal sx\par \par When first presented with joint swelling 9/14 Hb 6.6 w/ MCV 74 nl wbc and plt 170-190 neg RF, CCP, nl Hep C , ,Uric acid nl 6.5, nl Fe and folate, with elevated ferritin 804, Haptoglobin 225-398, - was given steroids w/ immediate and nearly full resolution. \par \par Meds: \par - gabapentin

## 2021-11-05 NOTE — PHYSICAL EXAM
[General Appearance - Alert] : alert [General Appearance - In No Acute Distress] : in no acute distress [Sclera] : the sclera and conjunctiva were normal [PERRL With Normal Accommodation] : pupils were equal in size, round, and reactive to light [Extraocular Movements] : extraocular movements were intact [Outer Ear] : the ears and nose were normal in appearance [Oropharynx] : the oropharynx was normal [Neck Appearance] : the appearance of the neck was normal [Neck Cervical Mass (___cm)] : no neck mass was observed [Jugular Venous Distention Increased] : there was no jugular-venous distention [Thyroid Diffuse Enlargement] : the thyroid was not enlarged [Thyroid Nodule] : there were no palpable thyroid nodules [Auscultation Breath Sounds / Voice Sounds] : lungs were clear to auscultation bilaterally [Heart Rate And Rhythm] : heart rate was normal and rhythm regular [Heart Sounds] : normal S1 and S2 [Heart Sounds Gallop] : no gallops [Murmurs] : no murmurs [Heart Sounds Pericardial Friction Rub] : no pericardial rub [Cervical Lymph Nodes Enlarged Posterior Bilaterally] : posterior cervical [Cervical Lymph Nodes Enlarged Anterior Bilaterally] : anterior cervical [Supraclavicular Lymph Nodes Enlarged Bilaterally] : supraclavicular [Axillary Lymph Nodes Enlarged Bilaterally] : axillary [Inguinal Lymph Nodes Enlarged Bilaterally] : inguinal [No CVA Tenderness] : no ~M costovertebral angle tenderness [No Spinal Tenderness] : no spinal tenderness [Skin Color & Pigmentation] : normal skin color and pigmentation [Skin Turgor] : normal skin turgor [] : no rash [Sensation] : the sensory exam was normal to light touch and pinprick [No Focal Deficits] : no focal deficits [Oriented To Time, Place, And Person] : oriented to person, place, and time [Impaired Insight] : insight and judgment were intact [Affect] : the affect was normal [FreeTextEntry1] : despite events, calm, appropriate, pleasant

## 2021-11-09 NOTE — PROCEDURE
[Bone Marrow Biopsy] : bone marrow biopsy [Bone Marrow Aspiration] : bone marrow aspiration  [Patient] : the patient [Verbal Consent Obtained] : verbal consent was obtained prior to the procedure [Patient identification verified] : patient identification verified [Procedure verified and consent obtained] : procedure verified and consent obtained [Laterality verified and correct site marked] : laterality verified and correct site marked [Left] : site: left [Correct positioning] : correct positioning [Prone] : prone [Superior iliac spine was identified] : the superior iliac spine was identified. [The left posterior iliac crest was prepped with betadine and draped, using sterile technique.] : The left posterior iliac crest was prepped with betadine and draped, using sterile technique. [Lidocaine was injected and into the periosteum overlying the site.] : Lidocaine was injected and into the periosteum overlying the site. [Aspirate] : aspirate [Cytogenetics] : cytogenetics [FISH] : FISH [Biopsy] : biopsy [Flow Cytometry] : flow cytometry [] : The patient was instructed to remove the bandage the following AM. The patient may bathe. Acetaminophen may be taken for discomfort, as per package directions.If there are any other problems, the patient was instructed to call the office. The patient verbalized understanding, and is aware of the office contact numbers. [FreeTextEntry1] : Hx of vulvar cancer with recurrent disease on 10/2020 s/p chemo & RT, last dose 9/1/21. Now requiring frequent transfusions. [FreeTextEntry2] : 7 cc of lidocaine was used for the procedure.\par \par WBC: 6.09\par Hgb: 8.0\par Hct: 25.4\par Plts: 160\par \par Bone marrow aspiration and biopsy were done. MDS panel sent

## 2021-11-09 NOTE — REASON FOR VISIT
[Bone Marrow Biopsy] : bone marrow biopsy [Bone Marrow Aspiration] : bone marrow aspiration [FreeTextEntry2] : Hx of vulvar cancer with recurrent disease on 10/2020 s/p chemo & RT, last dose 9/1/21. Now requiring frequent transfusions.

## 2021-11-12 PROBLEM — T14.8XXA OPEN WOUND: Status: ACTIVE | Noted: 2021-01-01

## 2021-11-15 NOTE — REVIEW OF SYSTEMS
[As Noted in HPI] : as noted in HPI [Fatigue] : fatigue [Dry Skin] : dry skin [Decreased Appetite] : appetite not decreased [Recent Weight Gain (___ Lbs)] : no recent weight gain [Recent Weight Loss (___ Lbs)] : no recent weight loss [Fever] : no fever [Chills] : no chills [Visual Field Defect] : no visual field defect [Blurred Vision] : no blurred vision [Redness] : no redness  [Dysphagia] : no dysphagia [Neck Pain] : no neck pain [Dysphonia] : no dysphonia [Nasal Congestion] : no nasal congestion [Chest Pain] : no chest pain [Slow Heart Rate] : heart rate is not slow [Leg Claudication] : no leg claudication [Palpitations] : no palpitations [Fast Heart Rate] : heart rate is not fast [Lower Ext Edema] : no lower extremity edema [Shortness Of Breath] : no shortness of breath [Cough] : no cough [Orthopnea] : no orthopnea [Joint Pain] : no joint pain [Acanthosis] : no acanthosis  [Acne] : no acne [Hirsutism] : no hirsutism [Hair Loss] : no hair loss [Cold Intolerance] : no cold intolerance

## 2021-11-15 NOTE — ASSESSMENT
[Levothyroxine] : The patient was instructed to take Levothyroxine on an empty stomach, separate from vitamins, and wait at least 30 minutes before eating [Importance of Diet and Exercise] : importance of diet and exercise to improve glycemic control, achieve weight loss and improve cardiovascular health [Weight Loss] : weight loss [Diabetes Foot Care] : diabetes foot care [Carbohydrate Consistent Diet] : carbohydrate consistent diet [Action and use of Insulin] : action and use of short and long-acting insulin [Self Monitoring of Blood Glucose] : self monitoring of blood glucose [Injection Technique, Storage, Sharps Disposal] : injection technique, storage, and sharps disposal [Retinopathy Screening] : Patient was referred to ophthalmology for retinopathy screening [FreeTextEntry1] : 1. Primary hypothyroidism secondary to Hashimoto's\par Patient appears clinically improved, previous TFTs from Oct 2021 in subclinical hypothyroidism\par Continue on levothyroxine 112mcg daily for now, explained need to repeat TFTs in 6 weeks since last blood test to reevaluate dose. Advised to do repeat thyroid function blood test around 12/10/2021\par Also emphasized importance of medication compliance with thyroid medication and educated regarding consequences of worsening of thyroid function, including myxedema coma and death.\par Ordered baseline thyroid sonogram to evaluate for nodules.\par \par 2. Type 2 DM \par Increase Tresiba to 70U at bedtime in view of patient-reported persistent hyperglycemia after increased prednisone dose \par Continue Novolog 20U premeal for now and continue Victoza 1.8mg daily.\par Educated patient about signs and symptoms of hypoglycemia as well as hypoglycemia management \par \par RTC in 1 month with repeat TFTs.

## 2021-11-15 NOTE — HISTORY OF PRESENT ILLNESS
[FreeTextEntry1] : BALJINDER GERMAN is a 65 yo female with past medical history of hypothyroidism, HTN, HLD, type 2 DM, h/o hemolytic anemia, vulvar squamous cell carcinoma s/p chemo and RT, fibromyalgia, obesity, and polyarthropathy who presents to clinic today for follow up of hypothyroidism.  \par \par Patient was initially diagnosed with hypothyroidism around 20 years ago, at last endocrine visit on 10/14/21, patient noted she was previously taking stable dose of levothyroxine 150 mcg for about 4 years however she stopped taking medication around the end of August 2021. She had noted complaints of occasional constipation and weight gain but denied any other hypothyroid symptoms at that time. She was instructed to take levothyroxine 75mcg daily. She was then called on 10/29/21 and noted marked improvement in TFTs, TSH was slightly above normal range, indicative of subclinical hypothyroidism and dose was increased to 112mcg daily.\par Today she states she is feeling better and taking her levothyroxine as instructed. She denies any new complaints, however she notes she is following up with hematology and rheumatology for workup of low Hgb. \par \par In regards to type 2 DM, she has been diabetic for many years, she checks glucose 3x/day, am fasting usually ranges from 180-200s; today it is 140mg/dl. pre lunch ranges from 150-180s and pre dinner ranges in 160s-180s. Patient denies any recent hypoglycemic episodes or symptoms. She is taking Tresiba 60U qhs, Novolog 20U premeal and Victoza 1.8mg daily. She is on daily prednisone therapy as recommended by rheumatologist, but she spreads out dose and takes prednisone 10mg in morning and 10mg in evening.\par \par Home Meds: prednisone 20mg daily, gabapentin 400mg tid, amlodipine 10mg daily, cetirizinre 10mg prn, enalapril 2.5mg po daily, escitalopram 10mg daily, folic acid 800mcg daily, levothyroxine 75mcg daily, novolog 20 tid, omepraziole 40, simvastatin 20mg daily, tresiba 60IU qhs, triamterene-hctz 37.25mg daily, novolog 20U premeals, Victoza 1.8mg daily.

## 2021-11-15 NOTE — PHYSICAL EXAM
[Alert] : alert [Obese] : obese [No Acute Distress] : no acute distress [Normal Sclera/Conjunctiva] : normal sclera/conjunctiva [No Proptosis] : no proptosis [No Lid Lag] : no lid lag [Supple] : the neck was supple [Thyroid Not Enlarged] : the thyroid was not enlarged [No Thyroid Nodules] : no palpable thyroid nodules [No Respiratory Distress] : no respiratory distress [Clear to Auscultation] : lungs were clear to auscultation bilaterally [Normal S1, S2] : normal S1 and S2 [No Murmurs] : no murmurs [Normal Rate] : heart rate was normal [Regular Rhythm] : with a regular rhythm [Normal Bowel Sounds] : normal bowel sounds [Not Tender] : non-tender [Not Distended] : not distended [Soft] : abdomen soft [Normal Gait] : normal gait [No Rash] : no rash [No Skin Lesions] : no skin lesions [Normal Reflexes] : deep tendon reflexes were 2+ and symmetric [Oriented x3] : oriented to person, place, and time [Acanthosis Nigricans] : no acanthosis nigricans [de-identified] : mild periorbital edema noted. [de-identified] : no macroglossia.

## 2021-11-21 NOTE — DISEASE MANAGEMENT
[Pathological] : TNM Stage: p [N/A] : Currently not applicable [FreeTextEntry4] : recurrent [NTNM] : 0 [TTNM] : is [MTNM] : x [de-identified] : 5840 [de-identified] : Pelvis/Vulva

## 2021-11-21 NOTE — DISEASE MANAGEMENT
[Pathological] : TNM Stage: p [N/A] : Currently not applicable [TTNM] : is [NTNM] : 0 [MTNM] : x [de-identified] : cGy [de-identified] : Pelvis/Vulva

## 2021-11-21 NOTE — HISTORY OF PRESENT ILLNESS
[FreeTextEntry1] : Mrs. 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 Additionally, a groin lesion is noted.\par \par 8/31/21  Burning in vulvar area persists. Takes oxycodone for associated discomfort.  Diarrhea persists, but continues to be relieved by imodium. Right inguinal adenopathy is improved as compared to last week as per patient and per our exam. Denies any vaginal bleeding or discharge. Advised to bathe, dry and use silvadine to right inguinal area to assess fully if the swelling decreases. On this basis, we will assess next week in f/u to see if further radiation or surgical excision is required for any further residual disease. \par \par 8/24/21: Burning persists, denies any dysuria except for when urine occurs contacts skin. Still experiences intermittent diarrhea relieved by imodium. Denies any vaginal bleeding or discharge. Rt inguinal adenopathy persists\par \par 8/17/21: Burning persists in vulvar area. No dysuria. Intermittent diarrhea. Taking oxycodone and ibuprofen for pain , imodium for diarrhea. PRBC transfusion last week and IV hydration over weekend. Labs due today with med/onc\par Moist desquamation vulva\par Rt inguinal adenopathy slightly decreased\par 8/3/21: Tolerating treatment. Burning pain to vulva persists, along with dysuria. Also notes fatigue. Continuing to take antiemetic prn for nausea. Endorses poor appetite, and feeling a little lightheaded. Ate crackers after arrival, and this improved her lightheadedness. BP initially low at 93/55, repeated with orthostatics, unremarkable. Sitting /58, and standing /52. Continues to have intermittent diarrhea, relieved by imodium. Last BM was Sunday, had 4 loose stools then. No BMs yesterday. \par \par 7/27/21: Tolerating treatment. Reports burning pain to the vulva. Taking oxycodone 10 mg at night which has been helping with pain and sleep. Occasional nausea, taking antiemetic prn. No episodes of emesis.She is experiencing dysuria and also burning to the vulva after urination, using tiffanie-bottle and Aquaphor. Had multiple episodes of diarrhea over the weekend, took Pepto first then Imodium which helped. Last BM was yesterday. \par \par 7/20/21: Tolerating treatment. Reports mild fatigue. Has increasing vulva pain, taking ibuprofen 600mg BID and oxycodone at night. Had episode of urinary leakage after chemo which subsided. Moving bowels normally. No vaginal bleeding or discharge. Tolerating chemotherapy. Skin care discussed and will provide Aquaphor.\par \par 7/13/21: Tolerating treatment. She had episodes of diarrhea yesterday and today. Discussed antidiarrheal medications, LRD, dietary support prn. Pain has been managed with Motrin. Will start chemo this week\par

## 2021-11-21 NOTE — DISEASE MANAGEMENT
[Pathological] : TNM Stage: p [N/A] : Currently not applicable [FreeTextEntry4] : recurrent [NTNM] : 0 [TTNM] : is [MTNM] : x [de-identified] : 8870 [de-identified] : Pelvis/Vulva

## 2021-12-09 PROBLEM — C51.9 VULVAR CANCER, CARCINOMA: Status: ACTIVE | Noted: 2018-02-26

## 2021-12-09 NOTE — ASSESSMENT
[FreeTextEntry1] : 67 yo woman with vulvar cancer on chemoradiation with weekly Cisplatin between 7/15/21 to 9/1/21. History of hemolytic anemia. \par \par  -History of  hemolytic anemia Amrit panel  at the time of diagnosis revealed a positive IgG and negative C3.\par   \par Hemolysis work up done by Dr Carpenter on 9/10/21- was negative, repeated today. \par \par Patient's hemoglobin on 9/25/21 was 6.4 g/dl, complaints of dizziness, denies chest pain, palpitations. \par 9/27/21 - 6.9g/dl. Patient was scheduled for two units of PRBCs. 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 Recurrent disease on 10/2020, treated with chemotherapy and RT. Last treatment was 9/1/21 , concurrent chemo/RT. 7/15/21 to 9/1/21. (Cisplatin). Patient required frequent blood transfusions approx 6 in the last few weeks. \par \par Complete anemia work up repeated today. Hemoglobin electrophoresis c/w thalassemia trait. \par \par Bone marrow biopsy 11/9/21 c/w erythroid hyperplasia, megakaryocytes normal, no evidence of dysplasia. FISH pending. \par \par 12/9/21- Hb 8.2 g/dl, symptomatic. Patient will be scheduled for one unit PRBCs. \par \par RTC 6 weeks. \par \par

## 2021-12-09 NOTE — HISTORY OF PRESENT ILLNESS
[de-identified] : Patient is feeling well. Following with Dr Carpenter GYN Oncologist for history of vulvar cancer, stable. 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 Recurrent disease on 10/2020, treated with chemotherapy and RT. Last treatment was 9/1/21 , concurrent chemo/RT. 7/15/21 to 9/1/21. (Cisplatin). Patient required frequent blood transfusions approx 6 in the last few weeks. \par \par Patient's hemoglobin on 9/25/21 was 6.4 g/dl, complaints of dizziness, denies chest pain, palpitations. \par \par Denies fever, night sweats or weight loss. \par \par \par No changes in her medical, surgical or social history since 9/27/2021.  [de-identified] : Ms. Carranza had vulvar cancer surgery on December 13, 2014,  preoperatively her hemoglobin was 10.7 with an MCV of 62.7 the patient states that she has thalassemia trait. 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 Reticulocyte count was elevated consistent with hemolysis treated with steroids with no  improvement over 2 or 3 days and  was given one treatment with rituximab; with appropriate response. \par \par Surgical Final Report\par \par \par Final Diagnosis\par 1. Anterior vulva, biopsy\par - Lichen sclerosus\par - Negative for dysplasia\par \par 2. Portion of anterior vulva, resection\par - Invasive well differentiated squamous cell carcinoma, see\par synoptic summary\par _________________________________________________________________\par \par Intraoperative Consultation\par 1. Anterior vulva\par - Negative for dysplasia\par By Dr. GRIS Abad\par \par Synoptic Summary\par Case Summary (Vulva, 7th edition and FIGO 2014 Annual Report,\par January 2016)\par \par Specimen Type: Anterior vulva\par Procedure: Wide excision\par Lymph Node Sampling: Not applicable\par Tumor Site: Anterior vulva\par Tumor Size: 0.9 cm\par Tumor Focality: Unifocal\par Histologic Type: Squamous cell carcinoma\par Histologic Grade: G1\par Microscopic Tumor Extension:\par Depth of Invasion: 4 mm\par Tumor Border: Pushing\par Margins:\par Uninvolved by invasive carcinoma\par Distance of invasive carcinoma from closest posterior\par margin: 4 mm\par Involved by vulvar intraepithelial lesion, differentiated\par type: all margins\par Lymph-Vascular Invasion: Not identified\par Lymph Nodes: Not applicable\par \par Pathologic Staging:\par TNM Descriptors: Not applicable\par Primary Tumor (pT): pT1b\par Regional Lymph Nodes (pN): pNx\par \par \par \par \par \par BALJINDER CARRANZA C 2\par \par \par \par Surgical Final Report\par \par \par \par \par Distant Metastasis (pM): pMx\par FIGO Stage: [id number]\par \par Additional Pathologic Findings: Lichen sclerosus, dVIN\par Comment(s): Deeper levels on block 1E are examined.\par \par \par \par  \par

## 2021-12-13 PROBLEM — D64.9 ANEMIA: Status: ACTIVE | Noted: 2021-01-01

## 2021-12-13 NOTE — PHARMACOTHERAPY INTERVENTION NOTE - COMMENTS
Medication history is incomplete. Medications verified with [facility]. Awaiting call back from patient's daughter and spouse to confirm patient's home medications. Please call spectra [phone] if you have any questions.

## 2021-12-13 NOTE — H&P ADULT - HISTORY OF PRESENT ILLNESS
67 yo F w/ PMHx of HTN, HLD, DM, fibromyalgia, Vulvar CA s/p vulva surgery in 2020, completed chemo & RT in 9/21, now p/w SOB, dizziness & nausea for the last couple of weeks. Pt states she initially started experiencing an intermittent steady jabbing type of pain in her upper abdomen/ epigastric region, described as feeling like a gas pocket non-radiating, 9/10 severity, which can last for several hours at a time. Pt feels pain worsen with inspiration or rolling onto her side or when getting up. Pain eases up when she takes motrin & resting. ALso admits to a pleuritic type of chest pain at times. Pt also reports feeling accompanying lightheadedness, generalized weakness, dizziness, nausea & SOB. Pt reports HERNANDEZ after taking a couple of steps and feels the SOB has become constant over the last couple of days. Since thanksgiving she also reports on 2 occasions she felt like she'd pass out when symptoms got bad.     65 yo F w/ PMHx of HTN, HLD, DM, fibromyalgia, Vulvar CA s/p vulva surgery in 2020, completed chemo & RT in 9/21, found to be anemic Hgb 8.2 so she received 1U PRBC on saturday, 12/11 prior to that received PRBC in 9/21 now p/w SOB, dizziness & nausea for the last couple of weeks. Pt states she initially started experiencing an intermittent steady jabbing type of pain in her upper abdomen/ epigastric region, described as feeling like a gas pocket non-radiating, 9/10 severity, which can last for several hours at a time. Pt feels pain worsen with inspiration or rolling onto her side or when getting up. Pain eases up when she takes motrin & resting. ALso admits to a pleuritic type of chest pain at times. Pt also reports feeling accompanying lightheadedness, generalized weakness, dizziness, nausea & SOB. Pt reports HERNANDEZ after taking a couple of steps and feels the SOB has become constant over the last couple of days. Since thanksgiving she also reports on 2 occasions she felt like she'd pass out when symptoms got bad.

## 2021-12-13 NOTE — H&P ADULT - RS GEN PE MLT RESP DETAILS PC
airway patent/breath sounds equal/good air movement/diminished breath sounds, L/diminished breath sounds, R/respirations labored

## 2021-12-13 NOTE — H&P ADULT - ASSESSMENT
67 yo F w/ PMHx of HTN, HLD, DM, fibromyalgia, Vulvar CA s/p vulva surgery in 2020, completed chemo & RT in 9/21, now p/w SOB, dizziness & nausea for the last couple of weeks.  CTPA- No pulmonary embolus is noted. Multiple nodules are noted within both lungs. Primary consideration is metastasis. Findings suggestive of malignant pleural effusions bilaterally.  EKG- NSR @ 97 TWI/ Flat T's v5-6, I, L, II, III, F

## 2021-12-13 NOTE — H&P ADULT - PROBLEM SELECTOR PLAN 4
continue gabapentin, prednisone continue amlodipine, enalapril, Triamterene Continue Basal Insulin, Check Fingersticks with Meals and cover with ISS TID, check HgbA1C

## 2021-12-13 NOTE — ED PROVIDER NOTE - PHYSICAL EXAMINATION
gen: well appearing  Mentation: AAO x 3  psych: mood appropriate  ENT: airway patent  Eyes: conjunctivae clear bilaterally  Cardio: RRR, no m/r/g  Resp: normal BS b/l, tachypneic  GI: s/nt/nd  : no CVA tenderness  Neuro: sensation and motor function intact  MSK: normal movement of all extremities  Lymph/Vasc: no LE edema gen: well appearing  Mentation: AAO x 3  psych: mood appropriate  ENT: airway patent  Eyes: conjunctivae clear bilaterally  Cardio: RRR, no m/r/g  Resp: normal BS b/l, tachypneic  GI: s/nt/nd, no guarding or rigidity  : no CVA tenderness  Neuro: sensation and motor function intact  MSK: normal movement of all extremities  Lymph/Vasc: no LE edema

## 2021-12-13 NOTE — ED ADULT NURSE NOTE - CHIEF COMPLAINT QUOTE
C/o intermittent dizziness, SOB, and nausea that began "a few weeks ago," reports going to [facility] on Saturday, received blood transfusion. Denies fever, chills, cough. PMHx: vulva cancer, DM2, HTN. Last chemo/radiation on 9/1. Finger stick 166.

## 2021-12-13 NOTE — ED ADULT NURSE REASSESSMENT NOTE - NS ED NURSE REASSESS COMMENT FT1
report received from night shift RN, Pt appears in no acute distress. Pt denies chest pain, sob, n/v/d, fever or chills.

## 2021-12-13 NOTE — ED PROVIDER NOTE - ATTENDING CONTRIBUTION TO CARE
I performed a face-to-face evaluation of the patient and performed a history and physical examination along with the resident or ACP, and/or medical student above.  I agree with the history and physical examination as documented by the resident or ACP, and/or medical student above.  Walker:   65yo F w/ pmh as above p/w sob, general weakness and lightheadedness. ECG with new ST depressions and TWIs. DDx includes PE vs ACS, less likely infectious. Labs, imaging, meds, likely admission.

## 2021-12-13 NOTE — H&P ADULT - PROBLEM SELECTOR PLAN 3
continue amlodipine, enalapril, Triamterene Continue Basal Insulin, Check Fingersticks with Meals and cover with ISS TID, check HgbA1C check Echo Has new TWI inversions on lateral leads. Likely related to L pleural effusion  -Will need cards clearence in AM  -Trop negative so far

## 2021-12-13 NOTE — H&P ADULT - PROBLEM SELECTOR PLAN 1
Admit to Telemetry, monitor for arrhythmia, CT done, consider Pulmonary eval Admit to Telemetry, monitor for arrhythmia, CT done, Call IR c/s in am, Call Heme/ Onc c/s in am Admit to Telemetry, monitor for arrhythmia, CT done, Call IR c/s in am, Call Heme/ Onc c/s in am  -Consult Pulm in AM

## 2021-12-13 NOTE — H&P ADULT - GIT GEN HX ROS MEA POS PC
See HPI, Had a bad episode of constipation 3 months ago, had a syncopal episode and went to an ED for staples on her head after she hit her head on a tub/nausea/constipation/abdominal pain

## 2021-12-13 NOTE — ED PROVIDER NOTE - OBJECTIVE STATEMENT
65 y/o F with PMH of vulvar ca s/p surgery and chemo/radiation (last session in Sept), HTN, HLD, insulin dependent DM, fibromyalgia presenting with lightheadedness, SOB, generalized weakness, and nausea. Patient reports symptoms for last 4-5 days, progressively worsening. Also has had decreased PO intake for last 2 days. No dizziness/room spinning sensation. Denies any chest pain, LE swelling, v/d 67 y/o F with PMH of vulvar ca s/p surgery and chemo/radiation (last session in Sept), HTN, HLD, insulin dependent DM, fibromyalgia presenting with lightheadedness, SOB, generalized weakness, and nausea. Patient reports symptoms for last 4-5 days, progressively worsening. Also has had decreased PO intake for last 2 days with some abd discomfort. No dizziness/room spinning sensation. Denies any chest pain, LE swelling, v/d 67 y/o F with PMH of vulvar ca s/p surgery and chemo/radiation (last session in Sept), HTN, HLD, insulin dependent DM, fibromyalgia presenting with lightheadedness, SOB, generalized weakness, and nausea. Patient reports symptoms for last 4-5 days, progressively worsening. Also has had decreased PO intake for last 2 days with some abd discomfort. No dizziness/room spinning sensation. Denies any chest pain, LE swelling, v/d  Patient notably had blood transfusion on 12/11 but symptoms persisted after

## 2021-12-13 NOTE — H&P ADULT - RS GEN HX ROS MEA POS PC
See HPI, admits to a mild cough once in a blue moon tony to clear throat/dyspnea/cough/pleuritic chest pain

## 2021-12-13 NOTE — PHARMACOTHERAPY INTERVENTION NOTE - COMMENTS
Medication history is complete. Patient reports taking enalapril 2.5 mg (1 tablet once daily), however per [facility], enalapril has not been dispensed since November 2020. Please call spectra [phone] if you have any questions.

## 2021-12-13 NOTE — ED ADULT TRIAGE NOTE - CHIEF COMPLAINT QUOTE
C/o intermittent dizziness, SOB, and nausea that began "a few weeks ago," reports going to [facility] on Saturday, received blood transfusion. Denies fever, chills, cough. PMHx: vulva cancer, DM2, HTN. Last chemo/radiation on 9/1. Finger stick 166.

## 2021-12-13 NOTE — H&P ADULT - NSHPSOCIALHISTORY_GEN_ALL_CORE
, lives with   Smoked 1ppd x 50 years on/off, stopped 1 year ago  Denies ETOH or drug use  Received her COVID vaccine x2 in 4/21

## 2021-12-13 NOTE — ED PROVIDER NOTE - CLINICAL SUMMARY MEDICAL DECISION MAKING FREE TEXT BOX
DO Brennan PGY-3: 65 y/o F presenting with SOB, weakness, lightheadedness. Symptoms may be due to anemia, electrolyte derangement. Given hx of CA, also considering PE. EKG with new T wave inversions in inferolateral leads. Labs, trop, t/s, CTA chest. Dispo pending results DO Brennan PGY-3: 67 y/o F presenting with SOB, weakness, lightheadedness. Symptoms may be due to anemia, electrolyte derangement. Given hx of CA, also considering PE. EKG with new T wave inversions in inferolateral leads. Also r/o DKA given hx of diabetes and abd pain/nausea though initial fingerstick 166. Labs, trop, t/s, CTA chest. Dispo pending results

## 2021-12-13 NOTE — H&P ADULT - PROBLEM SELECTOR PLAN 2
Continue Basal Insulin, Check Fingersticks with Meals and cover with ISS TID, check HgbA1C monitor CBC, check stool for occult blood, anemia w/u, call Heme/ Onc c/s in am

## 2021-12-13 NOTE — ED ADULT NURSE NOTE - NSFALLRSKASSESSTYPE_ED_ALL_ED
PROGRESS NOTE



Patient was seen this morning.  Code blue was called this morning.  Patient had

developed bradycardia.  He was eventually intubated.  His serum creatinine is noted to

be 2.69 mg/dL today, up from 2.27 and 2.3 yesterday.  The patient's urine output had

dropped to 10 mL an hour yesterday for 3-4 hours.  He was given a dose of IV Lasix and

it looks like the urine output has picked up since then, staying at about 30-40 mL an

hour.  Patient has been started on amiodarone drip.  The dobutamine is currently on

hold.  He has been intubated.  Blood pressure had dropped into the 80s, currently

staying at about 100-112 mmHg systolic.



LABS:

Reviewed.  Sodium was 127, potassium 4.0, chloride 93, CO2 is 20, BUN of 96, serum

creatinine 2.69 mg/dL today.  Hemoglobin was 11.2, white cell count is 16.6.



ASSESSMENT:

1. Acute kidney injury, ATN secondary to contrast nephropathy and hemodynamic

    instability.  Currently nonoliguric.  Patient was oliguric yesterday.  We will

    continue to monitor his urine output.  He is status post code blue with severe

    bradycardia, being considered for possible pacemaker placement.

2. Hypervolemic hyponatremia, expect further improvement with ongoing diuresis.

3. Acute myocardial infarction, status post cardiac catheterization and right coronary

    stent placement.

4. Cardiomyopathy, ejection fraction 45% to 50%.

5. Possible pneumonia maintained on antibiotics.

6. Metabolic acidosis maintained on oral sodium bicarb, currently improved and stable.



PLAN:

Continue with the oral sodium bicarb.  Monitor electrolytes.  May continue off

dobutamine.  Will follow up on repeat labs.  I will also discontinue the magnesium as

serum magnesium is elevated.





MMODL / IJN: [id number] / Job#: [id number] Initial (On Arrival)

## 2021-12-13 NOTE — ED ADULT NURSE NOTE - OBJECTIVE STATEMENT
65 y/o female, a&ox4, NAD, received to rm 5 with c/o SOB. Pt reports SOB and dizziness for the past few weeks. Pmh of Vulvar cancer, last chemo and radiation treatment 09/21. Recently received blood transfusion a few days ago at [facility] for low hemoglobin and hematocrit levels. NSR on cardiac monitor. Lung sounds are clear and equal bilaterally, symmetrical chest rising. Skin appropriate for ethnicity. Right side of abdomen distended, nonrigid, pt endorses recent diagnosis of abdominal hernia. 67 y/o female, a&ox4, NAD, received to rm 5 with c/o SOB. Pt reports SOB, nausea, and dizziness for the past few weeks. Pmh of Vulvar cancer, last chemo and radiation treatment 09/21. Recently received blood transfusion a few days ago at [facility] for low hemoglobin and hematocrit levels. NSR on cardiac monitor. Lung sounds are clear and equal bilaterally, symmetrical chest rising. Skin appropriate for ethnicity. Right side of abdomen distended, nonrigid, pt endorses recent diagnosis of abdominal hernia. 20G IV placed to right AC, labs collected and sent off. Pt medicated as per MD orders.

## 2021-12-13 NOTE — H&P ADULT - NSHPOUTPATIENTPROVIDERS_GEN_ALL_CORE
PCP- Dr Foster in [facility]  Heme- Dr Leana Mart, Chemo- Dr Mary Carpenter, Rad- Dr Margie Guerrero, Onc- Dr Anders (has not seen in a while)

## 2021-12-13 NOTE — H&P ADULT - ATTENDING COMMENTS
I agree with the above H&P from ACP/Resident/Intern. In addition:  HPI: 67 yo F w/ PMHx of HTN, HLD, DM, fibromyalgia, Vulvar CA s/p vulva surgery in 2020, completed chemo & RT in 9/21, found to be anemic Hgb 8.2 so she received 1U PRBC on saturday, 12/11 prior to that received PRBC in 9/21 now p/w SOB, dizziness & nausea for the last couple of weeks.  Workup here shows that she has multiple new lung nodules suspicious for lung metatasis and new pleural effusion. Currently has some L sided chest pain and dyspnea. Last chemo was sept for recurrent vavular cancer.  Labs: Reviewed  Imaging: Reviewed  EKG: Reviewed  PHYSICAL EXAM:  GENERAL: NAD, well-developed, well-nourished  CHEST/LUNG: Dull lung sounds on L, Crackles on right  HEART: Regular rate and rhythm; No murmurs, rubs, or gallops, (+)S1, S2  ABDOMEN: Soft, Nontender, Nondistended; Normal Bowel sounds   EXTREMITIES:  2+ Peripheral Pulses, No clubbing, cyanosis, or edema  A/P: 67 yo F w/ PMHx of HTN, HLD, DM, fibromyalgia, Vulvar CA s/p vulva surgery in 2020, completed chemo & RT in 9/21 for recurrent cancer now found to have new L pleural effusion and possible new lung mets.  -Will need IR to drain pleural effusion: Cytology, cell studies  -Will need Onc and pulm to comment on new pulm nodules

## 2021-12-14 NOTE — CHART NOTE - NSCHARTNOTEFT_GEN_A_CORE
Case discussed with LISA Ramos. Clinical service requested therapeutic thoracentesis performed with diagnostic fluid sent. Procedure previously approved and planned for tomorrow.     Lung nodule biopsy also requested by heme Onc. This can be performed however CT abd pelvis should be obtained first to assess for possible metastatic foci. Fluid sent from the thora may confirm malignancy. Lung nodule biopsy can be performed if no other appropriate sites are seen on additional imaging and additional tissue is needed after thora.

## 2021-12-14 NOTE — PATIENT PROFILE ADULT - NSFALLSECTIONLABEL_GEN_A_CORE
. Maurice Blunt (MD), Obstetrics and Gynecology  [address] [place] [address]  Phone: [phone]  Fax: [phone]

## 2021-12-14 NOTE — CHART NOTE - NSCHARTNOTEFT_GEN_A_CORE
Patient Age: Female     Patient Gender:Yes     Procedure (including site/side if known):Diagnostic/therapeutic throacentesis       Diagnosis/Indication:pleural effusion     Interventional Radiology Attending Physician:Dr. Meyer    Ordering Attending Physician:Dr. deluna    Pertinent medical history:                      9.2    7.96  )-----------( 245      ( 14 Dec 2021 08:15 )             28.7   12-14    135  |  97<L>  |  21  ----------------------------<  163<H>  3.7   |  30  |  0.95    Ca    10.5      14 Dec 2021 08:15  Phos  3.3     12-14  Mg     1.70     12-14    TPro  5.9<L>  /  Alb  3.1<L>  /  TBili  0.5  /  DBili  x   /  AST  7   /  ALT  7   /  AlkPhos  70  12-14      Pertinent Labs:  - Please send cytopathology as well as regular thoracentesis studies. :- Please send cytopathology as well as regular thoracentesis studies.       Patient and Family aware? yes          Attending/Resident/NP/PA    Contact:                [id number]               Date:            12/14/2021                        time:1926

## 2021-12-14 NOTE — CONSULT NOTE ADULT - SUBJECTIVE AND OBJECTIVE BOX
HPI:  Patient is a 65 y/o F w/ a PMHx of HTN, HLD, T2DM, fibromyalgia, recurrent vulvar cancer (recently received chemo/RT from 7/2021-9/2021), and anemia (requiring intermittent transfusions recently) who presented with shortness of breath, dizziness, and nausea for multiple weeks. Patient stated that she initially started experiencing an intermittent steady jabbing type of pain in her upper abdomen/epigastric region. Pain was described as feeling like a gas pocket, non-radiating, 9/10 severity, which can last for several hours at a time. Patient felt that the pain worsened with inspiration or rolling onto her side or when getting up. Pain eased up when she takes Motrin or when she rested. She also endorsed a pleuritic chest pain at times. On ROS, she reported occasional lightheadedness, generalized weakness, dizziness, nausea, and shortness of breath. Pt reported dyspnea on exertion after taking a couple of steps and feels the shortness of breath has become constant over the last couple of days PTA. Given her multiple symptoms, she decided to go to the ED for further evaluation. In the ED, patient was afebrile, mildly tachycardic, and was saturating well on RA. A CTA Chest was obtained which showed multiple nodules within both lungs concerning for metastasis as well as findings suggestive of malignant pleural effusions bilaterally. Patient was ultimately admitted to telemetry for further management. Given patient's history of vulvar cancer and concern for new metastases, Oncology was consulted for further evaluation.    Patient seen this afternoon. Above history was confirmed. In addition to above, patient also endorses a R inguinal wound that has had some discharge lately. No recent fevers. No reports of chest pain or dyspnea at time of visit.    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 She is s/p anterior partial vulvectomy with bilateral sentinel node excision on 12/12/2014 and pathology showed tumor was poorly differentiated, 4mm with 4mm depth of invasion. The nearest margin was 6mm. She has had multiple biopsies over the years and in Dec 2017, she underwent wide local excision of a 9 mm G1 scc with 4 mm invasion. Margin was 4mm with HOMERO at all margins. No LVSi was seen. She continue to be followed with multiple biopsies over the year. In June,2020 she had more discomfort in the right vulva with a new nodule on exam and biopsy read as psoriasiform spongiotic dermatitis. She was seen in Dermatology and rebiopsied in Aug 2020 showed SCCA of the vulva. She was seen for Gyn onc f/u and eval in Sept 2020 exam revealed 2 cm raised lesion post labia majora and extensive white epithelium posterior fourchette. PET done without evidence of metastatic disease (Oct 2020) definitive surgery with plastics reconstruction planned. She underwent surgery with right inguinal node assessment right partial vulvectomy left labial scar excision and additional deep margin excision as planned on Oct 19, 2020. Pathology confirmed 2 benign right sentinel nodes, VIN3 of left labial sca and a 2.4 cm G1 scc with 13 mm depth of invasion. Closest margin was 9 mm,. the was no LVSI and no high grade HOMERO at margins. When seen for f/u on Feb10,2021 white and slightly ulcerated plaques on left labia minor were seen and biopsied. these revealed early evolving CIS associated with lechen sclerosis et atrophicus in both the left vulva and perianal skin. She was seen by Colorectal Surgery who noted a right anterior hemorrhoid with suspicious lesion on anoscopy. 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 She was then referred to Medical Oncology and Radiation Oncology. She received chemo/RT with weekly Cisplatin from 7/12/21-9/1/21. Patient follows with Dr. Paulo Carpenter at the [facility]    Of note, patient was recently evaluated for anemia requiring occasional transfusions. Her workup was notable for a hemoglobin electrophoresis which was c/w beta thalassemia minor. She had a BMBx on 11/9/21 (minimal marrow obtained to evaluate) which showed erythroid hyperplasia, megakaryocytes normal, no evidence of dysplasia. Patient also follows with Dr. Leana Auguste at the [facility].    PAST MEDICAL & SURGICAL HISTORY:  Lichen sclerosus of female genitalia    Vulva cancer  bilateral 2014 reoccurance 2017    HTN (hypertension)    TIA (transient ischemic attack)  8/2013  blurred vision/dizziness no treatment    DM type 2 (diabetes mellitus, type 2)  1995    Hypothyroid    Thalassemia minor    Strabismus    Fibromyalgia    HLD (hyperlipidemia)    Anxiety and depression    GERD (gastroesophageal reflux disease)    Obesity    S/P laparoscopic cholecystectomy  2000    S/P eye surgery  June 2014, for strabismus    Vulvar neoplasm  s/p radical anterior vulvectomy in 2014 with radical excision in 2017    H/O total knee replacement, right  2016    Review of Systems:   CONSTITUTIONAL: No fever or chills  EYES: No eye pain or discharge  ENMT:  No sinus or throat pain  NECK: No pain or stiffness  RESPIRATORY: No shortness of breath or cough  CARDIOVASCULAR: No chest pain or palpitations  GASTROINTESTINAL: No vomiting or abdominal pain  GENITOURINARY: No dysuria or hematuria  NEUROLOGICAL: No headaches or confusion  SKIN: No itching or rash  MUSCULOSKELETAL: No joint pain or back pain    Allergies    No Known Allergies    Intolerances    Social History: Former smoker (Smoked 1ppd on and off for ~ 50 years, Quit ~ 1.5 years ago), No EtOH or illicit drug use    FAMILY HISTORY:  Family history of colon cancer (Aunt)    Family history of diabetes mellitus in mother (Aunt, Mother, Sibling)        MEDICATIONS  (STANDING):  amLODIPine   Tablet 10 milliGRAM(s) Oral daily  dextrose 40% Gel 15 Gram(s) Oral once  dextrose 5%. 1000 milliLiter(s) (50 mL/Hr) IV Continuous <Continuous>  dextrose 5%. 1000 milliLiter(s) (100 mL/Hr) IV Continuous <Continuous>  dextrose 50% Injectable 25 Gram(s) IV Push once  dextrose 50% Injectable 12.5 Gram(s) IV Push once  dextrose 50% Injectable 25 Gram(s) IV Push once  enalapril 2.5 milliGRAM(s) Oral daily  escitalopram 30 milliGRAM(s) Oral daily  gabapentin 400 milliGRAM(s) Oral three times a day  glucagon  Injectable 1 milliGRAM(s) IntraMuscular once  heparin   Injectable 5000 Unit(s) SubCutaneous every 8 hours  influenza  Vaccine (HIGH DOSE) 0.7 milliLiter(s) IntraMuscular once  insulin glargine Injectable (LANTUS) 56 Unit(s) SubCutaneous at bedtime  insulin lispro (ADMELOG) corrective regimen sliding scale   SubCutaneous three times a day before meals  levothyroxine 112 MICROGram(s) Oral daily  pantoprazole    Tablet 40 milliGRAM(s) Oral before breakfast  predniSONE   Tablet 10 milliGRAM(s) Oral two times a day  simvastatin 20 milliGRAM(s) Oral at bedtime  triamterene 37.5 mG/hydrochlorothiazide 25 mG Tablet 1 Tablet(s) Oral daily    MEDICATIONS  (PRN):  melatonin 3 milliGRAM(s) Oral at bedtime PRN Insomnia      CAPILLARY BLOOD GLUCOSE      POCT Blood Glucose.: 208 mg/dL (14 Dec 2021 16:57)  POCT Blood Glucose.: 263 mg/dL (14 Dec 2021 12:32)  POCT Blood Glucose.: 165 mg/dL (14 Dec 2021 07:48)  POCT Blood Glucose.: 246 mg/dL (14 Dec 2021 01:44)  POCT Blood Glucose.: 252 mg/dL (13 Dec 2021 22:39)    I&O's Summary    Vital Signs Last 24 Hrs  T(C): 37.1 (14 Dec 2021 12:36), Max: 37.7 (13 Dec 2021 18:15)  T(F): 98.8 (14 Dec 2021 12:36), Max: 99.9 (13 Dec 2021 18:15)  HR: 89 (14 Dec 2021 12:36) (89 - 110)  BP: 105/51 (14 Dec 2021 12:36) (105/51 - 158/66)  BP(mean): --  RR: 18 (14 Dec 2021 12:36) (17 - 18)  SpO2: 98% (14 Dec 2021 12:36) (98% - 99%)    PHYSICAL EXAM:  GENERAL: NAD, Sitting in bed  HEENT: NC/AT, Sclera anicteric  NECK: Supple  CHEST/LUNG: Respirations grossly nonlabored on RA  HEART: RRR; +S1/S2  ABDOMEN: +BS, Soft, NT  EXTREMITIES: No LE edema  NEUROLOGY: Awake and alert, Answering questions and following commands appropriately  SKIN: + Malodorous R inguinal wound/tract  PSYCH: Calm and cooperative    LABS:                        9.2    7.96  )-----------( 245      ( 14 Dec 2021 08:15 )             28.7     12-14    135  |  97<L>  |  21  ----------------------------<  163<H>  3.7   |  30  |  0.95    Ca    10.5      14 Dec 2021 08:15  Phos  3.3     12-14  Mg     1.70     12-14    TPro  5.9<L>  /  Alb  3.1<L>  /  TBili  0.5  /  DBili  x   /  AST  7   /  ALT  7   /  AlkPhos  70  12-14    RADIOLOGY & ADDITIONAL TESTS:  Studies reviewed.

## 2021-12-14 NOTE — PROGRESS NOTE ADULT - ASSESSMENT
65 yo F w/ PMHx of HTN, HLD, DM, fibromyalgia, Vulvar CA s/p vulva surgery in 2020, completed chemo & RT in 9/21, now p/w SOB, dizziness & nausea for the last couple of weeks.  CTPA- No pulmonary embolus is noted. Multiple nodules are noted within both lungs. Primary consideration is metastasis. Findings suggestive of malignant pleural effusions bilaterally.  EKG- NSR @ 97 TWI/ Flat T's v5-6, I, L, II, III, F

## 2021-12-14 NOTE — CONSULT NOTE ADULT - SUBJECTIVE AND OBJECTIVE BOX
HPI:  67 yo F w/ PMHx of HTN, HLD, DM, fibromyalgia, Vulvar CA s/p vulva surgery in 2020, completed chemo & RT in 9/21, found to be anemic Hgb 8.2 so she received 1U PRBC on saturday, 12/11 prior to that received PRBC in 9/21 now p/w SO and dizziness for the last couple of weeks.       PAST MEDICAL & SURGICAL HISTORY:  Lichen sclerosus of female genitalia    Vulva cancer  bilateral 2014 reoccurance 2017    HTN (hypertension)    TIA (transient ischemic attack)  8/2013  blurred vision/dizziness no treatment    DM type 2 (diabetes mellitus, type 2)  1995    Hypothyroid    Thalassemia minor    Strabismus    Fibromyalgia    HLD (hyperlipidemia)    Anxiety and depression    GERD (gastroesophageal reflux disease)    Obesity    S/P laparoscopic cholecystectomy  2000    S/P eye surgery  June 2014, for strabismus    Vulvar neoplasm  s/p radical anterior vulvectomy in 2014 with radical excision in 2017    H/O total knee replacement, right  2016        FAMILY HISTORY:  Family history of colon cancer (Aunt)    Family history of diabetes mellitus in mother (Aunt, Mother, Sibling)        SOCIAL HISTORY:  Smoking: [ ] Never Smoked [ ] Former Smoker (__ packs x ___ years) [ ] Current Smoker  (__ packs x ___ years)  Substance Use: [ ] Never Used [ ] Used ____  EtOH Use:  Marital Status: [ ] Single [ ]  [ ]  [ ]   Sexual History:   Occupation:  Recent Travel:  Country of Birth:  Advance Directives:    Allergies    No Known Allergies    Intolerances        HOME MEDICATIONS:  Home Medications:  amLODIPine 10 mg oral tablet: 1 tab(s) orally once a day (13 Dec 2021 19:09)  enalapril 2.5 mg oral tablet: 1 tab(s) orally once a day    **Per pharmacy, rx last dispensed November 2020 (13 Dec 2021 19:09)  escitalopram 20 mg oral tablet: 1.5 tabs (30 mg) orally once a day (13 Dec 2021 19:09)  gabapentin 400 mg oral capsule: 1 cap(s) orally 3 times a day    **Per pharmacy, most recent rx written for gabapentin 600 mg (1 tab orally three times daily) on 9/6/21 x 30 day supply (13 Dec 2021 19:09)  ibuprofen 600 mg oral tablet: 1 tab(s) orally every 6 hours, As Needed - for mild pain (13 Dec 2021 19:09)  levothyroxine 112 mcg (0.112 mg) oral tablet: 1 tab(s) orally once a day (13 Dec 2021 19:09)  NovoLOG 100 units/mL subcutaneous solution: 15 - 20 unit(s) subcutaneous 3 times a day (with meals)  (13 Dec 2021 19:09)  omeprazole 40 mg oral delayed release capsule: 1 cap(s) orally once a day (13 Dec 2021 19:09)  predniSONE 10 mg oral tablet: 1 tab(s) orally 2 times a day  (13 Dec 2021 19:09)  prochlorperazine 10 mg oral tablet: 1 tab(s) orally , As Needed for Nausea (13 Dec 2021 19:09)  simvastatin 20 mg oral tablet: 1 tab(s) orally once a day (at bedtime)    **Per pharmacy, rx filled 3/26/21 x 30 day supply (13 Dec 2021 19:09)  Tresiba FlexTouch 200 units/mL subcutaneous solution: 70 unit(s) subcutaneous once a day (at bedtime) (13 Dec 2021 19:09)  triamterene-hydrochlorothiazide 37.5 mg-25 mg oral tablet: 1 tab(s) orally once a day (13 Dec 2021 19:09)  Victoza 18 mg/3 mL subcutaneous solution: 1.8 milligram(s) subcutaneous once a day    **Per pharmacy, rx filled 11/3/21 x 30 day supply (13 Dec 2021 19:09)      REVIEW OF SYSTEMS:  Constitutional: [ ] negative [ ] fevers [ ] chills [ ] weight loss [ ] weight gain  HEENT: [ ] negative [ ] dry eyes [ ] eye irritation [ ] postnasal drip [ ] nasal congestion  CV: [ ] negative  [ ] chest pain [ ] orthopnea [ ] palpitations [ ] murmur  Resp: [ ] negative [ ] cough [ ] shortness of breath [ ] dyspnea [ ] wheezing [ ] sputum [ ] hemoptysis  GI: [ ] negative [ ] nausea [ ] vomiting [ ] diarrhea [ ] constipation [ ] abd pain [ ] dysphagia   : [ ] negative [ ] dysuria [ ] nocturia [ ] hematuria [ ] increased urinary frequency  Musculoskeletal: [ ] negative [ ] back pain [ ] myalgias [ ] arthralgias [ ] fracture  Skin: [ ] negative [ ] rash [ ] itch  Neurological: [ ] negative [ ] headache [ ] dizziness [ ] syncope [ ] weakness [ ] numbness  Psychiatric: [ ] negative [ ] anxiety [ ] depression  Endocrine: [ ] negative [ ] diabetes [ ] thyroid problem  Hematologic/Lymphatic: [ ] negative [ ] anemia [ ] bleeding problem  Allergic/Immunologic: [ ] negative [ ] itchy eyes [ ] nasal discharge [ ] hives [ ] angioedema  [ ] All other systems negative  [ ] Unable to assess ROS because ________    OBJECTIVE:  ICU Vital Signs Last 24 Hrs  T(C): 37.1 (14 Dec 2021 12:36), Max: 37.7 (13 Dec 2021 18:15)  T(F): 98.8 (14 Dec 2021 12:36), Max: 99.9 (13 Dec 2021 18:15)  HR: 89 (14 Dec 2021 12:36) (89 - 110)  BP: 105/51 (14 Dec 2021 12:36) (105/51 - 158/66)  BP(mean): --  ABP: --  ABP(mean): --  RR: 18 (14 Dec 2021 12:36) (17 - 18)  SpO2: 98% (14 Dec 2021 12:36) (98% - 100%)        CAPILLARY BLOOD GLUCOSE      POCT Blood Glucose.: 263 mg/dL (14 Dec 2021 12:32)      PHYSICAL EXAM:  General:   HEENT:   Lymph Nodes:  Neck:   Respiratory:   Cardiovascular:   Abdomen:   Extremities:   Skin:   Neurological:  Psychiatry:    LINES:     HOSPITAL MEDICATIONS:  Standing Meds:  amLODIPine   Tablet 10 milliGRAM(s) Oral daily  dextrose 40% Gel 15 Gram(s) Oral once  dextrose 5%. 1000 milliLiter(s) IV Continuous <Continuous>  dextrose 5%. 1000 milliLiter(s) IV Continuous <Continuous>  dextrose 50% Injectable 25 Gram(s) IV Push once  dextrose 50% Injectable 12.5 Gram(s) IV Push once  dextrose 50% Injectable 25 Gram(s) IV Push once  enalapril 2.5 milliGRAM(s) Oral daily  escitalopram 30 milliGRAM(s) Oral daily  gabapentin 400 milliGRAM(s) Oral three times a day  glucagon  Injectable 1 milliGRAM(s) IntraMuscular once  heparin   Injectable 5000 Unit(s) SubCutaneous every 8 hours  influenza  Vaccine (HIGH DOSE) 0.7 milliLiter(s) IntraMuscular once  insulin glargine Injectable (LANTUS) 56 Unit(s) SubCutaneous at bedtime  insulin lispro (ADMELOG) corrective regimen sliding scale   SubCutaneous three times a day before meals  levothyroxine 112 MICROGram(s) Oral daily  pantoprazole    Tablet 40 milliGRAM(s) Oral before breakfast  predniSONE   Tablet 10 milliGRAM(s) Oral two times a day  simvastatin 20 milliGRAM(s) Oral at bedtime  triamterene 37.5 mG/hydrochlorothiazide 25 mG Tablet 1 Tablet(s) Oral daily      PRN Meds:  melatonin 3 milliGRAM(s) Oral at bedtime PRN      LABS:                        9.2    7.96  )-----------( 245      ( 14 Dec 2021 08:15 )             28.7     Hgb Trend: 9.2<--, 8.9<--, 8.2<--  12-14    135  |  97<L>  |  21  ----------------------------<  163<H>  3.7   |  30  |  0.95    Ca    10.5      14 Dec 2021 08:15  Phos  3.3     12-14  Mg     1.70     12-14    TPro  5.9<L>  /  Alb  3.1<L>  /  TBili  0.5  /  DBili  x   /  AST  7   /  ALT  7   /  AlkPhos  70  12-14    Creatinine Trend: 0.95<--, 1.02<--        Venous Blood Gas:  12-13 @ 07:59  7.45/45/30/31/49.4  VBG Lactate: 1.1      MICROBIOLOGY:       RADIOLOGY:  [ ] Reviewed and interpreted by me    PULMONARY FUNCTION TESTS:    EKG: HPI:  65 yo F w/ PMHx of HTN, HLD, DM, fibromyalgia, Vulvar CA s/p vulva surgery in 2020, completed chemo & RT in 9/21, found to be anemic Hgb 8.2 so she received 1U PRBC on saturday, 12/11 prior to that received PRBC in 9/21 now p/w SO and dizziness for the last couple of weeks. Patient reports that she has been having worsening shortness of breath for ~2 weeks, at all times, even with rest, but dyspnea does get worse with ambulation and when lying flat. She also has bilateral flank pain and epigastric pain that gets worse with inspiration. She has daily cough, usually morning cough with small amounts of clear sputum. Denies any fevers, chills, abdominal pain. Denies any lower extremity pain or swelling and has had recent sonogram showing of her heart, reportedly normal.       PAST MEDICAL & SURGICAL HISTORY:  Lichen sclerosus of female genitalia    Vulva cancer  bilateral 2014 reoccurance 2017    HTN (hypertension)    TIA (transient ischemic attack)  8/2013  blurred vision/dizziness no treatment    DM type 2 (diabetes mellitus, type 2)  1995    Hypothyroid    Thalassemia minor    Strabismus    Fibromyalgia    HLD (hyperlipidemia)    Anxiety and depression    GERD (gastroesophageal reflux disease)    Obesity    S/P laparoscopic cholecystectomy  2000    S/P eye surgery  June 2014, for strabismus    Vulvar neoplasm  s/p radical anterior vulvectomy in 2014 with radical excision in 2017    H/O total knee replacement, right  2016        FAMILY HISTORY:  Family history of colon cancer (Aunt)    Family history of diabetes mellitus in mother (Aunt, Mother, Sibling)        SOCIAL HISTORY:  Smoking: [ ] Never Smoked [x] Former Smoker (1 packs x >30 years years) quit two years ago [ ] Current Smoker  (__ packs x ___ years)  Substance Use: [x] Never Used [ ] Used ____  EtOH Use: never    Allergies    No Known Allergies    Intolerances        HOME MEDICATIONS:  Home Medications:  amLODIPine 10 mg oral tablet: 1 tab(s) orally once a day (13 Dec 2021 19:09)  enalapril 2.5 mg oral tablet: 1 tab(s) orally once a day    **Per pharmacy, rx last dispensed November 2020 (13 Dec 2021 19:09)  escitalopram 20 mg oral tablet: 1.5 tabs (30 mg) orally once a day (13 Dec 2021 19:09)  gabapentin 400 mg oral capsule: 1 cap(s) orally 3 times a day    **Per pharmacy, most recent rx written for gabapentin 600 mg (1 tab orally three times daily) on 9/6/21 x 30 day supply (13 Dec 2021 19:09)  ibuprofen 600 mg oral tablet: 1 tab(s) orally every 6 hours, As Needed - for mild pain (13 Dec 2021 19:09)  levothyroxine 112 mcg (0.112 mg) oral tablet: 1 tab(s) orally once a day (13 Dec 2021 19:09)  NovoLOG 100 units/mL subcutaneous solution: 15 - 20 unit(s) subcutaneous 3 times a day (with meals)  (13 Dec 2021 19:09)  omeprazole 40 mg oral delayed release capsule: 1 cap(s) orally once a day (13 Dec 2021 19:09)  predniSONE 10 mg oral tablet: 1 tab(s) orally 2 times a day  (13 Dec 2021 19:09)  prochlorperazine 10 mg oral tablet: 1 tab(s) orally , As Needed for Nausea (13 Dec 2021 19:09)  simvastatin 20 mg oral tablet: 1 tab(s) orally once a day (at bedtime)    **Per pharmacy, rx filled 3/26/21 x 30 day supply (13 Dec 2021 19:09)  Tresiba FlexTouch 200 units/mL subcutaneous solution: 70 unit(s) subcutaneous once a day (at bedtime) (13 Dec 2021 19:09)  triamterene-hydrochlorothiazide 37.5 mg-25 mg oral tablet: 1 tab(s) orally once a day (13 Dec 2021 19:09)  Victoza 18 mg/3 mL subcutaneous solution: 1.8 milligram(s) subcutaneous once a day    **Per pharmacy, rx filled 11/3/21 x 30 day supply (13 Dec 2021 19:09)      REVIEW OF SYSTEMS:  Constitutional: [ ] negative [ ] fevers [ ] chills [ ] weight loss [ ] weight gain  HEENT: [ ] negative [ ] dry eyes [ ] eye irritation [ ] postnasal drip [ ] nasal congestion  CV: [ ] negative  [ ] chest pain [ ] orthopnea [ ] palpitations [ ] murmur  Resp: [ ] negative [ ] cough [ ] shortness of breath [ ] dyspnea [ ] wheezing [ ] sputum [ ] hemoptysis  GI: [ ] negative [ ] nausea [ ] vomiting [ ] diarrhea [ ] constipation [ ] abd pain [ ] dysphagia   : [ ] negative [ ] dysuria [ ] nocturia [ ] hematuria [ ] increased urinary frequency  Musculoskeletal: [ ] negative [ ] back pain [ ] myalgias [ ] arthralgias [ ] fracture  Skin: [ ] negative [ ] rash [ ] itch  Neurological: [ ] negative [ ] headache [ ] dizziness [ ] syncope [ ] weakness [ ] numbness  Psychiatric: [ ] negative [ ] anxiety [ ] depression  Endocrine: [ ] negative [ ] diabetes [ ] thyroid problem  Hematologic/Lymphatic: [ ] negative [ ] anemia [ ] bleeding problem  Allergic/Immunologic: [ ] negative [ ] itchy eyes [ ] nasal discharge [ ] hives [ ] angioedema  [ ] All other systems negative  [ ] Unable to assess ROS because ________    OBJECTIVE:  ICU Vital Signs Last 24 Hrs  T(C): 37.1 (14 Dec 2021 12:36), Max: 37.7 (13 Dec 2021 18:15)  T(F): 98.8 (14 Dec 2021 12:36), Max: 99.9 (13 Dec 2021 18:15)  HR: 89 (14 Dec 2021 12:36) (89 - 110)  BP: 105/51 (14 Dec 2021 12:36) (105/51 - 158/66)  BP(mean): --  ABP: --  ABP(mean): --  RR: 18 (14 Dec 2021 12:36) (17 - 18)  SpO2: 98% (14 Dec 2021 12:36) (98% - 100%)        CAPILLARY BLOOD GLUCOSE      POCT Blood Glucose.: 263 mg/dL (14 Dec 2021 12:32)      PHYSICAL EXAM:  General:   HEENT:   Lymph Nodes:  Neck:   Respiratory:   Cardiovascular:   Abdomen:   Extremities:   Skin:   Neurological:  Psychiatry:    LINES:     HOSPITAL MEDICATIONS:  Standing Meds:  amLODIPine   Tablet 10 milliGRAM(s) Oral daily  dextrose 40% Gel 15 Gram(s) Oral once  dextrose 5%. 1000 milliLiter(s) IV Continuous <Continuous>  dextrose 5%. 1000 milliLiter(s) IV Continuous <Continuous>  dextrose 50% Injectable 25 Gram(s) IV Push once  dextrose 50% Injectable 12.5 Gram(s) IV Push once  dextrose 50% Injectable 25 Gram(s) IV Push once  enalapril 2.5 milliGRAM(s) Oral daily  escitalopram 30 milliGRAM(s) Oral daily  gabapentin 400 milliGRAM(s) Oral three times a day  glucagon  Injectable 1 milliGRAM(s) IntraMuscular once  heparin   Injectable 5000 Unit(s) SubCutaneous every 8 hours  influenza  Vaccine (HIGH DOSE) 0.7 milliLiter(s) IntraMuscular once  insulin glargine Injectable (LANTUS) 56 Unit(s) SubCutaneous at bedtime  insulin lispro (ADMELOG) corrective regimen sliding scale   SubCutaneous three times a day before meals  levothyroxine 112 MICROGram(s) Oral daily  pantoprazole    Tablet 40 milliGRAM(s) Oral before breakfast  predniSONE   Tablet 10 milliGRAM(s) Oral two times a day  simvastatin 20 milliGRAM(s) Oral at bedtime  triamterene 37.5 mG/hydrochlorothiazide 25 mG Tablet 1 Tablet(s) Oral daily      PRN Meds:  melatonin 3 milliGRAM(s) Oral at bedtime PRN      LABS:                        9.2    7.96  )-----------( 245      ( 14 Dec 2021 08:15 )             28.7     Hgb Trend: 9.2<--, 8.9<--, 8.2<--  12-14    135  |  97<L>  |  21  ----------------------------<  163<H>  3.7   |  30  |  0.95    Ca    10.5      14 Dec 2021 08:15  Phos  3.3     12-14  Mg     1.70     12-14    TPro  5.9<L>  /  Alb  3.1<L>  /  TBili  0.5  /  DBili  x   /  AST  7   /  ALT  7   /  AlkPhos  70  12-14    Creatinine Trend: 0.95<--, 1.02<--        Venous Blood Gas:  12-13 @ 07:59  7.45/45/30/31/49.4  VBG Lactate: 1.1      MICROBIOLOGY:       RADIOLOGY:  [ ] Reviewed and interpreted by me    PULMONARY FUNCTION TESTS:    EKG: HPI:  65 yo F w/ PMHx of HTN, HLD, DM, fibromyalgia, Vulvar CA s/p vulva surgery in 2020, completed chemo & RT in 9/21, found to be anemic Hgb 8.2 so she received 1U PRBC on saturday, 12/11 prior to that received PRBC in 9/21 now p/w SO and dizziness for the last couple of weeks. Patient reports that she has been having worsening shortness of breath for ~2 weeks, at all times, even with rest, but dyspnea does get worse with ambulation and when lying flat. She also has bilateral flank pain and epigastric pain that gets worse with inspiration. She has daily cough, usually morning cough with small amounts of clear sputum. Denies any fevers, chills, abdominal pain. Denies any lower extremity pain or swelling and has had recent sonogram showing of her heart, reportedly normal.       PAST MEDICAL & SURGICAL HISTORY:  Lichen sclerosus of female genitalia    Vulva cancer  bilateral 2014 reoccurance 2017    HTN (hypertension)    TIA (transient ischemic attack)  8/2013  blurred vision/dizziness no treatment    DM type 2 (diabetes mellitus, type 2)  1995    Hypothyroid    Thalassemia minor    Strabismus    Fibromyalgia    HLD (hyperlipidemia)    Anxiety and depression    GERD (gastroesophageal reflux disease)    Obesity    S/P laparoscopic cholecystectomy  2000    S/P eye surgery  June 2014, for strabismus    Vulvar neoplasm  s/p radical anterior vulvectomy in 2014 with radical excision in 2017    H/O total knee replacement, right  2016        FAMILY HISTORY:  Family history of colon cancer (Aunt)    Family history of diabetes mellitus in mother (Aunt, Mother, Sibling)        SOCIAL HISTORY:  Smoking: [ ] Never Smoked [x] Former Smoker (1 packs x >30 years years) quit two years ago [ ] Current Smoker  (__ packs x ___ years)  Substance Use: [x] Never Used [ ] Used ____  EtOH Use: never    Allergies    No Known Allergies    Intolerances        HOME MEDICATIONS:  Home Medications:  amLODIPine 10 mg oral tablet: 1 tab(s) orally once a day (13 Dec 2021 19:09)  enalapril 2.5 mg oral tablet: 1 tab(s) orally once a day    **Per pharmacy, rx last dispensed November 2020 (13 Dec 2021 19:09)  escitalopram 20 mg oral tablet: 1.5 tabs (30 mg) orally once a day (13 Dec 2021 19:09)  gabapentin 400 mg oral capsule: 1 cap(s) orally 3 times a day    **Per pharmacy, most recent rx written for gabapentin 600 mg (1 tab orally three times daily) on 9/6/21 x 30 day supply (13 Dec 2021 19:09)  ibuprofen 600 mg oral tablet: 1 tab(s) orally every 6 hours, As Needed - for mild pain (13 Dec 2021 19:09)  levothyroxine 112 mcg (0.112 mg) oral tablet: 1 tab(s) orally once a day (13 Dec 2021 19:09)  NovoLOG 100 units/mL subcutaneous solution: 15 - 20 unit(s) subcutaneous 3 times a day (with meals)  (13 Dec 2021 19:09)  omeprazole 40 mg oral delayed release capsule: 1 cap(s) orally once a day (13 Dec 2021 19:09)  predniSONE 10 mg oral tablet: 1 tab(s) orally 2 times a day  (13 Dec 2021 19:09)  prochlorperazine 10 mg oral tablet: 1 tab(s) orally , As Needed for Nausea (13 Dec 2021 19:09)  simvastatin 20 mg oral tablet: 1 tab(s) orally once a day (at bedtime)    **Per pharmacy, rx filled 3/26/21 x 30 day supply (13 Dec 2021 19:09)  Tresiba FlexTouch 200 units/mL subcutaneous solution: 70 unit(s) subcutaneous once a day (at bedtime) (13 Dec 2021 19:09)  triamterene-hydrochlorothiazide 37.5 mg-25 mg oral tablet: 1 tab(s) orally once a day (13 Dec 2021 19:09)  Victoza 18 mg/3 mL subcutaneous solution: 1.8 milligram(s) subcutaneous once a day    **Per pharmacy, rx filled 11/3/21 x 30 day supply (13 Dec 2021 19:09)      REVIEW OF SYSTEMS:  Constitutional: [x] negative [ ] fevers [ ] chills [ ] weight loss [ ] weight gain  HEENT: [x] negative [ ] dry eyes [ ] eye irritation [ ] postnasal drip [ ] nasal congestion  CV: [x] negative  [ ] chest pain [ ] orthopnea [ ] palpitations [ ] murmur  Resp: [ ] negative [x] cough [x] shortness of breath [x] dyspnea [ ] wheezing [x] sputum [ ] hemoptysis  GI: [ ] negative [x] nausea [ ] vomiting [ ] diarrhea [ ] constipation [x] abd pain, epigastric in nature, worse with inspiration [ ] dysphagia   : [x] negative [ ] dysuria [ ] nocturia [ ] hematuria [ ] increased urinary frequency  Musculoskeletal: [x] negative [ ] back pain [ ] myalgias [ ] arthralgias [ ] fracture  Skin: [x] negative [ ] rash [ ] itch  Neurological: [ ] negative [ ] headache [x] dizziness [ ] syncope [ ] weakness [ ] numbness  Psychiatric: [x] negative [ ] anxiety [ ] depression  Endocrine: [x] negative [ ] diabetes [ ] thyroid problem  [x] All other systems negative  [ ] Unable to assess ROS because ________    OBJECTIVE:  ICU Vital Signs Last 24 Hrs  T(C): 37.1 (14 Dec 2021 12:36), Max: 37.7 (13 Dec 2021 18:15)  T(F): 98.8 (14 Dec 2021 12:36), Max: 99.9 (13 Dec 2021 18:15)  HR: 89 (14 Dec 2021 12:36) (89 - 110)  BP: 105/51 (14 Dec 2021 12:36) (105/51 - 158/66)  BP(mean): --  ABP: --  ABP(mean): --  RR: 18 (14 Dec 2021 12:36) (17 - 18)  SpO2: 98% (14 Dec 2021 12:36) (98% - 100%)        CAPILLARY BLOOD GLUCOSE      POCT Blood Glucose.: 263 mg/dL (14 Dec 2021 12:32)      PHYSICAL EXAM:  General: resting in bed comfortably, non-toxic appearing  HEENT: perrla   Neck: supple  Respiratory: decreased breath sounds in L lung up to two-thirds of the way up, R lung clear to auscultation b/l, no rales, rhonchi, wheezing  Cardiovascular: normal S1, s2, RRR  Abdomen: soft non-tender, non-distended  Extremities: no edema  Skin: intact  Neurological: intact, A&Ox4    LINES:     HOSPITAL MEDICATIONS:  Standing Meds:  amLODIPine   Tablet 10 milliGRAM(s) Oral daily  dextrose 40% Gel 15 Gram(s) Oral once  dextrose 5%. 1000 milliLiter(s) IV Continuous <Continuous>  dextrose 5%. 1000 milliLiter(s) IV Continuous <Continuous>  dextrose 50% Injectable 25 Gram(s) IV Push once  dextrose 50% Injectable 12.5 Gram(s) IV Push once  dextrose 50% Injectable 25 Gram(s) IV Push once  enalapril 2.5 milliGRAM(s) Oral daily  escitalopram 30 milliGRAM(s) Oral daily  gabapentin 400 milliGRAM(s) Oral three times a day  glucagon  Injectable 1 milliGRAM(s) IntraMuscular once  heparin   Injectable 5000 Unit(s) SubCutaneous every 8 hours  influenza  Vaccine (HIGH DOSE) 0.7 milliLiter(s) IntraMuscular once  insulin glargine Injectable (LANTUS) 56 Unit(s) SubCutaneous at bedtime  insulin lispro (ADMELOG) corrective regimen sliding scale   SubCutaneous three times a day before meals  levothyroxine 112 MICROGram(s) Oral daily  pantoprazole    Tablet 40 milliGRAM(s) Oral before breakfast  predniSONE   Tablet 10 milliGRAM(s) Oral two times a day  simvastatin 20 milliGRAM(s) Oral at bedtime  triamterene 37.5 mG/hydrochlorothiazide 25 mG Tablet 1 Tablet(s) Oral daily      PRN Meds:  melatonin 3 milliGRAM(s) Oral at bedtime PRN      LABS:                        9.2    7.96  )-----------( 245      ( 14 Dec 2021 08:15 )             28.7     Hgb Trend: 9.2<--, 8.9<--, 8.2<--  12-14    135  |  97<L>  |  21  ----------------------------<  163<H>  3.7   |  30  |  0.95    Ca    10.5      14 Dec 2021 08:15  Phos  3.3     12-14  Mg     1.70     12-14    TPro  5.9<L>  /  Alb  3.1<L>  /  TBili  0.5  /  DBili  x   /  AST  7   /  ALT  7   /  AlkPhos  70  12-14    Creatinine Trend: 0.95<--, 1.02<--        Venous Blood Gas:  12-13 @ 07:59  7.45/45/30/31/49.4  VBG Lactate: 1.1      MICROBIOLOGY:       RADIOLOGY:  < from: CT Angio Chest PE Protocol w/ IV Cont (12.13.21 @ 12:01) >  IMPRESSION: No pulmonary embolus is noted.    Multiple nodules are noted within both lungs. Primary consideration is   metastasis.    Findings suggestive of malignant pleural effusions bilaterally.    --- End of Report ---    < end of copied text >   HPI:  65 yo F w/ PMHx of HTN, HLD, DM, fibromyalgia, Vulvar CA s/p vulva surgery in 2020, completed chemo & RT in 9/21, found to be anemic Hgb 8.2 so she received 1U PRBC on saturday, 12/11 prior to that received PRBC in 9/21 now p/w SO and dizziness for the last couple of weeks. Patient reports that she has been having worsening shortness of breath for ~2 weeks, at all times, even with rest, but dyspnea does get worse with ambulation and when lying flat. She also has bilateral flank pain and epigastric pain that gets worse with inspiration. She has daily cough, usually morning cough with small amounts of clear sputum. Denies any fevers, chills, abdominal pain. Denies any lower extremity pain or swelling and has had recent sonogram showing of her heart, reportedly normal.       PAST MEDICAL & SURGICAL HISTORY:  Lichen sclerosus of female genitalia    Vulva cancer  bilateral 2014 reoccurance 2017    HTN (hypertension)    TIA (transient ischemic attack)  8/2013  blurred vision/dizziness no treatment    DM type 2 (diabetes mellitus, type 2)  1995    Hypothyroid    Thalassemia minor    Strabismus    Fibromyalgia    HLD (hyperlipidemia)    Anxiety and depression    GERD (gastroesophageal reflux disease)    Obesity    S/P laparoscopic cholecystectomy  2000    S/P eye surgery  June 2014, for strabismus    Vulvar neoplasm  s/p radical anterior vulvectomy in 2014 with radical excision in 2017    H/O total knee replacement, right  2016        FAMILY HISTORY:  Family history of colon cancer (Aunt)    Family history of diabetes mellitus in mother (Aunt, Mother, Sibling)        SOCIAL HISTORY:  Smoking: [ ] Never Smoked [x] Former Smoker (1 packs x >30 years years) quit two years ago [ ] Current Smoker  (__ packs x ___ years)  Substance Use: [x] Never Used [ ] Used ____  EtOH Use: never    Allergies    No Known Allergies    Intolerances        HOME MEDICATIONS:  Home Medications:  amLODIPine 10 mg oral tablet: 1 tab(s) orally once a day (13 Dec 2021 19:09)  enalapril 2.5 mg oral tablet: 1 tab(s) orally once a day    **Per pharmacy, rx last dispensed November 2020 (13 Dec 2021 19:09)  escitalopram 20 mg oral tablet: 1.5 tabs (30 mg) orally once a day (13 Dec 2021 19:09)  gabapentin 400 mg oral capsule: 1 cap(s) orally 3 times a day    **Per pharmacy, most recent rx written for gabapentin 600 mg (1 tab orally three times daily) on 9/6/21 x 30 day supply (13 Dec 2021 19:09)  ibuprofen 600 mg oral tablet: 1 tab(s) orally every 6 hours, As Needed - for mild pain (13 Dec 2021 19:09)  levothyroxine 112 mcg (0.112 mg) oral tablet: 1 tab(s) orally once a day (13 Dec 2021 19:09)  NovoLOG 100 units/mL subcutaneous solution: 15 - 20 unit(s) subcutaneous 3 times a day (with meals)  (13 Dec 2021 19:09)  omeprazole 40 mg oral delayed release capsule: 1 cap(s) orally once a day (13 Dec 2021 19:09)  predniSONE 10 mg oral tablet: 1 tab(s) orally 2 times a day  (13 Dec 2021 19:09)  prochlorperazine 10 mg oral tablet: 1 tab(s) orally , As Needed for Nausea (13 Dec 2021 19:09)  simvastatin 20 mg oral tablet: 1 tab(s) orally once a day (at bedtime)    **Per pharmacy, rx filled 3/26/21 x 30 day supply (13 Dec 2021 19:09)  Tresiba FlexTouch 200 units/mL subcutaneous solution: 70 unit(s) subcutaneous once a day (at bedtime) (13 Dec 2021 19:09)  triamterene-hydrochlorothiazide 37.5 mg-25 mg oral tablet: 1 tab(s) orally once a day (13 Dec 2021 19:09)  Victoza 18 mg/3 mL subcutaneous solution: 1.8 milligram(s) subcutaneous once a day    **Per pharmacy, rx filled 11/3/21 x 30 day supply (13 Dec 2021 19:09)      REVIEW OF SYSTEMS:  Constitutional: [x] negative [ ] fevers [ ] chills [ ] weight loss [ ] weight gain  HEENT: [x] negative [ ] dry eyes [ ] eye irritation [ ] postnasal drip [ ] nasal congestion  CV: [x] negative  [ ] chest pain [ ] orthopnea [ ] palpitations [ ] murmur  Resp: [ ] negative [x] cough [x] shortness of breath [x] dyspnea [ ] wheezing [x] sputum [ ] hemoptysis  GI: [ ] negative [x] nausea [ ] vomiting [ ] diarrhea [ ] constipation [x] abd pain, epigastric in nature, worse with inspiration [ ] dysphagia   : [x] negative [ ] dysuria [ ] nocturia [ ] hematuria [ ] increased urinary frequency  Musculoskeletal: [x] negative [ ] back pain [ ] myalgias [ ] arthralgias [ ] fracture  Skin: [x] negative [ ] rash [ ] itch  Neurological: [ ] negative [ ] headache [x] dizziness [ ] syncope [ ] weakness [ ] numbness  Psychiatric: [x] negative [ ] anxiety [ ] depression  Endocrine: [x] negative [ ] diabetes [ ] thyroid problem  [x] All other systems negative  [ ] Unable to assess ROS because ________    OBJECTIVE:  ICU Vital Signs Last 24 Hrs  T(C): 37.1 (14 Dec 2021 12:36), Max: 37.7 (13 Dec 2021 18:15)  T(F): 98.8 (14 Dec 2021 12:36), Max: 99.9 (13 Dec 2021 18:15)  HR: 89 (14 Dec 2021 12:36) (89 - 110)  BP: 105/51 (14 Dec 2021 12:36) (105/51 - 158/66)  BP(mean): --  ABP: --  ABP(mean): --  RR: 18 (14 Dec 2021 12:36) (17 - 18)  SpO2: 98% (14 Dec 2021 12:36) (98% - 100%)        CAPILLARY BLOOD GLUCOSE      POCT Blood Glucose.: 263 mg/dL (14 Dec 2021 12:32)      PHYSICAL EXAM:  General: resting in bed comfortably, non-toxic appearing  HEENT: perrla   Neck: supple  Respiratory: decreased breath sounds in L lung up to two-thirds of the way up, R lung clear to auscultation b/l, no rales, rhonchi, wheezing  Cardiovascular: normal S1, s2, RRR  Abdomen: soft non-tender, non-distended  Extremities: no edema  Skin: intact  Neurological: intact, A&Ox4    LINES:     HOSPITAL MEDICATIONS:  Standing Meds:  amLODIPine   Tablet 10 milliGRAM(s) Oral daily  dextrose 40% Gel 15 Gram(s) Oral once  dextrose 5%. 1000 milliLiter(s) IV Continuous <Continuous>  dextrose 5%. 1000 milliLiter(s) IV Continuous <Continuous>  dextrose 50% Injectable 25 Gram(s) IV Push once  dextrose 50% Injectable 12.5 Gram(s) IV Push once  dextrose 50% Injectable 25 Gram(s) IV Push once  enalapril 2.5 milliGRAM(s) Oral daily  escitalopram 30 milliGRAM(s) Oral daily  gabapentin 400 milliGRAM(s) Oral three times a day  glucagon  Injectable 1 milliGRAM(s) IntraMuscular once  heparin   Injectable 5000 Unit(s) SubCutaneous every 8 hours  influenza  Vaccine (HIGH DOSE) 0.7 milliLiter(s) IntraMuscular once  insulin glargine Injectable (LANTUS) 56 Unit(s) SubCutaneous at bedtime  insulin lispro (ADMELOG) corrective regimen sliding scale   SubCutaneous three times a day before meals  levothyroxine 112 MICROGram(s) Oral daily  pantoprazole    Tablet 40 milliGRAM(s) Oral before breakfast  predniSONE   Tablet 10 milliGRAM(s) Oral two times a day  simvastatin 20 milliGRAM(s) Oral at bedtime  triamterene 37.5 mG/hydrochlorothiazide 25 mG Tablet 1 Tablet(s) Oral daily      PRN Meds:  melatonin 3 milliGRAM(s) Oral at bedtime PRN      LABS:                        9.2    7.96  )-----------( 245      ( 14 Dec 2021 08:15 )             28.7     Hgb Trend: 9.2<--, 8.9<--, 8.2<--  12-14    135  |  97<L>  |  21  ----------------------------<  163<H>  3.7   |  30  |  0.95    Ca    10.5      14 Dec 2021 08:15  Phos  3.3     12-14  Mg     1.70     12-14    TPro  5.9<L>  /  Alb  3.1<L>  /  TBili  0.5  /  DBili  x   /  AST  7   /  ALT  7   /  AlkPhos  70  12-14    Creatinine Trend: 0.95<--, 1.02<--        Venous Blood Gas:  12-13 @ 07:59  7.45/45/30/31/49.4  VBG Lactate: 1.1      MICROBIOLOGY:       RADIOLOGY:  < from: CT Angio Chest PE Protocol w/ IV Cont (12.13.21 @ 12:01) >  Several small lymph nodes are present in the pretracheal space and the AP   window.    Heart is normal in size. Calcification the coronary arteries is noted. No   pericardial effusion is noted. Pulmonary arteries are normal in caliber.   No filling defects are noted.    No endobronchial lesions are noted. Multiple nodules are noted within   both lungs. The largest one is noted in the right lower lobe and measures   approximately 2.6 x 1.5 cm. Small size loculated pleural effusions are   noted bilaterally, left more than right. Thickening/nodularity is noted   involving the pleura bilaterally.    Below the diaphragm, visualized portions of the abdomen demonstrates 2.4   cm right adrenal gland nodule which is unchanged when compared to   previous exam. Small low-attenuation lesions in the left kidney are too   small to be adequately characterized on this exam. Patient is status post   cholecystectomy.    Degenerative changes of the spine are noted.    < end of copied text >   HPI:  67 yo F w/ PMHx of HTN, HLD, DM, fibromyalgia, Vulvar CA s/p vulva surgery in 2020, completed chemo & RT in 9/21, found to be anemic Hgb 8.2 so she received 1U PRBC on saturday, 12/11 prior to that received PRBC in 9/21 now p/w SO and dizziness for the last couple of weeks. Patient reports that she has been having worsening shortness of breath for ~2 weeks, at all times, even with rest, but dyspnea does get worse with ambulation and when lying flat. She also has bilateral flank pain and epigastric pain that gets worse with inspiration. She has daily cough, usually morning cough with small amounts of clear sputum. Denies any fevers, chills, abdominal pain. Denies any lower extremity pain or swelling and has had recent sonogram showing of her heart, reportedly normal.       PAST MEDICAL & SURGICAL HISTORY:  Lichen sclerosus of female genitalia Vulva cancer  bilateral 2014 reoccurance 2017  HTN (hypertension)  TIA (transient ischemic attack)  8/2013  blurred vision/dizziness no treatment  DM type 2 (diabetes mellitus, type 2) 1995  Hypothyroid  Thalassemia minor  Strabismus  Fibromyalgia  HLD (hyperlipidemia)  Anxiety and depression  GERD (gastroesophageal reflux disease)  Obesity  S/P laparoscopic cholecystectomy 2000  S/P eye surgery June 2014, for strabismus  Vulvar neoplasm s/p radical anterior vulvectomy in 2014 with radical excision in 2017  H/O total knee replacement, right 2016    FAMILY HISTORY:  Family history of colon cancer (Aunt)    Family history of diabetes mellitus in mother (Aunt, Mother, Sibling)        SOCIAL HISTORY:  Smoking: [ ] Never Smoked [x] Former Smoker (1 packs x >30 years years) quit two years ago [ ] Current Smoker  (__ packs x ___ years)  Substance Use: [x] Never Used [ ] Used ____  EtOH Use: never    Allergies    No Known Allergies    Intolerances        HOME MEDICATIONS:  Home Medications:  amLODIPine 10 mg oral tablet: 1 tab(s) orally once a day (13 Dec 2021 19:09)  enalapril 2.5 mg oral tablet: 1 tab(s) orally once a day    **Per pharmacy, rx last dispensed November 2020 (13 Dec 2021 19:09)  escitalopram 20 mg oral tablet: 1.5 tabs (30 mg) orally once a day (13 Dec 2021 19:09)  gabapentin 400 mg oral capsule: 1 cap(s) orally 3 times a day    **Per pharmacy, most recent rx written for gabapentin 600 mg (1 tab orally three times daily) on 9/6/21 x 30 day supply (13 Dec 2021 19:09)  ibuprofen 600 mg oral tablet: 1 tab(s) orally every 6 hours, As Needed - for mild pain (13 Dec 2021 19:09)  levothyroxine 112 mcg (0.112 mg) oral tablet: 1 tab(s) orally once a day (13 Dec 2021 19:09)  NovoLOG 100 units/mL subcutaneous solution: 15 - 20 unit(s) subcutaneous 3 times a day (with meals)  (13 Dec 2021 19:09)  omeprazole 40 mg oral delayed release capsule: 1 cap(s) orally once a day (13 Dec 2021 19:09)  predniSONE 10 mg oral tablet: 1 tab(s) orally 2 times a day  (13 Dec 2021 19:09)  prochlorperazine 10 mg oral tablet: 1 tab(s) orally , As Needed for Nausea (13 Dec 2021 19:09)  simvastatin 20 mg oral tablet: 1 tab(s) orally once a day (at bedtime)    **Per pharmacy, rx filled 3/26/21 x 30 day supply (13 Dec 2021 19:09)  Tresiba FlexTouch 200 units/mL subcutaneous solution: 70 unit(s) subcutaneous once a day (at bedtime) (13 Dec 2021 19:09)  triamterene-hydrochlorothiazide 37.5 mg-25 mg oral tablet: 1 tab(s) orally once a day (13 Dec 2021 19:09)  Victoza 18 mg/3 mL subcutaneous solution: 1.8 milligram(s) subcutaneous once a day    **Per pharmacy, rx filled 11/3/21 x 30 day supply (13 Dec 2021 19:09)      REVIEW OF SYSTEMS:  Constitutional: [x] negative [ ] fevers [ ] chills [ ] weight loss [ ] weight gain  HEENT: [x] negative [ ] dry eyes [ ] eye irritation [ ] postnasal drip [ ] nasal congestion  CV: [x] negative  [ ] chest pain [ ] orthopnea [ ] palpitations [ ] murmur  Resp: [ ] negative [x] cough [x] shortness of breath [x] dyspnea [ ] wheezing [x] sputum [ ] hemoptysis  GI: [ ] negative [x] nausea [ ] vomiting [ ] diarrhea [ ] constipation [x] abd pain, epigastric in nature, worse with inspiration [ ] dysphagia   : [x] negative [ ] dysuria [ ] nocturia [ ] hematuria [ ] increased urinary frequency  Musculoskeletal: [x] negative [ ] back pain [ ] myalgias [ ] arthralgias [ ] fracture  Skin: [x] negative [ ] rash [ ] itch  Neurological: [ ] negative [ ] headache [x] dizziness [ ] syncope [ ] weakness [ ] numbness  Psychiatric: [x] negative [ ] anxiety [ ] depression  Endocrine: [x] negative [ ] diabetes [ ] thyroid problem  [x] All other systems negative  [ ] Unable to assess ROS because ________    OBJECTIVE:  ICU Vital Signs Last 24 Hrs  T(C): 37.1 (14 Dec 2021 12:36), Max: 37.7 (13 Dec 2021 18:15)  T(F): 98.8 (14 Dec 2021 12:36), Max: 99.9 (13 Dec 2021 18:15)  HR: 89 (14 Dec 2021 12:36) (89 - 110)  BP: 105/51 (14 Dec 2021 12:36) (105/51 - 158/66)  BP(mean): --  ABP: --  ABP(mean): --  RR: 18 (14 Dec 2021 12:36) (17 - 18)  SpO2: 98% (14 Dec 2021 12:36) (98% - 100%)        CAPILLARY BLOOD GLUCOSE      POCT Blood Glucose.: 263 mg/dL (14 Dec 2021 12:32)      PHYSICAL EXAM:  General: resting in bed comfortably, non-toxic appearing  HEENT: perrla   Neck: supple  Respiratory: decreased breath sounds in L lung up to two-thirds of the way up, R lung clear to auscultation b/l, no rales, rhonchi, wheezing  Cardiovascular: normal S1, s2, RRR  Abdomen: soft non-tender, non-distended  Extremities: no edema  Skin: intact  Neurological: intact, A&Ox4    LINES:     HOSPITAL MEDICATIONS:  Standing Meds:  amLODIPine   Tablet 10 milliGRAM(s) Oral daily  dextrose 40% Gel 15 Gram(s) Oral once  dextrose 5%. 1000 milliLiter(s) IV Continuous <Continuous>  dextrose 5%. 1000 milliLiter(s) IV Continuous <Continuous>  dextrose 50% Injectable 25 Gram(s) IV Push once  dextrose 50% Injectable 12.5 Gram(s) IV Push once  dextrose 50% Injectable 25 Gram(s) IV Push once  enalapril 2.5 milliGRAM(s) Oral daily  escitalopram 30 milliGRAM(s) Oral daily  gabapentin 400 milliGRAM(s) Oral three times a day  glucagon  Injectable 1 milliGRAM(s) IntraMuscular once  heparin   Injectable 5000 Unit(s) SubCutaneous every 8 hours  influenza  Vaccine (HIGH DOSE) 0.7 milliLiter(s) IntraMuscular once  insulin glargine Injectable (LANTUS) 56 Unit(s) SubCutaneous at bedtime  insulin lispro (ADMELOG) corrective regimen sliding scale   SubCutaneous three times a day before meals  levothyroxine 112 MICROGram(s) Oral daily  pantoprazole    Tablet 40 milliGRAM(s) Oral before breakfast  predniSONE   Tablet 10 milliGRAM(s) Oral two times a day  simvastatin 20 milliGRAM(s) Oral at bedtime  triamterene 37.5 mG/hydrochlorothiazide 25 mG Tablet 1 Tablet(s) Oral daily      PRN Meds:  melatonin 3 milliGRAM(s) Oral at bedtime PRN      LABS:                        9.2    7.96  )-----------( 245      ( 14 Dec 2021 08:15 )             28.7     Hgb Trend: 9.2<--, 8.9<--, 8.2<--  12-14    135  |  97<L>  |  21  ----------------------------<  163<H>  3.7   |  30  |  0.95    Ca    10.5      14 Dec 2021 08:15  Phos  3.3     12-14  Mg     1.70     12-14    TPro  5.9<L>  /  Alb  3.1<L>  /  TBili  0.5  /  DBili  x   /  AST  7   /  ALT  7   /  AlkPhos  70  12-14    Creatinine Trend: 0.95<--, 1.02<--        Venous Blood Gas:  12-13 @ 07:59  7.45/45/30/31/49.4  VBG Lactate: 1.1      MICROBIOLOGY:       RADIOLOGY:  < from: CT Angio Chest PE Protocol w/ IV Cont (12.13.21 @ 12:01) >  Several small lymph nodes are present in the pretracheal space and the AP   window.    Heart is normal in size. Calcification the coronary arteries is noted. No   pericardial effusion is noted. Pulmonary arteries are normal in caliber.   No filling defects are noted.    No endobronchial lesions are noted. Multiple nodules are noted within   both lungs. The largest one is noted in the right lower lobe and measures   approximately 2.6 x 1.5 cm. Small size loculated pleural effusions are   noted bilaterally, left more than right. Thickening/nodularity is noted   involving the pleura bilaterally.    Below the diaphragm, visualized portions of the abdomen demonstrates 2.4   cm right adrenal gland nodule which is unchanged when compared to   previous exam. Small low-attenuation lesions in the left kidney are too   small to be adequately characterized on this exam. Patient is status post   cholecystectomy.    Degenerative changes of the spine are noted.    < end of copied text >

## 2021-12-14 NOTE — CHART NOTE - NSCHARTNOTEFT_GEN_A_CORE
pulm nodules/pleural effusion: pulm following:- Agree with proceeding with diagnostic and therapeutic thoracentesis.: approved byDr. Meyer:1st thoracentesi and ct abdomen and pelvis then will determine which area is best to biopsy****************

## 2021-12-14 NOTE — PROGRESS NOTE ADULT - SUBJECTIVE AND OBJECTIVE BOX
Patient is a 66y old  Female who presents with a chief complaint of SOB dizziness & nausea x couple of weeks (14 Dec 2021 14:21)      SUBJECTIVE / OVERNIGHT EVENTS: Pt seen and examined at 10:50am, no overnight events, pt reports sob, on nasal canula oxygen, denies any chest pain, reports upper abdominal pain, dizziness upon standing and nausea but no vomiting. No other new issues reported.    MEDICATIONS  (STANDING):  amLODIPine   Tablet 10 milliGRAM(s) Oral daily  dextrose 40% Gel 15 Gram(s) Oral once  dextrose 5%. 1000 milliLiter(s) (50 mL/Hr) IV Continuous <Continuous>  dextrose 5%. 1000 milliLiter(s) (100 mL/Hr) IV Continuous <Continuous>  dextrose 50% Injectable 25 Gram(s) IV Push once  dextrose 50% Injectable 12.5 Gram(s) IV Push once  dextrose 50% Injectable 25 Gram(s) IV Push once  enalapril 2.5 milliGRAM(s) Oral daily  escitalopram 30 milliGRAM(s) Oral daily  gabapentin 400 milliGRAM(s) Oral three times a day  glucagon  Injectable 1 milliGRAM(s) IntraMuscular once  heparin   Injectable 5000 Unit(s) SubCutaneous every 8 hours  influenza  Vaccine (HIGH DOSE) 0.7 milliLiter(s) IntraMuscular once  insulin glargine Injectable (LANTUS) 56 Unit(s) SubCutaneous at bedtime  insulin lispro (ADMELOG) corrective regimen sliding scale   SubCutaneous three times a day before meals  levothyroxine 112 MICROGram(s) Oral daily  pantoprazole    Tablet 40 milliGRAM(s) Oral before breakfast  predniSONE   Tablet 10 milliGRAM(s) Oral two times a day  simvastatin 20 milliGRAM(s) Oral at bedtime  triamterene 37.5 mG/hydrochlorothiazide 25 mG Tablet 1 Tablet(s) Oral daily    MEDICATIONS  (PRN):  melatonin 3 milliGRAM(s) Oral at bedtime PRN Insomnia      Vital Signs Last 24 Hrs  T(C): 37.1 (14 Dec 2021 12:36), Max: 37.7 (13 Dec 2021 18:15)  T(F): 98.8 (14 Dec 2021 12:36), Max: 99.9 (13 Dec 2021 18:15)  HR: 89 (14 Dec 2021 12:36) (89 - 110)  BP: 105/51 (14 Dec 2021 12:36) (105/51 - 158/66)  BP(mean): --  RR: 18 (14 Dec 2021 12:36) (17 - 18)  SpO2: 98% (14 Dec 2021 12:36) (98% - 99%)  CAPILLARY BLOOD GLUCOSE      POCT Blood Glucose.: 263 mg/dL (14 Dec 2021 12:32)  POCT Blood Glucose.: 165 mg/dL (14 Dec 2021 07:48)  POCT Blood Glucose.: 246 mg/dL (14 Dec 2021 01:44)  POCT Blood Glucose.: 252 mg/dL (13 Dec 2021 22:39)    I&O's Summary      PHYSICAL EXAM:  GENERAL: NAD, well-developed  CHEST/LUNG: Decreased bs on the left mid to lower lung field, rt base with minimal crackles  HEART: Regular rate and rhythm  ABDOMEN: Soft, mild tenderness upper abdomen, Nondistended  EXTREMITIES: no LE edema  PSYCH: Calm  NEUROLOGY: AAOx3  SKIN: No rashes or lesions    LABS:                        9.2    7.96  )-----------( 245      ( 14 Dec 2021 08:15 )             28.7     12-14    135  |  97<L>  |  21  ----------------------------<  163<H>  3.7   |  30  |  0.95    Ca    10.5      14 Dec 2021 08:15  Phos  3.3     12-14  Mg     1.70     12-14    TPro  5.9<L>  /  Alb  3.1<L>  /  TBili  0.5  /  DBili  x   /  AST  7   /  ALT  7   /  AlkPhos  70  12-14              RADIOLOGY & ADDITIONAL TESTS:  < from: CT Angio Chest PE Protocol w/ IV Cont (12.13.21 @ 12:01) >  CT ANGIO CHEST PULM ART WAWIC                            < end of copied text >  < from: CT Angio Chest PE Protocol w/ IV Cont (12.13.21 @ 12:01) >  No pulmonary embolus is noted.    Multiple nodules are noted within both lungs. Primary consideration is   metastasis.    Findings suggestive of malignant pleural effusions bilaterally.    < end of copied text >    Imaging Personally Reviewed:    Consultant(s) Notes Reviewed:      Care Discussed with Consultants/Other Providers:

## 2021-12-14 NOTE — CONSULT NOTE ADULT - ASSESSMENT
Ms. Carranza 65 yo F w/ PMHx of HTN, HLD, DM, fibromyalgia, Vulvar CA s/p vulva surgery in 2020, completed chemo & RT in 9/21, anemic, now presents w/ shortness of breath, found to have  Ms. Carranza 65 yo F w/ PMHx of HTN, HLD, DM, fibromyalgia, Vulvar CA s/p vulva surgery in 2020, completed chemo & RT in 9/21, anemic, now presents w/ shortness of breath, found to have bilateral pleural effusions, L>R and pulmonary nodules, pulmonary consulted for pulmonary nodules.    #Pulmonary Nodules  -bilateral pulm nodules, largest measuring 2.6x1.5cm in the RLL. Given hx of vulvar cancer, there is a concern but metastasis, but she also has extensive smoking history (<30 pack years), and thus there is also a concern for primary lung cancer.   -Agree with proceeding with diagnostic and therapeutic thoracentesis. Please send cytopathology as well as regular thoracentesis studies.   -Discussed with IR, their plan is to not proceed with tissue biopsy at this time given patient already has a primary malignancy.  -Await pleural studies, if negative, can reconsider biopsy of lung nodules  -Plan to be discussed with attending.     Radha Mesa, PGY3  Pulmonary Consult Resident Ms. Carranza 67 yo F w/ PMHx of HTN, HLD, DM, fibromyalgia, Vulvar CA s/p vulva surgery in 2020, completed chemo & RT in 9/21, anemic, now presents w/ shortness of breath, found to have bilateral pleural effusions, L>R and pulmonary nodules, pulmonary consulted for pulmonary nodules.    #Pulmonary Nodules  - Bilateral pulm nodules, largest measuring 2.6x1.5cm in the RLL with concern for metastasis of vulvar cancer  - Agree with proceeding with diagnostic and therapeutic thoracentesis.   - Please send cytopathology as well as regular thoracentesis studies.   - If cytopathology is negative she may require biopsy of nodules    Radha Mesa, PGY3  Pulmonary Consult Resident

## 2021-12-14 NOTE — CONSULT NOTE ADULT - ASSESSMENT
Patient is a 65 y/o F w/ a PMHx of HTN, HLD, T2DM, fibromyalgia, recurrent vulvar cancer (recently received chemo/RT from 7/2021-9/2021), and anemia (requiring intermittent transfusions recently) who presented with shortness of breath, dizziness, and nausea for multiple weeks, found to have multiple lung nodules and B/L pleural effusions concerning for malignancy, and was admitted to telemetry for further management. Oncology was consulted for further evaluation.    *** NOTE INCOMPLETE *** Patient is a 65 y/o F w/ a PMHx of HTN, HLD, T2DM, fibromyalgia, recurrent vulvar cancer (recently received chemo/RT from 7/2021-9/2021), and anemia (requiring intermittent transfusions recently) who presented with shortness of breath, dizziness, and nausea for multiple weeks, found to have multiple lung nodules and B/L pleural effusions concerning for malignancy, and was admitted to telemetry for further management. Oncology was consulted for further evaluation.    # Dyspnea  - CTA Chest (12/13): No pulmonary embolus is noted. Multiple nodules are noted within both lungs. Primary consideration is metastasis. Findings suggestive of malignant pleural effusions bilaterally.  - Pulmonary consulted for further evaluation. Follow-up w/ IR/Pulm if a therapeutic thoracentesis is needed  - Unclear if pt's recent dyspnea is primarily caused by her effusions. Follow-up TTE  - Recommend PT consult  - Pt is currently saturating well on RA. Continue to closely monitor    # Recurrent vulvar cancer  - History as above. Last recurrence was earlier this year. She received chemo/RT with weekly Cisplatin from 7/12/21-9/1/21.  - Imaging findings on admission are concerning for progressing malignancy  - Pt is currently scheduled for a diagnostic thoracentesis. Recommend obtaining a biopsy of one of the lung nodules (if feasible) as this can help establish a diagnosis and can be sent for molecular testing if cancer is confirmed  - Please check a CT A+P w/ IV contrast for staging  - Pt noted to have a malodorous R inguinal wound/tract on exam. Recommend a Wound Care consult. Consider antibiotics pending imaging results  - There are no plans for systemic therapy while inpatient  - Continue supportive care as per primary team  - Primary Oncologist: Dr. Paulo Carpenter. Continue outpatient follow-up  - Will continue to follow    # Microcytic Anemia  - Occasionally requiring transfusions (last on 12/11)  - Recently underwent a hematologic evaluation as an outpatient. Her workup was notable for a hemoglobin electrophoresis which was c/w beta thalassemia minor. She had a BMBx on 11/9/21 (minimal marrow obtained to evaluate) which showed erythroid hyperplasia, megakaryocytes normal, no evidence of dysplasia.   - Monitor CBC and keep active T+S. Transfuse for Hgb < 7 or if symptomatic  - Primary Hematologist: Dr. Leana Auguste. Continue outpatient follow-up    Plan d/w patient and primary team BRITTNEY Dudley, PGY6  Hematology-Oncology Fellow  Pager: [phone] / [phone]

## 2021-12-14 NOTE — CHART NOTE - NSCHARTNOTEFT_GEN_A_CORE
66F vulvar ca s/p surgery,chemo, RT, now with shortness of breath, new lung nodules suspicious for lung metatasis and new left pleural effusion, IR consulted for left diagnostic thoracentesis    plan for  above procedure tomorrow. can put order for IR procedure under   NPO AMN for sedation in case needs sedation, but likely under local  please recheck CBC BMP Coags 4 AM  ensure covid swab within 72 hours of procedure    Mike Montesinos MD  Interventional Radiology PGY4    -EMERGENT: [facility] IR Pager: [phone].  [facility] IR Pager: [phone] [id number]  -Nonemergent consults:  [facility] order "Consult- Interventional Radiology"  -Available on [employer] TEAMS for questions 66F vulvar ca s/p surgery,chemo, RT, now with shortness of breath, new lung nodules suspicious for lung metatasis and new left pleural effusion, IR consulted for left diagnostic thoracentesis    plan for  above procedure tomorrow. can put order for IR procedure under [facility]    NPO AMN for sedation in case needs sedation, but likely under local  please recheck CBC BMP Coags 4 AM  ensure covid swab within 72 hours of procedure    Mike Montesinos MD  Interventional Radiology PGY4    -EMERGENT: [facility] IR Pager: [phone].  [facility] IR Pager: [phone] [id number]  -Nonemergent consults:  [facility] order "Consult- Interventional Radiology"  -Available on [employer] TEAMS for questions

## 2021-12-15 NOTE — PROGRESS NOTE ADULT - PROBLEM SELECTOR PLAN 2
wound care consulted, cont dressing, f/u wound culture  cont zosyn renaly dosing, had 1 dose of vanco yesterday, f/u blood culture wound care consulted, cont dressing, f/u wound culture  cont zosyn renal dosing, had 1 dose of vanco yesterday, f/u blood culture  ID consulted, f/u ID consult recs

## 2021-12-15 NOTE — PROGRESS NOTE ADULT - SUBJECTIVE AND OBJECTIVE BOX
Patient is a 66y old  Female who presents with a chief complaint of SOB dizziness & nausea x couple of weeks (15 Dec 2021 13:47)      SUBJECTIVE / OVERNIGHT EVENTS: Pt seen and examined at 11:10am, no overnight events, tele with sinus rhythm, pt reports that sob is better today after thoracentesis, still with dizziness upon standing, reports rt groin wound painful as she just had wound evaluation and packing done, denies any abdominal pain, chest pain, nausea or vomiting. No other new issues reported.        MEDICATIONS  (STANDING):  amLODIPine   Tablet 10 milliGRAM(s) Oral daily  dextrose 40% Gel 15 Gram(s) Oral once  dextrose 5%. 1000 milliLiter(s) (50 mL/Hr) IV Continuous <Continuous>  dextrose 5%. 1000 milliLiter(s) (100 mL/Hr) IV Continuous <Continuous>  dextrose 50% Injectable 25 Gram(s) IV Push once  dextrose 50% Injectable 12.5 Gram(s) IV Push once  dextrose 50% Injectable 25 Gram(s) IV Push once  enalapril 2.5 milliGRAM(s) Oral daily  escitalopram 30 milliGRAM(s) Oral daily  gabapentin 400 milliGRAM(s) Oral three times a day  glucagon  Injectable 1 milliGRAM(s) IntraMuscular once  heparin   Injectable 5000 Unit(s) SubCutaneous every 8 hours  influenza  Vaccine (HIGH DOSE) 0.7 milliLiter(s) IntraMuscular once  insulin glargine Injectable (LANTUS) 56 Unit(s) SubCutaneous at bedtime  insulin lispro (ADMELOG) corrective regimen sliding scale   SubCutaneous three times a day before meals  levothyroxine 112 MICROGram(s) Oral daily  pantoprazole    Tablet 40 milliGRAM(s) Oral before breakfast  piperacillin/tazobactam IVPB.. 3.375 Gram(s) IV Intermittent every 8 hours  predniSONE   Tablet 10 milliGRAM(s) Oral two times a day  simvastatin 20 milliGRAM(s) Oral at bedtime  triamterene 37.5 mG/hydrochlorothiazide 25 mG Tablet 1 Tablet(s) Oral daily    MEDICATIONS  (PRN):  acetaminophen     Tablet .. 650 milliGRAM(s) Oral every 6 hours PRN Temp greater or equal to 38C (100.4F), Mild Pain (1 - 3), Moderate Pain (4 - 6)  melatonin 3 milliGRAM(s) Oral at bedtime PRN Insomnia      Vital Signs Last 24 Hrs  T(C): 36.6 (15 Dec 2021 12:32), Max: 38.3 (14 Dec 2021 21:25)  T(F): 97.9 (15 Dec 2021 12:32), Max: 100.9 (14 Dec 2021 21:25)  HR: 75 (15 Dec 2021 12:32) (64 - 84)  BP: 129/50 (15 Dec 2021 12:32) (117/50 - 133/50)  BP(mean): --  RR: 18 (15 Dec 2021 12:32) (18 - 18)  SpO2: 98% (15 Dec 2021 12:32) (97% - 100%)  CAPILLARY BLOOD GLUCOSE      POCT Blood Glucose.: 210 mg/dL (15 Dec 2021 12:42)  POCT Blood Glucose.: 219 mg/dL (15 Dec 2021 07:41)  POCT Blood Glucose.: 188 mg/dL (14 Dec 2021 22:05)  POCT Blood Glucose.: 208 mg/dL (14 Dec 2021 16:57)    I&O's Summary      PHYSICAL EXAM:  GENERAL: NAD, well-developed  CHEST/LUNG: Better aeration on the left mid to lower lung field although with crackles, rt base with minimal crackles  HEART: Regular rate and rhythm  ABDOMEN: Soft, nontender, Nondistended  EXTREMITIES: no LE edema  PSYCH: Calm  NEUROLOGY: AAOx3  SKIN: Rt groin wound with packing+    LABS:                        8.0    7.89  )-----------( 256      ( 15 Dec 2021 04:32 )             25.7     12-15    134<L>  |  95<L>  |  35<H>  ----------------------------<  219<H>  4.1   |  30  |  1.66<H>    Ca    10.2      15 Dec 2021 04:32  Phos  3.9     12-15  Mg     1.60     12-15    TPro  5.9<L>  /  Alb  3.2<L>  /  TBili  0.4  /  DBili  x   /  AST  8   /  ALT  7   /  AlkPhos  63  12-15    PT/INR - ( 15 Dec 2021 04:32 )   PT: 13.3 sec;   INR: 1.18 ratio         PTT - ( 15 Dec 2021 04:32 )  PTT:22.7 sec      Urinalysis Basic - ( 15 Dec 2021 08:52 )    Color: Yellow / Appearance: Clear / S.027 / pH: x  Gluc: x / Ketone: Negative  / Bili: Negative / Urobili: <2 mg/dL   Blood: x / Protein: 30 mg/dL / Nitrite: Negative   Leuk Esterase: Large / RBC: 7 /HPF / WBC 19 /HPF   Sq Epi: x / Non Sq Epi: 4 /HPF / Bacteria: Negative        RADIOLOGY & ADDITIONAL TESTS:  < from: US Kidney and Bladder (12.15.21 @ 12:14) >  No hydronephrosis.    < end of copied text >    Imaging Personally Reviewed:    Consultant(s) Notes Reviewed:      Care Discussed with Consultants/Other Providers:   Patient is a 66y old  Female who presents with a chief complaint of SOB dizziness & nausea x couple of weeks (15 Dec 2021 13:47)      SUBJECTIVE / OVERNIGHT EVENTS: Pt seen and examined at 11:10am, had fever to 100.9 overnight, tele with sinus rhythm, pt reports that sob is better today after thoracentesis, still with dizziness upon standing, reports rt groin wound painful as she just had wound evaluation and packing done, denies any abdominal pain, chest pain, nausea or vomiting. No other new issues reported.        MEDICATIONS  (STANDING):  amLODIPine   Tablet 10 milliGRAM(s) Oral daily  dextrose 40% Gel 15 Gram(s) Oral once  dextrose 5%. 1000 milliLiter(s) (50 mL/Hr) IV Continuous <Continuous>  dextrose 5%. 1000 milliLiter(s) (100 mL/Hr) IV Continuous <Continuous>  dextrose 50% Injectable 25 Gram(s) IV Push once  dextrose 50% Injectable 12.5 Gram(s) IV Push once  dextrose 50% Injectable 25 Gram(s) IV Push once  enalapril 2.5 milliGRAM(s) Oral daily  escitalopram 30 milliGRAM(s) Oral daily  gabapentin 400 milliGRAM(s) Oral three times a day  glucagon  Injectable 1 milliGRAM(s) IntraMuscular once  heparin   Injectable 5000 Unit(s) SubCutaneous every 8 hours  influenza  Vaccine (HIGH DOSE) 0.7 milliLiter(s) IntraMuscular once  insulin glargine Injectable (LANTUS) 56 Unit(s) SubCutaneous at bedtime  insulin lispro (ADMELOG) corrective regimen sliding scale   SubCutaneous three times a day before meals  levothyroxine 112 MICROGram(s) Oral daily  pantoprazole    Tablet 40 milliGRAM(s) Oral before breakfast  piperacillin/tazobactam IVPB.. 3.375 Gram(s) IV Intermittent every 8 hours  predniSONE   Tablet 10 milliGRAM(s) Oral two times a day  simvastatin 20 milliGRAM(s) Oral at bedtime  triamterene 37.5 mG/hydrochlorothiazide 25 mG Tablet 1 Tablet(s) Oral daily    MEDICATIONS  (PRN):  acetaminophen     Tablet .. 650 milliGRAM(s) Oral every 6 hours PRN Temp greater or equal to 38C (100.4F), Mild Pain (1 - 3), Moderate Pain (4 - 6)  melatonin 3 milliGRAM(s) Oral at bedtime PRN Insomnia      Vital Signs Last 24 Hrs  T(C): 36.6 (15 Dec 2021 12:32), Max: 38.3 (14 Dec 2021 21:25)  T(F): 97.9 (15 Dec 2021 12:32), Max: 100.9 (14 Dec 2021 21:25)  HR: 75 (15 Dec 2021 12:32) (64 - 84)  BP: 129/50 (15 Dec 2021 12:32) (117/50 - 133/50)  BP(mean): --  RR: 18 (15 Dec 2021 12:32) (18 - 18)  SpO2: 98% (15 Dec 2021 12:32) (97% - 100%)  CAPILLARY BLOOD GLUCOSE      POCT Blood Glucose.: 210 mg/dL (15 Dec 2021 12:42)  POCT Blood Glucose.: 219 mg/dL (15 Dec 2021 07:41)  POCT Blood Glucose.: 188 mg/dL (14 Dec 2021 22:05)  POCT Blood Glucose.: 208 mg/dL (14 Dec 2021 16:57)    I&O's Summary      PHYSICAL EXAM:  GENERAL: NAD, well-developed  CHEST/LUNG: Better aeration on the left mid to lower lung field although with crackles, rt base with minimal crackles  HEART: Regular rate and rhythm  ABDOMEN: Soft, nontender, Nondistended  EXTREMITIES: no LE edema  PSYCH: Calm  NEUROLOGY: AAOx3  SKIN: Rt groin wound with packing+    LABS:                        8.0    7.89  )-----------( 256      ( 15 Dec 2021 04:32 )             25.7     12-15    134<L>  |  95<L>  |  35<H>  ----------------------------<  219<H>  4.1   |  30  |  1.66<H>    Ca    10.2      15 Dec 2021 04:32  Phos  3.9     12-15  Mg     1.60     12-15    TPro  5.9<L>  /  Alb  3.2<L>  /  TBili  0.4  /  DBili  x   /  AST  8   /  ALT  7   /  AlkPhos  63  12-15    PT/INR - ( 15 Dec 2021 04:32 )   PT: 13.3 sec;   INR: 1.18 ratio         PTT - ( 15 Dec 2021 04:32 )  PTT:22.7 sec      Urinalysis Basic - ( 15 Dec 2021 08:52 )    Color: Yellow / Appearance: Clear / S.027 / pH: x  Gluc: x / Ketone: Negative  / Bili: Negative / Urobili: <2 mg/dL   Blood: x / Protein: 30 mg/dL / Nitrite: Negative   Leuk Esterase: Large / RBC: 7 /HPF / WBC 19 /HPF   Sq Epi: x / Non Sq Epi: 4 /HPF / Bacteria: Negative        RADIOLOGY & ADDITIONAL TESTS:  < from: US Kidney and Bladder (12.15.21 @ 12:14) >  No hydronephrosis.    < end of copied text >    Imaging Personally Reviewed:  < from: Xray Chest 1 View- PORTABLE-Routine (Xray Chest 1 View- PORTABLE-Routine .) (12.15.21 @ 10:51) >   XR CHEST PORTABLE ROUTINE 1V                            < end of copied text >  < from: Xray Chest 1 View- PORTABLE-Routine (Xray Chest 1 View- PORTABLE-Routine .) (12.15.21 @ 10:51) >  No pneumothorax.    < end of copied text >    Consultant(s) Notes Reviewed:      Care Discussed with Consultants/Other Providers:

## 2021-12-15 NOTE — PHYSICAL THERAPY INITIAL EVALUATION ADULT - PATIENT PROFILE REVIEW, REHAB EVAL
PT orders received: no formal activity orders. Consult with RN Viktoria CLAYTON, patient may participate in PT evaluation./yes

## 2021-12-15 NOTE — PROGRESS NOTE ADULT - PROBLEM SELECTOR PLAN 1
Cont on tele, monitor for arrhythmia,   -CT showed No pulmonary embolus is noted. Multiple nodules are noted within both lungs. Primary consideration is   metastasis. Findings suggestive of malignant pleural effusions bilaterally.  -s/p Thoracentesis on 12/15 by IR, drained appr 660cc pleural fluid drained, pleural fluid studies pending   - Heme/ Onc consult appreciated,  IR to do bx of lung nodules after ct a/p  - Pulm consult appreciated Cont on tele, monitor for arrhythmia,   -CT showed No pulmonary embolus is noted. Multiple nodules are noted within both lungs. Primary consideration is   metastasis. Findings suggestive of malignant pleural effusions bilaterally.  -s/p Thoracentesis on 12/15 by IR, drained appr 660cc pleural fluid, pleural fluid studies in progress  - Heme/ Onc consult appreciated,  IR to do bx of lung nodules after ct a/p, will do ct tomorrow given fabrice  - Pulm consult appreciated  -cxr showed no pneumothorax  -f/u echo

## 2021-12-15 NOTE — PHYSICAL THERAPY INITIAL EVALUATION ADULT - GENERAL OBSERVATIONS, REHAB EVAL
Pt received semisupine in bed, +2L oxygen via nasal cannula, +IV, +telemetry/pulse oximeter, in NAD. Pt agreeable to participate in PT evaluation.

## 2021-12-15 NOTE — CONSULT NOTE ADULT - ASSESSMENT
66y old  Female w/ SOB dizziness & nausea   with left pleural effusion  w/ PMHx of HTN, HLD, DM, fibromyalgia, Vulvar CA s/p vulva surgery in 2020, completed chemo & RT in 9/21, found to be anemic Hgb 8.2 so she received 1U PRBC on saturday, 12/11 prior to that received PRBC in 9/21 now p/w SOB, dizziness & nausea for the last couple of weeks.   pain in her upper abdomen/ epigastric region,   pleuritic type of chest pain at times. fup with IM cardiology  right groin wound in radiated field s/p pet in may showing met to groin/ obturator node, s/p chemo may have caused collection, now with odor and increased drainage , not bleeding   may have sub acute /chronic infection- culture pending  packed with ag aquacel, irrigated well with NS/ dakins  topical lido2.5%prilocaine for dressing changes to wound    will need vna  followup with Oncology for anemia, chemo;  thallesemia, s/p prbc inr 1.18, pt 13.3 nl plt    no bleeding at this time, no surgical debridement needed  [id number]/kg, protein 1.2-1.5g/kg,  , Obesity management  offload  moisture barrier  compression 2 layer kerlex, ace  DVT prophylaxisis  established/new problem improved, stable , worsened  additional workup  data reviewed lab, tests, records, image  complexity high mod  risk high -  due to dx, complexity data, risk  time 70 min 223

## 2021-12-15 NOTE — CONSULT NOTE ADULT - ASSESSMENT
66F w/ HTN, HLD, T2DM, fibromyalgia, recurrent vulvar cancer (recently received chemo/RT from 7/2021-9/2021), and anemia (requiring intermittent transfusions recently) who presented with shortness of breath, dizziness, and nausea for multiple weeks, found to have multiple lung nodules and B/L pleural effusions concerning for malignancy. ID consulted for R groin wound.    #Right inguinal wound  Wound care consulted, no debridement necessary  Febrile to 100.9 on 11/14  s/p vanc x1  - f/u blood and wound c/s  - c/w zosyn (12/15-) pending c/s  - f/u CTAP    Plan is not final until attested by the attending.    Marissa Hankins  Internal Medicine, PGY-1   66F w/ HTN, T2DM, fibromyalgia, hypothyroid, thalassemia, and recurrent vulvar cancer (recently received chemo/RT from 7/2021-9/2021) who presented with shortness of breath, dizziness, and nausea for multiple weeks, found to have multiple lung nodules and B/L pleural effusions concerning for malignancy. ID consulted for R groin wound.    #Right inguinal wound  Wound care consulted, no debridement necessary  Febrile to 100.9 on 11/14  s/p vanc x1  - f/u blood and wound c/s  - c/w zosyn (12/15-) pending c/s  - f/u CTAP    Plan is not final until attested by the attending.    Marissa Hankins  Internal Medicine, PGY-1   66F w/ HTN, T2DM, fibromyalgia, hypothyroid, thalassemia, and recurrent vulvar cancer (recently received chemo/RT from 7/2021-9/2021) who presented with shortness of breath, dizziness, and nausea for multiple weeks, found to have multiple lung nodules and B/L pleural effusions concerning for malignancy. ID consulted for R groin wound.    #Right inguinal wound  Wound care consulted, no debridement necessary  Febrile to 100.9 on 11/14  s/p vanc x1  - f/u blood and wound c/s  - c/w zosyn (12/15-) pending c/s  - f/u CT AP    Plan is not final until attested by the attending.    Marissa Hankins  Internal Medicine, PGY-1

## 2021-12-15 NOTE — PRE PROCEDURE NOTE - HISTORY OF PRESENT ILLNESS
Interventional Radiology  Pre-Procedure Note    This is a 66y  Female  with left pleural effusion    HPI:  65 yo F w/ PMHx of HTN, HLD, DM, fibromyalgia, Vulvar CA s/p vulva surgery in 2020, completed chemo & RT in 9/21, found to be anemic Hgb 8.2 so she received 1U PRBC on saturday, 12/11 prior to that received PRBC in 9/21 now p/w SOB, dizziness & nausea for the last couple of weeks. Pt states she initially started experiencing an intermittent steady jabbing type of pain in her upper abdomen/ epigastric region, described as feeling like a gas pocket non-radiating, 9/10 severity, which can last for several hours at a time. Pt feels pain worsen with inspiration or rolling onto her side or when getting up. Pain eases up when she takes motrin & resting. ALso admits to a pleuritic type of chest pain at times. Pt also reports feeling accompanying lightheadedness, generalized weakness, dizziness, nausea & SOB. Pt reports HERNANDEZ after taking a couple of steps and feels the SOB has become constant over the last couple of days. Since thanksgiving she also reports on 2 occasions she felt like she'd pass out when symptoms got bad.     (13 Dec 2021 17:15)      PAST MEDICAL & SURGICAL HISTORY:  Lichen sclerosus of female genitalia    Vulva cancer  bilateral 2014 reoccurance 2017    HTN (hypertension)    TIA (transient ischemic attack)  8/2013  blurred vision/dizziness no treatment    DM type 2 (diabetes mellitus, type 2)  1995    Hypothyroid    Thalassemia minor    Strabismus    Fibromyalgia    HLD (hyperlipidemia)    Anxiety and depression    GERD (gastroesophageal reflux disease)    Obesity    S/P laparoscopic cholecystectomy  2000    S/P eye surgery  June 2014, for strabismus    Vulvar neoplasm  s/p radical anterior vulvectomy in 2014 with radical excision in 2017    H/O total knee replacement, right  2016        Social History:     FAMILY HISTORY:  Family history of colon cancer (Aunt)    Family history of diabetes mellitus in mother (Aunt, Mother, Sibling)        Allergies: No Known Allergies      Current Medications: acetaminophen     Tablet .. 650 milliGRAM(s) Oral every 6 hours PRN  amLODIPine   Tablet 10 milliGRAM(s) Oral daily  dextrose 40% Gel 15 Gram(s) Oral once  dextrose 5%. 1000 milliLiter(s) IV Continuous <Continuous>  dextrose 5%. 1000 milliLiter(s) IV Continuous <Continuous>  dextrose 50% Injectable 25 Gram(s) IV Push once  dextrose 50% Injectable 12.5 Gram(s) IV Push once  dextrose 50% Injectable 25 Gram(s) IV Push once  enalapril 2.5 milliGRAM(s) Oral daily  escitalopram 30 milliGRAM(s) Oral daily  gabapentin 400 milliGRAM(s) Oral three times a day  glucagon  Injectable 1 milliGRAM(s) IntraMuscular once  heparin   Injectable 5000 Unit(s) SubCutaneous every 8 hours  influenza  Vaccine (HIGH DOSE) 0.7 milliLiter(s) IntraMuscular once  insulin glargine Injectable (LANTUS) 56 Unit(s) SubCutaneous at bedtime  insulin lispro (ADMELOG) corrective regimen sliding scale   SubCutaneous three times a day before meals  levothyroxine 112 MICROGram(s) Oral daily  melatonin 3 milliGRAM(s) Oral at bedtime PRN  pantoprazole    Tablet 40 milliGRAM(s) Oral before breakfast  piperacillin/tazobactam IVPB.. 3.375 Gram(s) IV Intermittent every 8 hours  predniSONE   Tablet 10 milliGRAM(s) Oral two times a day  simvastatin 20 milliGRAM(s) Oral at bedtime  triamterene 37.5 mG/hydrochlorothiazide 25 mG Tablet 1 Tablet(s) Oral daily      Labs:                         8.0    7.89  )-----------( 256      ( 15 Dec 2021 04:32 )             25.7       12-15    134<L>  |  95<L>  |  35<H>  ----------------------------<  219<H>  4.1   |  30  |  1.66<H>    Ca    10.2      15 Dec 2021 04:32  Phos  3.9     12-15  Mg     1.60     12-15    TPro  5.9<L>  /  Alb  3.2<L>  /  TBili  0.4  /  DBili  x   /  AST  8   /  ALT  7   /  AlkPhos  63  12-15    < from: CT Angio Chest PE Protocol w/ IV Cont (12.13.21 @ 12:01) >    ACC: [id number] EXAM:  CT ANGIO CHEST PULM [facility]                          PROCEDURE DATE:  12/13/2021          INTERPRETATION:  Clinical information: Evaluate for pulmonary embolus.   Exam is compared to PET/CT scan of 5/25/2021.    CT angiogram of the chest was obtained following administration of   intravenous contrast. Approximately 70 cc of Omnipaque 350 was   administered and 30 cc was discarded. Coronal, sagittal and MIP images   were submitted for review.    Several small lymph nodes are present in the pretracheal space and the AP   window.    Heart is normal in size. Calcification the coronary arteries is noted. No   pericardial effusion is noted. Pulmonary arteries are normal in caliber.   No filling defects are noted.    No endobronchial lesions are noted. Multiple nodules are noted within   both lungs. The largest one is noted in the right lower lobe and measures   approximately 2.6 x 1.5 cm. Small size loculated pleural effusions are   noted bilaterally, left more than right. Thickening/nodularity is noted   involving the pleura bilaterally.    Below the diaphragm, visualized portions of the abdomen demonstrates 2.4   cm right adrenal gland nodule which is unchanged when compared to   previous exam. Small low-attenuation lesions in the left kidney are too   small to be adequately characterized on this exam. Patient is status post   cholecystectomy.    Degenerative changes of the spine are noted.    IMPRESSION: No pulmonary embolus is noted.    Multiple nodules are noted within both lungs. Primary consideration is   metastasis.    Findings suggestive of malignant pleural effusions bilaterally.    --- End of Report ---            POLY PARRA MD; Attending Radiologist  This document has been electronically signed. Dec 13 2021  1:24PM    < end of copied text >      Assessment/Plan:   This is a 66y Female  presents with loculated pleural effusions  Patient presents to IR for left thoracentesis.  Procedure/ risks/ benefits/ goals/ alternatives were explained. All questions answered. Informed content obtained from patient. Consent placed in chart.

## 2021-12-15 NOTE — PROGRESS NOTE ADULT - SUBJECTIVE AND OBJECTIVE BOX
Patient is a 66y old  Female who presents with a chief complaint of SOB dizziness & nausea x couple of weeks (15 Dec 2021 15:52)    SUBJECTIVE / OVERNIGHT EVENTS:  Chart reviewed and previous events noted. Patient had a thoracentesis earlier today (~ 660cc drained). Patient seen in the afternoon. She reports that her breathing felt better after the procedure. She continues to experience dizziness with standing. She also reports some R groin discomfort after her wound was packed. No complaints of chest pain. Patient had a Tmax of 100.9F overnight.    MEDICATIONS  (STANDING):  amLODIPine   Tablet 10 milliGRAM(s) Oral daily  dextrose 40% Gel 15 Gram(s) Oral once  dextrose 5%. 1000 milliLiter(s) (50 mL/Hr) IV Continuous <Continuous>  dextrose 5%. 1000 milliLiter(s) (100 mL/Hr) IV Continuous <Continuous>  dextrose 50% Injectable 25 Gram(s) IV Push once  dextrose 50% Injectable 12.5 Gram(s) IV Push once  dextrose 50% Injectable 25 Gram(s) IV Push once  enalapril 2.5 milliGRAM(s) Oral daily  escitalopram 30 milliGRAM(s) Oral daily  gabapentin 400 milliGRAM(s) Oral three times a day  glucagon  Injectable 1 milliGRAM(s) IntraMuscular once  heparin   Injectable 5000 Unit(s) SubCutaneous every 8 hours  influenza  Vaccine (HIGH DOSE) 0.7 milliLiter(s) IntraMuscular once  insulin glargine Injectable (LANTUS) 56 Unit(s) SubCutaneous at bedtime  insulin lispro (ADMELOG) corrective regimen sliding scale   SubCutaneous three times a day before meals  levothyroxine 112 MICROGram(s) Oral daily  pantoprazole    Tablet 40 milliGRAM(s) Oral before breakfast  piperacillin/tazobactam IVPB.. 3.375 Gram(s) IV Intermittent every 12 hours  predniSONE   Tablet 10 milliGRAM(s) Oral two times a day  simvastatin 20 milliGRAM(s) Oral at bedtime  sodium chloride 0.9%. 1000 milliLiter(s) (75 mL/Hr) IV Continuous <Continuous>  triamterene 37.5 mG/hydrochlorothiazide 25 mG Tablet 1 Tablet(s) Oral daily    MEDICATIONS  (PRN):  acetaminophen     Tablet .. 650 milliGRAM(s) Oral every 6 hours PRN Temp greater or equal to 38C (100.4F), Mild Pain (1 - 3), Moderate Pain (4 - 6)  melatonin 3 milliGRAM(s) Oral at bedtime PRN Insomnia      CAPILLARY BLOOD GLUCOSE      POCT Blood Glucose.: 254 mg/dL (15 Dec 2021 16:53)  POCT Blood Glucose.: 210 mg/dL (15 Dec 2021 12:42)  POCT Blood Glucose.: 219 mg/dL (15 Dec 2021 07:41)  POCT Blood Glucose.: 188 mg/dL (14 Dec 2021 22:05)    I&O's Summary    Vital Signs Last 24 Hrs  T(C): 36.7 (15 Dec 2021 17:24), Max: 38.3 (14 Dec 2021 21:25)  T(F): 98 (15 Dec 2021 17:24), Max: 100.9 (14 Dec 2021 21:25)  HR: 78 (15 Dec 2021 17:24) (64 - 84)  BP: 143/60 (15 Dec 2021 17:24) (117/50 - 143/60)  BP(mean): --  RR: 18 (15 Dec 2021 17:24) (18 - 18)  SpO2: 98% (15 Dec 2021 17:24) (97% - 100%)    PHYSICAL EXAM:  GENERAL: NAD, Sitting in bed  HEENT: NC/AT, Sclera anicteric  NECK: Supple  CHEST/LUNG: Respirations grossly nonlabored on 2L NC  HEART: RRR; +S1/S2  ABDOMEN: +BS, Soft, NT  EXTREMITIES: No LE edema  NEUROLOGY: Awake and alert, Answering questions and following commands appropriately  SKIN: R inguinal wound/tract (packed)  PSYCH: Calm and cooperative    LABS:                        8.0    7.89  )-----------( 256      ( 15 Dec 2021 04:32 )             25.7     12-15    134<L>  |  95<L>  |  35<H>  ----------------------------<  219<H>  4.1   |  30  |  1.66<H>    Ca    10.2      15 Dec 2021 04:32  Phos  3.9     12-15  Mg     1.60     12-15    TPro  5.9<L>  /  Alb  3.2<L>  /  TBili  0.4  /  DBili  x   /  AST  8   /  ALT  7   /  AlkPhos  63  12-15    PT/INR - ( 15 Dec 2021 04:32 )   PT: 13.3 sec;   INR: 1.18 ratio         PTT - ( 15 Dec 2021 04:32 )  PTT:22.7 sec      Urinalysis Basic - ( 15 Dec 2021 08:52 )    Color: Yellow / Appearance: Clear / S.027 / pH: x  Gluc: x / Ketone: Negative  / Bili: Negative / Urobili: <2 mg/dL   Blood: x / Protein: 30 mg/dL / Nitrite: Negative   Leuk Esterase: Large / RBC: 7 /HPF / WBC 19 /HPF   Sq Epi: x / Non Sq Epi: 4 /HPF / Bacteria: Negative    RADIOLOGY & ADDITIONAL TESTS:  Studies reviewed.

## 2021-12-15 NOTE — PHYSICAL THERAPY INITIAL EVALUATION ADULT - PERTINENT HX OF CURRENT PROBLEM, REHAB EVAL
Pt is a 66 year old female presenting with SOB, dizziness and nausea x 2-3 weeks. Found to have + malignant pleural effusions. PMH: HTN, HLD, DM, fibromyalgia, Vulvar cancer s/p vulva surgery in 2020, completed chemo & RT in 9/21, Anemia s/p PRBC on 12/11.

## 2021-12-15 NOTE — PHYSICAL THERAPY INITIAL EVALUATION ADULT - ADDITIONAL COMMENTS
Pt lives in a house with 4 exterior steps to enter, + 1 flight of stairs within home. Prior to admission, pt ambulated independently, no assistive device.    Pt was left semisupine with head of bed elevated to 30°, all lines intact and call bell within reach, RN aware.

## 2021-12-15 NOTE — PROGRESS NOTE ADULT - SUBJECTIVE AND OBJECTIVE BOX
Interval Events:  -Overnight Creatinine worsened to 1.66, spiked fever to 100.9, and was started on zosyn.   -This morning patient felt more short of breath. remains on 2L, saturating %. Continues to have flank pain worse with inspiration Patient denies abdominal pain, nausea, vomiting, headaches.     OBJECTIVE:  ICU Vital Signs Last 24 Hrs  T(C): 36.6 (15 Dec 2021 12:32), Max: 38.3 (14 Dec 2021 21:25)  T(F): 97.9 (15 Dec 2021 12:32), Max: 100.9 (14 Dec 2021 21:25)  HR: 75 (15 Dec 2021 12:32) (64 - 84)  BP: 129/50 (15 Dec 2021 12:32) (117/50 - 133/50)  BP(mean): --  ABP: --  ABP(mean): --  RR: 18 (15 Dec 2021 12:32) (18 - 18)  SpO2: 98% (15 Dec 2021 12:32) (97% - 100%)        CAPILLARY BLOOD GLUCOSE      POCT Blood Glucose.: 210 mg/dL (15 Dec 2021 12:42)      PHYSICAL EXAM:  GENERAL: NAD, well-developed  CHEST/LUNG: Decreased bs on the left mid to lower lung field, rt base with minimal crackles  HEART: Regular rate and rhythm  ABDOMEN: Soft, mild tenderness upper abdomen, Nondistended  EXTREMITIES: no LE edema  PSYCH: Calm  NEUROLOGY: AAOx3  SKIN: No rashes or lesions      HOSPITAL MEDICATIONS:  MEDICATIONS  (STANDING):  amLODIPine   Tablet 10 milliGRAM(s) Oral daily  dextrose 40% Gel 15 Gram(s) Oral once  dextrose 5%. 1000 milliLiter(s) (50 mL/Hr) IV Continuous <Continuous>  dextrose 5%. 1000 milliLiter(s) (100 mL/Hr) IV Continuous <Continuous>  dextrose 50% Injectable 25 Gram(s) IV Push once  dextrose 50% Injectable 12.5 Gram(s) IV Push once  dextrose 50% Injectable 25 Gram(s) IV Push once  enalapril 2.5 milliGRAM(s) Oral daily  escitalopram 30 milliGRAM(s) Oral daily  gabapentin 400 milliGRAM(s) Oral three times a day  glucagon  Injectable 1 milliGRAM(s) IntraMuscular once  heparin   Injectable 5000 Unit(s) SubCutaneous every 8 hours  influenza  Vaccine (HIGH DOSE) 0.7 milliLiter(s) IntraMuscular once  insulin glargine Injectable (LANTUS) 56 Unit(s) SubCutaneous at bedtime  insulin lispro (ADMELOG) corrective regimen sliding scale   SubCutaneous three times a day before meals  levothyroxine 112 MICROGram(s) Oral daily  pantoprazole    Tablet 40 milliGRAM(s) Oral before breakfast  piperacillin/tazobactam IVPB.. 3.375 Gram(s) IV Intermittent every 8 hours  predniSONE   Tablet 10 milliGRAM(s) Oral two times a day  simvastatin 20 milliGRAM(s) Oral at bedtime  triamterene 37.5 mG/hydrochlorothiazide 25 mG Tablet 1 Tablet(s) Oral daily    MEDICATIONS  (PRN):  acetaminophen     Tablet .. 650 milliGRAM(s) Oral every 6 hours PRN Temp greater or equal to 38C (100.4F), Mild Pain (1 - 3), Moderate Pain (4 - 6)  melatonin 3 milliGRAM(s) Oral at bedtime PRN Insomnia      LABS:                        8.0    7.89  )-----------( 256      ( 15 Dec 2021 04:32 )             25.7     Hgb Trend: 8.0<--, 8.1<--, 9.2<--, 8.9<--, 8.2<--  12-15    134<L>  |  95<L>  |  35<H>  ----------------------------<  219<H>  4.1   |  30  |  1.66<H>    Ca    10.2      15 Dec 2021 04:32  Phos  3.9     12-15  Mg     1.60     12-15    TPro  5.9<L>  /  Alb  3.2<L>  /  TBili  0.4  /  DBili  x   /  AST  8   /  ALT  7   /  AlkPhos  63  12-15    Creatinine Trend: 1.66<--, 0.95<--, 1.02<--  PT/INR - ( 15 Dec 2021 04:32 )   PT: 13.3 sec;   INR: 1.18 ratio         PTT - ( 15 Dec 2021 04:32 )  PTT:22.7 sec  Urinalysis Basic - ( 15 Dec 2021 08:52 )    Color: Yellow / Appearance: Clear / S.027 / pH: x  Gluc: x / Ketone: Negative  / Bili: Negative / Urobili: <2 mg/dL   Blood: x / Protein: 30 mg/dL / Nitrite: Negative   Leuk Esterase: Large / RBC: 7 /HPF / WBC 19 /HPF   Sq Epi: x / Non Sq Epi: 4 /HPF / Bacteria: Negative            MICROBIOLOGY:       RADIOLOGY:  [ ] Reviewed and interpreted by me    PULMONARY FUNCTION TESTS:    EKG:

## 2021-12-15 NOTE — PROGRESS NOTE ADULT - PROBLEM SELECTOR PLAN 9
continue levothyroxine  tsh elevated, mildly low free t4, will rpt in am continue levothyroxine  tsh elevated, mildly low free t4, will increase levothyroxine to 125mcg if pt has been on 112mcg dose for >4 weeks

## 2021-12-15 NOTE — CONSULT NOTE ADULT - SUBJECTIVE AND OBJECTIVE BOX
Patient is a 66y old  Female who presents with a chief complaint of SOB dizziness & nausea x couple of weeks (14 Dec 2021 16:38)  with left pleural effusion    w/ PMHx of HTN, HLD, DM, fibromyalgia, Vulvar CA s/p vulva surgery in 2020, completed chemo & RT in 9/21, found to be anemic Hgb 8.2 so she received 1U PRBC on saturday, 12/11 prior to that received PRBC in 9/21 now p/w SOB, dizziness & nausea for the last couple of weeks. Pt states she initially started experiencing an intermittent steady jabbing type of pain in her upper abdomen/ epigastric region, described as feeling like a gas pocket non-radiating, 9/10 severity, which can last for several hours at a time. Pt feels pain worsen with inspiration or rolling onto her side or when getting up. Pain eases up when she takes motrin & resting. ALso admits to a pleuritic type of chest pain at times. Pt also reports feeling accompanying lightheadedness, generalized weakness, dizziness, nausea & SOB. Pt reports HERNANDEZ after taking a couple of steps and feels the SOB has become constant over the last couple of days. Since thanksgiving she also reports on 2 occasions she felt like she'd pass out when symptoms got bad.     (13 Dec 2021 17:15) right groin wound "popped" 2 weeks ago very painful, smell, ooze,     PAST MEDICAL & SURGICAL HISTORY:  Lichen sclerosus of female genitalia  Vulva cancer  bilateral 2014 reoccurrence 2017  HTN (hypertension)  TIA (transient ischemic attack)  8/2013  blurred vision/dizziness no treatment  DM type 2 (diabetes mellitus, type 2)  1995  Hypothyroid  Thalassemia minor  Strabismus  Fibromyalgia  HLD (hyperlipidemia)  Anxiety and depression  GERD (gastroesophageal reflux disease)  Obesity  S/P laparoscopic cholecystectomy  2000  S/P eye surgery  June 2014, for strabismus  Vulvar neoplasm  s/p radical anterior vulvectomy in 2014 with radical excision in 2017  H/O total knee replacement, right  2016    Social History: 5 children non smoker    FAMILY HISTORY:Family history of colon cancer (Aunt)  Family history of diabetes mellitus in mother (Aunt, Mother, Sibling)  Allergies: No Known Allergies      Current Medications: acetaminophen     Tablet .. 650 milliGRAM(s) Oral every 6 hours PRN  amLODIPine   Tablet 10 milliGRAM(s) Oral daily  dextrose 40% Gel 15 Gram(s) Oral once  dextrose 5%. 1000 milliLiter(s) IV Continuous <Continuous>  dextrose 5%. 1000 milliLiter(s) IV Continuous <Continuous>  dextrose 50% Injectable 25 Gram(s) IV Push once  dextrose 50% Injectable 12.5 Gram(s) IV Push once  dextrose 50% Injectable 25 Gram(s) IV Push once  enalapril 2.5 milliGRAM(s) Oral daily  escitalopram 30 milliGRAM(s) Oral daily  gabapentin 400 milliGRAM(s) Oral three times a day  glucagon  Injectable 1 milliGRAM(s) IntraMuscular once  heparin   Injectable 5000 Unit(s) SubCutaneous every 8 hours  influenza  Vaccine (HIGH DOSE) 0.7 milliLiter(s) IntraMuscular once  insulin glargine Injectable (LANTUS) 56 Unit(s) SubCutaneous at bedtime  insulin lispro (ADMELOG) corrective regimen sliding scale   SubCutaneous three times a day before meals  levothyroxine 112 MICROGram(s) Oral daily  melatonin 3 milliGRAM(s) Oral at bedtime PRN  pantoprazole    Tablet 40 milliGRAM(s) Oral before breakfast  piperacillin/tazobactam IVPB.. 3.375 Gram(s) IV Intermittent every 8 hours  predniSONE   Tablet 10 milliGRAM(s) Oral two times a day  simvastatin 20 milliGRAM(s) Oral at bedtime  triamterene 37.5 mG/hydrochlorothiazide 25 mG Tablet 1 Tablet(s) Oral daily      Labs:                         8.0    7.89  )-----------( 256      ( 15 Dec 2021 04:32 )             25.7       12-15    134<L>  |  95<L>  |  35<H>  ----------------------------<  219<H>  4.1   |  30  |  1.66<H>    Ca    10.2      15 Dec 2021 04:32  Phos  3.9     12-15  Mg     1.60     12-15    TPro  5.9<L>  /  Alb  3.2<L>  /  TBili  0.4  /  DBili  x   /  AST  8   /  ALT  7   /  AlkPhos  63  12-15    < from: CT Angio Chest PE Protocol w/ IV Cont (12.13.21 @ 12:01) >    ACC: [id number] EXAM:  CT ANGIO CHEST PULM ART [facility]                          PROCEDURE DATE:  12/13/2021    INTERPRETATION:  Clinical information: Evaluate for pulmonary embolus.   Exam is compared to PET/CT scan of 5/25/2021.    CT angiogram of the chest was obtained following administration of   intravenous contrast. Approximately 70 cc of Omnipaque 350 was   administered and 30 cc was discarded. Coronal, sagittal and MIP images   were submitted for review.    Several small lymph nodes are present in the pretracheal space and the AP   window.  Heart is normal in size. Calcification the coronary arteries is noted. No   pericardial effusion is noted. Pulmonary arteries are normal in caliber.   No filling defects are noted.  No endobronchial lesions are noted. Multiple nodules are noted within   both lungs. The largest one is noted in the right lower lobe and measures   approximately 2.6 x 1.5 cm. Small size loculated pleural effusions are   noted bilaterally, left more than right. Thickening/nodularity is noted   involving the pleura bilaterally.    Below the diaphragm, visualized portions of the abdomen demonstrates 2.4   cm right adrenal gland nodule which is unchanged when compared to   previous exam. Small low-attenuation lesions in the left kidney are too   small to be adequately characterized on this exam. Patient is status post   cholecystectomy.  Degenerative changes of the spine are noted.  IMPRESSION: No pulmonary embolus is noted.  Multiple nodules are noted within both lungs. Primary consideration is   metastasis.  Findings suggestive of malignant pleural effusions bilaterally.    --- End of Report ---    POLY PARRA MD; Attending Radiologist  This document has been electronically signed. Dec 13 2021  1:24PM    < end of copied text >      Assessment/Plan:   This is a 66y Female  presents with loculated pleural effusions  Patient presents to IR for left thoracentesis.  Procedure/ risks/ benefits/ goals/ alternatives were explained. All questions answered. Informed content obtained from patient. Consent placed in chart.      (15 Dec 2021 10:13)       REVIEW OF SYSTEMS- see pmh and hpi  Allergies    No Known Allergies     MEDICATIONS  (STANDING):  amLODIPine   Tablet 10 milliGRAM(s) Oral daily  dextrose 40% Gel 15 Gram(s) Oral once  dextrose 5%. 1000 milliLiter(s) (50 mL/Hr) IV Continuous <Continuous>  dextrose 5%. 1000 milliLiter(s) (100 mL/Hr) IV Continuous <Continuous>  dextrose 50% Injectable 25 Gram(s) IV Push once  dextrose 50% Injectable 12.5 Gram(s) IV Push once  dextrose 50% Injectable 25 Gram(s) IV Push once  enalapril 2.5 milliGRAM(s) Oral daily  escitalopram 30 milliGRAM(s) Oral daily  gabapentin 400 milliGRAM(s) Oral three times a day  glucagon  Injectable 1 milliGRAM(s) IntraMuscular once  heparin   Injectable 5000 Unit(s) SubCutaneous every 8 hours  influenza  Vaccine (HIGH DOSE) 0.7 milliLiter(s) IntraMuscular once  insulin glargine Injectable (LANTUS) 56 Unit(s) SubCutaneous at bedtime  insulin lispro (ADMELOG) corrective regimen sliding scale   SubCutaneous three times a day before meals  levothyroxine 112 MICROGram(s) Oral daily  pantoprazole    Tablet 40 milliGRAM(s) Oral before breakfast  piperacillin/tazobactam IVPB.. 3.375 Gram(s) IV Intermittent every 8 hours  predniSONE   Tablet 10 milliGRAM(s) Oral two times a day  simvastatin 20 milliGRAM(s) Oral at bedtime  triamterene 37.5 mG/hydrochlorothiazide 25 mG Tablet 1 Tablet(s) Oral daily    MEDICATIONS  (PRN):  acetaminophen     Tablet .. 650 milliGRAM(s) Oral every 6 hours PRN Temp greater or equal to 38C (100.4F), Mild Pain (1 - 3), Moderate Pain (4 - 6)  melatonin 3 milliGRAM(s) Oral at bedtime PRN Insomnia      FAMILY HISTORY:  Family history of colon cancer (Aunt)    Family history of diabetes mellitus in mother (Aunt, Mother, Sibling)             I&O's Summary      Vital Signs Last 24 Hrs  T(C): 36.6 (15 Dec 2021 12:32), Max: 38.3 (14 Dec 2021 21:25)  T(F): 97.9 (15 Dec 2021 12:32), Max: 100.9 (14 Dec 2021 21:25)  HR: 75 (15 Dec 2021 12:32) (64 - 84)  BP: 129/50 (15 Dec 2021 12:32) (117/50 - 133/50)  BP(mean): --  RR: 18 (15 Dec 2021 12:32) (18 - 18)  SpO2: 98% (15 Dec 2021 12:32) (97% - 100%)    bmi 30.5 wt 78    PHYSICAL EXAM: sad  Constitutional:nad  ENMT:perrla pale  Back:nl  Respiratory:clear apex , reduced on left  Cardiovascular:rrr  Gastrointestinal:non tender soft distended, scars  gu s/p vulvectomy, right groin ulcer 3.5x1.5x2.5 tender un 11ock 2.5cm, no candida, no purulent drainage  Extremities: non tender , no edema, 1+pulses  Skin:as noted  Musculoskeletal: rom knees intact  psych calm    CBC Full  -  ( 15 Dec 2021 04:32 )  WBC Count : 7.89 K/uL  RBC Count : 3.48 M/uL  Hemoglobin : 8.0 g/dL  Hematocrit : 25.7 %  Platelet Count - Automated : 256 K/uL  Mean Cell Volume : 73.9 fL  Mean Cell Hemoglobin : 23.0 pg  Mean Cell Hemoglobin Concentration : 31.1 gm/dL  Auto Neutrophil # : 7.07 K/uL  Auto Lymphocyte # : 0.30 K/uL  Auto Monocyte # : 0.26 K/uL  Auto Eosinophil # : 0.01 K/uL  Auto Basophil # : 0.02 K/uL  Auto Neutrophil % : 89.6 %  Auto Lymphocyte % : 3.8 %  Auto Monocyte % : 3.3 %  Auto Eosinophil % : 0.1 %  Auto Basophil % : 0.3 %      12-15    134<L>  |  95<L>  |  35<H>  ----------------------------<  219<H>  4.1   |  30  |  1.66<H>    Ca    10.2      15 Dec 2021 04:32  Phos  3.9     12-15  Mg     1.60     12-15    TPro  5.9<L>  /  Alb  3.2<L>  /  TBili  0.4  /  DBili  x   /  AST  8   /  ALT  7   /  AlkPhos  63  12-15      eGFR if AA36  eGFR if non AA31    < from: NM PET/CT Onc FDG Skull to Thigh, Inital (05.25.21 @ 10:53) >  EXAM:  PETCT NETO-ESTEBAN ONC FDG INIT        PROCEDURE DATE:  05/25/2021           INTERPRETATION:  PROCEDURE:  PET/CT SKULL BASE-MID THIGH IMAGING    CLINICAL INFORMATION: 66-year-old female with vulvar cancer status with extensive carcinoma in situ and palpable inguinal lymph node 6 months status post surgery. PET/CT is done as part of the subsequent treatment strategy evaluation.    RADIOPHARMACEUTICAL:  11.91 mCi F-18, FDG, I.V.    TECHNIQUE:  Fasting blood sugar measured prior to injection ofradiopharmaceutical was 197 mg/dl. Following intravenous injection of the radiopharmaceutical and an uptake period of approximately 70 minutes, FDG-PET/CT was obtained on a [employer] 710 from skull base to mid thigh. Oral contrast was given duringthe uptake period. CT was performed during shallow respiration. The CT protocol was optimized for PET attenuation correction and anatomic localization. The CT protocol was not designed to produce and cannot replace state-of-the-art diagnostic CT images with specific imaging protocols for different body parts and indications. Images were reviewed on a dedicated workstation using multiplanar reconstruction.    The standardized uptake values (SUV) are normalized to patient body weight and indicate the highest activity concentration (SUVmax) in a given disease site. All image numbers refer to axial image number.    COMPARISON:  FDG PET/CT dated 9/11/2020.    FINDINGS:    HEAD/NECK: Physiologic FDG activity in visualized brain.    Right maxillary sinus polyp or retention cyst, new as compared to prior study. Small focus of increased FDG activity, inferior aspect of right maxillary sinus, not well evaluated on CT (SUV 4.0; image 22).    Few small FDG-avid bilateral level I and II cervical lymphnodes, slightly increased in size, with new FDG avidity, measuring up to 1.3 x 0.8 cm with SUV 3.9 in left level Ib region (image 39); previously 1.2 x 0.4 cm and not FDG-avid.    Mild, diffuse FDG activity in the thyroid gland, unchanged (SUV 4.5; image 54; previous SUV 5.7).    LUNGS: No abnormal FDG activity. No lung nodule.    PLEURA/PERICARDIUM:No abnormal FDG activity. No pleural or pericardial effusion.    HEPATOBILIARY/PANCREAS: Physiologic FDG activity. Liver SUV mean is 3.2; previously 3.1. Cholecystectomy.    SPLEEN: Physiologic FDG activity. Normal in size.    ADRENAL GLANDS: No abnormal FDG activity. Non-FDG-avid 2 cm low-attenuation right adrenal nodule, unchanged, likely an adenoma. Unremarkable left adrenal gland.    KIDNEYS/URINARY BLADDER: Physiologic excreted FDG activity.    PELVIC ORGANS: Resolution of FDG-avid skin thickening, left posterior vulvar region. Mildly FDG-avid skin thickening, perianal region, unchanged (SUV 3.8; image 236; previous SUV 3.2).    ABDOMINOPELVIC NODES: FDG-avid right anterior obturator lymph node is increased in size and demonstrates new FDG avidity, measuring 2.4 x 1.7 cm (SUV 7.9; image 210); previously 1.7 x 1.0 cm.    Few adjacent FDG-avid right inguinal lymph nodes are new as compared to prior study, measuring up to approximately 3.2 x 2.5 cm with SUV 9.0 (image 233). This lymph node demonstrates decreased FDG activity centrally, compatible with necrosis. New small FDG-avid left inguinal lymph nodes adjacent to surgical clips, measures 1.0 x 0.8 cm (SUV 2.9; image 230). New surgical clips in right inguinal region.    Minimally FDG avid small bilateral inguinal lymph nodes. A reference 0.8 cm right inguinal node (SUV 2.8; image 214). A 1 cm left inguinal node (SUV 2.4; [id number]).    BOWEL/PERITONEUM/MESENTERY: No abnormal FDG activity.    BONES/SOFT TISSUES: No abnormal FDG activity.    Mildly FDG-avid skin thickening, left lower anterior abdominal wall, also present previously, is not significantly changed (SUV 2.8; image 199; previous SUV 2.7).    IMPRESSION: Compared to FDG-PET/CT scan dated 9/11/2020:    1. FDG-avid right anterior obturator lymph node, increased in size with new FDG avidity, and new FDG-avid, partially necrotic right inguinal lymphadenopathy, compatible with metastatic disease. A new small FDG-avid left inguinal lymph node is indeterminate. Further evaluation may be performed with ultrasound-guided percutaneous needle biopsy.    2. Nonspecific mildly FDG-avid skin thickening, perianal regionunchanged. Resolution of FDG avid thickening along left posterior aspect of vulva.    3. Nonspecific, mildly FDG-avid skin thickening, left lower anterior abdominal wall, unchanged. Please correlate with direct visualization.    4. Few nonspecific small FDG-avid bilateral level I and II cervical lymph nodes, slightly increased in size, with new FDG avidity.    5. Right maxillary sinus polyp or retention cyst, new as compared to prior study. Small focus of increased FDG activity, inferior aspect of right maxillary sinus, not well evaluated on CT, is indeterminate, possibly related to dental disease. Dental exam and ENT consultation suggested for further evaluation.    6. Nonspecific diffuse thyroid hypermetabolism, unchanged, suggests thyroiditis.           HERMILO TOMLINSON MD; Attending Nuclear Medicine   This document has been electronically signed. May 27 2021  1:13PM    < end of copied text >

## 2021-12-15 NOTE — CONSULT NOTE ADULT - SUBJECTIVE AND OBJECTIVE BOX
HPI:  65 yo F w/ PMHx of HTN, HLD, DM, fibromyalgia, Vulvar CA s/p vulva surgery in , completed chemo & RT in , found to be anemic Hgb 8.2 so she received 1U PRBC on saturday,  prior to that received PRBC in  now p/w SOB, dizziness & nausea for the last couple of weeks. Pt states she initially started experiencing an intermittent steady jabbing type of pain in her upper abdomen/ epigastric region, described as feeling like a gas pocket non-radiating, 9/10 severity, which can last for several hours at a time. Pt feels pain worsen with inspiration or rolling onto her side or when getting up. Pain eases up when she takes motrin & resting. ALso admits to a pleuritic type of chest pain at times. Pt also reports feeling accompanying lightheadedness, generalized weakness, dizziness, nausea & SOB. Pt reports HERNANDEZ after taking a couple of steps and feels the SOB has become constant over the last couple of days. Since  she also reports on 2 occasions she felt like she'd pass out when symptoms got bad.     (13 Dec 2021 17:15)    PAST MEDICAL & SURGICAL HISTORY:  Lichen sclerosus of female genitalia    Vulva cancer  bilateral  reoccurance     HTN (hypertension)    TIA (transient ischemic attack)  2013  blurred vision/dizziness no treatment    DM type 2 (diabetes mellitus, type 2)      Hypothyroid    Thalassemia minor    Strabismus    Fibromyalgia    HLD (hyperlipidemia)    Anxiety and depression    GERD (gastroesophageal reflux disease)    Obesity    S/P laparoscopic cholecystectomy      S/P eye surgery  2014, for strabismus    Vulvar neoplasm  s/p radical anterior vulvectomy in  with radical excision in 2017    H/O total knee replacement, right  2016        Allergies  No Known Allergies        ANTIMICROBIALS:  piperacillin/tazobactam IVPB.. 3.375 every 8 hours      OTHER MEDS: MEDICATIONS  (STANDING):  acetaminophen     Tablet .. 650 every 6 hours PRN  amLODIPine   Tablet 10 daily  dextrose 40% Gel 15 once  dextrose 50% Injectable 25 once  dextrose 50% Injectable 12.5 once  dextrose 50% Injectable 25 once  enalapril 2.5 daily  escitalopram 30 daily  gabapentin 400 three times a day  glucagon  Injectable 1 once  heparin   Injectable 5000 every 8 hours  influenza  Vaccine (HIGH DOSE) 0.7 once  insulin glargine Injectable (LANTUS) 56 at bedtime  insulin lispro (ADMELOG) corrective regimen sliding scale  three times a day before meals  levothyroxine 112 daily  melatonin 3 at bedtime PRN  pantoprazole    Tablet 40 before breakfast  predniSONE   Tablet 10 two times a day  simvastatin 20 at bedtime  triamterene 37.5 mG/hydrochlorothiazide 25 mG Tablet 1 daily      SOCIAL HISTORY:  [ ] etoh [ ] tobacco [ ] former smoker [ ] IVDU    FAMILY HISTORY:  Family history of colon cancer (Aunt)    Family history of diabetes mellitus in mother (Aunt, Mother, Sibling)        REVIEW OF SYSTEMS  [  ] ROS unobtainable because:    [  ] All other systems negative except as noted below:	    Constitutional:  [ ] fever [ ] chills  [ ] weight loss  [ ] weakness  Skin:  [ ] rash [ ] phlebitis	  Eyes: [ ] icterus [ ] pain  [ ] discharge	  ENMT: [ ] sore throat  [ ] thrush [ ] ulcers [ ] exudates  Respiratory: [ ] dyspnea [ ] hemoptysis [ ] cough [ ] sputum	  Cardiovascular:  [ ] chest pain [ ] palpitations [ ] edema	  Gastrointestinal:  [ ] nausea [ ] vomiting [ ] diarrhea [ ] constipation [ ] pain	  Genitourinary:  [ ] dysuria [ ] frequency [ ] hematuria [ ] discharge [ ] flank pain  [ ] incontinence  Musculoskeletal:  [ ] myalgias [ ] arthralgias [ ] arthritis  [ ] back pain  Neurological:  [ ] headache [ ] seizures  [ ] confusion/altered mental status  Psychiatric:  [ ] anxiety [ ] depression	  Hematology/Lymphatics:  [ ] lymphadenopathy  Endocrine:  [ ] adrenal [ ] thyroid  Allergic/Immunologic:	 [ ] transplant [ ] seasonal    Vital Signs Last 24 Hrs  T(F): 97.9 (12-15-21 @ 12:32), Max: 100.9 (21 @ 21:25)    Vital Signs Last 24 Hrs  HR: 75 (12-15-21 @ 12:32) (64 - 84)  BP: 129/50 (12-15-21 @ 12:32) (117/50 - 133/50)  RR: 18 (12-15-21 @ 12:32)  SpO2: 98% (12-15-21 @ 12:32) (97% - 100%)  Wt(kg): --    PHYSICAL EXAM:  Constitutional: non-toxic, no distress  HEAD/EYES: anicteric, no conjunctival injection  ENT:  supple, no thrush  Cardiovascular:   normal S1, S2, no murmur, no edema  Respiratory:  clear BS bilaterally, no wheezes, no rales  GI:  soft, non-tender, normal bowel sounds  :  no ibanez, no CVA tenderness  Musculoskeletal:  no synovitis, normal ROM  Neurologic: awake and alert, normal strength, no focal findings  Skin:  no rash, no erythema, no phlebitis  Heme/Onc: no lymphadenopathy   Psychiatric:  awake, alert, appropriate mood                                8.0    7.89  )-----------( 256      ( 15 Dec 2021 04:32 )             25.7       12-15    134<L>  |  95<L>  |  35<H>  ----------------------------<  219<H>  4.1   |  30  |  1.66<H>    Ca    10.2      15 Dec 2021 04:32  Phos  3.9     12-15  Mg     1.60     12-15    TPro  5.9<L>  /  Alb  3.2<L>  /  TBili  0.4  /  DBili  x   /  AST  8   /  ALT  7   /  AlkPhos  63  12-15      Urinalysis Basic - ( 15 Dec 2021 08:52 )    Color: Yellow / Appearance: Clear / S.027 / pH: x  Gluc: x / Ketone: Negative  / Bili: Negative / Urobili: <2 mg/dL   Blood: x / Protein: 30 mg/dL / Nitrite: Negative   Leuk Esterase: Large / RBC: 7 /HPF / WBC 19 /HPF   Sq Epi: x / Non Sq Epi: 4 /HPF / Bacteria: Negative        MICROBIOLOGY:    RADIOLOGY:  ACC: [id number] EXAM:  CT ANGIO CHEST PULM [facility]                          PROCEDURE DATE:  2021      INTERPRETATION:  Clinical information: Evaluate for pulmonary embolus.   Exam is compared to PET/CT scan of 2021.    CT angiogram of the chest was obtained following administration of   intravenous contrast. Approximately 70 cc of Omnipaque 350 was   administered and 30 cc was discarded. Coronal, sagittal and MIP images   were submitted for review.    Several small lymph nodes are present in the pretracheal space and the AP   window.    Heart is normal in size. Calcification the coronary arteries is noted. No   pericardial effusion is noted. Pulmonary arteries are normal in caliber.   No filling defects are noted.    No endobronchial lesions are noted. Multiple nodules are noted within   both lungs. The largest one is noted in the right lower lobe and measures   approximately 2.6 x 1.5 cm. Small size loculated pleural effusions are   noted bilaterally, left more than right. Thickening/nodularity is noted   involving the pleura bilaterally.    Below the diaphragm, visualized portions of the abdomen demonstrates 2.4   cm right adrenal gland nodule which is unchanged when compared to   previous exam. Small low-attenuation lesions in the left kidney are too   small to be adequately characterized on this exam. Patient is status post   cholecystectomy.    Degenerative changes of the spine are noted.    IMPRESSION: No pulmonary embolus is noted.    Multiple nodules are noted within both lungs. Primary consideration is   metastasis.    Findings suggestive of malignant pleural effusions bilaterally.   HPI:  65 yo F w/ PMHx of HTN, HLD, DM, fibromyalgia, Vulvar CA s/p vulva surgery in , completed chemo & RT in , found to be anemic Hgb 8.2 so she received 1U PRBC on saturday,  prior to that received PRBC in  now p/w SOB, dizziness & nausea for the last couple of weeks. Pt states she initially started experiencing an intermittent steady jabbing type of pain in her upper abdomen/ epigastric region, described as feeling like a gas pocket non-radiating, 9/10 severity, which can last for several hours at a time. Pt feels pain worsen with inspiration or rolling onto her side or when getting up. Pain eases up when she takes motrin & resting. ALso admits to a pleuritic type of chest pain at times. Pt also reports feeling accompanying lightheadedness, generalized weakness, dizziness, nausea & SOB. Pt reports HERNANDEZ after taking a couple of steps and feels the SOB has become constant over the last couple of days. Since  she also reports on 2 occasions she felt like she'd pass out when symptoms got bad.     (13 Dec 2021 17:15)    Patient reports first noticing inguinal wound over a month ago. She had pain in the region and noticed a hole in her R groin. She saw her rad-onc who gave her a week of antibiotics. She states that the wound has maybe gotten a little bigger and has become progressively more painful.    PAST MEDICAL & SURGICAL HISTORY:  Lichen sclerosus of female genitalia    Vulva cancer  bilateral  reoccurance     HTN (hypertension)    TIA (transient ischemic attack)  2013  blurred vision/dizziness no treatment    DM type 2 (diabetes mellitus, type 2)      Hypothyroid    Thalassemia minor    Strabismus    Fibromyalgia    HLD (hyperlipidemia)    Anxiety and depression    GERD (gastroesophageal reflux disease)    Obesity    S/P laparoscopic cholecystectomy      S/P eye surgery  2014, for strabismus    Vulvar neoplasm  s/p radical anterior vulvectomy in  with radical excision in     H/O total knee replacement, right  2016        Allergies  No Known Allergies        ANTIMICROBIALS:  piperacillin/tazobactam IVPB.. 3.375 every 8 hours      OTHER MEDS: MEDICATIONS  (STANDING):  acetaminophen     Tablet .. 650 every 6 hours PRN  amLODIPine   Tablet 10 daily  dextrose 40% Gel 15 once  dextrose 50% Injectable 25 once  dextrose 50% Injectable 12.5 once  dextrose 50% Injectable 25 once  enalapril 2.5 daily  escitalopram 30 daily  gabapentin 400 three times a day  glucagon  Injectable 1 once  heparin   Injectable 5000 every 8 hours  influenza  Vaccine (HIGH DOSE) 0.7 once  insulin glargine Injectable (LANTUS) 56 at bedtime  insulin lispro (ADMELOG) corrective regimen sliding scale  three times a day before meals  levothyroxine 112 daily  melatonin 3 at bedtime PRN  pantoprazole    Tablet 40 before breakfast  predniSONE   Tablet 10 two times a day  simvastatin 20 at bedtime  triamterene 37.5 mG/hydrochlorothiazide 25 mG Tablet 1 daily      SOCIAL HISTORY:  [ ] etoh [ ] tobacco [ ] former smoker [ ] IVDU    FAMILY HISTORY:  Family history of colon cancer (Aunt)    Family history of diabetes mellitus in mother (Aunt, Mother, Sibling)        REVIEW OF SYSTEMS  [  ] ROS unobtainable because:    [ x ] All other systems negative except as noted below:	    Constitutional:  [ ] fever [ ] chills  [ ] weight loss  [ ] weakness  Skin:  [ ] rash [ ] phlebitis	  Eyes: [ ] icterus [ ] pain  [ ] discharge	  ENMT: [ ] sore throat  [ ] thrush [ ] ulcers [ ] exudates  Respiratory: [ x ] dyspnea, improved [ ] hemoptysis [ ] cough [ ] sputum	  Cardiovascular:  [ ] chest pain [ ] palpitations [ ] edema	  Gastrointestinal:  [ ] nausea [ ] vomiting [ ] diarrhea [ ] constipation [ x ] RUQ pain	  Genitourinary:  [ ] dysuria [ ] frequency [ ] hematuria [ ] discharge [ ] flank pain  [ ] incontinence  Musculoskeletal:  [ ] myalgias [ ] arthralgias [ ] arthritis  [ ] back pain  Neurological:  [ ] headache [ ] seizures  [ ] confusion/altered mental status  Psychiatric:  [ ] anxiety [ ] depression	  Hematology/Lymphatics:  [ ] lymphadenopathy  Endocrine:  [ ] adrenal [ ] thyroid  Allergic/Immunologic:	 [ ] transplant [ ] seasonal    Vital Signs Last 24 Hrs  T(F): 97.9 (12-15-21 @ 12:32), Max: 100.9 (21 @ 21:25)    Vital Signs Last 24 Hrs  HR: 75 (12-15-21 @ 12:32) (64 - 84)  BP: 129/50 (12-15-21 @ 12:32) (117/50 - 133/50)  RR: 18 (12-15-21 @ 12:32)  SpO2: 98% (12-15-21 @ 12:32) (97% - 100%)  Wt(kg): --    PHYSICAL EXAM:  Constitutional: non-toxic, no distress  HEAD/EYES: anicteric, no conjunctival injection  ENT:  supple, no thrush  Cardiovascular:   normal S1, S2, no murmur, no edema  Respiratory:  clear BS bilaterally, no wheezes, no rales  GI:  soft, non-tender, normal bowel sounds  :  no ibanez, no CVA tenderness  Musculoskeletal:  no synovitis, normal ROM  Neurologic: awake and alert, normal strength, no focal findings  Skin:  +R inguinal tract with packing, no erythema surrounding tract, TTP  Heme/Onc: no lymphadenopathy   Psychiatric:  awake, alert, appropriate mood                            8.0    7.89  )-----------( 256      ( 15 Dec 2021 04:32 )             25.7       12-15    134<L>  |  95<L>  |  35<H>  ----------------------------<  219<H>  4.1   |  30  |  1.66<H>    Ca    10.2      15 Dec 2021 04:32  Phos  3.9     12-15  Mg     1.60     12-15    TPro  5.9<L>  /  Alb  3.2<L>  /  TBili  0.4  /  DBili  x   /  AST  8   /  ALT  7   /  AlkPhos  63  12-15      Urinalysis Basic - ( 15 Dec 2021 08:52 )    Color: Yellow / Appearance: Clear / S.027 / pH: x  Gluc: x / Ketone: Negative  / Bili: Negative / Urobili: <2 mg/dL   Blood: x / Protein: 30 mg/dL / Nitrite: Negative   Leuk Esterase: Large / RBC: 7 /HPF / WBC 19 /HPF   Sq Epi: x / Non Sq Epi: 4 /HPF / Bacteria: Negative        MICROBIOLOGY:    RADIOLOGY:  ACC: [id number] EXAM:  CT ANGIO CHEST PULM [facility]                          PROCEDURE DATE:  2021      INTERPRETATION:  Clinical information: Evaluate for pulmonary embolus.   Exam is compared to PET/CT scan of 2021.    CT angiogram of the chest was obtained following administration of   intravenous contrast. Approximately 70 cc of Omnipaque 350 was   administered and 30 cc was discarded. Coronal, sagittal and MIP images   were submitted for review.    Several small lymph nodes are present in the pretracheal space and the AP   window.    Heart is normal in size. Calcification the coronary arteries is noted. No   pericardial effusion is noted. Pulmonary arteries are normal in caliber.   No filling defects are noted.    No endobronchial lesions are noted. Multiple nodules are noted within   both lungs. The largest one is noted in the right lower lobe and measures   approximately 2.6 x 1.5 cm. Small size loculated pleural effusions are   noted bilaterally, left more than right. Thickening/nodularity is noted   involving the pleura bilaterally.    Below the diaphragm, visualized portions of the abdomen demonstrates 2.4   cm right adrenal gland nodule which is unchanged when compared to   previous exam. Small low-attenuation lesions in the left kidney are too   small to be adequately characterized on this exam. Patient is status post   cholecystectomy.    Degenerative changes of the spine are noted.    IMPRESSION: No pulmonary embolus is noted.    Multiple nodules are noted within both lungs. Primary consideration is   metastasis.    Findings suggestive of malignant pleural effusions bilaterally.   HPI:  65 yo F w/ PMHx of HTN, HLD, DM, fibromyalgia, Vulvar CA s/p vulva surgery in , completed chemo & RT in , found to be anemic Hgb 8.2 so she received 1U PRBC on saturday,  prior to that received PRBC in  now p/w SOB, dizziness & nausea for the last couple of weeks. Pt states she initially started experiencing an intermittent steady jabbing type of pain in her upper abdomen/ epigastric region, described as feeling like a gas pocket non-radiating, 9/10 severity, which can last for several hours at a time. Pt feels pain worsen with inspiration or rolling onto her side or when getting up. Pain eases up when she takes motrin & resting. ALso admits to a pleuritic type of chest pain at times. Pt also reports feeling accompanying lightheadedness, generalized weakness, dizziness, nausea & SOB. Pt reports HERNANDEZ after taking a couple of steps and feels the SOB has become constant over the last couple of days. Since  she also reports on 2 occasions she felt like she'd pass out when symptoms got bad.     (13 Dec 2021 17:15)    Patient reports first noticing inguinal wound over a month ago. She had pain in the region and noticed a hole in her R groin. She saw her rad-onc who gave her a week of antibiotics (bactrim per HIE). She states that the wound has maybe gotten a little bigger and has become progressively more painful.    PAST MEDICAL & SURGICAL HISTORY:  Lichen sclerosus of female genitalia    Vulva cancer  bilateral  reoccurance     HTN (hypertension)    TIA (transient ischemic attack)  2013  blurred vision/dizziness no treatment    DM type 2 (diabetes mellitus, type 2)      Hypothyroid    Thalassemia minor    Strabismus    Fibromyalgia    HLD (hyperlipidemia)    Anxiety and depression    GERD (gastroesophageal reflux disease)    Obesity    S/P laparoscopic cholecystectomy      S/P eye surgery  2014, for strabismus    Vulvar neoplasm  s/p radical anterior vulvectomy in  with radical excision in     H/O total knee replacement, right  2016        Allergies  No Known Allergies        ANTIMICROBIALS:  piperacillin/tazobactam IVPB.. 3.375 every 8 hours      OTHER MEDS: MEDICATIONS  (STANDING):  acetaminophen     Tablet .. 650 every 6 hours PRN  amLODIPine   Tablet 10 daily  dextrose 40% Gel 15 once  dextrose 50% Injectable 25 once  dextrose 50% Injectable 12.5 once  dextrose 50% Injectable 25 once  enalapril 2.5 daily  escitalopram 30 daily  gabapentin 400 three times a day  glucagon  Injectable 1 once  heparin   Injectable 5000 every 8 hours  influenza  Vaccine (HIGH DOSE) 0.7 once  insulin glargine Injectable (LANTUS) 56 at bedtime  insulin lispro (ADMELOG) corrective regimen sliding scale  three times a day before meals  levothyroxine 112 daily  melatonin 3 at bedtime PRN  pantoprazole    Tablet 40 before breakfast  predniSONE   Tablet 10 two times a day  simvastatin 20 at bedtime  triamterene 37.5 mG/hydrochlorothiazide 25 mG Tablet 1 daily      SOCIAL HISTORY:  [ ] etoh [ ] tobacco [ ] former smoker [ ] IVDU    FAMILY HISTORY:  Family history of colon cancer (Aunt)    Family history of diabetes mellitus in mother (Aunt, Mother, Sibling)        REVIEW OF SYSTEMS  [  ] ROS unobtainable because:    [ x ] All other systems negative except as noted below:	    Constitutional:  [ ] fever [ ] chills  [ ] weight loss  [ ] weakness  Skin:  [ ] rash [ ] phlebitis	  Eyes: [ ] icterus [ ] pain  [ ] discharge	  ENMT: [ ] sore throat  [ ] thrush [ ] ulcers [ ] exudates  Respiratory: [ x ] dyspnea, improved [ ] hemoptysis [ ] cough [ ] sputum	  Cardiovascular:  [ ] chest pain [ ] palpitations [ ] edema	  Gastrointestinal:  [ ] nausea [ ] vomiting [ ] diarrhea [ ] constipation [ x ] RUQ pain	  Genitourinary:  [ ] dysuria [ ] frequency [ ] hematuria [ ] discharge [ ] flank pain  [ ] incontinence  Musculoskeletal:  [ ] myalgias [ ] arthralgias [ ] arthritis  [ ] back pain  Neurological:  [ ] headache [ ] seizures  [ ] confusion/altered mental status  Psychiatric:  [ ] anxiety [ ] depression	  Hematology/Lymphatics:  [ ] lymphadenopathy  Endocrine:  [ ] adrenal [ ] thyroid  Allergic/Immunologic:	 [ ] transplant [ ] seasonal    Vital Signs Last 24 Hrs  T(F): 97.9 (12-15-21 @ 12:32), Max: 100.9 (21 @ 21:25)    Vital Signs Last 24 Hrs  HR: 75 (12-15-21 @ 12:32) (64 - 84)  BP: 129/50 (12-15-21 @ 12:32) (117/50 - 133/50)  RR: 18 (12-15-21 @ 12:32)  SpO2: 98% (12-15-21 @ 12:32) (97% - 100%)  Wt(kg): --    PHYSICAL EXAM:  Constitutional: non-toxic, no distress  HEAD/EYES: anicteric, no conjunctival injection  ENT:  supple, no thrush  Cardiovascular:   normal S1, S2, no murmur, no edema  Respiratory:  clear BS bilaterally, no wheezes, no rales  GI:  soft, non-tender, normal bowel sounds  :  no ibanez, no CVA tenderness  Musculoskeletal:  no synovitis, normal ROM  Neurologic: awake and alert, normal strength, no focal findings  Skin:  +R inguinal tract with packing, no erythema surrounding tract, TTP  Heme/Onc: no lymphadenopathy   Psychiatric:  awake, alert, appropriate mood                            8.0    7.89  )-----------( 256      ( 15 Dec 2021 04:32 )             25.7       12-15    134<L>  |  95<L>  |  35<H>  ----------------------------<  219<H>  4.1   |  30  |  1.66<H>    Ca    10.2      15 Dec 2021 04:32  Phos  3.9     12-15  Mg     1.60     12-15    TPro  5.9<L>  /  Alb  3.2<L>  /  TBili  0.4  /  DBili  x   /  AST  8   /  ALT  7   /  AlkPhos  63  12-15      Urinalysis Basic - ( 15 Dec 2021 08:52 )    Color: Yellow / Appearance: Clear / S.027 / pH: x  Gluc: x / Ketone: Negative  / Bili: Negative / Urobili: <2 mg/dL   Blood: x / Protein: 30 mg/dL / Nitrite: Negative   Leuk Esterase: Large / RBC: 7 /HPF / WBC 19 /HPF   Sq Epi: x / Non Sq Epi: 4 /HPF / Bacteria: Negative        MICROBIOLOGY:    RADIOLOGY:  ACC: [id number] EXAM:  CT ANGIO CHEST PULM On license of [facility]                          PROCEDURE DATE:  2021      INTERPRETATION:  Clinical information: Evaluate for pulmonary embolus.   Exam is compared to PET/CT scan of 2021.    CT angiogram of the chest was obtained following administration of   intravenous contrast. Approximately 70 cc of Omnipaque 350 was   administered and 30 cc was discarded. Coronal, sagittal and MIP images   were submitted for review.    Several small lymph nodes are present in the pretracheal space and the AP   window.    Heart is normal in size. Calcification the coronary arteries is noted. No   pericardial effusion is noted. Pulmonary arteries are normal in caliber.   No filling defects are noted.    No endobronchial lesions are noted. Multiple nodules are noted within   both lungs. The largest one is noted in the right lower lobe and measures   approximately 2.6 x 1.5 cm. Small size loculated pleural effusions are   noted bilaterally, left more than right. Thickening/nodularity is noted   involving the pleura bilaterally.    Below the diaphragm, visualized portions of the abdomen demonstrates 2.4   cm right adrenal gland nodule which is unchanged when compared to   previous exam. Small low-attenuation lesions in the left kidney are too   small to be adequately characterized on this exam. Patient is status post   cholecystectomy.    Degenerative changes of the spine are noted.    IMPRESSION: No pulmonary embolus is noted.    Multiple nodules are noted within both lungs. Primary consideration is   metastasis.    Findings suggestive of malignant pleural effusions bilaterally.

## 2021-12-15 NOTE — PROGRESS NOTE ADULT - ASSESSMENT
Ms. Carranza 65 yo F w/ PMHx of HTN, HLD, DM, fibromyalgia, Vulvar CA s/p vulva surgery in 2020, completed chemo & RT in 9/21, anemic, now presents w/ shortness of breath, found to have bilateral pleural effusions, L>R and pulmonary nodules, pulmonary consulted for pulmonary nodules.    #Pulmonary Nodules  - Bilateral pulm nodules, largest measuring 2.6x1.5cm in the RLL with concern for metastasis of vulvar cancer  - s/p diagnostic and therapeutic thoracentesis by IR 12/15, studies look exudative in nature  - f/u cytopathology  - If cytopathology is negative she may require biopsy of nodules    #KRISTIN  - Cr 1.66, increased from 0.95   - kidney US w/o any hydronephrosis. Can be related due vanc/zosyn combo on 12/14. Would hold off on any more vanc.   - renally dose meds, including Zosyn  - recheck BMP  - monitor Is/Os    Radha Mesa, PGY3  Pulmonary Consult Resident

## 2021-12-16 NOTE — PROGRESS NOTE ADULT - PROBLEM SELECTOR PLAN 2
wound care consulted, cont dressing  cont zosyn renal dosing, s/p 1 dose of vanco,    blood culture neg to date  ID consult appreciated  wound culture with strep mitis/oralis

## 2021-12-16 NOTE — PROGRESS NOTE ADULT - PROBLEM SELECTOR PLAN 1
Cont on tele, monitor for arrhythmia,   -CT showed No pulmonary embolus is noted. Multiple nodules are noted within both lungs. Primary consideration is   metastasis. Findings suggestive of malignant pleural effusions bilaterally.  -s/p Thoracentesis on 12/15 by IR, drained appr 660cc pleural fluid, pleural fluid studies in progress  - Heme/ Onc consult appreciated,  IR to do bx of lung nodules after ct a/p, will get ct today  - Pulm consult appreciated  -cxr showed no pneumothorax  -f/u echo

## 2021-12-16 NOTE — PROGRESS NOTE ADULT - SUBJECTIVE AND OBJECTIVE BOX
Follow Up:      Inverval History/ROS:Patient is a 66y old  Female who presents with a chief complaint of SOB dizziness & nausea x couple of weeks (16 Dec 2021 15:20)    No fever  C/o right groin pain    Allergies    No Known Allergies    Intolerances        ANTIMICROBIALS:  piperacillin/tazobactam IVPB.. 3.375 every 12 hours      OTHER MEDS:  acetaminophen     Tablet .. 650 milliGRAM(s) Oral every 6 hours PRN  amLODIPine   Tablet 10 milliGRAM(s) Oral daily  Dakins Solution - 1/4 Strength 1 Application(s) Topical daily  dextrose 40% Gel 15 Gram(s) Oral once  dextrose 5%. 1000 milliLiter(s) IV Continuous <Continuous>  dextrose 5%. 1000 milliLiter(s) IV Continuous <Continuous>  dextrose 50% Injectable 25 Gram(s) IV Push once  dextrose 50% Injectable 12.5 Gram(s) IV Push once  dextrose 50% Injectable 25 Gram(s) IV Push once  enalapril 2.5 milliGRAM(s) Oral daily  escitalopram 30 milliGRAM(s) Oral daily  gabapentin 400 milliGRAM(s) Oral three times a day  glucagon  Injectable 1 milliGRAM(s) IntraMuscular once  heparin   Injectable 5000 Unit(s) SubCutaneous every 8 hours  influenza  Vaccine (HIGH DOSE) 0.7 milliLiter(s) IntraMuscular once  insulin glargine Injectable (LANTUS) 56 Unit(s) SubCutaneous at bedtime  insulin lispro (ADMELOG) corrective regimen sliding scale   SubCutaneous three times a day before meals  levothyroxine 125 MICROGram(s) Oral daily  lidocaine/prilocaine Cream 1 Application(s) Topical daily PRN  melatonin 3 milliGRAM(s) Oral at bedtime PRN  oxycodone    5 mG/acetaminophen 325 mG 1 Tablet(s) Oral every 6 hours PRN  pantoprazole    Tablet 40 milliGRAM(s) Oral before breakfast  predniSONE   Tablet 10 milliGRAM(s) Oral two times a day  simvastatin 20 milliGRAM(s) Oral at bedtime  sodium chloride 0.9%. 1000 milliLiter(s) IV Continuous <Continuous>  triamterene 37.5 mG/hydrochlorothiazide 25 mG Tablet 1 Tablet(s) Oral daily      Vital Signs Last 24 Hrs  T(C): 36.9 (16 Dec 2021 11:24), Max: 36.9 (16 Dec 2021 11:24)  T(F): 98.4 (16 Dec 2021 11:24), Max: 98.4 (16 Dec 2021 11:24)  HR: 78 (16 Dec 2021 11:24) (65 - 81)  BP: 134/52 (16 Dec 2021 11:24) (133/53 - 137/74)  BP(mean): --  RR: 18 (16 Dec 2021 11:24) (17 - 18)  SpO2: 98% (16 Dec 2021 11:24) (95% - 98%)    PHYSICAL EXAM:  General: [x ] non-toxic  HEAD/EYES: [ ] PERRL [x ] white sclera [ ] icterus  ENT:  [ ] normal [x ] supple [ ] thrush [ ] pharyngeal exudate  Cardiovascular:   [ ] murmur [x ] normal [ ] PPM/AICD  Respiratory:  [ x] clear to ausculation bilaterally  GI:  [ x] soft, non-tender, normal bowel sounds  :  [ ] ibanez [ x] no CVA tenderness   Musculoskeletal:  [ ] no synovitis  Neurologic:  [ ] non-focal exam   Skin:  [ x] right groin - open wound- no purulence  No erythema  Lymph: [ ] no lymphadenopathy  Psychiatric:  [ ] appropriate affect [ ] alert & oriented  Lines:  [x ] no phlebitis [ ] central line                                8.5    7.93  )-----------( 282      ( 16 Dec 2021 06:47 )             26.2       12-16    131<L>  |  94<L>  |  37<H>  ----------------------------<  240<H>  4.6   |  28  |  1.27    Ca    10.4      16 Dec 2021 06:47  Phos  3.0     12-16  Mg     1.60     12-16    TPro  5.9<L>  /  Alb  3.2<L>  /  TBili  0.4  /  DBili  x   /  AST  8   /  ALT  7   /  AlkPhos  63  12-15      Urinalysis Basic - ( 15 Dec 2021 08:52 )    Color: Yellow / Appearance: Clear / S.027 / pH: x  Gluc: x / Ketone: Negative  / Bili: Negative / Urobili: <2 mg/dL   Blood: x / Protein: 30 mg/dL / Nitrite: Negative   Leuk Esterase: Large / RBC: 7 /HPF / WBC 19 /HPF   Sq Epi: x / Non Sq Epi: 4 /HPF / Bacteria: Negative        MICROBIOLOGY:Culture Results:   No growth (12-15-21 @ 12:23)  Culture Results:   Testing in progress (12-15-21 @ 12:23)  Culture Results:   No growth to date. (12-15-21 @ 06:02)  Culture Results:   Numerous Streptococcus mitis/oralis group  "Susceptibilities not performed" (12-15-21 @ 02:05)      RADIOLOGY:

## 2021-12-16 NOTE — PROGRESS NOTE ADULT - SUBJECTIVE AND OBJECTIVE BOX
Patient is a 66y old  Female who presents with a chief complaint of SOB dizziness & nausea x couple of weeks (15 Dec 2021 18:49)      SUBJECTIVE / OVERNIGHT EVENTS: Pt seen and examined at 11am, no overnight events, tele with sinus rhythm, pt reports that sob is much better today, off of oxygen, sats good on RA,  still with dizziness upon standing, reports rt sided upper abdominal pain, denies any chest pain, nausea or vomiting. No other new issues reported.        MEDICATIONS  (STANDING):  amLODIPine   Tablet 10 milliGRAM(s) Oral daily  Dakins Solution - 1/4 Strength 1 Application(s) Topical daily  dextrose 40% Gel 15 Gram(s) Oral once  dextrose 5%. 1000 milliLiter(s) (50 mL/Hr) IV Continuous <Continuous>  dextrose 5%. 1000 milliLiter(s) (100 mL/Hr) IV Continuous <Continuous>  dextrose 50% Injectable 25 Gram(s) IV Push once  dextrose 50% Injectable 12.5 Gram(s) IV Push once  dextrose 50% Injectable 25 Gram(s) IV Push once  enalapril 2.5 milliGRAM(s) Oral daily  escitalopram 30 milliGRAM(s) Oral daily  gabapentin 400 milliGRAM(s) Oral three times a day  glucagon  Injectable 1 milliGRAM(s) IntraMuscular once  heparin   Injectable 5000 Unit(s) SubCutaneous every 8 hours  influenza  Vaccine (HIGH DOSE) 0.7 milliLiter(s) IntraMuscular once  insulin glargine Injectable (LANTUS) 56 Unit(s) SubCutaneous at bedtime  insulin lispro (ADMELOG) corrective regimen sliding scale   SubCutaneous three times a day before meals  levothyroxine 125 MICROGram(s) Oral daily  pantoprazole    Tablet 40 milliGRAM(s) Oral before breakfast  piperacillin/tazobactam IVPB.. 3.375 Gram(s) IV Intermittent every 12 hours  predniSONE   Tablet 10 milliGRAM(s) Oral two times a day  simvastatin 20 milliGRAM(s) Oral at bedtime  sodium chloride 0.9%. 1000 milliLiter(s) (75 mL/Hr) IV Continuous <Continuous>  triamterene 37.5 mG/hydrochlorothiazide 25 mG Tablet 1 Tablet(s) Oral daily    MEDICATIONS  (PRN):  acetaminophen     Tablet .. 650 milliGRAM(s) Oral every 6 hours PRN Temp greater or equal to 38C (100.4F), Mild Pain (1 - 3), Moderate Pain (4 - 6)  lidocaine/prilocaine Cream 1 Application(s) Topical daily PRN Dressing changes  melatonin 3 milliGRAM(s) Oral at bedtime PRN Insomnia  oxycodone    5 mG/acetaminophen 325 mG 1 Tablet(s) Oral every 6 hours PRN Severe Pain (7 - 10)      Vital Signs Last 24 Hrs  T(C): 36.9 (16 Dec 2021 11:24), Max: 36.9 (16 Dec 2021 11:24)  T(F): 98.4 (16 Dec 2021 11:24), Max: 98.4 (16 Dec 2021 11:24)  HR: 78 (16 Dec 2021 11:24) (65 - 81)  BP: 134/52 (16 Dec 2021 11:24) (133/53 - 143/60)  BP(mean): --  RR: 18 (16 Dec 2021 11:24) (17 - 18)  SpO2: 98% (16 Dec 2021 11:24) (95% - 98%)  CAPILLARY BLOOD GLUCOSE      POCT Blood Glucose.: 240 mg/dL (16 Dec 2021 11:47)  POCT Blood Glucose.: 203 mg/dL (16 Dec 2021 08:15)  POCT Blood Glucose.: 297 mg/dL (15 Dec 2021 20:50)  POCT Blood Glucose.: 254 mg/dL (15 Dec 2021 16:53)    I&O's Summary      PHYSICAL EXAM:  GENERAL: NAD, well-developed  CHEST/LUNG: Left lung field mid to lower lung field with crackles, rt base with crackles  HEART: Regular rate and rhythm  ABDOMEN: Soft, tenderness in the RUQ, Nondistended  EXTREMITIES: no LE edema  PSYCH: Calm  NEUROLOGY: AAOx3  SKIN: Rt groin wound with packing+    LABS:                        8.5    7.93  )-----------( 282      ( 16 Dec 2021 06:47 )             26.2     12-16    131<L>  |  94<L>  |  37<H>  ----------------------------<  240<H>  4.6   |  28  |  1.27    Ca    10.4      16 Dec 2021 06:47  Phos  3.0     12-16  Mg     1.60     12-16    TPro  5.9<L>  /  Alb  3.2<L>  /  TBili  0.4  /  DBili  x   /  AST  8   /  ALT  7   /  AlkPhos  63  12-15    PT/INR - ( 15 Dec 2021 04:32 )   PT: 13.3 sec;   INR: 1.18 ratio         PTT - ( 15 Dec 2021 04:32 )  PTT:22.7 sec      Urinalysis Basic - ( 15 Dec 2021 08:52 )    Color: Yellow / Appearance: Clear / S.027 / pH: x  Gluc: x / Ketone: Negative  / Bili: Negative / Urobili: <2 mg/dL   Blood: x / Protein: 30 mg/dL / Nitrite: Negative   Leuk Esterase: Large / RBC: 7 /HPF / WBC 19 /HPF   Sq Epi: x / Non Sq Epi: 4 /HPF / Bacteria: Negative        RADIOLOGY & ADDITIONAL TESTS:    Imaging Personally Reviewed:    Consultant(s) Notes Reviewed:      Care Discussed with Consultants/Other Providers:

## 2021-12-16 NOTE — PROGRESS NOTE ADULT - ASSESSMENT
67 year old with vulvar cancer s/p surgery and chemotherapy.    She presented with shortness of breath and was found to have new PEs.     She has a chronic groin wound.  She had recent fever  Prior PET scan with some avid lymph nodes to right groin (noted in May)    I suspect prior radiation injury, mets to lymph node, ? necrosis of lymph nodes are key factors in development of wound/ sinus tract and are factors that are limiting wound healing.    I do not think infection is the cuase of the wound or the reason it is not healing.    Wound culture with strep species of unclear significance.    Out of an abundance of caution, can give 10 d course of antibiotics. can finish course with augmentin 875 q 12     Outpt wound care.     Will sign off  Call with questions

## 2021-12-17 NOTE — CONSULT NOTE ADULT - SUBJECTIVE AND OBJECTIVE BOX
HPI:  67 yo F with HTN, HLD, type 2DM, hypothyroidism, fibromyalgia, vulvar cancer s/p surgery 2020, RT/chemo 2021, anemia presenting with HERNANDEZ and dizziness with imaging concerning for metastatic disease and pleural effusions bilaterally. Endocrine was consulted for T2DM management.    Patient was diagnosed with *** in ***. Has been on insulin for *** years.   Diabetes complications include:  Reports family history of DM in ***.    Denies blurry vision, polyuria, polydipsia, and paresthesias.  Patient has been receiving lantus (56 units QHS) with low dose lispro correction scale only. Glucoses are above goal consistently in 200-300s. No hypos.    Hypothyroidism    TSH was   Levothyroxine was increased to 125 mcg this admission.    Patient is on prednisone 10 mg bid    Endocrinologist:    Last HbA1c: 7.7%    Home DM regimen:    Inpatient DM regimen:      PAST MEDICAL & SURGICAL HISTORY:  Lichen sclerosus of female genitalia    Vulva cancer  bilateral 2014 reoccurance 2017    HTN (hypertension)    TIA (transient ischemic attack)  8/2013  blurred vision/dizziness no treatment    DM type 2 (diabetes mellitus, type 2)  1995    Hypothyroid    Thalassemia minor    Strabismus    Fibromyalgia    HLD (hyperlipidemia)    Anxiety and depression    GERD (gastroesophageal reflux disease)    Obesity    S/P laparoscopic cholecystectomy  2000    S/P eye surgery  June 2014, for strabismus    Vulvar neoplasm  s/p radical anterior vulvectomy in 2014 with radical excision in 2017    H/O total knee replacement, right  2016        FAMILY HISTORY:  Family history of colon cancer (Aunt)    Family history of diabetes mellitus in mother (Aunt, Mother, Sibling)        Social History:  Tobacco:  ETOH:  Drug use:    Outpatient Medications:    MEDICATIONS  (STANDING):  amLODIPine   Tablet 10 milliGRAM(s) Oral daily  Dakins Solution - 1/4 Strength 1 Application(s) Topical daily  dextrose 40% Gel 15 Gram(s) Oral once  dextrose 5%. 1000 milliLiter(s) (50 mL/Hr) IV Continuous <Continuous>  dextrose 5%. 1000 milliLiter(s) (100 mL/Hr) IV Continuous <Continuous>  dextrose 50% Injectable 25 Gram(s) IV Push once  dextrose 50% Injectable 25 Gram(s) IV Push once  dextrose 50% Injectable 12.5 Gram(s) IV Push once  enalapril 2.5 milliGRAM(s) Oral daily  escitalopram 30 milliGRAM(s) Oral daily  gabapentin 400 milliGRAM(s) Oral three times a day  glucagon  Injectable 1 milliGRAM(s) IntraMuscular once  heparin   Injectable 5000 Unit(s) SubCutaneous every 8 hours  influenza  Vaccine (HIGH DOSE) 0.7 milliLiter(s) IntraMuscular once  insulin glargine Injectable (LANTUS) 56 Unit(s) SubCutaneous at bedtime  insulin lispro (ADMELOG) corrective regimen sliding scale   SubCutaneous three times a day before meals  levothyroxine 125 MICROGram(s) Oral daily  pantoprazole    Tablet 40 milliGRAM(s) Oral before breakfast  piperacillin/tazobactam IVPB.. 3.375 Gram(s) IV Intermittent every 12 hours  predniSONE   Tablet 10 milliGRAM(s) Oral two times a day  simvastatin 20 milliGRAM(s) Oral at bedtime  triamterene 37.5 mG/hydrochlorothiazide 25 mG Tablet 1 Tablet(s) Oral daily    MEDICATIONS  (PRN):  acetaminophen     Tablet .. 650 milliGRAM(s) Oral every 6 hours PRN Temp greater or equal to 38C (100.4F), Mild Pain (1 - 3), Moderate Pain (4 - 6)  lidocaine/prilocaine Cream 1 Application(s) Topical daily PRN Dressing changes  melatonin 3 milliGRAM(s) Oral at bedtime PRN Insomnia  oxycodone    5 mG/acetaminophen 325 mG 1 Tablet(s) Oral every 6 hours PRN Severe Pain (7 - 10)      Allergies    No Known Allergies    Intolerances        Review of Systems:  Constitutional: No fever, good appetite/po intake  Eyes: No blurry vision, diplopia  Neuro: No tremors  HEENT: No pain  Cardiovascular: No chest pain, palpitations  Respiratory: No SOB, no cough  GI: No nausea, vomiting,   : No dysuria, hematuria  Skin: no rash  Psych: no depression  Endocrine: no polyuria, polydipsia  Hem/lymph: no swelling  Osteoporosis: no fractures    ALL OTHER SYSTEMS REVIEWED AND NEGATIVE    PHYSICAL EXAM:  VITALS: T(C): 37.4 (12-17-21 @ 05:00)  T(F): 99.3 (12-17-21 @ 05:00), Max: 99.3 (12-17-21 @ 05:00)  HR: 99 (12-17-21 @ 05:00) (96 - 102)  BP: 166/65 (12-17-21 @ 05:00) (146/48 - 166/65)  RR:  (18 - 18)  SpO2:  (98% - 99%)  Wt(kg): --  GENERAL: NAD, well-groomed, well-developed  EYES: No proptosis, extraocular movements intact,  no lid lag, anicteric  HEENT:  Atraumatic, Normocephalic, moist mucous membranes  THYROID: Normal size, no palpable nodules, no thyromegaly  RESPIRATORY: Clear to auscultation bilaterally; No rales, rhonchi, wheezing, or rubs  CARDIOVASCULAR: Regular rate and rhythm; No murmurs; no peripheral edema  GI: Soft, nontender, non distended, normal bowel sounds  SKIN: Dry, intact, No rashes or lesions  EXTREMITIES: No foot ulcers, distal pedal pulses intact bilaterally  NEURO: sensation intact, no tremors  PSYCH: reactive affect, euthymic mood  CUSHING'S SIGNS: no striae or visible bruising                              8.1    8.41  )-----------( 268      ( 17 Dec 2021 07:45 )             26.6       12-17    128<L>  |  89<L>  |  31<H>  ----------------------------<  376<H>  4.8   |  25  |  1.02    EGFR if : 65  EGFR if non : 56<L>    Ca    10.6<H>      12-17  Mg     1.60     12-17  Phos  2.6     12-17    TPro  5.9<L>  /  Alb  3.2<L>  /  TBili  0.4  /  DBili  x   /  AST  8   /  ALT  7   /  AlkPhos  63  12-15      Thyroid Function Tests:  12-16 @ 06:47 TSH 11.94 FreeT4 0.9 T3 -- Anti TPO -- Anti Thyroglobulin Ab -- TSI --  12-14 @ 08:15 TSH 10.03 FreeT4 0.8 T3 -- Anti TPO -- Anti Thyroglobulin Ab -- TSI --      12-14 Chol 193 Direct LDL -- LDL calculated 130<H> HDL 37<L> Trig 130    Radiology:       A/P: 67 yo F with HTN, HLD, type 2DM, hypothyroidism, fibromyalgia, vulvar cancer s/p surgery 2020, RT/chemo 2021, anemia presenting with HERNANDEZ and dizziness with imaging concerning for metastatic disease and pleural effusions bilaterally. Endocrine was consulted for T2DM management.    #Type 2 Diabetes Mellitus  - A1c 7.7%; home regimen:  - pt will be NPO on 12/17 at [place]  - Recommend [ ] units of lantus QHS - will monitor for now, may need BID dosing on high doses of lantus  - Recommend [ ] units of lispro TIDQAC  - Recommend moderate lispro correction scale TIDQAC and QHS  - Please check FSG before meals and QHS, or q6h while NPO    #Hypertension  - goal BP <130/80  - Management as per primary team    #Hyperlipidemia  - continue simvastatin 20 mg  - f/u outpatient    #Hypothyroidism      #Chronic steroid use    Nasrin Madrigal MD  Attending Physician   Department of Endocrinology, Diabetes and Metabolism   Pager: [phone]    If before 9AM or after 5PM, or on weekends/holidays, please call the Endocrine answering service for assistance ([phone]).  For nonurgent matters, please email [email or web address] for [email or web address].          HPI:  65 yo F with HTN, HLD, type 2DM, hypothyroidism, fibromyalgia, vulvar cancer s/p surgery 2020, RT/chemo 2021, anemia presenting with HERNANDEZ and dizziness with imaging concerning for metastatic disease and pleural effusions bilaterally. Endocrine was consulted for T2DM management.    Patient was diagnosed with T2DM 20 years ago. Has been on insulin for 15 years.   No known microvascular complications. She is up to date with ophtho.  Reports family history of DM in both parents.  Denies blurry vision, polyuria, polydipsia, and paresthesias.  Patient has been receiving lantus (56 units QHS) with low dose lispro correction scale only. Glucoses are above goal consistently in 200-300s.     In terms of her history of hypothyroidism, pt reports taking 112 mcg prior to the hospital. Has been on it for a few years. Denies hx of thyroid cancer, head/neck radiation. TSH was 11.9, FT4 0.9 on 12/16/21, Levothyroxine was increased to 125 mcg this admission.    Patient is on prednisone 10 mg bid for at least two months, states it is for nerve pain. Was not on steroids prior to this. Thinks the dose will be tapered in 1/2022.    Endocrinologist: Dr. Armstrong on [facility] in [place]    Last HbA1c: 7.7%    Home DM regimen:  Tresiba 60 units  novolog 15-30 units with meals (ISF 1:30 but pt does not use scale exactly)  Victoza 1.8 mg daily    Inpatient DM regimen:  lantus 56 unit QHS  LDCS with meals    PAST MEDICAL & SURGICAL HISTORY:  Lichen sclerosus of female genitalia    Vulva cancer  bilateral 2014 reoccurance 2017    HTN (hypertension)    TIA (transient ischemic attack)  8/2013  blurred vision/dizziness no treatment    DM type 2 (diabetes mellitus, type 2)  1995    Hypothyroid    Thalassemia minor    Strabismus    Fibromyalgia    HLD (hyperlipidemia)    Anxiety and depression    GERD (gastroesophageal reflux disease)    Obesity    S/P laparoscopic cholecystectomy  2000    S/P eye surgery  June 2014, for strabismus    Vulvar neoplasm  s/p radical anterior vulvectomy in 2014 with radical excision in 2017    H/O total knee replacement, right  2016        FAMILY HISTORY:  Family history of colon cancer (Aunt)    Family history of diabetes mellitus in mother (Aunt, Mother, Sibling)        Social History:  no etoh, drugs or tobacco    MEDICATIONS  (STANDING):  amLODIPine   Tablet 10 milliGRAM(s) Oral daily  Dakins Solution - 1/4 Strength 1 Application(s) Topical daily  dextrose 40% Gel 15 Gram(s) Oral once  dextrose 5%. 1000 milliLiter(s) (50 mL/Hr) IV Continuous <Continuous>  dextrose 5%. 1000 milliLiter(s) (100 mL/Hr) IV Continuous <Continuous>  dextrose 50% Injectable 25 Gram(s) IV Push once  dextrose 50% Injectable 25 Gram(s) IV Push once  dextrose 50% Injectable 12.5 Gram(s) IV Push once  enalapril 2.5 milliGRAM(s) Oral daily  escitalopram 30 milliGRAM(s) Oral daily  gabapentin 400 milliGRAM(s) Oral three times a day  glucagon  Injectable 1 milliGRAM(s) IntraMuscular once  heparin   Injectable 5000 Unit(s) SubCutaneous every 8 hours  influenza  Vaccine (HIGH DOSE) 0.7 milliLiter(s) IntraMuscular once  insulin glargine Injectable (LANTUS) 56 Unit(s) SubCutaneous at bedtime  insulin lispro (ADMELOG) corrective regimen sliding scale   SubCutaneous three times a day before meals  levothyroxine 125 MICROGram(s) Oral daily  pantoprazole    Tablet 40 milliGRAM(s) Oral before breakfast  piperacillin/tazobactam IVPB.. 3.375 Gram(s) IV Intermittent every 12 hours  predniSONE   Tablet 10 milliGRAM(s) Oral two times a day  simvastatin 20 milliGRAM(s) Oral at bedtime  triamterene 37.5 mG/hydrochlorothiazide 25 mG Tablet 1 Tablet(s) Oral daily    MEDICATIONS  (PRN):  acetaminophen     Tablet .. 650 milliGRAM(s) Oral every 6 hours PRN Temp greater or equal to 38C (100.4F), Mild Pain (1 - 3), Moderate Pain (4 - 6)  lidocaine/prilocaine Cream 1 Application(s) Topical daily PRN Dressing changes  melatonin 3 milliGRAM(s) Oral at bedtime PRN Insomnia  oxycodone    5 mG/acetaminophen 325 mG 1 Tablet(s) Oral every 6 hours PRN Severe Pain (7 - 10)      Allergies    No Known Allergies    Intolerances        Review of Systems:  Constitutional: No fever, good appetite/po intake  Eyes: No blurry vision, diplopia  Neuro: No tremors  HEENT: No pain  Cardiovascular: No chest pain, palpitations  Respiratory: No SOB, no cough  GI: No nausea, vomiting,   : No dysuria, hematuria  Skin: no rash  Psych: no depression  Endocrine: no polyuria, polydipsia  Hem/lymph: no swelling  Osteoporosis: no fractures    ALL OTHER SYSTEMS REVIEWED AND NEGATIVE    PHYSICAL EXAM:  VITALS: T(C): 37.4 (12-17-21 @ 05:00)  T(F): 99.3 (12-17-21 @ 05:00), Max: 99.3 (12-17-21 @ 05:00)  HR: 99 (12-17-21 @ 05:00) (96 - 102)  BP: 166/65 (12-17-21 @ 05:00) (146/48 - 166/65)  RR:  (18 - 18)  SpO2:  (98% - 99%)  Wt(kg): --  GENERAL: NAD, well-groomed, well-developed  EYES: No proptosis, extraocular movements intact,  no lid lag, anicteric  HEENT:  Atraumatic, Normocephalic, moist mucous membranes  THYROID: Normal size, no palpable nodules, no thyromegaly  RESPIRATORY: no distress; on NL; No rales, rhonchi, wheezing, or rubs  CARDIOVASCULAR: Regular rate and rhythm; No murmurs; no peripheral edema  GI: Soft, nontender, non distended, normal bowel sounds  SKIN: Dry, intact, No rashes or lesions  EXTREMITIES: No foot ulcers, distal pedal pulses intact bilaterally  NEURO: sensation intact, no tremors  PSYCH: reactive affect, euthymic mood  CUSHING'S SIGNS: no striae or visible bruising                              8.1    8.41  )-----------( 268      ( 17 Dec 2021 07:45 )             26.6       12-17    128<L>  |  89<L>  |  31<H>  ----------------------------<  376<H>  4.8   |  25  |  1.02    EGFR if : 65  EGFR if non : 56<L>    Ca    10.6<H>      12-17  Mg     1.60     12-17  Phos  2.6     12-17    TPro  5.9<L>  /  Alb  3.2<L>  /  TBili  0.4  /  DBili  x   /  AST  8   /  ALT  7   /  AlkPhos  63  12-15      Thyroid Function Tests:  12-16 @ 06:47 TSH 11.94 FreeT4 0.9 T3 -- Anti TPO -- Anti Thyroglobulin Ab -- TSI --  12-14 @ 08:15 TSH 10.03 FreeT4 0.8 T3 -- Anti TPO -- Anti Thyroglobulin Ab -- TSI --      12-14 Chol 193 Direct LDL -- LDL calculated 130<H> HDL 37<L> Trig 130    Radiology:

## 2021-12-17 NOTE — PROGRESS NOTE ADULT - PROBLEM SELECTOR PLAN 2
wound care consulted, cont dressing  cont zosyn renal dosing, s/p 1 dose of vanco,    blood culture neg to date  ID consult appreciated  wound culture with strep mitis/oralis  per ID cont zosyn, can complete with augmentin

## 2021-12-17 NOTE — PROGRESS NOTE ADULT - ASSESSMENT
Patient is a 67 y/o F w/ a PMHx of HTN, HLD, T2DM, fibromyalgia, recurrent vulvar cancer (recently received chemo/RT from 7/2021-9/2021), and anemia (requiring intermittent transfusions recently) who presented with shortness of breath, dizziness, and nausea for multiple weeks, found to have multiple lung nodules and B/L pleural effusions concerning for malignancy, and was admitted to telemetry for further management, s/p thoracentesis on 12/15 (~ 660cc drained). Oncology was consulted for further evaluation.    # Dyspnea  - CTA Chest (12/13): No pulmonary embolus is noted. Multiple nodules are noted within both lungs. Primary consideration is metastasis. Findings suggestive of malignant pleural effusions bilaterally.  - Appreciate IR evaluation. Pt is s/p thoracentesis on 12/15 (~ 660cc drained). Follow-up cytopathology   - Appreciate Pulmonary evaluation. Continue to follow-up recs  - TTE (12/14) showed a hyperdynamic left ventricle  - Appreciate PT evaluation  - Continue supportive care as per primary team  - Pt initially felt relief after her thoracentesis; however, she reports feeling more short of breath over the last 24 hours. Recommend checking a CXR to assess for reaccumulation of pleural effusion.  - Wean supplemental O2 as tolerated    # Recurrent vulvar cancer  - Last recurrence was earlier this year. She received chemo/RT with weekly Cisplatin from 7/12/21-9/1/21.  - Imaging findings on admission are concerning for progressing malignancy  - Pt s/p thoracentesis as noted above. Follow-up cytopathology   - Follow-up CT A+P (ordered)  - Pending CT A+P results, recommend obtaining a biopsy of one of the lung nodules (if feasible) as this can help establish a diagnosis and can be sent for molecular testing if cancer is confirmed  - Pt with a R inguinal wound/tract on exam. Appreciate Wound Care and ID evaluations. Continue to follow-up recs  - There are no plans for systemic therapy while inpatient  - Continue supportive care as per primary team  - Primary Oncologist: Dr. Paulo Carpenter. Continue outpatient follow-up  - Will continue to follow    # Microcytic Anemia  - Occasionally requiring transfusions (last on 12/11)  - Recently underwent a hematologic evaluation as an outpatient. Her workup was notable for a hemoglobin electrophoresis which was c/w beta thalassemia minor. She had a BMBx on 11/9/21 (minimal marrow obtained to evaluate) which showed erythroid hyperplasia, megakaryocytes normal, no evidence of dysplasia.   - Monitor CBC and keep active T+S. Transfuse for Hgb < 7 or if symptomatic  - Primary Hematologist: Dr. Leana Auguste. Continue outpatient follow-up    Plan d/w primary team BRITTNEY Dudley, PGY6  Hematology-Oncology Fellow  Pager: [phone] / [phone] Patient is a 67 y/o F w/ a PMHx of HTN, HLD, T2DM, fibromyalgia, recurrent vulvar cancer (recently received chemo/RT from 7/2021-9/2021), and anemia (requiring intermittent transfusions recently) who presented with shortness of breath, dizziness, and nausea for multiple weeks, found to have multiple lung nodules and B/L pleural effusions concerning for malignancy, and was admitted to telemetry for further management, s/p thoracentesis on 12/15 (~ 660cc drained). Oncology was consulted for further evaluation.    # Dyspnea  - CTA Chest (12/13): No pulmonary embolus is noted. Multiple nodules are noted within both lungs. Primary consideration is metastasis. Findings suggestive of malignant pleural effusions bilaterally.  - Appreciate IR evaluation. Pt is s/p thoracentesis on 12/15 (~ 660cc drained). Follow-up cytopathology   - Appreciate Pulmonary evaluation. Continue to follow-up recs  - TTE (12/14) showed a hyperdynamic left ventricle  - Appreciate PT evaluation  - Continue supportive care as per primary team  - Pt initially felt relief after her thoracentesis; however, she reports feeling more short of breath over the last 24 hours. Repeat CXR (12/17) showed a persistent pleural effusion and new right pleural effusion.  - Recommend Pulmonary re-evaluation  - Wean supplemental O2 as tolerated    # Constipation  - Pt reports that her last BM was ~ 3 days ago  - Please start patient on a bowel regimen  - Continue to monitor    # Recurrent vulvar cancer  - Last recurrence was earlier this year. She received chemo/RT with weekly Cisplatin from 7/12/21-9/1/21.  - Imaging findings on admission are concerning for progressing malignancy  - Pt s/p thoracentesis as noted above. Follow-up cytopathology   - Follow-up CT A+P (ordered)  - Pending CT A+P results, recommend obtaining a biopsy of one of the lung nodules (if feasible) as this can help establish a diagnosis and can be sent for molecular testing if cancer is confirmed  - Pt with a R inguinal wound/tract on exam. Appreciate Wound Care and ID evaluations. Continue to follow-up recs  - There are no plans for systemic therapy while inpatient  - Continue supportive care as per primary team  - Primary Oncologist: Dr. Paulo Carpenter. Continue outpatient follow-up  - Will continue to follow    # Microcytic Anemia  - Occasionally requiring transfusions (last on 12/11)  - Recently underwent a hematologic evaluation as an outpatient. Her workup was notable for a hemoglobin electrophoresis which was c/w beta thalassemia minor. She had a BMBx on 11/9/21 (minimal marrow obtained to evaluate) which showed erythroid hyperplasia, megakaryocytes normal, no evidence of dysplasia.   - Monitor CBC and keep active T+S. Transfuse for Hgb < 7 or if symptomatic  - Primary Hematologist: Dr. Leana Auguste. Continue outpatient follow-up    Plan d/w primary team BRITTNEY Dudley, PGY6  Hematology-Oncology Fellow  Pager: [phone] / [phone]

## 2021-12-17 NOTE — PROGRESS NOTE ADULT - PROBLEM SELECTOR PLAN 1
Cont on tele, monitor for arrhythmia,   -CT showed No pulmonary embolus is noted. Multiple nodules are noted within both lungs. Primary consideration is   metastasis. Findings suggestive of malignant pleural effusions bilaterally.  -s/p Thoracentesis on 12/15 by IR, drained appr 660cc pleural fluid, exudative, f/u path   - Heme/ Onc consult appreciated,  IR to do bx of lung nodules after ct a/p, awaiting for ct  - Pulm consult appreciated  -cxr showed no pneumothorax  -f/u echo  -rpt cxr today

## 2021-12-17 NOTE — CONSULT NOTE ADULT - ASSESSMENT
A/P: 65 yo F with HTN, HLD, type 2DM, hypothyroidism, fibromyalgia, vulvar cancer s/p surgery 2020, RT/chemo 2021, anemia presenting with HERNANDEZ and dizziness with imaging concerning for metastatic disease and pleural effusions bilaterally. Endocrine was consulted for T2DM management.    #Type 2 Diabetes Mellitus c/b steroid induced hyperglycemia:   - hyperglycemic here due to prednisone + no standing prandial insulin   - A1c 7.7%; home regimen: tresiba 60 units QHS, novolog 15-30 units with meals (ISF 1:30, but usually just estimates), victoza 1.8 mg daily  - Recommend increasing lantus to 68 units QHS- will monitor for now, may need BID dosing on high doses of lantus  - Recommend starting lispro 20 units with meals  - Recommend moderate lispro correction scale TIDQAC and QHS  - Please check FSG before meals and QHS, or q6h while NPO  - inpatient glycemic goals 100-180    #Hypertension  - goal BP <130/80  - Management as per primary team    #Hyperlipidemia  - continue simvastatin 20 mg  - f/u outpatient    #Hypothyroidism  - agree with levothyroxine 125 mcg daily, take daily on empty stomach  - repeat TFTs as outpatient in 6-8 weeks    #Chronic steroid use  - on prednisone 10 mg bid for at least two months, ordered here. Pt cannot stop steroids abruptly as she will have secondary adrenal insufficiency at this point  - pt to follow up in 1/2022 with her doctors with steroid taper  - if any hemodynamic instability, please order IV hydrocortisone 50 mg q6h for stress steroids    Discussed with primary team  adjusted insulin orders as above    Nasrin Madrigal MD  Attending Physician   Department of Endocrinology, Diabetes and Metabolism   Pager: [phone]    If before 9AM or after 5PM, or on weekends/holidays, please call the Endocrine answering service for assistance ([phone]).  For nonurgent matters, please email [email or web address] for assistance.

## 2021-12-17 NOTE — PROGRESS NOTE ADULT - PROBLEM SELECTOR PLAN 8
Continue Basal Insulin, Check Fingersticks with Meals and cover with ISS TID,    HgbA1C 7.7  glucose elevated pt also on steroids, will get endo consult, insulin per endo recs  f/u endo recs

## 2021-12-17 NOTE — PROGRESS NOTE ADULT - SUBJECTIVE AND OBJECTIVE BOX
Patient is a 66y old  Female who presents with a chief complaint of SOB dizziness & nausea x couple of weeks (17 Dec 2021 13:48)    SUBJECTIVE / OVERNIGHT EVENTS:  Chart reviewed and previous events noted. Patient seen this AM. She reports that she has been feeling more short of breath over the last 24 hours. She continues to experience dizziness with standing. No complaints of chest pain. Patient was afebrile overnight.    MEDICATIONS  (STANDING):  amLODIPine   Tablet 10 milliGRAM(s) Oral daily  Dakins Solution - 1/4 Strength 1 Application(s) Topical daily  dextrose 40% Gel 15 Gram(s) Oral once  dextrose 5%. 1000 milliLiter(s) (50 mL/Hr) IV Continuous <Continuous>  dextrose 5%. 1000 milliLiter(s) (100 mL/Hr) IV Continuous <Continuous>  dextrose 50% Injectable 25 Gram(s) IV Push once  dextrose 50% Injectable 25 Gram(s) IV Push once  dextrose 50% Injectable 12.5 Gram(s) IV Push once  enalapril 2.5 milliGRAM(s) Oral daily  escitalopram 30 milliGRAM(s) Oral daily  gabapentin 400 milliGRAM(s) Oral three times a day  glucagon  Injectable 1 milliGRAM(s) IntraMuscular once  heparin   Injectable 5000 Unit(s) SubCutaneous every 8 hours  influenza  Vaccine (HIGH DOSE) 0.7 milliLiter(s) IntraMuscular once  insulin glargine Injectable (LANTUS) 56 Unit(s) SubCutaneous at bedtime  insulin lispro (ADMELOG) corrective regimen sliding scale   SubCutaneous three times a day before meals  levothyroxine 125 MICROGram(s) Oral daily  pantoprazole    Tablet 40 milliGRAM(s) Oral before breakfast  piperacillin/tazobactam IVPB.. 3.375 Gram(s) IV Intermittent every 12 hours  predniSONE   Tablet 10 milliGRAM(s) Oral two times a day  simvastatin 20 milliGRAM(s) Oral at bedtime  triamterene 37.5 mG/hydrochlorothiazide 25 mG Tablet 1 Tablet(s) Oral daily    MEDICATIONS  (PRN):  acetaminophen     Tablet .. 650 milliGRAM(s) Oral every 6 hours PRN Temp greater or equal to 38C (100.4F), Mild Pain (1 - 3), Moderate Pain (4 - 6)  lidocaine/prilocaine Cream 1 Application(s) Topical daily PRN Dressing changes  melatonin 3 milliGRAM(s) Oral at bedtime PRN Insomnia  oxycodone    5 mG/acetaminophen 325 mG 1 Tablet(s) Oral every 6 hours PRN Severe Pain (7 - 10)      CAPILLARY BLOOD GLUCOSE      POCT Blood Glucose.: 329 mg/dL (17 Dec 2021 12:18)  POCT Blood Glucose.: 345 mg/dL (17 Dec 2021 08:21)  POCT Blood Glucose.: 329 mg/dL (16 Dec 2021 20:35)  POCT Blood Glucose.: 241 mg/dL (16 Dec 2021 16:44)    I&O's Summary    Vital Signs Last 24 Hrs  T(C): 37.4 (17 Dec 2021 05:00), Max: 37.4 (17 Dec 2021 05:00)  T(F): 99.3 (17 Dec 2021 05:00), Max: 99.3 (17 Dec 2021 05:00)  HR: 99 (17 Dec 2021 05:00) (96 - 102)  BP: 166/65 (17 Dec 2021 05:00) (146/48 - 166/65)  BP(mean): --  RR: 18 (17 Dec 2021 05:00) (18 - 18)  SpO2: 98% (17 Dec 2021 05:00) (98% - 99%)    PHYSICAL EXAM:  GENERAL: NAD, Sitting in bed  HEENT: NC/AT, Sclera anicteric  NECK: Supple  CHEST/LUNG: Mild tachypnea, No wheezing appreciated  HEART: RRR; +S1/S2  ABDOMEN: +BS, Soft, NT  EXTREMITIES: No LE edema  NEUROLOGY: Awake and alert, Answering questions and following commands appropriately  SKIN: R inguinal wound/tract (packed)  PSYCH: Calm and cooperative    LABS:                        8.1    8.41  )-----------( 268      ( 17 Dec 2021 07:45 )             26.6     12-17    128<L>  |  89<L>  |  31<H>  ----------------------------<  376<H>  4.8   |  25  |  1.02    Ca    10.6<H>      17 Dec 2021 07:45  Phos  2.6     12-17  Mg     1.60     12-17    RADIOLOGY & ADDITIONAL TESTS:  Studies reviewed. Patient is a 66y old  Female who presents with a chief complaint of SOB dizziness & nausea x couple of weeks (17 Dec 2021 13:48)    SUBJECTIVE / OVERNIGHT EVENTS:  Chart reviewed and previous events noted. Patient seen this AM. She reports that she has been feeling more short of breath over the last 24 hours. She continues to experience dizziness with standing. She also endorses constipation. She states her last BM was ~ 3 days ago. No complaints of chest pain. Patient was afebrile overnight.    MEDICATIONS  (STANDING):  amLODIPine   Tablet 10 milliGRAM(s) Oral daily  Dakins Solution - 1/4 Strength 1 Application(s) Topical daily  dextrose 40% Gel 15 Gram(s) Oral once  dextrose 5%. 1000 milliLiter(s) (50 mL/Hr) IV Continuous <Continuous>  dextrose 5%. 1000 milliLiter(s) (100 mL/Hr) IV Continuous <Continuous>  dextrose 50% Injectable 25 Gram(s) IV Push once  dextrose 50% Injectable 25 Gram(s) IV Push once  dextrose 50% Injectable 12.5 Gram(s) IV Push once  enalapril 2.5 milliGRAM(s) Oral daily  escitalopram 30 milliGRAM(s) Oral daily  gabapentin 400 milliGRAM(s) Oral three times a day  glucagon  Injectable 1 milliGRAM(s) IntraMuscular once  heparin   Injectable 5000 Unit(s) SubCutaneous every 8 hours  influenza  Vaccine (HIGH DOSE) 0.7 milliLiter(s) IntraMuscular once  insulin glargine Injectable (LANTUS) 56 Unit(s) SubCutaneous at bedtime  insulin lispro (ADMELOG) corrective regimen sliding scale   SubCutaneous three times a day before meals  levothyroxine 125 MICROGram(s) Oral daily  pantoprazole    Tablet 40 milliGRAM(s) Oral before breakfast  piperacillin/tazobactam IVPB.. 3.375 Gram(s) IV Intermittent every 12 hours  predniSONE   Tablet 10 milliGRAM(s) Oral two times a day  simvastatin 20 milliGRAM(s) Oral at bedtime  triamterene 37.5 mG/hydrochlorothiazide 25 mG Tablet 1 Tablet(s) Oral daily    MEDICATIONS  (PRN):  acetaminophen     Tablet .. 650 milliGRAM(s) Oral every 6 hours PRN Temp greater or equal to 38C (100.4F), Mild Pain (1 - 3), Moderate Pain (4 - 6)  lidocaine/prilocaine Cream 1 Application(s) Topical daily PRN Dressing changes  melatonin 3 milliGRAM(s) Oral at bedtime PRN Insomnia  oxycodone    5 mG/acetaminophen 325 mG 1 Tablet(s) Oral every 6 hours PRN Severe Pain (7 - 10)      CAPILLARY BLOOD GLUCOSE      POCT Blood Glucose.: 329 mg/dL (17 Dec 2021 12:18)  POCT Blood Glucose.: 345 mg/dL (17 Dec 2021 08:21)  POCT Blood Glucose.: 329 mg/dL (16 Dec 2021 20:35)  POCT Blood Glucose.: 241 mg/dL (16 Dec 2021 16:44)    I&O's Summary    Vital Signs Last 24 Hrs  T(C): 37.4 (17 Dec 2021 05:00), Max: 37.4 (17 Dec 2021 05:00)  T(F): 99.3 (17 Dec 2021 05:00), Max: 99.3 (17 Dec 2021 05:00)  HR: 99 (17 Dec 2021 05:00) (96 - 102)  BP: 166/65 (17 Dec 2021 05:00) (146/48 - 166/65)  BP(mean): --  RR: 18 (17 Dec 2021 05:00) (18 - 18)  SpO2: 98% (17 Dec 2021 05:00) (98% - 99%)    PHYSICAL EXAM:  GENERAL: NAD, Sitting in bed  HEENT: NC/AT, Sclera anicteric  NECK: Supple  CHEST/LUNG: Mild tachypnea, No wheezing appreciated  HEART: RRR; +S1/S2  ABDOMEN: +BS, Soft, NT  EXTREMITIES: No LE edema  NEUROLOGY: Awake and alert, Answering questions and following commands appropriately  SKIN: R inguinal wound/tract (packed)  PSYCH: Calm and cooperative    LABS:                        8.1    8.41  )-----------( 268      ( 17 Dec 2021 07:45 )             26.6     12-17    128<L>  |  89<L>  |  31<H>  ----------------------------<  376<H>  4.8   |  25  |  1.02    Ca    10.6<H>      17 Dec 2021 07:45  Phos  2.6     12-17  Mg     1.60     12-17    RADIOLOGY & ADDITIONAL TESTS:  Studies reviewed.

## 2021-12-17 NOTE — PROGRESS NOTE ADULT - SUBJECTIVE AND OBJECTIVE BOX
Patient is a 66y old  Female who presents with a chief complaint of SOB dizziness & nausea x couple of weeks (17 Dec 2021 14:05)      SUBJECTIVE / OVERNIGHT EVENTS: Pt seen and examined at 11am, no overnight events, tele with sinus rhythm, pt reports increasing sob today, felt better after thoracentesis but today is again sob, still with dizziness upon standing, reports rt sided flank/back pain, denies any dysuria, urinary frequency, chest pain, nausea or vomiting. No other new issues reported.        MEDICATIONS  (STANDING):  amLODIPine   Tablet 10 milliGRAM(s) Oral daily  Dakins Solution - 1/4 Strength 1 Application(s) Topical daily  dextrose 40% Gel 15 Gram(s) Oral once  dextrose 5%. 1000 milliLiter(s) (50 mL/Hr) IV Continuous <Continuous>  dextrose 5%. 1000 milliLiter(s) (100 mL/Hr) IV Continuous <Continuous>  dextrose 50% Injectable 25 Gram(s) IV Push once  dextrose 50% Injectable 25 Gram(s) IV Push once  dextrose 50% Injectable 12.5 Gram(s) IV Push once  enalapril 2.5 milliGRAM(s) Oral daily  escitalopram 30 milliGRAM(s) Oral daily  gabapentin 400 milliGRAM(s) Oral three times a day  glucagon  Injectable 1 milliGRAM(s) IntraMuscular once  heparin   Injectable 5000 Unit(s) SubCutaneous every 8 hours  influenza  Vaccine (HIGH DOSE) 0.7 milliLiter(s) IntraMuscular once  insulin glargine Injectable (LANTUS) 56 Unit(s) SubCutaneous at bedtime  insulin lispro (ADMELOG) corrective regimen sliding scale   SubCutaneous three times a day before meals  levothyroxine 125 MICROGram(s) Oral daily  pantoprazole    Tablet 40 milliGRAM(s) Oral before breakfast  piperacillin/tazobactam IVPB.. 3.375 Gram(s) IV Intermittent every 12 hours  predniSONE   Tablet 10 milliGRAM(s) Oral two times a day  simvastatin 20 milliGRAM(s) Oral at bedtime  triamterene 37.5 mG/hydrochlorothiazide 25 mG Tablet 1 Tablet(s) Oral daily    MEDICATIONS  (PRN):  acetaminophen     Tablet .. 650 milliGRAM(s) Oral every 6 hours PRN Temp greater or equal to 38C (100.4F), Mild Pain (1 - 3), Moderate Pain (4 - 6)  lidocaine/prilocaine Cream 1 Application(s) Topical daily PRN Dressing changes  melatonin 3 milliGRAM(s) Oral at bedtime PRN Insomnia  oxycodone    5 mG/acetaminophen 325 mG 1 Tablet(s) Oral every 6 hours PRN Severe Pain (7 - 10)      Vital Signs Last 24 Hrs  T(C): 37.4 (17 Dec 2021 05:00), Max: 37.4 (17 Dec 2021 05:00)  T(F): 99.3 (17 Dec 2021 05:00), Max: 99.3 (17 Dec 2021 05:00)  HR: 99 (17 Dec 2021 05:00) (96 - 102)  BP: 166/65 (17 Dec 2021 05:00) (146/48 - 166/65)  BP(mean): --  RR: 18 (17 Dec 2021 05:00) (18 - 18)  SpO2: 98% (17 Dec 2021 05:00) (98% - 99%)  CAPILLARY BLOOD GLUCOSE      POCT Blood Glucose.: 329 mg/dL (17 Dec 2021 12:18)  POCT Blood Glucose.: 345 mg/dL (17 Dec 2021 08:21)  POCT Blood Glucose.: 329 mg/dL (16 Dec 2021 20:35)  POCT Blood Glucose.: 241 mg/dL (16 Dec 2021 16:44)    I&O's Summary      PHYSICAL EXAM:  GENERAL: NAD, well-developed  CHEST/LUNG: Left lung field mid to lower lung field with decreased bs, rt base with crackles  HEART: Regular rate and rhythm  ABDOMEN: Soft, nontender, Nondistended  EXTREMITIES: no LE edema  PSYCH: Calm  NEUROLOGY: AAOx3  SKIN: Rt groin wound with packing+    LABS:                        8.1    8.41  )-----------( 268      ( 17 Dec 2021 07:45 )             26.6     12-17    128<L>  |  89<L>  |  31<H>  ----------------------------<  376<H>  4.8   |  25  |  1.02    Ca    10.6<H>      17 Dec 2021 07:45  Phos  2.6     12-17  Mg     1.60     12-17                RADIOLOGY & ADDITIONAL TESTS:    Imaging Personally Reviewed:    Consultant(s) Notes Reviewed:      Care Discussed with Consultants/Other Providers:

## 2021-12-18 NOTE — PROGRESS NOTE ADULT - ASSESSMENT
67 yo F w/ PMHx of HTN, HLD, DM, fibromyalgia, Vulvar CA s/p vulva surgery in 2020, completed chemo & RT in 9/21, now p/w SOB, dizziness & nausea for the last couple of weeks.  CTPA- No pulmonary embolus is noted. Multiple nodules are noted within both lungs. Primary consideration is metastasis. Findings suggestive of malignant pleural effusions bilaterally.

## 2021-12-18 NOTE — PROGRESS NOTE ADULT - SUBJECTIVE AND OBJECTIVE BOX
PROGRESS NOTE:   Authored by Dr. Marissa Riddle MD, pager [phone]     Patient is a 66y old  Female who presents with a chief complaint of SOB dizziness & nausea x couple of weeks (17 Dec 2021 14:40)      SUBJECTIVE / OVERNIGHT EVENTS:  Patient complains of not having a BM x 5 days. Continues to endorse SOB, particularly with exertion. No fevers, chill, N/V/D, dysuria, or hematuria.     ADDITIONAL REVIEW OF SYSTEMS:    MEDICATIONS  (STANDING):  amLODIPine   Tablet 10 milliGRAM(s) Oral daily  Dakins Solution - 1/4 Strength 1 Application(s) Topical daily  dextrose 40% Gel 15 Gram(s) Oral once  dextrose 5%. 1000 milliLiter(s) (50 mL/Hr) IV Continuous <Continuous>  dextrose 5%. 1000 milliLiter(s) (100 mL/Hr) IV Continuous <Continuous>  dextrose 50% Injectable 25 Gram(s) IV Push once  dextrose 50% Injectable 12.5 Gram(s) IV Push once  dextrose 50% Injectable 25 Gram(s) IV Push once  enalapril 2.5 milliGRAM(s) Oral daily  escitalopram 30 milliGRAM(s) Oral daily  gabapentin 400 milliGRAM(s) Oral three times a day  glucagon  Injectable 1 milliGRAM(s) IntraMuscular once  heparin   Injectable 5000 Unit(s) SubCutaneous every 8 hours  influenza  Vaccine (HIGH DOSE) 0.7 milliLiter(s) IntraMuscular once  insulin glargine Injectable (LANTUS) 68 Unit(s) SubCutaneous at bedtime  insulin lispro (ADMELOG) corrective regimen sliding scale   SubCutaneous three times a day before meals  insulin lispro (ADMELOG) corrective regimen sliding scale   SubCutaneous at bedtime  insulin lispro Injectable (ADMELOG) 20 Unit(s) SubCutaneous three times a day with meals  levothyroxine 125 MICROGram(s) Oral daily  mineral oil enema 133 milliLiter(s) Rectal once  pantoprazole    Tablet 40 milliGRAM(s) Oral before breakfast  piperacillin/tazobactam IVPB.. 3.375 Gram(s) IV Intermittent every 12 hours  polyethylene glycol 3350 17 Gram(s) Oral daily  predniSONE   Tablet 10 milliGRAM(s) Oral two times a day  senna 2 Tablet(s) Oral at bedtime  simvastatin 20 milliGRAM(s) Oral at bedtime  triamterene 37.5 mG/hydrochlorothiazide 25 mG Tablet 1 Tablet(s) Oral daily    MEDICATIONS  (PRN):  acetaminophen     Tablet .. 650 milliGRAM(s) Oral every 6 hours PRN Temp greater or equal to 38C (100.4F), Mild Pain (1 - 3), Moderate Pain (4 - 6)  lidocaine/prilocaine Cream 1 Application(s) Topical daily PRN Dressing changes  melatonin 3 milliGRAM(s) Oral at bedtime PRN Insomnia  oxycodone    5 mG/acetaminophen 325 mG 1 Tablet(s) Oral every 6 hours PRN Severe Pain (7 - 10)      CAPILLARY BLOOD GLUCOSE      POCT Blood Glucose.: 156 mg/dL (18 Dec 2021 11:34)  POCT Blood Glucose.: 155 mg/dL (18 Dec 2021 07:54)  POCT Blood Glucose.: 267 mg/dL (17 Dec 2021 21:13)  POCT Blood Glucose.: 340 mg/dL (17 Dec 2021 16:41)    I&O's Summary      PHYSICAL EXAM:  Vital Signs Last 24 Hrs  T(C): 37.1 (18 Dec 2021 12:02), Max: 37.1 (18 Dec 2021 12:02)  T(F): 98.7 (18 Dec 2021 12:02), Max: 98.7 (18 Dec 2021 12:02)  HR: 79 (18 Dec 2021 12:02) (79 - 96)  BP: 120/60 (18 Dec 2021 12:02) (120/60 - 157/70)  BP(mean): --  RR: 18 (18 Dec 2021 12:02) (18 - 18)  SpO2: 96% (18 Dec 2021 12:02) (96% - 97%)    CONSTITUTIONAL: NAD, well-developed  RESPIRATORY: Normal respiratory effort; mildly diminished breath sounds on L side  CARDIOVASCULAR: Regular rate and rhythm, normal S1 and S2, no murmur/rub/gallop; No lower extremity edema; Peripheral pulses are 2+ bilaterally  ABDOMEN: Nontender to palpation, normoactive bowel sounds, no rebound/guarding; No hepatosplenomegaly  MUSCULOSKELETAL: no clubbing or cyanosis of digits; no joint swelling or tenderness to palpation  PSYCH: A+O to person, place, and time; affect appropriate    LABS:                        8.1    8.41  )-----------( 268      ( 17 Dec 2021 07:45 )             26.6     12-17    132<L>  |  94<L>  |  29<H>  ----------------------------<  313<H>  4.6   |  29  |  1.05    Ca    10.9<H>      17 Dec 2021 17:23  Phos  2.4     12-17  Mg     1.50     12-17                  RADIOLOGY & ADDITIONAL TESTS:  Results Reviewed:   Imaging Personally Reviewed:  Electrocardiogram Personally Reviewed:    COORDINATION OF CARE:  Care Discussed with Consultants/Other Providers [Y/N]:  Prior or Outpatient Records Reviewed [Y/N]:

## 2021-12-19 NOTE — PROGRESS NOTE ADULT - PROBLEM SELECTOR PLAN 1
Cont on tele, monitor for arrhythmia,   -CT showed No pulmonary embolus is noted. Multiple nodules are noted within both lungs. Primary consideration is   metastasis. Findings suggestive of malignant pleural effusions bilaterally.  -s/p Thoracentesis on 12/15 by IR, drained appr 660cc pleural fluid, exudative, f/u path   - Heme/ Onc consult appreciated,  IR to do bx of lung nodules after ct a/p, awaiting for ct  - Pulm consult appreciated  -cxr showed no pneumothorax  -f/u echo  -rpt cxr 11/18 with persistent L pleural effusion

## 2021-12-19 NOTE — PROGRESS NOTE ADULT - ASSESSMENT
A/P: 65 yo F with HTN, HLD, type 2DM, hypothyroidism, fibromyalgia, vulvar cancer s/p surgery 2020, RT/chemo 2021, anemia presenting with HERNANDEZ and dizziness with imaging concerning for metastatic disease and pleural effusions bilaterally. Endocrine was consulted for T2DM management.    #Type 2 Diabetes Mellitus c/b steroid induced hyperglycemia:   - hyperglycemic here due to prednisone + no standing prandial insulin   - A1c 7.7%; home regimen: tresiba 60 units QHS, novolog 15-30 units with meals (ISF 1:30, but usually just estimates), victoza 1.8 mg daily  - Glucose at goal  - Recommend continuing lantus to 68 units QHS- will monitor for now, may need BID dosing on high doses of lantus  - Recommend continuing lispro 20 units with meals, HOLD if not eating   - Insulin need may increase when appetite and food intake improves   - Recommend continuing moderate lispro correction scale TIDQAC and QHS  - Please check FSG before meals and QHS, or q6h while NPO  - inpatient glycemic goals 100-180 mg/DL    Discharge planning for DM: continue basal / bolus insulin, schedule follow up with prior outpatient endocrine provider     #Hypertension  - goal BP <130/80  - Management as per primary team    #Hyperlipidemia  - continue simvastatin 20 mg  - f/u outpatient    #Hypothyroidism  - agree with levothyroxine 125 mcg daily, take daily on empty stomach  - repeat TFTs as outpatient in 6-8 weeks    #Chronic steroid use  - on prednisone 10 mg bid for at least two months, ordered here. Pt cannot stop steroids abruptly as she will have secondary adrenal insufficiency at this point  - pt to follow up in 1/2022 with her doctors with steroid taper  - if any hemodynamic instability, please order IV hydrocortisone 50 mg q6h for stress steroids    #Hypercalcemia   - Calcium 12.5 not corrected   - Calcium trending around 10.9 prior to today, Albumin 3.1, corrected Calcium = 11.62   - Agree with initiation of continuous IVF   - Will send PTH, PTHrp, Vit D 25, 125   - Treatment may be required, please trend     LUIS ALBERTO Marques-BC  Nurse Practitioner   Division of Endocrinology  Pager: ADALBERTO pager [phone]    If out of hospital/unavailable when paged, please note: patient will be cared for by another provider on the endocrine service.  Please call the endocrine answering service for assistance to reach covering provider ([phone]). For non-urgent matters, please email [email or web address] for assistance.        A/P: 65 yo F with HTN, HLD, type 2DM, hypothyroidism, fibromyalgia, vulvar cancer s/p surgery 2020, RT/chemo 2021, anemia presenting with HERNANDEZ and dizziness with imaging concerning for metastatic disease and pleural effusions bilaterally. Endocrine was consulted for T2DM management.    #Type 2 Diabetes Mellitus c/b steroid induced hyperglycemia:   - hyperglycemic here due to prednisone + no standing prandial insulin   - A1c 7.7%; home regimen: tresiba 60 units QHS, novolog 15-30 units with meals (ISF 1:30, but usually just estimates), victoza 1.8 mg daily  - Glucose at goal  - Recommend decrease Lantus to 54 units QHS for tonight, patient was ordered for 68 units but only 54 administered. Fasting glucose acceptable.   - Recommend continuing lispro 20 units with meals, HOLD if not eating   - Insulin need may increase when appetite and food intake improves   - Recommend continuing moderate lispro correction scale TIDQAC and QHS  - Please check FSG before meals and QHS, or q6h while NPO  - inpatient glycemic goals 100-180 mg/DL    Discharge planning for DM: continue basal / bolus insulin, schedule follow up with prior outpatient endocrine provider     #Hypertension  - goal BP <130/80  - Management as per primary team    #Hyperlipidemia  - continue simvastatin 20 mg  - f/u outpatient    #Hypothyroidism  - agree with levothyroxine 125 mcg daily, take daily on empty stomach  - repeat TFTs as outpatient in 6-8 weeks    #Chronic steroid use  - on prednisone 10 mg bid for at least two months, ordered here. Pt cannot stop steroids abruptly as she will have secondary adrenal insufficiency at this point  - pt to follow up in 1/2022 with her doctors with steroid taper  - if any hemodynamic instability, please order IV hydrocortisone 50 mg q6h for stress steroids    #Hypercalcemia   - Calcium 12.5 not corrected   - Calcium trending around 10.9 prior to today, Albumin 3.1, corrected Calcium = 11.62   - Agree with initiation of continuous IVF   - Will send PTH, PTHrp, Vit D 25, 125   - Treatment may be required, please trend     LUIS ALBERTO Marques-BC  Nurse Practitioner   Division of Endocrinology  Pager: ADALBERTO pager [phone]    If out of hospital/unavailable when paged, please note: patient will be cared for by another provider on the endocrine service.  Please call the endocrine answering service for assistance to reach covering provider ([phone]). For non-urgent matters, please email [email or web address] for assistance.

## 2021-12-19 NOTE — PROGRESS NOTE ADULT - SUBJECTIVE AND OBJECTIVE BOX
PROGRESS NOTE:   Authored by Dr. Marissa Riddle MD, pager [phone]     Patient is a 66y old  Female who presents with a chief complaint of SOB dizziness & nausea x couple of weeks (19 Dec 2021 11:23)      SUBJECTIVE / OVERNIGHT EVENTS:  Patient had small BM this morning. Still endorses abdominal bloating and SOB. No fevers, chills, N/V/D, CP, dysuria, or hematuria.     ADDITIONAL REVIEW OF SYSTEMS:    MEDICATIONS  (STANDING):  amLODIPine   Tablet 10 milliGRAM(s) Oral daily  Dakins Solution - 1/4 Strength 1 Application(s) Topical daily  dextrose 40% Gel 15 Gram(s) Oral once  dextrose 5%. 1000 milliLiter(s) (50 mL/Hr) IV Continuous <Continuous>  dextrose 5%. 1000 milliLiter(s) (100 mL/Hr) IV Continuous <Continuous>  dextrose 50% Injectable 25 Gram(s) IV Push once  dextrose 50% Injectable 12.5 Gram(s) IV Push once  dextrose 50% Injectable 25 Gram(s) IV Push once  enalapril 2.5 milliGRAM(s) Oral daily  escitalopram 30 milliGRAM(s) Oral daily  gabapentin 400 milliGRAM(s) Oral three times a day  glucagon  Injectable 1 milliGRAM(s) IntraMuscular once  heparin   Injectable 5000 Unit(s) SubCutaneous every 8 hours  influenza  Vaccine (HIGH DOSE) 0.7 milliLiter(s) IntraMuscular once  insulin glargine Injectable (LANTUS) 54 Unit(s) SubCutaneous at bedtime  insulin lispro (ADMELOG) corrective regimen sliding scale   SubCutaneous three times a day before meals  insulin lispro (ADMELOG) corrective regimen sliding scale   SubCutaneous at bedtime  insulin lispro Injectable (ADMELOG) 20 Unit(s) SubCutaneous three times a day with meals  levothyroxine 125 MICROGram(s) Oral daily  pantoprazole    Tablet 40 milliGRAM(s) Oral before breakfast  piperacillin/tazobactam IVPB.. 3.375 Gram(s) IV Intermittent every 12 hours  polyethylene glycol 3350 17 Gram(s) Oral daily  predniSONE   Tablet 10 milliGRAM(s) Oral two times a day  senna 2 Tablet(s) Oral at bedtime  simvastatin 20 milliGRAM(s) Oral at bedtime  sodium chloride 0.9%. 1000 milliLiter(s) (75 mL/Hr) IV Continuous <Continuous>  triamterene 37.5 mG/hydrochlorothiazide 25 mG Tablet 1 Tablet(s) Oral daily    MEDICATIONS  (PRN):  acetaminophen     Tablet .. 650 milliGRAM(s) Oral every 6 hours PRN Temp greater or equal to 38C (100.4F), Mild Pain (1 - 3), Moderate Pain (4 - 6)  lidocaine/prilocaine Cream 1 Application(s) Topical daily PRN Dressing changes  melatonin 3 milliGRAM(s) Oral at bedtime PRN Insomnia  oxycodone    5 mG/acetaminophen 325 mG 1 Tablet(s) Oral every 6 hours PRN Severe Pain (7 - 10)      CAPILLARY BLOOD GLUCOSE      POCT Blood Glucose.: 215 mg/dL (19 Dec 2021 11:54)  POCT Blood Glucose.: 119 mg/dL (19 Dec 2021 07:44)  POCT Blood Glucose.: 114 mg/dL (18 Dec 2021 23:10)  POCT Blood Glucose.: 123 mg/dL (18 Dec 2021 21:20)  POCT Blood Glucose.: 122 mg/dL (18 Dec 2021 16:43)    I&O's Summary      PHYSICAL EXAM:  Vital Signs Last 24 Hrs  T(C): 36.8 (19 Dec 2021 05:00), Max: 36.8 (18 Dec 2021 17:56)  T(F): 98.2 (19 Dec 2021 05:00), Max: 98.3 (18 Dec 2021 17:56)  HR: 96 (19 Dec 2021 05:00) (96 - 101)  BP: 147/53 (19 Dec 2021 05:00) (145/56 - 148/69)  BP(mean): --  RR: 17 (19 Dec 2021 05:00) (17 - 18)  SpO2: 98% (19 Dec 2021 05:00) (98% - 99%)    CONSTITUTIONAL: NAD, well-developed  RESPIRATORY: Normal respiratory effort; lungs are clear to auscultation bilaterally  CARDIOVASCULAR: Regular rate and rhythm, normal S1 and S2, no murmur/rub/gallop; No lower extremity edema; Peripheral pulses are 2+ bilaterally  ABDOMEN: Nontender to palpation, normoactive bowel sounds, no rebound/guarding; No hepatosplenomegaly  MUSCULOSKELETAL: no clubbing or cyanosis of digits; no joint swelling or tenderness to palpation  PSYCH: A+O to person, place, and time; affect appropriate    LABS:                        8.3    11.65 )-----------( 337      ( 19 Dec 2021 01:49 )             26.5     12-19    131<L>  |  93<L>  |  28<H>  ----------------------------<  102<H>  5.3   |  32<H>  |  0.92    Ca    12.5<H>      19 Dec 2021 01:49  Phos  3.2     12-19  Mg     1.60     12-19    TPro  6.7  /  Alb  3.1<L>  /  TBili  0.3  /  DBili  x   /  AST  10  /  ALT  12  /  AlkPhos  65  12-19                RADIOLOGY & ADDITIONAL TESTS:  Results Reviewed:   Imaging Personally Reviewed:  Electrocardiogram Personally Reviewed:    ACC: [id number] EXAM:  XR CHEST PORTABLE URGENT 1V                          PROCEDURE DATE:  12/18/2021          INTERPRETATION:  Indication: Worsening dyspnea.    TECHNIQUE: Single portable view of the chest    COMPARISON: 12/17/2021 and CT scan of the chest dated 12/13/2021    FINDINGS: The heart size cannot be adequately assessed on this single   view. There is a persistent small left pleural effusion with fluid   tracking into the fissure, unchanged. There is a trace right pleural   effusion and/or right basilar atelectasis. The previously noted lung   nodules are better appreciated on the recent CT scan of the chest.    IMPRESSION: Persistent left pleural effusion tracking into the fissure.   Trace right pleural effusion and/or right basilar atelectasis. Previously   noted lung nodules are better seen on the recent CT scan of the chest.      COORDINATION OF CARE:  Care Discussed with Consultants/Other Providers [Y/N]:  Prior or Outpatient Records Reviewed [Y/N]:

## 2021-12-19 NOTE — PROGRESS NOTE ADULT - PROBLEM SELECTOR PLAN 2
-Corrected ca 13.3 today, notice uptrend over past few days  -restarted IVF NS @ 75 cc /hr   -daily Ca and albumin  -could be 2/2 hypercalcemia of malignancy; however, will send w/u with PTH, vitamin D 25, 1, 25, and PTHRP  -may require bisphosphonate, continue to trend -Corrected ca 13.3 today, notice uptrend over past few days  -restarted IVF NS @ 75 cc /hr   -daily Ca and albumin  -could be 2/2 hypercalcemia of malignancy; however, will send w/u with PTH, vitamin D 25, 1, 25, and PTHRP  -may require bisphosphonate, continue to trend  -will d/c triemterene/hctz given these drugs can worsen hyperCa

## 2021-12-19 NOTE — PROGRESS NOTE ADULT - SUBJECTIVE AND OBJECTIVE BOX
HPI:  67 yo F with HTN, HLD, type 2DM, hypothyroidism, fibromyalgia, vulvar cancer s/p surgery 2020, RT/chemo 2021, anemia presenting with HERNANDEZ and dizziness with imaging concerning for metastatic disease and pleural effusions bilaterally. Endocrine was consulted for T2DM management.    Patient was diagnosed with T2DM 20 years ago. Has been on insulin for 15 years.   No known microvascular complications. She is up to date with ophtho.  Reports family history of DM in both parents.  Denies blurry vision, polyuria, polydipsia, and paresthesias.  Patient has been receiving lantus (56 units QHS) with low dose lispro correction scale only. Glucoses are above goal consistently in 200-300s.     In terms of her history of hypothyroidism, pt reports taking 112 mcg prior to the hospital. Has been on it for a few years. Denies hx of thyroid cancer, head/neck radiation. TSH was 11.9, FT4 0.9 on 12/16/21, Levothyroxine was increased to 125 mcg this admission.    Patient is on prednisone 10 mg bid for at least two months, states it is for nerve pain. Was not on steroids prior to this. Thinks the dose will be tapered in 1/2022.    Endocrinologist: Dr. Armstrong on [facility] in [place]    Last HbA1c: 7.7%    Home DM regimen:  Tresiba 60 units  novolog 15-30 units with meals (ISF 1:30 but pt does not use scale exactly)  Victoza 1.8 mg daily    Inpatient DM regimen:  Lantus 68 unit QHS  Admelog 20 units TID before meals, HOLD if not eating   Admelog MODERATE correctional scales before meals and bedtime     Interval history: Visited with patient at bedside, endorses SOB and constipation. Eating minimally due to constipation. However, did have the urge to use the bathroom at the end of our visit. No hypoglycemia, glucose trending at goal. Hypercalcemia noted.     PAST MEDICAL & SURGICAL HISTORY:  Lichen sclerosus of female genitalia    Vulva cancer  bilateral 2014 reoccurance 2017    HTN (hypertension)    TIA (transient ischemic attack)  8/2013  blurred vision/dizziness no treatment    DM type 2 (diabetes mellitus, type 2)  1995    Hypothyroid    Thalassemia minor    Strabismus    Fibromyalgia    HLD (hyperlipidemia)    Anxiety and depression    GERD (gastroesophageal reflux disease)    Obesity    S/P laparoscopic cholecystectomy  2000    S/P eye surgery  June 2014, for strabismus    Vulvar neoplasm  s/p radical anterior vulvectomy in 2014 with radical excision in 2017    H/O total knee replacement, right  2016    FAMILY HISTORY:  Family history of colon cancer (Aunt)    Family history of diabetes mellitus in mother (Aunt, Mother, Sibling)      Social History:  no etoh, drugs or tobacco    MEDICATIONS  (STANDING):  amLODIPine   Tablet 10 milliGRAM(s) Oral daily  Dakins Solution - 1/4 Strength 1 Application(s) Topical daily  dextrose 40% Gel 15 Gram(s) Oral once  dextrose 5%. 1000 milliLiter(s) (50 mL/Hr) IV Continuous <Continuous>  dextrose 5%. 1000 milliLiter(s) (100 mL/Hr) IV Continuous <Continuous>  dextrose 50% Injectable 25 Gram(s) IV Push once  dextrose 50% Injectable 12.5 Gram(s) IV Push once  dextrose 50% Injectable 25 Gram(s) IV Push once  enalapril 2.5 milliGRAM(s) Oral daily  escitalopram 30 milliGRAM(s) Oral daily  gabapentin 400 milliGRAM(s) Oral three times a day  glucagon  Injectable 1 milliGRAM(s) IntraMuscular once  heparin   Injectable 5000 Unit(s) SubCutaneous every 8 hours  influenza  Vaccine (HIGH DOSE) 0.7 milliLiter(s) IntraMuscular once  insulin glargine Injectable (LANTUS) 68 Unit(s) SubCutaneous at bedtime  insulin lispro (ADMELOG) corrective regimen sliding scale   SubCutaneous three times a day before meals  insulin lispro (ADMELOG) corrective regimen sliding scale   SubCutaneous at bedtime  insulin lispro Injectable (ADMELOG) 20 Unit(s) SubCutaneous three times a day with meals  levothyroxine 125 MICROGram(s) Oral daily  pantoprazole    Tablet 40 milliGRAM(s) Oral before breakfast  piperacillin/tazobactam IVPB.. 3.375 Gram(s) IV Intermittent every 12 hours  polyethylene glycol 3350 17 Gram(s) Oral daily  predniSONE   Tablet 10 milliGRAM(s) Oral two times a day  senna 2 Tablet(s) Oral at bedtime  simvastatin 20 milliGRAM(s) Oral at bedtime  sodium chloride 0.9%. 1000 milliLiter(s) (75 mL/Hr) IV Continuous <Continuous>  triamterene 37.5 mG/hydrochlorothiazide 25 mG Tablet 1 Tablet(s) Oral daily      No Known Allergies    Review of Systems:  Constitutional: Tired  Eyes: No blurry vision, diplopia  Neuro: No tremors  HEENT: No pain  Cardiovascular: No chest pain, palpitations  Respiratory: Endorses SOB, no cough  GI: Endorses constipation, No nausea, vomiting,   : No dysuria, hematuria  Endocrine: no polyuria, polydipsia    ALL OTHER SYSTEMS REVIEWED AND NEGATIVE    PHYSICAL EXAM:  Vital Signs Last 24 Hrs  T(C): 36.8 (19 Dec 2021 05:00), Max: 37.1 (18 Dec 2021 12:02)  T(F): 98.2 (19 Dec 2021 05:00), Max: 98.7 (18 Dec 2021 12:02)  HR: 96 (19 Dec 2021 05:00) (79 - 101)  BP: 147/53 (19 Dec 2021 05:00) (120/60 - 148/69)  BP(mean): --  RR: 17 (19 Dec 2021 05:00) (17 - 18)  SpO2: 98% (19 Dec 2021 05:00) (96% - 99%)  GENERAL: NAD, well-groomed, well-developed  EYES: No proptosis, extraocular movements intact,  no lid lag, anicteric  HEENT:  Atraumatic, Normocephalic, moist mucous membranes  RESPIRATORY: Non labored respirations   GI: Soft, nontender, non distended, normal bowel sounds  SKIN: Dry, intact, No rashes or lesions  EXTREMITIES: No foot ulcers, distal pedal pulses intact bilaterally  NEURO: sensation intact, no tremors  PSYCH: reactive affect, euthymic mood      12-19    131<L>  |  93<L>  |  28<H>  ----------------------------<  102<H>  5.3   |  32<H>  |  0.92    Ca    12.5<H>      19 Dec 2021 01:49  Phos  3.2     12-19  Mg     1.60     12-19    TPro  6.7  /  Alb  3.1<L>  /  TBili  0.3  /  DBili  x   /  AST  10  /  ALT  12  /  AlkPhos  65  12-19    Calcium, Total Serum: 12.5 mg/dL (12-19-21 @ 01:49)  Calcium, Total Serum: 10.9 mg/dL (12-17-21 @ 17:23)  Calcium, Total Serum: 10.6 mg/dL (12-17-21 @ 07:45)      Thyroid Function Tests:  12-16 @ 06:47 TSH 11.94 FreeT4 0.9 T3 -- Anti TPO -- Anti Thyroglobulin Ab -- TSI --  12-14 @ 08:15 TSH 10.03 FreeT4 0.8 T3 -- Anti TPO -- Anti Thyroglobulin Ab -- TSI --      12-14 Chol 193 Direct LDL -- LDL calculated 130<H> HDL 37<L> Trig 130      A1C with Estimated Average Glucose Result: 7.7 % (12-14-21 @ 08:15)  A1C with Estimated Average Glucose Result: 8.2 % (09-04-21 @ 23:11)  A1C with Estimated Average Glucose Result: 7.5 % (09-04-21 @ 09:13)  A1C with Estimated Average Glucose Result: 7.7 % (03-25-21 @ 15:08)         HPI:  65 yo F with HTN, HLD, type 2DM, hypothyroidism, fibromyalgia, vulvar cancer s/p surgery 2020, RT/chemo 2021, anemia presenting with HERNANDEZ and dizziness with imaging concerning for metastatic disease and pleural effusions bilaterally. Endocrine was consulted for T2DM management.    Patient was diagnosed with T2DM 20 years ago. Has been on insulin for 15 years.   No known microvascular complications. She is up to date with ophtho.  Reports family history of DM in both parents.  Denies blurry vision, polyuria, polydipsia, and paresthesias.  Patient has been receiving lantus (56 units QHS) with low dose lispro correction scale only. Glucoses are above goal consistently in 200-300s.     In terms of her history of hypothyroidism, pt reports taking 112 mcg prior to the hospital. Has been on it for a few years. Denies hx of thyroid cancer, head/neck radiation. TSH was 11.9, FT4 0.9 on 12/16/21, Levothyroxine was increased to 125 mcg this admission.    Patient is on prednisone 10 mg bid for at least two months, states it is for nerve pain. Was not on steroids prior to this. Thinks the dose will be tapered in 1/2022.    Endocrinologist: Dr. Armstrong on [facility] in [place]    Last HbA1c: 7.7%    Home DM regimen:  Tresiba 60 units  novolog 15-30 units with meals (ISF 1:30 but pt does not use scale exactly)  Victoza 1.8 mg daily    Inpatient DM regimen:  Lantus 68 unit QHS - however, 54 units given last night   Admelog 20 units TID before meals, HOLD if not eating   Admelog MODERATE correctional scales before meals and bedtime     Interval history: Visited with patient at bedside, endorses SOB and constipation. Eating minimally due to constipation. However, did have the urge to use the bathroom at the end of our visit. No hypoglycemia, glucose trending at goal. Hypercalcemia noted.     PAST MEDICAL & SURGICAL HISTORY:  Lichen sclerosus of female genitalia    Vulva cancer  bilateral 2014 reoccurance 2017    HTN (hypertension)    TIA (transient ischemic attack)  8/2013  blurred vision/dizziness no treatment    DM type 2 (diabetes mellitus, type 2)  1995    Hypothyroid    Thalassemia minor    Strabismus    Fibromyalgia    HLD (hyperlipidemia)    Anxiety and depression    GERD (gastroesophageal reflux disease)    Obesity    S/P laparoscopic cholecystectomy  2000    S/P eye surgery  June 2014, for strabismus    Vulvar neoplasm  s/p radical anterior vulvectomy in 2014 with radical excision in 2017    H/O total knee replacement, right  2016    FAMILY HISTORY:  Family history of colon cancer (Aunt)    Family history of diabetes mellitus in mother (Aunt, Mother, Sibling)      Social History:  no etoh, drugs or tobacco    MEDICATIONS  (STANDING):  amLODIPine   Tablet 10 milliGRAM(s) Oral daily  Dakins Solution - 1/4 Strength 1 Application(s) Topical daily  dextrose 40% Gel 15 Gram(s) Oral once  dextrose 5%. 1000 milliLiter(s) (50 mL/Hr) IV Continuous <Continuous>  dextrose 5%. 1000 milliLiter(s) (100 mL/Hr) IV Continuous <Continuous>  dextrose 50% Injectable 25 Gram(s) IV Push once  dextrose 50% Injectable 12.5 Gram(s) IV Push once  dextrose 50% Injectable 25 Gram(s) IV Push once  enalapril 2.5 milliGRAM(s) Oral daily  escitalopram 30 milliGRAM(s) Oral daily  gabapentin 400 milliGRAM(s) Oral three times a day  glucagon  Injectable 1 milliGRAM(s) IntraMuscular once  heparin   Injectable 5000 Unit(s) SubCutaneous every 8 hours  influenza  Vaccine (HIGH DOSE) 0.7 milliLiter(s) IntraMuscular once  insulin glargine Injectable (LANTUS) 68 Unit(s) SubCutaneous at bedtime  insulin lispro (ADMELOG) corrective regimen sliding scale   SubCutaneous three times a day before meals  insulin lispro (ADMELOG) corrective regimen sliding scale   SubCutaneous at bedtime  insulin lispro Injectable (ADMELOG) 20 Unit(s) SubCutaneous three times a day with meals  levothyroxine 125 MICROGram(s) Oral daily  pantoprazole    Tablet 40 milliGRAM(s) Oral before breakfast  piperacillin/tazobactam IVPB.. 3.375 Gram(s) IV Intermittent every 12 hours  polyethylene glycol 3350 17 Gram(s) Oral daily  predniSONE   Tablet 10 milliGRAM(s) Oral two times a day  senna 2 Tablet(s) Oral at bedtime  simvastatin 20 milliGRAM(s) Oral at bedtime  sodium chloride 0.9%. 1000 milliLiter(s) (75 mL/Hr) IV Continuous <Continuous>  triamterene 37.5 mG/hydrochlorothiazide 25 mG Tablet 1 Tablet(s) Oral daily      No Known Allergies    Review of Systems:  Constitutional: Tired  Eyes: No blurry vision, diplopia  Neuro: No tremors  HEENT: No pain  Cardiovascular: No chest pain, palpitations  Respiratory: Endorses SOB, no cough  GI: Endorses constipation, No nausea, vomiting,   : No dysuria, hematuria  Endocrine: no polyuria, polydipsia    ALL OTHER SYSTEMS REVIEWED AND NEGATIVE    PHYSICAL EXAM:  Vital Signs Last 24 Hrs  T(C): 36.8 (19 Dec 2021 05:00), Max: 37.1 (18 Dec 2021 12:02)  T(F): 98.2 (19 Dec 2021 05:00), Max: 98.7 (18 Dec 2021 12:02)  HR: 96 (19 Dec 2021 05:00) (79 - 101)  BP: 147/53 (19 Dec 2021 05:00) (120/60 - 148/69)  BP(mean): --  RR: 17 (19 Dec 2021 05:00) (17 - 18)  SpO2: 98% (19 Dec 2021 05:00) (96% - 99%)  GENERAL: NAD, well-groomed, well-developed  EYES: No proptosis, extraocular movements intact,  no lid lag, anicteric  HEENT:  Atraumatic, Normocephalic, moist mucous membranes  RESPIRATORY: Non labored respirations   GI: Soft, nontender, non distended, normal bowel sounds  SKIN: Dry, intact, No rashes or lesions  EXTREMITIES: No foot ulcers, distal pedal pulses intact bilaterally  NEURO: sensation intact, no tremors  PSYCH: reactive affect, euthymic mood      12-19    131<L>  |  93<L>  |  28<H>  ----------------------------<  102<H>  5.3   |  32<H>  |  0.92    Ca    12.5<H>      19 Dec 2021 01:49  Phos  3.2     12-19  Mg     1.60     12-19    TPro  6.7  /  Alb  3.1<L>  /  TBili  0.3  /  DBili  x   /  AST  10  /  ALT  12  /  AlkPhos  65  12-19    Calcium, Total Serum: 12.5 mg/dL (12-19-21 @ 01:49)  Calcium, Total Serum: 10.9 mg/dL (12-17-21 @ 17:23)  Calcium, Total Serum: 10.6 mg/dL (12-17-21 @ 07:45)      Thyroid Function Tests:  12-16 @ 06:47 TSH 11.94 FreeT4 0.9 T3 -- Anti TPO -- Anti Thyroglobulin Ab -- TSI --  12-14 @ 08:15 TSH 10.03 FreeT4 0.8 T3 -- Anti TPO -- Anti Thyroglobulin Ab -- TSI --      12-14 Chol 193 Direct LDL -- LDL calculated 130<H> HDL 37<L> Trig 130      A1C with Estimated Average Glucose Result: 7.7 % (12-14-21 @ 08:15)  A1C with Estimated Average Glucose Result: 8.2 % (09-04-21 @ 23:11)  A1C with Estimated Average Glucose Result: 7.5 % (09-04-21 @ 09:13)  A1C with Estimated Average Glucose Result: 7.7 % (03-25-21 @ 15:08)

## 2021-12-19 NOTE — PROGRESS NOTE ADULT - ASSESSMENT
67 yo F w/ PMHx of HTN, HLD, DM, fibromyalgia, Vulvar CA s/p vulva surgery in 2020, completed chemo & RT in 9/21, now p/w SOB, dizziness & nausea for the last couple of weeks.  CTPA- No pulmonary embolus is noted. Multiple nodules are noted within both lungs. Primary consideration is metastasis. Findings suggestive of malignant pleural effusions bilaterally. Now with worsening hypercalcemia.

## 2021-12-19 NOTE — PROGRESS NOTE ADULT - PROBLEM SELECTOR PLAN 9
Has new TWI inversions on lateral leads. Likely related to L pleural effusion  -pt with no chest pain  -Trop negative so far  -cont to monitor on tele

## 2021-12-20 NOTE — CONSULT NOTE ADULT - SUBJECTIVE AND OBJECTIVE BOX
HPI:  67 yo F w/ PMHx of HTN, HLD, DM, fibromyalgia, Vulvar CA s/p vulva surgery in 2020, completed chemo & RT in 9/21, found to be anemic Hgb 8.2 so she received 1U PRBC on saturday, 12/11 prior to that received PRBC in 9/21 now p/w SOB, dizziness & nausea for the last couple of weeks. Pt states she initially started experiencing an intermittent steady jabbing type of pain in her upper abdomen/ epigastric region, described as feeling like a gas pocket non-radiating, 9/10 severity, which can last for several hours at a time. Pt feels pain worsen with inspiration or rolling onto her side or when getting up. Pain eases up when she takes motrin & resting. Also admits to a pleuritic type of chest pain at times. Pt also reports feeling accompanying lightheadedness, generalized weakness, dizziness, nausea & SOB. Pt reports HERNANDEZ after taking a couple of steps and feels the SOB has become constant over the last couple of days. Since thanksgiving she also reports on 2 occasions she felt like she'd pass out when symptoms got bad.       CTPA - w/multiple nodules also suggestive of malignant pleural effusions bilaterally. Now with worsening hypercalcemia. IR c/s for pulm nodule bx which may direct treatment.        PAST MEDICAL & SURGICAL HISTORY:  Lichen sclerosus of female genitalia    Vulva cancer  bilateral 2014 reoccurance 2017    HTN (hypertension)    TIA (transient ischemic attack)  8/2013  blurred vision/dizziness no treatment    DM type 2 (diabetes mellitus, type 2)  1995    Hypothyroid    Thalassemia minor    Strabismus    Fibromyalgia    HLD (hyperlipidemia)    Anxiety and depression    GERD (gastroesophageal reflux disease)    Obesity    S/P laparoscopic cholecystectomy  2000    S/P eye surgery  June 2014, for strabismus    Vulvar neoplasm  s/p radical anterior vulvectomy in 2014 with radical excision in 2017    H/O total knee replacement, right  2016        Social History:     FAMILY HISTORY:  Family history of colon cancer (Aunt)    Family history of diabetes mellitus in mother (Aunt, Mother, Sibling)        Allergies: No Known Allergies      Current Medications: acetaminophen     Tablet .. 650 milliGRAM(s) Oral every 6 hours PRN  amLODIPine   Tablet 10 milliGRAM(s) Oral daily  dextrose 40% Gel 15 Gram(s) Oral once  dextrose 5%. 1000 milliLiter(s) IV Continuous <Continuous>  dextrose 5%. 1000 milliLiter(s) IV Continuous <Continuous>  dextrose 50% Injectable 25 Gram(s) IV Push once    PAST MEDICAL & SURGICAL HISTORY:  Lichen sclerosus of female genitalia    Vulva cancer  bilateral 2014 reoccurance 2017    HTN (hypertension)    TIA (transient ischemic attack)  8/2013  blurred vision/dizziness no treatment    DM type 2 (diabetes mellitus, type 2)  1995    Hypothyroid    Thalassemia minor    Strabismus    Fibromyalgia    HLD (hyperlipidemia)    Anxiety and depression    GERD (gastroesophageal reflux disease)    Obesity    S/P laparoscopic cholecystectomy  2000    S/P eye surgery  June 2014, for strabismus    Vulvar neoplasm  s/p radical anterior vulvectomy in 2014 with radical excision in 2017    H/O total knee replacement, right  2016        REVIEW OF SYSTEMS      Constitutional: No fever, good appetite/po intake  Eyes: No blurry vision, diplopia  Neuro: No tremors  HEENT: No pain  Cardiovascular: No chest pain, palpitations  Respiratory: + SOB, no cough  GI: + nausea, no vomiting,   : No dysuria, hematuria  Skin: no rash  Psych: no depression  Endocrine: no polyuria, polydipsia        MEDICATIONS  (STANDING):  amLODIPine   Tablet 10 milliGRAM(s) Oral daily  calcitonin Injectable 310 International Unit(s) IntraMuscular every 12 hours  cholecalciferol 2000 Unit(s) Oral daily  Dakins Solution - 1/4 Strength 1 Application(s) Topical daily  dextrose 40% Gel 15 Gram(s) Oral once  dextrose 5%. 1000 milliLiter(s) (50 mL/Hr) IV Continuous <Continuous>  dextrose 5%. 1000 milliLiter(s) (100 mL/Hr) IV Continuous <Continuous>  dextrose 50% Injectable 25 Gram(s) IV Push once  dextrose 50% Injectable 12.5 Gram(s) IV Push once  dextrose 50% Injectable 25 Gram(s) IV Push once  enalapril 5 milliGRAM(s) Oral daily  escitalopram 30 milliGRAM(s) Oral daily  gabapentin 400 milliGRAM(s) Oral three times a day  glucagon  Injectable 1 milliGRAM(s) IntraMuscular once  heparin   Injectable 5000 Unit(s) SubCutaneous every 8 hours  influenza  Vaccine (HIGH DOSE) 0.7 milliLiter(s) IntraMuscular once  insulin glargine Injectable (LANTUS) 54 Unit(s) SubCutaneous at bedtime  insulin lispro (ADMELOG) corrective regimen sliding scale   SubCutaneous three times a day before meals  insulin lispro (ADMELOG) corrective regimen sliding scale   SubCutaneous at bedtime  insulin lispro Injectable (ADMELOG) 20 Unit(s) SubCutaneous three times a day with meals  levothyroxine 125 MICROGram(s) Oral daily  pantoprazole    Tablet 40 milliGRAM(s) Oral before breakfast  piperacillin/tazobactam IVPB.. 3.375 Gram(s) IV Intermittent every 12 hours  polyethylene glycol 3350 17 Gram(s) Oral daily  predniSONE   Tablet 10 milliGRAM(s) Oral two times a day  senna 2 Tablet(s) Oral at bedtime  simvastatin 20 milliGRAM(s) Oral at bedtime    MEDICATIONS  (PRN):  acetaminophen     Tablet .. 650 milliGRAM(s) Oral every 6 hours PRN Temp greater or equal to 38C (100.4F), Mild Pain (1 - 3), Moderate Pain (4 - 6)  bisacodyl Suppository 10 milliGRAM(s) Rectal daily PRN Constipation  lidocaine/prilocaine Cream 1 Application(s) Topical daily PRN Dressing changes  melatonin 3 milliGRAM(s) Oral at bedtime PRN Insomnia  oxycodone    5 mG/acetaminophen 325 mG 1 Tablet(s) Oral every 6 hours PRN Severe Pain (7 - 10)      Allergies    No Known Allergies    Intolerances        SOCIAL HISTORY:  denies alcohol  denies tobacco     FAMILY HISTORY:  Family history of colon cancer (Aunt)    Family history of diabetes mellitus in mother (Aunt, Mother, Sibling)        Vital Signs Last 24 Hrs  T(C): 36.4 (21 Dec 2021 12:47), Max: 37 (20 Dec 2021 15:00)  T(F): 97.6 (21 Dec 2021 12:47), Max: 98.6 (20 Dec 2021 15:00)  HR: 88 (21 Dec 2021 12:47) (85 - 97)  BP: 136/58 (21 Dec 2021 12:47) (133/56 - 162/56)  BP(mean): --  RR: 18 (21 Dec 2021 12:47) (17 - 18)  SpO2: 99% (21 Dec 2021 12:47) (99% - 99%)    PHYSICAL EXAM:    GENERAL: NAD  THYROID: Normal size, no palpable nodules, no thyromegaly  RESPIRATORY: normal respiratory effort  CARDIOVASCULAR: RRR  GI: Soft, nontender, non distended  SKIN: Dry, intact, No rashes or lesions  EXTREMITIES: No foot ulcers, distal pedal pulses intact bilaterally  NEURO: sensation intact, no tremors                LABS:                        7.8    11.86 )-----------( 343      ( 21 Dec 2021 07:01 )             24.4     12-21    127<L>  |  91<L>  |  24<H>  ----------------------------<  157<H>  5.6<H>   |  29  |  0.88    Ca    11.7<H>      21 Dec 2021 07:01  Phos  2.3     12-21  Mg     1.80     12-21        RADIOLOGY & ADDITIONAL STUDIES:    Imaging: < from: CT Angio Chest PE Protocol w/ IV Cont (12.13.21 @ 12:01) >  IMPRESSION: No pulmonary embolus is noted.    Multiple nodules are noted within both lungs. Primary consideration is   metastasis.    Findings suggestive of malignant pleural effusions bilaterally.

## 2021-12-20 NOTE — PROGRESS NOTE ADULT - PROBLEM SELECTOR PLAN 1
Cont on tele, monitor for arrhythmia,   -CT showed No pulmonary embolus is noted. Multiple nodules are noted within both lungs. Primary consideration is   metastasis. Findings suggestive of malignant pleural effusions bilaterally.  -s/p Thoracentesis on 12/15 by IR, drained appr 660cc pleural fluid, exudative, f/u path   - Heme/ Onc consult appreciated,  IR to do bx of lung nodules after ct a/p, awaiting for ct  - Pulm consult appreciated  -cxr showed no pneumothorax  -f/u echo  -rpt cxr 12/18 with persistent L pleural effusion  - Repeat CXR 12/20--> re-consult Pulm for repeat thora given re accumulation-- this is likely worsened by the IVF she has been getting to treat her hypercalcemia.

## 2021-12-20 NOTE — PROGRESS NOTE ADULT - PROBLEM SELECTOR PLAN 9
Has new TWI inversions on lateral leads. Likely related to L pleural effusion  -pt with no chest pain  -Trop negative so far  -cont to monitor on tele  TTE 12/20

## 2021-12-20 NOTE — CONSULT NOTE ADULT - ASSESSMENT
Vascular & Interventional Radiology Consult Note    Evaluate for Procedure: Pulm Nodule Bx    HPI: 67 yo F w/ PMHx of HTN, HLD, DM, fibromyalgia, Vulvar CA s/p vulva surgery in 2020, completed chemo & RT in 9/21, now p/w SOB, dizziness N/V for weeks. CTPA - w/multiple nodules also suggestive of malignant pleural effusions bilaterally. Now with worsening hypercalcemia. IR c/s for pulm nodule bx which may direct treatment.    Allergies:   Medications (Abx/Cardiac/Anticoagulation/Blood Products)  amLODIPine   Tablet: 10 milliGRAM(s) Oral (12-20 @ 05:50)  enalapril: 2.5 milliGRAM(s) Oral (12-19 @ 05:08)  enalapril: 5 milliGRAM(s) Oral (12-20 @ 05:53)  heparin   Injectable: 5000 Unit(s) SubCutaneous (12-20 @ 05:52)  piperacillin/tazobactam IVPB..: 25 mL/Hr IV Intermittent (12-20 @ 12:40)  triamterene 37.5 mG/hydrochlorothiazide 25 mG Tablet: 1 Tablet(s) Oral (12-19 @ 05:08)    Data:    T(C): 36.8  HR: 96  BP: 144/63  RR: 17  SpO2: 100%    -WBC 11.65 / HgB 8.3 / Hct 26.5 / Plt 337  -Na 128 / Cl 92 / BUN 30 / Glucose 110  -K 5.2 / CO2 29 / Cr 0.85  -ALT -- / Alk Phos -- / T.Bili --    Imaging: < from: CT Angio Chest PE Protocol w/ IV Cont (12.13.21 @ 12:01) >  IMPRESSION: No pulmonary embolus is noted.    Multiple nodules are noted within both lungs. Primary consideration is   metastasis.    Findings suggestive of malignant pleural effusions bilaterally.    < end of copied text >      Assessment:   67 yo F w/ PMHx of HTN, HLD, DM, fibromyalgia, Vulvar CA s/p vulva surgery in 2020, completed chemo & RT in 9/21, now p/w SOB, dizziness N/V for weeks. CTPA - w/multiple nodules also suggestive of malignant pleural effusions bilaterally. Now with worsening hypercalcemia. IR c/s for pulm nodule bx which may direct treatment.    Plan:  - plan for pulm nodule bx on Wed 12/22/21  - Place IR procedure note under Dr. Peng  - Will need pre-procedure note.  - NPO@MN the day before the procedure.  - Morning of procedure: hold AM anticoagulation, obtain AM labs (CBC, CMP, coags)     Assessment:   67 yo F w/ PMHx of HTN, HLD, DM, fibromyalgia, Vulvar CA s/p vulva surgery in 2020, completed chemo & RT in 9/21, now p/w SOB, dizziness N/V for weeks. CTPA - w/multiple nodules also suggestive of malignant pleural effusions bilaterally. Now with worsening hypercalcemia. IR c/s for pulm nodule bx which may direct treatment.    Plan:  - plan for pulm nodule bx on Wed 12/22/21  - Place IR procedure note under Dr. Peng  - Will need pre-procedure note.  - NPO@MN the day before the procedure.  - Morning of procedure: hold AM anticoagulation, obtain AM labs (CBC, CMP, coags)   Assessment:   65 yo F w/ PMHx of HTN, HLD, DM, fibromyalgia, Vulvar CA s/p vulva surgery in 2020, completed chemo & RT in 9/21, now p/w SOB, dizziness N/V for weeks. CTPA - w/multiple nodules also suggestive of malignant pleural effusions bilaterally. Now with worsening hypercalcemia. IR c/s for pulm nodule bx which may direct treatment.    Plan:  - plan for pulm nodule bx on Wed 12/22/21  - NPO@MN   -  hold AM anticoagulation, obtain 4 AM labs (CBC, CMP, coags)

## 2021-12-20 NOTE — PROGRESS NOTE ADULT - ASSESSMENT
65 yo F with HTN, HLD, type 2DM, hypothyroidism, fibromyalgia, vulvar cancer s/p surgery 2020, RT/chemo 2021, anemia presenting with HERNANDEZ and dizziness with imaging concerning for metastatic disease and pleural effusions bilaterally. Endocrine was consulted for T2DM management.    1. Type 2 Diabetes Mellitus c/b steroid induced hyperglycemia  Hyperglycemic here due to prednisone + no standing prandial insulin   A1c 7.7%;   Home Regimen: Tresiba 60 units QHS, Novolog 15-30 units with meals (ISF 1:30, but usually just estimates), Victoza 1.8 mg daily    While inpatient:   BG target 100-180 mg/dl   Overall within or close to goal   Continue Lantus 54 units SQ qHS   Continue Admelog 20 units SQ TID before meals (Hold if NPO/not eating meal)  Continue Admelog MODERATE dose correctional scale before meals, moderate dose at bedtime   Please check FSG before meals and QHS  Consistent carbohydrate diet  Hypoglycemia protocol     Discharge planning for DM: Resume basal / bolus insulin (dosing TBD), schedule follow up with prior outpatient endocrine provider     2. Chronic steroid use, steroid induced hyperglycemia   On Prednisone 10 mg BID for at least two months, ordered here  Insulin dosing as above   Pt cannot stop steroids abruptly as she will have secondary adrenal insufficiency at this point  Patient to follow up in 1/2022 with her doctors with steroid taper  If any hemodynamic instability, please order IV hydrocortisone 50 mg q6h for stress steroids    3. Hypothyroidism  Continue Levothyroxine 125 mcg PO daily, take daily on empty stomach  Repeat TFTs as outpatient in 6-8 weeks    4. Hypercalcemia   Corrected calcium 13  Agree with initiation of continuous IVF   Vit D 25-OH LOW at 12.2, recommend supplement (see below)   Followup PTH, PTHrp    5. Vitamin D deficiency  Vitamin D 25-OH 12.2  Recommend supplement with Ergocalciferol 50,000 units PO weekly    6. Hypertension  Goal BP <130/80  Management as per primary team    7. Hyperlipidemia  Continue Simvastatin 20 mg daily  Followup lipid panel annually as outpatient    Erendira Greene  Nurse Practitioner  Division of Endocrinology & Diabetes  In house pager #[phone]/long range pager #[phone]    If before 9AM or after 6PM, or on weekends/holidays, please call endocrine answering service for assistance ([phone]).  For nonurgent matters email [email or web address] for assistance.    67 yo F with HTN, HLD, type 2DM, hypothyroidism, fibromyalgia, vulvar cancer s/p surgery 2020, RT/chemo 2021, anemia presenting with HERNANDEZ and dizziness with imaging concerning for metastatic disease and pleural effusions bilaterally. Endocrine was consulted for T2DM management.    1. Type 2 Diabetes Mellitus c/b steroid induced hyperglycemia  Hyperglycemic here due to prednisone + no standing prandial insulin   A1c 7.7%;   Home Regimen: Tresiba 60 units QHS, Novolog 15-30 units with meals (ISF 1:30, but usually just estimates), Victoza 1.8 mg daily    While inpatient:   BG target 100-180 mg/dl   Overall within or close to goal   Continue Lantus 54 units SQ qHS   Continue Admelog 20 units SQ TID before meals (Hold if NPO/not eating meal)  Continue Admelog MODERATE dose correctional scale before meals, moderate dose at bedtime   Please check FSG before meals and QHS  Consistent carbohydrate diet  Hypoglycemia protocol     Discharge planning for DM: Resume basal / bolus insulin (dosing TBD), schedule follow up with prior outpatient endocrine provider     2. Chronic steroid use, steroid induced hyperglycemia   On Prednisone 10 mg BID for at least two months, ordered here  Insulin dosing as above   Pt cannot stop steroids abruptly as she will have secondary adrenal insufficiency at this point  Patient to follow up in 1/2022 with her doctors with steroid taper  If any hemodynamic instability, please order IV hydrocortisone 50 mg q6h for stress steroids    3. Hypothyroidism  Continue Levothyroxine 125 mcg PO daily, take daily on empty stomach  Repeat TFTs as outpatient in 6-8 weeks    4. Hypercalcemia   Corrected calcium 13  Agree with initiation of continuous IVF   Vit D 25-OH LOW at 12.2, recommend supplement (see below)  PTH- suppressed    Followup PTHrp  Noted Pamidronate (60 mg) was ordered, patient without kidney dysfunction therefore could use 90 mg Pamidronate, however would prefer to use Zometa 4 mg IV x 1 due to longer action time (about 1 month)   Zometa will take about 72h to reach full effect. Recommend to give Calcitonin (4 IU/kg) = 312 international units, SQ q12h x 4 doses     5. Vitamin D deficiency  Vitamin D 25-OH 12.2  Recommend supplement with Cholecalciferol 2000 units daily    6. Hypertension  Goal BP <130/80  Management as per primary team    7. Hyperlipidemia  Continue Simvastatin 20 mg daily  Followup lipid panel annually as outpatient    Erendira Greene  Nurse Practitioner  Division of Endocrinology & Diabetes  In house pager #[phone]/long range pager #[phone]    If before 9AM or after 6PM, or on weekends/holidays, please call endocrine answering service for assistance ([phone]).  For nonurgent matters email [email or web address] for assistance.    67 yo F with HTN, HLD, type 2DM, hypothyroidism, fibromyalgia, vulvar cancer s/p surgery 2020, RT/chemo 2021, anemia presenting with HERNANDEZ and dizziness with imaging concerning for metastatic disease and pleural effusions bilaterally. Endocrine was consulted for T2DM management.    1. Type 2 Diabetes Mellitus c/b steroid induced hyperglycemia  Hyperglycemic here due to prednisone + no standing prandial insulin   A1c 7.7%;   Home Regimen: Tresiba 60 units QHS, Novolog 15-30 units with meals (ISF 1:30, but usually just estimates), Victoza 1.8 mg daily    While inpatient:   BG target 100-180 mg/dl   Overall within or close to goal   Continue Lantus 54 units SQ qHS   Continue Admelog 20 units SQ TID before meals (Hold if NPO/not eating meal)  Continue Admelog MODERATE dose correctional scale before meals, moderate dose at bedtime   Please check FSG before meals and QHS  Consistent carbohydrate diet  Hypoglycemia protocol     Discharge planning for DM: Resume basal / bolus insulin (dosing TBD), schedule follow up with prior outpatient endocrine provider     2. Chronic steroid use, steroid induced hyperglycemia   On Prednisone 10 mg BID for at least two months, ordered here  Insulin dosing as above   Pt cannot stop steroids abruptly as she will have secondary adrenal insufficiency at this point  Patient to follow up in 1/2022 with her doctors with steroid taper  If any hemodynamic instability, please order IV hydrocortisone 50 mg q6h for stress steroids    3. Hypothyroidism  Continue Levothyroxine 125 mcg PO daily, take daily on empty stomach  Repeat TFTs as outpatient in 6-8 weeks    4. Hypercalcemia   Corrected calcium 13  Agree with initiation of continuous IVF   Vit D 25-OH LOW at 12.2, recommend supplement (see below)  PTH- suppressed    Followup PTHrp  Noted Pamidronate (60 mg) was ordered, patient without kidney dysfunction therefore could use 90 mg Pamidronate, however would prefer to use Zometa  due to longer action time (about 1 month). Administer Zometa 4 mg IV over 15 min x 1  Zometa will take about 72h to reach full effect. Recommend to give Calcitonin (4 IU/kg) = 312 international units, SQ q12h x 4 doses     5. Vitamin D deficiency  Vitamin D 25-OH 12.2  Recommend supplement with Cholecalciferol 2000 units daily    6. Hypertension  Goal BP <130/80  Management as per primary team    7. Hyperlipidemia  Continue Simvastatin 20 mg daily  Followup lipid panel annually as outpatient    Discussed recs with primary team attending MD Gary Greene  Nurse Practitioner  Division of Endocrinology & Diabetes  In house pager #[phone]/long range pager #[phone]    If before 9AM or after 6PM, or on weekends/holidays, please call endocrine answering service for assistance ([phone]).  For nonurgent matters email [email or web address] for assistance.    65 yo F with HTN, HLD, type 2DM, hypothyroidism, fibromyalgia, vulvar cancer s/p surgery 2020, RT/chemo 2021, anemia presenting with HERNANDEZ and dizziness with imaging concerning for metastatic disease and pleural effusions bilaterally. Endocrine was consulted for T2DM management.    1. Type 2 Diabetes Mellitus c/b steroid induced hyperglycemia  Hyperglycemic here due to prednisone + no standing prandial insulin   A1c 7.7%; goal less than 7%  Home Regimen: Tresiba 60 units QHS, Novolog 15-30 units with meals (ISF 1:30, but usually just estimates), Victoza 1.8 mg daily    While inpatient:   BG target 100-180 mg/dl   Overall within or close to goal   Continue Lantus 54 units SQ qHS   Continue Admelog 20 units SQ TID before meals (Hold if NPO/not eating meal)  Continue Admelog MODERATE dose correctional scale before meals, moderate dose at bedtime   Please check FSG before meals and QHS  Consistent carbohydrate diet  Hypoglycemia protocol     Discharge planning for DM: Resume basal / bolus insulin (dosing TBD), schedule follow up with prior outpatient endocrine provider     2. Chronic steroid use, steroid induced hyperglycemia   On Prednisone 10 mg BID for at least two months, ordered here  Insulin dosing as above   Pt cannot stop steroids abruptly as she will have secondary adrenal insufficiency at this point  Patient to follow up in 1/2022 with her doctors with steroid taper  If any hemodynamic instability, please order IV hydrocortisone 50 mg q6h for stress steroids    3. Hypothyroidism  12-16 @ 06:47 TSH 11.94 FreeT4 0.9   Levels borderline, would continue with current LT4 dosing for now and followup as outpatient, repeat TFTs in 6-8 weeks  Continue Levothyroxine 125 mcg PO daily, take daily on empty stomach    4. Hypercalcemia   Corrected calcium 13  Agree with initiation of continuous IVF   Vit D 25-OH LOW at 12.2, recommend supplement (see below)  PTH- suppressed    Followup PTHrp  Noted Pamidronate (60 mg) was ordered, patient without kidney dysfunction therefore could use 90 mg Pamidronate, however would prefer to use Zometa  due to longer action time (about 1 month). Administer Zometa 4 mg IV over 15 min x 1  Zometa will take about 72h to reach full effect. Recommend to give Calcitonin (4 IU/kg) = 312 international units, SQ q12h x 4 doses     5. Vitamin D deficiency  Vitamin D 25-OH 12.2  Recommend supplement with Cholecalciferol 2000 units daily    6. Hypertension  Goal BP <130/80  Management as per primary team    7. Hyperlipidemia  Continue Simvastatin 20 mg daily  Followup lipid panel annually as outpatient    Discussed recs with primary team attending MD Gary Greene  Nurse Practitioner  Division of Endocrinology & Diabetes  In house pager #[phone]/long range pager #[phone]    If before 9AM or after 6PM, or on weekends/holidays, please call endocrine answering service for assistance ([phone]).  For nonurgent matters email [email or web address] for assistance.

## 2021-12-20 NOTE — PROGRESS NOTE ADULT - PROBLEM SELECTOR PLAN 2
Worsening. D/c IVF 12/20 given hypoxia and tachypnea  -daily Ca and albumin  -could be 2/2 hypercalcemia of malignancy;  low PTH,  lowvitamin D 25, 1, 25, and PTHRP still pending  -will d/c triemterene/hctz given these drugs can worsen hyperCa  - F/u w Endo  - Calcitonin q 12 hrs x 4 doses, Zometa 12/20  - Replete vit D Dr Sushma Cottrell ([facility]) [phone]

## 2021-12-20 NOTE — PROGRESS NOTE ADULT - SUBJECTIVE AND OBJECTIVE BOX
[facility] Division of Hospital Medicine  Evelyn Vega MD  Pager (M-F, 8A-5P): [phone]  Other Times:  [phone]      SUBJECTIVE / OVERNIGHT EVENTS: Worsening tachypnea overnight and incr O2 requirement to 3L today. Afebrile. No other complaints. No CP. No dysuria. Was on IVF overnight at 75cc/hr.    MEDICATIONS  (STANDING):  amLODIPine   Tablet 10 milliGRAM(s) Oral daily  calcitonin Injectable 310 International Unit(s) IntraMuscular every 12 hours  Dakins Solution - 1/4 Strength 1 Application(s) Topical daily  dextrose 40% Gel 15 Gram(s) Oral once  dextrose 5%. 1000 milliLiter(s) (50 mL/Hr) IV Continuous <Continuous>  dextrose 5%. 1000 milliLiter(s) (100 mL/Hr) IV Continuous <Continuous>  dextrose 50% Injectable 25 Gram(s) IV Push once  dextrose 50% Injectable 12.5 Gram(s) IV Push once  dextrose 50% Injectable 25 Gram(s) IV Push once  enalapril 5 milliGRAM(s) Oral daily  escitalopram 30 milliGRAM(s) Oral daily  gabapentin 400 milliGRAM(s) Oral three times a day  glucagon  Injectable 1 milliGRAM(s) IntraMuscular once  heparin   Injectable 5000 Unit(s) SubCutaneous every 8 hours  influenza  Vaccine (HIGH DOSE) 0.7 milliLiter(s) IntraMuscular once  insulin glargine Injectable (LANTUS) 54 Unit(s) SubCutaneous at bedtime  insulin lispro (ADMELOG) corrective regimen sliding scale   SubCutaneous three times a day before meals  insulin lispro (ADMELOG) corrective regimen sliding scale   SubCutaneous at bedtime  insulin lispro Injectable (ADMELOG) 20 Unit(s) SubCutaneous three times a day with meals  levothyroxine 125 MICROGram(s) Oral daily  pantoprazole    Tablet 40 milliGRAM(s) Oral before breakfast  piperacillin/tazobactam IVPB.. 3.375 Gram(s) IV Intermittent every 12 hours  polyethylene glycol 3350 17 Gram(s) Oral daily  predniSONE   Tablet 10 milliGRAM(s) Oral two times a day  senna 2 Tablet(s) Oral at bedtime  simvastatin 20 milliGRAM(s) Oral at bedtime  sodium chloride 0.9%. 1000 milliLiter(s) (75 mL/Hr) IV Continuous <Continuous>  zoledronic acid IVPB (ZOMETA) (Non - oncologic) 4 milliGRAM(s) IV Intermittent once    MEDICATIONS  (PRN):  acetaminophen     Tablet .. 650 milliGRAM(s) Oral every 6 hours PRN Temp greater or equal to 38C (100.4F), Mild Pain (1 - 3), Moderate Pain (4 - 6)  bisacodyl Suppository 10 milliGRAM(s) Rectal daily PRN Constipation  lidocaine/prilocaine Cream 1 Application(s) Topical daily PRN Dressing changes  melatonin 3 milliGRAM(s) Oral at bedtime PRN Insomnia  oxycodone    5 mG/acetaminophen 325 mG 1 Tablet(s) Oral every 6 hours PRN Severe Pain (7 - 10)      I&O's Summary      PHYSICAL EXAM:  Vital Signs Last 24 Hrs  T(C): 37 (20 Dec 2021 15:00), Max: 37.2 (19 Dec 2021 17:51)  T(F): 98.6 (20 Dec 2021 15:00), Max: 99 (19 Dec 2021 17:51)  HR: 85 (20 Dec 2021 15:00) (79 - 98)  BP: 152/64 (20 Dec 2021 15:00) (134/54 - 158/57)  BP(mean): --  RR: 18 (20 Dec 2021 15:00) (17 - 18)  SpO2: 99% (20 Dec 2021 15:00) (98% - 100%)  CONSTITUTIONAL: NAD, well-developed, well-groomed  EYES: PERRLA; conjunctiva and sclera clear  ENMT: Moist oral mucosa, no pharyngeal injection or exudates; normal dentition  RESPIRATORY: on 3L, bibasilar crackles worse on the L side, tachypneic  CARDIOVASCULAR: Regular rate and rhythm, normal S1 and S2, no murmur/rub/gallop; No lower extremity edema; Peripheral pulses are 2+ bilaterally  ABDOMEN: Nontender to palpation, normoactive bowel sounds, no rebound/guarding  PSYCH: A+O to person, place, and time; affect appropriate  SKIN: No rashes; no palpable lesions    LABS:                        8.3    11.65 )-----------( 337      ( 19 Dec 2021 01:49 )             26.5     12-20    128<L>  |  92<L>  |  30<H>  ----------------------------<  110<H>  5.2   |  29  |  0.85    Ca    12.3<H>      20 Dec 2021 07:42  Phos  3.2     12-19  Mg     1.50     12-20    TPro  6.7  /  Alb  3.1<L>  /  TBili  0.3  /  DBili  x   /  AST  10  /  ALT  12  /  AlkPhos  65  12-19                RADIOLOGY & ADDITIONAL TESTS:  Results Reviewed:   Imaging Personally Reviewed:  Electrocardiogram Personally Reviewed:    COORDINATION OF CARE:  Care Discussed with Consultants/Other Providers [Y/N]:  Prior or Outpatient Records Reviewed [Y/N]:

## 2021-12-20 NOTE — CONSULT NOTE ADULT - TIME BILLING
Review of history, imaging, physical exam, procedural indications, discussion with the primary team and patient

## 2021-12-20 NOTE — PROGRESS NOTE ADULT - SUBJECTIVE AND OBJECTIVE BOX
Chief Complaint: DM 2    History: Patient seen at bedside. Reports she is eating meals, endorses eating less due to feeling constipated. Also endorses ongoing SOB. Denies n/v, denies s/s of hypoglycemia   Remains on Prednisone 10 mg BID    MEDICATIONS  (STANDING):  amLODIPine   Tablet 10 milliGRAM(s) Oral daily  Dakins Solution - 1/4 Strength 1 Application(s) Topical daily  dextrose 40% Gel 15 Gram(s) Oral once  dextrose 5%. 1000 milliLiter(s) (50 mL/Hr) IV Continuous <Continuous>  dextrose 5%. 1000 milliLiter(s) (100 mL/Hr) IV Continuous <Continuous>  dextrose 50% Injectable 25 Gram(s) IV Push once  dextrose 50% Injectable 12.5 Gram(s) IV Push once  dextrose 50% Injectable 25 Gram(s) IV Push once  enalapril 5 milliGRAM(s) Oral daily  escitalopram 30 milliGRAM(s) Oral daily  gabapentin 400 milliGRAM(s) Oral three times a day  glucagon  Injectable 1 milliGRAM(s) IntraMuscular once  heparin   Injectable 5000 Unit(s) SubCutaneous every 8 hours  influenza  Vaccine (HIGH DOSE) 0.7 milliLiter(s) IntraMuscular once  insulin glargine Injectable (LANTUS) 54 Unit(s) SubCutaneous at bedtime  insulin lispro (ADMELOG) corrective regimen sliding scale   SubCutaneous three times a day before meals  insulin lispro (ADMELOG) corrective regimen sliding scale   SubCutaneous at bedtime  insulin lispro Injectable (ADMELOG) 20 Unit(s) SubCutaneous three times a day with meals  levothyroxine 125 MICROGram(s) Oral daily  pamidronate IVPB 60 milliGRAM(s) IV Intermittent once  pantoprazole    Tablet 40 milliGRAM(s) Oral before breakfast  piperacillin/tazobactam IVPB.. 3.375 Gram(s) IV Intermittent every 12 hours  polyethylene glycol 3350 17 Gram(s) Oral daily  predniSONE   Tablet 10 milliGRAM(s) Oral two times a day  senna 2 Tablet(s) Oral at bedtime  simvastatin 20 milliGRAM(s) Oral at bedtime  sodium chloride 0.9%. 1000 milliLiter(s) (75 mL/Hr) IV Continuous <Continuous>    MEDICATIONS  (PRN):  acetaminophen     Tablet .. 650 milliGRAM(s) Oral every 6 hours PRN Temp greater or equal to 38C (100.4F), Mild Pain (1 - 3), Moderate Pain (4 - 6)  bisacodyl Suppository 10 milliGRAM(s) Rectal daily PRN Constipation  lidocaine/prilocaine Cream 1 Application(s) Topical daily PRN Dressing changes  melatonin 3 milliGRAM(s) Oral at bedtime PRN Insomnia  oxycodone    5 mG/acetaminophen 325 mG 1 Tablet(s) Oral every 6 hours PRN Severe Pain (7 - 10)    No Known Allergies    Review of Systems:  Cardiovascular: No chest pain  Respiratory: +SOB  GI: +constipation, no nausea, vomiting  Endocrine: no hypoglycemia     PHYSICAL EXAM:  VITALS: T(C): 36.8 (12-20-21 @ 05:47)  T(F): 98.3 (12-20-21 @ 05:47), Max: 99 (12-19-21 @ 17:51)  HR: 96 (12-20-21 @ 12:55) (79 - 98)  BP: 144/63 (12-20-21 @ 12:55) (134/54 - 158/57)  RR:  (17 - 18)  SpO2:  (98% - 100%)  Wt(kg): --  GENERAL: NAD  EYES: No proptosis, no lid lag, anicteric  HEENT:  Atraumatic, Normocephalic, moist mucous membranes  RESPIRATORY: +increased work of breathing  PSYCH: Alert and oriented x 3, normal affect, normal mood    CAPILLARY BLOOD GLUCOSE    POCT Blood Glucose.: 200 mg/dL (20 Dec 2021 12:01)  POCT Blood Glucose.: 117 mg/dL (20 Dec 2021 07:46)  POCT Blood Glucose.: 160 mg/dL (19 Dec 2021 20:50)  POCT Blood Glucose.: 139 mg/dL (19 Dec 2021 16:56)      12-20    128<L>  |  92<L>  |  30<H>  ----------------------------<  110<H>  5.2   |  29  |  0.85    EGFR if : 82  EGFR if non : 70    Ca    12.3<H>      12-20  Mg     1.50     12-20  Phos  3.2     12-19    TPro  6.7  /  Alb  3.1<L>  /  TBili  0.3  /  DBili  x   /  AST  10  /  ALT  12  /  AlkPhos  65  12-19      Thyroid Function Tests:  12-16 @ 06:47 TSH 11.94 FreeT4 0.9 T3 -- Anti TPO -- Anti Thyroglobulin Ab -- TSI --  12-14 @ 08:15 TSH 10.03 FreeT4 0.8 T3 -- Anti TPO -- Anti Thyroglobulin Ab -- TSI --      A1C with Estimated Average Glucose Result: 7.7 % (12-14-21 @ 08:15)  A1C with Estimated Average Glucose Result: 8.2 % (09-04-21 @ 23:11)  A1C with Estimated Average Glucose Result: 7.5 % (09-04-21 @ 09:13)  A1C with Estimated Average Glucose Result: 7.7 % (03-25-21 @ 15:08)    Diet, Regular:   Consistent Carbohydrate Evening Snack (CSTCHOSN)  Low Sodium (12-13-21 @ 17:08)

## 2021-12-20 NOTE — PROGRESS NOTE ADULT - ASSESSMENT
67 y/o F w/ a PMHx of HTN, HLD, T2DM, fibromyalgia, recurrent vulvar cancer (recently received chemo/RT from 7/2021-9/2021), and anemia (requiring intermittent transfusions recently) who presented with shortness of breath, dizziness, and nausea for multiple weeks, found to have multiple lung nodules and B/L pleural effusions concerning for malignancy, and was admitted to telemetry for further management, s/p thoracentesis on 12/15 (~ 660cc drained). Oncology was consulted for further evaluation.    # Dyspnea  - CTA Chest (12/13): No pulmonary embolus is noted. Multiple nodules are noted within both lungs. Primary consideration is metastasis. Findings suggestive of malignant pleural effusions bilaterally.  - Appreciate IR evaluation. Pt is s/p thoracentesis on 12/15 (~ 660cc drained). Follow-up cytopathology   - Appreciate Pulmonary evaluation. Continue to follow-up recs  - TTE (12/14) showed a hyperdynamic left ventricle  - serial CXR with persistent L pleural effusion   - Recommend Pulmonary re-evaluation     # Recurrent vulvar cancer  - Last recurrence was earlier this year. She received chemo/RT with weekly Cisplatin from 7/12/21-9/1/21.  - Imaging findings on admission are concerning for progressing malignancy  - Pt s/p thoracentesis as noted above. Follow-up cytopathology   - CT A/P with small collection in R inguinal region (likely known R inguinal wound/tract) but does not appear to show other areas of disease that is amenable to biopsy  - recommend IR consult for biopsy of one of the lung nodules (if feasible) as this can help establish a diagnosis and can be sent for molecular testing if cancer is confirmed  - will follow up with primary Oncologist Dr. Paulo Carpenter as outpatient     #Hypercalcemia  - Ca increased to 12.5  - PTH and Vit D low  - f/u PTHrP  - appreciate endo consult  - may be related to hypercalcemia of malignancy  - c/w IVF   - may need calcitonin, bisphosphonate       # Microcytic Anemia  - Occasionally requiring transfusions (last on 12/11)  - Recently underwent a hematologic evaluation as an outpatient. Her workup was notable for a hemoglobin electrophoresis which was c/w beta thalassemia minor. She had a BMBx on 11/9/21 (minimal marrow obtained to evaluate) which showed erythroid hyperplasia, megakaryocytes normal, no evidence of dysplasia.   - Monitor CBC and keep active T+S. Transfuse for Hgb < 7 or if symptomatic  - Primary Hematologist: Dr. Leana Auguste. Continue outpatient follow-up   65 y/o F w/ a PMHx of HTN, HLD, T2DM, fibromyalgia, recurrent vulvar cancer (recently received chemo/RT from 7/2021-9/2021), and anemia (requiring intermittent transfusions recently) who presented with shortness of breath, dizziness, and nausea for multiple weeks, found to have multiple lung nodules and B/L pleural effusions concerning for malignancy, and was admitted to telemetry for further management, s/p thoracentesis on 12/15 (~ 660cc drained). Oncology was consulted for further evaluation.    # Dyspnea  - CTA Chest (12/13): No pulmonary embolus is noted. Multiple nodules are noted within both lungs. Primary consideration is metastasis. Findings suggestive of malignant pleural effusions bilaterally.  - Appreciate IR evaluation. Pt is s/p thoracentesis on 12/15 (~ 660cc drained). Follow-up cytopathology   - Appreciate Pulmonary evaluation. Continue to follow-up recs  - TTE (12/14) showed a hyperdynamic left ventricle  - serial CXR with persistent L pleural effusion   - Recommend Pulmonary re-evaluation     # Recurrent vulvar cancer  - Last recurrence was earlier this year. She received chemo/RT with weekly Cisplatin from 7/12/21-9/1/21.  - Imaging findings on admission are concerning for progressing malignancy  - Pt s/p thoracentesis as noted above. Follow-up cytopathology   - CT A/P with small collection in R inguinal region (likely known R inguinal wound/tract) but does not appear to show other areas of disease that is amenable to biopsy  - recommend IR consult for biopsy of one of the lung nodules (if feasible) as this can help establish a diagnosis and can be sent for molecular testing if cancer is confirmed  - will follow up with primary Oncologist Dr. Paulo Carpenter as outpatient     #Hypercalcemia  - Ca increased to 12.5  - PTH and Vit D low  - f/u PTHrP  - appreciate endo consult  - may be related to hypercalcemia of malignancy  - recommend pamidronate 60mg IV. Discussed risk/benefits with patient including risk for osteonecrosis of jaw      # Microcytic Anemia  - Occasionally requiring transfusions (last on 12/11)  - Recently underwent a hematologic evaluation as an outpatient. Her workup was notable for a hemoglobin electrophoresis which was c/w beta thalassemia minor. She had a BMBx on 11/9/21 (minimal marrow obtained to evaluate) which showed erythroid hyperplasia, megakaryocytes normal, no evidence of dysplasia.   - Monitor CBC and keep active T+S. Transfuse for Hgb < 7 or if symptomatic  - Primary Hematologist: Dr. Leana Auguste. Continue outpatient follow-up   67 y/o F w/ a PMHx of HTN, HLD, T2DM, fibromyalgia, recurrent vulvar cancer (recently received chemo/RT from 7/2021-9/2021), and anemia (requiring intermittent transfusions recently) who presented with shortness of breath, dizziness, and nausea for multiple weeks, found to have multiple lung nodules and B/L pleural effusions concerning for malignancy, and was admitted to telemetry for further management, s/p thoracentesis on 12/15 (~ 660cc drained). Oncology was consulted for further evaluation.    # Dyspnea  - CTA Chest (12/13): No pulmonary embolus is noted. Multiple nodules are noted within both lungs. Primary consideration is metastasis. Findings suggestive of malignant pleural effusions bilaterally.  - Appreciate IR evaluation. Pt is s/p thoracentesis on 12/15 (~ 660cc drained). Follow-up cytopathology   - Appreciate Pulmonary evaluation. Continue to follow-up recs  - TTE (12/14) showed a hyperdynamic left ventricle  - serial CXR with persistent L pleural effusion   - Recommend Pulmonary re-evaluation     # Recurrent vulvar cancer  - Last recurrence was earlier this year. She received chemo/RT with weekly Cisplatin from 7/12/21-9/1/21.  - Imaging findings on admission are concerning for progressing malignancy  - Pt s/p thoracentesis as noted above. Follow-up cytopathology   - CT A/P with small collection in R inguinal region (likely known R inguinal wound/tract) but does not appear to show other areas of disease that is amenable to biopsy  - recommend IR consult for biopsy of one of the lung nodules (if feasible) as this can help establish a diagnosis and can be sent for molecular testing if cancer is confirmed  - will follow up with primary Oncologist Dr. Paulo Carpenter as outpatient     #Hypercalcemia   -This is an oncologic emergency and requires urgent management  - Ca increased to 12.5  - PTH and Vit D low  - f/u PTHrP  - appreciate endo consult  - may be related to hypercalcemia of malignancy  - recommend pamidronate 60mg IV. Discussed risk/benefits with patient including risk for osteonecrosis of jaw      # Microcytic Anemia  - Occasionally requiring transfusions (last on 12/11)  - Recently underwent a hematologic evaluation as an outpatient. Her workup was notable for a hemoglobin electrophoresis which was c/w beta thalassemia minor. She had a BMBx on 11/9/21 (minimal marrow obtained to evaluate) which showed erythroid hyperplasia, megakaryocytes normal, no evidence of dysplasia.   - Monitor CBC and keep active T+S. Transfuse for Hgb < 7 or if symptomatic  - Primary Hematologist: Dr. Leana Auguste. Continue outpatient follow-up

## 2021-12-21 NOTE — PROGRESS NOTE ADULT - ASSESSMENT
67 yo F with HTN, HLD, type 2DM, hypothyroidism, fibromyalgia, vulvar cancer s/p surgery 2020, RT/chemo 2021, anemia presenting with HERNANDEZ and dizziness with imaging concerning for metastatic disease and pleural effusions bilaterally. Endocrine was consulted for T2DM management.    1. Type 2 Diabetes Mellitus c/b steroid induced hyperglycemia  Hyperglycemic here due to prednisone + no standing prandial insulin   A1c 7.7%; goal less than 7%  Home Regimen: Tresiba 60 units QHS, Novolog 15-30 units with meals (ISF 1:30, but usually just estimates), Victoza 1.8 mg daily    While inpatient:   BG target 100-180 mg/dl, within goal, tightly controlled glucose last night in setting of skipped meal  For NPO status - recommend to reduce Lantus to 40 units SQ qHS     When diet resumed:  Continue Lantus 54 units SQ qHS   Continue Admelog 20 units SQ TID before meals (Hold if NPO/not eating meal)  Continue Admelog MODERATE dose correctional scale before meals, moderate dose at bedtime   Please check FSG before meals and QHS  Consistent carbohydrate diet - appreciate RD input, recommend to liberalize as possible (eliminate sodium restriction, add Glucerna per RD recommendation)  Hypoglycemia protocol     Discharge planning for DM: Resume basal / bolus insulin (dosing TBD), schedule follow up with prior outpatient endocrine provider     2. Chronic steroid use, steroid induced hyperglycemia   On Prednisone 10 mg BID for at least two months, ordered here  Insulin dosing as above   Pt cannot stop steroids abruptly as she will have secondary adrenal insufficiency at this point  Patient to follow up in 1/2022 with her doctors with steroid taper  If any hemodynamic instability, please order IV hydrocortisone 50 mg q6h for stress steroids    3. Hypothyroidism  12-16 @ 06:47 TSH 11.94 FreeT4 0.9   Levels borderline, would continue with current LT4 dosing for now and followup as outpatient, repeat TFTs in 6-8 weeks  Continue Levothyroxine 125 mcg PO daily, take daily on empty stomach    4. Hypercalcemia   Corrected calcium 12.4   Zometa 4 mg given on 12/20/21 (will take about 72h for full effect)  Ordered Calcitonin 310 IU SQ q12h (2 of 4 doses administered)  Vit D 25-OH LOW at 12.2, recommend supplement (see below)  PTH- suppressed    Followup PTHrp    5. Vitamin D deficiency  Vitamin D 25-OH 12.2  Recommend supplement with Cholecalciferol 2000 units daily    6. Hypertension  Goal BP <130/80  Management as per primary team    7. Hyperlipidemia  Continue Simvastatin 20 mg daily  Followup lipid panel annually as outpatient    Erendira Greene  Nurse Practitioner  Division of Endocrinology & Diabetes  In house pager #[phone]/long range pager #[phone]    If before 9AM or after 6PM, or on weekends/holidays, please call endocrine answering service for assistance ([phone]).  For nonurgent matters email [email or web address] for assistance.

## 2021-12-21 NOTE — PROGRESS NOTE ADULT - SUBJECTIVE AND OBJECTIVE BOX
Chief Complaint: DM 2 with hyperglycemia exacerbated by steroids, hypercalcemia     History: Patient seen at bedside. RD also present at time of visit. Patient reporting she dislikes hospital food, requesting more food choices. Reports she did not eat dinner last night, noted with tightly controlled FS at bedtime - 77 mg/dl. No hypoglycemia symptoms.   Continued on Prednisone 10 mg BID  Noted she will be NPO after midnight    In regards to hypercalcemia:  Received Zometa 4 mg IV on 12/20 at 1800  Ordered for Calcitonin 310 IU x 4 doses, received 2 out of 4    MEDICATIONS  (STANDING):  amLODIPine   Tablet 10 milliGRAM(s) Oral daily  calcitonin Injectable 310 International Unit(s) IntraMuscular every 12 hours  cholecalciferol 2000 Unit(s) Oral daily  Dakins Solution - 1/4 Strength 1 Application(s) Topical daily  dextrose 40% Gel 15 Gram(s) Oral once  dextrose 5%. 1000 milliLiter(s) (50 mL/Hr) IV Continuous <Continuous>  dextrose 5%. 1000 milliLiter(s) (100 mL/Hr) IV Continuous <Continuous>  dextrose 50% Injectable 25 Gram(s) IV Push once  dextrose 50% Injectable 12.5 Gram(s) IV Push once  dextrose 50% Injectable 25 Gram(s) IV Push once  enalapril 5 milliGRAM(s) Oral daily  escitalopram 30 milliGRAM(s) Oral daily  gabapentin 400 milliGRAM(s) Oral three times a day  glucagon  Injectable 1 milliGRAM(s) IntraMuscular once  heparin   Injectable 5000 Unit(s) SubCutaneous every 8 hours  influenza  Vaccine (HIGH DOSE) 0.7 milliLiter(s) IntraMuscular once  insulin glargine Injectable (LANTUS) 54 Unit(s) SubCutaneous at bedtime  insulin lispro (ADMELOG) corrective regimen sliding scale   SubCutaneous three times a day before meals  insulin lispro (ADMELOG) corrective regimen sliding scale   SubCutaneous at bedtime  insulin lispro Injectable (ADMELOG) 20 Unit(s) SubCutaneous three times a day with meals  levothyroxine 125 MICROGram(s) Oral daily  pantoprazole    Tablet 40 milliGRAM(s) Oral before breakfast  piperacillin/tazobactam IVPB.. 3.375 Gram(s) IV Intermittent every 12 hours  polyethylene glycol 3350 17 Gram(s) Oral daily  predniSONE   Tablet 10 milliGRAM(s) Oral two times a day  senna 2 Tablet(s) Oral at bedtime  simvastatin 20 milliGRAM(s) Oral at bedtime    MEDICATIONS  (PRN):  acetaminophen     Tablet .. 650 milliGRAM(s) Oral every 6 hours PRN Temp greater or equal to 38C (100.4F), Mild Pain (1 - 3), Moderate Pain (4 - 6)  bisacodyl Suppository 10 milliGRAM(s) Rectal daily PRN Constipation  lidocaine/prilocaine Cream 1 Application(s) Topical daily PRN Dressing changes  melatonin 3 milliGRAM(s) Oral at bedtime PRN Insomnia  oxycodone    5 mG/acetaminophen 325 mG 1 Tablet(s) Oral every 6 hours PRN Severe Pain (7 - 10)    No Known Allergies    Review of Systems:  Cardiovascular: No chest pain  Respiratory: No SOB  GI: No nausea, vomiting  Endocrine: no hypoglycemia     PHYSICAL EXAM:  VITALS: T(C): 36.4 (12-21-21 @ 12:47)  T(F): 97.6 (12-21-21 @ 12:47), Max: 98.5 (12-20-21 @ 21:36)  HR: 88 (12-21-21 @ 12:47) (88 - 97)  BP: 136/58 (12-21-21 @ 12:47) (133/56 - 162/56)  RR:  (17 - 18)  SpO2:  (99% - 99%)  Wt(kg): --  GENERAL: NAD  EYES: No proptosis, no lid lag, anicteric  HEENT:  Atraumatic, Normocephalic, moist mucous membranes  RESPIRATORY: unlabored respirations   PSYCH: Alert and oriented x 3, normal affect, normal mood    CAPILLARY BLOOD GLUCOSE    POCT Blood Glucose.: 120 mg/dL (21 Dec 2021 11:46)  POCT Blood Glucose.: 162 mg/dL (21 Dec 2021 07:51)  POCT Blood Glucose.: 114 mg/dL (20 Dec 2021 21:49)  POCT Blood Glucose.: 77 mg/dL (20 Dec 2021 20:54)  POCT Blood Glucose.: 119 mg/dL (20 Dec 2021 16:43)      12-21    127<L>  |  91<L>  |  24<H>  ----------------------------<  157<H>  5.6<H>   |  29  |  0.88    EGFR if : 78  EGFR if non : 68    Ca    11.7<H>      12-21  Mg     1.80     12-21  Phos  2.3     12-21    TPro  6.7  /  Alb  3.1<L>  /  TBili  0.3  /  DBili  x   /  AST  10  /  ALT  12  /  AlkPhos  65  12-19      Thyroid Function Tests:  12-16 @ 06:47 TSH 11.94 FreeT4 0.9 T3 -- Anti TPO -- Anti Thyroglobulin Ab -- TSI --  12-14 @ 08:15 TSH 10.03 FreeT4 0.8 T3 -- Anti TPO -- Anti Thyroglobulin Ab -- TSI --      A1C with Estimated Average Glucose Result: 7.7 % (12-14-21 @ 08:15)  A1C with Estimated Average Glucose Result: 8.2 % (09-04-21 @ 23:11)  A1C with Estimated Average Glucose Result: 7.5 % (09-04-21 @ 09:13)  A1C with Estimated Average Glucose Result: 7.7 % (03-25-21 @ 15:08)    Diet, NPO after Midnight:      NPO Start Date: 21-Dec-2021,   NPO Start Time: 23:59 (12-21-21 @ 07:50)  Diet, Regular:   Consistent Carbohydrate Evening Snack (CSTCHOSN)  Low Sodium (12-13-21 @ 17:08)

## 2021-12-21 NOTE — PROGRESS NOTE ADULT - ASSESSMENT
Ms. Carranza 65 yo F w/ PMHx of HTN, HLD, DM, fibromyalgia, Vulvar CA s/p vulva surgery in 2020, completed chemo & RT in 9/21, anemic, now presents w/ shortness of breath, found to have bilateral pleural effusions, L>R and pulmonary nodules, pulmonary consulted for pulmonary nodules.    RECS:  - increasing o2 requirement overnight  - patient remains afebrile, without significant leukocytosis  - CXR without enlargement of left pleural effusion s/p thoracentesis last week  - would attempt diuresis given recent mIVF overnight could be contributing vs. residual reexpansion edema s/p thora  - POCUS shows:  - Bilateral pulm nodules, largest measuring 2.6x1.5cm in the RLL with concern for metastasis of vulvar cancer, pending IR biopsy of accessible nodule tomorrow  - s/p diagnostic and therapeutic thoracentesis by IR 12/15, studies look exudative in nature      ****NOTE INCOMPLETE****   Ms. Carranza 67 yo F w/ PMHx of HTN, HLD, DM, fibromyalgia, Vulvar CA s/p vulva surgery in 2020, completed chemo & RT in 9/21, anemic, now presents w/ shortness of breath, found to have bilateral pleural effusions, L>R and pulmonary nodules, pulmonary consulted for pulmonary nodules.    RECS:  - increasing o2 requirement overnight, satting % on 3L NC during my interview  - patient remains afebrile, without significant leukocytosis  - CXR without enlargement of left pleural effusion s/p thoracentesis last week  - would attempt diuresis given recent mIVF overnight could be contributing to SOB and increasing o2 req  - POCUS shows: scattered b-lines in left lung fields, a-line predominant on right, mild left sided pleural effusion without significant pocket for drainage at this time.   - Bilateral pulm nodules, largest measuring 2.6x1.5cm in the RLL with concern for metastasis of vulvar cancer, pending IR biopsy of accessible nodule tomorrow  - s/p diagnostic and therapeutic thoracentesis by IR 12/15, studies look exudative in nature      ****NOTE INCOMPLETE****   Ms. Carranza 67 yo F w/ PMHx of HTN, HLD, DM, fibromyalgia, Vulvar CA s/p vulva surgery in 2020, completed chemo & RT in 9/21, anemic, now presents w/ shortness of breath, found to have bilateral pleural effusions, L>R and pulmonary nodules, pulmonary consulted for pulmonary nodules.    RECS:  - increasing o2 requirement overnight, satting % on 3L NC during my interview  - patient remains afebrile, without significant leukocytosis  - CXR without enlargement of left pleural effusion s/p thoracentesis last week  - recommend repeat formal echo as recent TTE with limited results and bedside echo with limited windows.   - would attempt diuresis given recent mIVF overnight could be contributing to SOB and increasing o2 req  - POCUS shows: scattered b-lines in left lung fields, a-line predominant on right, mild left sided pleural effusion without significant pocket for drainage at this time.   - Bilateral pulm nodules, largest measuring 2.6x1.5cm in the RLL with concern for metastasis of vulvar cancer, pending IR biopsy of accessible nodule tomorrow  - s/p diagnostic and therapeutic thoracentesis by IR 12/15, studies look exudative in nature      ****NOTE INCOMPLETE****   Ms. Carranza 67 yo F w/ PMHx of HTN, HLD, DM, fibromyalgia, Vulvar CA s/p vulva surgery in 2020, completed chemo & RT in 9/21, anemic, now presents w/ shortness of breath, found to have bilateral pleural effusions, L>R and pulmonary nodules, pulmonary consulted for pulmonary nodules.    RECS:  - increasing o2 requirement overnight, satting % on 3L NC during my interview  - patient remains afebrile, without significant leukocytosis  - CXR without enlargement of left pleural effusion s/p thoracentesis last week  - recommend repeat formal echo as recent TTE with limited results and bedside echo with limited windows.   - would attempt diuresis given recent mIVF overnight could be contributing to SOB and increasing o2 req  - POCUS shows: scattered b-lines in left lung fields, a-line predominant on right, mild left sided pleural effusion without significant pocket for drainage at this time.   - Bilateral pulm nodules, largest measuring 2.6x1.5cm in the RLL with concern for metastasis of vulvar cancer, pending IR biopsy of accessible nodule tomorrow  - s/p diagnostic and therapeutic thoracentesis by IR 12/15, studies look exudative in nature    ****NOTE INCOMPLETE****   Ms. Carranza 65 yo F w/ PMHx of HTN, HLD, DM, fibromyalgia, Vulvar CA s/p vulva surgery in 2020, completed chemo & RT in 9/21, anemic, now presents w/ shortness of breath, found to have bilateral pleural effusions, L>R and pulmonary nodules, pulmonary consulted for pulmonary nodules.    RECS:  - increasing o2 requirement overnight, satting % on 3L NC during my interview  - patient remains afebrile, without significant leukocytosis  - CXR without enlargement of left pleural effusion s/p thoracentesis last week  - recommend repeat formal echo as recent TTE with limited results and bedside echo with limited windows.   - would attempt diuresis given recent mIVF overnight could be contributing to SOB and increasing o2 req  - POCUS shows: scattered b-lines in left lung fields, a-line predominant on right, mild left sided pleural effusion without significant pocket for drainage at this time.   - Bilateral pulm nodules, largest measuring 2.6x1.5cm in the RLL with concern for metastasis of vulvar cancer, pending IR biopsy of accessible nodule tomorrow  - s/p diagnostic and therapeutic thoracentesis by IR 12/15, studies look exudative in nature

## 2021-12-21 NOTE — PROGRESS NOTE ADULT - ASSESSMENT
65 yo F w/ PMHx of HTN, HLD, DM, fibromyalgia, Vulvar CA s/p vulva surgery in 2020, completed chemo & RT in 9/21, now p/w SOB, dizziness & nausea for the last couple of weeks.  CTPA- No pulmonary embolus is noted. Multiple nodules are noted within both lungs. Primary consideration is metastasis. Findings suggestive of malignant pleural effusions bilaterally, s/p thora this admission by IR 12/15. Now with worsening hypercalcemia. Pulm nodule bx planned for 12/22. Concern for reaccumulation of pleural effusion on CXR 12/20.

## 2021-12-21 NOTE — PROGRESS NOTE ADULT - SUBJECTIVE AND OBJECTIVE BOX
CHIEF COMPLAINT: SOB, suspected malignant effusion    Interval Events: s/p IR thoracentesis on 12/15, stable left pleural effusion on CXR since. Reconsulted for new o2 requirment since 12/16, with increase to 3L overnight.     REVIEW OF SYSTEMS:  Constitutional: [ ] negative [ ] fevers [ ] chills [ ] weight loss [ ] weight gain  HEENT: [ ] negative [ ] dry eyes [ ] eye irritation [ ] postnasal drip [ ] nasal congestion  CV: [ ] negative  [ ] chest pain [ ] orthopnea [ ] palpitations [ ] murmur  Resp: [ ] negative [ ] cough x ] shortness of breath [ ] dyspnea [ ] wheezing [ ] sputum [ ] hemoptysis  GI: [ ] negative [ ] nausea [ ] vomiting [ ] diarrhea [ ] constipation [ ] abd pain [ ] dysphagia   : [ ] negative [ ] dysuria [ ] nocturia [ ] hematuria [ ] increased urinary frequency  Musculoskeletal: [ ] negative [ ] back pain [ ] myalgias [ ] arthralgias [ ] fracture  Skin: [ ] negative [ ] rash [ ] itch  Neurological: [ ] negative [ ] headache [ ] dizziness [ ] syncope [ ] weakness [ ] numbness  Psychiatric: [ ] negative [ ] anxiety [ ] depression  Endocrine: [ ] negative [ ] diabetes [ ] thyroid problem  Hematologic/Lymphatic: [ ] negative [ ] anemia [ ] bleeding problem  Allergic/Immunologic: [ ] negative [ ] itchy eyes [ ] nasal discharge [ ] hives [ ] angioedema  [ ] All other systems negative  [ ] Unable to assess ROS because ________    OBJECTIVE:  ICU Vital Signs Last 24 Hrs  T(C): 36.9 (21 Dec 2021 05:40), Max: 37 (20 Dec 2021 15:00)  T(F): 98.4 (21 Dec 2021 05:40), Max: 98.6 (20 Dec 2021 15:00)  HR: 95 (21 Dec 2021 05:40) (85 - 97)  BP: 160/50 (21 Dec 2021 05:40) (144/63 - 162/56)  BP(mean): --  ABP: --  ABP(mean): --  RR: 18 (21 Dec 2021 05:40) (17 - 18)  SpO2: 99% (21 Dec 2021 05:40) (99% - 100%)        CAPILLARY BLOOD GLUCOSE      POCT Blood Glucose.: 162 mg/dL (21 Dec 2021 07:51)    PHYSICAL EXAM:  GENERAL: NAD, well-developed  CHEST/LUNG: Decreased bs on the left mid to lower lung field, rt base with minimal crackles  HEART: Regular rate and rhythm  ABDOMEN: Soft, mild tenderness upper abdomen, Nondistended  EXTREMITIES: no LE edema  PSYCH: Calm  NEUROLOGY: AAOx3  SKIN: No rashes or lesions    HOSPITAL MEDICATIONS:  MEDICATIONS  (STANDING):  amLODIPine   Tablet 10 milliGRAM(s) Oral daily  calcitonin Injectable 310 International Unit(s) IntraMuscular every 12 hours  cholecalciferol 2000 Unit(s) Oral daily  Dakins Solution - 1/4 Strength 1 Application(s) Topical daily  dextrose 40% Gel 15 Gram(s) Oral once  dextrose 5%. 1000 milliLiter(s) (50 mL/Hr) IV Continuous <Continuous>  dextrose 5%. 1000 milliLiter(s) (100 mL/Hr) IV Continuous <Continuous>  dextrose 50% Injectable 25 Gram(s) IV Push once  dextrose 50% Injectable 12.5 Gram(s) IV Push once  dextrose 50% Injectable 25 Gram(s) IV Push once  enalapril 5 milliGRAM(s) Oral daily  escitalopram 30 milliGRAM(s) Oral daily  furosemide   Injectable 20 milliGRAM(s) IV Push once  gabapentin 400 milliGRAM(s) Oral three times a day  glucagon  Injectable 1 milliGRAM(s) IntraMuscular once  heparin   Injectable 5000 Unit(s) SubCutaneous every 8 hours  influenza  Vaccine (HIGH DOSE) 0.7 milliLiter(s) IntraMuscular once  insulin glargine Injectable (LANTUS) 54 Unit(s) SubCutaneous at bedtime  insulin lispro (ADMELOG) corrective regimen sliding scale   SubCutaneous at bedtime  insulin lispro (ADMELOG) corrective regimen sliding scale   SubCutaneous three times a day before meals  insulin lispro Injectable (ADMELOG) 20 Unit(s) SubCutaneous three times a day with meals  levothyroxine 125 MICROGram(s) Oral daily  pantoprazole    Tablet 40 milliGRAM(s) Oral before breakfast  piperacillin/tazobactam IVPB.. 3.375 Gram(s) IV Intermittent every 12 hours  polyethylene glycol 3350 17 Gram(s) Oral daily  predniSONE   Tablet 10 milliGRAM(s) Oral two times a day  senna 2 Tablet(s) Oral at bedtime  simvastatin 20 milliGRAM(s) Oral at bedtime    MEDICATIONS  (PRN):  acetaminophen     Tablet .. 650 milliGRAM(s) Oral every 6 hours PRN Temp greater or equal to 38C (100.4F), Mild Pain (1 - 3), Moderate Pain (4 - 6)  bisacodyl Suppository 10 milliGRAM(s) Rectal daily PRN Constipation  lidocaine/prilocaine Cream 1 Application(s) Topical daily PRN Dressing changes  melatonin 3 milliGRAM(s) Oral at bedtime PRN Insomnia  oxycodone    5 mG/acetaminophen 325 mG 1 Tablet(s) Oral every 6 hours PRN Severe Pain (7 - 10)      LABS:                        7.8    11.86 )-----------( 343      ( 21 Dec 2021 07:01 )             24.4     Hgb Trend: 7.8<--, 8.0<--, 8.3<--, 8.1<--, 8.5<--  12-21    127<L>  |  91<L>  |  24<H>  ----------------------------<  157<H>  5.6<H>   |  29  |  0.88    Ca    11.7<H>      21 Dec 2021 07:01  Phos  2.3     12-21  Mg     1.80     12-21      Creatinine Trend: 0.88<--, 0.85<--, 0.92<--, 1.05<--, 1.02<--, 1.27<--      MICROBIOLOGY:   RADIOLOGY:  ACC: [id number] EXAM:  XR CHEST AP OR PA 1V                          PROCEDURE DATE:  12/20/2021        INTERPRETATION:  TIME OF EXAM: December 20, 2021 at 4:28 PM    CLINICAL INFORMATION: Pleural effusions post thoracentesis.    TECHNIQUE:   Portable chest    INTERPRETATION:    Left pleural effusion unchanged from the study of 2 days ago. The heart   is not enlarged. Trace right effusion also seen.    Lungs are free of focal consolidations.    COMPARISON:  December 18    POCUS:    EKG: CHIEF COMPLAINT: SOB, suspected malignant effusion    Interval Events: s/p IR thoracentesis on 12/15, stable left pleural effusion on CXR since. Reconsulted for new o2 requirment since 12/16, with increase to 3L overnight.     REVIEW OF SYSTEMS:  Constitutional: [ ] negative [ ] fevers [ ] chills [ ] weight loss [ ] weight gain  HEENT: [ ] negative [ ] dry eyes [ ] eye irritation [ ] postnasal drip [ ] nasal congestion  CV: [ ] negative  [ ] chest pain [ ] orthopnea [ ] palpitations [ ] murmur  Resp: [ ] negative [ ] cough [x ] shortness of breath [ ] dyspnea [ ] wheezing [ ] sputum [ ] hemoptysis  GI: [ ] negative [ ] nausea [ ] vomiting [ ] diarrhea [ ] constipation [ ] abd pain [ ] dysphagia   : [ ] negative [ ] dysuria [ ] nocturia [ ] hematuria [ ] increased urinary frequency  Musculoskeletal: [ ] negative [ ] back pain [ ] myalgias [ ] arthralgias [ ] fracture  Skin: [ ] negative [ ] rash [ ] itch  Neurological: [ ] negative [ ] headache [ ] dizziness [ ] syncope [ ] weakness [ ] numbness  Psychiatric: [ ] negative [ ] anxiety [ ] depression  Endocrine: [ ] negative [ ] diabetes [ ] thyroid problem  Hematologic/Lymphatic: [ ] negative [ ] anemia [ ] bleeding problem  Allergic/Immunologic: [ ] negative [ ] itchy eyes [ ] nasal discharge [ ] hives [ ] angioedema  [x ] All other systems negative  [ ] Unable to assess ROS because ________    OBJECTIVE:  ICU Vital Signs Last 24 Hrs  T(C): 36.9 (21 Dec 2021 05:40), Max: 37 (20 Dec 2021 15:00)  T(F): 98.4 (21 Dec 2021 05:40), Max: 98.6 (20 Dec 2021 15:00)  HR: 95 (21 Dec 2021 05:40) (85 - 97)  BP: 160/50 (21 Dec 2021 05:40) (144/63 - 162/56)  BP(mean): --  ABP: --  ABP(mean): --  RR: 18 (21 Dec 2021 05:40) (17 - 18)  SpO2: 99% (21 Dec 2021 05:40) (99% - 100%)        CAPILLARY BLOOD GLUCOSE      POCT Blood Glucose.: 162 mg/dL (21 Dec 2021 07:51)    PHYSICAL EXAM:  GENERAL: NAD, well-developed  CHEST/LUNG: Decreased bs on the left mid to lower lung field, rt base with minimal crackles  HEART: Regular rate and rhythm  ABDOMEN: Soft, mild tenderness upper abdomen, Nondistended  EXTREMITIES: no LE edema  PSYCH: Calm  NEUROLOGY: AAOx3  SKIN: No rashes or lesions    HOSPITAL MEDICATIONS:  MEDICATIONS  (STANDING):  amLODIPine   Tablet 10 milliGRAM(s) Oral daily  calcitonin Injectable 310 International Unit(s) IntraMuscular every 12 hours  cholecalciferol 2000 Unit(s) Oral daily  Dakins Solution - 1/4 Strength 1 Application(s) Topical daily  dextrose 40% Gel 15 Gram(s) Oral once  dextrose 5%. 1000 milliLiter(s) (50 mL/Hr) IV Continuous <Continuous>  dextrose 5%. 1000 milliLiter(s) (100 mL/Hr) IV Continuous <Continuous>  dextrose 50% Injectable 25 Gram(s) IV Push once  dextrose 50% Injectable 12.5 Gram(s) IV Push once  dextrose 50% Injectable 25 Gram(s) IV Push once  enalapril 5 milliGRAM(s) Oral daily  escitalopram 30 milliGRAM(s) Oral daily  furosemide   Injectable 20 milliGRAM(s) IV Push once  gabapentin 400 milliGRAM(s) Oral three times a day  glucagon  Injectable 1 milliGRAM(s) IntraMuscular once  heparin   Injectable 5000 Unit(s) SubCutaneous every 8 hours  influenza  Vaccine (HIGH DOSE) 0.7 milliLiter(s) IntraMuscular once  insulin glargine Injectable (LANTUS) 54 Unit(s) SubCutaneous at bedtime  insulin lispro (ADMELOG) corrective regimen sliding scale   SubCutaneous at bedtime  insulin lispro (ADMELOG) corrective regimen sliding scale   SubCutaneous three times a day before meals  insulin lispro Injectable (ADMELOG) 20 Unit(s) SubCutaneous three times a day with meals  levothyroxine 125 MICROGram(s) Oral daily  pantoprazole    Tablet 40 milliGRAM(s) Oral before breakfast  piperacillin/tazobactam IVPB.. 3.375 Gram(s) IV Intermittent every 12 hours  polyethylene glycol 3350 17 Gram(s) Oral daily  predniSONE   Tablet 10 milliGRAM(s) Oral two times a day  senna 2 Tablet(s) Oral at bedtime  simvastatin 20 milliGRAM(s) Oral at bedtime    MEDICATIONS  (PRN):  acetaminophen     Tablet .. 650 milliGRAM(s) Oral every 6 hours PRN Temp greater or equal to 38C (100.4F), Mild Pain (1 - 3), Moderate Pain (4 - 6)  bisacodyl Suppository 10 milliGRAM(s) Rectal daily PRN Constipation  lidocaine/prilocaine Cream 1 Application(s) Topical daily PRN Dressing changes  melatonin 3 milliGRAM(s) Oral at bedtime PRN Insomnia  oxycodone    5 mG/acetaminophen 325 mG 1 Tablet(s) Oral every 6 hours PRN Severe Pain (7 - 10)      LABS:                        7.8    11.86 )-----------( 343      ( 21 Dec 2021 07:01 )             24.4     Hgb Trend: 7.8<--, 8.0<--, 8.3<--, 8.1<--, 8.5<--  12-21    127<L>  |  91<L>  |  24<H>  ----------------------------<  157<H>  5.6<H>   |  29  |  0.88    Ca    11.7<H>      21 Dec 2021 07:01  Phos  2.3     12-21  Mg     1.80     12-21      Creatinine Trend: 0.88<--, 0.85<--, 0.92<--, 1.05<--, 1.02<--, 1.27<--      MICROBIOLOGY:   RADIOLOGY:  ACC: [id number] EXAM:  XR CHEST AP OR PA 1V                          PROCEDURE DATE:  12/20/2021        INTERPRETATION:  TIME OF EXAM: December 20, 2021 at 4:28 PM    CLINICAL INFORMATION: Pleural effusions post thoracentesis.    TECHNIQUE:   Portable chest    INTERPRETATION:    Left pleural effusion unchanged from the study of 2 days ago. The heart   is not enlarged. Trace right effusion also seen.    Lungs are free of focal consolidations.    COMPARISON:  December 18    POCUS:    EKG:

## 2021-12-21 NOTE — PROGRESS NOTE ADULT - PROBLEM SELECTOR PLAN 1
Cont on tele, monitor for arrhythmia,   -CT showed No pulmonary embolus is noted. Multiple nodules are noted within both lungs. Primary consideration is   metastasis. Findings suggestive of malignant pleural effusions bilaterally.  -s/p Thoracentesis on 12/15 by IR, drained appr 660cc pleural fluid, exudative, f/u path   - Heme/ Onc consult appreciated,  IR to do bx of lung nodules after ct a/p, awaiting for ct  - Pulm consult appreciated  -cxr showed no pneumothorax  -f/u echo  -rpt cxr 12/18 with persistent L pleural effusion  - Repeat CXR 12/20--> re-consult Pulm/IR for repeat thora given re accumulation-- this is likely worsened by the IVF she has been getting to treat her hypercalcemia.  -lasix 20 mg IV dosed 12/21 am, no significnat improvement in SOB  - strict i/o

## 2021-12-21 NOTE — PROGRESS NOTE ADULT - PROBLEM SELECTOR PLAN 9
Has new TWI inversions on lateral leads. Likely related to L pleural effusion  -pt with no chest pain  -Trop negative so far  -cont to monitor on tele  TTE 12/20 still pending

## 2021-12-21 NOTE — PROGRESS NOTE ADULT - PROBLEM SELECTOR PLAN 2
Worsening. D/c IVF 12/20 given hypoxia and tachypnea  -daily Ca and albumin  -could be 2/2 hypercalcemia of malignancy;  low PTH,  lowvitamin D 25, 1, 25, and PTHRP still pending  -will d/c triemterene/hctz given these drugs can worsen hyperCa  - F/u w Endo  - Calcitonin q 12 hrs x 4 doses, Zometa 12/20  - Replete vit D

## 2021-12-21 NOTE — PROGRESS NOTE ADULT - SUBJECTIVE AND OBJECTIVE BOX
[facility] Division of Hospital Medicine  Evelyn Vega MD  Pager (M-F, 8A-5P): [phone]  Other Times:  [phone]      SUBJECTIVE / OVERNIGHT EVENTS: Still on 3L, worsening tachypnea overnight. Also endorsing nausea. No CP. No LE swelling. Has had ongoing issues with constipation.    MEDICATIONS  (STANDING):  amLODIPine   Tablet 10 milliGRAM(s) Oral daily  calcitonin Injectable 310 International Unit(s) SubCutaneous every 12 hours  cholecalciferol 2000 Unit(s) Oral daily  Dakins Solution - 1/4 Strength 1 Application(s) Topical daily  dextrose 40% Gel 15 Gram(s) Oral once  dextrose 5%. 1000 milliLiter(s) (50 mL/Hr) IV Continuous <Continuous>  dextrose 5%. 1000 milliLiter(s) (100 mL/Hr) IV Continuous <Continuous>  dextrose 50% Injectable 25 Gram(s) IV Push once  dextrose 50% Injectable 12.5 Gram(s) IV Push once  dextrose 50% Injectable 25 Gram(s) IV Push once  enalapril 5 milliGRAM(s) Oral daily  escitalopram 30 milliGRAM(s) Oral daily  gabapentin 400 milliGRAM(s) Oral three times a day  glucagon  Injectable 1 milliGRAM(s) IntraMuscular once  heparin   Injectable 5000 Unit(s) SubCutaneous every 8 hours  influenza  Vaccine (HIGH DOSE) 0.7 milliLiter(s) IntraMuscular once  insulin glargine Injectable (LANTUS) 40 Unit(s) SubCutaneous at bedtime  insulin lispro (ADMELOG) corrective regimen sliding scale   SubCutaneous three times a day before meals  insulin lispro (ADMELOG) corrective regimen sliding scale   SubCutaneous at bedtime  insulin lispro Injectable (ADMELOG) 20 Unit(s) SubCutaneous three times a day with meals  levothyroxine 125 MICROGram(s) Oral daily  pantoprazole    Tablet 40 milliGRAM(s) Oral before breakfast  piperacillin/tazobactam IVPB.. 3.375 Gram(s) IV Intermittent every 12 hours  polyethylene glycol 3350 17 Gram(s) Oral daily  predniSONE   Tablet 10 milliGRAM(s) Oral two times a day  senna 2 Tablet(s) Oral at bedtime  simvastatin 20 milliGRAM(s) Oral at bedtime    MEDICATIONS  (PRN):  acetaminophen     Tablet .. 650 milliGRAM(s) Oral every 6 hours PRN Temp greater or equal to 38C (100.4F), Mild Pain (1 - 3), Moderate Pain (4 - 6)  bisacodyl Suppository 10 milliGRAM(s) Rectal daily PRN Constipation  lidocaine/prilocaine Cream 1 Application(s) Topical daily PRN Dressing changes  melatonin 3 milliGRAM(s) Oral at bedtime PRN Insomnia  oxycodone    5 mG/acetaminophen 325 mG 1 Tablet(s) Oral every 6 hours PRN Severe Pain (7 - 10)      I&O's Summary      PHYSICAL EXAM:  Vital Signs Last 24 Hrs  T(C): 36.4 (21 Dec 2021 12:47), Max: 36.9 (20 Dec 2021 21:36)  T(F): 97.6 (21 Dec 2021 12:47), Max: 98.5 (20 Dec 2021 21:36)  HR: 88 (21 Dec 2021 12:47) (88 - 97)  BP: 136/58 (21 Dec 2021 12:47) (133/56 - 162/56)  BP(mean): --  RR: 18 (21 Dec 2021 12:47) (17 - 18)  SpO2: 99% (21 Dec 2021 12:47) (99% - 99%)  CONSTITUTIONAL: NAD, well-developed, well-groomed  EYES: PERRLA; conjunctiva and sclera clear  ENMT: Moist oral mucosa, no pharyngeal injection or exudates; normal dentition  RESPIRATORY: on 3L, bibasilar crackles worse on the L side, tachypneic  CARDIOVASCULAR: Regular rate and rhythm, normal S1 and S2, no murmur/rub/gallop; No lower extremity edema; Peripheral pulses are 2+ bilaterally  ABDOMEN: Nontender to palpation, normoactive bowel sounds, no rebound/guarding  PSYCH: A+O to person, place, and time; affect appropriate  SKIN: No rashes; no palpable lesions    LABS:                        7.8    11.86 )-----------( 343      ( 21 Dec 2021 07:01 )             24.4     12-21    128<L>  |  90<L>  |  25<H>  ----------------------------<  141<H>  4.7   |  30  |  0.95    Ca    11.5<H>      21 Dec 2021 19:03  Phos  2.2     12-21  Mg     1.70     12-21                  RADIOLOGY & ADDITIONAL TESTS:  Results Reviewed:   Imaging Personally Reviewed:  Electrocardiogram Personally Reviewed:    COORDINATION OF CARE:  Care Discussed with Consultants/Other Providers [Y/N]:  Prior or Outpatient Records Reviewed [Y/N]:

## 2021-12-22 NOTE — PROGRESS NOTE ADULT - ASSESSMENT
65 yo F w/ PMHx of HTN, HLD, DM, fibromyalgia, Vulvar CA s/p vulva surgery in 2020, completed chemo & RT in 9/21, now p/w SOB, dizziness & nausea for the last couple of weeks.  CTPA- No pulmonary embolus is noted. Multiple nodules are noted within both lungs. Primary consideration is metastasis. Findings suggestive of malignant pleural effusions bilaterally, s/p thora this admission by IR 12/15. Now with worsening hypercalcemia. Also on a course of zosyn for inguinal wound (thought to be possible source of fever, but not entirely clear)-- ID recommended 10 day course of augmentin (12/15-12/24). Pulm nodule bx 12/22. Concern for reaccumulation of pleural effusion on CXR 12/20. Weaning o2, periodic IV lasix, last dosed 12/22.

## 2021-12-22 NOTE — PRE PROCEDURE NOTE - PRE PROCEDURE EVALUATION
Interventional Radiology  Pre-Procedure Note    HPI:  65 yo F w/ PMHx of HTN, HLD, DM, fibromyalgia, Vulvar CA s/p vulva surgery in 2020, completed chemo & RT in 9/21, found to be anemic Hgb 8.2 so she received 1U PRBC on saturday, 12/11 prior to that received PRBC in 9/21    CT Chest showed: Multiple nodules are noted within both lungs. The largest one is noted in the right lower lobe and measures   approximately 2.6 x 1.5 cm. Small size loculated pleural effusions are  noted bilaterally, left more than right. Thickening/nodularity is noted  involving the pleura bilaterally.    IR consulted for lung mass biopsy.     NPO: Since midnight  Anticoagulation: Heparin DVT SubQ last dose on 12/21 at 1pm  Antibiotics: Zosyn IV    Allergies: No Known Allergies    PAST MEDICAL & SURGICAL HISTORY:  Lichen sclerosus of female genitalia  Vulva cancer  bilateral 2014 reoccurance 2017  HTN (hypertension)  TIA (transient ischemic attack)  8/2013  blurred vision/dizziness no treatment  DM type 2 (diabetes mellitus, type 2)  1995  Hypothyroid  Thalassemia minor  Strabismus  Fibromyalgia  HLD (hyperlipidemia)  Anxiety and depression  GERD (gastroesophageal reflux disease)  Obesity  S/P laparoscopic cholecystectomy  2000  S/P eye surgery  June 2014, for strabismus  Vulvar neoplasm  s/p radical anterior vulvectomy in 2014 with radical excision in 2017  H/O total knee replacement, right  2016    Pertinent labs:                      8.2    12.33 )-----------( 353      ( 22 Dec 2021 07:13 )             27.1   12-22    130<L>  |  92<L>  |  27<H>  ----------------------------<  95  5.1   |  29  |  0.93    Ca    11.2<H>      22 Dec 2021 07:13  Phos  2.3     12-22  Mg     1.80     12-22    Consent: Procedure/risks/ Benefits explained. Informed consent obtained. Pt verbalizes understanding.

## 2021-12-22 NOTE — PROCEDURE NOTE - SPECIMEN OBTAINED
Fluid sent for chemistry/Fluid sent for cytology/Fluid sent for gram stain and culture
Cores given to Cytopathology Technologist/Pathologist

## 2021-12-22 NOTE — PROCEDURE NOTE - PROCEDURE FINDINGS AND DETAILS
Status post right lower lobe mass biopsy, no acute complications  Biocentry device for closure
US guided left thoracentesis performed. Appx 660cc janice clear fluid drained. Samples sent for cyto, micro, chem. CXR to follow.

## 2021-12-22 NOTE — PROGRESS NOTE ADULT - ASSESSMENT
Ms. Carranza 65 yo F w/ PMHx of HTN, HLD, DM, fibromyalgia, Vulvar CA s/p vulva surgery in 2020, completed chemo & RT in 9/21, anemic, now presents w/ shortness of breath, found to have bilateral pleural effusions, L>R and pulmonary nodules, pulmonary consulted for pulmonary nodules.    RECS:  - persistent o2 require of 3L NC  - patient remains afebrile, without significant leukocytosis  - CXR without enlargement of left pleural effusion s/p thoracentesis last week  - recommend repeat formal echo as recent TTE with limited results and bedside echo with limited windows.   - minimal response to diuresis yesterday  -f/u formal echo  - POCUS shows: scattered b-lines in left lung fields, a-line predominant on right, mild left sided pleural effusion without significant pocket for drainage at this time.   - Bilateral pulm nodules, largest measuring 2.6x1.5cm in the RLL with concern for metastasis of vulvar cancer, s/p IR guided biopsy today, f/u studies  - s/p diagnostic and therapeutic thoracentesis by IR 12/15, studies look exudative in nature  - patient's worsening SOB and hypoxia could reflect increased tumor burden/progression of disease, recommend starting prednisone 40 mg daily and assessing response, likely slow taper if patient responds well

## 2021-12-22 NOTE — PROGRESS NOTE ADULT - SUBJECTIVE AND OBJECTIVE BOX
CHIEF COMPLAINT: SOB, suspected malignant effusion    Interval Events: Patient with persistent requirement of 3L NC o2, pending lung nodule biopsy by IR this AM.    REVIEW OF SYSTEMS:  Resp: [ ] negative [ ] cough [x ] shortness of breath [ ] dyspnea [ ] wheezing [ ] sputum [ ] hemoptysis  [x ] All other systems negative    OBJECTIVE:  ICU Vital Signs Last 24 Hrs  T(C): 37 (22 Dec 2021 05:00), Max: 37 (21 Dec 2021 21:00)  T(F): 98.6 (22 Dec 2021 05:00), Max: 98.6 (21 Dec 2021 21:00)  HR: 92 (22 Dec 2021 05:00) (90 - 92)  BP: 142/66 (22 Dec 2021 05:00) (138/57 - 142/66)  BP(mean): --  ABP: --  ABP(mean): --  RR: 19 (22 Dec 2021 05:00) (18 - 19)  SpO2: 97% (22 Dec 2021 05:00) (97% - 100%)        12-21 @ 07:01  -  12-22 @ 07:00  --------------------------------------------------------  IN: 550 mL / OUT: 350 mL / NET: 200 mL      CAPILLARY BLOOD GLUCOSE    POCT Blood Glucose.: 117 mg/dL (22 Dec 2021 07:39)    PHYSICAL EXAM:  GENERAL: NAD, well-developed  CHEST/LUNG: Decreased bs on the left mid to lower lung field, rt base with minimal crackles  HEART: Regular rate and rhythm  ABDOMEN: Soft, mild tenderness upper abdomen, Nondistended  EXTREMITIES: no LE edema  PSYCH: Calm  NEUROLOGY: AAOx3  SKIN: No rashes or lesions    HOSPITAL MEDICATIONS:  MEDICATIONS  (STANDING):  amLODIPine   Tablet 10 milliGRAM(s) Oral daily  calcitonin Injectable 310 International Unit(s) SubCutaneous every 12 hours  cholecalciferol 2000 Unit(s) Oral daily  Dakins Solution - 1/4 Strength 1 Application(s) Topical daily  dextrose 40% Gel 15 Gram(s) Oral once  dextrose 5%. 1000 milliLiter(s) (50 mL/Hr) IV Continuous <Continuous>  dextrose 5%. 1000 milliLiter(s) (100 mL/Hr) IV Continuous <Continuous>  dextrose 50% Injectable 25 Gram(s) IV Push once  dextrose 50% Injectable 12.5 Gram(s) IV Push once  dextrose 50% Injectable 25 Gram(s) IV Push once  enalapril 5 milliGRAM(s) Oral daily  escitalopram 30 milliGRAM(s) Oral daily  gabapentin 400 milliGRAM(s) Oral three times a day  glucagon  Injectable 1 milliGRAM(s) IntraMuscular once  heparin   Injectable 5000 Unit(s) SubCutaneous every 8 hours  influenza  Vaccine (HIGH DOSE) 0.7 milliLiter(s) IntraMuscular once  insulin glargine Injectable (LANTUS) 40 Unit(s) SubCutaneous at bedtime  insulin lispro (ADMELOG) corrective regimen sliding scale   SubCutaneous three times a day before meals  insulin lispro (ADMELOG) corrective regimen sliding scale   SubCutaneous at bedtime  insulin lispro Injectable (ADMELOG) 20 Unit(s) SubCutaneous three times a day with meals  levothyroxine 125 MICROGram(s) Oral daily  magnesium sulfate  IVPB 1 Gram(s) IV Intermittent once  pantoprazole    Tablet 40 milliGRAM(s) Oral before breakfast  piperacillin/tazobactam IVPB.. 3.375 Gram(s) IV Intermittent every 12 hours  polyethylene glycol 3350 17 Gram(s) Oral daily  predniSONE   Tablet 10 milliGRAM(s) Oral two times a day  senna 2 Tablet(s) Oral at bedtime  simvastatin 20 milliGRAM(s) Oral at bedtime    MEDICATIONS  (PRN):  acetaminophen     Tablet .. 650 milliGRAM(s) Oral every 6 hours PRN Temp greater or equal to 38C (100.4F), Mild Pain (1 - 3), Moderate Pain (4 - 6)  bisacodyl Suppository 10 milliGRAM(s) Rectal daily PRN Constipation  lidocaine/prilocaine Cream 1 Application(s) Topical daily PRN Dressing changes  melatonin 3 milliGRAM(s) Oral at bedtime PRN Insomnia  oxycodone    5 mG/acetaminophen 325 mG 1 Tablet(s) Oral every 6 hours PRN Severe Pain (7 - 10)      LABS:                        8.2    12.33 )-----------( 353      ( 22 Dec 2021 07:13 )             27.1     Hgb Trend: 8.2<--, 7.8<--, 8.0<--, 8.3<--, 8.1<--  12-22    130<L>  |  92<L>  |  27<H>  ----------------------------<  95  5.1   |  29  |  0.93    Ca    11.2<H>      22 Dec 2021 07:13  Phos  2.3     12-22  Mg     1.80     12-22      Creatinine Trend: 0.93<--, 0.95<--, 0.88<--, 0.85<--, 0.92<--, 1.05<-    MICROBIOLOGY:     RADIOLOGY:  [ ] Reviewed and interpreted by me    PULMONARY FUNCTION TESTS:    EKG:

## 2021-12-22 NOTE — PROGRESS NOTE ADULT - PROBLEM SELECTOR PLAN 9
Has new TWI inversions on lateral leads. Likely related to L pleural effusion  -pt with no chest pain  -Trop negative so far  -cont to monitor on tele  -TTE 12/20 results inconclusive--technically difficult study, grossly normal LV

## 2021-12-22 NOTE — CHART NOTE - NSCHARTNOTEFT_GEN_A_CORE
Nutrition Consult X Assessment, Education     Pt 67 yo female with PMHx of HTN, HDL, DM, fibromyalgia, Vulvar CA s/p vulva surgery in 2020, completed chemo & RT in 9/21, Anemia s/p PRBC on 12/11 - per chart review.     RDN attempted to assess, but Pt off the unit at time of visit. Pt went to IR for procedure; Pt NPO - per nurse.   Will reattempt to visit Pt at a later time as able, RDN remains available.

## 2021-12-22 NOTE — PROGRESS NOTE ADULT - SUBJECTIVE AND OBJECTIVE BOX
[facility] Division of Hospital Medicine  Evelyn Vega MD  Pager (M-F, 8A-5P): [phone]  Other Times:  [phone]      SUBJECTIVE / OVERNIGHT EVENTS:  Seen patient after she came back from her nodule biopsy w IR. O2 requirement is improving (on 1 L now), but she is still complaining of nausea. Zofran worked yesterday. Endorsing constipation, however nurse says she had a loose BM yesterday.     MEDICATIONS  (STANDING):  amLODIPine   Tablet 10 milliGRAM(s) Oral daily  cholecalciferol 2000 Unit(s) Oral daily  Dakins Solution - 1/4 Strength 1 Application(s) Topical daily  dextrose 40% Gel 15 Gram(s) Oral once  dextrose 5%. 1000 milliLiter(s) (50 mL/Hr) IV Continuous <Continuous>  dextrose 5%. 1000 milliLiter(s) (100 mL/Hr) IV Continuous <Continuous>  dextrose 50% Injectable 25 Gram(s) IV Push once  dextrose 50% Injectable 12.5 Gram(s) IV Push once  dextrose 50% Injectable 25 Gram(s) IV Push once  enalapril 5 milliGRAM(s) Oral daily  escitalopram 30 milliGRAM(s) Oral daily  gabapentin 400 milliGRAM(s) Oral three times a day  glucagon  Injectable 1 milliGRAM(s) IntraMuscular once  heparin   Injectable 5000 Unit(s) SubCutaneous every 8 hours  influenza  Vaccine (HIGH DOSE) 0.7 milliLiter(s) IntraMuscular once  insulin glargine Injectable (LANTUS) 40 Unit(s) SubCutaneous at bedtime  insulin lispro (ADMELOG) corrective regimen sliding scale   SubCutaneous three times a day before meals  insulin lispro (ADMELOG) corrective regimen sliding scale   SubCutaneous at bedtime  insulin lispro Injectable (ADMELOG) 20 Unit(s) SubCutaneous three times a day with meals  levothyroxine 125 MICROGram(s) Oral daily  pantoprazole    Tablet 40 milliGRAM(s) Oral before breakfast  piperacillin/tazobactam IVPB.. 3.375 Gram(s) IV Intermittent every 12 hours  polyethylene glycol 3350 17 Gram(s) Oral daily  predniSONE   Tablet 10 milliGRAM(s) Oral two times a day  senna 2 Tablet(s) Oral at bedtime  simvastatin 20 milliGRAM(s) Oral at bedtime    MEDICATIONS  (PRN):  acetaminophen     Tablet .. 650 milliGRAM(s) Oral every 6 hours PRN Temp greater or equal to 38C (100.4F), Mild Pain (1 - 3), Moderate Pain (4 - 6)  bisacodyl Suppository 10 milliGRAM(s) Rectal daily PRN Constipation  lidocaine/prilocaine Cream 1 Application(s) Topical daily PRN Dressing changes  melatonin 3 milliGRAM(s) Oral at bedtime PRN Insomnia  oxycodone    5 mG/acetaminophen 325 mG 1 Tablet(s) Oral every 6 hours PRN Severe Pain (7 - 10)      I&O's Summary    21 Dec 2021 07:01  -  22 Dec 2021 07:00  --------------------------------------------------------  IN: 550 mL / OUT: 350 mL / NET: 200 mL    PHYSICAL EXAM:  Vital Signs Last 24 Hrs  T(C): 36.7 (22 Dec 2021 20:34), Max: 37 (22 Dec 2021 05:00)  T(F): 98 (22 Dec 2021 20:34), Max: 98.6 (22 Dec 2021 05:00)  HR: 99 (22 Dec 2021 20:34) (90 - 99)  BP: 159/64 (22 Dec 2021 20:34) (136/52 - 159/64)  BP(mean): --  RR: 18 (22 Dec 2021 20:34) (18 - 19)  SpO2: 97% (22 Dec 2021 20:34) (97% - 98%)  CONSTITUTIONAL: NAD, well-developed, well-groomed  EYES:  conjunctiva and sclera clear  ENMT: Moist oral mucosa, no pharyngeal injection or exudates; normal dentition  RESPIRATORY: on 1L, bibasilar crackles worse on the L side, tachypnea improved, bandaid over bx site, no hematoma formation  CARDIOVASCULAR: Regular rate and rhythm, normal S1 and S2, no murmur/rub/gallop; No lower extremity edema; Peripheral pulses are 2+ bilaterally  ABDOMEN: Nontender to palpation, normoactive bowel sounds, no rebound/guarding  PSYCH: A+O to person, place, and time; affect appropriate  SKIN: No rashes; no palpable lesions    LABS:                        8.2    12.33 )-----------( 353      ( 22 Dec 2021 07:13 )             27.1     12-22    130<L>  |  92<L>  |  27<H>  ----------------------------<  95  5.1   |  29  |  0.93    Ca    11.2<H>      22 Dec 2021 07:13  Phos  2.3     12-22  Mg     1.80     12-22                  RADIOLOGY & ADDITIONAL TESTS:  Results Reviewed:   Imaging Personally Reviewed:  Electrocardiogram Personally Reviewed:    COORDINATION OF CARE:  Care Discussed with Consultants/Other Providers [Y/N]:  Prior or Outpatient Records Reviewed [Y/N]:

## 2021-12-22 NOTE — CHART NOTE - NSCHARTNOTEFT_GEN_A_CORE
PRE-INTERVENTIONAL RADIOLOGY PROCEDURE NOTE      Patient Age: 80    Patient Gender: m    Procedure: biopsy    Diagnosis/Indication:  Malignancy pleural effusion and pulmonary nodules    Interventional Radiology Attending Physician:  Dr De Paz Attending Physician:  Dr Mckeon    Pertinent Medical History:  Pleural effusion pulmonary nodules    Pertinent labs:                      8.2    12.33 )-----------( 353      ( 22 Dec 2021 07:13 )             27.1       12-22    130<L>  |  92<L>  |  27<H>  ----------------------------<  95  5.1   |  29  |  0.93    Ca    11.2<H>      22 Dec 2021 07:13  Phos  2.3     12-22  Mg     1.80     12-22                Patient and Family Aware ? Yes    Daina Kraft PRE-INTERVENTIONAL RADIOLOGY PROCEDURE NOTE      Patient Age: 80    Patient Gender: m    Procedure: biopsy    Diagnosis/Indication:  Malignancy pleural effusion and pulmonary nodules    Interventional Radiology Attending Physician:  Dr Meyer    Ordering Attending Physician:  Dr Mckeon    Pertinent Medical History:  Pleural effusion pulmonary nodules    Pertinent labs:                      8.2    12.33 )-----------( 353      ( 22 Dec 2021 07:13 )             27.1       12-22    130<L>  |  92<L>  |  27<H>  ----------------------------<  95  5.1   |  29  |  0.93    Ca    11.2<H>      22 Dec 2021 07:13  Phos  2.3     12-22  Mg     1.80     12-22                Patient and Family Aware ? Yes    Daina Kraft

## 2021-12-23 NOTE — PROGRESS NOTE ADULT - ASSESSMENT
65 yo F with HTN, HLD, type 2DM, hypothyroidism, fibromyalgia, vulvar cancer s/p surgery 2020, RT/chemo 2021, anemia presenting with HERNANDEZ and dizziness with imaging concerning for metastatic disease and pleural effusions bilaterally. Endocrine was consulted for T2DM management.    1. Type 2 Diabetes Mellitus c/b steroid induced hyperglycemia  A1c 7.7%; goal less than 7%  Home Regimen: Tresiba 60 units QHS, Novolog 15-30 units with meals (ISF 1:30, but usually just estimates), Victoza 1.8 mg daily    While inpatient:   BG target 100-180 mg/dl  For now, continue Lantus 40 units SQ qHS (note, she was previously requiring 54 units but fasting glucose stable today)  Low PO intake but steroid dose just increased - continue Admelog 20 units SQ TID before meals (Hold if NPO/not eating meal)  Continue Admelog MODERATE dose correctional scale before meals, moderate dose at bedtime   Please check FSG before meals and QHS  Consistent carbohydrate diet - appreciate RD input, liberalized diet (eliminated sodium restriction, added Glucerna per RD recommendation)  Hypoglycemia protocol     Discharge planning for DM: Resume basal / bolus insulin (dosing TBD), schedule follow up with prior outpatient endocrine provider     2. Chronic steroid use, steroid induced hyperglycemia   On Prednisone 10 mg BID for at least two months, now Prednisone 40 mg daily  Insulin dosing as above   Pt cannot stop steroids abruptly as she will have secondary adrenal insufficiency   Patient to follow up in 1/2022 with her doctors with steroid taper  If any hemodynamic instability, please order IV hydrocortisone 50 mg q6h for stress steroids    3. Hypothyroidism  12-16 @ 06:47 TSH 11.94 FreeT4 0.9   Levels borderline, would continue with current LT4 dosing for now and followup as outpatient, repeat TFTs in 6-8 weeks  Continue Levothyroxine 125 mcg PO daily, take daily on empty stomach    4. Hypercalcemia   Need updated albumin to properly correct, based on last albumin result, corrected calcium = 11  S/p Zometa 4 mg IV (given on 12/20/21, will take about 72h for full effect)  S/p Calcitonin 310 IU SQ q12h x 4 doses (completed 12/22)   Vit D 25-OH LOW at 12.2, recommend supplement (see below)  PTH- suppressed    Followup PTHrp    5. Vitamin D deficiency  Vitamin D 25-OH 12.2  Recommend supplement with Cholecalciferol 2000 units daily    6. Hypertension  Goal BP <130/80  Management as per primary team    7. Hyperlipidemia  Continue Simvastatin 20 mg daily  Followup lipid panel annually as outpatient    Erendira Greene  Nurse Practitioner  Division of Endocrinology & Diabetes  In house pager #[phone]/long range pager #[phone]    If before 9AM or after 6PM, or on weekends/holidays, please call endocrine answering service for assistance ([phone]).  For nonurgent matters email [email or web address] for assistance.    65 yo F with HTN, HLD, type 2DM, hypothyroidism, fibromyalgia, vulvar cancer s/p surgery 2020, RT/chemo 2021, anemia presenting with HERNANDEZ and dizziness with imaging concerning for metastatic disease and pleural effusions bilaterally. Endocrine was consulted for T2DM management.    1. Type 2 Diabetes Mellitus c/b steroid induced hyperglycemia  A1c 7.7%; goal less than 7%  Home Regimen: Tresiba 60 units QHS, Novolog 15-30 units with meals (ISF 1:30, but usually just estimates), Victoza 1.8 mg daily    While inpatient:   BG target 100-180 mg/dl  For now, continue Lantus 40 units SQ qHS (note, she was previously requiring 54 units but fasting glucose stable today)  Low PO intake but steroid dose just increased and hyperglycemia noted at lunch - would continue Admelog 20 units SQ TID before meals - please HOLD if she is not eating  Continue Admelog MODERATE dose correctional scale before meals, moderate dose at bedtime   Please check FSG before meals and QHS  Consistent carbohydrate diet - appreciate RD input, liberalized diet (eliminated sodium restriction, added Glucerna per RD recommendation)  Hypoglycemia protocol     Discharge planning for DM: Resume basal / bolus insulin (dosing TBD), schedule follow up with prior outpatient endocrine provider     2. Chronic steroid use, steroid induced hyperglycemia   On Prednisone 10 mg BID for at least two months, now Prednisone 40 mg daily  Insulin dosing as above   Pt cannot stop steroids abruptly as she will have secondary adrenal insufficiency   Patient to follow up in 1/2022 with her doctors with steroid taper  If any hemodynamic instability, please order IV hydrocortisone 50 mg q6h for stress steroids    3. Hypothyroidism  12-16 @ 06:47 TSH 11.94 FreeT4 0.9   Levels borderline, would continue with current LT4 dosing for now and followup as outpatient, repeat TFTs in 6-8 weeks  Continue Levothyroxine 125 mcg PO daily, take daily on empty stomach    4. Hypercalcemia   Need updated albumin to properly correct, based on last albumin result, corrected calcium = 11  S/p Zometa 4 mg IV (given on 12/20/21, will take about 72h for full effect)  S/p Calcitonin 310 IU SQ q12h x 4 doses (completed 12/22)   Vit D 25-OH LOW at 12.2, recommend supplement (see below)  PTH- suppressed    Followup PTHrp    5. Vitamin D deficiency  Vitamin D 25-OH 12.2  Recommend supplement with Cholecalciferol 2000 units daily    6. Hypertension  Goal BP <130/80  Management as per primary team    7. Hyperlipidemia  Continue Simvastatin 20 mg daily  Followup lipid panel annually as outpatient    Erendira Greene  Nurse Practitioner  Division of Endocrinology & Diabetes  In house pager #[phone]/long range pager #[phone]    If before 9AM or after 6PM, or on weekends/holidays, please call endocrine answering service for assistance ([phone]).  For nonurgent matters email [email or web address] for assistance.

## 2021-12-23 NOTE — PROGRESS NOTE ADULT - SUBJECTIVE AND OBJECTIVE BOX
CHIEF COMPLAINT: SOB, suspected malignant effusion    Interval Events: Patient with persistent requirement of 3L NC o2, pending lung nodule biopsy by IR this AM.    REVIEW OF SYSTEMS:  Resp: [ ] negative [ ] cough [x ] shortness of breath [ ] dyspnea [ ] wheezing [ ] sputum [ ] hemoptysis  [x ] All other systems negative    OBJECTIVE:  ICU Vital Signs Last 24 Hrs  T(C): 36.9 (23 Dec 2021 05:30), Max: 36.9 (22 Dec 2021 15:11)  T(F): 98.5 (23 Dec 2021 05:30), Max: 98.5 (23 Dec 2021 05:30)  HR: 97 (23 Dec 2021 05:30) (90 - 99)  BP: 139/61 (23 Dec 2021 05:30) (136/52 - 159/64)  BP(mean): --  ABP: --  ABP(mean): --  RR: 19 (23 Dec 2021 05:30) (18 - 19)  SpO2: 97% (23 Dec 2021 05:30) (97% - 98%)        12-22 @ 07:01  -  12-23 @ 07:00  --------------------------------------------------------  IN: 300 mL / OUT: 900 mL / NET: -600 mL      CAPILLARY BLOOD GLUCOSE    POCT Blood Glucose.: 154 mg/dL (23 Dec 2021 08:43)    PHYSICAL EXAM:  GENERAL: NAD, well-developed  CHEST/LUNG: Decreased bs on the left mid to lower lung field, o/w ctab  HEART: Regular rate and rhythm  ABDOMEN: Soft, mild tenderness upper abdomen, mildly distended  EXTREMITIES: no LE edema  PSYCH: Calm  NEUROLOGY: AAOx3  SKIN: No rashes or lesions    HOSPITAL MEDICATIONS:  MEDICATIONS  (STANDING):  amLODIPine   Tablet 10 milliGRAM(s) Oral daily  cholecalciferol 2000 Unit(s) Oral daily  Dakins Solution - 1/4 Strength 1 Application(s) Topical daily  dextrose 40% Gel 15 Gram(s) Oral once  dextrose 5%. 1000 milliLiter(s) (50 mL/Hr) IV Continuous <Continuous>  dextrose 5%. 1000 milliLiter(s) (100 mL/Hr) IV Continuous <Continuous>  dextrose 50% Injectable 25 Gram(s) IV Push once  dextrose 50% Injectable 12.5 Gram(s) IV Push once  dextrose 50% Injectable 25 Gram(s) IV Push once  enalapril 5 milliGRAM(s) Oral daily  escitalopram 30 milliGRAM(s) Oral daily  gabapentin 400 milliGRAM(s) Oral three times a day  glucagon  Injectable 1 milliGRAM(s) IntraMuscular once  heparin   Injectable 5000 Unit(s) SubCutaneous every 8 hours  influenza  Vaccine (HIGH DOSE) 0.7 milliLiter(s) IntraMuscular once  insulin glargine Injectable (LANTUS) 40 Unit(s) SubCutaneous at bedtime  insulin lispro (ADMELOG) corrective regimen sliding scale   SubCutaneous three times a day before meals  insulin lispro (ADMELOG) corrective regimen sliding scale   SubCutaneous at bedtime  insulin lispro Injectable (ADMELOG) 20 Unit(s) SubCutaneous three times a day with meals  levothyroxine 125 MICROGram(s) Oral daily  ondansetron Injectable 4 milliGRAM(s) IV Push once  pantoprazole    Tablet 40 milliGRAM(s) Oral before breakfast  piperacillin/tazobactam IVPB.. 3.375 Gram(s) IV Intermittent every 12 hours  polyethylene glycol 3350 17 Gram(s) Oral daily  predniSONE   Tablet 40 milliGRAM(s) Oral daily  senna 2 Tablet(s) Oral at bedtime  simvastatin 20 milliGRAM(s) Oral at bedtime    MEDICATIONS  (PRN):  acetaminophen     Tablet .. 650 milliGRAM(s) Oral every 6 hours PRN Temp greater or equal to 38C (100.4F), Mild Pain (1 - 3), Moderate Pain (4 - 6)  bisacodyl Suppository 10 milliGRAM(s) Rectal daily PRN Constipation  lidocaine/prilocaine Cream 1 Application(s) Topical daily PRN Dressing changes  melatonin 3 milliGRAM(s) Oral at bedtime PRN Insomnia  oxycodone    5 mG/acetaminophen 325 mG 1 Tablet(s) Oral every 6 hours PRN Severe Pain (7 - 10)      LABS:                        7.6    12.58 )-----------( 307      ( 23 Dec 2021 06:40 )             24.1     Hgb Trend: 7.6<--, 8.2<--, 7.8<--, 8.0<--, 8.3<--  12-23    128<L>  |  92<L>  |  40<H>  ----------------------------<  141<H>  4.9   |  28  |  1.03    Ca    10.3      23 Dec 2021 06:40  Phos  2.6     12-23  Mg     2.10     12-23    Creatinine Trend: 1.03<--, 0.93<--, 0.95<--, 0.88<--, 0.85<--, 0.92<--    MICROBIOLOGY:     Culture - Fungal, Tissue (collected 22 Dec 2021 18:55)  Source: .Tissue RIGHT LUNG NODULE  Preliminary Report (23 Dec 2021 08:10):    Testing in progress    Culture - Tissue with Gram Stain (collected 22 Dec 2021 18:55)  Source: .Tissue RIGHT LUNG NODULE  Gram Stain (22 Dec 2021 22:07):    No polymorphonuclear leukocytes    No organisms seen per oil power field        RADIOLOGY:  [ ] Reviewed and interpreted by me    PULMONARY FUNCTION TESTS:    EKG: CHIEF COMPLAINT: SOB, suspected malignant effusion    Interval Events: Patient with persistent requirement of 3L NC o2, s/p lung nodule biopsy by IR yesterday.    REVIEW OF SYSTEMS:  Resp: [ ] negative [ ] cough [x ] shortness of breath [ ] dyspnea [ ] wheezing [ ] sputum [ ] hemoptysis  [x ] All other systems negative    OBJECTIVE:  ICU Vital Signs Last 24 Hrs  T(C): 36.9 (23 Dec 2021 05:30), Max: 36.9 (22 Dec 2021 15:11)  T(F): 98.5 (23 Dec 2021 05:30), Max: 98.5 (23 Dec 2021 05:30)  HR: 97 (23 Dec 2021 05:30) (90 - 99)  BP: 139/61 (23 Dec 2021 05:30) (136/52 - 159/64)  BP(mean): --  ABP: --  ABP(mean): --  RR: 19 (23 Dec 2021 05:30) (18 - 19)  SpO2: 97% (23 Dec 2021 05:30) (97% - 98%)        12-22 @ 07:01  -  12-23 @ 07:00  --------------------------------------------------------  IN: 300 mL / OUT: 900 mL / NET: -600 mL      CAPILLARY BLOOD GLUCOSE    POCT Blood Glucose.: 154 mg/dL (23 Dec 2021 08:43)    PHYSICAL EXAM:  GENERAL: NAD, well-developed  CHEST/LUNG: Decreased bs on the left mid to lower lung field, o/w ctab  HEART: Regular rate and rhythm  ABDOMEN: Soft, mild tenderness upper abdomen, mildly distended  EXTREMITIES: no LE edema  PSYCH: Calm  NEUROLOGY: AAOx3  SKIN: No rashes or lesions    HOSPITAL MEDICATIONS:  MEDICATIONS  (STANDING):  amLODIPine   Tablet 10 milliGRAM(s) Oral daily  cholecalciferol 2000 Unit(s) Oral daily  Dakins Solution - 1/4 Strength 1 Application(s) Topical daily  dextrose 40% Gel 15 Gram(s) Oral once  dextrose 5%. 1000 milliLiter(s) (50 mL/Hr) IV Continuous <Continuous>  dextrose 5%. 1000 milliLiter(s) (100 mL/Hr) IV Continuous <Continuous>  dextrose 50% Injectable 25 Gram(s) IV Push once  dextrose 50% Injectable 12.5 Gram(s) IV Push once  dextrose 50% Injectable 25 Gram(s) IV Push once  enalapril 5 milliGRAM(s) Oral daily  escitalopram 30 milliGRAM(s) Oral daily  gabapentin 400 milliGRAM(s) Oral three times a day  glucagon  Injectable 1 milliGRAM(s) IntraMuscular once  heparin   Injectable 5000 Unit(s) SubCutaneous every 8 hours  influenza  Vaccine (HIGH DOSE) 0.7 milliLiter(s) IntraMuscular once  insulin glargine Injectable (LANTUS) 40 Unit(s) SubCutaneous at bedtime  insulin lispro (ADMELOG) corrective regimen sliding scale   SubCutaneous three times a day before meals  insulin lispro (ADMELOG) corrective regimen sliding scale   SubCutaneous at bedtime  insulin lispro Injectable (ADMELOG) 20 Unit(s) SubCutaneous three times a day with meals  levothyroxine 125 MICROGram(s) Oral daily  ondansetron Injectable 4 milliGRAM(s) IV Push once  pantoprazole    Tablet 40 milliGRAM(s) Oral before breakfast  piperacillin/tazobactam IVPB.. 3.375 Gram(s) IV Intermittent every 12 hours  polyethylene glycol 3350 17 Gram(s) Oral daily  predniSONE   Tablet 40 milliGRAM(s) Oral daily  senna 2 Tablet(s) Oral at bedtime  simvastatin 20 milliGRAM(s) Oral at bedtime    MEDICATIONS  (PRN):  acetaminophen     Tablet .. 650 milliGRAM(s) Oral every 6 hours PRN Temp greater or equal to 38C (100.4F), Mild Pain (1 - 3), Moderate Pain (4 - 6)  bisacodyl Suppository 10 milliGRAM(s) Rectal daily PRN Constipation  lidocaine/prilocaine Cream 1 Application(s) Topical daily PRN Dressing changes  melatonin 3 milliGRAM(s) Oral at bedtime PRN Insomnia  oxycodone    5 mG/acetaminophen 325 mG 1 Tablet(s) Oral every 6 hours PRN Severe Pain (7 - 10)      LABS:                        7.6    12.58 )-----------( 307      ( 23 Dec 2021 06:40 )             24.1     Hgb Trend: 7.6<--, 8.2<--, 7.8<--, 8.0<--, 8.3<--  12-23    128<L>  |  92<L>  |  40<H>  ----------------------------<  141<H>  4.9   |  28  |  1.03    Ca    10.3      23 Dec 2021 06:40  Phos  2.6     12-23  Mg     2.10     12-23    Creatinine Trend: 1.03<--, 0.93<--, 0.95<--, 0.88<--, 0.85<--, 0.92<--    MICROBIOLOGY:     Culture - Fungal, Tissue (collected 22 Dec 2021 18:55)  Source: .Tissue RIGHT LUNG NODULE  Preliminary Report (23 Dec 2021 08:10):    Testing in progress    Culture - Tissue with Gram Stain (collected 22 Dec 2021 18:55)  Source: .Tissue RIGHT LUNG NODULE  Gram Stain (22 Dec 2021 22:07):    No polymorphonuclear leukocytes    No organisms seen per oil power field        RADIOLOGY:  [ ] Reviewed and interpreted by me    PULMONARY FUNCTION TESTS:    EKG:

## 2021-12-23 NOTE — DIETITIAN INITIAL EVALUATION ADULT. - ADD RECOMMEND
1. Encourage & assist Pt with meals; Monitor PO diet tolerance; Honor food preferences;         2. Anti-emetic PRN per MD discretion;       3. Add Multivitamins 1 tab daily for micronutrient coverage;       4. Monitor labs, hydration status;

## 2021-12-23 NOTE — DIETITIAN INITIAL EVALUATION ADULT. - OTHER INFO
Pt 67 yo female with PMHx of HTN, HLD, DM, fibromyalgia, Vulvar CA s/p vulva surgery in 2020, completed chemo & RT in 9/21, Anemia s/p PRBC on 12/11 presented with SOB, dizziness & nausea for the last couple of weeks - per chart review. Of note +Bilateral pulm nodules - s/p lung nodule biopsy by IR; Pt with R inguinal wound.     At time of visit, Pt appears alert, oriented but weak. Per Pt her appetite has been poor for past several days; Pt C/O Nausea, but no C/O vomiting or diarrhea; +BM (12/21) per flow sheet. Food preferences discussed with Pt. RDN encouraged Pt to drink PO supplement: Glucerna shake, as ordered. No report of chewing or swallowing difficulty. At home Pt avoids regular sugar reported. Per Pt, her height: ~63" & her weight: ~172#, PTA; no weight loss or weight changes PTA. RDN answered concerns related to diet. RDN remains available, Pt made aware.

## 2021-12-23 NOTE — PROGRESS NOTE ADULT - ASSESSMENT
65 y/o F w/ a PMHx of HTN, HLD, T2DM, fibromyalgia, recurrent vulvar cancer (recently received chemo/RT from 7/2021-9/2021), and anemia (requiring intermittent transfusions recently) who presented with shortness of breath, dizziness, and nausea for multiple weeks, found to have multiple lung nodules and B/L pleural effusions concerning for malignancy, and was admitted to telemetry for further management, s/p thoracentesis on 12/15 (~ 660cc drained). Oncology was consulted for further evaluation.    # Dyspnea  - CTA Chest (12/13): No pulmonary embolus is noted. Multiple nodules are noted within both lungs. Primary consideration is metastasis. Findings suggestive of malignant pleural effusions bilaterally.  - TTE (12/14) showed a hyperdynamic left ventricle  - serial CXR with persistent L pleural effusion   - may be component of pleural effusions + disease in lung   - Appreciate IR evaluation. Pt is s/p thoracentesis on 12/15 (~ 660cc drained).   - Appreciate Pulmonary evaluation. No improvement with gentle diuresis so currently undergoing trial of steroids    # Recurrent vulvar cancer  - Last recurrence was earlier this year. She received chemo/RT with weekly Cisplatin from 7/12/21-9/1/21.  - Imaging findings on admission are concerning for progressing malignancy  - Pt s/p thoracentesis as noted above with negative cytopathology  - CT A/P with small collection in R inguinal region (likely known R inguinal wound/tract) but does not appear to show other areas of disease that is amenable to biopsy  - s/p IR biopsy of R lung nodule (to establish a diagnosis of metastatic disease and to send for NGS testing if cancer confirmed) 12/22  - will follow up with primary Oncologist Dr. Paulo Carpenter as outpatient     #Hypercalcemia   -This is an oncologic emergency and requires urgent management  - Ca increased to 12.5  - PTH and Vit D low  - f/u PTHrP  - appreciate endo consult  - may be related to hypercalcemia of malignancy  - s/p zolendronic acid (discussed risks/benefits including risk of osteonecrosis of jaw) and calcitonin with normalization of calcium    # Microcytic Anemia  - Occasionally requiring transfusions (last on 12/11)  - Recently underwent a hematologic evaluation as an outpatient. Her workup was notable for a hemoglobin electrophoresis which was c/w beta thalassemia minor. She had a BMBx on 11/9/21 (minimal marrow obtained to evaluate) which showed erythroid hyperplasia, megakaryocytes normal, no evidence of dysplasia.   - Monitor CBC and keep active T+S. Transfuse for Hgb < 7 or if symptomatic  - Primary Hematologist: Dr. Leana Auguste. Continue outpatient follow-up   67 y/o F w/ a PMHx of HTN, HLD, T2DM, fibromyalgia, recurrent vulvar cancer (recently received chemo/RT from 7/2021-9/2021), and anemia (requiring intermittent transfusions recently) who presented with shortness of breath, dizziness, and nausea for multiple weeks, found to have multiple lung nodules and B/L pleural effusions concerning for malignancy, and was admitted to telemetry for further management, s/p thoracentesis on 12/15 (~ 660cc drained). Oncology was consulted for further evaluation.    # Dyspnea  - CTA Chest (12/13): No pulmonary embolus is noted. Multiple nodules are noted within both lungs. Primary consideration is metastasis. Findings suggestive of malignant pleural effusions bilaterally.  - TTE (12/14) showed a hyperdynamic left ventricle  - serial CXR with persistent L pleural effusion   - may be component of pleural effusions + disease in lung   - Appreciate IR evaluation. Pt is s/p thoracentesis on 12/15 (~ 660cc drained).   - Appreciate Pulmonary evaluation. No improvement with gentle diuresis so currently undergoing trial of steroids  - patient still has SOB, light headedness; please recheck orthostatics and would consider 1U PRBC for borderline low Hb 7.6    # Recurrent vulvar cancer  - Last recurrence was earlier this year. She received chemo/RT with weekly Cisplatin from 7/12/21-9/1/21.  - Imaging findings on admission are concerning for progressing malignancy  - Pt s/p thoracentesis as noted above with negative cytopathology  - CT A/P with small collection in R inguinal region (likely known R inguinal wound/tract) but does not appear to show other areas of disease that is amenable to biopsy  - s/p IR biopsy of R lung nodule (to establish a diagnosis of metastatic disease and to send for NGS testing if cancer confirmed) 12/22  - will follow up with primary Oncologist Dr. Paulo Carpenter as outpatient     #Hypercalcemia   -This is an oncologic emergency and requires urgent management  - Ca increased to 12.5  - PTH and Vit D low  - f/u PTHrP  - appreciate endo consult  - may be related to hypercalcemia of malignancy  - s/p zolendronic acid (discussed risks/benefits including risk of osteonecrosis of jaw) and calcitonin with normalization of calcium    # Microcytic Anemia  - Occasionally requiring transfusions (last on 12/11)  - Recently underwent a hematologic evaluation as an outpatient. Her workup was notable for a hemoglobin electrophoresis which was c/w beta thalassemia minor. She had a BMBx on 11/9/21 (minimal marrow obtained to evaluate) which showed erythroid hyperplasia, megakaryocytes normal, no evidence of dysplasia.   - Monitor CBC and keep active T+S. Transfuse for Hgb < 7 or if symptomatic  - Primary Hematologist: Dr. Leana Auguste. Continue outpatient follow-up

## 2021-12-23 NOTE — PROGRESS NOTE ADULT - ASSESSMENT
65 yo F w/ PMHx of HTN, HLD, DM, fibromyalgia, Vulvar CA s/p vulva surgery in 2020, completed chemo & RT in 9/21, PMR (on chronic steroids, 10 mg BID), now p/w SOB, dizziness & nausea for the last couple of weeks.  CTPA- No pulmonary embolus is noted. Multiple nodules are noted within both lungs. Primary consideration is metastasis. Findings suggestive of malignant pleural effusions bilaterally, s/p thora this admission by IR 12/15. Now with worsening hypercalcemia. Also on a course of zosyn for inguinal wound (thought to be possible source of fever, but not entirely clear)-- ID recommended 10 day course of augmentin (12/15-12/24). Pulm nodule bx 12/22. Concern for reaccumulation of pleural effusion on CXR 12/20. Weaning o2, periodic IV lasix, last dosed 12/23 without any improvement. Pulm recommended trial of high dose steroids w extended taper (started pred 40 mg PO daily on 12/23). IR and Pulm consulted re recurrent pleural effusion, believe acute hypoxic respiratory failure is more consistent w a CHF exac. Notably, patient has not been responding to IV lasix 20 mg doses, BNP on admission was 200s, and TTE was not helpful given poor windows for eval. Her pleural fluid analysis was exudative on 12/15 which is not consistent with a CHF exacerbation. Cardiology consult 12/23 for eval of CHF which would be a new diagnosis for her.  67 yo F w/ PMHx of HTN, HLD, DM, fibromyalgia, Vulvar CA s/p vulva surgery in 2020, completed chemo & RT in 9/21, PMR (on chronic steroids, 10 mg BID), now p/w SOB, dizziness & nausea for the last couple of weeks.  CTPA- No pulmonary embolus is noted. Multiple nodules are noted within both lungs. Primary consideration is metastasis. Findings suggestive of malignant pleural effusions bilaterally, s/p thora this admission by IR 12/15. Now with worsening hypercalcemia. Also on a course of zosyn for inguinal wound (thought to be possible source of fever, but not entirely clear)-- ID recommended 10 day course of augmentin (12/15-12/24). Pulm nodule bx 12/22. Concern for reaccumulation of pleural effusion on CXR 12/20. Weaning o2, periodic IV lasix, last dosed 12/23 without any improvement. Pulm recommended trial of high dose steroids w extended taper (started pred 40 mg PO daily on 12/23). IR and Pulm consulted re recurrent pleural effusion for a repeat tap, however they believe acute hypoxic respiratory failure is more consistent w a CHF exac vs worsening of tumor burden. Notably, patient has not been responding to IV lasix 20 mg doses, BNP on admission was 200s, and TTE was not helpful given poor windows for eval. Her pleural fluid analysis was exudative on 12/15 which is not consistent with a CHF exacerbation. Does not look like CHF exac at this time. Continues to be anemic w Hgb 7.6, therefore will give 1 u PRBC 12/23 per H/O recs. F/u repeat CT chest 12/23.     Nausea also an ongoing issue for her--> changed her to scheduled zofran (instead of prn), bowel regimen of miralax/senna + enema for constipation (likely worsened by hypercal).

## 2021-12-23 NOTE — CHART NOTE - NSCHARTNOTEFT_GEN_A_CORE
Patient is  67 yo F w/ PMHx of HTN, HLD, DM, fibromyalgia, Vulvar CA s/p vulva surgery in 2020, completed chemo & RT in 9/21, Anemia s/p PRBC on 12/11 now p/w SOB, dizziness & nausea for the last couple of weeks.  Patient is followed at [facility] - seen and followed by Heme/ Onc this admission. Heme / onc recs appreciated - rec 1U PRBC transfusion for ongoing SOB.    1U PRBC was ordered earlier   Informed consent obtained, Tylenol & Benadryl ordered prophylactically prior to blood transfusion.   pt hemodynamically stable will continue to monitor                         7.6    12.58 )-----------( 307      ( 23 Dec 2021 06:40 )             24.1       Vital Signs Last 24 Hrs  T(C): 36.6 (24 Dec 2021 00:38), Max: 36.9 (23 Dec 2021 05:30)  T(F): 97.8 (24 Dec 2021 00:38), Max: 98.5 (23 Dec 2021 05:30)  HR: 72 (24 Dec 2021 00:38) (62 - 97)  BP: 143/64 (24 Dec 2021 00:38) (127/56 - 143/64)  RR: 18 (24 Dec 2021 00:38) (18 - 19)  SpO2: 98% (24 Dec 2021 00:38) (95% - 98%)

## 2021-12-23 NOTE — DIETITIAN INITIAL EVALUATION ADULT. - PERTINENT LABORATORY DATA
(12/23) WBC 12.58 H, H/H 7.6/24.1 L, Na 128 L, Cl 92 L, BUN 40 H, Glu 141 H;        (12/19) Albumin 3.1 L

## 2021-12-23 NOTE — PROGRESS NOTE ADULT - PROBLEM SELECTOR PLAN 12
continue lexapro    #Constipation:   -c/w miralax, initiated senna. Mineral oil enema ordered. continue lexapro    #Constipation:   -c/w miralax, initiated senna. Mineral oil enema ordered.    # Nausea  - scheduled zofran

## 2021-12-23 NOTE — PROGRESS NOTE ADULT - ASSESSMENT
Ms. Carranza 67 yo F w/ PMHx of HTN, HLD, DM, fibromyalgia, Vulvar CA s/p vulva surgery in 2020, completed chemo & RT in 9/21, anemic, now presents w/ shortness of breath, found to have bilateral pleural effusions, L>R and pulmonary nodules, pulmonary consulted for pulmonary nodules.    RECS:  - persistent o2 require of 3L NC  - patient remains afebrile, without significant leukocytosis  - CXR without enlargement of left pleural effusion s/p thoracentesis last week  - recommend repeat formal echo as recent TTE with limited results and bedside echo with limited windows.   - minimal response to diuresis yesterday  -f/u formal echo  - POCUS 12/23 shows: no significant drainable effusion at this time. Left lung with trace effusion pockets both laterally and posteriorly  - s/p IR guided biopsy yesterday, f/u studies  - s/p diagnostic and therapeutic thoracentesis by IR 12/15, studies looks exudative/malignant in nature  - patient's worsening SOB and hypoxia could reflect increased tumor burden/progression of disease, recommend continue with prednisone 40 mg daily and assessing response, likely slow taper if patient responds well  - would recommend palliative care consult as patient's overall condition suggestive of progression of malignancy and general decompensation, assess GOC regarding preferences for intubation/ressuscitation as patient currently remains full code

## 2021-12-23 NOTE — PROGRESS NOTE ADULT - SUBJECTIVE AND OBJECTIVE BOX
[facility] Division of Hospital Medicine  Evelyn Vega MD  Pager (M-F, 8A-5P): [phone]  Other Times:  [phone]      SUBJECTIVE / OVERNIGHT EVENTS: Continues to have SOB despite 20 mg IV lasix given last night and intermittently throughout her hospitalization. Continues to be on oxygen. 2L. Feels constipated, no BM in over 2 days. No LE edema.     MEDICATIONS  (STANDING):  amLODIPine   Tablet 10 milliGRAM(s) Oral daily  cholecalciferol 2000 Unit(s) Oral daily  Dakins Solution - 1/4 Strength 1 Application(s) Topical daily  dextrose 40% Gel 15 Gram(s) Oral once  dextrose 5%. 1000 milliLiter(s) (50 mL/Hr) IV Continuous <Continuous>  dextrose 5%. 1000 milliLiter(s) (100 mL/Hr) IV Continuous <Continuous>  dextrose 50% Injectable 25 Gram(s) IV Push once  dextrose 50% Injectable 12.5 Gram(s) IV Push once  dextrose 50% Injectable 25 Gram(s) IV Push once  enalapril 5 milliGRAM(s) Oral daily  escitalopram 30 milliGRAM(s) Oral daily  gabapentin 400 milliGRAM(s) Oral three times a day  glucagon  Injectable 1 milliGRAM(s) IntraMuscular once  heparin   Injectable 5000 Unit(s) SubCutaneous every 8 hours  influenza  Vaccine (HIGH DOSE) 0.7 milliLiter(s) IntraMuscular once  insulin glargine Injectable (LANTUS) 40 Unit(s) SubCutaneous at bedtime  insulin lispro (ADMELOG) corrective regimen sliding scale   SubCutaneous three times a day before meals  insulin lispro (ADMELOG) corrective regimen sliding scale   SubCutaneous at bedtime  insulin lispro Injectable (ADMELOG) 20 Unit(s) SubCutaneous three times a day with meals  levothyroxine 125 MICROGram(s) Oral daily  ondansetron   Disintegrating Tablet 4 milliGRAM(s) Oral three times a day  pantoprazole    Tablet 40 milliGRAM(s) Oral before breakfast  polyethylene glycol 3350 17 Gram(s) Oral daily  predniSONE   Tablet 40 milliGRAM(s) Oral daily  senna 2 Tablet(s) Oral at bedtime  simvastatin 20 milliGRAM(s) Oral at bedtime    MEDICATIONS  (PRN):  acetaminophen     Tablet .. 650 milliGRAM(s) Oral every 6 hours PRN Temp greater or equal to 38C (100.4F), Mild Pain (1 - 3), Moderate Pain (4 - 6)  bisacodyl Suppository 10 milliGRAM(s) Rectal daily PRN Constipation  lidocaine/prilocaine Cream 1 Application(s) Topical daily PRN Dressing changes  melatonin 3 milliGRAM(s) Oral at bedtime PRN Insomnia  oxycodone    5 mG/acetaminophen 325 mG 1 Tablet(s) Oral every 6 hours PRN Severe Pain (7 - 10)      I&O's Summary    22 Dec 2021 07:01  -  23 Dec 2021 07:00  --------------------------------------------------------  IN: 300 mL / OUT: 900 mL / NET: -600 mL        PHYSICAL EXAM:  Vital Signs Last 24 Hrs  T(C): 36.9 (23 Dec 2021 12:13), Max: 36.9 (22 Dec 2021 15:11)  T(F): 98.4 (23 Dec 2021 12:13), Max: 98.5 (23 Dec 2021 05:30)  HR: 81 (23 Dec 2021 12:13) (81 - 99)  BP: 130/56 (23 Dec 2021 12:13) (130/56 - 159/64)  BP(mean): --  RR: 19 (23 Dec 2021 12:13) (18 - 19)  SpO2: 95% (23 Dec 2021 12:13) (95% - 98%)  CONSTITUTIONAL: NAD, well-developed, well-groomed  EYES:  conjunctiva and sclera clear  ENMT: Moist oral mucosa, no pharyngeal injection or exudates; normal dentition  RESPIRATORY: on 1L, bibasilar crackles worse on the L side, tachypnea improved, bandaid over bx site, no hematoma formation  CARDIOVASCULAR: Regular rate and rhythm, normal S1 and S2, no murmur/rub/gallop; No lower extremity edema; Peripheral pulses are 2+ bilaterally  ABDOMEN: Nontender to palpation, normoactive bowel sounds, no rebound/guarding  PSYCH: A+O to person, place, and time; affect appropriate  SKIN: No rashes; no palpable lesions, inguinal (R) sided wound appears nontender, no exudate, dressed with bandage- per nursing depth of ab 4cm on 12/23, no erythema surrounding the area      LABS:                        7.6    12.58 )-----------( 307      ( 23 Dec 2021 06:40 )             24.1     12-23    128<L>  |  92<L>  |  40<H>  ----------------------------<  141<H>  4.9   |  28  |  1.03    Ca    10.3      23 Dec 2021 06:40  Phos  2.6     12-23  Mg     2.10     12-23                Culture - Acid Fast - Tissue w/Smear (collected 22 Dec 2021 18:55)  Source: .Tissue RIGHT LUNG NODULE    Culture - Fungal, Tissue (collected 22 Dec 2021 18:55)  Source: .Tissue RIGHT LUNG NODULE  Preliminary Report (23 Dec 2021 08:10):    Testing in progress    Culture - Tissue with Gram Stain (collected 22 Dec 2021 18:55)  Source: .Tissue RIGHT LUNG NODULE  Gram Stain (22 Dec 2021 22:07):    No polymorphonuclear leukocytes    No organisms seen per oil power field        RADIOLOGY & ADDITIONAL TESTS:  Results Reviewed:   Imaging Personally Reviewed:  Repeat CXR 12/22 personally reviewed and her pleural effusion appear worse, specifically on the R    COORDINATION OF CARE:  Care Discussed with Consultants/Other Providers [Y/N]:  Reviewed outpatient records re vulvar cancer history and PMR history (for which she was started on steroids within the past 2 years). Case dsicussed w IR and Pulm

## 2021-12-23 NOTE — PROGRESS NOTE ADULT - PROBLEM SELECTOR PLAN 1
# Acute hypoxic respiraotry failure  Cont on tele, monitor for arrhythmia,   -CT showed No pulmonary embolus is noted. Multiple nodules are noted within both lungs. Primary consideration is   metastasis. Findings suggestive of malignant pleural effusions bilaterally.  -s/p Thoracentesis on 12/15 by IR, drained appr 660cc pleural fluid, exudative, f/u path   - Heme/ Onc consult appreciated,  IR to do bx of lung nodules after ct a/p, awaiting for ct  - Pulm consult appreciated  -cxr showed no pneumothorax  -f/u echo  -rpt cxr 12/18 with persistent L pleural effusion  - Repeat CXR 12/20--> re-consult Pulm/IR for repeat thora given re accumulation-- this is likely worsened by the IVF she has been getting to treat her hypercalcemia.  -lasix 20 mg IV dosed 12/21 am, 12/22, no significnat improvement in SOB  - strict i/o  - Cardiology consult 12/23 for CHF eval  - Trial of pred 40 mg daily per Pulm, starting 12/23

## 2021-12-23 NOTE — DIETITIAN INITIAL EVALUATION ADULT. - PERTINENT MEDS FT
Heparin, Insulin (Glargine), Insulin (Lispro), Zofran, Dulcolax (PRN), Vit D3, Miralax, Senna, Protonix,

## 2021-12-23 NOTE — CONSULT NOTE ADULT - ASSESSMENT
Vascular & Interventional Radiology Consult Note    Evaluate for Procedure: Thoracentesis    HPI: 65 yo F w/ PMHx of HTN, HLD, DM, fibromyalgia, Vulvar CA s/p vulva surgery in 2020, completed chemo & RT in 9/21, anemic, now presents w/ shortness of breath, found to have bilateral pleural effusions, L>R and pulmonary nodules s/p IR bx on 12/22/21. Patient now with worsening SOB. IR c/s for thoracentesis.     Allergies:   Medications (Abx/Cardiac/Anticoagulation/Blood Products)  amLODIPine   Tablet: 10 milliGRAM(s) Oral (12-23 @ 05:29)  enalapril: 5 milliGRAM(s) Oral (12-23 @ 05:29)  furosemide   Injectable: 20 milliGRAM(s) IV Push (12-22 @ 18:06)  heparin   Injectable: 5000 Unit(s) SubCutaneous (12-23 @ 05:29)  piperacillin/tazobactam IVPB..: 25 mL/Hr IV Intermittent (12-23 @ 01:11)    Data:    T(C): 36.9  HR: 81  BP: 130/56  RR: 19  SpO2: 95%    -WBC 12.58 / HgB 7.6 / Hct 24.1 / Plt 307  -Na 128 / Cl 92 / BUN 40 / Glucose 141  -K 4.9 / CO2 28 / Cr 1.03  -ALT -- / Alk Phos -- / T.Bili --    Imaging: < from: Xray Chest 1 View AP/PA (12.22.21 @ 15:25) >  FINDINGS:  Nasal cannula overlying the chest.  Heart size cannot be assessed in this projection.    Scattered ill-defined circular/ovoid opacities throughout the bilateral   lungs, likely related to multiple nodules seen in CT chest. Small left   pleural effusion with associated atelectasis. No pneumothorax.  There are no acute osseous abnormalities.    IMPRESSION: No pneumothorax post lung biopsy.    < end of copied text >    < from: CT Abdomen and Pelvis w/ IV Cont (12.17.21 @ 17:49) >  IMPRESSION:    New small collection measuring 4.3 x 2.3 cm in the right inguinal region   containing foci of air.    Redemonstration of partially imaged bibasilar pulmonary nodules and  pleural nodularity/thickening.    Loculated small left and trace right pleural effusions with interval   decrease in size.    --- End of Report ---    < end of copied text >    Assessment:   65 yo F w/ PMHx of HTN, HLD, DM, fibromyalgia, Vulvar CA s/p vulva surgery in 2020, completed chemo & RT in 9/21, anemic, now presents w/ shortness of breath, found to have bilateral pleural effusions, L>R and pulmonary nodules s/p IR bx on 12/22/21. Patient now with worsening SOB & hypoxia. IR c/s for thoracentesis.     Plan:  - Patient was able to tolerate the lung bx in the prone position with the same loculated pleural effusion and is unlikely to be the acute cause of the patient's worsening respiratory status.  - Agree with pulmonology recommendation for formal ECHO as CXR shows potential signs of CHF and patient may benefit from diuresis.  - No plan for IR intervention at this time.  - If there is still c/f worsening resp status that may require IR intervention, recommend repeat CT for further evaluation for an acute process.  - Plan d/w Dr. Persaud. Vascular & Interventional Radiology Consult Note    Evaluate for Procedure: Thoracentesis    HPI: 67 yo F w/ PMHx of HTN, HLD, DM, fibromyalgia, Vulvar CA s/p vulva surgery in 2020, completed chemo & RT in 9/21, anemic, now presents w/ shortness of breath, found to have bilateral pleural effusions, L>R and pulmonary nodules s/p IR bx on 12/22/21. Patient now with worsening SOB. IR c/s for thoracentesis.     Allergies:   Medications (Abx/Cardiac/Anticoagulation/Blood Products)  amLODIPine   Tablet: 10 milliGRAM(s) Oral (12-23 @ 05:29)  enalapril: 5 milliGRAM(s) Oral (12-23 @ 05:29)  furosemide   Injectable: 20 milliGRAM(s) IV Push (12-22 @ 18:06)  heparin   Injectable: 5000 Unit(s) SubCutaneous (12-23 @ 05:29)  piperacillin/tazobactam IVPB..: 25 mL/Hr IV Intermittent (12-23 @ 01:11)    Data:    T(C): 36.9  HR: 81  BP: 130/56  RR: 19  SpO2: 95%    -WBC 12.58 / HgB 7.6 / Hct 24.1 / Plt 307  -Na 128 / Cl 92 / BUN 40 / Glucose 141  -K 4.9 / CO2 28 / Cr 1.03  -ALT -- / Alk Phos -- / T.Bili --    Imaging: < from: Xray Chest 1 View AP/PA (12.22.21 @ 15:25) >  FINDINGS:  Nasal cannula overlying the chest.  Heart size cannot be assessed in this projection.    Scattered ill-defined circular/ovoid opacities throughout the bilateral   lungs, likely related to multiple nodules seen in CT chest. Small left   pleural effusion with associated atelectasis. No pneumothorax.  There are no acute osseous abnormalities.    IMPRESSION: No pneumothorax post lung biopsy.    < end of copied text >    < from: CT Abdomen and Pelvis w/ IV Cont (12.17.21 @ 17:49) >  IMPRESSION:    New small collection measuring 4.3 x 2.3 cm in the right inguinal region   containing foci of air.    Redemonstration of partially imaged bibasilar pulmonary nodules and  pleural nodularity/thickening.    Loculated small left and trace right pleural effusions with interval   decrease in size.    --- End of Report ---    < end of copied text >    Assessment:   67 yo F w/ PMHx of HTN, HLD, DM, fibromyalgia, Vulvar CA s/p vulva surgery in 2020, completed chemo & RT in 9/21, anemic, now presents w/ shortness of breath, found to have bilateral pleural effusions, L>R and pulmonary nodules s/p IR bx on 12/22/21. Patient now with worsening SOB & hypoxia. IR c/s for thoracentesis. Loculated L pleural effusion on CT chest from the lung bx.    Plan:  - Patient was able to tolerate the lung bx in the prone position with the same loculated pleural effusion and is unlikely to be the acute cause of the patient's worsening respiratory status.  - Agree with pulmonology recommendation for formal ECHO as CXR shows potential signs of CHF and patient may benefit from diuresis.  - No plan for IR intervention at this time.  - If there is still c/f worsening resp status that may require IR intervention, recommend repeat CT for further evaluation for an acute process.  - Plan d/w Dr. Persaud.

## 2021-12-23 NOTE — PROGRESS NOTE ADULT - SUBJECTIVE AND OBJECTIVE BOX
Chief Complaint: DM 2    History: Patient seen at bedside. NPO yesterday for biopsy, diet resumed last night but patient reports she has low appetite. Skipped breakfast. Denies n/v, denies s/s of hypoglycemia   Prednisone adjusted from 10 mg BID to 40 mg daily    MEDICATIONS  (STANDING):  amLODIPine   Tablet 10 milliGRAM(s) Oral daily  cholecalciferol 2000 Unit(s) Oral daily  Dakins Solution - 1/4 Strength 1 Application(s) Topical daily  dextrose 40% Gel 15 Gram(s) Oral once  dextrose 5%. 1000 milliLiter(s) (50 mL/Hr) IV Continuous <Continuous>  dextrose 5%. 1000 milliLiter(s) (100 mL/Hr) IV Continuous <Continuous>  dextrose 50% Injectable 12.5 Gram(s) IV Push once  dextrose 50% Injectable 25 Gram(s) IV Push once  dextrose 50% Injectable 25 Gram(s) IV Push once  enalapril 5 milliGRAM(s) Oral daily  escitalopram 30 milliGRAM(s) Oral daily  gabapentin 400 milliGRAM(s) Oral three times a day  glucagon  Injectable 1 milliGRAM(s) IntraMuscular once  heparin   Injectable 5000 Unit(s) SubCutaneous every 8 hours  influenza  Vaccine (HIGH DOSE) 0.7 milliLiter(s) IntraMuscular once  insulin glargine Injectable (LANTUS) 40 Unit(s) SubCutaneous at bedtime  insulin lispro (ADMELOG) corrective regimen sliding scale   SubCutaneous three times a day before meals  insulin lispro (ADMELOG) corrective regimen sliding scale   SubCutaneous at bedtime  insulin lispro Injectable (ADMELOG) 20 Unit(s) SubCutaneous three times a day with meals  levothyroxine 125 MICROGram(s) Oral daily  ondansetron   Disintegrating Tablet 4 milliGRAM(s) Oral three times a day  pantoprazole    Tablet 40 milliGRAM(s) Oral before breakfast  polyethylene glycol 3350 17 Gram(s) Oral daily  predniSONE   Tablet 40 milliGRAM(s) Oral daily  senna 2 Tablet(s) Oral at bedtime  simvastatin 20 milliGRAM(s) Oral at bedtime    MEDICATIONS  (PRN):  acetaminophen     Tablet .. 650 milliGRAM(s) Oral every 6 hours PRN Temp greater or equal to 38C (100.4F), Mild Pain (1 - 3), Moderate Pain (4 - 6)  bisacodyl Suppository 10 milliGRAM(s) Rectal daily PRN Constipation  lidocaine/prilocaine Cream 1 Application(s) Topical daily PRN Dressing changes  melatonin 3 milliGRAM(s) Oral at bedtime PRN Insomnia  oxycodone    5 mG/acetaminophen 325 mG 1 Tablet(s) Oral every 6 hours PRN Severe Pain (7 - 10)    No Known Allergies    Review of Systems:  Cardiovascular: No chest pain  Respiratory: No SOB  GI: No nausea, vomiting  Endocrine: no hypoglycemia     PHYSICAL EXAM:  VITALS: T(C): 36.9 (12-23-21 @ 12:13)  T(F): 98.4 (12-23-21 @ 12:13), Max: 98.5 (12-23-21 @ 05:30)  HR: 81 (12-23-21 @ 12:13) (81 - 99)  BP: 130/56 (12-23-21 @ 12:13) (130/56 - 159/64)  RR:  (18 - 19)  SpO2:  (95% - 98%)  Wt(kg): --  GENERAL: NAD  EYES: No proptosis, no lid lag, anicteric  HEENT:  Atraumatic, Normocephalic, moist mucous membranes  RESPIRATORY: unlabored respirations   PSYCH: Alert and oriented x 3    CAPILLARY BLOOD GLUCOSE    POCT Blood Glucose.: 247 mg/dL (23 Dec 2021 12:49)  POCT Blood Glucose.: 187 mg/dL (23 Dec 2021 11:21)  POCT Blood Glucose.: 154 mg/dL (23 Dec 2021 08:43)  POCT Blood Glucose.: 134 mg/dL (22 Dec 2021 21:08)  POCT Blood Glucose.: 134 mg/dL (22 Dec 2021 17:11)      12-23    128<L>  |  92<L>  |  40<H>  ----------------------------<  141<H>  4.9   |  28  |  1.03    EGFR if : 65  EGFR if non : 56<L>    Ca    10.3      12-23  Mg     2.10     12-23  Phos  2.6     12-23        Thyroid Function Tests:  12-16 @ 06:47 TSH 11.94 FreeT4 0.9 T3 -- Anti TPO -- Anti Thyroglobulin Ab -- TSI --  12-14 @ 08:15 TSH 10.03 FreeT4 0.8 T3 -- Anti TPO -- Anti Thyroglobulin Ab -- TSI --      A1C with Estimated Average Glucose Result: 7.7 % (12-14-21 @ 08:15)  A1C with Estimated Average Glucose Result: 8.2 % (09-04-21 @ 23:11)  A1C with Estimated Average Glucose Result: 7.5 % (09-04-21 @ 09:13)  A1C with Estimated Average Glucose Result: 7.7 % (03-25-21 @ 15:08)    Diet, Regular (12-22-21 @ 10:35)  Diet, Regular:   Consistent Carbohydrate Evening Snack (CSTCHOSN)  Supplement Feeding Modality:  Oral  Glucerna Shake Cans or Servings Per Day:  1       Frequency:  Daily (12-21-21 @ 16:23)

## 2021-12-23 NOTE — PROGRESS NOTE ADULT - SUBJECTIVE AND OBJECTIVE BOX
Hematology Oncology Follow-up    INTERVAL HPI/OVERNIGHT EVENTS:  Still endorsing dizziness and requiring 3L NC O2. States shortness of breath is about the same as last few days.     VITAL SIGNS:  T(F): 98.5 (12-23-21 @ 05:30)  HR: 97 (12-23-21 @ 05:30)  BP: 139/61 (12-23-21 @ 05:30)  RR: 19 (12-23-21 @ 05:30)  SpO2: 97% (12-23-21 @ 05:30)  Wt(kg): --    12-22-21 @ 07:01  -  12-23-21 @ 07:00  --------------------------------------------------------  IN: 300 mL / OUT: 900 mL / NET: -600 mL          Review of Systems:  General: denies fevers/chills  Respiratory: + shortness of breath  Cardiovascular: denies chest pain, palpitations  Gastrointestinal: denies abdominal pain, constipation, diarrhea, melena, hematochezia  MSK: denies joint pain or muscle pain  Neuro: denies headache, weakness, or parasthesias  Skin: denies rash, petichiae, echymoses  Psych: denies anxiety or sleep disturbances    PHYSICAL EXAM:  Constitutional: NAD  Respiratory: faint crackles L chest, symmetric chest rise, with normal respiratory effort  Cardiovascular: RRR  Gastrointestinal: soft, NTND  Extremities:  no edema  MSK: no obvious abnormalities  Neurological: Grossly intact  Skin: no rash, no echymoses, no petichiae  Psych: normal affect    MEDICATIONS  (STANDING):  amLODIPine   Tablet 10 milliGRAM(s) Oral daily  cholecalciferol 2000 Unit(s) Oral daily  Dakins Solution - 1/4 Strength 1 Application(s) Topical daily  dextrose 40% Gel 15 Gram(s) Oral once  dextrose 5%. 1000 milliLiter(s) (50 mL/Hr) IV Continuous <Continuous>  dextrose 5%. 1000 milliLiter(s) (100 mL/Hr) IV Continuous <Continuous>  dextrose 50% Injectable 25 Gram(s) IV Push once  dextrose 50% Injectable 12.5 Gram(s) IV Push once  dextrose 50% Injectable 25 Gram(s) IV Push once  enalapril 5 milliGRAM(s) Oral daily  escitalopram 30 milliGRAM(s) Oral daily  gabapentin 400 milliGRAM(s) Oral three times a day  glucagon  Injectable 1 milliGRAM(s) IntraMuscular once  heparin   Injectable 5000 Unit(s) SubCutaneous every 8 hours  influenza  Vaccine (HIGH DOSE) 0.7 milliLiter(s) IntraMuscular once  insulin glargine Injectable (LANTUS) 40 Unit(s) SubCutaneous at bedtime  insulin lispro (ADMELOG) corrective regimen sliding scale   SubCutaneous three times a day before meals  insulin lispro (ADMELOG) corrective regimen sliding scale   SubCutaneous at bedtime  insulin lispro Injectable (ADMELOG) 20 Unit(s) SubCutaneous three times a day with meals  levothyroxine 125 MICROGram(s) Oral daily  ondansetron Injectable 4 milliGRAM(s) IV Push once  pantoprazole    Tablet 40 milliGRAM(s) Oral before breakfast  piperacillin/tazobactam IVPB.. 3.375 Gram(s) IV Intermittent every 12 hours  polyethylene glycol 3350 17 Gram(s) Oral daily  predniSONE   Tablet 40 milliGRAM(s) Oral daily  senna 2 Tablet(s) Oral at bedtime  simvastatin 20 milliGRAM(s) Oral at bedtime    MEDICATIONS  (PRN):  acetaminophen     Tablet .. 650 milliGRAM(s) Oral every 6 hours PRN Temp greater or equal to 38C (100.4F), Mild Pain (1 - 3), Moderate Pain (4 - 6)  bisacodyl Suppository 10 milliGRAM(s) Rectal daily PRN Constipation  lidocaine/prilocaine Cream 1 Application(s) Topical daily PRN Dressing changes  melatonin 3 milliGRAM(s) Oral at bedtime PRN Insomnia  oxycodone    5 mG/acetaminophen 325 mG 1 Tablet(s) Oral every 6 hours PRN Severe Pain (7 - 10)      No Known Allergies      LABS:                        7.6    12.58 )-----------( 307      ( 23 Dec 2021 06:40 )             24.1     12-23    128<L>  |  92<L>  |  40<H>  ----------------------------<  141<H>  4.9   |  28  |  1.03    Ca    10.3      23 Dec 2021 06:40  Phos  2.6     12-23  Mg     2.10     12-23                       RADIOLOGY & ADDITIONAL TESTS:  Studies reviewed.

## 2021-12-23 NOTE — PROGRESS NOTE ADULT - TIME BILLING
coordinating care with primary team and reviewed the chart.

## 2021-12-24 NOTE — PROGRESS NOTE ADULT - PROBLEM SELECTOR PLAN 12
continue lexapro    #Constipation:   -c/w miralax, initiated senna. Mineral oil enema ordered.    # Nausea  - scheduled zofran

## 2021-12-24 NOTE — PROGRESS NOTE ADULT - SUBJECTIVE AND OBJECTIVE BOX
[facility] Division of Hospital Medicine  Evelyn Vega MD  Pager (M-F, 8A-5P): [phone]  Other Times:  [phone]      SUBJECTIVE / OVERNIGHT EVENTS: Resting comfortably in bed, continues on 2L. Had a suppository last night that helped w BMs. NAEON. S/p 1 u PRBC yesterday.    MEDICATIONS  (STANDING):  amLODIPine   Tablet 10 milliGRAM(s) Oral daily  cholecalciferol 2000 Unit(s) Oral daily  Dakins Solution - 1/4 Strength 1 Application(s) Topical daily  dextrose 40% Gel 15 Gram(s) Oral once  dextrose 5%. 1000 milliLiter(s) (50 mL/Hr) IV Continuous <Continuous>  dextrose 5%. 1000 milliLiter(s) (100 mL/Hr) IV Continuous <Continuous>  dextrose 50% Injectable 25 Gram(s) IV Push once  dextrose 50% Injectable 12.5 Gram(s) IV Push once  dextrose 50% Injectable 25 Gram(s) IV Push once  enalapril 5 milliGRAM(s) Oral daily  escitalopram 30 milliGRAM(s) Oral daily  gabapentin 400 milliGRAM(s) Oral three times a day  glucagon  Injectable 1 milliGRAM(s) IntraMuscular once  heparin   Injectable 5000 Unit(s) SubCutaneous every 8 hours  influenza  Vaccine (HIGH DOSE) 0.7 milliLiter(s) IntraMuscular once  insulin glargine Injectable (LANTUS) 35 Unit(s) SubCutaneous at bedtime  insulin lispro (ADMELOG) corrective regimen sliding scale   SubCutaneous three times a day before meals  insulin lispro (ADMELOG) corrective regimen sliding scale   SubCutaneous at bedtime  insulin lispro Injectable (ADMELOG) 20 Unit(s) SubCutaneous three times a day with meals  levothyroxine 125 MICROGram(s) Oral daily  ondansetron   Disintegrating Tablet 4 milliGRAM(s) Oral three times a day  pantoprazole    Tablet 40 milliGRAM(s) Oral before breakfast  polyethylene glycol 3350 17 Gram(s) Oral daily  predniSONE   Tablet 40 milliGRAM(s) Oral daily  senna 2 Tablet(s) Oral at bedtime  simvastatin 20 milliGRAM(s) Oral at bedtime    MEDICATIONS  (PRN):  acetaminophen     Tablet .. 650 milliGRAM(s) Oral every 6 hours PRN Temp greater or equal to 38C (100.4F), Mild Pain (1 - 3), Moderate Pain (4 - 6)  bisacodyl Suppository 10 milliGRAM(s) Rectal daily PRN Constipation  diphenhydrAMINE 25 milliGRAM(s) Oral daily PRN Rash and/or Itching  lidocaine/prilocaine Cream 1 Application(s) Topical daily PRN Dressing changes  melatonin 3 milliGRAM(s) Oral at bedtime PRN Insomnia      I&O's Summary    23 Dec 2021 07:01  -  24 Dec 2021 07:00  --------------------------------------------------------  IN: 950 mL / OUT: 1000 mL / NET: -50 mL        PHYSICAL EXAM:  Vital Signs Last 24 Hrs  T(C): 36.9 (24 Dec 2021 05:30), Max: 36.9 (24 Dec 2021 05:30)  T(F): 98.4 (24 Dec 2021 05:30), Max: 98.4 (24 Dec 2021 05:30)  HR: 77 (24 Dec 2021 05:30) (62 - 77)  BP: 143/58 (24 Dec 2021 05:30) (127/56 - 143/64)  BP(mean): --  RR: 18 (24 Dec 2021 05:30) (18 - 18)  SpO2: 97% (24 Dec 2021 05:30) (96% - 98%)  CONSTITUTIONAL: NAD, well-developed, well-groomed  EYES: PERRLA; conjunctiva and sclera clear  ENMT: Moist oral mucosa, no pharyngeal injection or exudates; normal dentition  RESPIRATORY: Normal respiratory effort; lungs are clear to auscultation bilaterally  CARDIOVASCULAR: Regular rate and rhythm, normal S1 and S2, no murmur/rub/gallop; No lower extremity edema; Peripheral pulses are 2+ bilaterally  ABDOMEN: Nontender to palpation, normoactive bowel sounds, no rebound/guarding; No hepatosplenomegaly  PSYCH: A+O to person, place, and time; affect appropriate  SKIN: No rashes; no palpable lesions    LABS:                        8.6    12.88 )-----------( 308      ( 24 Dec 2021 07:27 )             27.8     12-24    132<L>  |  94<L>  |  46<H>  ----------------------------<  62<L>  4.0   |  29  |  1.01    Ca    9.9      24 Dec 2021 07:27  Phos  1.8     12-24  Mg     2.00     12-24    TPro  6.4  /  Alb  3.1<L>  /  TBili  0.4  /  DBili  x   /  AST  8   /  ALT  9   /  AlkPhos  72  12-24              Culture - Acid Fast - Tissue w/Smear (collected 22 Dec 2021 18:55)  Source: .Tissue RIGHT LUNG NODULE    Culture - Fungal, Tissue (collected 22 Dec 2021 18:55)  Source: .Tissue RIGHT LUNG NODULE  Preliminary Report (23 Dec 2021 08:10):    Testing in progress    Culture - Tissue with Gram Stain (collected 22 Dec 2021 18:55)  Source: .Tissue RIGHT LUNG NODULE  Gram Stain (22 Dec 2021 22:07):    No polymorphonuclear leukocytes    No organisms seen per oil power field  Preliminary Report (23 Dec 2021 17:22):    No growth

## 2021-12-24 NOTE — PROGRESS NOTE ADULT - ASSESSMENT
65 yo F with HTN, HLD, type 2DM, hypothyroidism, fibromyalgia, vulvar cancer s/p surgery 2020, RT/chemo 2021, anemia presenting with HERNANDEZ and dizziness with imaging concerning for metastatic disease and pleural effusions bilaterally. Endocrine was consulted for T2DM management.    1. Type 2 Diabetes Mellitus c/b steroid induced hyperglycemia  A1c 7.7%; goal less than 7%  Home Regimen: Tresiba 60 units QHS, Novolog 15-30 units with meals (ISF 1:30, but usually just estimates), Victoza 1.8 mg daily    While inpatient:   BG target 100-180 mg/dl, hypoglycemia this AM  Reduce Lantus to 35 units SQ qHS  continue Admelog 20 units SQ TID before meals - please HOLD if she is not eating  Continue Admelog MODERATE dose correctional scale before meals, moderate dose at bedtime   Please check FSG before meals and QHS  Consistent carbohydrate diet - appreciate RD input, liberalized diet (eliminated sodium restriction, added Glucerna per RD recommendation)  Hypoglycemia protocol     Discharge planning for DM: Resume basal / bolus insulin (dosing TBD), schedule follow up with prior outpatient endocrine provider     2. Chronic steroid use, steroid induced hyperglycemia   On Prednisone 10 mg BID for at least two months, now Prednisone 40 mg daily  Insulin dosing as above   Pt cannot stop steroids abruptly as she will have secondary adrenal insufficiency   Patient to follow up in 1/2022 with her doctors with steroid taper  If any hemodynamic instability, please order IV hydrocortisone 50 mg q6h for stress steroids    3. Hypothyroidism  12-16 @ 06:47 TSH 11.94 FreeT4 0.9   Levels borderline, would continue with current LT4 dosing for now and followup as outpatient, repeat TFTs in 6-8 weeks  Continue Levothyroxine 125 mcg PO daily, take daily on empty stomach    4. Hypercalcemia   PTH 7  Likely hypercalcemia of malignancy  Corrected calcium 10.6 today improved  S/p Zometa 4 mg IV (given on 12/20/21, will take about 72h for full effect)  S/p Calcitonin 310 IU SQ q12h x 4 doses (completed 12/22)   Vit D 25-OH LOW at 12.2, recommend supplement (see below)  PTH- suppressed    Followup PTHrp    5. Vitamin D deficiency  Vitamin D 25-OH 12.2  Recommend supplement with Cholecalciferol 2000 units daily    6. Hypertension  Goal BP <130/80  Management as per primary team    7. Hyperlipidemia  Continue Simvastatin 20 mg daily  Followup lipid panel annually as outpatient    Whitney Carpio MD  Division of Endocrinology  Pager: [phone]    If after 6PM or before 9AM, or on weekends/holidays, please call endocrine answering service for assistance ([phone]).  For nonurgent matters email [email or web address] for assistance.

## 2021-12-24 NOTE — PROGRESS NOTE ADULT - SUBJECTIVE AND OBJECTIVE BOX
Chief Complaint: DM2    History: hypoglycemia on serum this AM 62. Patient denies symptoms  eating ok    MEDICATIONS  (STANDING):  amLODIPine   Tablet 10 milliGRAM(s) Oral daily  cholecalciferol 2000 Unit(s) Oral daily  Dakins Solution - 1/4 Strength 1 Application(s) Topical daily  dextrose 40% Gel 15 Gram(s) Oral once  dextrose 5%. 1000 milliLiter(s) (50 mL/Hr) IV Continuous <Continuous>  dextrose 5%. 1000 milliLiter(s) (100 mL/Hr) IV Continuous <Continuous>  dextrose 50% Injectable 25 Gram(s) IV Push once  dextrose 50% Injectable 12.5 Gram(s) IV Push once  dextrose 50% Injectable 25 Gram(s) IV Push once  enalapril 5 milliGRAM(s) Oral daily  escitalopram 30 milliGRAM(s) Oral daily  gabapentin 400 milliGRAM(s) Oral three times a day  glucagon  Injectable 1 milliGRAM(s) IntraMuscular once  heparin   Injectable 5000 Unit(s) SubCutaneous every 8 hours  influenza  Vaccine (HIGH DOSE) 0.7 milliLiter(s) IntraMuscular once  insulin glargine Injectable (LANTUS) 40 Unit(s) SubCutaneous at bedtime  insulin lispro (ADMELOG) corrective regimen sliding scale   SubCutaneous at bedtime  insulin lispro (ADMELOG) corrective regimen sliding scale   SubCutaneous three times a day before meals  insulin lispro Injectable (ADMELOG) 20 Unit(s) SubCutaneous three times a day with meals  levothyroxine 125 MICROGram(s) Oral daily  ondansetron   Disintegrating Tablet 4 milliGRAM(s) Oral three times a day  pantoprazole    Tablet 40 milliGRAM(s) Oral before breakfast  polyethylene glycol 3350 17 Gram(s) Oral daily  predniSONE   Tablet 40 milliGRAM(s) Oral daily  senna 2 Tablet(s) Oral at bedtime  simvastatin 20 milliGRAM(s) Oral at bedtime    MEDICATIONS  (PRN):  acetaminophen     Tablet .. 650 milliGRAM(s) Oral every 6 hours PRN Temp greater or equal to 38C (100.4F), Mild Pain (1 - 3), Moderate Pain (4 - 6)  bisacodyl Suppository 10 milliGRAM(s) Rectal daily PRN Constipation  diphenhydrAMINE 25 milliGRAM(s) Oral daily PRN Rash and/or Itching  lidocaine/prilocaine Cream 1 Application(s) Topical daily PRN Dressing changes  melatonin 3 milliGRAM(s) Oral at bedtime PRN Insomnia      Allergies    No Known Allergies    Intolerances      Review of Systems:    ALL OTHER SYSTEMS REVIEWED AND NEGATIVE    PHYSICAL EXAM:  VITALS: T(C): 36.9 (12-24-21 @ 05:30)  T(F): 98.4 (12-24-21 @ 05:30), Max: 98.4 (12-24-21 @ 05:30)  HR: 77 (12-24-21 @ 05:30) (62 - 77)  BP: 143/58 (12-24-21 @ 05:30) (127/56 - 143/64)  RR:  (18 - 18)  SpO2:  (96% - 98%)  Wt(kg): --  GENERAL: NAD, well-groomed, well-developed  EYES: No proptosis, no lid lag, anicteric  HEENT:  Atraumatic, Normocephalic, moist mucous membranes  RESPIRATORY: nonlabored respirations, no wheezing  PSYCH: Alert and oriented x 3, normal affect, normal mood    CAPILLARY BLOOD GLUCOSE      POCT Blood Glucose.: 226 mg/dL (24 Dec 2021 11:19)  POCT Blood Glucose.: 74 mg/dL (24 Dec 2021 07:29)  POCT Blood Glucose.: 190 mg/dL (23 Dec 2021 21:29)  POCT Blood Glucose.: 201 mg/dL (23 Dec 2021 16:57)      12-24    132<L>  |  94<L>  |  46<H>  ----------------------------<  62<L>  4.0   |  29  |  1.01    EGFR if : 66  EGFR if non : 57<L>    Ca    9.9      12-24  Mg     2.00     12-24  Phos  1.8     12-24    TPro  6.4  /  Alb  3.1<L>  /  TBili  0.4  /  DBili  x   /  AST  8   /  ALT  9   /  AlkPhos  72  12-24      A1C with Estimated Average Glucose Result: 7.7 % (12-14-21 @ 08:15)  A1C with Estimated Average Glucose Result: 8.2 % (09-04-21 @ 23:11)  A1C with Estimated Average Glucose Result: 7.5 % (09-04-21 @ 09:13)  A1C with Estimated Average Glucose Result: 7.7 % (03-25-21 @ 15:08)      Thyroid Function Tests:  12-16 @ 06:47 TSH 11.94 FreeT4 0.9 T3 -- Anti TPO -- Anti Thyroglobulin Ab -- TSI --  12-14 @ 08:15 TSH 10.03 FreeT4 0.8 T3 -- Anti TPO -- Anti Thyroglobulin Ab -- TSI --

## 2021-12-24 NOTE — PROGRESS NOTE ADULT - ASSESSMENT
65 yo F w/ PMHx of HTN, HLD, DM, fibromyalgia, Vulvar CA s/p vulva surgery in 2020, completed chemo & RT in 9/21, PMR (on chronic steroids, 10 mg BID), now p/w SOB, dizziness & nausea for the last couple of weeks.  CTPA- No pulmonary embolus is noted. Multiple nodules are noted within both lungs. Primary consideration is metastasis. Findings suggestive of malignant pleural effusions bilaterally, s/p thora this admission by IR 12/15. Now with worsening hypercalcemia. Also on a course of zosyn for inguinal wound (thought to be possible source of fever, but not entirely clear)-- ID recommended 10 day course of augmentin (12/15-12/24). Pulm nodule bx 12/22. Concern for reaccumulation of pleural effusion on CXR 12/20. Weaning o2, periodic IV lasix, last dosed 12/23 without any improvement. Pulm recommended trial of high dose steroids w extended taper (started pred 40 mg PO daily on 12/23). IR and Pulm consulted re recurrent pleural effusion for a repeat tap, however they believe acute hypoxic respiratory failure is more consistent w a CHF exac vs worsening of tumor burden. Notably, patient has not been responding to IV lasix 20 mg doses, BNP on admission was 200s, and TTE was not helpful given poor windows for eval. Her pleural fluid analysis was exudative on 12/15 which is not consistent with a CHF exacerbation. Does not look like CHF exac at this time. Continues to be anemic w Hgb 7.6, therefore gave 1 u PRBC 12/23 per H/O recs. F/u repeat CT chest 12/23.     Nausea also an ongoing issue for her--> changed her to scheduled zofran (instead of prn) and her symptoms are improved, bowel regimen of miralax/senna + enema for constipation (likely worsened by hypercal).

## 2021-12-25 NOTE — PROGRESS NOTE ADULT - SUBJECTIVE AND OBJECTIVE BOX
[facility] Division of Hospital Medicine  Evelyn Vega MD  Pager (M-F, 8A-5P): [phone]  Other Times:  [phone]      SUBJECTIVE / OVERNIGHT EVENTS:    MEDICATIONS  (STANDING):  amLODIPine   Tablet 10 milliGRAM(s) Oral daily  cholecalciferol 2000 Unit(s) Oral daily  Dakins Solution - 1/4 Strength 1 Application(s) Topical daily  dextrose 40% Gel 15 Gram(s) Oral once  dextrose 5%. 1000 milliLiter(s) (50 mL/Hr) IV Continuous <Continuous>  dextrose 5%. 1000 milliLiter(s) (100 mL/Hr) IV Continuous <Continuous>  dextrose 50% Injectable 25 Gram(s) IV Push once  dextrose 50% Injectable 12.5 Gram(s) IV Push once  dextrose 50% Injectable 25 Gram(s) IV Push once  enalapril 5 milliGRAM(s) Oral daily  escitalopram 30 milliGRAM(s) Oral daily  gabapentin 400 milliGRAM(s) Oral three times a day  glucagon  Injectable 1 milliGRAM(s) IntraMuscular once  heparin   Injectable 5000 Unit(s) SubCutaneous every 8 hours  influenza  Vaccine (HIGH DOSE) 0.7 milliLiter(s) IntraMuscular once  insulin glargine Injectable (LANTUS) 30 Unit(s) SubCutaneous at bedtime  insulin lispro (ADMELOG) corrective regimen sliding scale   SubCutaneous three times a day before meals  insulin lispro (ADMELOG) corrective regimen sliding scale   SubCutaneous at bedtime  insulin lispro Injectable (ADMELOG) 20 Unit(s) SubCutaneous three times a day with meals  levothyroxine 125 MICROGram(s) Oral daily  ondansetron   Disintegrating Tablet 4 milliGRAM(s) Oral three times a day  pantoprazole    Tablet 40 milliGRAM(s) Oral before breakfast  polyethylene glycol 3350 17 Gram(s) Oral daily  predniSONE   Tablet 40 milliGRAM(s) Oral daily  senna 2 Tablet(s) Oral at bedtime  simvastatin 20 milliGRAM(s) Oral at bedtime    MEDICATIONS  (PRN):  acetaminophen     Tablet .. 650 milliGRAM(s) Oral every 6 hours PRN Temp greater or equal to 38C (100.4F), Mild Pain (1 - 3), Moderate Pain (4 - 6)  bisacodyl Suppository 10 milliGRAM(s) Rectal daily PRN Constipation  diphenhydrAMINE 25 milliGRAM(s) Oral daily PRN Rash and/or Itching  lactulose Syrup 20 Gram(s) Oral daily PRN constipation  lidocaine/prilocaine Cream 1 Application(s) Topical daily PRN Dressing changes  melatonin 3 milliGRAM(s) Oral at bedtime PRN Insomnia      I&O's Summary    24 Dec 2021 07:01  -  25 Dec 2021 07:00  --------------------------------------------------------  IN: 805 mL / OUT: 500 mL / NET: 305 mL    25 Dec 2021 07:01  -  25 Dec 2021 19:03  --------------------------------------------------------  IN: 840 mL / OUT: 350 mL / NET: 490 mL        PHYSICAL EXAM:  Vital Signs Last 24 Hrs  T(C): 37.1 (25 Dec 2021 13:38), Max: 37.1 (25 Dec 2021 13:38)  T(F): 98.8 (25 Dec 2021 13:38), Max: 98.8 (25 Dec 2021 13:38)  HR: 93 (25 Dec 2021 17:20) (74 - 94)  BP: 133/58 (25 Dec 2021 17:20) (133/58 - 145/55)  BP(mean): --  RR: 18 (25 Dec 2021 17:20) (17 - 18)  SpO2: 94% (25 Dec 2021 17:20) (94% - 99%)  CONSTITUTIONAL: NAD, well-developed, well-groomed  EYES:  conjunctiva and sclera clear  ENMT: Moist oral mucosa, no pharyngeal injection or exudates; normal dentition  RESPIRATORY: on 1L, bibasilar crackles worse on the L side, tachypnea improved, bandaid over bx site, no hematoma formation  CARDIOVASCULAR: Regular rate and rhythm, normal S1 and S2, no murmur/rub/gallop; No lower extremity edema; Peripheral pulses are 2+ bilaterally  ABDOMEN: Nontender to palpation, normoactive bowel sounds, no rebound/guarding  PSYCH: A+O to person, place, and time; affect appropriate  SKIN: No rashes; no palpable lesions, inguinal (R) sided wound appears nontender, no exudate, dressed with bandage- per nursing depth of ab 4cm on 12/23, no erythema surrounding the area    LABS:                        8.7    13.72 )-----------( 321      ( 25 Dec 2021 06:51 )             28.2     12-25    132<L>  |  94<L>  |  46<H>  ----------------------------<  65<L>  4.0   |  29  |  1.00    Ca    9.5      25 Dec 2021 06:51  Phos  1.7     12-25  Mg     2.10     12-25    TPro  6.4  /  Alb  3.1<L>  /  TBili  0.4  /  DBili  x   /  AST  8   /  ALT  9   /  AlkPhos  72  12-24

## 2021-12-25 NOTE — CHART NOTE - NSCHARTNOTEFT_GEN_A_CORE
Hypoglycemia 65 on serum this AM, FS 81  Recommend decrease Lantus further to 30 units qhs  continue other doses.  Will follow.    Whitney Carpio MD  Division of Endocrinology  Pager: [phone]    If after 6PM or before 9AM, or on weekends/holidays, please call endocrine answering service for assistance ([phone]).  For nonurgent matters email [email or web address] for assistance.

## 2021-12-25 NOTE — PROGRESS NOTE ADULT - ASSESSMENT
65 yo F w/ PMHx of HTN, HLD, DM, fibromyalgia, Vulvar CA s/p vulva surgery in 2020, completed chemo & RT in 9/21, PMR (on chronic steroids, 10 mg BID), now p/w SOB, dizziness & nausea for the last couple of weeks.  CTPA- No pulmonary embolus is noted. Multiple nodules are noted within both lungs. Primary consideration is metastasis. Findings suggestive of malignant pleural effusions bilaterally, s/p thora this admission by IR 12/15. Now with worsening hypercalcemia. Also on a course of zosyn for inguinal wound (thought to be possible source of fever, but not entirely clear)-- ID recommended 10 day course of augmentin (12/15-12/24). Pulm nodule bx 12/22. Concern for reaccumulation of pleural effusion on CXR 12/20. Weaning o2, periodic IV lasix, last dosed 12/23 without any improvement. Pulm recommended trial of high dose steroids w extended taper (started pred 40 mg PO daily on 12/23). IR and Pulm consulted re recurrent pleural effusion for a repeat tap, however they believe acute hypoxic respiratory failure is more consistent w a CHF exac vs worsening of tumor burden. Notably, patient has not been responding to IV lasix 20 mg doses, BNP on admission was 200s, and TTE was not helpful given poor windows for eval. Her pleural fluid analysis was exudative on 12/15 which is not consistent with a CHF exacerbation. Does not look like CHF exac at this time. Continues to be anemic w Hgb 7.6, therefore gave 1 u PRBC 12/23 per H/O recs. F/u repeat CT chest 12/23, showed worsening atelectasis of the LLL compared to prior. D/w Pulm regarding next steps.     Nausea also an ongoing issue for her--> changed her to scheduled zofran (instead of prn) and her symptoms are improved, bowel regimen of miralax/senna + enema for constipation (likely worsened by hypercal).

## 2021-12-26 NOTE — PROGRESS NOTE ADULT - ASSESSMENT
65 y/o F w/ a PMHx of HTN, HLD, T2DM, fibromyalgia, recurrent vulvar cancer (recently received chemo/RT from 7/2021-9/2021), and anemia (requiring intermittent transfusions recently) who presented with shortness of breath, dizziness, and nausea for multiple weeks, found to have multiple lung nodules and B/L pleural effusions concerning for malignancy, and was admitted to telemetry for further management, s/p thoracentesis on 12/15 (~ 660cc drained). Oncology was consulted for further evaluation.    # Dyspnea  - CTA Chest (12/13): No pulmonary embolus is noted. Multiple nodules are noted within both lungs. Primary consideration is metastasis. Findings suggestive of malignant pleural effusions bilaterally.  - TTE (12/14) showed a hyperdynamic left ventricle  - serial CXR with persistent L pleural effusion, mild to moderate  - may be component of pleural effusions + disease in lung but has not improved s/p thoracentesis (12/15 ~ 660cc drained) or with Lasix,  pRBC transfusion or steroids  - Appreciate Pulmonary evaluation. Would request reevaluation in light of recent CT scan for additional recommendations  - patient still has SOB and is dizzy when she stands; no improvement after blood transfusion    # Recurrent vulvar cancer  - Last recurrence was earlier this year. She received chemo/RT with weekly Cisplatin from 7/12/21-9/1/21.  - Imaging findings on admission are concerning for progressing malignancy  - Pt s/p thoracentesis as noted above with negative cytopathology  - CT A/P with small collection in R inguinal region (likely known R inguinal wound/tract) but does not appear to show other areas of disease that is amenable to biopsy  - s/p IR biopsy of R lung nodule (to establish a diagnosis of metastatic disease and to send for NGS testing if cancer confirmed) 12/22  - will follow up with primary Oncologist Dr. Paulo Carpenter as outpatient     #Hypercalcemia   -This is an oncologic emergency and requires urgent management  - Ca increased to 12.5  - PTH and Vit D low  - f/u PTHrP  - appreciate endo consult  - may be related to hypercalcemia of malignancy  - s/p zoledronic acid (discussed risks/benefits including risk of osteonecrosis of jaw) and calcitonin with normalization of calcium    # Microcytic Anemia  - Occasionally requiring transfusions (last on 12/11)  - Recently underwent a hematologic evaluation as an outpatient. Her workup was notable for a hemoglobin electrophoresis which was c/w beta thalassemia minor. She had a BMBx on 11/9/21 (minimal marrow obtained to evaluate) which showed erythroid hyperplasia, megakaryocytes normal, no evidence of dysplasia.   - Monitor CBC and keep active T+S. Transfuse for Hgb < 7 or if symptomatic  - Primary Hematologist: Dr. Leana Auguste. Continue outpatient follow-up

## 2021-12-26 NOTE — PROGRESS NOTE ADULT - SUBJECTIVE AND OBJECTIVE BOX
[facility] Division of Hospital Medicine  Evelyn Vega MD  Pager (M-F, 8A-5P): [phone]  Other Times:  [phone]      SUBJECTIVE / OVERNIGHT EVENTS: Ongoing issues with SOB, hypoxia. Had large BMs yesterday. Decr PO intake. Willing to try another dose of lasix today, but overall very discouraged.     MEDICATIONS  (STANDING):  amLODIPine   Tablet 10 milliGRAM(s) Oral daily  cholecalciferol 2000 Unit(s) Oral daily  Dakins Solution - 1/4 Strength 1 Application(s) Topical daily  dextrose 40% Gel 15 Gram(s) Oral once  dextrose 5%. 1000 milliLiter(s) (50 mL/Hr) IV Continuous <Continuous>  dextrose 5%. 1000 milliLiter(s) (100 mL/Hr) IV Continuous <Continuous>  dextrose 50% Injectable 25 Gram(s) IV Push once  dextrose 50% Injectable 12.5 Gram(s) IV Push once  dextrose 50% Injectable 25 Gram(s) IV Push once  enalapril 5 milliGRAM(s) Oral daily  escitalopram 30 milliGRAM(s) Oral daily  gabapentin 400 milliGRAM(s) Oral three times a day  glucagon  Injectable 1 milliGRAM(s) IntraMuscular once  heparin   Injectable 5000 Unit(s) SubCutaneous every 8 hours  influenza  Vaccine (HIGH DOSE) 0.7 milliLiter(s) IntraMuscular once  insulin glargine Injectable (LANTUS) 30 Unit(s) SubCutaneous at bedtime  insulin lispro (ADMELOG) corrective regimen sliding scale   SubCutaneous three times a day before meals  insulin lispro (ADMELOG) corrective regimen sliding scale   SubCutaneous at bedtime  insulin lispro Injectable (ADMELOG) 20 Unit(s) SubCutaneous three times a day with meals  levothyroxine 125 MICROGram(s) Oral daily  ondansetron   Disintegrating Tablet 4 milliGRAM(s) Oral three times a day  pantoprazole    Tablet 40 milliGRAM(s) Oral before breakfast  polyethylene glycol 3350 17 Gram(s) Oral daily  predniSONE   Tablet 40 milliGRAM(s) Oral daily  simvastatin 20 milliGRAM(s) Oral at bedtime    MEDICATIONS  (PRN):  acetaminophen     Tablet .. 650 milliGRAM(s) Oral every 6 hours PRN Temp greater or equal to 38C (100.4F), Mild Pain (1 - 3), Moderate Pain (4 - 6)  bisacodyl Suppository 10 milliGRAM(s) Rectal daily PRN Constipation  diphenhydrAMINE 25 milliGRAM(s) Oral daily PRN Rash and/or Itching  lidocaine/prilocaine Cream 1 Application(s) Topical daily PRN Dressing changes  melatonin 3 milliGRAM(s) Oral at bedtime PRN Insomnia      I&O's Summary    25 Dec 2021 07:01  -  26 Dec 2021 07:00  --------------------------------------------------------  IN: 940 mL / OUT: 600 mL / NET: 340 mL    26 Dec 2021 07:01  -  26 Dec 2021 16:37  --------------------------------------------------------  IN: 650 mL / OUT: 0 mL / NET: 650 mL        PHYSICAL EXAM:  Vital Signs Last 24 Hrs  T(C): 37 (26 Dec 2021 13:30), Max: 37 (26 Dec 2021 13:30)  T(F): 98.6 (26 Dec 2021 13:30), Max: 98.6 (26 Dec 2021 13:30)  HR: 87 (26 Dec 2021 13:30) (87 - 93)  BP: 135/68 (26 Dec 2021 13:30) (133/58 - 148/55)  BP(mean): --  RR: 18 (26 Dec 2021 13:30) (17 - 18)  SpO2: 97% (26 Dec 2021 13:30) (94% - 97%)  CONSTITUTIONAL: NAD, well-developed, well-groomed  EYES:  conjunctiva and sclera clear  ENMT: Moist oral mucosa, no pharyngeal injection or exudates; normal dentition  RESPIRATORY: on 3L, bibasilar crackles worse on the L side, tachypnea improved  CARDIOVASCULAR: Regular rate and rhythm, normal S1 and S2, no murmur/rub/gallop; No lower extremity edema; Peripheral pulses are 2+ bilaterally  ABDOMEN: Nontender to palpation, normoactive bowel sounds, no rebound/guarding  PSYCH: A+O to person, place, and time; affect appropriate  SKIN: No rashes; no palpable lesions, inguinal (R) sided wound appears nontender, no exudate, dressed with bandage- per nursing depth of ab 4cm on 12/23, no erythema surrounding the area  LABS:                        8.4    13.35 )-----------( 311      ( 26 Dec 2021 05:50 )             27.8     12-26    133<L>  |  95<L>  |  44<H>  ----------------------------<  107<H>  4.4   |  26  |  0.95    Ca    8.7      26 Dec 2021 05:50  Phos  1.4     12-26  Mg     2.10     12-26            COORDINATION OF CARE:  Care Discussed with Consultants/Other Providers [Y/N]: pulm, heme/onc

## 2021-12-26 NOTE — PROGRESS NOTE ADULT - ASSESSMENT
65 yo F w/ PMHx of HTN, HLD, DM, fibromyalgia, Vulvar CA s/p vulva surgery in 2020, completed chemo & RT in 9/21, PMR (on chronic steroids, 10 mg BID), now p/w SOB, dizziness & nausea for weeks.  CTPA- No pulmonary embolus is noted. Multiple nodules are noted within both lungs. Primary consideration is metastasis. Findings suggestive of malignant pleural effusions bilaterally, s/p thora this admission by IR 12/15. Now with worsening hypercalcemia. Also on a course of zosyn for inguinal wound (thought to be possible source of fever, but not entirely clear)-- ID recommended 10 day course of augmentin (12/15-12/24). Pulm nodule bx 12/22. Concern for reaccumulation of pleural effusion on CXR 12/20. Weaning o2, periodic IV lasix, last dosed 12/23 without any improvement. Pulm recommended trial of high dose steroids w extended taper (started pred 40 mg PO daily on 12/23). IR and Pulm consulted re recurrent pleural effusion for a repeat tap, however they didn't do it. Continues to be anemic w Hgb 7.6, therefore gave 1 u PRBC 12/23 per H/O recs. F/u repeat CT chest 12/23, showed worsening atelectasis of the LLL compared to prior. D/w Pulm regarding next steps.-- rec's chest PT.  Thus far, this hasn't helped much. 12/26 Will try PO lasix 40 mg daily and re-assess Cr daily-- unlikely to help much as she didn't respond to IV lasix.     Nausea also an ongoing issue for her--> changed her to scheduled zofran (instead of prn) and her symptoms are improved, bowel regimen of miralax/senna + enema for constipation (likely worsened by hypercal).

## 2021-12-26 NOTE — PROGRESS NOTE ADULT - SUBJECTIVE AND OBJECTIVE BOX
Hematology Oncology Follow-up    INTERVAL HPI/OVERNIGHT EVENTS:  Still endorsing dizziness and requiring 3L NC O2. States shortness of breath is about the same as last few days, and she has significant HERNANDEZ.    Vital Signs Last 24 Hrs  T(C): 36.8 (26 Dec 2021 05:13), Max: 36.8 (25 Dec 2021 21:46)  T(F): 98.3 (26 Dec 2021 05:13), Max: 98.3 (26 Dec 2021 05:13)  HR: 91 (26 Dec 2021 05:13) (91 - 93)  BP: 145/56 (26 Dec 2021 05:13) (133/58 - 148/55)  BP(mean): --  RR: 17 (26 Dec 2021 05:13) (17 - 18)  SpO2: 97% (26 Dec 2021 05:13) (94% - 97%)          Review of Systems:  General: denies fevers/chills  Respiratory: + shortness of breath  Cardiovascular: denies chest pain, palpitations  Gastrointestinal: denies abdominal pain, constipation, diarrhea, melena, hematochezia  MSK: denies joint pain or muscle pain  Neuro: denies headache, weakness, or paresthesias  Skin: denies rash, petechiae, echymoses  Psych: denies anxiety or sleep disturbances    PHYSICAL EXAM:  Constitutional: NAD  Respiratory: faint crackles L chest, symmetric chest rise, with normal respiratory effort  Cardiovascular: RRR  Gastrointestinal: soft, NTND  Extremities:  no edema  MSK: no obvious abnormalities  Neurological: Grossly intact  Skin: no rash, no echymoses, no petichiae  Psych: normal affect    MEDICATIONS  (STANDING):  amLODIPine   Tablet 10 milliGRAM(s) Oral daily  cholecalciferol 2000 Unit(s) Oral daily  Dakins Solution - 1/4 Strength 1 Application(s) Topical daily  dextrose 40% Gel 15 Gram(s) Oral once  dextrose 5%. 1000 milliLiter(s) (50 mL/Hr) IV Continuous <Continuous>  dextrose 5%. 1000 milliLiter(s) (100 mL/Hr) IV Continuous <Continuous>  dextrose 50% Injectable 25 Gram(s) IV Push once  dextrose 50% Injectable 12.5 Gram(s) IV Push once  dextrose 50% Injectable 25 Gram(s) IV Push once  enalapril 5 milliGRAM(s) Oral daily  escitalopram 30 milliGRAM(s) Oral daily  gabapentin 400 milliGRAM(s) Oral three times a day  glucagon  Injectable 1 milliGRAM(s) IntraMuscular once  heparin   Injectable 5000 Unit(s) SubCutaneous every 8 hours  influenza  Vaccine (HIGH DOSE) 0.7 milliLiter(s) IntraMuscular once  insulin glargine Injectable (LANTUS) 30 Unit(s) SubCutaneous at bedtime  insulin lispro (ADMELOG) corrective regimen sliding scale   SubCutaneous three times a day before meals  insulin lispro (ADMELOG) corrective regimen sliding scale   SubCutaneous at bedtime  insulin lispro Injectable (ADMELOG) 20 Unit(s) SubCutaneous three times a day with meals  levothyroxine 125 MICROGram(s) Oral daily  ondansetron   Disintegrating Tablet 4 milliGRAM(s) Oral three times a day  pantoprazole    Tablet 40 milliGRAM(s) Oral before breakfast  polyethylene glycol 3350 17 Gram(s) Oral daily  predniSONE   Tablet 40 milliGRAM(s) Oral daily  simvastatin 20 milliGRAM(s) Oral at bedtime    MEDICATIONS  (PRN):  acetaminophen     Tablet .. 650 milliGRAM(s) Oral every 6 hours PRN Temp greater or equal to 38C (100.4F), Mild Pain (1 - 3), Moderate Pain (4 - 6)  bisacodyl Suppository 10 milliGRAM(s) Rectal daily PRN Constipation  diphenhydrAMINE 25 milliGRAM(s) Oral daily PRN Rash and/or Itching  lidocaine/prilocaine Cream 1 Application(s) Topical daily PRN Dressing changes  melatonin 3 milliGRAM(s) Oral at bedtime PRN Insomnia      No Known Allergies    LABS:                        8.4    13.35 )-----------( 311      ( 26 Dec 2021 05:50 )             27.8       12-26    133<L>  |  95<L>  |  44<H>  ----------------------------<  107<H>  4.4   |  26  |  0.95    Ca    8.7      26 Dec 2021 05:50  Phos  1.4     12-26  Mg     2.10     12-26      RADIOLOGY & ADDITIONAL TESTS:    ACC: [id number] EXAM:  CT CHEST                          PROCEDURE DATE:  12/24/2021      INTERPRETATION:  .  ACC: [id number]  INDICATION: Shortness of breath, recurrent pleural effusions, history of   vulvar cancer  TECHNIQUE: Unenhanced CT of the chest. Coronal, sagittal, and MIP images   were reconstructed and reviewed.  COMPARISON: Chest CT 12/13/2021    FINDINGS:    AIRWAYS, LUNGS and PLEURA: Patent central airways. Stable trace right and   small-moderate loculated left pleural effusions. Underlying pleural   pleural/fissural thickening and nodularity suggestive of metastatic   disease. Near complete atelectasis of left lower lobe is new from prior.   3 cm spiculated mass within right lower lobe is slightly enlarged;   previously 2.6 cm. The other smaller spiculated nodules are unchanged.   Interlobular septal thickening at the left base. No pneumothorax.    MEDIASTINUM AND ALTON: Multiple borderline and mildly enlarged mediastinal   lymph nodes are slightly increased for example 1.7 cm right lower   paratracheal lymph node previously 1 cm.    HEART AND VESSELS: Heart size is normal. Coronary calcifications. Low   attenuation of blood pool representing anemia. No pericardial effusion.   Thoracic aorta and pulmonary artery normalin diameter.    VISUALIZED UPPER ABDOMEN: Cholecystectomy.    CHEST WALL AND BONES: No aggressive osseous lesion.    LOWER NECK: Within normal limits.    IMPRESSION:    In comparison with 12/13/2021,    Stable trace right and small-moderate loculated left pleural effusions.    Near complete atelectasis of left lower lobe is new from prior.    3 cm spiculated mass within right lower lobe is slightly enlarged;   previously 2.6 cm. The other smaller spiculated metastatic nodules are   unchanged. Mildly enlarged mediastinal lymph nodes slightly increased   from prior.      TRACE KAPLAN MD; Attending Radiologist  This document has been electronically signed. Dec 24 2021 11:02AM

## 2021-12-27 NOTE — PROGRESS NOTE ADULT - ASSESSMENT
67 yo F with HTN, HLD, type 2DM, hypothyroidism, fibromyalgia, vulvar cancer s/p surgery 2020, RT/chemo 2021, anemia presenting with HERNANDEZ and dizziness with imaging concerning for metastatic disease and pleural effusions bilaterally. Endocrine was consulted for T2DM management.    1. Type 2 Diabetes Mellitus c/b steroid induced hyperglycemia  A1c 7.7%; goal less than 7%  Home Regimen: Tresiba 60 units QHS, Novolog 15-30 units with meals (ISF 1:30, but usually just estimates), Victoza 1.8 mg daily    While inpatient:   BG target 100-180 mg/dl, hypoglycemia this AM  Increase Lantus to 34 units SQ qHS  Increase Admelog to 22 units SQ TID before meals - please HOLD if she is not eating  Continue Admelog MODERATE dose correctional scale before meals, moderate dose at bedtime   Please check FSG before meals and QHS  Consistent carbohydrate diet - appreciate RD input, liberalized diet (eliminated sodium restriction, added Glucerna per RD recommendation)  Hypoglycemia protocol     Discharge planning for DM: Resume basal / bolus insulin (dosing TBD), schedule follow up with prior outpatient endocrine provider     2. Chronic steroid use, steroid induced hyperglycemia   On Prednisone 10 mg BID for at least two months, now Prednisone 40 mg daily  Insulin dosing as above   Pt cannot stop steroids abruptly as she will have secondary adrenal insufficiency   Patient to follow up in 1/2022 with her doctors with steroid taper  If any hemodynamic instability, please order IV hydrocortisone 50 mg q6h for stress steroids    3. Hypothyroidism  12-16 @ 06:47 TSH 11.94 FreeT4 0.9   Levels borderline, would continue with current LT4 dosing for now and followup as outpatient, repeat TFTs in 6-8 weeks  Continue Levothyroxine 125 mcg PO daily, take daily on empty stomach    4. Hypercalcemia   PTH 7  Likely hypercalcemia of malignancy  Corrected calcium 9.02 today improved  S/p Zometa 4 mg IV (given on 12/20/21, will take about 72h for full effect)  S/p Calcitonin 310 IU SQ q12h x 4 doses (completed 12/22)   Vit D 25-OH LOW at 12.2, recommend supplement (see below)  PTH- suppressed    Followup PTHrp    5. Vitamin D deficiency  Vitamin D 25-OH 12.2  Recommend supplement with Cholecalciferol 2000 units daily    6. Hypertension  Goal BP <130/80  Management as per primary team    7. Hyperlipidemia  Continue Simvastatin 20 mg daily  Followup lipid panel annually as outpatient    Whitney Carpio MD  Division of Endocrinology  Pager: [phone]    If after 6PM or before 9AM, or on weekends/holidays, please call endocrine answering service for assistance ([phone]).  For nonurgent matters email [email or web address] for assistance.

## 2021-12-27 NOTE — PROGRESS NOTE ADULT - SUBJECTIVE AND OBJECTIVE BOX
[facility] Division of Hospital Medicine  Dr. Amanda Bradley  Pager (M-F, 8A-5P): [phone]  Other Times:  [phone]      SUBJECTIVE / OVERNIGHT EVENTS: still with SOB. Feels worse. Discussed DNR/DNI - will think about it. Tearful during interview.     MEDICATIONS  (STANDING):  amLODIPine   Tablet 10 milliGRAM(s) Oral daily  cholecalciferol 2000 Unit(s) Oral daily  Dakins Solution - 1/4 Strength 1 Application(s) Topical daily  dextrose 40% Gel 15 Gram(s) Oral once  dextrose 5%. 1000 milliLiter(s) (50 mL/Hr) IV Continuous <Continuous>  dextrose 5%. 1000 milliLiter(s) (100 mL/Hr) IV Continuous <Continuous>  dextrose 50% Injectable 25 Gram(s) IV Push once  dextrose 50% Injectable 12.5 Gram(s) IV Push once  dextrose 50% Injectable 25 Gram(s) IV Push once  enalapril 5 milliGRAM(s) Oral daily  escitalopram 30 milliGRAM(s) Oral daily  gabapentin 400 milliGRAM(s) Oral three times a day  glucagon  Injectable 1 milliGRAM(s) IntraMuscular once  heparin   Injectable 5000 Unit(s) SubCutaneous every 8 hours  influenza  Vaccine (HIGH DOSE) 0.7 milliLiter(s) IntraMuscular once  insulin glargine Injectable (LANTUS) 30 Unit(s) SubCutaneous at bedtime  insulin lispro (ADMELOG) corrective regimen sliding scale   SubCutaneous three times a day before meals  insulin lispro (ADMELOG) corrective regimen sliding scale   SubCutaneous at bedtime  insulin lispro Injectable (ADMELOG) 20 Unit(s) SubCutaneous three times a day with meals  levothyroxine 125 MICROGram(s) Oral daily  ondansetron   Disintegrating Tablet 4 milliGRAM(s) Oral three times a day  pantoprazole    Tablet 40 milliGRAM(s) Oral before breakfast  polyethylene glycol 3350 17 Gram(s) Oral daily  predniSONE   Tablet 40 milliGRAM(s) Oral daily  simvastatin 20 milliGRAM(s) Oral at bedtime    MEDICATIONS  (PRN):  acetaminophen     Tablet .. 650 milliGRAM(s) Oral every 6 hours PRN Temp greater or equal to 38C (100.4F), Mild Pain (1 - 3), Moderate Pain (4 - 6)  bisacodyl Suppository 10 milliGRAM(s) Rectal daily PRN Constipation  diphenhydrAMINE 25 milliGRAM(s) Oral daily PRN Rash and/or Itching  lidocaine/prilocaine Cream 1 Application(s) Topical daily PRN Dressing changes  melatonin 3 milliGRAM(s) Oral at bedtime PRN Insomnia      I&O's Summary    26 Dec 2021 07:01  -  27 Dec 2021 07:00  --------------------------------------------------------  IN: 1650 mL / OUT: 500 mL / NET: 1150 mL      PHYSICAL EXAM:  Vital Signs Last 24 Hrs  T(C): 36.9 (27 Dec 2021 14:10), Max: 37 (27 Dec 2021 05:00)  T(F): 98.4 (27 Dec 2021 14:10), Max: 98.6 (27 Dec 2021 05:00)  HR: 99 (27 Dec 2021 14:10) (84 - 99)  BP: 121/55 (27 Dec 2021 14:10) (121/55 - 134/65)  BP(mean): --  RR: 18 (27 Dec 2021 14:10) (17 - 18)  SpO2: 97% (27 Dec 2021 14:10) (90% - 98%)  CONSTITUTIONAL: NAD, well-developed, well-groomed  EYES:  conjunctiva and sclera clear  ENMT: Moist oral mucosa, no pharyngeal injection or exudates; normal dentition  RESPIRATORY: on 3L, bibasilar crackles worse on the L side, tachypnea improved  CARDIOVASCULAR: Regular rate and rhythm, normal S1 and S2, no murmur/rub/gallop; No lower extremity edema; Peripheral pulses are 2+ bilaterally  ABDOMEN: Nontender to palpation, normoactive bowel sounds, no rebound/guarding  PSYCH: A+O to person, place, and time; affect appropriate  SKIN: No rashes; no palpable lesions, inguinal (R) sided wound appears nontender, no exudate, dressed with bandage- per nursing depth of ab 4cm on 12/23, no erythema surrounding the area  LABS:                                   8.5    14.36 )-----------( 293      ( 27 Dec 2021 05:45 )             27.4   12-27    135  |  97<L>  |  40<H>  ----------------------------<  139<H>  4.6   |  27  |  0.94    Ca    8.3<L>      27 Dec 2021 05:45  Phos  1.4     12-27  Mg     2.10     12-27          COORDINATION OF CARE:  Care Discussed with Consultants/Other Providers [Y/N]: pulm, heme/onc

## 2021-12-27 NOTE — PROGRESS NOTE ADULT - SUBJECTIVE AND OBJECTIVE BOX
Chief Complaint: DM2    History: rports fair appetite, denies n/v. denies s/s of hypoglycemia      MEDICATIONS  (STANDING):  amLODIPine   Tablet 10 milliGRAM(s) Oral daily  cholecalciferol 2000 Unit(s) Oral daily  Dakins Solution - 1/4 Strength 1 Application(s) Topical daily  dextrose 40% Gel 15 Gram(s) Oral once  dextrose 5%. 1000 milliLiter(s) (50 mL/Hr) IV Continuous <Continuous>  dextrose 5%. 1000 milliLiter(s) (100 mL/Hr) IV Continuous <Continuous>  dextrose 50% Injectable 25 Gram(s) IV Push once  dextrose 50% Injectable 12.5 Gram(s) IV Push once  dextrose 50% Injectable 25 Gram(s) IV Push once  enalapril 5 milliGRAM(s) Oral daily  escitalopram 30 milliGRAM(s) Oral daily  gabapentin 400 milliGRAM(s) Oral three times a day  glucagon  Injectable 1 milliGRAM(s) IntraMuscular once  heparin   Injectable 5000 Unit(s) SubCutaneous every 8 hours  influenza  Vaccine (HIGH DOSE) 0.7 milliLiter(s) IntraMuscular once  insulin glargine Injectable (LANTUS) 40 Unit(s) SubCutaneous at bedtime  insulin lispro (ADMELOG) corrective regimen sliding scale   SubCutaneous at bedtime  insulin lispro (ADMELOG) corrective regimen sliding scale   SubCutaneous three times a day before meals  insulin lispro Injectable (ADMELOG) 20 Unit(s) SubCutaneous three times a day with meals  levothyroxine 125 MICROGram(s) Oral daily  ondansetron   Disintegrating Tablet 4 milliGRAM(s) Oral three times a day  pantoprazole    Tablet 40 milliGRAM(s) Oral before breakfast  polyethylene glycol 3350 17 Gram(s) Oral daily  predniSONE   Tablet 40 milliGRAM(s) Oral daily  senna 2 Tablet(s) Oral at bedtime  simvastatin 20 milliGRAM(s) Oral at bedtime    MEDICATIONS  (PRN):  acetaminophen     Tablet .. 650 milliGRAM(s) Oral every 6 hours PRN Temp greater or equal to 38C (100.4F), Mild Pain (1 - 3), Moderate Pain (4 - 6)  bisacodyl Suppository 10 milliGRAM(s) Rectal daily PRN Constipation  diphenhydrAMINE 25 milliGRAM(s) Oral daily PRN Rash and/or Itching  lidocaine/prilocaine Cream 1 Application(s) Topical daily PRN Dressing changes  melatonin 3 milliGRAM(s) Oral at bedtime PRN Insomnia      Allergies    No Known Allergies    Intolerances      Review of Systems:    ALL OTHER SYSTEMS REVIEWED AND NEGATIVE    PHYSICAL EXAM:  Vital Signs Last 24 Hrs  T(C): 36.9 (27 Dec 2021 14:10), Max: 37 (27 Dec 2021 05:00)  T(F): 98.4 (27 Dec 2021 14:10), Max: 98.6 (27 Dec 2021 05:00)  HR: 97 (27 Dec 2021 15:42) (84 - 99)  BP: 121/55 (27 Dec 2021 14:10) (121/55 - 134/65)  BP(mean): --  RR: 18 (27 Dec 2021 14:10) (17 - 18)  SpO2: 94% (27 Dec 2021 15:42) (90% - 98%)  GENERAL: NAD, well-groomed, well-developed  EYES: No proptosis, no lid lag, anicteric  HEENT:  Atraumatic, Normocephalic, moist mucous membranes  RESPIRATORY: nonlabored respirations, no wheezing  PSYCH: Alert and oriented x 3, normal affect, normal mood    CAPILLARY BLOOD GLUCOSE    POCT Blood Glucose.: 274 mg/dL (27 Dec 2021 17:09)  POCT Blood Glucose.: 250 mg/dL (27 Dec 2021 11:26)  POCT Blood Glucose.: 205 mg/dL (27 Dec 2021 07:32)  POCT Blood Glucose.: 202 mg/dL (26 Dec 2021 20:43)  POCT Blood Glucose.: 226 mg/dL (24 Dec 2021 11:19)  POCT Blood Glucose.: 74 mg/dL (24 Dec 2021 07:29)  POCT Blood Glucose.: 190 mg/dL (23 Dec 2021 21:29)  POCT Blood Glucose.: 201 mg/dL (23 Dec 2021 16:57)      12-27    135  |  97<L>  |  40<H>  ----------------------------<  139<H>  4.6   |  27  |  0.94    Ca    8.3<L>      27 Dec 2021 05:45  Phos  1.4     12-27  Mg     2.10     12-27          A1C with Estimated Average Glucose Result: 7.7 % (12-14-21 @ 08:15)  A1C with Estimated Average Glucose Result: 8.2 % (09-04-21 @ 23:11)  A1C with Estimated Average Glucose Result: 7.5 % (09-04-21 @ 09:13)  A1C with Estimated Average Glucose Result: 7.7 % (03-25-21 @ 15:08)      Thyroid Function Tests:  12-16 @ 06:47 TSH 11.94 FreeT4 0.9 T3 -- Anti TPO -- Anti Thyroglobulin Ab -- TSI --  12-14 @ 08:15 TSH 10.03 FreeT4 0.8 T3 -- Anti TPO -- Anti Thyroglobulin Ab -- TSI --

## 2021-12-27 NOTE — PROGRESS NOTE ADULT - ASSESSMENT
67 yo F with HTN, HLD, DM, fibromyalgia, Vulvar CA s/p vulva surgery in 2020, completed chemo & RT in 9/21 admitted for shortness of breath, found to have bilateral pleural effusions, L>R and pulmonary nodules, pulmonary consulted for pulmonary nodules.    RECS:  - persistent o2 require of 3L NC  - patient remains afebrile, without significant leukocytosis  - recommend repeat formal echo as recent TTE with limited results and bedside echo with limited windows.   - s/p IR guided biopsy on 12/23, pathology pending  - s/p diagnostic and therapeutic thoracentesis by IR 12/15, exudative   - Cytopathology from pleural fluid was negative for malignant cells   - patient's worsening SOB and hypoxia could reflect increased tumor burden/progression of disease  - May consider IV steroids in brent of PO Prednisone, but will discuss with attending   - would recommend palliative care consult as patient's overall condition suggestive of progression of malignancy and general decompensation, assess GOC regarding preferences for intubation/ressuscitation as patient currently remains full code    Sam Anders MD   Pulmonary/Critical Care Fellow PGY-6   [facility] Pager #: [phone]  Spectra #: [phone] 65 yo F with HTN, HLD, DM, fibromyalgia, Vulvar CA s/p vulva surgery in 2020, completed chemo & RT in 9/21 admitted for shortness of breath, found to have bilateral pleural effusions, L>R and pulmonary nodules, pulmonary consulted for pulmonary nodules.    RECS:  - persistent o2 require of 3L NC  - patient remains afebrile, without significant leukocytosis  - recommend repeat formal echo as recent TTE with limited results and bedside echo with limited windows.   - s/p IR guided biopsy on 12/23, pathology pending  - s/p diagnostic and therapeutic thoracentesis by IR 12/15, exudative   - Cytopathology from pleural fluid was negative for malignant cells   - patient's worsening SOB and hypoxia could reflect increased tumor burden/progression of disease  - D/c prednisone, start solumedrol 40mg IV BID   - start Bactrim for PCP PPX. Bactrim DS  3 times a week   - Start morphine 2mg IV q4-6h PRN for Dyspnea and Severe pain   - Discussed Code status with patient and her daughter patient is a full code     Sam Anders MD   Pulmonary/Critical Care Fellow PGY-6   [facility] Pager #: [phone]  Spectra #: [phone]

## 2021-12-27 NOTE — PROGRESS NOTE ADULT - ASSESSMENT
65 y/o F w/ a PMHx of HTN, HLD, T2DM, fibromyalgia, recurrent vulvar cancer (recently received chemo/RT from 7/2021-9/2021), and anemia (requiring intermittent transfusions recently) who presented with shortness of breath, dizziness, and nausea for multiple weeks, found to have multiple lung nodules and B/L pleural effusions concerning for malignancy, and was admitted to telemetry for further management, s/p thoracentesis on 12/15 (~ 660cc drained). Oncology was consulted for further evaluation.    # Dyspnea  - CTA Chest (12/13): No pulmonary embolus is noted. Multiple nodules are noted within both lungs. Primary consideration is metastasis. Findings suggestive of malignant pleural effusions bilaterally.  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   - bactrim for PCP ppx  - morphine IV PRN for dypsnea  - would repeat CXR to see if worsening effusion that can be drained  - f/u path from lung nodule biopsy 12/22  - patient still has SOB and is dizzy when she stands; no improvement after blood transfusion    # Recurrent vulvar cancer  - Last recurrence was earlier this year. She received chemo/RT with weekly Cisplatin from 7/12/21-9/1/21.  - Imaging findings on admission are concerning for progressing malignancy  - Pt s/p thoracentesis as noted above with negative cytopathology  - CT A/P with small collection in R inguinal region (likely known R inguinal wound/tract) but does not appear to show other areas of disease that is amenable to biopsy  - s/p IR biopsy of R lung nodule (to establish a diagnosis of metastatic disease and to send for NGS testing if cancer confirmed) 12/22  - will follow up with primary Oncologist Dr. Paulo Carpenter as outpatient     #Hypercalcemia   -This is an oncologic emergency and requires urgent management  - Ca increased to 12.5  - PTH and Vit D low  - f/u PTHrP  - appreciate endo consult  - may be related to hypercalcemia of malignancy  - s/p zoledronic acid (discussed risks/benefits including risk of osteonecrosis of jaw) and calcitonin with normalization of calcium    # Microcytic Anemia  - Occasionally requiring transfusions (last on 12/11)  - Recently underwent a hematologic evaluation as an outpatient. Her workup was notable for a hemoglobin electrophoresis which was c/w beta thalassemia minor. She had a BMBx on 11/9/21 (minimal marrow obtained to evaluate) which showed erythroid hyperplasia, megakaryocytes normal, no evidence of dysplasia.   - Monitor CBC and keep active T+S. Transfuse for Hgb < 7 or if symptomatic  - Primary Hematologist: Dr. Leana Auguste. Continue outpatient follow-up

## 2021-12-27 NOTE — PROGRESS NOTE ADULT - PROBLEM SELECTOR PLAN 2
-daily Ca and albumin  -could be 2/2 hypercalcemia of malignancy;  low PTH,  lowvitamin D 25, 1, 25, and PTHRP still pending  -will d/c triemterene/hctz given these drugs can worsen hyperCa  - F/u w Endo  - Calcitonin q 12 hrs x 4 doses, Zometa 12/20  - Replete vit D

## 2021-12-27 NOTE — PROGRESS NOTE ADULT - SUBJECTIVE AND OBJECTIVE BOX
CHIEF COMPLAINT:Patient is a 66y old  Female who presents with a chief complaint of SOB dizziness & nausea x couple of weeks (26 Dec 2021 16:37)      Interval Events: Patient states she still feels miserable and her breathing has not improved. Also complaining of a right sided chest pain as well that is constant.     REVIEW OF SYSTEMS:  [x] All other systems negative  [ ] Unable to assess ROS because ________    OBJECTIVE:  ICU Vital Signs Last 24 Hrs  T(C): 37 (27 Dec 2021 05:00), Max: 37 (26 Dec 2021 13:30)  T(F): 98.6 (27 Dec 2021 05:00), Max: 98.6 (26 Dec 2021 13:30)  HR: 90 (27 Dec 2021 05:00) (84 - 90)  BP: 127/58 (27 Dec 2021 05:00) (127/58 - 135/68)  BP(mean): --  ABP: --  ABP(mean): --  RR: 18 (27 Dec 2021 05:00) (18 - 18)  SpO2: 95% (27 Dec 2021 05:00) (95% - 98%)        12-26 @ 07:01  -  12-27 @ 07:00  --------------------------------------------------------  IN: 1650 mL / OUT: 500 mL / NET: 1150 mL        PHYSICAL EXAM:  GENERAL: NAD  EYES: EOMI, PERRLA, conjunctiva and sclera clear  ENMT: No tonsillar erythema, exudates, or enlargement; Moist mucous membranes, Good dentition, No lesions  CHEST/LUNG: Bilateral crackles. Decreased BS on the left side   HEART: Regular rate and rhythm; No murmurs, rubs, or gallops  ABDOMEN: Soft, Nontender, Nondistended; Bowel sounds present  VASCULAR:  2+ Peripheral Pulses, No clubbing, cyanosis, or edema  LYMPH: No lymphadenopathy noted  SKIN: No rashes or lesions  NERVOUS SYSTEM:  Alert & Oriented X3, Good concentration    HOSPITAL MEDICATIONS:  MEDICATIONS  (STANDING):  amLODIPine   Tablet 10 milliGRAM(s) Oral daily  cholecalciferol 2000 Unit(s) Oral daily  Dakins Solution - 1/4 Strength 1 Application(s) Topical daily  dextrose 40% Gel 15 Gram(s) Oral once  dextrose 5%. 1000 milliLiter(s) (50 mL/Hr) IV Continuous <Continuous>  dextrose 5%. 1000 milliLiter(s) (100 mL/Hr) IV Continuous <Continuous>  dextrose 50% Injectable 25 Gram(s) IV Push once  dextrose 50% Injectable 12.5 Gram(s) IV Push once  dextrose 50% Injectable 25 Gram(s) IV Push once  enalapril 5 milliGRAM(s) Oral daily  escitalopram 30 milliGRAM(s) Oral daily  furosemide    Tablet 40 milliGRAM(s) Oral daily  gabapentin 400 milliGRAM(s) Oral three times a day  glucagon  Injectable 1 milliGRAM(s) IntraMuscular once  heparin   Injectable 5000 Unit(s) SubCutaneous every 8 hours  influenza  Vaccine (HIGH DOSE) 0.7 milliLiter(s) IntraMuscular once  insulin glargine Injectable (LANTUS) 30 Unit(s) SubCutaneous at bedtime  insulin lispro (ADMELOG) corrective regimen sliding scale   SubCutaneous three times a day before meals  insulin lispro (ADMELOG) corrective regimen sliding scale   SubCutaneous at bedtime  insulin lispro Injectable (ADMELOG) 20 Unit(s) SubCutaneous three times a day with meals  levothyroxine 125 MICROGram(s) Oral daily  ondansetron   Disintegrating Tablet 4 milliGRAM(s) Oral three times a day  pantoprazole    Tablet 40 milliGRAM(s) Oral before breakfast  polyethylene glycol 3350 17 Gram(s) Oral daily  potassium phosphate / sodium phosphate Powder (PHOS-NaK) 1 Packet(s) Oral two times a day  predniSONE   Tablet 40 milliGRAM(s) Oral daily  simvastatin 20 milliGRAM(s) Oral at bedtime    MEDICATIONS  (PRN):  acetaminophen     Tablet .. 650 milliGRAM(s) Oral every 6 hours PRN Temp greater or equal to 38C (100.4F), Mild Pain (1 - 3), Moderate Pain (4 - 6)  bisacodyl Suppository 10 milliGRAM(s) Rectal daily PRN Constipation  diphenhydrAMINE 25 milliGRAM(s) Oral daily PRN Rash and/or Itching  lidocaine/prilocaine Cream 1 Application(s) Topical daily PRN Dressing changes  melatonin 3 milliGRAM(s) Oral at bedtime PRN Insomnia      LABS:    The Labs were reviewed by me   The Radiology was reviewed by me    EKG tracing reviewed by me    12-27    135  |  97<L>  |  40<H>  ----------------------------<  139<H>  4.6   |  27  |  0.94  12-26    133<L>  |  95<L>  |  44<H>  ----------------------------<  107<H>  4.4   |  26  |  0.95  12-25    132<L>  |  94<L>  |  46<H>  ----------------------------<  65<L>  4.0   |  29  |  1.00    Ca    8.3<L>      27 Dec 2021 05:45  Ca    8.7      26 Dec 2021 05:50  Ca    9.5      25 Dec 2021 06:51  Phos  1.4     12-27  Mg     2.10     12-27      Magnesium, Serum: 2.10 mg/dL (12-27-21 @ 05:45)  Magnesium, Serum: 2.10 mg/dL (12-26-21 @ 05:50)  Magnesium, Serum: 2.10 mg/dL (12-25-21 @ 06:51)    Phosphorus Level, Serum: 1.4 mg/dL (12-27-21 @ 05:45)  Phosphorus Level, Serum: 1.4 mg/dL (12-26-21 @ 05:50)  Phosphorus Level, Serum: 1.7 mg/dL (12-25-21 @ 06:51)                                              8.5    14.36 )-----------( 293      ( 27 Dec 2021 05:45 )             27.4                         8.4    13.35 )-----------( 311      ( 26 Dec 2021 05:50 )             27.8                         8.7    13.72 )-----------( 321      ( 25 Dec 2021 06:51 )             28.2     CAPILLARY BLOOD GLUCOSE      POCT Blood Glucose.: 205 mg/dL (27 Dec 2021 07:32)  POCT Blood Glucose.: 202 mg/dL (26 Dec 2021 20:43)  POCT Blood Glucose.: 218 mg/dL (26 Dec 2021 17:43)  POCT Blood Glucose.: 262 mg/dL (26 Dec 2021 12:34)        MICROBIOLOGY:     RADIOLOGY:  [x ] Reviewed and interpreted by me    < from: CT Chest No Cont (12.24.21 @ 10:52) >  IMPRESSION:    In comparison with 12/13/2021,    Stable trace right and small-moderate loculatedleft pleural effusions.    Near complete atelectasis of left lower lobe is new from prior.    3 cm spiculated mass within right lower lobe is slightly enlarged;   previously 2.6 cm. The other smaller spiculated metastatic nodules are   unchanged. Mildly enlarged mediastinal lymph nodes slightly increased   from prior.    < end of copied text >      Point of Care Ultrasound Findings:        PFT:    EKG:

## 2021-12-27 NOTE — PROGRESS NOTE ADULT - SUBJECTIVE AND OBJECTIVE BOX
Hematology Oncology Follow-up    INTERVAL HPI/OVERNIGHT EVENTS:  Appears to be in more respiratory distress today.     VITAL SIGNS:  T(F): 98.4 (12-27-21 @ 14:10)  HR: 99 (12-27-21 @ 14:10)  BP: 121/55 (12-27-21 @ 14:10)  RR: 18 (12-27-21 @ 14:10)  SpO2: 97% (12-27-21 @ 14:10)  Wt(kg): --    12-26-21 @ 07:01  -  12-27-21 @ 07:00  --------------------------------------------------------  IN: 1650 mL / OUT: 500 mL / NET: 1150 mL          Review of Systems:  General: denies fevers/chills  Respiratory: + shortness of breath  Cardiovascular: denies chest pain, palpitations  Gastrointestinal: denies nausea, vomiting, abdominal pain, constipation, diarrhea, melena, hematochezia  MSK: denies joint pain or muscle pain  Neuro: denies headache, weakness, or parasthesias  Skin: denies rash, petichiae, echymoses  Psych: denies anxiety or sleep disturbances    PHYSICAL EXAM:  Constitutional: mod distress  Respiratory: crackles bilateral lower lung bases  Cardiovascular: RRR  Gastrointestinal: soft, NTND  Extremities:  no edema  MSK: no obvious abnormalities  Neurological: Grossly intact  Skin: no rash, no echymoses, no petichiae  Psych: normal affect    MEDICATIONS  (STANDING):  albuterol/ipratropium for Nebulization 3 milliLiter(s) Nebulizer every 6 hours  amLODIPine   Tablet 10 milliGRAM(s) Oral daily  buDESOnide    Inhalation Suspension 0.5 milliGRAM(s) Inhalation two times a day  cholecalciferol 2000 Unit(s) Oral daily  Dakins Solution - 1/4 Strength 1 Application(s) Topical daily  dextrose 40% Gel 15 Gram(s) Oral once  dextrose 5%. 1000 milliLiter(s) (50 mL/Hr) IV Continuous <Continuous>  dextrose 5%. 1000 milliLiter(s) (100 mL/Hr) IV Continuous <Continuous>  dextrose 50% Injectable 25 Gram(s) IV Push once  dextrose 50% Injectable 12.5 Gram(s) IV Push once  dextrose 50% Injectable 25 Gram(s) IV Push once  enalapril 5 milliGRAM(s) Oral daily  escitalopram 30 milliGRAM(s) Oral daily  furosemide    Tablet 40 milliGRAM(s) Oral daily  gabapentin 400 milliGRAM(s) Oral three times a day  glucagon  Injectable 1 milliGRAM(s) IntraMuscular once  heparin   Injectable 5000 Unit(s) SubCutaneous every 8 hours  influenza  Vaccine (HIGH DOSE) 0.7 milliLiter(s) IntraMuscular once  insulin glargine Injectable (LANTUS) 34 Unit(s) SubCutaneous at bedtime  insulin lispro (ADMELOG) corrective regimen sliding scale   SubCutaneous at bedtime  insulin lispro (ADMELOG) corrective regimen sliding scale   SubCutaneous three times a day before meals  insulin lispro Injectable (ADMELOG) 22 Unit(s) SubCutaneous three times a day with meals  levothyroxine 125 MICROGram(s) Oral daily  methylPREDNISolone sodium succinate Injectable 40 milliGRAM(s) IV Push two times a day  ondansetron   Disintegrating Tablet 4 milliGRAM(s) Oral three times a day  pantoprazole    Tablet 40 milliGRAM(s) Oral before breakfast  polyethylene glycol 3350 17 Gram(s) Oral daily  potassium phosphate / sodium phosphate Powder (PHOS-NaK) 1 Packet(s) Oral two times a day  simvastatin 20 milliGRAM(s) Oral at bedtime  trimethoprim  160 mG/sulfamethoxazole 800 mG 1 Tablet(s) Oral <User Schedule>    MEDICATIONS  (PRN):  acetaminophen     Tablet .. 650 milliGRAM(s) Oral every 6 hours PRN Temp greater or equal to 38C (100.4F), Mild Pain (1 - 3), Moderate Pain (4 - 6)  bisacodyl Suppository 10 milliGRAM(s) Rectal daily PRN Constipation  diphenhydrAMINE 25 milliGRAM(s) Oral daily PRN Rash and/or Itching  lidocaine/prilocaine Cream 1 Application(s) Topical daily PRN Dressing changes  melatonin 3 milliGRAM(s) Oral at bedtime PRN Insomnia  morphine  - Injectable 2 milliGRAM(s) IV Push every 6 hours PRN pain or dyspnea  morphine  - Injectable 2 milliGRAM(s) IV Push every 4 hours PRN Moderate Pain (4 - 6)      No Known Allergies      LABS:                        8.5    14.36 )-----------( 293      ( 27 Dec 2021 05:45 )             27.4     12-27    135  |  97<L>  |  40<H>  ----------------------------<  139<H>  4.6   |  27  |  0.94    Ca    8.3<L>      27 Dec 2021 05:45  Phos  1.4     12-27  Mg     2.10     12-27                       RADIOLOGY & ADDITIONAL TESTS:  Studies reviewed.

## 2021-12-27 NOTE — PROGRESS NOTE ADULT - PROBLEM SELECTOR PLAN 1
# Acute hypoxic respiratory failure  -CT without pulmonary embolus. Multiple nodules are noted within both lungs. Primary consideration is   metastasis. Findings suggestive of malignant pleural effusions bilaterally.  -s/p Thoracentesis on 12/15 by IR, drained appr 660cc pleural fluid, exudative, path negative  - Pulm consult appreciated - steroids switched to methylprednisolone today.   -f/u echo  -duonebs and budesonide nebs standing

## 2021-12-27 NOTE — PROGRESS NOTE ADULT - ASSESSMENT
67 yo F w/ PMHx of HTN, HLD, DM, fibromyalgia, Vulvar CA s/p vulva surgery in 2020, completed chemo & RT in 9/21, PMR (on chronic steroids, 10 mg BID), now p/w SOB, dizziness & nausea for weeks.  CTPA- No pulmonary embolus is noted. Multiple nodules are noted within both lungs. Primary consideration is metastasis. Findings suggestive of malignant pleural effusions bilaterally, s/p thora this admission by IR 12/15. Now with worsening hypercalcemia. Also on a course of zosyn for inguinal wound (thought to be possible source of fever, but not entirely clear)-- ID recommended 10 day course of augmentin (12/15-12/24). Pulm nodule bx 12/22. Concern for reaccumulation of pleural effusion on CXR 12/20. Weaning o2, periodic IV lasix, last dosed 12/23 without any improvement. Pulm recommended IV steroids to see improvement.

## 2021-12-28 NOTE — PROGRESS NOTE ADULT - SUBJECTIVE AND OBJECTIVE BOX
[facility]-- WOUND TEAM -- FOLLOW UP NOTE  --------------------------------------------------------------------------------    subjective: C/o SOB that began earlier today. Per patient primary team aware, may be pleural effusion reaccumulating?  Patient reports that odor from right groin wound has improved. Reports that the lidocain/prilocaine cream used in conjunction  to wound dressing changes aids in pain control.    24 hour events: Chart reviewed including labs and relevant images      Diet:  Diet, Regular (12-22-21 @ 10:35)  Diet, Regular:   Consistent Carbohydrate Evening Snack (CSTCHOSN)  Supplement Feeding Modality:  Oral  Glucerna Shake Cans or Servings Per Day:  1       Frequency:  Daily (12-21-21 @ 16:23)      ROS: Respiratory/ SKIN see subjective  all other systems negative      ALLERGIES & MEDICATIONS  --------------------------------------------------------------------------------  Allergies    No Known Allergies    Intolerances          STANDING INPATIENT MEDICATIONS    albuterol/ipratropium for Nebulization 3 milliLiter(s) Nebulizer every 6 hours  amLODIPine   Tablet 10 milliGRAM(s) Oral daily  buDESOnide    Inhalation Suspension 0.5 milliGRAM(s) Inhalation two times a day  cholecalciferol 2000 Unit(s) Oral daily  Dakins Solution - 1/4 Strength 1 Application(s) Topical daily  dextrose 40% Gel 15 Gram(s) Oral once  dextrose 5%. 1000 milliLiter(s) IV Continuous <Continuous>  dextrose 5%. 1000 milliLiter(s) IV Continuous <Continuous>  dextrose 50% Injectable 25 Gram(s) IV Push once  dextrose 50% Injectable 12.5 Gram(s) IV Push once  dextrose 50% Injectable 25 Gram(s) IV Push once  enalapril 5 milliGRAM(s) Oral daily  escitalopram 30 milliGRAM(s) Oral daily  furosemide    Tablet 40 milliGRAM(s) Oral daily  gabapentin 400 milliGRAM(s) Oral three times a day  glucagon  Injectable 1 milliGRAM(s) IntraMuscular once  heparin   Injectable 5000 Unit(s) SubCutaneous every 8 hours  influenza  Vaccine (HIGH DOSE) 0.7 milliLiter(s) IntraMuscular once  insulin glargine Injectable (LANTUS) 38 Unit(s) SubCutaneous at bedtime  insulin lispro (ADMELOG) corrective regimen sliding scale   SubCutaneous at bedtime  insulin lispro (ADMELOG) corrective regimen sliding scale   SubCutaneous three times a day before meals  insulin lispro Injectable (ADMELOG) 24 Unit(s) SubCutaneous three times a day with meals  levothyroxine 125 MICROGram(s) Oral daily  methylPREDNISolone sodium succinate Injectable 40 milliGRAM(s) IV Push two times a day  ondansetron   Disintegrating Tablet 4 milliGRAM(s) Oral three times a day  pantoprazole    Tablet 40 milliGRAM(s) Oral before breakfast  polyethylene glycol 3350 17 Gram(s) Oral daily  simvastatin 20 milliGRAM(s) Oral at bedtime  trimethoprim  160 mG/sulfamethoxazole 800 mG 1 Tablet(s) Oral <User Schedule>      PRN INPATIENT MEDICATION  bisacodyl Suppository 10 milliGRAM(s) Rectal daily PRN  diphenhydrAMINE 25 milliGRAM(s) Oral daily PRN  lidocaine/prilocaine Cream 1 Application(s) Topical daily PRN  melatonin 3 milliGRAM(s) Oral at bedtime PRN  morphine  - Injectable 3 milliGRAM(s) IV Push every 6 hours PRN        Vital signs:  T(C): 36.8 (12-28-21 @ 08:57), Max: 37 (12-28-21 @ 06:00)  HR: 96 (12-28-21 @ 15:45) (90 - 100)  BP: 148/50 (12-28-21 @ 08:57) (126/65 - 154/75)  RR: 16 (12-28-21 @ 08:57) (16 - 18)  SpO2: 100% (12-28-21 @ 15:45) (95% - 100%)  Wt(kg): --        12-27-21 @ 07:01  -  12-28-21 @ 07:00  --------------------------------------------------------  IN: 1500 mL / OUT: 900 mL / NET: 600 mL      Constitutional: NAD  ENMT: perrla, mucosa moist   Back:nl  Respiratory: mild SOB  Cardiovascular: rrr  Gastrointestinal: non tender, soft distended, scars  gu s/p vulvectomy, right groin ulcer 2.5cmx1.5cmx2.3cm (previously 3.5x1.5x2.5- length variation maybe due to positioning, patient was at a 45 degree angle during exam due to SOB), mild tenderness with palpation/improving, no candida, no purulent drainage. NO drainable collection. NO edema, no erythema, no increased warmth to periwound skin.  Extremities: non tender , no edema, 1+pulses  Skin: as noted  Musculoskeletal: deconditioned, weak, has +FROM  psych: calm      LABS/ CULTURES/ RADIOLOGY:              8.1    14.28 >-----------<  287      [12-28-21 @ 07:58]              26.1     132  |  95  |  33  ----------------------------<  278      [12-28-21 @ 07:58]  5.0   |  27  |  0.92        Ca     7.9     [12-28-21 @ 07:58]      Mg     1.80     [12-28-21 @ 07:58]      Phos  1.8     [12-28-21 @ 07:58]    TPro  x   /  Alb  3.3  /  TBili  x   /  DBili  x   /  AST  x   /  ALT  x   /  AlkPhos  x   [12-28-21 @ 08:09]        CAPILLARY BLOOD GLUCOSE  POCT Blood Glucose.: 275 mg/dL (28 Dec 2021 16:41)  POCT Blood Glucose.: 357 mg/dL (28 Dec 2021 12:06)  POCT Blood Glucose.: 284 mg/dL (28 Dec 2021 07:51)  POCT Blood Glucose.: 383 mg/dL (28 Dec 2021 00:11)  POCT Blood Glucose.: 370 mg/dL (27 Dec 2021 22:17)  POCT Blood Glucose.: 254 mg/dL (27 Dec 2021 21:32)      A1C with Estimated Average Glucose Result: 7.7 % (12-14-21 @ 08:15)  A1C with Estimated Average Glucose Result: 8.2 % (09-04-21 @ 23:11)  A1C with Estimated Average Glucose Result: 7.5 % (09-04-21 @ 09:13)      Right groin Culture Results:   Numerous Streptococcus mitis/oralis group   "Susceptibilities not performed" (12.14.21 @ 22:19)         < from: Xray Chest 1 View- PORTABLE-Routine (Xray Chest 1 View- PORTABLE-Routine in AM.) (12.28.21 @ 07:25) >  ACC: [id number] EXAM:  XR CHEST PORTABLE ROUTINE 1V                          PROCEDURE DATE:  12/28/2021          INTERPRETATION:  Chest one view    HISTORY: Shortness of breath    COMPARISON STUDY: 12/22/2021    Frontal expiratory view of the chest shows the heart to be similar in   size. The lungs show partial clearing of right infiltrates with   progression of left infiltrates. Left pleural effusion is larger and   there is no evidence of pneumothorax nor right pleural effusion.    IMPRESSION:  Progression of left infiltrate and effusion.    Thank you for the courtesy of this referral.    --- End of Report ---            GISSELLE CORTES MD; Attending Interventional Radiologist  This document has been electronically signed. Dec 28 2021  3:38PM    < end of copied text >      < from: CT Abdomen and Pelvis w/ IV Cont (12.17.21 @ 17:49) >  ACC: [id number] EXAM:  CT ABDOMEN AND PELVIS IC                          PROCEDURE DATE:  12/17/2021          INTERPRETATION:  CLINICAL INFORMATION: Multiple nodules seen on recent   chest CT. Vulvar cancer status post surgery and chemoradiation. Right   inguinal wound.    COMPARISON: CTA chest 12/13/2021.    CONTRAST/COMPLICATIONS:  IV Contrast: Omnipaque 350  90 cc administered   10 cc discarded  Oral Contrast: NONE  Complications: None reported at time of study completion    PROCEDURE:  CT of the Abdomen and Pelvis was performed.  Sagittal and coronal reformats were performed.    FINDINGS:  LOWER CHEST: Bibasilar pulmonary nodules are again identified as at prior   CT. Unchanged bilateral pleural nodularity/thickening. Loculated small   left and trace right pleural effusions appears decreased since previous   study. Coronary calcifications.    LIVER: Within normal limits.  BILE DUCTS: Normal caliber.  GALLBLADDER: Cholecystectomy.  SPLEEN: Within normal limits.  PANCREAS: Within normallimits.  ADRENALS: Unchanged 2.1 cm right adrenal adenoma.  KIDNEYS/URETERS: Bilateral subcentimeter hypodense foci, too small to   characterize. Symmetric bilateral renal parenchymal enhancement. No   hydronephrosis.    BLADDER: Within normal limits.  REPRODUCTIVE ORGANS: Uterus and adnexa within normal limits.    BOWEL: No bowel obstruction. Appendix is not visualized.  PERITONEUM: No ascites.  VESSELS: Atherosclerotic changes.  RETROPERITONEUM/LYMPH NODES: No lymphadenopathy.  ABDOMINAL WALL: New small collection measuring 4.3 x 2.3 cm in the right   inguinal region containing foci of air with adjacent surgical clips   appears new since previous study. Mild skin thickening in the left   anterior abdominal wall is unchanged.  BONES: Degenerative changes. Unchanged minimal anterolisthesis of L4 on   L5.    IMPRESSION:    New small collection measuring 4.3 x 2.3 cm in the right inguinal region   containing foci of air.    Redemonstration of partially imaged bibasilar pulmonary nodules and  pleural nodularity/thickening.    Loculated small left and trace right pleural effusions with interval   decrease in size.    --- End of Report ---          MIK LORENZO MD; Resident Radiology  This document has been electronically signed.  CHRISTINA OROZCO MD; Attending Radiologist  This document has been electronically signed. Dec 18 2021 11:59AM    < end of copied text >

## 2021-12-28 NOTE — PROGRESS NOTE ADULT - PROBLEM SELECTOR PLAN 1
# Acute hypoxic respiratory failure  -CT without pulmonary embolus. Multiple nodules are noted within both lungs. Primary consideration is   metastasis. Findings suggestive of malignant pleural effusions bilaterally.  -s/p Thoracentesis on 12/15 by IR, drained appr 660cc pleural fluid, exudative, path negative  - Pulm consult appreciated - steroids switched to methylprednisolone on 12/27  -f/u echo  -duonebs and budesonide nebs standing

## 2021-12-28 NOTE — PROGRESS NOTE ADULT - ASSESSMENT
65 yo F w/ PMHx of HTN, HLD, DM, fibromyalgia, Vulvar CA s/p vulva surgery in 2020, completed chemo & RT in 9/21, PMR (on chronic steroids, 10 mg BID), now p/w SOB. CTPA with out PE but multiple nodules are noted within both lungs. Primary consideration is metastasis. Findings suggestive of malignant pleural effusions bilaterally, s/p thora this admission by IR 12/15. Now with worsening hypercalcemia. Also on a course of zosyn for inguinal wound (thought to be possible source of fever, but not entirely clear)-- ID recommended 10 day course of augmentin (12/15-12/24). Pulm nodule bx 12/22. Concern for reaccumulation of Left pleural effusion on CXR 12/28. Pulm recommended IV steroids switched to methylprednisolone on 12/27.  Wound care f/u for right groin wound in radiated field s/p pet in may showing met to groin/ obturator node, s/p chemo may have caused collection, odor and increased drainage resolving post cleansing wound with dakins and packing with aquacel silver. However agree with ID that radiation injury, mets to lymph node, ? necrosis of lymph nodes are key factors in development of wound/ sinus tract and are factors that are limiting wound healing. Focus wound care on symptom management. Per ID Wound culture with strep species of unclear significance, pt put on 10 day  course of antibiotics to be cautious.     Continue current wound management irrigate well with dakins, rinse with NS. pack with ag aquacel, cover with silicone foam with border. Change daily.  topical lido2.5%prilocaine for dressing changes to wound    will need vna  f/u nutrition, obese BMI 30.5 kg/m2  moisture barrier  DVT prophylaxis    continue to offload with low airloss support surface.  Continue to turn and position every 2 hours with z-esperanza fluidized positioning device.  Continue use of Complete Cair pressure offloading boots.    Care per primary team.   Pleural effusion mgmt deferred to primary team.     Upon discharge follow up at outpatient [facility]. [address] [facility]. [phone].  Please reconsult as needed.  Thank you.    Findings and plan discussed with primary team.  ALEJANDRA Porter-BC, CWOC    pager #[phone]    If after 4PM or before 7:30AM on Mon-Friday or weekend/holiday please contact general surgery for urgent matters.   Team A- [id number]   Team B- [id number]  For non-urgent matters e-mail [email or web address]    I spent 55 minutes face-to-face with this patient of which more than 50% of the time was spent counseling/coordinating care of this patient.

## 2021-12-28 NOTE — PROGRESS NOTE ADULT - SUBJECTIVE AND OBJECTIVE BOX
CHIEF COMPLAINT:Patient is a 66y old  Female who presents with a chief complaint of SOB dizziness & nausea x couple of weeks (27 Dec 2021 18:00)      Interval Events: Patient states are breathing is better after starting the IV steroids. States the morphine IV helped a little bit with the breathing, but did help with the pain.     REVIEW OF SYSTEMS:  [x] All other systems negative  [ ] Unable to assess ROS because ________    OBJECTIVE:  ICU Vital Signs Last 24 Hrs  T(C): 37 (28 Dec 2021 06:00), Max: 37 (28 Dec 2021 06:00)  T(F): 98.6 (28 Dec 2021 06:00), Max: 98.6 (28 Dec 2021 06:00)  HR: 92 (28 Dec 2021 06:00) (92 - 100)  BP: 144/62 (28 Dec 2021 06:00) (121/55 - 154/75)  BP(mean): --  ABP: --  ABP(mean): --  RR: 18 (28 Dec 2021 06:00) (17 - 18)  SpO2: 97% (28 Dec 2021 06:00) (90% - 98%)        12-27 @ 07:01  -  12-28 @ 07:00  --------------------------------------------------------  IN: 1500 mL / OUT: 900 mL / NET: 600 mL        PHYSICAL EXAM:  GENERAL: NAD, well-groomed, well-developed  EYES: EOMI, PERRLA, conjunctiva and sclera clear  ENMT: No tonsillar erythema, exudates, or enlargement; Moist mucous membranes, Good dentition, No lesions  CHEST/LUNG: Decreased BS at the bases   HEART: Regular rate and rhythm; No murmurs, rubs, or gallops  ABDOMEN: Soft, Nontender, Nondistended; Bowel sounds present  VASCULAR:  2+ Peripheral Pulses, No clubbing, cyanosis, or edema  LYMPH: No lymphadenopathy noted  SKIN: No rashes or lesions  NERVOUS SYSTEM:  Alert & Oriented X3, Good concentration    HOSPITAL MEDICATIONS:  MEDICATIONS  (STANDING):  albuterol/ipratropium for Nebulization 3 milliLiter(s) Nebulizer every 6 hours  amLODIPine   Tablet 10 milliGRAM(s) Oral daily  buDESOnide    Inhalation Suspension 0.5 milliGRAM(s) Inhalation two times a day  cholecalciferol 2000 Unit(s) Oral daily  Dakins Solution - 1/4 Strength 1 Application(s) Topical daily  dextrose 40% Gel 15 Gram(s) Oral once  dextrose 5%. 1000 milliLiter(s) (50 mL/Hr) IV Continuous <Continuous>  dextrose 5%. 1000 milliLiter(s) (100 mL/Hr) IV Continuous <Continuous>  dextrose 50% Injectable 25 Gram(s) IV Push once  dextrose 50% Injectable 12.5 Gram(s) IV Push once  dextrose 50% Injectable 25 Gram(s) IV Push once  enalapril 5 milliGRAM(s) Oral daily  escitalopram 30 milliGRAM(s) Oral daily  furosemide    Tablet 40 milliGRAM(s) Oral daily  gabapentin 400 milliGRAM(s) Oral three times a day  glucagon  Injectable 1 milliGRAM(s) IntraMuscular once  heparin   Injectable 5000 Unit(s) SubCutaneous every 8 hours  influenza  Vaccine (HIGH DOSE) 0.7 milliLiter(s) IntraMuscular once  insulin glargine Injectable (LANTUS) 34 Unit(s) SubCutaneous at bedtime  insulin lispro (ADMELOG) corrective regimen sliding scale   SubCutaneous three times a day before meals  insulin lispro (ADMELOG) corrective regimen sliding scale   SubCutaneous at bedtime  insulin lispro Injectable (ADMELOG) 22 Unit(s) SubCutaneous three times a day with meals  levothyroxine 125 MICROGram(s) Oral daily  methylPREDNISolone sodium succinate Injectable 40 milliGRAM(s) IV Push two times a day  ondansetron   Disintegrating Tablet 4 milliGRAM(s) Oral three times a day  pantoprazole    Tablet 40 milliGRAM(s) Oral before breakfast  polyethylene glycol 3350 17 Gram(s) Oral daily  simvastatin 20 milliGRAM(s) Oral at bedtime  trimethoprim  160 mG/sulfamethoxazole 800 mG 1 Tablet(s) Oral <User Schedule>    MEDICATIONS  (PRN):  acetaminophen     Tablet .. 650 milliGRAM(s) Oral every 6 hours PRN Temp greater or equal to 38C (100.4F), Mild Pain (1 - 3), Moderate Pain (4 - 6)  bisacodyl Suppository 10 milliGRAM(s) Rectal daily PRN Constipation  diphenhydrAMINE 25 milliGRAM(s) Oral daily PRN Rash and/or Itching  lidocaine/prilocaine Cream 1 Application(s) Topical daily PRN Dressing changes  melatonin 3 milliGRAM(s) Oral at bedtime PRN Insomnia  morphine  - Injectable 2 milliGRAM(s) IV Push every 6 hours PRN pain or dyspnea      LABS:    The Labs were reviewed by me   The Radiology was reviewed by me    EKG tracing reviewed by me    12-28    132<L>  |  95<L>  |  33<H>  ----------------------------<  278<H>  5.0   |  27  |  0.92  12-27    135  |  97<L>  |  40<H>  ----------------------------<  139<H>  4.6   |  27  |  0.94  12-26    133<L>  |  95<L>  |  44<H>  ----------------------------<  107<H>  4.4   |  26  |  0.95    Ca    7.9<L>      28 Dec 2021 07:58  Ca    8.3<L>      27 Dec 2021 05:45  Ca    8.7      26 Dec 2021 05:50  Phos  1.8     12-28  Mg     1.80     12-28      Magnesium, Serum: 1.80 mg/dL (12-28-21 @ 07:58)  Magnesium, Serum: 2.10 mg/dL (12-27-21 @ 05:45)  Magnesium, Serum: 2.10 mg/dL (12-26-21 @ 05:50)    Phosphorus Level, Serum: 1.8 mg/dL (12-28-21 @ 07:58)  Phosphorus Level, Serum: 1.4 mg/dL (12-27-21 @ 05:45)  Phosphorus Level, Serum: 1.4 mg/dL (12-26-21 @ 05:50)                                              8.1    14.28 )-----------( 287      ( 28 Dec 2021 07:58 )             26.1                         8.5    14.36 )-----------( 293      ( 27 Dec 2021 05:45 )             27.4                         8.4    13.35 )-----------( 311      ( 26 Dec 2021 05:50 )             27.8     CAPILLARY BLOOD GLUCOSE      POCT Blood Glucose.: 284 mg/dL (28 Dec 2021 07:51)  POCT Blood Glucose.: 383 mg/dL (28 Dec 2021 00:11)  POCT Blood Glucose.: 370 mg/dL (27 Dec 2021 22:17)  POCT Blood Glucose.: 254 mg/dL (27 Dec 2021 21:32)  POCT Blood Glucose.: 274 mg/dL (27 Dec 2021 17:09)  POCT Blood Glucose.: 250 mg/dL (27 Dec 2021 11:26)        MICROBIOLOGY:     RADIOLOGY:  [ ] Reviewed and interpreted by me    Point of Care Ultrasound Findings:    PFT:    EKG:

## 2021-12-28 NOTE — PROGRESS NOTE ADULT - ASSESSMENT
67 yo F w/ PMHx of HTN, HLD, DM, fibromyalgia, Vulvar CA s/p vulva surgery in 2020, completed chemo & RT in 9/21, PMR (on chronic steroids, 10 mg BID), now p/w SOB. CTPA with out PE but multiple nodules are noted within both lungs. Primary consideration is metastasis. Findings suggestive of malignant pleural effusions bilaterally, s/p thora this admission by IR 12/15. Now with worsening hypercalcemia. Also on a course of zosyn for inguinal wound (thought to be possible source of fever, but not entirely clear)-- ID recommended 10 day course of augmentin (12/15-12/24). Pulm nodule bx 12/22. Concern for reaccumulation of pleural effusion on CXR 12/20. Pulm recommended IV steroids to see improvement.

## 2021-12-28 NOTE — PROGRESS NOTE ADULT - SUBJECTIVE AND OBJECTIVE BOX
Chief Complaint: DM2    History: rports fair appetite, denies n/v. denies s/s of hypoglycemia. Started on solumedrol.    MEDICATIONS  (STANDING):  amLODIPine   Tablet 10 milliGRAM(s) Oral daily  cholecalciferol 2000 Unit(s) Oral daily  Dakins Solution - 1/4 Strength 1 Application(s) Topical daily  dextrose 40% Gel 15 Gram(s) Oral once  dextrose 5%. 1000 milliLiter(s) (50 mL/Hr) IV Continuous <Continuous>  dextrose 5%. 1000 milliLiter(s) (100 mL/Hr) IV Continuous <Continuous>  dextrose 50% Injectable 25 Gram(s) IV Push once  dextrose 50% Injectable 12.5 Gram(s) IV Push once  dextrose 50% Injectable 25 Gram(s) IV Push once  enalapril 5 milliGRAM(s) Oral daily  escitalopram 30 milliGRAM(s) Oral daily  gabapentin 400 milliGRAM(s) Oral three times a day  glucagon  Injectable 1 milliGRAM(s) IntraMuscular once  heparin   Injectable 5000 Unit(s) SubCutaneous every 8 hours  influenza  Vaccine (HIGH DOSE) 0.7 milliLiter(s) IntraMuscular once  insulin glargine Injectable (LANTUS) 40 Unit(s) SubCutaneous at bedtime  insulin lispro (ADMELOG) corrective regimen sliding scale   SubCutaneous at bedtime  insulin lispro (ADMELOG) corrective regimen sliding scale   SubCutaneous three times a day before meals  insulin lispro Injectable (ADMELOG) 20 Unit(s) SubCutaneous three times a day with meals  levothyroxine 125 MICROGram(s) Oral daily  ondansetron   Disintegrating Tablet 4 milliGRAM(s) Oral three times a day  pantoprazole    Tablet 40 milliGRAM(s) Oral before breakfast  polyethylene glycol 3350 17 Gram(s) Oral daily  predniSONE   Tablet 40 milliGRAM(s) Oral daily  senna 2 Tablet(s) Oral at bedtime  simvastatin 20 milliGRAM(s) Oral at bedtime    MEDICATIONS  (PRN):  acetaminophen     Tablet .. 650 milliGRAM(s) Oral every 6 hours PRN Temp greater or equal to 38C (100.4F), Mild Pain (1 - 3), Moderate Pain (4 - 6)  bisacodyl Suppository 10 milliGRAM(s) Rectal daily PRN Constipation  diphenhydrAMINE 25 milliGRAM(s) Oral daily PRN Rash and/or Itching  lidocaine/prilocaine Cream 1 Application(s) Topical daily PRN Dressing changes  melatonin 3 milliGRAM(s) Oral at bedtime PRN Insomnia      Allergies    No Known Allergies    Intolerances      Review of Systems:    ALL OTHER SYSTEMS REVIEWED AND NEGATIVE    PHYSICAL EXAM:  Vital Signs Last 24 Hrs  T(C): 36.8 (28 Dec 2021 08:57), Max: 37 (28 Dec 2021 06:00)  T(F): 98.3 (28 Dec 2021 08:57), Max: 98.6 (28 Dec 2021 06:00)  HR: 96 (28 Dec 2021 15:45) (90 - 100)  BP: 148/50 (28 Dec 2021 08:57) (126/65 - 154/75)  BP(mean): --  RR: 16 (28 Dec 2021 08:57) (16 - 18)  SpO2: 100% (28 Dec 2021 15:45) (95% - 100%)  GENERAL: NAD, well-groomed, well-developed  EYES: No proptosis, no lid lag, anicteric  HEENT:  Atraumatic, Normocephalic, moist mucous membranes  RESPIRATORY: nonlabored respirations, no wheezing  PSYCH: Alert and oriented x 3, normal affect, normal mood      CAPILLARY BLOOD GLUCOSE    POCT Blood Glucose.: 275 mg/dL (28 Dec 2021 16:41)  POCT Blood Glucose.: 357 mg/dL (28 Dec 2021 12:06)  POCT Blood Glucose.: 284 mg/dL (28 Dec 2021 07:51)  POCT Blood Glucose.: 383 mg/dL (28 Dec 2021 00:11)  POCT Blood Glucose.: 370 mg/dL (27 Dec 2021 22:17)  POCT Blood Glucose.: 254 mg/dL (27 Dec 2021 21:32)  POCT Blood Glucose.: 274 mg/dL (27 Dec 2021 17:09)  POCT Blood Glucose.: 250 mg/dL (27 Dec 2021 11:26)  POCT Blood Glucose.: 205 mg/dL (27 Dec 2021 07:32)  POCT Blood Glucose.: 202 mg/dL (26 Dec 2021 20:43)  POCT Blood Glucose.: 226 mg/dL (24 Dec 2021 11:19)  POCT Blood Glucose.: 74 mg/dL (24 Dec 2021 07:29)  POCT Blood Glucose.: 190 mg/dL (23 Dec 2021 21:29)  POCT Blood Glucose.: 201 mg/dL (23 Dec 2021 16:57)    12-28    132<L>  |  95<L>  |  33<H>  ----------------------------<  278<H>  5.0   |  27  |  0.92    Ca    7.9<L>      28 Dec 2021 07:58  Phos  1.8     12-28  Mg     1.80     12-28    TPro  x   /  Alb  3.3  /  TBili  x   /  DBili  x   /  AST  x   /  ALT  x   /  AlkPhos  x   12-28      A1C with Estimated Average Glucose Result: 7.7 % (12-14-21 @ 08:15)  A1C with Estimated Average Glucose Result: 8.2 % (09-04-21 @ 23:11)  A1C with Estimated Average Glucose Result: 7.5 % (09-04-21 @ 09:13)  A1C with Estimated Average Glucose Result: 7.7 % (03-25-21 @ 15:08)      Thyroid Function Tests:  12-16 @ 06:47 TSH 11.94 FreeT4 0.9 T3 -- Anti TPO -- Anti Thyroglobulin Ab -- TSI --  12-14 @ 08:15 TSH 10.03 FreeT4 0.8 T3 -- Anti TPO -- Anti Thyroglobulin Ab -- TSI --

## 2021-12-28 NOTE — PROGRESS NOTE ADULT - SUBJECTIVE AND OBJECTIVE BOX
[facility] Division of Hospital Medicine  Dr. Amanda Bradley  Pager (M-F, 8A-5P): [phone]  Other Times:  [phone]      SUBJECTIVE / OVERNIGHT EVENTS: Symptoms improved today. Morphine only helped for little bit. No new concerns. Wants meds for constipation. Understands that we are still waiting on path results before deciding on cancer treatment     MEDICATIONS  (STANDING):  amLODIPine   Tablet 10 milliGRAM(s) Oral daily  cholecalciferol 2000 Unit(s) Oral daily  Dakins Solution - 1/4 Strength 1 Application(s) Topical daily  dextrose 40% Gel 15 Gram(s) Oral once  dextrose 5%. 1000 milliLiter(s) (50 mL/Hr) IV Continuous <Continuous>  dextrose 5%. 1000 milliLiter(s) (100 mL/Hr) IV Continuous <Continuous>  dextrose 50% Injectable 25 Gram(s) IV Push once  dextrose 50% Injectable 12.5 Gram(s) IV Push once  dextrose 50% Injectable 25 Gram(s) IV Push once  enalapril 5 milliGRAM(s) Oral daily  escitalopram 30 milliGRAM(s) Oral daily  gabapentin 400 milliGRAM(s) Oral three times a day  glucagon  Injectable 1 milliGRAM(s) IntraMuscular once  heparin   Injectable 5000 Unit(s) SubCutaneous every 8 hours  influenza  Vaccine (HIGH DOSE) 0.7 milliLiter(s) IntraMuscular once  insulin glargine Injectable (LANTUS) 30 Unit(s) SubCutaneous at bedtime  insulin lispro (ADMELOG) corrective regimen sliding scale   SubCutaneous three times a day before meals  insulin lispro (ADMELOG) corrective regimen sliding scale   SubCutaneous at bedtime  insulin lispro Injectable (ADMELOG) 20 Unit(s) SubCutaneous three times a day with meals  levothyroxine 125 MICROGram(s) Oral daily  ondansetron   Disintegrating Tablet 4 milliGRAM(s) Oral three times a day  pantoprazole    Tablet 40 milliGRAM(s) Oral before breakfast  polyethylene glycol 3350 17 Gram(s) Oral daily  predniSONE   Tablet 40 milliGRAM(s) Oral daily  simvastatin 20 milliGRAM(s) Oral at bedtime    MEDICATIONS  (PRN):  acetaminophen     Tablet .. 650 milliGRAM(s) Oral every 6 hours PRN Temp greater or equal to 38C (100.4F), Mild Pain (1 - 3), Moderate Pain (4 - 6)  bisacodyl Suppository 10 milliGRAM(s) Rectal daily PRN Constipation  diphenhydrAMINE 25 milliGRAM(s) Oral daily PRN Rash and/or Itching  lidocaine/prilocaine Cream 1 Application(s) Topical daily PRN Dressing changes  melatonin 3 milliGRAM(s) Oral at bedtime PRN Insomnia      I&O's Summary    27 Dec 2021 07:01  -  28 Dec 2021 07:00  --------------------------------------------------------  IN: 1500 mL / OUT: 900 mL / NET: 600 mL        PHYSICAL EXAM:  Vital Signs Last 24 Hrs  T(C): 36.8 (28 Dec 2021 08:57), Max: 37 (28 Dec 2021 06:00)  T(F): 98.3 (28 Dec 2021 08:57), Max: 98.6 (28 Dec 2021 06:00)  HR: 95 (28 Dec 2021 09:25) (90 - 100)  BP: 148/50 (28 Dec 2021 08:57) (121/55 - 154/75)  BP(mean): --  RR: 16 (28 Dec 2021 08:57) (16 - 18)  SpO2: 100% (28 Dec 2021 09:25) (94% - 100%)  CONSTITUTIONAL: NAD, well-developed, well-groomed  EYES:  conjunctiva and sclera clear  ENMT: Moist oral mucosa, no pharyngeal injection or exudates; normal dentition  RESPIRATORY: on 3L, bibasilar crackles worse on the L side, tachypnea improved, wheezing noted today  CARDIOVASCULAR: Regular rate and rhythm, normal S1 and S2, no murmur/rub/gallop; No lower extremity edema; Peripheral pulses are 2+ bilaterally  ABDOMEN: Nontender to palpation, normoactive bowel sounds, no rebound/guarding  PSYCH: A+O to person, place, and time; affect appropriate  SKIN: No rashes; no palpable lesions, inguinal (R) sided wound appears nontender, no exudate, dressed with bandage- per nursing depth of ab 4cm on 12/23, no erythema surrounding the area  LABS:                                              8.1    14.28 )-----------( 287      ( 28 Dec 2021 07:58 )             26.1   12-28    132<L>  |  95<L>  |  33<H>  ----------------------------<  278<H>  5.0   |  27  |  0.92    Ca    7.9<L>      28 Dec 2021 07:58  Phos  1.8     12-28  Mg     1.80     12-28            COORDINATION OF CARE:  Care Discussed with Consultants/Other Providers [Y/N]: pulm, heme/onc

## 2021-12-28 NOTE — PROGRESS NOTE ADULT - ASSESSMENT
67 y/o F w/ a PMHx of HTN, HLD, T2DM, fibromyalgia, recurrent vulvar cancer (recently received chemo/RT from 7/2021-9/2021), and anemia (requiring intermittent transfusions recently) who presented with shortness of breath, dizziness, and nausea for multiple weeks, found to have multiple lung nodules and B/L pleural effusions concerning for malignancy, and was admitted to telemetry for further management, s/p thoracentesis on 12/15 (~ 660cc drained). Oncology was consulted for further evaluation.    # Dyspnea  - CTA Chest (12/13): No pulmonary embolus is noted. Multiple nodules are noted within both lungs. Primary consideration is metastasis. Findings suggestive of malignant pleural effusions bilaterally.  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   - bactrim for PCP ppx  - morphine IV PRN for dypsnea  - f/u path from lung nodule biopsy 12/22 (reached out to pathologist 12/28, still undergoing immunostains)   - patient still has SOB and is dizzy when she stands; no improvement after blood transfusion    # Recurrent vulvar cancer  - Last recurrence was earlier this year. She received chemo/RT with weekly Cisplatin from 7/12/21-9/1/21.  - Imaging findings on admission are concerning for progressing malignancy  - Pt s/p thoracentesis as noted above with negative cytopathology  - CT A/P with small collection in R inguinal region (likely known R inguinal wound/tract) but does not appear to show other areas of disease that is amenable to biopsy  - s/p IR biopsy of R lung nodule (to establish a diagnosis of metastatic disease and to send for NGS testing if cancer confirmed) 12/22  - will follow up with primary Oncologist Dr. Paulo Carpenter as outpatient     #Hypercalcemia   -This is an oncologic emergency and requires urgent management  - Ca increased to 12.5  - PTH and Vit D low  - f/u PTHrP  - appreciate endo consult  - may be related to hypercalcemia of malignancy  - s/p zoledronic acid (discussed risks/benefits including risk of osteonecrosis of jaw) and calcitonin with normalization of calcium    # Microcytic Anemia  - Occasionally requiring transfusions (last on 12/11)  - Recently underwent a hematologic evaluation as an outpatient. Her workup was notable for a hemoglobin electrophoresis which was c/w beta thalassemia minor. She had a BMBx on 11/9/21 (minimal marrow obtained to evaluate) which showed erythroid hyperplasia, megakaryocytes normal, no evidence of dysplasia.   - Monitor CBC and keep active T+S. Transfuse for Hgb < 7 or if symptomatic  - Primary Hematologist: Dr. Leana Auguste. Continue outpatient follow-up

## 2021-12-28 NOTE — PROGRESS NOTE ADULT - ASSESSMENT
67 yo F with HTN, HLD, DM, fibromyalgia, Vulvar CA s/p vulva surgery in 2020, completed chemo & RT in 9/21 admitted for shortness of breath, found to have bilateral pleural effusions, L>R and pulmonary nodules, pulmonary consulted for pulmonary nodules and dyspnea.     RECS:  - persistent o2 require of 3L NC  - patient's worsening SOB and hypoxia could reflect increased tumor burden/progression of disease  - patient remains afebrile, without significant leukocytosis  - recommend repeat formal echo as recent TTE with limited results and bedside echo with limited windows.   - s/p IR guided biopsy on 12/23, pathology pending  - s/p diagnostic and therapeutic thoracentesis by IR 12/15, exudative   - Cytopathology from pleural fluid was negative for malignant cells   - c/w solumedrol 40mg IV BID   - c/w Bactrim for PCP PPX. Bactrim DS 3 times a week   - Can increase morphine to 3mg IV q6h PRN for Dyspnea and Severe pain   - Discussed Code status with patient and her daughter patient is a full code     Sam Anders MD   Pulmonary/Critical Care Fellow PGY-6   [facility] Pager #: [phone]  Spectra #: [phone]

## 2021-12-28 NOTE — PROGRESS NOTE ADULT - PROBLEM SELECTOR PLAN 9
Continue Basal Insulin, Check Fingersticks with Meals and cover with ISS TID,    HgbA1C 7.7  glucose elevated due to steroids. Endo following. Adjust basal/bolus insulin   f/u endo recs

## 2021-12-28 NOTE — PROGRESS NOTE ADULT - SUBJECTIVE AND OBJECTIVE BOX
Hematology Oncology Follow-up    INTERVAL HPI/OVERNIGHT EVENTS:  Work of breathing is improved today with the morphine.     VITAL SIGNS:  T(F): 98.3 (12-28-21 @ 08:57)  HR: 95 (12-28-21 @ 09:25)  BP: 148/50 (12-28-21 @ 08:57)  RR: 16 (12-28-21 @ 08:57)  SpO2: 100% (12-28-21 @ 09:25)  Wt(kg): --    12-27-21 @ 07:01  -  12-28-21 @ 07:00  --------------------------------------------------------  IN: 1500 mL / OUT: 900 mL / NET: 600 mL          Review of Systems:  General: denies fevers/chills  Respiratory: + shortness of breath  Cardiovascular: denies chest pain, palpitations  Gastrointestinal: denies nausea, vomiting, abdominal pain, constipation, diarrhea, melena, hematochezia  MSK: denies joint pain or muscle pain  Neuro: denies headache, weakness, or parasthesias  Skin: denies rash, petichiae, echymoses  Psych: denies anxiety or sleep disturbances    PHYSICAL EXAM:  Constitutional: mild distress  Respiratory: crackles bilateral lung bases, symmetric chest rise, with normal respiratory effort  Cardiovascular: RRR  Gastrointestinal: soft, NTND  Extremities:  no edema  MSK: no obvious abnormalities  Neurological: Grossly intact  Skin: no rash, no echymoses, no petichiae  Psych: normal affect    MEDICATIONS  (STANDING):  albuterol/ipratropium for Nebulization 3 milliLiter(s) Nebulizer every 6 hours  amLODIPine   Tablet 10 milliGRAM(s) Oral daily  buDESOnide    Inhalation Suspension 0.5 milliGRAM(s) Inhalation two times a day  cholecalciferol 2000 Unit(s) Oral daily  Dakins Solution - 1/4 Strength 1 Application(s) Topical daily  dextrose 40% Gel 15 Gram(s) Oral once  dextrose 5%. 1000 milliLiter(s) (50 mL/Hr) IV Continuous <Continuous>  dextrose 5%. 1000 milliLiter(s) (100 mL/Hr) IV Continuous <Continuous>  dextrose 50% Injectable 25 Gram(s) IV Push once  dextrose 50% Injectable 12.5 Gram(s) IV Push once  dextrose 50% Injectable 25 Gram(s) IV Push once  enalapril 5 milliGRAM(s) Oral daily  escitalopram 30 milliGRAM(s) Oral daily  furosemide    Tablet 40 milliGRAM(s) Oral daily  gabapentin 400 milliGRAM(s) Oral three times a day  glucagon  Injectable 1 milliGRAM(s) IntraMuscular once  heparin   Injectable 5000 Unit(s) SubCutaneous every 8 hours  influenza  Vaccine (HIGH DOSE) 0.7 milliLiter(s) IntraMuscular once  insulin glargine Injectable (LANTUS) 38 Unit(s) SubCutaneous at bedtime  insulin lispro (ADMELOG) corrective regimen sliding scale   SubCutaneous three times a day before meals  insulin lispro (ADMELOG) corrective regimen sliding scale   SubCutaneous at bedtime  insulin lispro Injectable (ADMELOG) 24 Unit(s) SubCutaneous three times a day with meals  levothyroxine 125 MICROGram(s) Oral daily  methylPREDNISolone sodium succinate Injectable 40 milliGRAM(s) IV Push two times a day  ondansetron   Disintegrating Tablet 4 milliGRAM(s) Oral three times a day  pantoprazole    Tablet 40 milliGRAM(s) Oral before breakfast  polyethylene glycol 3350 17 Gram(s) Oral daily  simvastatin 20 milliGRAM(s) Oral at bedtime  sodium phosphate IVPB 30 milliMole(s) IV Intermittent once  trimethoprim  160 mG/sulfamethoxazole 800 mG 1 Tablet(s) Oral <User Schedule>    MEDICATIONS  (PRN):  acetaminophen     Tablet .. 650 milliGRAM(s) Oral every 6 hours PRN Temp greater or equal to 38C (100.4F), Mild Pain (1 - 3), Moderate Pain (4 - 6)  bisacodyl Suppository 10 milliGRAM(s) Rectal daily PRN Constipation  diphenhydrAMINE 25 milliGRAM(s) Oral daily PRN Rash and/or Itching  lidocaine/prilocaine Cream 1 Application(s) Topical daily PRN Dressing changes  melatonin 3 milliGRAM(s) Oral at bedtime PRN Insomnia  morphine  - Injectable 2 milliGRAM(s) IV Push every 6 hours PRN pain or dyspnea      No Known Allergies      LABS:                        8.1    14.28 )-----------( 287      ( 28 Dec 2021 07:58 )             26.1     12-28    132<L>  |  95<L>  |  33<H>  ----------------------------<  278<H>  5.0   |  27  |  0.92    Ca    7.9<L>      28 Dec 2021 07:58  Phos  1.8     12-28  Mg     1.80     12-28                       RADIOLOGY & ADDITIONAL TESTS:  Studies reviewed.

## 2021-12-28 NOTE — PROGRESS NOTE ADULT - ASSESSMENT
67 yo F with HTN, HLD, type 2DM, hypothyroidism, fibromyalgia, vulvar cancer s/p surgery 2020, RT/chemo 2021, anemia presenting with HERNANDEZ and dizziness with imaging concerning for metastatic disease and pleural effusions bilaterally. Endocrine was consulted for T2DM management.    1. Type 2 Diabetes Mellitus c/b steroid induced hyperglycemia  A1c 7.7%; goal less than 7%  Home Regimen: Tresiba 60 units QHS, Novolog 15-30 units with meals (ISF 1:30, but usually just estimates), Victoza 1.8 mg daily    While inpatient:   BG target 100-180 mg/dl  now on solumedrol  Increase Lantus to 40 units SQ qHS  Increase Admelog to 26 units SQ TID before meals - please HOLD if she is not eating  Continue Admelog MODERATE dose correctional scale before meals, moderate dose at bedtime   Please check FSG before meals and QHS  Consistent carbohydrate diet - appreciate RD input, liberalized diet (eliminated sodium restriction, added Glucerna per RD recommendation)  Hypoglycemia protocol     Discharge planning for DM: Resume basal / bolus insulin (dosing TBD), schedule follow up with prior outpatient endocrine provider     2. Chronic steroid use, steroid induced hyperglycemia   On Prednisone 10 mg BID for at least two months, was on Prednisone 40 mg daily, now on solumedrol 40 mg IV BID (12/28)  Insulin dosing as above   Pt cannot stop steroids abruptly as she will have secondary adrenal insufficiency   Patient to follow up in 1/2022 with her doctors with steroid taper      3. Hypothyroidism  12-16 @ 06:47 TSH 11.94 FreeT4 0.9   Levels borderline, would continue with current LT4 dosing for now and followup as outpatient, repeat TFTs in 6-8 weeks  Continue Levothyroxine 125 mcg PO daily, take daily on empty stomach    4. Hypercalcemia   PTH 7  Likely hypercalcemia of malignancy  Corrected calcium 8.6 today   S/p Zometa 4 mg IV (given on 12/20/21, will take about 72h for full effect)  S/p Calcitonin 310 IU SQ q12h x 4 doses (completed 12/22)   Vit D 25-OH LOW at 12.2, recommend supplement (see below)  PTH- suppressed    Followup PTHrp  Check Serum albumin    5. Vitamin D deficiency  Vitamin D 25-OH 12.2  Recommend supplement with Cholecalciferol 2000 units daily    6. Hypertension  Goal BP <130/80  Management as per primary team    7. Hyperlipidemia  Continue Simvastatin 20 mg daily  Followup lipid panel annually as outpatient    Madiha Ontiveros  Nurse Practitioner  Division of Endocrinology & Diabetes  Pager # [phone]      If after 6PM or before 9AM, or on weekends/holidays, please call endocrine answering service for assistance ([phone]).  For nonurgent matters email [email or web address] for assistance.

## 2021-12-29 NOTE — PROGRESS NOTE ADULT - SUBJECTIVE AND OBJECTIVE BOX
CHIEF COMPLAINT:Patient is a 66y old  Female who presents with a chief complaint of SOB dizziness & nausea x couple of weeks (28 Dec 2021 17:38)      Interval Events: Patient states she feels her SOB was worse compared to yesterday. States the increased dose of morphine did help her pain and her breathing.     REVIEW OF SYSTEMS:  [x] All other systems negative  [ ] Unable to assess ROS because ________    OBJECTIVE:  ICU Vital Signs Last 24 Hrs  T(C): 36.6 (29 Dec 2021 06:03), Max: 36.9 (28 Dec 2021 21:25)  T(F): 97.9 (29 Dec 2021 06:03), Max: 98.4 (28 Dec 2021 21:25)  HR: 93 (29 Dec 2021 06:03) (89 - 100)  BP: 149/62 (29 Dec 2021 06:03) (135/52 - 149/62)  BP(mean): --  ABP: --  ABP(mean): --  RR: 18 (29 Dec 2021 06:03) (16 - 18)  SpO2: 96% (29 Dec 2021 06:03) (95% - 100%)        12-28 @ 07:01  -  12-29 @ 07:00  --------------------------------------------------------  IN: 625 mL / OUT: 425 mL / NET: 200 mL        PHYSICAL EXAM:  GENERAL: NAD  EYES: EOMI, PERRLA, conjunctiva and sclera clear  ENMT: No tonsillar erythema, exudates, or enlargement; Moist mucous membranes, Good dentition, No lesions  CHEST/LUNG: Decreased BS at the bases   HEART: Regular rate and rhythm; No murmurs, rubs, or gallops  ABDOMEN: Soft, Nontender, Nondistended; Bowel sounds present  VASCULAR:  2+ Peripheral Pulses, No clubbing, cyanosis, or edema  LYMPH: No lymphadenopathy noted  SKIN: No rashes or lesions  NERVOUS SYSTEM:  Alert & Oriented X3, Good concentration    HOSPITAL MEDICATIONS:  MEDICATIONS  (STANDING):  albuterol/ipratropium for Nebulization 3 milliLiter(s) Nebulizer every 6 hours  amLODIPine   Tablet 10 milliGRAM(s) Oral daily  buDESOnide    Inhalation Suspension 0.5 milliGRAM(s) Inhalation two times a day  cholecalciferol 2000 Unit(s) Oral daily  Dakins Solution - 1/4 Strength 1 Application(s) Topical daily  dextrose 40% Gel 15 Gram(s) Oral once  dextrose 5%. 1000 milliLiter(s) (50 mL/Hr) IV Continuous <Continuous>  dextrose 5%. 1000 milliLiter(s) (100 mL/Hr) IV Continuous <Continuous>  dextrose 50% Injectable 25 Gram(s) IV Push once  dextrose 50% Injectable 12.5 Gram(s) IV Push once  dextrose 50% Injectable 25 Gram(s) IV Push once  enalapril 5 milliGRAM(s) Oral daily  escitalopram 30 milliGRAM(s) Oral daily  furosemide    Tablet 40 milliGRAM(s) Oral daily  gabapentin 400 milliGRAM(s) Oral three times a day  glucagon  Injectable 1 milliGRAM(s) IntraMuscular once  heparin   Injectable 5000 Unit(s) SubCutaneous every 8 hours  influenza  Vaccine (HIGH DOSE) 0.7 milliLiter(s) IntraMuscular once  insulin glargine Injectable (LANTUS) 40 Unit(s) SubCutaneous at bedtime  insulin lispro (ADMELOG) corrective regimen sliding scale   SubCutaneous three times a day before meals  insulin lispro (ADMELOG) corrective regimen sliding scale   SubCutaneous at bedtime  insulin lispro Injectable (ADMELOG) 26 Unit(s) SubCutaneous three times a day with meals  levothyroxine 125 MICROGram(s) Oral daily  methylPREDNISolone sodium succinate Injectable 40 milliGRAM(s) IV Push two times a day  ondansetron   Disintegrating Tablet 4 milliGRAM(s) Oral three times a day  pantoprazole    Tablet 40 milliGRAM(s) Oral before breakfast  polyethylene glycol 3350 17 Gram(s) Oral daily  simvastatin 20 milliGRAM(s) Oral at bedtime  trimethoprim  160 mG/sulfamethoxazole 800 mG 1 Tablet(s) Oral <User Schedule>    MEDICATIONS  (PRN):  bisacodyl Suppository 10 milliGRAM(s) Rectal daily PRN Constipation  diphenhydrAMINE 25 milliGRAM(s) Oral daily PRN Rash and/or Itching  lidocaine/prilocaine Cream 1 Application(s) Topical daily PRN Dressing changes  melatonin 3 milliGRAM(s) Oral at bedtime PRN Insomnia  morphine  - Injectable 3 milliGRAM(s) IV Push every 6 hours PRN mod to severe pain or dyspnea      LABS:    The Labs were reviewed by me   The Radiology was reviewed by me    EKG tracing reviewed by me    12-29    134<L>  |  95<L>  |  28<H>  ----------------------------<  201<H>  4.8   |  27  |  0.88  12-28    132<L>  |  95<L>  |  33<H>  ----------------------------<  278<H>  5.0   |  27  |  0.92  12-27    135  |  97<L>  |  40<H>  ----------------------------<  139<H>  4.6   |  27  |  0.94    Ca    7.6<L>      29 Dec 2021 06:42  Ca    7.9<L>      28 Dec 2021 07:58  Ca    8.3<L>      27 Dec 2021 05:45  Phos  2.7     12-29  Mg     1.80     12-29    TPro  x   /  Alb  3.3  /  TBili  x   /  DBili  x   /  AST  x   /  ALT  x   /  AlkPhos  x   12-28    Magnesium, Serum: 1.80 mg/dL (12-29-21 @ 06:42)  Magnesium, Serum: 1.80 mg/dL (12-28-21 @ 07:58)  Magnesium, Serum: 2.10 mg/dL (12-27-21 @ 05:45)    Phosphorus Level, Serum: 2.7 mg/dL (12-29-21 @ 06:42)  Phosphorus Level, Serum: 1.8 mg/dL (12-28-21 @ 07:58)  Phosphorus Level, Serum: 1.4 mg/dL (12-27-21 @ 05:45)                                              8.2    16.07 )-----------( 292      ( 29 Dec 2021 06:42 )             26.0                         8.1    14.28 )-----------( 287      ( 28 Dec 2021 07:58 )             26.1                         8.5    14.36 )-----------( 293      ( 27 Dec 2021 05:45 )             27.4     CAPILLARY BLOOD GLUCOSE      POCT Blood Glucose.: 210 mg/dL (29 Dec 2021 07:58)  POCT Blood Glucose.: 221 mg/dL (28 Dec 2021 20:34)  POCT Blood Glucose.: 275 mg/dL (28 Dec 2021 16:41)  POCT Blood Glucose.: 357 mg/dL (28 Dec 2021 12:06)        MICROBIOLOGY:     RADIOLOGY:  [ ] Reviewed and interpreted by me    Point of Care Ultrasound Findings:    PFT:    EKG:

## 2021-12-29 NOTE — PROGRESS NOTE ADULT - SUBJECTIVE AND OBJECTIVE BOX
[facility] Division of Hospital Medicine  Noah Montesinos MD  In House Pager [phone]    Patient is a 66y old  Female who presents with a chief complaint of SOB dizziness & nausea x couple of weeks (29 Dec 2021 10:18)      SUBJECTIVE / OVERNIGHT EVENTS:  No overnight events. Labs and vitals reviewed.  Patient seen and examined at bedside, no acute complaints.  No fever, no chills, no SOB, no CP, no n/v/d, no abd pain, no dysuria      MEDICATIONS  (STANDING):  albuterol/ipratropium for Nebulization 3 milliLiter(s) Nebulizer every 6 hours  amLODIPine   Tablet 10 milliGRAM(s) Oral daily  buDESOnide    Inhalation Suspension 0.5 milliGRAM(s) Inhalation two times a day  cholecalciferol 2000 Unit(s) Oral daily  Dakins Solution - 1/4 Strength 1 Application(s) Topical daily  dextrose 40% Gel 15 Gram(s) Oral once  dextrose 5%. 1000 milliLiter(s) (50 mL/Hr) IV Continuous <Continuous>  dextrose 5%. 1000 milliLiter(s) (100 mL/Hr) IV Continuous <Continuous>  dextrose 50% Injectable 25 Gram(s) IV Push once  dextrose 50% Injectable 12.5 Gram(s) IV Push once  dextrose 50% Injectable 25 Gram(s) IV Push once  enalapril 5 milliGRAM(s) Oral daily  escitalopram 30 milliGRAM(s) Oral daily  furosemide    Tablet 40 milliGRAM(s) Oral daily  gabapentin 400 milliGRAM(s) Oral three times a day  glucagon  Injectable 1 milliGRAM(s) IntraMuscular once  heparin   Injectable 5000 Unit(s) SubCutaneous every 8 hours  influenza  Vaccine (HIGH DOSE) 0.7 milliLiter(s) IntraMuscular once  insulin glargine Injectable (LANTUS) 40 Unit(s) SubCutaneous at bedtime  insulin lispro (ADMELOG) corrective regimen sliding scale   SubCutaneous three times a day before meals  insulin lispro (ADMELOG) corrective regimen sliding scale   SubCutaneous at bedtime  insulin lispro Injectable (ADMELOG) 26 Unit(s) SubCutaneous three times a day with meals  levothyroxine 125 MICROGram(s) Oral daily  methylPREDNISolone sodium succinate Injectable 40 milliGRAM(s) IV Push two times a day  ondansetron   Disintegrating Tablet 4 milliGRAM(s) Oral three times a day  pantoprazole    Tablet 40 milliGRAM(s) Oral before breakfast  polyethylene glycol 3350 17 Gram(s) Oral daily  simvastatin 20 milliGRAM(s) Oral at bedtime  trimethoprim  160 mG/sulfamethoxazole 800 mG 1 Tablet(s) Oral <User Schedule>    MEDICATIONS  (PRN):  bisacodyl Suppository 10 milliGRAM(s) Rectal daily PRN Constipation  diphenhydrAMINE 25 milliGRAM(s) Oral daily PRN Rash and/or Itching  lidocaine/prilocaine Cream 1 Application(s) Topical daily PRN Dressing changes  melatonin 3 milliGRAM(s) Oral at bedtime PRN Insomnia  morphine  - Injectable 3 milliGRAM(s) IV Push every 6 hours PRN mod to severe pain or dyspnea    CAPILLARY BLOOD GLUCOSE      POCT Blood Glucose.: 166 mg/dL (29 Dec 2021 12:12)  POCT Blood Glucose.: 210 mg/dL (29 Dec 2021 07:58)  POCT Blood Glucose.: 221 mg/dL (28 Dec 2021 20:34)  POCT Blood Glucose.: 275 mg/dL (28 Dec 2021 16:41)    I&O's Summary    28 Dec 2021 07:01  -  29 Dec 2021 07:00  --------------------------------------------------------  IN: 625 mL / OUT: 425 mL / NET: 200 mL        PHYSICAL EXAM:  Vital Signs Last 24 Hrs  T(C): 37 (29 Dec 2021 13:14), Max: 37 (29 Dec 2021 13:14)  T(F): 98.6 (29 Dec 2021 13:14), Max: 98.6 (29 Dec 2021 13:14)  HR: 97 (29 Dec 2021 13:14) (89 - 100)  BP: 138/61 (29 Dec 2021 13:14) (135/52 - 149/62)  BP(mean): --  RR: 18 (29 Dec 2021 13:14) (16 - 18)  SpO2: 100% (29 Dec 2021 13:14) (95% - 100%)    Gen: NAD; resting in bed. awake and alert.   Pulm: no respiratory distress; no accessory muscle use; CTA b/l; no wheezing; no crackles.   Cards: RRR, nl S1/S2; no obvious murmurs; no LE edema; no JVD  Abd: soft; NT on exam; +bs  Ext: no cyanosis; no joint effusion; no joint pain on palpation.  Skin: no rash; no cyanosis    LABS:                        8.2    16.07 )-----------( 292      ( 29 Dec 2021 06:42 )             26.0     12-29    134<L>  |  95<L>  |  28<H>  ----------------------------<  201<H>  4.8   |  27  |  0.88    Ca    7.6<L>      29 Dec 2021 06:42  Phos  2.7     12-29  Mg     1.80     12-29    TPro  x   /  Alb  3.3  /  TBili  x   /  DBili  x   /  AST  x   /  ALT  x   /  AlkPhos  x   12-28                RADIOLOGY & ADDITIONAL TESTS:  Results Reviewed: Y  Imaging Personally Reviewed: Y  Electrocardiogram Personally Reviewed: Y    COORDINATION OF CARE:  Care Discussed with Consultants/Other Providers [Y/N]: Y  Prior or Outpatient Records Reviewed [Y/N]: Y

## 2021-12-29 NOTE — CHART NOTE - NSCHARTNOTEFT_GEN_A_CORE
: Dr. Anders and Dr. Calloway     INDICATION: Pleural effusion     PROCEDURE:  [ ] LIMITED ECHO  [x ] LIMITED CHEST  [ ] LIMITED RETROPERITONEAL  [ ] LIMITED ABDOMINAL  [ ] LIMITED DVT  [ ] NEEDLE GUIDANCE VASCULAR  [ ] NEEDLE GUIDANCE THORACENTESIS  [ ] NEEDLE GUIDANCE PARACENTESIS  [ ] NEEDLE GUIDANCE PERICARDIOCENTESIS  [ ] OTHER    FINDINGS: small left pleural effusion and no pleural effusion on the right side. Consolidation of the left lower lung       INTERPRETATION: pleural effusion, most likely due to malignancy         Images uploaded to [employer] : Dr. Anders and Dr. Calloway     INDICATION: Pleural effusion     PROCEDURE:  [ ] LIMITED ECHO  [x ] LIMITED CHEST  [ ] LIMITED RETROPERITONEAL  [ ] LIMITED ABDOMINAL  [ ] LIMITED DVT  [ ] NEEDLE GUIDANCE VASCULAR  [ ] NEEDLE GUIDANCE THORACENTESIS  [ ] NEEDLE GUIDANCE PARACENTESIS  [ ] NEEDLE GUIDANCE PERICARDIOCENTESIS  [ ] OTHER    FINDINGS: small left pleural effusion and no pleural effusion on the right side. Consolidation of the left lower lung       INTERPRETATION: pleural effusion with associated pleural nodularity, most likely due to malignancy         Images uploaded to [employer]

## 2021-12-29 NOTE — PROGRESS NOTE ADULT - PROBLEM SELECTOR PLAN 8
Has new TWI inversions on lateral leads. Likely related to L pleural effusion  -pt with no chest pain  -Trop negative so far  -cont to monitor on tele  -TTE 12/20 results inconclusive--technically difficult study, grossly normal LV  - repeat echo pending.

## 2021-12-29 NOTE — PROGRESS NOTE ADULT - PROBLEM SELECTOR PLAN 1
# Acute hypoxic respiratory failure  -CT without pulmonary embolus. Multiple nodules are noted within both lungs. Primary consideration is   metastasis. Findings suggestive of malignant pleural effusions bilaterally.  -s/p Thoracentesis on 12/15 by IR, drained appr 660cc pleural fluid, exudative, path negative  - Pulm consult appreciated - steroids switched to methylprednisolone on 12/27  -duonebs and budesonide nebs standing   - bedside US by pulm showed improvement in pleural effusion, but patient clinically with SOB.   - prior echo with poor windown.   - repeat echo.

## 2021-12-29 NOTE — PROGRESS NOTE ADULT - ASSESSMENT
67 yo F with HTN, HLD, DM, fibromyalgia, Vulvar CA s/p vulva surgery in 2020, completed chemo & RT in 9/21 admitted for shortness of breath, found to have bilateral pleural effusions, L>R and pulmonary nodules, pulmonary consulted for pulmonary nodules and dyspnea.     RECS:  - persistent o2 require of 3L NC  - patient's worsening SOB and hypoxia could reflect increased tumor burden/progression of disease  - patient remains afebrile, without significant leukocytosis  - recommend repeat formal echo as recent TTE with limited results and bedside echo with limited windows.   - s/p IR guided biopsy on 12/23, pathology pending  - s/p diagnostic and therapeutic thoracentesis by IR 12/15, exudative   - Cytopathology from pleural fluid was negative for malignant cells   - c/w solumedrol 40mg IV BID   - c/w Bactrim for PCP PPX. Bactrim DS 3 times a week   - Can increase morphine to 3mg IV q6h PRN for Dyspnea and Severe pain   - Discussed Code status with patient and her daughter patient is a full code     Sam Anders MD   Pulmonary/Critical Care Fellow PGY-6   [facility] Pager #: [phone]  Spectra #: [phone]

## 2021-12-29 NOTE — PROGRESS NOTE ADULT - ASSESSMENT
65 y/o F w/ a PMHx of HTN, HLD, T2DM, fibromyalgia, recurrent vulvar cancer (recently received chemo/RT from 7/2021-9/2021), and anemia (requiring intermittent transfusions recently) who presented with shortness of breath, dizziness, and nausea for multiple weeks, found to have multiple lung nodules and B/L pleural effusions concerning for malignancy, and was admitted to telemetry for further management, s/p thoracentesis on 12/15 (~ 660cc drained). Oncology was consulted for further evaluation.    # Dyspnea  - CTA Chest (12/13): No pulmonary embolus is noted. Multiple nodules are noted within both lungs. Primary consideration is metastasis. Findings suggestive of malignant pleural effusions bilaterally.  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   - bactrim for PCP ppx  - morphine IV PRN for dypsnea  - f/u path from lung nodule biopsy 12/22 (reached out to pathologist 12/28, still undergoing immunostains). If it confirms diagnosis of malignancy will consider urgent treatment with chemotherapy given significant symptom burden     # Recurrent vulvar cancer  - Last recurrence was earlier this year. She received chemo/RT with weekly Cisplatin from 7/12/21-9/1/21.  - Imaging findings on admission are concerning for progressing malignancy  - Pt s/p thoracentesis as noted above with negative cytopathology  - CT A/P with small collection in R inguinal region (likely known R inguinal wound/tract) but does not appear to show other areas of disease that is amenable to biopsy  - s/p IR biopsy of R lung nodule (to establish a diagnosis of metastatic disease and to send for NGS testing if cancer confirmed) 12/22  - will follow up with primary Oncologist Dr. Paulo Carpenter as outpatient     #Hypercalcemia   -This is an oncologic emergency and requires urgent management  - Ca increased to 12.5  - PTH and Vit D low  - f/u PTHrP  - appreciate endo consult  - may be related to hypercalcemia of malignancy  - s/p zoledronic acid (discussed risks/benefits including risk of osteonecrosis of jaw) and calcitonin with normalization of calcium    # Microcytic Anemia  - Occasionally requiring transfusions (last on 12/11)  - Recently underwent a hematologic evaluation as an outpatient. Her workup was notable for a hemoglobin electrophoresis which was c/w beta thalassemia minor. She had a BMBx on 11/9/21 (minimal marrow obtained to evaluate) which showed erythroid hyperplasia, megakaryocytes normal, no evidence of dysplasia.   - Monitor CBC and keep active T+S. Transfuse for Hgb < 7 or if symptomatic  - Primary Hematologist: Dr. Leana Auguste. Continue outpatient follow-up     67 y/o F w/ a PMHx of HTN, HLD, T2DM, fibromyalgia, recurrent vulvar cancer (recently received chemo/RT from 7/2021-9/2021), and anemia (requiring intermittent transfusions recently) who presented with shortness of breath, dizziness, and nausea for multiple weeks, found to have multiple lung nodules and B/L pleural effusions concerning for malignancy, and was admitted to telemetry for further management, s/p thoracentesis on 12/15 (~ 660cc drained). Oncology was consulted for further evaluation.    # Dyspnea  - CTA Chest (12/13): No pulmonary embolus is noted. Multiple nodules are noted within both lungs. Primary consideration is metastasis. Findings suggestive of malignant pleural effusions bilaterally.  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   - bactrim for PCP ppx  - morphine IV PRN for dypsnea  - path from lung nodule biopsy 12/22 consistent with metastatic squamous cell carcinoma (previous vulvar cancer also sq cell ca), sent for NGS and PDL1  - due to concern for recurrence (and now metastatic disease) and significant symptoms (shortness of breath), will tentatively plan to treat patient with chemotherapy 12/30 using carbo/taxol     # Recurrent vulvar cancer  - Last recurrence was earlier this year. She received chemo/RT with weekly Cisplatin from 7/12/21-9/1/21.  - Imaging findings on admission are concerning for progressing malignancy  - Pt s/p thoracentesis as noted above with negative cytopathology  - CT A/P with small collection in R inguinal region (likely known R inguinal wound/tract) but does not appear to show other areas of disease that is amenable to biopsy  - s/p IR biopsy of R lung nodule (to establish a diagnosis of metastatic disease and to send for NGS testing if cancer confirmed) 12/22  - will follow up with primary Oncologist Dr. Paulo Carpenter as outpatient     #Hypercalcemia   -This is an oncologic emergency and requires urgent management  - Ca increased to 12.5  - PTH and Vit D low  - f/u PTHrP  - appreciate endo consult  - may be related to hypercalcemia of malignancy  - s/p zoledronic acid (discussed risks/benefits including risk of osteonecrosis of jaw) and calcitonin with normalization of calcium    # Microcytic Anemia  - Occasionally requiring transfusions (last on 12/11)  - Recently underwent a hematologic evaluation as an outpatient. Her workup was notable for a hemoglobin electrophoresis which was c/w beta thalassemia minor. She had a BMBx on 11/9/21 (minimal marrow obtained to evaluate) which showed erythroid hyperplasia, megakaryocytes normal, no evidence of dysplasia.   - Monitor CBC and keep active T+S. Transfuse for Hgb < 7 or if symptomatic  - Primary Hematologist: Dr. Leana Auguste. Continue outpatient follow-up

## 2021-12-29 NOTE — PROVIDER CONTACT NOTE (HYPOGLYCEMIA EVENT) - NS PROVIDER CONTACT BACKGROUND-HYPO
Age: 66y    Gender: Female    POCT Blood Glucose:  211 mg/dL (12-29-21 @ 21:14)  197 mg/dL (12-29-21 @ 20:55)  53 mg/dL (12-29-21 @ 20:37)  55 mg/dL (12-29-21 @ 20:35)  104 mg/dL (12-29-21 @ 17:19)  82 mg/dL (12-29-21 @ 16:57)  166 mg/dL (12-29-21 @ 12:12)  210 mg/dL (12-29-21 @ 07:58)      eMAR:  dextrose 50% Injectable   12.5 Gram(s) IV Push (12-29-21 @ 20:45)    insulin glargine Injectable (LANTUS)   40 Unit(s) SubCutaneous (12-29-21 @ 21:40)    insulin lispro (ADMELOG) corrective regimen sliding scale   2 Unit(s) SubCutaneous (12-29-21 @ 12:20)   4 Unit(s) SubCutaneous (12-29-21 @ 08:11)    insulin lispro Injectable (ADMELOG)   26 Unit(s) SubCutaneous (12-29-21 @ 12:20)   26 Unit(s) SubCutaneous (12-29-21 @ 08:14)    insulin lispro Injectable (ADMELOG).   16 Unit(s) SubCutaneous (12-29-21 @ 17:22)    levothyroxine   125 MICROGram(s) Oral (12-29-21 @ 06:01)    methylPREDNISolone sodium succinate Injectable   40 milliGRAM(s) IV Push (12-29-21 @ 17:13)   40 milliGRAM(s) IV Push (12-29-21 @ 06:02)    simvastatin   20 milliGRAM(s) Oral (12-29-21 @ 21:08)

## 2021-12-29 NOTE — PROGRESS NOTE ADULT - SUBJECTIVE AND OBJECTIVE BOX
Hematology Oncology Follow-up    INTERVAL HPI/OVERNIGHT EVENTS:  States that work of breathing is the same as yesterday, improved with morphine.       VITAL SIGNS:  T(F): 97.9 (12-29-21 @ 06:03)  HR: 97 (12-29-21 @ 09:55)  BP: 149/62 (12-29-21 @ 06:03)  RR: 18 (12-29-21 @ 06:03)  SpO2: 96% (12-29-21 @ 06:03)  Wt(kg): --    12-28-21 @ 07:01  -  12-29-21 @ 07:00  --------------------------------------------------------  IN: 625 mL / OUT: 425 mL / NET: 200 mL          Review of Systems:  General: denies fevers/chills  Respiratory: + shortness of breath  Cardiovascular: denies chest pain, palpitations  Gastrointestinal: denies nausea, vomiting, abdominal pain, constipation, diarrhea, melena, hematochezia  MSK: denies joint pain or muscle pain  Neuro: denies headache, weakness, or parasthesias  Skin: denies rash, petichiae, echymoses  Psych: denies anxiety or sleep disturbances    PHYSICAL EXAM:  Constitutional: NAD  Respiratory: + crackles bilateral bases, symmetric chest rise, with normal respiratory effort  Cardiovascular: RRR  Gastrointestinal: soft, NTND  Extremities:  no edema  MSK: no obvious abnormalities  Neurological: Grossly intact  Skin: no rash, no echymoses, no petichiae  Psych: normal affect    MEDICATIONS  (STANDING):  albuterol/ipratropium for Nebulization 3 milliLiter(s) Nebulizer every 6 hours  amLODIPine   Tablet 10 milliGRAM(s) Oral daily  buDESOnide    Inhalation Suspension 0.5 milliGRAM(s) Inhalation two times a day  cholecalciferol 2000 Unit(s) Oral daily  Dakins Solution - 1/4 Strength 1 Application(s) Topical daily  dextrose 40% Gel 15 Gram(s) Oral once  dextrose 5%. 1000 milliLiter(s) (50 mL/Hr) IV Continuous <Continuous>  dextrose 5%. 1000 milliLiter(s) (100 mL/Hr) IV Continuous <Continuous>  dextrose 50% Injectable 25 Gram(s) IV Push once  dextrose 50% Injectable 12.5 Gram(s) IV Push once  dextrose 50% Injectable 25 Gram(s) IV Push once  enalapril 5 milliGRAM(s) Oral daily  escitalopram 30 milliGRAM(s) Oral daily  furosemide    Tablet 40 milliGRAM(s) Oral daily  gabapentin 400 milliGRAM(s) Oral three times a day  glucagon  Injectable 1 milliGRAM(s) IntraMuscular once  heparin   Injectable 5000 Unit(s) SubCutaneous every 8 hours  influenza  Vaccine (HIGH DOSE) 0.7 milliLiter(s) IntraMuscular once  insulin glargine Injectable (LANTUS) 40 Unit(s) SubCutaneous at bedtime  insulin lispro (ADMELOG) corrective regimen sliding scale   SubCutaneous at bedtime  insulin lispro (ADMELOG) corrective regimen sliding scale   SubCutaneous three times a day before meals  insulin lispro Injectable (ADMELOG) 26 Unit(s) SubCutaneous three times a day with meals  levothyroxine 125 MICROGram(s) Oral daily  methylPREDNISolone sodium succinate Injectable 40 milliGRAM(s) IV Push two times a day  ondansetron   Disintegrating Tablet 4 milliGRAM(s) Oral three times a day  pantoprazole    Tablet 40 milliGRAM(s) Oral before breakfast  polyethylene glycol 3350 17 Gram(s) Oral daily  simvastatin 20 milliGRAM(s) Oral at bedtime  trimethoprim  160 mG/sulfamethoxazole 800 mG 1 Tablet(s) Oral <User Schedule>    MEDICATIONS  (PRN):  bisacodyl Suppository 10 milliGRAM(s) Rectal daily PRN Constipation  diphenhydrAMINE 25 milliGRAM(s) Oral daily PRN Rash and/or Itching  lidocaine/prilocaine Cream 1 Application(s) Topical daily PRN Dressing changes  melatonin 3 milliGRAM(s) Oral at bedtime PRN Insomnia  morphine  - Injectable 3 milliGRAM(s) IV Push every 6 hours PRN mod to severe pain or dyspnea      No Known Allergies      LABS:                        8.2    16.07 )-----------( 292      ( 29 Dec 2021 06:42 )             26.0     12-29    134<L>  |  95<L>  |  28<H>  ----------------------------<  201<H>  4.8   |  27  |  0.88    Ca    7.6<L>      29 Dec 2021 06:42  Phos  2.7     12-29  Mg     1.80     12-29    TPro  x   /  Alb  3.3  /  TBili  x   /  DBili  x   /  AST  x   /  ALT  x   /  AlkPhos  x   12-28                     RADIOLOGY & ADDITIONAL TESTS:  Studies reviewed.

## 2021-12-29 NOTE — PROGRESS NOTE ADULT - SUBJECTIVE AND OBJECTIVE BOX
Chief Complaint: DM2    History: reports fair appetite, denies n/v. denies s/s of hypoglycemia. Started on solumedrol.    MEDICATIONS  (STANDING):  amLODIPine   Tablet 10 milliGRAM(s) Oral daily  cholecalciferol 2000 Unit(s) Oral daily  Dakins Solution - 1/4 Strength 1 Application(s) Topical daily  dextrose 40% Gel 15 Gram(s) Oral once  dextrose 5%. 1000 milliLiter(s) (50 mL/Hr) IV Continuous <Continuous>  dextrose 5%. 1000 milliLiter(s) (100 mL/Hr) IV Continuous <Continuous>  dextrose 50% Injectable 25 Gram(s) IV Push once  dextrose 50% Injectable 12.5 Gram(s) IV Push once  dextrose 50% Injectable 25 Gram(s) IV Push once  enalapril 5 milliGRAM(s) Oral daily  escitalopram 30 milliGRAM(s) Oral daily  gabapentin 400 milliGRAM(s) Oral three times a day  glucagon  Injectable 1 milliGRAM(s) IntraMuscular once  heparin   Injectable 5000 Unit(s) SubCutaneous every 8 hours  influenza  Vaccine (HIGH DOSE) 0.7 milliLiter(s) IntraMuscular once  insulin glargine Injectable (LANTUS) 40 Unit(s) SubCutaneous at bedtime  insulin lispro (ADMELOG) corrective regimen sliding scale   SubCutaneous at bedtime  insulin lispro (ADMELOG) corrective regimen sliding scale   SubCutaneous three times a day before meals  insulin lispro Injectable (ADMELOG) 20 Unit(s) SubCutaneous three times a day with meals  levothyroxine 125 MICROGram(s) Oral daily  ondansetron   Disintegrating Tablet 4 milliGRAM(s) Oral three times a day  pantoprazole    Tablet 40 milliGRAM(s) Oral before breakfast  polyethylene glycol 3350 17 Gram(s) Oral daily  predniSONE   Tablet 40 milliGRAM(s) Oral daily  senna 2 Tablet(s) Oral at bedtime  simvastatin 20 milliGRAM(s) Oral at bedtime    MEDICATIONS  (PRN):  acetaminophen     Tablet .. 650 milliGRAM(s) Oral every 6 hours PRN Temp greater or equal to 38C (100.4F), Mild Pain (1 - 3), Moderate Pain (4 - 6)  bisacodyl Suppository 10 milliGRAM(s) Rectal daily PRN Constipation  diphenhydrAMINE 25 milliGRAM(s) Oral daily PRN Rash and/or Itching  lidocaine/prilocaine Cream 1 Application(s) Topical daily PRN Dressing changes  melatonin 3 milliGRAM(s) Oral at bedtime PRN Insomnia      Allergies    No Known Allergies    Intolerances      Review of Systems:    ALL OTHER SYSTEMS REVIEWED AND NEGATIVE    PHYSICAL EXAM:  Vital Signs Last 24 Hrs  T(C): 37 (29 Dec 2021 13:14), Max: 37 (29 Dec 2021 13:14)  T(F): 98.6 (29 Dec 2021 13:14), Max: 98.6 (29 Dec 2021 13:14)  HR: 97 (29 Dec 2021 13:14) (89 - 100)  BP: 138/61 (29 Dec 2021 13:14) (123/72 - 149/62)  BP(mean): --  RR: 18 (29 Dec 2021 13:14) (16 - 18)  SpO2: 100% (29 Dec 2021 13:14) (95% - 100%)  GENERAL: NAD, well-groomed, well-developed  EYES: No proptosis, no lid lag, anicteric  HEENT:  Atraumatic, Normocephalic, moist mucous membranes  RESPIRATORY: nonlabored respirations, no wheezing  PSYCH: Alert and oriented x 3, normal affect, normal mood    CAPILLARY BLOOD GLUCOSE    POCT Blood Glucose.: 104 mg/dL (29 Dec 2021 17:19)  POCT Blood Glucose.: 82 mg/dL (29 Dec 2021 16:57)  POCT Blood Glucose.: 166 mg/dL (29 Dec 2021 12:12)  POCT Blood Glucose.: 210 mg/dL (29 Dec 2021 07:58)  POCT Blood Glucose.: 221 mg/dL (28 Dec 2021 20:34)  POCT Blood Glucose.: 275 mg/dL (28 Dec 2021 16:41)  POCT Blood Glucose.: 357 mg/dL (28 Dec 2021 12:06)  POCT Blood Glucose.: 284 mg/dL (28 Dec 2021 07:51)  POCT Blood Glucose.: 383 mg/dL (28 Dec 2021 00:11)  POCT Blood Glucose.: 370 mg/dL (27 Dec 2021 22:17)  POCT Blood Glucose.: 254 mg/dL (27 Dec 2021 21:32)  POCT Blood Glucose.: 274 mg/dL (27 Dec 2021 17:09)  POCT Blood Glucose.: 250 mg/dL (27 Dec 2021 11:26)  POCT Blood Glucose.: 205 mg/dL (27 Dec 2021 07:32)  POCT Blood Glucose.: 202 mg/dL (26 Dec 2021 20:43)  POCT Blood Glucose.: 226 mg/dL (24 Dec 2021 11:19)  POCT Blood Glucose.: 74 mg/dL (24 Dec 2021 07:29)  POCT Blood Glucose.: 190 mg/dL (23 Dec 2021 21:29)  POCT Blood Glucose.: 201 mg/dL (23 Dec 2021 16:57)    12-29    134<L>  |  95<L>  |  28<H>  ----------------------------<  201<H>  4.8   |  27  |  0.88    Ca    7.6<L>      29 Dec 2021 06:42  Phos  2.7     12-29  Mg     1.80     12-29    TPro  x   /  Alb  3.3  /  TBili  x   /  DBili  x   /  AST  x   /  ALT  x   /  AlkPhos  x   12-28        A1C with Estimated Average Glucose Result: 7.7 % (12-14-21 @ 08:15)  A1C with Estimated Average Glucose Result: 8.2 % (09-04-21 @ 23:11)  A1C with Estimated Average Glucose Result: 7.5 % (09-04-21 @ 09:13)  A1C with Estimated Average Glucose Result: 7.7 % (03-25-21 @ 15:08)      Thyroid Function Tests:  12-16 @ 06:47 TSH 11.94 FreeT4 0.9 T3 -- Anti TPO -- Anti Thyroglobulin Ab -- TSI --  12-14 @ 08:15 TSH 10.03 FreeT4 0.8 T3 -- Anti TPO -- Anti Thyroglobulin Ab -- TSI --

## 2021-12-29 NOTE — PROVIDER CONTACT NOTE (HYPOGLYCEMIA EVENT) - NS PROVIDER CONTACT NOTE-TREATMENT-HYPO
as per ACP CORBY peter, give full dose of lantus at bedtime/4 oz Fruit Juice (Specify quantity, date/time)/Dextrose 50% 12.5 Grams IV Push

## 2021-12-29 NOTE — PROGRESS NOTE ADULT - ASSESSMENT
67 yo F with HTN, HLD, type 2DM, hypothyroidism, fibromyalgia, vulvar cancer s/p surgery 2020, RT/chemo 2021, anemia presenting with HERNANDEZ and dizziness with imaging concerning for metastatic disease and pleural effusions bilaterally. Endocrine was consulted for T2DM management.    1. Type 2 Diabetes Mellitus c/b steroid induced hyperglycemia  A1c 7.7%; goal less than 7%  Home Regimen: Tresiba 60 units QHS, Novolog 15-30 units with meals (ISF 1:30, but usually just estimates), Victoza 1.8 mg daily    While inpatient:   BG target 100-180 mg/dl  now on solumedrol  Continue Lantus to 40 units SQ qHS  Change Admelog to 24 units SQ TID before meals - please HOLD if she is not eating  FS in 80s pre dinner, recomended to reduce dose for dinner to 16 units premeal as long as patient eats.  Continue Admelog MODERATE dose correctional scale before meals, moderate dose at bedtime   Please check FSG before meals and QHS  Consistent carbohydrate diet - appreciate RD input, liberalized diet (eliminated sodium restriction, added Glucerna per RD recommendation)  Hypoglycemia protocol     Discharge planning for DM: Resume basal / bolus insulin (dosing TBD), schedule follow up with prior outpatient endocrine provider     2. Chronic steroid use, steroid induced hyperglycemia   On Prednisone 10 mg BID for at least two months, was on Prednisone 40 mg daily, now on solumedrol 40 mg IV BID (12/28)  Insulin dosing as above   Pt cannot stop steroids abruptly as she will have secondary adrenal insufficiency   Patient to follow up in 1/2022 with her doctors with steroid taper      3. Hypothyroidism  12-16 @ 06:47 TSH 11.94 FreeT4 0.9   Levels borderline, would continue with current LT4 dosing for now and followup as outpatient, repeat TFTs in 6-8 weeks  Continue Levothyroxine 125 mcg PO daily, take daily on empty stomach    4. Hypercalcemia   PTH 7  Likely hypercalcemia of malignancy  Corrected calcium 8.6 today   S/p Zometa 4 mg IV (given on 12/20/21, will take about 72h for full effect)  S/p Calcitonin 310 IU SQ q12h x 4 doses (completed 12/22)   Vit D 25-OH LOW at 12.2, recommend supplement (see below)  PTH- suppressed    Followup PTHrp  Check Serum albumin    5. Vitamin D deficiency  Vitamin D 25-OH 12.2  Recommend supplement with Cholecalciferol 2000 units daily    6. Hypertension  Goal BP <130/80  Management as per primary team    7. Hyperlipidemia  Continue Simvastatin 20 mg daily  Followup lipid panel annually as outpatient    Madiha Ontiveros  Nurse Practitioner  Division of Endocrinology & Diabetes  Pager # [phone]      If after 6PM or before 9AM, or on weekends/holidays, please call endocrine answering service for assistance ([phone]).  For nonurgent matters email [email or web address] for assistance.

## 2021-12-30 NOTE — PROGRESS NOTE ADULT - SUBJECTIVE AND OBJECTIVE BOX
[facility] Division of Hospital Medicine  Noah Montesinos MD  In House Pager [phone]    Patient is a 66y old  Female who presents with a chief complaint of SOB dizziness & nausea x couple of weeks (30 Dec 2021 11:58)      SUBJECTIVE / OVERNIGHT EVENTS:  No overnight events. Labs and vitals reviewed. Resp stats remain stable.   Patient seen and examined at bedside, no acute complaints. breathing slightly improve today.   No fever, no chills, no SOB, no CP, no n/v/d, no abd pain, no dysuria      MEDICATIONS  (STANDING):  albuterol/ipratropium for Nebulization 3 milliLiter(s) Nebulizer every 6 hours  amLODIPine   Tablet 10 milliGRAM(s) Oral daily  buDESOnide    Inhalation Suspension 0.5 milliGRAM(s) Inhalation two times a day  CARBOplatin IVPB (eMAR) 480 milliGRAM(s) IV Intermittent once  cholecalciferol 2000 Unit(s) Oral daily  Dakins Solution - 1/4 Strength 1 Application(s) Topical daily  dextrose 40% Gel 15 Gram(s) Oral once  dextrose 5%. 1000 milliLiter(s) (50 mL/Hr) IV Continuous <Continuous>  dextrose 5%. 1000 milliLiter(s) (100 mL/Hr) IV Continuous <Continuous>  dextrose 50% Injectable 25 Gram(s) IV Push once  dextrose 50% Injectable 12.5 Gram(s) IV Push once  dextrose 50% Injectable 25 Gram(s) IV Push once  enalapril 5 milliGRAM(s) Oral daily  escitalopram 30 milliGRAM(s) Oral daily  furosemide    Tablet 40 milliGRAM(s) Oral daily  gabapentin 400 milliGRAM(s) Oral three times a day  glucagon  Injectable 1 milliGRAM(s) IntraMuscular once  heparin   Injectable 5000 Unit(s) SubCutaneous every 8 hours  influenza  Vaccine (HIGH DOSE) 0.7 milliLiter(s) IntraMuscular once  insulin glargine Injectable (LANTUS) 40 Unit(s) SubCutaneous at bedtime  insulin lispro (ADMELOG) corrective regimen sliding scale   SubCutaneous three times a day before meals  insulin lispro (ADMELOG) corrective regimen sliding scale   SubCutaneous at bedtime  insulin lispro Injectable (ADMELOG) 24 Unit(s) SubCutaneous three times a day with meals  levothyroxine 125 MICROGram(s) Oral daily  methylPREDNISolone sodium succinate Injectable 40 milliGRAM(s) IV Push two times a day  ondansetron   Disintegrating Tablet 4 milliGRAM(s) Oral three times a day  pantoprazole    Tablet 40 milliGRAM(s) Oral before breakfast  polyethylene glycol 3350 17 Gram(s) Oral daily  senna 2 Tablet(s) Oral at bedtime  simvastatin 20 milliGRAM(s) Oral at bedtime  trimethoprim  160 mG/sulfamethoxazole 800 mG 1 Tablet(s) Oral <User Schedule>    MEDICATIONS  (PRN):  aluminum hydroxide/magnesium hydroxide/simethicone Suspension 30 milliLiter(s) Oral every 6 hours PRN Dyspepsia  bisacodyl Suppository 10 milliGRAM(s) Rectal daily PRN Constipation  diphenhydrAMINE 25 milliGRAM(s) Oral daily PRN Rash and/or Itching  lidocaine/prilocaine Cream 1 Application(s) Topical daily PRN Dressing changes  melatonin 3 milliGRAM(s) Oral at bedtime PRN Insomnia  morphine  - Injectable 3 milliGRAM(s) IV Push every 6 hours PRN mod to severe pain or dyspnea    CAPILLARY BLOOD GLUCOSE      POCT Blood Glucose.: 298 mg/dL (30 Dec 2021 12:33)  POCT Blood Glucose.: 206 mg/dL (30 Dec 2021 07:48)  POCT Blood Glucose.: 211 mg/dL (29 Dec 2021 21:14)  POCT Blood Glucose.: 197 mg/dL (29 Dec 2021 20:55)  POCT Blood Glucose.: 53 mg/dL (29 Dec 2021 20:37)  POCT Blood Glucose.: 55 mg/dL (29 Dec 2021 20:35)  POCT Blood Glucose.: 104 mg/dL (29 Dec 2021 17:19)  POCT Blood Glucose.: 82 mg/dL (29 Dec 2021 16:57)    I&O's Summary    29 Dec 2021 07:01  -  30 Dec 2021 07:00  --------------------------------------------------------  IN: 250 mL / OUT: 501 mL / NET: -251 mL        PHYSICAL EXAM:  Vital Signs Last 24 Hrs  T(C): 37.1 (30 Dec 2021 12:34), Max: 37.1 (30 Dec 2021 12:34)  T(F): 98.7 (30 Dec 2021 12:34), Max: 98.7 (30 Dec 2021 12:34)  HR: 100 (30 Dec 2021 12:34) (92 - 104)  BP: 139/54 (30 Dec 2021 12:34) (132/74 - 149/77)  BP(mean): --  RR: 18 (30 Dec 2021 12:34) (16 - 18)  SpO2: 97% (30 Dec 2021 12:34) (95% - 100%)    Gen: NAD; resting in bed. awake and alert.   Pulm: no respiratory distress; no accessory muscle use; CTA b/l; no wheezing; no crackles.   Cards: RRR, nl S1/S2; no obvious murmurs; no LE edema; no JVD  Abd: soft; NT on exam; +bs  Ext: no cyanosis; no joint effusion; no joint pain on palpation.  Skin: no rash; no cyanosis    LABS:                        8.2    17.01 )-----------( 245      ( 30 Dec 2021 04:55 )             26.0     12-30    134<L>  |  97<L>  |  29<H>  ----------------------------<  230<H>  5.6<H>   |  28  |  0.81    Ca    8.0<L>      30 Dec 2021 04:55  Phos  2.2     12-30  Mg     1.90     12-30                  RADIOLOGY & ADDITIONAL TESTS:  Results Reviewed: Y  Imaging Personally Reviewed: Y  Electrocardiogram Personally Reviewed: Y    COORDINATION OF CARE:  Care Discussed with Consultants/Other Providers [Y/N]: Y  Prior or Outpatient Records Reviewed [Y/N]: Y

## 2021-12-30 NOTE — PROGRESS NOTE ADULT - ASSESSMENT
67 yo F with HTN, HLD, type 2DM, hypothyroidism, fibromyalgia, vulvar cancer s/p surgery 2020, RT/chemo 2021, anemia presenting with HERNANDEZ and dizziness with imaging concerning for metastatic disease and pleural effusions bilaterally. Endocrine was consulted for T2DM management.    1. Type 2 Diabetes Mellitus c/b steroid induced hyperglycemia  A1c 7.7%; goal less than 7%  Home Regimen: Tresiba 60 units QHS, Novolog 15-30 units with meals (ISF 1:30, but usually just estimates), Victoza 1.8 mg daily    While inpatient:   BG target 100-180 mg/dl  now on solumedrol  Increase Lantus to 44 units SQ qHS  Change Admelog to 23 units SQ TID before meals - please HOLD if she is not eating  FS in 80s pre dinner, recomended to reduce dose for dinner to 16 units premeal as long as patient eats.  Continue Admelog MODERATE dose correctional scale before meals, moderate dose at bedtime   Please check FSG before meals and QHS  Consistent carbohydrate diet - appreciate RD input, liberalized diet (eliminated sodium restriction, added Glucerna per RD recommendation)  Hypoglycemia protocol     Discharge planning for DM: Resume basal / bolus insulin (dosing TBD), schedule follow up with prior outpatient endocrine provider     2. Chronic steroid use, steroid induced hyperglycemia   On Prednisone 10 mg BID for at least two months, was on Prednisone 40 mg daily, now on solumedrol 40 mg IV BID (12/28)  Insulin dosing as above   Pt cannot stop steroids abruptly as she will have secondary adrenal insufficiency   Patient to follow up in 1/2022 with her doctors with steroid taper      3. Hypothyroidism  12-16 @ 06:47 TSH 11.94 FreeT4 0.9   Levels borderline, would continue with current LT4 dosing for now and followup as outpatient, repeat TFTs in 6-8 weeks  Continue Levothyroxine 125 mcg PO daily, take daily on empty stomach    4. Hypercalcemia   PTH 7  Likely hypercalcemia of malignancy  Corrected calcium 8.6 today   S/p Zometa 4 mg IV (given on 12/20/21, will take about 72h for full effect)  S/p Calcitonin 310 IU SQ q12h x 4 doses (completed 12/22)   Vit D 25-OH LOW at 12.2, recommend supplement (see below)  PTH- suppressed    Followup PTHrp  Check Serum albumin    5. Vitamin D deficiency  Vitamin D 25-OH 12.2  Recommend supplement with Cholecalciferol 2000 units daily    6. Hypertension  Goal BP <130/80  Management as per primary team    7. Hyperlipidemia  Continue Simvastatin 20 mg daily  Followup lipid panel annually as outpatient    Madiha Ontiveros  Nurse Practitioner  Division of Endocrinology & Diabetes  Pager # [phone]      If after 6PM or before 9AM, or on weekends/holidays, please call endocrine answering service for assistance ([phone]).  For nonurgent matters email [email or web address] for assistance.

## 2021-12-30 NOTE — PROGRESS NOTE ADULT - SUBJECTIVE AND OBJECTIVE BOX
Chief Complaint: DM2    History: reports fair appetite, denies n/v. denies s/s of hypoglycemia.  on solumedrol.    Does not like hospital food    MEDICATIONS  (STANDING):  amLODIPine   Tablet 10 milliGRAM(s) Oral daily  cholecalciferol 2000 Unit(s) Oral daily  Dakins Solution - 1/4 Strength 1 Application(s) Topical daily  dextrose 40% Gel 15 Gram(s) Oral once  dextrose 5%. 1000 milliLiter(s) (50 mL/Hr) IV Continuous <Continuous>  dextrose 5%. 1000 milliLiter(s) (100 mL/Hr) IV Continuous <Continuous>  dextrose 50% Injectable 25 Gram(s) IV Push once  dextrose 50% Injectable 12.5 Gram(s) IV Push once  dextrose 50% Injectable 25 Gram(s) IV Push once  enalapril 5 milliGRAM(s) Oral daily  escitalopram 30 milliGRAM(s) Oral daily  gabapentin 400 milliGRAM(s) Oral three times a day  glucagon  Injectable 1 milliGRAM(s) IntraMuscular once  heparin   Injectable 5000 Unit(s) SubCutaneous every 8 hours  influenza  Vaccine (HIGH DOSE) 0.7 milliLiter(s) IntraMuscular once  insulin glargine Injectable (LANTUS) 40 Unit(s) SubCutaneous at bedtime  insulin lispro (ADMELOG) corrective regimen sliding scale   SubCutaneous at bedtime  insulin lispro (ADMELOG) corrective regimen sliding scale   SubCutaneous three times a day before meals  insulin lispro Injectable (ADMELOG) 20 Unit(s) SubCutaneous three times a day with meals  levothyroxine 125 MICROGram(s) Oral daily  ondansetron   Disintegrating Tablet 4 milliGRAM(s) Oral three times a day  pantoprazole    Tablet 40 milliGRAM(s) Oral before breakfast  polyethylene glycol 3350 17 Gram(s) Oral daily  predniSONE   Tablet 40 milliGRAM(s) Oral daily  senna 2 Tablet(s) Oral at bedtime  simvastatin 20 milliGRAM(s) Oral at bedtime    MEDICATIONS  (PRN):  acetaminophen     Tablet .. 650 milliGRAM(s) Oral every 6 hours PRN Temp greater or equal to 38C (100.4F), Mild Pain (1 - 3), Moderate Pain (4 - 6)  bisacodyl Suppository 10 milliGRAM(s) Rectal daily PRN Constipation  diphenhydrAMINE 25 milliGRAM(s) Oral daily PRN Rash and/or Itching  lidocaine/prilocaine Cream 1 Application(s) Topical daily PRN Dressing changes  melatonin 3 milliGRAM(s) Oral at bedtime PRN Insomnia      Allergies    No Known Allergies    Intolerances      Review of Systems:    ALL OTHER SYSTEMS REVIEWED AND NEGATIVE    PHYSICAL EXAM:  Vital Signs Last 24 Hrs  T(C): 37.1 (30 Dec 2021 12:34), Max: 37.1 (30 Dec 2021 12:34)  T(F): 98.7 (30 Dec 2021 12:34), Max: 98.7 (30 Dec 2021 12:34)  HR: 100 (30 Dec 2021 17:51) (94 - 104)  BP: 152/67 (30 Dec 2021 17:51) (139/54 - 152/67)  BP(mean): --  RR: 18 (30 Dec 2021 17:51) (18 - 18)  SpO2: 97% (30 Dec 2021 17:51) (95% - 99%)  GENERAL: NAD, well-groomed, well-developed  EYES: No proptosis, no lid lag, anicteric  HEENT:  Atraumatic, Normocephalic, moist mucous membranes  RESPIRATORY: nonlabored respirations, no wheezing  PSYCH: Alert and oriented x 3, normal affect, normal mood    CAPILLARY BLOOD GLUCOSE  POCT Blood Glucose.: 251 mg/dL (30 Dec 2021 18:31)  POCT Blood Glucose.: 183 mg/dL (30 Dec 2021 17:04)  POCT Blood Glucose.: 298 mg/dL (30 Dec 2021 12:33)  POCT Blood Glucose.: 206 mg/dL (30 Dec 2021 07:48)  POCT Blood Glucose.: 211 mg/dL (29 Dec 2021 21:14)  POCT Blood Glucose.: 197 mg/dL (29 Dec 2021 20:55)  POCT Blood Glucose.: 53 mg/dL (29 Dec 2021 20:37)  POCT Blood Glucose.: 55 mg/dL (29 Dec 2021 20:35)  POCT Blood Glucose.: 104 mg/dL (29 Dec 2021 17:19)  POCT Blood Glucose.: 82 mg/dL (29 Dec 2021 16:57)  POCT Blood Glucose.: 166 mg/dL (29 Dec 2021 12:12)  POCT Blood Glucose.: 210 mg/dL (29 Dec 2021 07:58)  POCT Blood Glucose.: 221 mg/dL (28 Dec 2021 20:34)  POCT Blood Glucose.: 275 mg/dL (28 Dec 2021 16:41)  POCT Blood Glucose.: 357 mg/dL (28 Dec 2021 12:06)  POCT Blood Glucose.: 284 mg/dL (28 Dec 2021 07:51)  POCT Blood Glucose.: 383 mg/dL (28 Dec 2021 00:11)  POCT Blood Glucose.: 370 mg/dL (27 Dec 2021 22:17)  POCT Blood Glucose.: 254 mg/dL (27 Dec 2021 21:32)  POCT Blood Glucose.: 274 mg/dL (27 Dec 2021 17:09)  POCT Blood Glucose.: 250 mg/dL (27 Dec 2021 11:26)  POCT Blood Glucose.: 205 mg/dL (27 Dec 2021 07:32)  POCT Blood Glucose.: 202 mg/dL (26 Dec 2021 20:43)  POCT Blood Glucose.: 226 mg/dL (24 Dec 2021 11:19)  POCT Blood Glucose.: 74 mg/dL (24 Dec 2021 07:29)  POCT Blood Glucose.: 190 mg/dL (23 Dec 2021 21:29)  POCT Blood Glucose.: 201 mg/dL (23 Dec 2021 16:57)    12-30    134<L>  |  97<L>  |  29<H>  ----------------------------<  282<H>  5.0   |  26  |  0.86    Ca    7.9<L>      30 Dec 2021 14:35  Phos  2.3     12-30  Mg     1.60     12-30        A1C with Estimated Average Glucose Result: 7.7 % (12-14-21 @ 08:15)  A1C with Estimated Average Glucose Result: 8.2 % (09-04-21 @ 23:11)  A1C with Estimated Average Glucose Result: 7.5 % (09-04-21 @ 09:13)  A1C with Estimated Average Glucose Result: 7.7 % (03-25-21 @ 15:08)      Thyroid Function Tests:  12-16 @ 06:47 TSH 11.94 FreeT4 0.9 T3 -- Anti TPO -- Anti Thyroglobulin Ab -- TSI --  12-14 @ 08:15 TSH 10.03 FreeT4 0.8 T3 -- Anti TPO -- Anti Thyroglobulin Ab -- TSI --

## 2021-12-30 NOTE — PROGRESS NOTE ADULT - SUBJECTIVE AND OBJECTIVE BOX
Hematology Oncology Follow-up    INTERVAL HPI/OVERNIGHT EVENTS:  No o/n events. Shortness of breath is slightly improved from yesterday. Plan for chemo today.    VITAL SIGNS:  T(F): 98.1 (12-30-21 @ 06:16)  HR: 98 (12-30-21 @ 06:32)  BP: 146/70 (12-30-21 @ 06:32)  RR: 18 (12-30-21 @ 06:16)  SpO2: 96% (12-30-21 @ 06:16)  Wt(kg): --    12-29-21 @ 07:01  -  12-30-21 @ 07:00  --------------------------------------------------------  IN: 250 mL / OUT: 501 mL / NET: -251 mL          Review of Systems:  General: denies fevers/chills  Respiratory: + shortness of breath  Cardiovascular: denies chest pain, palpitations  Gastrointestinal: denies nausea, vomiting, abdominal pain, constipation, diarrhea, melena, hematochezia  MSK: denies joint pain or muscle pain  Neuro: denies headache, weakness, or parasthesias  Skin: denies rash, petichiae, echymoses  Psych: denies anxiety or sleep disturbances    PHYSICAL EXAM:  Constitutional: NAD  Respiratory: bilateral crackles, symmetric chest rise, with normal respiratory effort  Cardiovascular: RRR  Gastrointestinal: soft, NTND  Extremities:  no edema  MSK: no obvious abnormalities  Neurological: Grossly intact  Skin: no rash, no echymoses, no petichiae  Psych: normal affect    MEDICATIONS  (STANDING):  albuterol/ipratropium for Nebulization 3 milliLiter(s) Nebulizer every 6 hours  amLODIPine   Tablet 10 milliGRAM(s) Oral daily  buDESOnide    Inhalation Suspension 0.5 milliGRAM(s) Inhalation two times a day  cholecalciferol 2000 Unit(s) Oral daily  Dakins Solution - 1/4 Strength 1 Application(s) Topical daily  dextrose 40% Gel 15 Gram(s) Oral once  dextrose 5%. 1000 milliLiter(s) (50 mL/Hr) IV Continuous <Continuous>  dextrose 5%. 1000 milliLiter(s) (100 mL/Hr) IV Continuous <Continuous>  dextrose 50% Injectable 25 Gram(s) IV Push once  dextrose 50% Injectable 12.5 Gram(s) IV Push once  dextrose 50% Injectable 25 Gram(s) IV Push once  enalapril 5 milliGRAM(s) Oral daily  escitalopram 30 milliGRAM(s) Oral daily  furosemide    Tablet 40 milliGRAM(s) Oral daily  gabapentin 400 milliGRAM(s) Oral three times a day  glucagon  Injectable 1 milliGRAM(s) IntraMuscular once  heparin   Injectable 5000 Unit(s) SubCutaneous every 8 hours  influenza  Vaccine (HIGH DOSE) 0.7 milliLiter(s) IntraMuscular once  insulin glargine Injectable (LANTUS) 40 Unit(s) SubCutaneous at bedtime  insulin lispro (ADMELOG) corrective regimen sliding scale   SubCutaneous three times a day before meals  insulin lispro (ADMELOG) corrective regimen sliding scale   SubCutaneous at bedtime  insulin lispro Injectable (ADMELOG) 24 Unit(s) SubCutaneous three times a day with meals  insulin lispro Injectable (ADMELOG). 12 Unit(s) SubCutaneous once  levothyroxine 125 MICROGram(s) Oral daily  methylPREDNISolone sodium succinate Injectable 40 milliGRAM(s) IV Push two times a day  ondansetron   Disintegrating Tablet 4 milliGRAM(s) Oral three times a day  pantoprazole    Tablet 40 milliGRAM(s) Oral before breakfast  polyethylene glycol 3350 17 Gram(s) Oral daily  senna 2 Tablet(s) Oral at bedtime  simvastatin 20 milliGRAM(s) Oral at bedtime  sodium zirconium cyclosilicate 10 Gram(s) Oral once  trimethoprim  160 mG/sulfamethoxazole 800 mG 1 Tablet(s) Oral <User Schedule>    MEDICATIONS  (PRN):  aluminum hydroxide/magnesium hydroxide/simethicone Suspension 30 milliLiter(s) Oral every 6 hours PRN Dyspepsia  bisacodyl Suppository 10 milliGRAM(s) Rectal daily PRN Constipation  diphenhydrAMINE 25 milliGRAM(s) Oral daily PRN Rash and/or Itching  lidocaine/prilocaine Cream 1 Application(s) Topical daily PRN Dressing changes  melatonin 3 milliGRAM(s) Oral at bedtime PRN Insomnia  morphine  - Injectable 3 milliGRAM(s) IV Push every 6 hours PRN mod to severe pain or dyspnea      No Known Allergies      LABS:                        8.2    17.01 )-----------( 245      ( 30 Dec 2021 04:55 )             26.0     12-30    134<L>  |  97<L>  |  29<H>  ----------------------------<  230<H>  5.6<H>   |  28  |  0.81    Ca    8.0<L>      30 Dec 2021 04:55  Phos  2.2     12-30  Mg     1.90     12-30                       RADIOLOGY & ADDITIONAL TESTS:  Studies reviewed.

## 2021-12-30 NOTE — PROGRESS NOTE ADULT - ASSESSMENT
65 y/o F w/ a PMHx of HTN, HLD, T2DM, fibromyalgia, recurrent vulvar cancer (recently received chemo/RT from 7/2021-9/2021), and anemia (requiring intermittent transfusions recently) who presented with shortness of breath, dizziness, and nausea for multiple weeks, found to have multiple lung nodules and B/L pleural effusions concerning for malignancy, and was admitted to telemetry for further management, s/p thoracentesis on 12/15 (~ 660cc drained). Oncology was consulted for further evaluation.    # Dyspnea  - CTA Chest (12/13): No pulmonary embolus is noted. Multiple nodules are noted within both lungs. Primary consideration is metastasis. Findings suggestive of malignant pleural effusions bilaterally.  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   - bactrim for PCP ppx  - morphine IV PRN for dypsnea  - path from lung nodule biopsy 12/22 consistent with metastatic squamous cell carcinoma (previous vulvar cancer also sq cell ca), sent for NGS and PDL1  - due to concern for recurrence (and now metastatic disease) and significant symptoms (shortness of breath), plan to treat patient with chemotherapy 12/30 using carbo/taxol     # Recurrent vulvar cancer  - Last recurrence was earlier this year. She received chemo/RT with weekly Cisplatin from 7/12/21-9/1/21.  - Imaging findings on admission are concerning for progressing malignancy  - Pt s/p thoracentesis as noted above with negative cytopathology  - CT A/P with small collection in R inguinal region (likely known R inguinal wound/tract) but does not appear to show other areas of disease that is amenable to biopsy  - s/p IR biopsy of R lung nodule which showed squamous cell carcinoma, concern for recurrence/metastatic disease; sent for NGS and PDL1  - will follow up with primary Oncologist Dr. Paulo Carpenter as outpatient     #Hypercalcemia   -This is an oncologic emergency and requires urgent management  - Ca increased to 12.5  - PTH and Vit D low  - f/u PTHrP  - appreciate endo consult  - may be related to hypercalcemia of malignancy  - s/p zoledronic acid (discussed risks/benefits including risk of osteonecrosis of jaw) and calcitonin with normalization of calcium    # Microcytic Anemia  - Occasionally requiring transfusions (last on 12/11)  - Recently underwent a hematologic evaluation as an outpatient. Her workup was notable for a hemoglobin electrophoresis which was c/w beta thalassemia minor. She had a BMBx on 11/9/21 (minimal marrow obtained to evaluate) which showed erythroid hyperplasia, megakaryocytes normal, no evidence of dysplasia.   - Monitor CBC and keep active T+S. Transfuse for Hgb < 7 or if symptomatic  - Primary Hematologist: Dr. Leana Auguste. Continue outpatient follow-up

## 2021-12-30 NOTE — PROGRESS NOTE ADULT - ASSESSMENT
67 yo F with HTN, HLD, DM, fibromyalgia, Vulvar CA s/p vulva surgery in 2020, completed chemo & RT in 9/21 admitted for shortness of breath, found to have bilateral pleural effusions, L>R and pulmonary nodules, pulmonary consulted for pulmonary nodules and dyspnea.     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 Bactrim DS 3 times a week   - c/w morphine IV q6h PRN for Dyspnea and Severe pain   - Discussed Code status with patient and her daughter patient is a full code     Sam Anders MD   Pulmonary/Critical Care Fellow PGY-6   [facility] Pager #: [phone]  Spectra #: [id number]

## 2021-12-30 NOTE — PROGRESS NOTE ADULT - PROBLEM SELECTOR PLAN 1
# Acute hypoxic respiratory failure  -CT without pulmonary embolus. Multiple nodules are noted within both lungs. Primary consideration is   metastasis. Findings suggestive of malignant pleural effusions bilaterally.  -s/p Thoracentesis on 12/15 by IR, drained appr 660cc pleural fluid, exudative, path negative  - Pulm consult appreciated - steroids switched to methylprednisolone on 12/27  -duonebs and budesonide nebs standing   - bedside US by pulm showed improvement in pleural effusion, but patient clinically with SOB.   - prior echo with poor windown.   - repeat echo pending.   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

## 2021-12-30 NOTE — PROGRESS NOTE ADULT - SUBJECTIVE AND OBJECTIVE BOX
CHIEF COMPLAINT:Patient is a 66y old  Female who presents with a chief complaint of SOB dizziness & nausea x couple of weeks (30 Dec 2021 07:57)      Interval Events: mild improvement in her dyspnea. Plan for chemo today    REVIEW OF SYSTEMS:  [x] All other systems negative  [ ] Unable to assess ROS because ________    OBJECTIVE:  ICU Vital Signs Last 24 Hrs  T(C): 36.7 (30 Dec 2021 06:16), Max: 37 (29 Dec 2021 13:14)  T(F): 98.1 (30 Dec 2021 06:16), Max: 98.6 (29 Dec 2021 13:14)  HR: 94 (30 Dec 2021 09:46) (92 - 104)  BP: 146/70 (30 Dec 2021 06:32) (132/74 - 149/77)  BP(mean): --  ABP: --  ABP(mean): --  RR: 18 (30 Dec 2021 06:16) (16 - 18)  SpO2: 99% (30 Dec 2021 09:46) (95% - 100%)        12-29 @ 07:01  -  12-30 @ 07:00  --------------------------------------------------------  IN: 250 mL / OUT: 501 mL / NET: -251 mL        PHYSICAL EXAM:  GENERAL: NAD, well-groomed, well-developed  EYES: EOMI, PERRLA, conjunctiva and sclera clear  ENMT: No tonsillar erythema, exudates, or enlargement; Moist mucous membranes, Good dentition, No lesions  CHEST/LUNG: Decreased BS on the left side. Bilateral crackles   HEART: Regular rate and rhythm; No murmurs, rubs, or gallops  ABDOMEN: Soft, Nontender, Nondistended; Bowel sounds present  VASCULAR:  2+ Peripheral Pulses, No clubbing, cyanosis, or edema  LYMPH: No lymphadenopathy noted  SKIN: No rashes or lesions  NERVOUS SYSTEM:  Alert & Oriented X3    HOSPITAL MEDICATIONS:  MEDICATIONS  (STANDING):  albuterol/ipratropium for Nebulization 3 milliLiter(s) Nebulizer every 6 hours  amLODIPine   Tablet 10 milliGRAM(s) Oral daily  buDESOnide    Inhalation Suspension 0.5 milliGRAM(s) Inhalation two times a day  CARBOplatin IVPB (eMAR) 480 milliGRAM(s) IV Intermittent once  cholecalciferol 2000 Unit(s) Oral daily  Dakins Solution - 1/4 Strength 1 Application(s) Topical daily  dextrose 40% Gel 15 Gram(s) Oral once  dextrose 5%. 1000 milliLiter(s) (50 mL/Hr) IV Continuous <Continuous>  dextrose 5%. 1000 milliLiter(s) (100 mL/Hr) IV Continuous <Continuous>  dextrose 50% Injectable 25 Gram(s) IV Push once  dextrose 50% Injectable 12.5 Gram(s) IV Push once  dextrose 50% Injectable 25 Gram(s) IV Push once  enalapril 5 milliGRAM(s) Oral daily  escitalopram 30 milliGRAM(s) Oral daily  furosemide    Tablet 40 milliGRAM(s) Oral daily  gabapentin 400 milliGRAM(s) Oral three times a day  glucagon  Injectable 1 milliGRAM(s) IntraMuscular once  heparin   Injectable 5000 Unit(s) SubCutaneous every 8 hours  influenza  Vaccine (HIGH DOSE) 0.7 milliLiter(s) IntraMuscular once  insulin glargine Injectable (LANTUS) 40 Unit(s) SubCutaneous at bedtime  insulin lispro (ADMELOG) corrective regimen sliding scale   SubCutaneous at bedtime  insulin lispro (ADMELOG) corrective regimen sliding scale   SubCutaneous three times a day before meals  insulin lispro Injectable (ADMELOG) 24 Unit(s) SubCutaneous three times a day with meals  levothyroxine 125 MICROGram(s) Oral daily  methylPREDNISolone sodium succinate Injectable 40 milliGRAM(s) IV Push two times a day  ondansetron   Disintegrating Tablet 4 milliGRAM(s) Oral three times a day  PACLitaxel IVPB (eMAR) 240 milliGRAM(s) IV Intermittent once  pantoprazole    Tablet 40 milliGRAM(s) Oral before breakfast  polyethylene glycol 3350 17 Gram(s) Oral daily  senna 2 Tablet(s) Oral at bedtime  simvastatin 20 milliGRAM(s) Oral at bedtime  trimethoprim  160 mG/sulfamethoxazole 800 mG 1 Tablet(s) Oral <User Schedule>    MEDICATIONS  (PRN):  aluminum hydroxide/magnesium hydroxide/simethicone Suspension 30 milliLiter(s) Oral every 6 hours PRN Dyspepsia  bisacodyl Suppository 10 milliGRAM(s) Rectal daily PRN Constipation  diphenhydrAMINE 25 milliGRAM(s) Oral daily PRN Rash and/or Itching  lidocaine/prilocaine Cream 1 Application(s) Topical daily PRN Dressing changes  melatonin 3 milliGRAM(s) Oral at bedtime PRN Insomnia  morphine  - Injectable 3 milliGRAM(s) IV Push every 6 hours PRN mod to severe pain or dyspnea      LABS:    The Labs were reviewed by me   The Radiology was reviewed by me    EKG tracing reviewed by me    12-30    134<L>  |  97<L>  |  29<H>  ----------------------------<  230<H>  5.6<H>   |  28  |  0.81  12-29    134<L>  |  95<L>  |  28<H>  ----------------------------<  201<H>  4.8   |  27  |  0.88  12-28    132<L>  |  95<L>  |  33<H>  ----------------------------<  278<H>  5.0   |  27  |  0.92    Ca    8.0<L>      30 Dec 2021 04:55  Ca    7.6<L>      29 Dec 2021 06:42  Ca    7.9<L>      28 Dec 2021 07:58  Phos  2.2     12-30  Mg     1.90     12-30    TPro  x   /  Alb  3.3  /  TBili  x   /  DBili  x   /  AST  x   /  ALT  x   /  AlkPhos  x   12-28    Magnesium, Serum: 1.90 mg/dL (12-30-21 @ 04:55)  Magnesium, Serum: 1.80 mg/dL (12-29-21 @ 06:42)  Magnesium, Serum: 1.80 mg/dL (12-28-21 @ 07:58)    Phosphorus Level, Serum: 2.2 mg/dL (12-30-21 @ 04:55)  Phosphorus Level, Serum: 2.7 mg/dL (12-29-21 @ 06:42)  Phosphorus Level, Serum: 1.8 mg/dL (12-28-21 @ 07:58)                                              8.2    17.01 )-----------( 245      ( 30 Dec 2021 04:55 )             26.0                         8.2    16.07 )-----------( 292      ( 29 Dec 2021 06:42 )             26.0                         8.1    14.28 )-----------( 287      ( 28 Dec 2021 07:58 )             26.1     CAPILLARY BLOOD GLUCOSE      POCT Blood Glucose.: 206 mg/dL (30 Dec 2021 07:48)  POCT Blood Glucose.: 211 mg/dL (29 Dec 2021 21:14)  POCT Blood Glucose.: 197 mg/dL (29 Dec 2021 20:55)  POCT Blood Glucose.: 53 mg/dL (29 Dec 2021 20:37)  POCT Blood Glucose.: 55 mg/dL (29 Dec 2021 20:35)  POCT Blood Glucose.: 104 mg/dL (29 Dec 2021 17:19)  POCT Blood Glucose.: 82 mg/dL (29 Dec 2021 16:57)  POCT Blood Glucose.: 166 mg/dL (29 Dec 2021 12:12)        MICROBIOLOGY:     RADIOLOGY:  [ ] Reviewed and interpreted by me    Point of Care Ultrasound Findings:    PFT:    EKG:

## 2021-12-31 NOTE — PROGRESS NOTE ADULT - ASSESSMENT
67 yo F w/ PMHx of HTN, HLD, DM, fibromyalgia, Vulvar CA s/p vulva surgery in 2020, completed chemo & RT in 9/21, PMR (on chronic steroids, 10 mg BID), now p/w SOB. CTPA with out PE but multiple nodules are noted within both lungs. Primary consideration is metastasis. Findings suggestive of malignant pleural effusions bilaterally, s/p thora this admission by IR 12/15. Now with worsening hypercalcemia. Also on a course of zosyn for inguinal wound (thought to be possible source of fever, but not entirely clear)-- ID recommended 10 day course of augmentin (12/15-12/24). Pulm nodule bx 12/22. Concern for reaccumulation of pleural effusion on CXR 12/20. Pulm recommended IV steroids. s/p chemo yesterday.

## 2021-12-31 NOTE — PROGRESS NOTE ADULT - SUBJECTIVE AND OBJECTIVE BOX
[facility] Division of Hospital Medicine  Noah Montesinos MD  In House Pager [phone]    Patient is a 66y old  Female who presents with a chief complaint of SOB dizziness & nausea x couple of weeks (31 Dec 2021 08:23)      SUBJECTIVE / OVERNIGHT EVENTS:  No overnight events. Labs and vitals reviewed.  Patient seen and examined at bedside, no acute complaints. reports slightly increased WOB this morning. feeling nauseous after chemo yesterday.   No fever, no chills, no SOB, no CP, no abd pain, no dysuria      MEDICATIONS  (STANDING):  albuterol/ipratropium for Nebulization 3 milliLiter(s) Nebulizer every 6 hours  amLODIPine   Tablet 10 milliGRAM(s) Oral daily  buDESOnide    Inhalation Suspension 0.5 milliGRAM(s) Inhalation two times a day  budesonide 160 MICROgram(s)/formoterol 4.5 MICROgram(s) Inhaler 2 Puff(s) Inhalation two times a day  cholecalciferol 2000 Unit(s) Oral daily  Dakins Solution - 1/4 Strength 1 Application(s) Topical daily  dextrose 40% Gel 15 Gram(s) Oral once  dextrose 5%. 1000 milliLiter(s) (50 mL/Hr) IV Continuous <Continuous>  dextrose 5%. 1000 milliLiter(s) (100 mL/Hr) IV Continuous <Continuous>  dextrose 50% Injectable 25 Gram(s) IV Push once  dextrose 50% Injectable 12.5 Gram(s) IV Push once  dextrose 50% Injectable 25 Gram(s) IV Push once  enalapril 5 milliGRAM(s) Oral daily  escitalopram 30 milliGRAM(s) Oral daily  furosemide    Tablet 40 milliGRAM(s) Oral daily  gabapentin 400 milliGRAM(s) Oral three times a day  glucagon  Injectable 1 milliGRAM(s) IntraMuscular once  heparin   Injectable 5000 Unit(s) SubCutaneous every 8 hours  influenza  Vaccine (HIGH DOSE) 0.7 milliLiter(s) IntraMuscular once  insulin glargine Injectable (LANTUS) 44 Unit(s) SubCutaneous at bedtime  insulin lispro (ADMELOG) corrective regimen sliding scale   SubCutaneous three times a day before meals  insulin lispro (ADMELOG) corrective regimen sliding scale   SubCutaneous at bedtime  insulin lispro Injectable (ADMELOG) 23 Unit(s) SubCutaneous three times a day with meals  levothyroxine 125 MICROGram(s) Oral daily  methylPREDNISolone sodium succinate Injectable 40 milliGRAM(s) IV Push two times a day  pantoprazole    Tablet 40 milliGRAM(s) Oral before breakfast  polyethylene glycol 3350 17 Gram(s) Oral daily  senna 2 Tablet(s) Oral at bedtime  simvastatin 20 milliGRAM(s) Oral at bedtime  trimethoprim  160 mG/sulfamethoxazole 800 mG 1 Tablet(s) Oral <User Schedule>    MEDICATIONS  (PRN):  aluminum hydroxide/magnesium hydroxide/simethicone Suspension 30 milliLiter(s) Oral every 6 hours PRN Dyspepsia  bisacodyl Suppository 10 milliGRAM(s) Rectal daily PRN Constipation  diphenhydrAMINE 25 milliGRAM(s) Oral daily PRN Rash and/or Itching  lactulose Syrup 10 Gram(s) Oral every 6 hours PRN constipation  lidocaine/prilocaine Cream 1 Application(s) Topical daily PRN Dressing changes  melatonin 3 milliGRAM(s) Oral at bedtime PRN Insomnia  morphine  - Injectable 3 milliGRAM(s) IV Push every 4 hours PRN mod to severe pain or dyspnea  ondansetron Injectable 4 milliGRAM(s) IV Push three times a day PRN Nausea and/or Vomiting    CAPILLARY BLOOD GLUCOSE      POCT Blood Glucose.: 209 mg/dL (31 Dec 2021 11:34)  POCT Blood Glucose.: 169 mg/dL (31 Dec 2021 07:32)  POCT Blood Glucose.: 278 mg/dL (30 Dec 2021 21:25)  POCT Blood Glucose.: 251 mg/dL (30 Dec 2021 18:31)  POCT Blood Glucose.: 183 mg/dL (30 Dec 2021 17:04)    I&O's Summary    30 Dec 2021 07:01  -  31 Dec 2021 07:00  --------------------------------------------------------  IN: 1050 mL / OUT: 150 mL / NET: 900 mL        PHYSICAL EXAM:  Vital Signs Last 24 Hrs  T(C): 36.8 (31 Dec 2021 12:36), Max: 37.1 (31 Dec 2021 07:39)  T(F): 98.3 (31 Dec 2021 12:36), Max: 98.7 (31 Dec 2021 07:39)  HR: 106 (31 Dec 2021 12:36) (96 - 106)  BP: 135/51 (31 Dec 2021 12:36) (129/89 - 155/83)  BP(mean): --  RR: 18 (31 Dec 2021 12:36) (18 - 20)  SpO2: 97% (31 Dec 2021 12:36) (97% - 98%)    Gen: NAD; resting in bed. awake and alert.   Pulm: no respiratory distress; no accessory muscle use; CTA b/l; no wheezing; no crackles.   Cards: RRR, nl S1/S2; no obvious murmurs; no LE edema; no JVD  Abd: soft; tender on exam; +bs  Ext: no cyanosis; no joint effusion; no joint pain on palpation.  Skin: no rash; no cyanosis    LABS:                        8.4    16.76 )-----------( 254      ( 31 Dec 2021 05:20 )             26.7     12-31    139  |  99  |  32<H>  ----------------------------<  132<H>  5.0   |  28  |  0.78    Ca    8.1<L>      31 Dec 2021 05:20  Phos  1.8     12-31  Mg     1.90     12-31                  RADIOLOGY & ADDITIONAL TESTS:  Results Reviewed: Y  Imaging Personally Reviewed: Y  Electrocardiogram Personally Reviewed: Y    COORDINATION OF CARE:  Care Discussed with Consultants/Other Providers [Y/N]: Y  Prior or Outpatient Records Reviewed [Y/N]: Y

## 2021-12-31 NOTE — PROGRESS NOTE ADULT - ASSESSMENT
65 y/o F w/ a PMHx of HTN, HLD, T2DM, fibromyalgia, recurrent vulvar cancer (recently received chemo/RT from 7/2021-9/2021), and anemia (requiring intermittent transfusions recently) who presented with shortness of breath, dizziness, and nausea for multiple weeks, found to have multiple lung nodules and B/L pleural effusions concerning for malignancy, and was admitted to telemetry for further management, s/p thoracentesis on 12/15 (~ 660cc drained). Oncology was consulted for further evaluation.    # Dyspnea  - CTA Chest (12/13): No pulmonary embolus is noted. Multiple nodules are noted within both lungs. Primary consideration is metastasis. Findings suggestive of malignant pleural effusions bilaterally.  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   - bactrim for PCP ppx  - morphine IV PRN for dypsnea  - path from lung nodule biopsy 12/22 consistent with metastatic squamous cell carcinoma (previous vulvar cancer also sq cell ca), sent for NGS and PDL1  - due to concern for recurrence (and now metastatic disease) and significant symptoms (shortness of breath), patient started on carboplatin + paclitaxel on 12/30, which she is tolerating fairly well as of 12/31    # nausea  - please augment anti-emetic coverage for patient's nausea in setting of chemotherapy administration    # constipation  - patient having constipation in setting of opiates and anti-emetics, has not had bowel mvmt yesterday despite suppository  - please augment stool softener and cathartics in light of above    # Recurrent vulvar cancer  - Last recurrence was earlier this year. She received chemo/RT with weekly Cisplatin from 7/12/21-9/1/21.  - Imaging findings on admission are concerning for progressing malignancy  - Pt s/p thoracentesis as noted above with negative cytopathology  - CT A/P with small collection in R inguinal region (likely known R inguinal wound/tract) but does not appear to show other areas of disease that is amenable to biopsy  - s/p IR biopsy of R lung nodule which showed squamous cell carcinoma, concern for recurrence/metastatic disease; sent for NGS and PDL1  - will follow up with primary Oncologist Dr. Paulo Carpenter as outpatient     #Hypercalcemia   -This is an oncologic emergency and requires urgent management  - Ca increased to 12.5  - PTH and Vit D low  - f/u PTHrP  - appreciate endo consult  - may be related to hypercalcemia of malignancy  - s/p zoledronic acid (discussed risks/benefits including risk of osteonecrosis of jaw) and calcitonin with normalization of calcium    # Microcytic Anemia  - Occasionally requiring transfusions (last on 12/11)  - Recently underwent a hematologic evaluation as an outpatient. Her workup was notable for a hemoglobin electrophoresis which was c/w beta thalassemia minor. She had a BMBx on 11/9/21 (minimal marrow obtained to evaluate) which showed erythroid hyperplasia, megakaryocytes normal, no evidence of dysplasia.   - Monitor CBC and keep active T+S. Transfuse for Hgb < 7 or if symptomatic  - Primary Hematologist: Dr. Leana Auguste. Continue outpatient follow-up    Joon Anderson MD PhD  Oncology Attending providing holiday weekend coverage

## 2021-12-31 NOTE — PROGRESS NOTE ADULT - PROBLEM SELECTOR PLAN 1
# Acute hypoxic respiratory failure  -CT without pulmonary embolus. Multiple nodules are noted within both lungs. Primary consideration is   metastasis. Findings suggestive of malignant pleural effusions bilaterally.  -s/p Thoracentesis on 12/15 by IR, drained appr 660cc pleural fluid, exudative, path negative  - Pulm consult appreciated - steroids switched to methylprednisolone on 12/27  -duonebs and budesonide nebs standing   - bedside US by pulm showed improvement in pleural effusion, but patient clinically with SOB.   - prior echo with poor window.   - repeat echo pending.   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

## 2021-12-31 NOTE — PROGRESS NOTE ADULT - SUBJECTIVE AND OBJECTIVE BOX
Hematology Oncology Follow-up    INTERVAL HPI/OVERNIGHT EVENTS:  No substantial o/n events.   12/30 was C1 carboplatin+paclitaxel  subjective:  - did not have bowel mvmt despite suppository this morning  - having substantial nausea this AM  - no pain  - breathing is not worse    VITAL SIGNS:  T(F): 98.1 (12-30-21 @ 06:16)  HR: 98 (12-30-21 @ 06:32)  BP: 146/70 (12-30-21 @ 06:32)  RR: 18 (12-30-21 @ 06:16)  SpO2: 96% (12-30-21 @ 06:16)  Wt(kg): --    12-29-21 @ 07:01  -  12-30-21 @ 07:00  --------------------------------------------------------  IN: 250 mL / OUT: 501 mL / NET: -251 mL          Review of Systems:  General: denies fevers/chills  Respiratory: + shortness of breath  Cardiovascular: denies chest pain, palpitations  Gastrointestinal: denies nausea, vomiting, abdominal pain, constipation, diarrhea, melena, hematochezia  MSK: denies joint pain or muscle pain  Neuro: denies headache, weakness, or parasthesias  Skin: denies rash, petichiae, echymoses  Psych: denies anxiety or sleep disturbances    PHYSICAL EXAM:  Constitutional: NAD  Respiratory: bilateral crackles, symmetric chest rise, with normal respiratory effort  Cardiovascular: RRR  Gastrointestinal: soft, NTND  Extremities:  no edema  MSK: no obvious abnormalities  Neurological: Grossly intact  Skin: no rash, no echymoses, no petichiae  Psych: normal affect    MEDICATIONS  (STANDING):  albuterol/ipratropium for Nebulization 3 milliLiter(s) Nebulizer every 6 hours  amLODIPine   Tablet 10 milliGRAM(s) Oral daily  buDESOnide    Inhalation Suspension 0.5 milliGRAM(s) Inhalation two times a day  cholecalciferol 2000 Unit(s) Oral daily  Dakins Solution - 1/4 Strength 1 Application(s) Topical daily  dextrose 40% Gel 15 Gram(s) Oral once  dextrose 5%. 1000 milliLiter(s) (50 mL/Hr) IV Continuous <Continuous>  dextrose 5%. 1000 milliLiter(s) (100 mL/Hr) IV Continuous <Continuous>  dextrose 50% Injectable 25 Gram(s) IV Push once  dextrose 50% Injectable 12.5 Gram(s) IV Push once  dextrose 50% Injectable 25 Gram(s) IV Push once  enalapril 5 milliGRAM(s) Oral daily  escitalopram 30 milliGRAM(s) Oral daily  furosemide    Tablet 40 milliGRAM(s) Oral daily  gabapentin 400 milliGRAM(s) Oral three times a day  glucagon  Injectable 1 milliGRAM(s) IntraMuscular once  heparin   Injectable 5000 Unit(s) SubCutaneous every 8 hours  influenza  Vaccine (HIGH DOSE) 0.7 milliLiter(s) IntraMuscular once  insulin glargine Injectable (LANTUS) 40 Unit(s) SubCutaneous at bedtime  insulin lispro (ADMELOG) corrective regimen sliding scale   SubCutaneous three times a day before meals  insulin lispro (ADMELOG) corrective regimen sliding scale   SubCutaneous at bedtime  insulin lispro Injectable (ADMELOG) 24 Unit(s) SubCutaneous three times a day with meals  insulin lispro Injectable (ADMELOG). 12 Unit(s) SubCutaneous once  levothyroxine 125 MICROGram(s) Oral daily  methylPREDNISolone sodium succinate Injectable 40 milliGRAM(s) IV Push two times a day  ondansetron   Disintegrating Tablet 4 milliGRAM(s) Oral three times a day  pantoprazole    Tablet 40 milliGRAM(s) Oral before breakfast  polyethylene glycol 3350 17 Gram(s) Oral daily  senna 2 Tablet(s) Oral at bedtime  simvastatin 20 milliGRAM(s) Oral at bedtime  sodium zirconium cyclosilicate 10 Gram(s) Oral once  trimethoprim  160 mG/sulfamethoxazole 800 mG 1 Tablet(s) Oral <User Schedule>    MEDICATIONS  (PRN):  aluminum hydroxide/magnesium hydroxide/simethicone Suspension 30 milliLiter(s) Oral every 6 hours PRN Dyspepsia  bisacodyl Suppository 10 milliGRAM(s) Rectal daily PRN Constipation  diphenhydrAMINE 25 milliGRAM(s) Oral daily PRN Rash and/or Itching  lidocaine/prilocaine Cream 1 Application(s) Topical daily PRN Dressing changes  melatonin 3 milliGRAM(s) Oral at bedtime PRN Insomnia  morphine  - Injectable 3 milliGRAM(s) IV Push every 6 hours PRN mod to severe pain or dyspnea      No Known Allergies      LABS:                        8.2    17.01 )-----------( 245      ( 30 Dec 2021 04:55 )             26.0     12-30    134<L>  |  97<L>  |  29<H>  ----------------------------<  230<H>  5.6<H>   |  28  |  0.81    Ca    8.0<L>      30 Dec 2021 04:55  Phos  2.2     12-30  Mg     1.90     12-30                       RADIOLOGY & ADDITIONAL TESTS:  Studies reviewed.

## 2021-12-31 NOTE — PROGRESS NOTE ADULT - PROBLEM SELECTOR PLAN 2
-daily Ca and albumin  -could be 2/2 hypercalcemia of malignancy;  low PTH,  low vitamin D 25, 1, 25, and PTHRP still pending  -will d/c triemterene/hctz given these drugs can worsen hyperCa  - F/u w Endo  - Calcitonin q 12 hrs x 4 doses, Zometa 12/20  - Replete vit D

## 2022-01-01 ENCOUNTER — RESULT REVIEW (OUTPATIENT)
Age: 67
End: 2022-01-01

## 2022-01-01 ENCOUNTER — RX RENEWAL (OUTPATIENT)
Age: 67
End: 2022-01-01

## 2022-01-01 ENCOUNTER — APPOINTMENT (OUTPATIENT)
Dept: RHEUMATOLOGY | Facility: CLINIC | Age: 67
End: 2022-01-01

## 2022-01-01 VITALS
OXYGEN SATURATION: 94 % | HEART RATE: 115 BPM | SYSTOLIC BLOOD PRESSURE: 115 MMHG | TEMPERATURE: 99 F | DIASTOLIC BLOOD PRESSURE: 53 MMHG

## 2022-01-01 DIAGNOSIS — F41.9 ANXIETY DISORDER, UNSPECIFIED: ICD-10-CM

## 2022-01-01 DIAGNOSIS — K59.00 CONSTIPATION, UNSPECIFIED: ICD-10-CM

## 2022-01-01 DIAGNOSIS — R52 PAIN, UNSPECIFIED: ICD-10-CM

## 2022-01-01 DIAGNOSIS — E87.5 HYPERKALEMIA: ICD-10-CM

## 2022-01-01 DIAGNOSIS — J96.01 ACUTE RESPIRATORY FAILURE WITH HYPOXIA: ICD-10-CM

## 2022-01-01 DIAGNOSIS — C79.9 SECONDARY MALIGNANT NEOPLASM OF UNSPECIFIED SITE: ICD-10-CM

## 2022-01-01 DIAGNOSIS — R53.81 OTHER MALAISE: ICD-10-CM

## 2022-01-01 DIAGNOSIS — Z29.9 ENCOUNTER FOR PROPHYLACTIC MEASURES, UNSPECIFIED: ICD-10-CM

## 2022-01-01 DIAGNOSIS — R68.89 OTHER GENERAL SYMPTOMS AND SIGNS: ICD-10-CM

## 2022-01-01 DIAGNOSIS — Z51.5 ENCOUNTER FOR PALLIATIVE CARE: ICD-10-CM

## 2022-01-01 DIAGNOSIS — K56.7 ILEUS, UNSPECIFIED: ICD-10-CM

## 2022-01-01 LAB
ALBUMIN FLD-MCNC: 1.9 G/DL — SIGNIFICANT CHANGE UP
ALBUMIN SERPL ELPH-MCNC: 3.2 G/DL — LOW (ref 3.3–5)
ALBUMIN SERPL ELPH-MCNC: 3.2 G/DL — LOW (ref 3.3–5)
ALBUMIN SERPL ELPH-MCNC: 3.6 G/DL — SIGNIFICANT CHANGE UP (ref 3.3–5)
ALP SERPL-CCNC: 62 U/L — SIGNIFICANT CHANGE UP (ref 40–120)
ALP SERPL-CCNC: 64 U/L — SIGNIFICANT CHANGE UP (ref 40–120)
ALP SERPL-CCNC: 68 U/L — SIGNIFICANT CHANGE UP (ref 40–120)
ALT FLD-CCNC: 12 U/L — SIGNIFICANT CHANGE UP (ref 4–33)
ALT FLD-CCNC: 12 U/L — SIGNIFICANT CHANGE UP (ref 4–33)
ALT FLD-CCNC: 15 U/L — SIGNIFICANT CHANGE UP (ref 4–33)
ANION GAP SERPL CALC-SCNC: 10 MMOL/L — SIGNIFICANT CHANGE UP (ref 7–14)
ANION GAP SERPL CALC-SCNC: 10 MMOL/L — SIGNIFICANT CHANGE UP (ref 7–14)
ANION GAP SERPL CALC-SCNC: 13 MMOL/L — SIGNIFICANT CHANGE UP (ref 7–14)
ANION GAP SERPL CALC-SCNC: 9 MMOL/L — SIGNIFICANT CHANGE UP (ref 7–14)
APTT BLD: 22.5 SEC — LOW (ref 27–36.3)
AST SERPL-CCNC: 10 U/L — SIGNIFICANT CHANGE UP (ref 4–32)
AST SERPL-CCNC: 10 U/L — SIGNIFICANT CHANGE UP (ref 4–32)
AST SERPL-CCNC: 9 U/L — SIGNIFICANT CHANGE UP (ref 4–32)
B PERT IGG+IGM PNL SER: CLEAR — SIGNIFICANT CHANGE UP
BILIRUB SERPL-MCNC: 0.2 MG/DL — SIGNIFICANT CHANGE UP (ref 0.2–1.2)
BILIRUB SERPL-MCNC: 0.2 MG/DL — SIGNIFICANT CHANGE UP (ref 0.2–1.2)
BILIRUB SERPL-MCNC: 0.3 MG/DL — SIGNIFICANT CHANGE UP (ref 0.2–1.2)
BLD GP AB SCN SERPL QL: NEGATIVE — SIGNIFICANT CHANGE UP
BLOOD GAS ARTERIAL COMPREHENSIVE RESULT: SIGNIFICANT CHANGE UP
BLOOD GAS VENOUS COMPREHENSIVE RESULT: SIGNIFICANT CHANGE UP
BUN SERPL-MCNC: 47 MG/DL — HIGH (ref 7–23)
BUN SERPL-MCNC: 48 MG/DL — HIGH (ref 7–23)
BUN SERPL-MCNC: 50 MG/DL — HIGH (ref 7–23)
BUN SERPL-MCNC: 51 MG/DL — HIGH (ref 7–23)
BUN SERPL-MCNC: 53 MG/DL — HIGH (ref 7–23)
BUN SERPL-MCNC: 54 MG/DL — HIGH (ref 7–23)
CALCIUM SERPL-MCNC: 7.2 MG/DL — LOW (ref 8.4–10.5)
CALCIUM SERPL-MCNC: 7.5 MG/DL — LOW (ref 8.4–10.5)
CALCIUM SERPL-MCNC: 7.5 MG/DL — LOW (ref 8.4–10.5)
CALCIUM SERPL-MCNC: 8.5 MG/DL — SIGNIFICANT CHANGE UP (ref 8.4–10.5)
CALCIUM SERPL-MCNC: 8.5 MG/DL — SIGNIFICANT CHANGE UP (ref 8.4–10.5)
CALCIUM SERPL-MCNC: 8.8 MG/DL — SIGNIFICANT CHANGE UP (ref 8.4–10.5)
CHLORIDE SERPL-SCNC: 94 MMOL/L — LOW (ref 98–107)
CHLORIDE SERPL-SCNC: 95 MMOL/L — LOW (ref 98–107)
CHLORIDE SERPL-SCNC: 95 MMOL/L — LOW (ref 98–107)
CHLORIDE SERPL-SCNC: 96 MMOL/L — LOW (ref 98–107)
CHLORIDE SERPL-SCNC: 96 MMOL/L — LOW (ref 98–107)
CHLORIDE SERPL-SCNC: 97 MMOL/L — LOW (ref 98–107)
CK MB BLD-MCNC: 2.7 % — HIGH (ref 0–2.5)
CK MB CFR SERPL CALC: 1.2 NG/ML — SIGNIFICANT CHANGE UP
CK SERPL-CCNC: 45 U/L — SIGNIFICANT CHANGE UP (ref 25–170)
CO2 SERPL-SCNC: 24 MMOL/L — SIGNIFICANT CHANGE UP (ref 22–31)
CO2 SERPL-SCNC: 24 MMOL/L — SIGNIFICANT CHANGE UP (ref 22–31)
CO2 SERPL-SCNC: 26 MMOL/L — SIGNIFICANT CHANGE UP (ref 22–31)
CO2 SERPL-SCNC: 28 MMOL/L — SIGNIFICANT CHANGE UP (ref 22–31)
CO2 SERPL-SCNC: 28 MMOL/L — SIGNIFICANT CHANGE UP (ref 22–31)
CO2 SERPL-SCNC: 29 MMOL/L — SIGNIFICANT CHANGE UP (ref 22–31)
COLOR FLD: ABNORMAL
COMMENT - FLUIDS: SIGNIFICANT CHANGE UP
CREAT SERPL-MCNC: 0.65 MG/DL — SIGNIFICANT CHANGE UP (ref 0.5–1.3)
CREAT SERPL-MCNC: 0.71 MG/DL — SIGNIFICANT CHANGE UP (ref 0.5–1.3)
CREAT SERPL-MCNC: 0.75 MG/DL — SIGNIFICANT CHANGE UP (ref 0.5–1.3)
CREAT SERPL-MCNC: 0.94 MG/DL — SIGNIFICANT CHANGE UP (ref 0.5–1.3)
CREAT SERPL-MCNC: 1.11 MG/DL — SIGNIFICANT CHANGE UP (ref 0.5–1.3)
CREAT SERPL-MCNC: 1.13 MG/DL — SIGNIFICANT CHANGE UP (ref 0.5–1.3)
EOSINOPHIL # FLD: 0 % — SIGNIFICANT CHANGE UP
FLUID INTAKE SUBSTANCE CLASS: SIGNIFICANT CHANGE UP
FLUID SEGMENTED GRANULOCYTES: 57 % — SIGNIFICANT CHANGE UP
FOLATE+VIT B12 SERBLD-IMP: 0 % — SIGNIFICANT CHANGE UP
GLUCOSE BLDC GLUCOMTR-MCNC: 116 MG/DL — HIGH (ref 70–99)
GLUCOSE BLDC GLUCOMTR-MCNC: 135 MG/DL — HIGH (ref 70–99)
GLUCOSE BLDC GLUCOMTR-MCNC: 135 MG/DL — HIGH (ref 70–99)
GLUCOSE BLDC GLUCOMTR-MCNC: 159 MG/DL — HIGH (ref 70–99)
GLUCOSE BLDC GLUCOMTR-MCNC: 166 MG/DL — HIGH (ref 70–99)
GLUCOSE BLDC GLUCOMTR-MCNC: 168 MG/DL — HIGH (ref 70–99)
GLUCOSE BLDC GLUCOMTR-MCNC: 169 MG/DL — HIGH (ref 70–99)
GLUCOSE BLDC GLUCOMTR-MCNC: 175 MG/DL — HIGH (ref 70–99)
GLUCOSE BLDC GLUCOMTR-MCNC: 182 MG/DL — HIGH (ref 70–99)
GLUCOSE BLDC GLUCOMTR-MCNC: 200 MG/DL — HIGH (ref 70–99)
GLUCOSE BLDC GLUCOMTR-MCNC: 220 MG/DL — HIGH (ref 70–99)
GLUCOSE BLDC GLUCOMTR-MCNC: 221 MG/DL — HIGH (ref 70–99)
GLUCOSE BLDC GLUCOMTR-MCNC: 226 MG/DL — HIGH (ref 70–99)
GLUCOSE BLDC GLUCOMTR-MCNC: 230 MG/DL — HIGH (ref 70–99)
GLUCOSE BLDC GLUCOMTR-MCNC: 231 MG/DL — HIGH (ref 70–99)
GLUCOSE BLDC GLUCOMTR-MCNC: 231 MG/DL — HIGH (ref 70–99)
GLUCOSE BLDC GLUCOMTR-MCNC: 237 MG/DL — HIGH (ref 70–99)
GLUCOSE BLDC GLUCOMTR-MCNC: 254 MG/DL — HIGH (ref 70–99)
GLUCOSE BLDC GLUCOMTR-MCNC: 257 MG/DL — HIGH (ref 70–99)
GLUCOSE BLDC GLUCOMTR-MCNC: 260 MG/DL — HIGH (ref 70–99)
GLUCOSE BLDC GLUCOMTR-MCNC: 265 MG/DL — HIGH (ref 70–99)
GLUCOSE BLDC GLUCOMTR-MCNC: 268 MG/DL — HIGH (ref 70–99)
GLUCOSE BLDC GLUCOMTR-MCNC: 293 MG/DL — HIGH (ref 70–99)
GLUCOSE BLDC GLUCOMTR-MCNC: 313 MG/DL — HIGH (ref 70–99)
GLUCOSE BLDC GLUCOMTR-MCNC: 79 MG/DL — SIGNIFICANT CHANGE UP (ref 70–99)
GLUCOSE BLDC GLUCOMTR-MCNC: 80 MG/DL — SIGNIFICANT CHANGE UP (ref 70–99)
GLUCOSE FLD-MCNC: 32 MG/DL — SIGNIFICANT CHANGE UP
GLUCOSE SERPL-MCNC: 142 MG/DL — HIGH (ref 70–99)
GLUCOSE SERPL-MCNC: 159 MG/DL — HIGH (ref 70–99)
GLUCOSE SERPL-MCNC: 220 MG/DL — HIGH (ref 70–99)
GLUCOSE SERPL-MCNC: 226 MG/DL — HIGH (ref 70–99)
GLUCOSE SERPL-MCNC: 239 MG/DL — HIGH (ref 70–99)
GLUCOSE SERPL-MCNC: 291 MG/DL — HIGH (ref 70–99)
GRAM STN FLD: SIGNIFICANT CHANGE UP
HCT VFR BLD CALC: 23.8 % — LOW (ref 34.5–45)
HCT VFR BLD CALC: 26.1 % — LOW (ref 34.5–45)
HCT VFR BLD CALC: 26.1 % — LOW (ref 34.5–45)
HCT VFR BLD CALC: 26.3 % — LOW (ref 34.5–45)
HGB BLD-MCNC: 7.5 G/DL — LOW (ref 11.5–15.5)
HGB BLD-MCNC: 7.8 G/DL — LOW (ref 11.5–15.5)
HGB BLD-MCNC: 7.9 G/DL — LOW (ref 11.5–15.5)
HGB BLD-MCNC: 8 G/DL — LOW (ref 11.5–15.5)
INR BLD: 1.03 RATIO — SIGNIFICANT CHANGE UP (ref 0.88–1.16)
LDH SERPL L TO P-CCNC: 5363 U/L — SIGNIFICANT CHANGE UP
LYMPHOCYTES # FLD: 2 % — SIGNIFICANT CHANGE UP
MAGNESIUM SERPL-MCNC: 1.9 MG/DL — SIGNIFICANT CHANGE UP (ref 1.6–2.6)
MAGNESIUM SERPL-MCNC: 2.1 MG/DL — SIGNIFICANT CHANGE UP (ref 1.6–2.6)
MAGNESIUM SERPL-MCNC: 2.2 MG/DL — SIGNIFICANT CHANGE UP (ref 1.6–2.6)
MCHC RBC-ENTMCNC: 22.2 PG — LOW (ref 27–34)
MCHC RBC-ENTMCNC: 22.3 PG — LOW (ref 27–34)
MCHC RBC-ENTMCNC: 22.3 PG — LOW (ref 27–34)
MCHC RBC-ENTMCNC: 23 PG — LOW (ref 27–34)
MCHC RBC-ENTMCNC: 29.9 GM/DL — LOW (ref 32–36)
MCHC RBC-ENTMCNC: 30 GM/DL — LOW (ref 32–36)
MCHC RBC-ENTMCNC: 30.7 GM/DL — LOW (ref 32–36)
MCHC RBC-ENTMCNC: 31.5 GM/DL — LOW (ref 32–36)
MCV RBC AUTO: 72.9 FL — LOW (ref 80–100)
MCV RBC AUTO: 73 FL — LOW (ref 80–100)
MCV RBC AUTO: 74.1 FL — LOW (ref 80–100)
MCV RBC AUTO: 74.1 FL — LOW (ref 80–100)
MESOTHL CELL # FLD: 15 % — SIGNIFICANT CHANGE UP
MONOS+MACROS # FLD: 26 % — SIGNIFICANT CHANGE UP
NRBC # BLD: 0 /100 WBCS — SIGNIFICANT CHANGE UP
NRBC # FLD: 0 K/UL — SIGNIFICANT CHANGE UP
NRBC # FLD: 0 K/UL — SIGNIFICANT CHANGE UP
NRBC # FLD: 0.03 K/UL — HIGH
NRBC # FLD: 0.05 K/UL — HIGH
OTHER CELLS FLD MANUAL: 0 % — SIGNIFICANT CHANGE UP
PH FLD: 7.7 — SIGNIFICANT CHANGE UP
PHOSPHATE SERPL-MCNC: 2.8 MG/DL — SIGNIFICANT CHANGE UP (ref 2.5–4.5)
PHOSPHATE SERPL-MCNC: 2.9 MG/DL — SIGNIFICANT CHANGE UP (ref 2.5–4.5)
PHOSPHATE SERPL-MCNC: 3.1 MG/DL — SIGNIFICANT CHANGE UP (ref 2.5–4.5)
PLATELET # BLD AUTO: 142 K/UL — LOW (ref 150–400)
PLATELET # BLD AUTO: 164 K/UL — SIGNIFICANT CHANGE UP (ref 150–400)
PLATELET # BLD AUTO: 167 K/UL — SIGNIFICANT CHANGE UP (ref 150–400)
PLATELET # BLD AUTO: 179 K/UL — SIGNIFICANT CHANGE UP (ref 150–400)
POTASSIUM SERPL-MCNC: 5.2 MMOL/L — SIGNIFICANT CHANGE UP (ref 3.5–5.3)
POTASSIUM SERPL-MCNC: 5.5 MMOL/L — HIGH (ref 3.5–5.3)
POTASSIUM SERPL-MCNC: 5.5 MMOL/L — HIGH (ref 3.5–5.3)
POTASSIUM SERPL-MCNC: 5.7 MMOL/L — HIGH (ref 3.5–5.3)
POTASSIUM SERPL-MCNC: 5.8 MMOL/L — HIGH (ref 3.5–5.3)
POTASSIUM SERPL-MCNC: 5.9 MMOL/L — HIGH (ref 3.5–5.3)
POTASSIUM SERPL-SCNC: 5.2 MMOL/L — SIGNIFICANT CHANGE UP (ref 3.5–5.3)
POTASSIUM SERPL-SCNC: 5.5 MMOL/L — HIGH (ref 3.5–5.3)
POTASSIUM SERPL-SCNC: 5.5 MMOL/L — HIGH (ref 3.5–5.3)
POTASSIUM SERPL-SCNC: 5.7 MMOL/L — HIGH (ref 3.5–5.3)
POTASSIUM SERPL-SCNC: 5.8 MMOL/L — HIGH (ref 3.5–5.3)
POTASSIUM SERPL-SCNC: 5.9 MMOL/L — HIGH (ref 3.5–5.3)
PROT FLD-MCNC: 3.5 G/DL — SIGNIFICANT CHANGE UP
PROT SERPL-MCNC: 6.2 G/DL — SIGNIFICANT CHANGE UP (ref 6–8.3)
PROT SERPL-MCNC: 6.2 G/DL — SIGNIFICANT CHANGE UP (ref 6–8.3)
PROT SERPL-MCNC: 6.4 G/DL — SIGNIFICANT CHANGE UP (ref 6–8.3)
PROTHROM AB SERPL-ACNC: 11.8 SEC — SIGNIFICANT CHANGE UP (ref 10.6–13.6)
RBC # BLD: 3.26 M/UL — LOW (ref 3.8–5.2)
RBC # BLD: 3.52 M/UL — LOW (ref 3.8–5.2)
RBC # BLD: 3.55 M/UL — LOW (ref 3.8–5.2)
RBC # BLD: 3.58 M/UL — LOW (ref 3.8–5.2)
RBC # FLD: 15.5 % — HIGH (ref 10.3–14.5)
RBC # FLD: 15.8 % — HIGH (ref 10.3–14.5)
RBC # FLD: 15.8 % — HIGH (ref 10.3–14.5)
RBC # FLD: 15.9 % — HIGH (ref 10.3–14.5)
RCV VOL RI: 5000 CELLS/UL — HIGH (ref 0–5)
RH IG SCN BLD-IMP: POSITIVE — SIGNIFICANT CHANGE UP
SARS-COV-2 RNA SPEC QL NAA+PROBE: SIGNIFICANT CHANGE UP
SODIUM SERPL-SCNC: 131 MMOL/L — LOW (ref 135–145)
SODIUM SERPL-SCNC: 133 MMOL/L — LOW (ref 135–145)
SODIUM SERPL-SCNC: 133 MMOL/L — LOW (ref 135–145)
SODIUM SERPL-SCNC: 134 MMOL/L — LOW (ref 135–145)
SODIUM SERPL-SCNC: 134 MMOL/L — LOW (ref 135–145)
SODIUM SERPL-SCNC: 135 MMOL/L — SIGNIFICANT CHANGE UP (ref 135–145)
SPECIMEN SOURCE FLD: SIGNIFICANT CHANGE UP
SPECIMEN SOURCE: SIGNIFICANT CHANGE UP
T4 FREE SERPL-MCNC: 0.9 NG/DL — SIGNIFICANT CHANGE UP (ref 0.9–1.8)
TOTAL CELLS COUNTED, BODY FLUID: 100 CELLS — SIGNIFICANT CHANGE UP
TOTAL NUCLEATED CELL COUNT, BODY FLUID: 1089 CELLS/UL — HIGH (ref 0–5)
TROPONIN T, HIGH SENSITIVITY RESULT: 23 NG/L — SIGNIFICANT CHANGE UP
TSH SERPL-MCNC: 0.76 UIU/ML — SIGNIFICANT CHANGE UP (ref 0.27–4.2)
TUBE TYPE: SIGNIFICANT CHANGE UP
VANCOMYCIN TROUGH SERPL-MCNC: 15.5 UG/ML — SIGNIFICANT CHANGE UP (ref 10–20)
WBC # BLD: 15.15 K/UL — HIGH (ref 3.8–10.5)
WBC # BLD: 16.27 K/UL — HIGH (ref 3.8–10.5)
WBC # BLD: 17.36 K/UL — HIGH (ref 3.8–10.5)
WBC # BLD: 20.52 K/UL — HIGH (ref 3.8–10.5)
WBC # FLD AUTO: 15.15 K/UL — HIGH (ref 3.8–10.5)
WBC # FLD AUTO: 16.27 K/UL — HIGH (ref 3.8–10.5)
WBC # FLD AUTO: 17.36 K/UL — HIGH (ref 3.8–10.5)
WBC # FLD AUTO: 20.52 K/UL — HIGH (ref 3.8–10.5)

## 2022-01-01 PROCEDURE — 71045 X-RAY EXAM CHEST 1 VIEW: CPT | Mod: 26

## 2022-01-01 PROCEDURE — 99232 SBSQ HOSP IP/OBS MODERATE 35: CPT | Mod: GC

## 2022-01-01 PROCEDURE — 99233 SBSQ HOSP IP/OBS HIGH 50: CPT

## 2022-01-01 PROCEDURE — 99232 SBSQ HOSP IP/OBS MODERATE 35: CPT

## 2022-01-01 PROCEDURE — 99291 CRITICAL CARE FIRST HOUR: CPT

## 2022-01-01 PROCEDURE — 99291 CRITICAL CARE FIRST HOUR: CPT | Mod: 25

## 2022-01-01 PROCEDURE — 93010 ELECTROCARDIOGRAM REPORT: CPT

## 2022-01-01 PROCEDURE — 76604 US EXAM CHEST: CPT | Mod: 26

## 2022-01-01 PROCEDURE — 36600 WITHDRAWAL OF ARTERIAL BLOOD: CPT

## 2022-01-01 PROCEDURE — 99233 SBSQ HOSP IP/OBS HIGH 50: CPT | Mod: GC

## 2022-01-01 PROCEDURE — 93308 TTE F-UP OR LMTD: CPT | Mod: 26

## 2022-01-01 PROCEDURE — 88108 CYTOPATH CONCENTRATE TECH: CPT | Mod: 26,59

## 2022-01-01 PROCEDURE — ZZZZZ: CPT

## 2022-01-01 PROCEDURE — 99222 1ST HOSP IP/OBS MODERATE 55: CPT

## 2022-01-01 PROCEDURE — 74176 CT ABD & PELVIS W/O CONTRAST: CPT | Mod: 26

## 2022-01-01 PROCEDURE — 74018 RADEX ABDOMEN 1 VIEW: CPT | Mod: 26

## 2022-01-01 PROCEDURE — 76705 ECHO EXAM OF ABDOMEN: CPT | Mod: 26

## 2022-01-01 PROCEDURE — 99223 1ST HOSP IP/OBS HIGH 75: CPT

## 2022-01-01 PROCEDURE — 99358 PROLONG SERVICE W/O CONTACT: CPT

## 2022-01-01 PROCEDURE — 88305 TISSUE EXAM BY PATHOLOGIST: CPT | Mod: 26

## 2022-01-01 PROCEDURE — 88112 CYTOPATH CELL ENHANCE TECH: CPT | Mod: 26

## 2022-01-01 RX ORDER — NYSTATIN 500MM UNIT
500000 POWDER (EA) MISCELLANEOUS
Refills: 0 | Status: DISCONTINUED | OUTPATIENT
Start: 2022-01-01 | End: 2022-01-01

## 2022-01-01 RX ORDER — SIMETHICONE 80 MG/1
80 TABLET, CHEWABLE ORAL ONCE
Refills: 0 | Status: COMPLETED | OUTPATIENT
Start: 2022-01-01 | End: 2022-01-01

## 2022-01-01 RX ORDER — SIMETHICONE 80 MG/1
80 TABLET, CHEWABLE ORAL
Refills: 0 | Status: COMPLETED | OUTPATIENT
Start: 2022-01-01 | End: 2022-01-01

## 2022-01-01 RX ORDER — LACTULOSE 10 G/15ML
20 SOLUTION ORAL
Refills: 0 | Status: DISCONTINUED | OUTPATIENT
Start: 2022-01-01 | End: 2022-01-01

## 2022-01-01 RX ORDER — ROBINUL 0.2 MG/ML
0.2 INJECTION INTRAMUSCULAR; INTRAVENOUS EVERY 4 HOURS
Refills: 0 | Status: DISCONTINUED | OUTPATIENT
Start: 2022-01-01 | End: 2022-01-01

## 2022-01-01 RX ORDER — INSULIN GLARGINE 100 [IU]/ML
25 INJECTION, SOLUTION SUBCUTANEOUS AT BEDTIME
Refills: 0 | Status: DISCONTINUED | OUTPATIENT
Start: 2022-01-01 | End: 2022-01-01

## 2022-01-01 RX ORDER — SODIUM ZIRCONIUM CYCLOSILICATE 10 G/10G
10 POWDER, FOR SUSPENSION ORAL ONCE
Refills: 0 | Status: COMPLETED | OUTPATIENT
Start: 2022-01-01 | End: 2022-01-01

## 2022-01-01 RX ORDER — ERGOCALCIFEROL 1.25 MG/1
1.25 MG CAPSULE, LIQUID FILLED ORAL
Qty: 16 | Refills: 1 | Status: ACTIVE | COMMUNITY
Start: 2021-01-01 | End: 1900-01-01

## 2022-01-01 RX ORDER — MORPHINE SULFATE 50 MG/1
2 CAPSULE, EXTENDED RELEASE ORAL
Refills: 0 | Status: DISCONTINUED | OUTPATIENT
Start: 2022-01-01 | End: 2022-01-01

## 2022-01-01 RX ORDER — MORPHINE SULFATE 50 MG/1
1 CAPSULE, EXTENDED RELEASE ORAL ONCE
Refills: 0 | Status: DISCONTINUED | OUTPATIENT
Start: 2022-01-01 | End: 2022-01-01

## 2022-01-01 RX ORDER — INSULIN LISPRO 100/ML
26 VIAL (ML) SUBCUTANEOUS
Refills: 0 | Status: DISCONTINUED | OUTPATIENT
Start: 2022-01-01 | End: 2022-01-01

## 2022-01-01 RX ORDER — CEFEPIME 1 G/1
2000 INJECTION, POWDER, FOR SOLUTION INTRAMUSCULAR; INTRAVENOUS ONCE
Refills: 0 | Status: COMPLETED | OUTPATIENT
Start: 2022-01-01 | End: 2022-01-01

## 2022-01-01 RX ORDER — MORPHINE SULFATE 50 MG/1
2 CAPSULE, EXTENDED RELEASE ORAL ONCE
Refills: 0 | Status: DISCONTINUED | OUTPATIENT
Start: 2022-01-01 | End: 2022-01-01

## 2022-01-01 RX ORDER — CEFEPIME 1 G/1
INJECTION, POWDER, FOR SOLUTION INTRAMUSCULAR; INTRAVENOUS
Refills: 0 | Status: DISCONTINUED | OUTPATIENT
Start: 2022-01-01 | End: 2022-01-01

## 2022-01-01 RX ORDER — DEXTROSE 50 % IN WATER 50 %
25 SYRINGE (ML) INTRAVENOUS ONCE
Refills: 0 | Status: COMPLETED | OUTPATIENT
Start: 2022-01-01 | End: 2022-01-01

## 2022-01-01 RX ORDER — INSULIN GLARGINE 100 [IU]/ML
46 INJECTION, SOLUTION SUBCUTANEOUS AT BEDTIME
Refills: 0 | Status: DISCONTINUED | OUTPATIENT
Start: 2022-01-01 | End: 2022-01-01

## 2022-01-01 RX ORDER — INSULIN LISPRO 100/ML
24 VIAL (ML) SUBCUTANEOUS
Refills: 0 | Status: DISCONTINUED | OUTPATIENT
Start: 2022-01-01 | End: 2022-01-01

## 2022-01-01 RX ORDER — INSULIN HUMAN 100 [IU]/ML
10 INJECTION, SOLUTION SUBCUTANEOUS ONCE
Refills: 0 | Status: COMPLETED | OUTPATIENT
Start: 2022-01-01 | End: 2022-01-01

## 2022-01-01 RX ORDER — POLYETHYLENE GLYCOL 3350 17 G/17G
17 POWDER, FOR SOLUTION ORAL
Refills: 0 | Status: DISCONTINUED | OUTPATIENT
Start: 2022-01-01 | End: 2022-01-01

## 2022-01-01 RX ORDER — MORPHINE SULFATE 50 MG/1
0.5 CAPSULE, EXTENDED RELEASE ORAL
Qty: 100 | Refills: 0 | Status: DISCONTINUED | OUTPATIENT
Start: 2022-01-01 | End: 2022-01-01

## 2022-01-01 RX ORDER — LACTULOSE 10 G/15ML
20 SOLUTION ORAL EVERY 12 HOURS
Refills: 0 | Status: DISCONTINUED | OUTPATIENT
Start: 2022-01-01 | End: 2022-01-01

## 2022-01-01 RX ORDER — LACTULOSE 10 G/15ML
200 SOLUTION ORAL DAILY
Refills: 0 | Status: DISCONTINUED | OUTPATIENT
Start: 2022-01-01 | End: 2022-01-01

## 2022-01-01 RX ORDER — SODIUM CHLORIDE 9 MG/ML
500 INJECTION INTRAMUSCULAR; INTRAVENOUS; SUBCUTANEOUS ONCE
Refills: 0 | Status: COMPLETED | OUTPATIENT
Start: 2022-01-01 | End: 2022-01-01

## 2022-01-01 RX ORDER — ROBINUL 0.2 MG/ML
0.4 INJECTION INTRAMUSCULAR; INTRAVENOUS EVERY 4 HOURS
Refills: 0 | Status: DISCONTINUED | OUTPATIENT
Start: 2022-01-01 | End: 2022-01-01

## 2022-01-01 RX ORDER — INSULIN GLARGINE 100 [IU]/ML
50 INJECTION, SOLUTION SUBCUTANEOUS AT BEDTIME
Refills: 0 | Status: DISCONTINUED | OUTPATIENT
Start: 2022-01-01 | End: 2022-01-01

## 2022-01-01 RX ORDER — CALCIUM GLUCONATE 100 MG/ML
2 VIAL (ML) INTRAVENOUS ONCE
Refills: 0 | Status: COMPLETED | OUTPATIENT
Start: 2022-01-01 | End: 2022-01-01

## 2022-01-01 RX ORDER — VANCOMYCIN HCL 1 G
1000 VIAL (EA) INTRAVENOUS ONCE
Refills: 0 | Status: COMPLETED | OUTPATIENT
Start: 2022-01-01 | End: 2022-01-01

## 2022-01-01 RX ORDER — NALOXEGOL OXALATE 12.5 MG/1
25 TABLET, FILM COATED ORAL DAILY
Refills: 0 | Status: DISCONTINUED | OUTPATIENT
Start: 2022-01-01 | End: 2022-01-01

## 2022-01-01 RX ORDER — VANCOMYCIN HCL 1 G
1000 VIAL (EA) INTRAVENOUS EVERY 12 HOURS
Refills: 0 | Status: DISCONTINUED | OUTPATIENT
Start: 2022-01-01 | End: 2022-01-01

## 2022-01-01 RX ORDER — LACTULOSE 10 G/15ML
10 SOLUTION ORAL
Refills: 0 | Status: DISCONTINUED | OUTPATIENT
Start: 2022-01-01 | End: 2022-01-01

## 2022-01-01 RX ORDER — ROBINUL 0.2 MG/ML
0.1 INJECTION INTRAMUSCULAR; INTRAVENOUS
Refills: 0 | Status: DISCONTINUED | OUTPATIENT
Start: 2022-01-01 | End: 2022-01-01

## 2022-01-01 RX ORDER — ATOVAQUONE 750 MG/5ML
1500 SUSPENSION ORAL DAILY
Refills: 0 | Status: DISCONTINUED | OUTPATIENT
Start: 2022-01-01 | End: 2022-01-01

## 2022-01-01 RX ORDER — CALCIUM CARBONATE 500(1250)
1 TABLET ORAL DAILY
Refills: 0 | Status: DISCONTINUED | OUTPATIENT
Start: 2022-01-01 | End: 2022-01-01

## 2022-01-01 RX ORDER — VANCOMYCIN HCL 1 G
VIAL (EA) INTRAVENOUS
Refills: 0 | Status: DISCONTINUED | OUTPATIENT
Start: 2022-01-01 | End: 2022-01-01

## 2022-01-01 RX ORDER — MORPHINE SULFATE 50 MG/1
1 CAPSULE, EXTENDED RELEASE ORAL
Qty: 100 | Refills: 0 | Status: DISCONTINUED | OUTPATIENT
Start: 2022-01-01 | End: 2022-01-01

## 2022-01-01 RX ORDER — SODIUM CHLORIDE 9 MG/ML
250 INJECTION, SOLUTION INTRAVENOUS ONCE
Refills: 0 | Status: COMPLETED | OUTPATIENT
Start: 2022-01-01 | End: 2022-01-01

## 2022-01-01 RX ORDER — LEVOTHYROXINE SODIUM 125 MCG
137 TABLET ORAL DAILY
Refills: 0 | Status: DISCONTINUED | OUTPATIENT
Start: 2022-01-01 | End: 2022-01-01

## 2022-01-01 RX ORDER — CHLORHEXIDINE GLUCONATE 213 G/1000ML
1 SOLUTION TOPICAL
Refills: 0 | Status: DISCONTINUED | OUTPATIENT
Start: 2022-01-01 | End: 2022-01-01

## 2022-01-01 RX ORDER — SODIUM CHLORIDE 9 MG/ML
1000 INJECTION INTRAMUSCULAR; INTRAVENOUS; SUBCUTANEOUS
Refills: 0 | Status: DISCONTINUED | OUTPATIENT
Start: 2022-01-01 | End: 2022-01-01

## 2022-01-01 RX ORDER — NYSTATIN 500MM UNIT
500000 POWDER (EA) MISCELLANEOUS DAILY
Refills: 0 | Status: DISCONTINUED | OUTPATIENT
Start: 2022-01-01 | End: 2022-01-01

## 2022-01-01 RX ORDER — CEFEPIME 1 G/1
2000 INJECTION, POWDER, FOR SOLUTION INTRAMUSCULAR; INTRAVENOUS EVERY 8 HOURS
Refills: 0 | Status: DISCONTINUED | OUTPATIENT
Start: 2022-01-01 | End: 2022-01-01

## 2022-01-01 RX ORDER — METOPROLOL TARTRATE 50 MG
12.5 TABLET ORAL
Refills: 0 | Status: DISCONTINUED | OUTPATIENT
Start: 2022-01-01 | End: 2022-01-01

## 2022-01-01 RX ORDER — INSULIN LISPRO 100/ML
VIAL (ML) SUBCUTANEOUS EVERY 6 HOURS
Refills: 0 | Status: DISCONTINUED | OUTPATIENT
Start: 2022-01-01 | End: 2022-01-01

## 2022-01-01 RX ADMIN — MORPHINE SULFATE 3 MILLIGRAM(S): 50 CAPSULE, EXTENDED RELEASE ORAL at 06:00

## 2022-01-01 RX ADMIN — MORPHINE SULFATE 2 MILLIGRAM(S): 50 CAPSULE, EXTENDED RELEASE ORAL at 04:39

## 2022-01-01 RX ADMIN — ROBINUL 0.4 MILLIGRAM(S): 0.2 INJECTION INTRAMUSCULAR; INTRAVENOUS at 15:48

## 2022-01-01 RX ADMIN — Medication 1 APPLICATION(S): at 12:07

## 2022-01-01 RX ADMIN — Medication 125 MICROGRAM(S): at 06:51

## 2022-01-01 RX ADMIN — CEFEPIME 100 MILLIGRAM(S): 1 INJECTION, POWDER, FOR SOLUTION INTRAMUSCULAR; INTRAVENOUS at 23:09

## 2022-01-01 RX ADMIN — Medication 500000 UNIT(S): at 17:27

## 2022-01-01 RX ADMIN — NALOXEGOL OXALATE 25 MILLIGRAM(S): 12.5 TABLET, FILM COATED ORAL at 18:46

## 2022-01-01 RX ADMIN — LACTULOSE 200 GRAM(S): 10 SOLUTION ORAL at 22:05

## 2022-01-01 RX ADMIN — CEFEPIME 100 MILLIGRAM(S): 1 INJECTION, POWDER, FOR SOLUTION INTRAMUSCULAR; INTRAVENOUS at 14:00

## 2022-01-01 RX ADMIN — MORPHINE SULFATE 3 MILLIGRAM(S): 50 CAPSULE, EXTENDED RELEASE ORAL at 03:47

## 2022-01-01 RX ADMIN — Medication 6: at 12:43

## 2022-01-01 RX ADMIN — BUDESONIDE AND FORMOTEROL FUMARATE DIHYDRATE 2 PUFF(S): 160; 4.5 AEROSOL RESPIRATORY (INHALATION) at 10:23

## 2022-01-01 RX ADMIN — Medication 1 TABLET(S): at 13:25

## 2022-01-01 RX ADMIN — HEPARIN SODIUM 5000 UNIT(S): 5000 INJECTION INTRAVENOUS; SUBCUTANEOUS at 21:51

## 2022-01-01 RX ADMIN — SODIUM CHLORIDE 50 MILLILITER(S): 9 INJECTION INTRAMUSCULAR; INTRAVENOUS; SUBCUTANEOUS at 18:48

## 2022-01-01 RX ADMIN — SODIUM CHLORIDE 125 MILLILITER(S): 9 INJECTION, SOLUTION INTRAVENOUS at 01:08

## 2022-01-01 RX ADMIN — Medication 250 MILLIGRAM(S): at 22:37

## 2022-01-01 RX ADMIN — Medication 6: at 06:25

## 2022-01-01 RX ADMIN — MORPHINE SULFATE 2 MILLIGRAM(S): 50 CAPSULE, EXTENDED RELEASE ORAL at 05:59

## 2022-01-01 RX ADMIN — Medication 40 MILLIGRAM(S): at 17:27

## 2022-01-01 RX ADMIN — MORPHINE SULFATE 2 MILLIGRAM(S): 50 CAPSULE, EXTENDED RELEASE ORAL at 05:45

## 2022-01-01 RX ADMIN — MORPHINE SULFATE 1 MG/HR: 50 CAPSULE, EXTENDED RELEASE ORAL at 10:03

## 2022-01-01 RX ADMIN — Medication 3 MILLIGRAM(S): at 22:20

## 2022-01-01 RX ADMIN — MORPHINE SULFATE 3 MILLIGRAM(S): 50 CAPSULE, EXTENDED RELEASE ORAL at 10:10

## 2022-01-01 RX ADMIN — Medication 500000 UNIT(S): at 23:53

## 2022-01-01 RX ADMIN — INSULIN GLARGINE 25 UNIT(S): 100 INJECTION, SOLUTION SUBCUTANEOUS at 22:37

## 2022-01-01 RX ADMIN — Medication 24 UNIT(S): at 08:31

## 2022-01-01 RX ADMIN — MORPHINE SULFATE 2 MILLIGRAM(S): 50 CAPSULE, EXTENDED RELEASE ORAL at 12:00

## 2022-01-01 RX ADMIN — NALOXEGOL OXALATE 25 MILLIGRAM(S): 12.5 TABLET, FILM COATED ORAL at 12:09

## 2022-01-01 RX ADMIN — Medication 1 TABLET(S): at 12:06

## 2022-01-01 RX ADMIN — MORPHINE SULFATE 1 MG/HR: 50 CAPSULE, EXTENDED RELEASE ORAL at 12:04

## 2022-01-01 RX ADMIN — MORPHINE SULFATE 2 MILLIGRAM(S): 50 CAPSULE, EXTENDED RELEASE ORAL at 21:30

## 2022-01-01 RX ADMIN — GABAPENTIN 400 MILLIGRAM(S): 400 CAPSULE ORAL at 05:46

## 2022-01-01 RX ADMIN — ONDANSETRON 4 MILLIGRAM(S): 8 TABLET, FILM COATED ORAL at 08:27

## 2022-01-01 RX ADMIN — Medication 137 MICROGRAM(S): at 05:49

## 2022-01-01 RX ADMIN — INSULIN HUMAN 10 UNIT(S): 100 INJECTION, SOLUTION SUBCUTANEOUS at 06:26

## 2022-01-01 RX ADMIN — Medication 125 MICROGRAM(S): at 05:49

## 2022-01-01 RX ADMIN — CEFEPIME 100 MILLIGRAM(S): 1 INJECTION, POWDER, FOR SOLUTION INTRAMUSCULAR; INTRAVENOUS at 06:30

## 2022-01-01 RX ADMIN — Medication 40 MILLIGRAM(S): at 06:52

## 2022-01-01 RX ADMIN — MORPHINE SULFATE 3 MILLIGRAM(S): 50 CAPSULE, EXTENDED RELEASE ORAL at 22:34

## 2022-01-01 RX ADMIN — GABAPENTIN 400 MILLIGRAM(S): 400 CAPSULE ORAL at 05:49

## 2022-01-01 RX ADMIN — Medication 0.5 MILLIGRAM(S): at 22:53

## 2022-01-01 RX ADMIN — BUDESONIDE AND FORMOTEROL FUMARATE DIHYDRATE 2 PUFF(S): 160; 4.5 AEROSOL RESPIRATORY (INHALATION) at 20:50

## 2022-01-01 RX ADMIN — ESCITALOPRAM OXALATE 30 MILLIGRAM(S): 10 TABLET, FILM COATED ORAL at 12:45

## 2022-01-01 RX ADMIN — Medication 2: at 00:15

## 2022-01-01 RX ADMIN — Medication 500000 UNIT(S): at 08:28

## 2022-01-01 RX ADMIN — CEFEPIME 100 MILLIGRAM(S): 1 INJECTION, POWDER, FOR SOLUTION INTRAMUSCULAR; INTRAVENOUS at 05:49

## 2022-01-01 RX ADMIN — Medication 500000 UNIT(S): at 13:36

## 2022-01-01 RX ADMIN — Medication 2000 UNIT(S): at 12:10

## 2022-01-01 RX ADMIN — Medication 250 MILLIGRAM(S): at 09:23

## 2022-01-01 RX ADMIN — MORPHINE SULFATE 3 MILLIGRAM(S): 50 CAPSULE, EXTENDED RELEASE ORAL at 18:56

## 2022-01-01 RX ADMIN — HEPARIN SODIUM 5000 UNIT(S): 5000 INJECTION INTRAVENOUS; SUBCUTANEOUS at 22:57

## 2022-01-01 RX ADMIN — SIMETHICONE 80 MILLIGRAM(S): 80 TABLET, CHEWABLE ORAL at 01:19

## 2022-01-01 RX ADMIN — Medication 4: at 08:30

## 2022-01-01 RX ADMIN — MORPHINE SULFATE 2 MILLIGRAM(S): 50 CAPSULE, EXTENDED RELEASE ORAL at 21:13

## 2022-01-01 RX ADMIN — Medication 200 GRAM(S): at 06:10

## 2022-01-01 RX ADMIN — MORPHINE SULFATE 0.5 MG/HR: 50 CAPSULE, EXTENDED RELEASE ORAL at 13:32

## 2022-01-01 RX ADMIN — MORPHINE SULFATE 2 MILLIGRAM(S): 50 CAPSULE, EXTENDED RELEASE ORAL at 14:00

## 2022-01-01 RX ADMIN — MORPHINE SULFATE 2 MILLIGRAM(S): 50 CAPSULE, EXTENDED RELEASE ORAL at 16:45

## 2022-01-01 RX ADMIN — MORPHINE SULFATE 2 MILLIGRAM(S): 50 CAPSULE, EXTENDED RELEASE ORAL at 19:28

## 2022-01-01 RX ADMIN — CEFEPIME 100 MILLIGRAM(S): 1 INJECTION, POWDER, FOR SOLUTION INTRAMUSCULAR; INTRAVENOUS at 09:35

## 2022-01-01 RX ADMIN — SIMVASTATIN 20 MILLIGRAM(S): 20 TABLET, FILM COATED ORAL at 22:20

## 2022-01-01 RX ADMIN — SIMVASTATIN 20 MILLIGRAM(S): 20 TABLET, FILM COATED ORAL at 22:57

## 2022-01-01 RX ADMIN — MORPHINE SULFATE 2 MILLIGRAM(S): 50 CAPSULE, EXTENDED RELEASE ORAL at 23:59

## 2022-01-01 RX ADMIN — Medication 250 MILLIGRAM(S): at 06:50

## 2022-01-01 RX ADMIN — BUDESONIDE AND FORMOTEROL FUMARATE DIHYDRATE 2 PUFF(S): 160; 4.5 AEROSOL RESPIRATORY (INHALATION) at 08:56

## 2022-01-01 RX ADMIN — MORPHINE SULFATE 2 MILLIGRAM(S): 50 CAPSULE, EXTENDED RELEASE ORAL at 17:49

## 2022-01-01 RX ADMIN — MORPHINE SULFATE 3 MILLIGRAM(S): 50 CAPSULE, EXTENDED RELEASE ORAL at 18:40

## 2022-01-01 RX ADMIN — Medication 137 MICROGRAM(S): at 05:45

## 2022-01-01 RX ADMIN — MORPHINE SULFATE 3 MILLIGRAM(S): 50 CAPSULE, EXTENDED RELEASE ORAL at 22:59

## 2022-01-01 RX ADMIN — BUDESONIDE AND FORMOTEROL FUMARATE DIHYDRATE 2 PUFF(S): 160; 4.5 AEROSOL RESPIRATORY (INHALATION) at 21:50

## 2022-01-01 RX ADMIN — Medication 2000 UNIT(S): at 13:35

## 2022-01-01 RX ADMIN — MORPHINE SULFATE 2 MILLIGRAM(S): 50 CAPSULE, EXTENDED RELEASE ORAL at 11:45

## 2022-01-01 RX ADMIN — Medication 500000 UNIT(S): at 17:32

## 2022-01-01 RX ADMIN — Medication 25 MILLILITER(S): at 06:26

## 2022-01-01 RX ADMIN — BUDESONIDE AND FORMOTEROL FUMARATE DIHYDRATE 2 PUFF(S): 160; 4.5 AEROSOL RESPIRATORY (INHALATION) at 08:22

## 2022-01-01 RX ADMIN — Medication 500000 UNIT(S): at 05:46

## 2022-01-01 RX ADMIN — BUDESONIDE AND FORMOTEROL FUMARATE DIHYDRATE 2 PUFF(S): 160; 4.5 AEROSOL RESPIRATORY (INHALATION) at 12:34

## 2022-01-01 RX ADMIN — MORPHINE SULFATE 3 MILLIGRAM(S): 50 CAPSULE, EXTENDED RELEASE ORAL at 03:28

## 2022-01-01 RX ADMIN — Medication 2: at 12:34

## 2022-01-01 RX ADMIN — Medication 2: at 23:59

## 2022-01-01 RX ADMIN — MORPHINE SULFATE 3 MILLIGRAM(S): 50 CAPSULE, EXTENDED RELEASE ORAL at 22:50

## 2022-01-01 RX ADMIN — Medication 26 UNIT(S): at 12:05

## 2022-01-01 RX ADMIN — PANTOPRAZOLE SODIUM 40 MILLIGRAM(S): 20 TABLET, DELAYED RELEASE ORAL at 05:46

## 2022-01-01 RX ADMIN — Medication 6: at 08:27

## 2022-01-01 RX ADMIN — MORPHINE SULFATE 2 MILLIGRAM(S): 50 CAPSULE, EXTENDED RELEASE ORAL at 20:54

## 2022-01-01 RX ADMIN — Medication 3 MILLILITER(S): at 16:12

## 2022-01-01 RX ADMIN — ONDANSETRON 4 MILLIGRAM(S): 8 TABLET, FILM COATED ORAL at 21:34

## 2022-01-01 RX ADMIN — Medication 4: at 17:26

## 2022-01-01 RX ADMIN — Medication 40 MILLIGRAM(S): at 06:53

## 2022-01-01 RX ADMIN — CHLORHEXIDINE GLUCONATE 1 APPLICATION(S): 213 SOLUTION TOPICAL at 07:02

## 2022-01-01 RX ADMIN — Medication 5 MILLIGRAM(S): at 06:18

## 2022-01-01 RX ADMIN — MORPHINE SULFATE 3 MILLIGRAM(S): 50 CAPSULE, EXTENDED RELEASE ORAL at 23:30

## 2022-01-01 RX ADMIN — MORPHINE SULFATE 3 MILLIGRAM(S): 50 CAPSULE, EXTENDED RELEASE ORAL at 14:50

## 2022-01-01 RX ADMIN — MORPHINE SULFATE 0.5 MG/HR: 50 CAPSULE, EXTENDED RELEASE ORAL at 19:55

## 2022-01-01 RX ADMIN — Medication 500000 UNIT(S): at 18:41

## 2022-01-01 RX ADMIN — AMLODIPINE BESYLATE 10 MILLIGRAM(S): 2.5 TABLET ORAL at 05:49

## 2022-01-01 RX ADMIN — GABAPENTIN 400 MILLIGRAM(S): 400 CAPSULE ORAL at 06:17

## 2022-01-01 RX ADMIN — Medication 250 MILLIGRAM(S): at 09:36

## 2022-01-01 RX ADMIN — MORPHINE SULFATE 1 MG/HR: 50 CAPSULE, EXTENDED RELEASE ORAL at 21:31

## 2022-01-01 RX ADMIN — AMLODIPINE BESYLATE 10 MILLIGRAM(S): 2.5 TABLET ORAL at 05:45

## 2022-01-01 RX ADMIN — MORPHINE SULFATE 2 MILLIGRAM(S): 50 CAPSULE, EXTENDED RELEASE ORAL at 20:51

## 2022-01-01 RX ADMIN — Medication 8: at 23:42

## 2022-01-01 RX ADMIN — Medication 23 UNIT(S): at 12:05

## 2022-01-01 RX ADMIN — Medication 500000 UNIT(S): at 06:17

## 2022-01-01 RX ADMIN — SIMETHICONE 80 MILLIGRAM(S): 80 TABLET, CHEWABLE ORAL at 12:07

## 2022-01-01 RX ADMIN — Medication 12.5 MILLIGRAM(S): at 05:49

## 2022-01-01 RX ADMIN — Medication 24 UNIT(S): at 12:35

## 2022-01-01 RX ADMIN — ATOVAQUONE 1500 MILLIGRAM(S): 750 SUSPENSION ORAL at 12:08

## 2022-01-01 RX ADMIN — CEFEPIME 100 MILLIGRAM(S): 1 INJECTION, POWDER, FOR SOLUTION INTRAMUSCULAR; INTRAVENOUS at 05:47

## 2022-01-01 RX ADMIN — Medication 2000 UNIT(S): at 12:46

## 2022-01-01 RX ADMIN — Medication 6: at 13:16

## 2022-01-01 RX ADMIN — Medication 2000 UNIT(S): at 12:06

## 2022-01-01 RX ADMIN — MORPHINE SULFATE 3 MILLIGRAM(S): 50 CAPSULE, EXTENDED RELEASE ORAL at 10:28

## 2022-01-01 RX ADMIN — Medication 1 TABLET(S): at 14:09

## 2022-01-01 RX ADMIN — CEFEPIME 100 MILLIGRAM(S): 1 INJECTION, POWDER, FOR SOLUTION INTRAMUSCULAR; INTRAVENOUS at 13:26

## 2022-01-01 RX ADMIN — Medication 12.5 MILLIGRAM(S): at 06:22

## 2022-01-01 RX ADMIN — Medication 12.5 MILLIGRAM(S): at 17:32

## 2022-01-01 RX ADMIN — ESCITALOPRAM OXALATE 30 MILLIGRAM(S): 10 TABLET, FILM COATED ORAL at 13:35

## 2022-01-01 RX ADMIN — Medication 1 ENEMA: at 14:37

## 2022-01-01 RX ADMIN — MORPHINE SULFATE 3 MILLIGRAM(S): 50 CAPSULE, EXTENDED RELEASE ORAL at 18:19

## 2022-01-01 RX ADMIN — Medication 4: at 06:28

## 2022-01-01 RX ADMIN — MORPHINE SULFATE 3 MILLIGRAM(S): 50 CAPSULE, EXTENDED RELEASE ORAL at 18:39

## 2022-01-01 RX ADMIN — HEPARIN SODIUM 5000 UNIT(S): 5000 INJECTION INTRAVENOUS; SUBCUTANEOUS at 06:51

## 2022-01-01 RX ADMIN — Medication 2: at 19:42

## 2022-01-01 RX ADMIN — SODIUM CHLORIDE 100 MILLILITER(S): 9 INJECTION INTRAMUSCULAR; INTRAVENOUS; SUBCUTANEOUS at 14:37

## 2022-01-01 RX ADMIN — Medication 2: at 22:00

## 2022-01-01 RX ADMIN — Medication 4: at 18:03

## 2022-01-01 RX ADMIN — SODIUM ZIRCONIUM CYCLOSILICATE 10 GRAM(S): 10 POWDER, FOR SUSPENSION ORAL at 06:16

## 2022-01-01 RX ADMIN — Medication 1 APPLICATION(S): at 13:34

## 2022-01-01 RX ADMIN — AMLODIPINE BESYLATE 10 MILLIGRAM(S): 2.5 TABLET ORAL at 06:50

## 2022-01-01 RX ADMIN — SODIUM ZIRCONIUM CYCLOSILICATE 10 GRAM(S): 10 POWDER, FOR SUSPENSION ORAL at 12:04

## 2022-01-01 RX ADMIN — SIMETHICONE 80 MILLIGRAM(S): 80 TABLET, CHEWABLE ORAL at 13:34

## 2022-01-01 RX ADMIN — Medication 12.5 MILLIGRAM(S): at 05:44

## 2022-01-01 RX ADMIN — Medication 250 MILLIGRAM(S): at 20:50

## 2022-01-01 RX ADMIN — Medication 5 MILLIGRAM(S): at 05:49

## 2022-01-01 RX ADMIN — Medication 40 MILLIGRAM(S): at 05:49

## 2022-01-01 RX ADMIN — POLYETHYLENE GLYCOL 3350 17 GRAM(S): 17 POWDER, FOR SOLUTION ORAL at 17:31

## 2022-01-01 RX ADMIN — Medication 40 MILLIGRAM(S): at 21:31

## 2022-01-01 RX ADMIN — Medication 1 APPLICATION(S): at 12:11

## 2022-01-01 RX ADMIN — MORPHINE SULFATE 2 MILLIGRAM(S): 50 CAPSULE, EXTENDED RELEASE ORAL at 16:30

## 2022-01-01 RX ADMIN — Medication 24 UNIT(S): at 17:26

## 2022-01-01 RX ADMIN — AMLODIPINE BESYLATE 10 MILLIGRAM(S): 2.5 TABLET ORAL at 06:18

## 2022-01-01 RX ADMIN — GABAPENTIN 400 MILLIGRAM(S): 400 CAPSULE ORAL at 22:57

## 2022-01-01 RX ADMIN — Medication 40 MILLIGRAM(S): at 05:46

## 2022-01-01 RX ADMIN — MORPHINE SULFATE 2 MILLIGRAM(S): 50 CAPSULE, EXTENDED RELEASE ORAL at 13:20

## 2022-01-01 RX ADMIN — Medication 500000 UNIT(S): at 05:49

## 2022-01-01 RX ADMIN — MORPHINE SULFATE 3 MILLIGRAM(S): 50 CAPSULE, EXTENDED RELEASE ORAL at 03:25

## 2022-01-01 RX ADMIN — Medication 40 MILLIGRAM(S): at 17:31

## 2022-01-01 RX ADMIN — MORPHINE SULFATE 3 MILLIGRAM(S): 50 CAPSULE, EXTENDED RELEASE ORAL at 14:39

## 2022-01-01 RX ADMIN — GABAPENTIN 400 MILLIGRAM(S): 400 CAPSULE ORAL at 13:36

## 2022-01-01 RX ADMIN — BUDESONIDE AND FORMOTEROL FUMARATE DIHYDRATE 2 PUFF(S): 160; 4.5 AEROSOL RESPIRATORY (INHALATION) at 10:55

## 2022-01-01 RX ADMIN — SIMETHICONE 80 MILLIGRAM(S): 80 TABLET, CHEWABLE ORAL at 12:37

## 2022-01-01 RX ADMIN — MORPHINE SULFATE 2 MILLIGRAM(S): 50 CAPSULE, EXTENDED RELEASE ORAL at 16:15

## 2022-01-01 RX ADMIN — HEPARIN SODIUM 5000 UNIT(S): 5000 INJECTION INTRAVENOUS; SUBCUTANEOUS at 14:14

## 2022-01-01 RX ADMIN — CEFEPIME 100 MILLIGRAM(S): 1 INJECTION, POWDER, FOR SOLUTION INTRAMUSCULAR; INTRAVENOUS at 23:17

## 2022-01-01 RX ADMIN — Medication 26 UNIT(S): at 18:17

## 2022-01-01 RX ADMIN — MORPHINE SULFATE 2 MILLIGRAM(S): 50 CAPSULE, EXTENDED RELEASE ORAL at 05:17

## 2022-01-01 RX ADMIN — INSULIN GLARGINE 25 UNIT(S): 100 INJECTION, SOLUTION SUBCUTANEOUS at 23:08

## 2022-01-01 RX ADMIN — POLYETHYLENE GLYCOL 3350 17 GRAM(S): 17 POWDER, FOR SOLUTION ORAL at 17:33

## 2022-01-01 RX ADMIN — Medication 23 UNIT(S): at 08:27

## 2022-01-01 RX ADMIN — Medication 137 MICROGRAM(S): at 05:46

## 2022-01-01 RX ADMIN — POLYETHYLENE GLYCOL 3350 17 GRAM(S): 17 POWDER, FOR SOLUTION ORAL at 18:41

## 2022-01-01 RX ADMIN — MORPHINE SULFATE 2 MILLIGRAM(S): 50 CAPSULE, EXTENDED RELEASE ORAL at 16:27

## 2022-01-01 RX ADMIN — GABAPENTIN 400 MILLIGRAM(S): 400 CAPSULE ORAL at 22:20

## 2022-01-01 RX ADMIN — Medication 3 MILLIGRAM(S): at 23:16

## 2022-01-01 RX ADMIN — Medication 5 MILLIGRAM(S): at 05:46

## 2022-01-01 RX ADMIN — MORPHINE SULFATE 2 MILLIGRAM(S): 50 CAPSULE, EXTENDED RELEASE ORAL at 12:06

## 2022-01-01 RX ADMIN — MORPHINE SULFATE 1 MG/HR: 50 CAPSULE, EXTENDED RELEASE ORAL at 20:59

## 2022-01-01 RX ADMIN — Medication 5 MILLIGRAM(S): at 06:52

## 2022-01-01 RX ADMIN — MORPHINE SULFATE 2 MILLIGRAM(S): 50 CAPSULE, EXTENDED RELEASE ORAL at 23:50

## 2022-01-01 RX ADMIN — HEPARIN SODIUM 5000 UNIT(S): 5000 INJECTION INTRAVENOUS; SUBCUTANEOUS at 12:45

## 2022-01-01 RX ADMIN — MORPHINE SULFATE 2 MILLIGRAM(S): 50 CAPSULE, EXTENDED RELEASE ORAL at 21:15

## 2022-01-01 RX ADMIN — Medication 12.5 MILLIGRAM(S): at 05:46

## 2022-01-01 RX ADMIN — Medication 3 MILLILITER(S): at 22:50

## 2022-01-01 RX ADMIN — POLYETHYLENE GLYCOL 3350 17 GRAM(S): 17 POWDER, FOR SOLUTION ORAL at 05:48

## 2022-01-01 RX ADMIN — Medication 1 APPLICATION(S): at 12:05

## 2022-01-01 RX ADMIN — Medication 500000 UNIT(S): at 05:50

## 2022-01-01 RX ADMIN — MORPHINE SULFATE 0.5 MG/HR: 50 CAPSULE, EXTENDED RELEASE ORAL at 21:00

## 2022-01-01 RX ADMIN — PANTOPRAZOLE SODIUM 40 MILLIGRAM(S): 20 TABLET, DELAYED RELEASE ORAL at 06:53

## 2022-01-01 RX ADMIN — Medication 500000 UNIT(S): at 12:09

## 2022-01-01 RX ADMIN — Medication 40 MILLIGRAM(S): at 06:17

## 2022-01-01 RX ADMIN — MORPHINE SULFATE 3 MILLIGRAM(S): 50 CAPSULE, EXTENDED RELEASE ORAL at 10:19

## 2022-01-01 RX ADMIN — SIMETHICONE 80 MILLIGRAM(S): 80 TABLET, CHEWABLE ORAL at 17:32

## 2022-01-01 RX ADMIN — MORPHINE SULFATE 3 MILLIGRAM(S): 50 CAPSULE, EXTENDED RELEASE ORAL at 18:41

## 2022-01-01 RX ADMIN — HEPARIN SODIUM 5000 UNIT(S): 5000 INJECTION INTRAVENOUS; SUBCUTANEOUS at 06:57

## 2022-01-01 RX ADMIN — GABAPENTIN 400 MILLIGRAM(S): 400 CAPSULE ORAL at 05:47

## 2022-01-01 RX ADMIN — POLYETHYLENE GLYCOL 3350 17 GRAM(S): 17 POWDER, FOR SOLUTION ORAL at 12:45

## 2022-01-01 RX ADMIN — Medication 4: at 12:04

## 2022-01-01 RX ADMIN — ESCITALOPRAM OXALATE 30 MILLIGRAM(S): 10 TABLET, FILM COATED ORAL at 12:09

## 2022-01-01 RX ADMIN — Medication 40 MILLIGRAM(S): at 06:48

## 2022-01-01 RX ADMIN — MORPHINE SULFATE 0.5 MG/HR: 50 CAPSULE, EXTENDED RELEASE ORAL at 09:23

## 2022-01-01 RX ADMIN — HEPARIN SODIUM 5000 UNIT(S): 5000 INJECTION INTRAVENOUS; SUBCUTANEOUS at 13:35

## 2022-01-01 RX ADMIN — Medication 500000 UNIT(S): at 12:44

## 2022-01-01 RX ADMIN — Medication 250 MILLIGRAM(S): at 17:34

## 2022-01-01 RX ADMIN — MORPHINE SULFATE 3 MILLIGRAM(S): 50 CAPSULE, EXTENDED RELEASE ORAL at 09:05

## 2022-01-01 RX ADMIN — MORPHINE SULFATE 0.5 MG/HR: 50 CAPSULE, EXTENDED RELEASE ORAL at 20:49

## 2022-01-01 RX ADMIN — MORPHINE SULFATE 2 MILLIGRAM(S): 50 CAPSULE, EXTENDED RELEASE ORAL at 17:34

## 2022-01-01 RX ADMIN — INSULIN GLARGINE 25 UNIT(S): 100 INJECTION, SOLUTION SUBCUTANEOUS at 23:39

## 2022-01-01 RX ADMIN — Medication 250 MILLIGRAM(S): at 06:30

## 2022-01-01 RX ADMIN — MORPHINE SULFATE 3 MILLIGRAM(S): 50 CAPSULE, EXTENDED RELEASE ORAL at 14:38

## 2022-01-01 RX ADMIN — POLYETHYLENE GLYCOL 3350 17 GRAM(S): 17 POWDER, FOR SOLUTION ORAL at 05:44

## 2022-01-01 RX ADMIN — CEFEPIME 100 MILLIGRAM(S): 1 INJECTION, POWDER, FOR SOLUTION INTRAMUSCULAR; INTRAVENOUS at 22:19

## 2022-01-01 RX ADMIN — CEFEPIME 100 MILLIGRAM(S): 1 INJECTION, POWDER, FOR SOLUTION INTRAMUSCULAR; INTRAVENOUS at 17:12

## 2022-01-01 RX ADMIN — INSULIN GLARGINE 50 UNIT(S): 100 INJECTION, SOLUTION SUBCUTANEOUS at 21:31

## 2022-01-01 RX ADMIN — MORPHINE SULFATE 3 MILLIGRAM(S): 50 CAPSULE, EXTENDED RELEASE ORAL at 23:05

## 2022-01-01 RX ADMIN — SENNA PLUS 2 TABLET(S): 8.6 TABLET ORAL at 21:51

## 2022-01-01 RX ADMIN — Medication 1 TABLET(S): at 13:17

## 2022-01-01 RX ADMIN — Medication 2: at 05:50

## 2022-01-01 RX ADMIN — GABAPENTIN 400 MILLIGRAM(S): 400 CAPSULE ORAL at 13:24

## 2022-01-01 RX ADMIN — MORPHINE SULFATE 2 MILLIGRAM(S): 50 CAPSULE, EXTENDED RELEASE ORAL at 15:55

## 2022-01-01 RX ADMIN — MORPHINE SULFATE 0.5 MG/HR: 50 CAPSULE, EXTENDED RELEASE ORAL at 19:43

## 2022-01-01 RX ADMIN — MORPHINE SULFATE 2 MILLIGRAM(S): 50 CAPSULE, EXTENDED RELEASE ORAL at 19:35

## 2022-01-01 RX ADMIN — SENNA PLUS 2 TABLET(S): 8.6 TABLET ORAL at 22:19

## 2022-01-01 RX ADMIN — MORPHINE SULFATE 1 MILLIGRAM(S): 50 CAPSULE, EXTENDED RELEASE ORAL at 02:42

## 2022-01-01 RX ADMIN — MORPHINE SULFATE 3 MILLIGRAM(S): 50 CAPSULE, EXTENDED RELEASE ORAL at 14:24

## 2022-01-01 RX ADMIN — Medication 1 APPLICATION(S): at 13:02

## 2022-01-01 RX ADMIN — Medication 500000 UNIT(S): at 12:06

## 2022-01-01 RX ADMIN — MORPHINE SULFATE 2 MILLIGRAM(S): 50 CAPSULE, EXTENDED RELEASE ORAL at 19:16

## 2022-01-01 RX ADMIN — Medication 12.5 MILLIGRAM(S): at 18:42

## 2022-01-01 RX ADMIN — MORPHINE SULFATE 3 MILLIGRAM(S): 50 CAPSULE, EXTENDED RELEASE ORAL at 18:28

## 2022-01-01 RX ADMIN — GABAPENTIN 400 MILLIGRAM(S): 400 CAPSULE ORAL at 06:51

## 2022-01-01 RX ADMIN — INSULIN GLARGINE 46 UNIT(S): 100 INJECTION, SOLUTION SUBCUTANEOUS at 21:55

## 2022-01-01 RX ADMIN — ATOVAQUONE 1500 MILLIGRAM(S): 750 SUSPENSION ORAL at 12:45

## 2022-01-01 RX ADMIN — MORPHINE SULFATE 2 MILLIGRAM(S): 50 CAPSULE, EXTENDED RELEASE ORAL at 16:03

## 2022-01-01 RX ADMIN — Medication 12.5 MILLIGRAM(S): at 19:00

## 2022-01-01 RX ADMIN — SENNA PLUS 2 TABLET(S): 8.6 TABLET ORAL at 22:57

## 2022-01-01 RX ADMIN — MORPHINE SULFATE 0.5 MG/HR: 50 CAPSULE, EXTENDED RELEASE ORAL at 19:59

## 2022-01-01 RX ADMIN — MORPHINE SULFATE 3 MILLIGRAM(S): 50 CAPSULE, EXTENDED RELEASE ORAL at 13:07

## 2022-01-01 RX ADMIN — Medication 1 TABLET(S): at 08:17

## 2022-01-01 RX ADMIN — Medication 4: at 12:03

## 2022-01-01 RX ADMIN — MORPHINE SULFATE 3 MILLIGRAM(S): 50 CAPSULE, EXTENDED RELEASE ORAL at 05:47

## 2022-01-01 RX ADMIN — Medication 2000 UNIT(S): at 12:45

## 2022-01-01 RX ADMIN — PANTOPRAZOLE SODIUM 40 MILLIGRAM(S): 20 TABLET, DELAYED RELEASE ORAL at 05:48

## 2022-01-01 RX ADMIN — GABAPENTIN 400 MILLIGRAM(S): 400 CAPSULE ORAL at 21:51

## 2022-01-01 RX ADMIN — HEPARIN SODIUM 5000 UNIT(S): 5000 INJECTION INTRAVENOUS; SUBCUTANEOUS at 06:22

## 2022-01-01 RX ADMIN — MORPHINE SULFATE 3 MILLIGRAM(S): 50 CAPSULE, EXTENDED RELEASE ORAL at 08:37

## 2022-01-01 RX ADMIN — CHLORHEXIDINE GLUCONATE 1 APPLICATION(S): 213 SOLUTION TOPICAL at 18:56

## 2022-01-01 RX ADMIN — MORPHINE SULFATE 2 MILLIGRAM(S): 50 CAPSULE, EXTENDED RELEASE ORAL at 21:00

## 2022-01-01 RX ADMIN — ROBINUL 0.2 MILLIGRAM(S): 0.2 INJECTION INTRAMUSCULAR; INTRAVENOUS at 22:19

## 2022-01-01 RX ADMIN — MORPHINE SULFATE 3 MILLIGRAM(S): 50 CAPSULE, EXTENDED RELEASE ORAL at 23:58

## 2022-01-01 RX ADMIN — MORPHINE SULFATE 2 MILLIGRAM(S): 50 CAPSULE, EXTENDED RELEASE ORAL at 17:53

## 2022-01-01 RX ADMIN — MORPHINE SULFATE 2 MILLIGRAM(S): 50 CAPSULE, EXTENDED RELEASE ORAL at 16:29

## 2022-01-01 RX ADMIN — SIMVASTATIN 20 MILLIGRAM(S): 20 TABLET, FILM COATED ORAL at 22:13

## 2022-01-01 RX ADMIN — INSULIN GLARGINE 25 UNIT(S): 100 INJECTION, SOLUTION SUBCUTANEOUS at 22:55

## 2022-01-01 RX ADMIN — Medication 30 MILLILITER(S): at 21:50

## 2022-01-01 RX ADMIN — PANTOPRAZOLE SODIUM 40 MILLIGRAM(S): 20 TABLET, DELAYED RELEASE ORAL at 06:18

## 2022-01-01 RX ADMIN — SENNA PLUS 2 TABLET(S): 8.6 TABLET ORAL at 22:53

## 2022-01-01 RX ADMIN — NALOXEGOL OXALATE 25 MILLIGRAM(S): 12.5 TABLET, FILM COATED ORAL at 12:59

## 2022-01-01 RX ADMIN — MORPHINE SULFATE 3 MILLIGRAM(S): 50 CAPSULE, EXTENDED RELEASE ORAL at 10:34

## 2022-01-01 RX ADMIN — LACTULOSE 10 GRAM(S): 10 SOLUTION ORAL at 18:29

## 2022-01-01 RX ADMIN — MORPHINE SULFATE 3 MILLIGRAM(S): 50 CAPSULE, EXTENDED RELEASE ORAL at 12:37

## 2022-01-01 RX ADMIN — SODIUM ZIRCONIUM CYCLOSILICATE 10 GRAM(S): 10 POWDER, FOR SUSPENSION ORAL at 20:41

## 2022-01-01 RX ADMIN — GABAPENTIN 400 MILLIGRAM(S): 400 CAPSULE ORAL at 12:46

## 2022-01-01 RX ADMIN — MORPHINE SULFATE 2 MILLIGRAM(S): 50 CAPSULE, EXTENDED RELEASE ORAL at 14:15

## 2022-01-01 RX ADMIN — GABAPENTIN 400 MILLIGRAM(S): 400 CAPSULE ORAL at 14:07

## 2022-01-01 RX ADMIN — HEPARIN SODIUM 5000 UNIT(S): 5000 INJECTION INTRAVENOUS; SUBCUTANEOUS at 05:48

## 2022-01-01 RX ADMIN — Medication 500000 UNIT(S): at 22:58

## 2022-01-01 RX ADMIN — AMLODIPINE BESYLATE 10 MILLIGRAM(S): 2.5 TABLET ORAL at 05:48

## 2022-01-01 RX ADMIN — GABAPENTIN 400 MILLIGRAM(S): 400 CAPSULE ORAL at 22:53

## 2022-01-01 RX ADMIN — Medication 500000 UNIT(S): at 12:45

## 2022-01-01 RX ADMIN — LACTULOSE 10 GRAM(S): 10 SOLUTION ORAL at 08:38

## 2022-01-01 RX ADMIN — Medication 40 MILLIGRAM(S): at 05:50

## 2022-01-01 RX ADMIN — MORPHINE SULFATE 2 MILLIGRAM(S): 50 CAPSULE, EXTENDED RELEASE ORAL at 06:17

## 2022-01-01 RX ADMIN — Medication 10 MILLIGRAM(S): at 12:34

## 2022-01-01 RX ADMIN — Medication 500000 UNIT(S): at 17:33

## 2022-01-01 RX ADMIN — Medication 137 MICROGRAM(S): at 06:18

## 2022-01-01 RX ADMIN — MORPHINE SULFATE 0.5 MG/HR: 50 CAPSULE, EXTENDED RELEASE ORAL at 09:27

## 2022-01-01 RX ADMIN — MORPHINE SULFATE 2 MILLIGRAM(S): 50 CAPSULE, EXTENDED RELEASE ORAL at 16:28

## 2022-01-01 RX ADMIN — ESCITALOPRAM OXALATE 30 MILLIGRAM(S): 10 TABLET, FILM COATED ORAL at 12:15

## 2022-01-01 RX ADMIN — SIMETHICONE 80 MILLIGRAM(S): 80 TABLET, CHEWABLE ORAL at 06:22

## 2022-01-01 RX ADMIN — Medication 1 APPLICATION(S): at 12:59

## 2022-01-01 RX ADMIN — SIMETHICONE 80 MILLIGRAM(S): 80 TABLET, CHEWABLE ORAL at 18:41

## 2022-01-01 RX ADMIN — ESCITALOPRAM OXALATE 30 MILLIGRAM(S): 10 TABLET, FILM COATED ORAL at 12:47

## 2022-01-01 NOTE — PROGRESS NOTE ADULT - ASSESSMENT
65 yo F with HTN, HLD, type 2DM, hypothyroidism, fibromyalgia, vulvar cancer s/p surgery 2020, RT/chemo 2021, anemia presenting with HERNANDEZ and dizziness with imaging concerning for metastatic disease and pleural effusions bilaterally. Endocrine was consulted for T2DM management.    1. Type 2 Diabetes Mellitus c/b steroid induced hyperglycemia  A1c 7.7%; goal less than 7%  Home Regimen: Tresiba 60 units QHS, Novolog 15-30 units with meals (ISF 1:30, but usually just estimates), Victoza 1.8 mg daily    While inpatient:   BG target 100-180 mg/dl  Continues on solumedrol with steroid exacerbated hyperglycemia, however, also with poor appetite and food intake   Will increase Lantus to 46 units SQ qHS  Will increase Admelog to 24 units SQ TID before meals - please HOLD if she is not eating  Continue Admelog MODERATE dose correctional scale before meals, moderate dose at bedtime   Please check FSG before meals and QHS  Consistent carbohydrate diet - appreciate RD input, liberalized diet (eliminated sodium restriction, added Glucerna per RD recommendation)  Hypoglycemia protocol     Discharge planning for DM: Resume basal / bolus insulin (dosing TBD), schedule follow up with prior outpatient endocrine provider     2. Chronic steroid use, steroid induced hyperglycemia   On Prednisone 10 mg BID for at least two months, was on Prednisone 40 mg daily, now on solumedrol 40 mg IV BID (12/28)  Insulin dosing as above   Pt cannot stop steroids abruptly as she will have secondary adrenal insufficiency   Patient to follow up in 1/2022 with her doctors with steroid taper      3. Hypothyroidism  12-16 @ 06:47 TSH 11.94 FreeT4 0.9   Levels borderline, would continue with current LT4 dosing for now and followup as outpatient, repeat TFTs in 6-8 weeks  Continue Levothyroxine 125 mcg PO daily, take daily on empty stomach    4. Hypercalcemia   PTH 7  Likely hypercalcemia of malignancy  S/p Zometa 4 mg IV (given on 12/20/21)  S/p Calcitonin 310 IU SQ q12h x 4 doses (completed 12/22)   Corrected calcium 7.8 today   Vit D 25-OH LOW at 12.2, recommend supplement (see below)  PTH- suppressed    Followup PTHrp  Check Serum albumin and Ca daily, may need Ca replacement tomorrow if corrected Ca is still low     5. Vitamin D deficiency  Vitamin D 25-OH 12.2  Recommend supplement with Cholecalciferol 2000 units daily    6. Hypertension  Goal BP <130/80  Management as per primary team    7. Hyperlipidemia  Continue Simvastatin 20 mg daily  Followup lipid panel annually as outpatient    LUIS ALBERTO Marques-BC  Nurse Practitioner   Division of Endocrinology  Pager: ADALBERTO pager [phone]    If out of hospital/unavailable when paged, please note: patient will be cared for by another provider on the endocrine service.  Please call the endocrine answering service for assistance to reach covering provider ([phone]). For non-urgent matters, please email [email or web address] for assistance.

## 2022-01-01 NOTE — PROGRESS NOTE ADULT - PROBLEM SELECTOR PLAN 2
-daily Ca and albumin  -could be 2/2 hypercalcemia of malignancy;  low PTH,  low vitamin D 25, 1, 25, and PTHRP neg  - F/u w Endo  - Calcitonin q 12 hrs x 4 doses, Zometa 12/20  - Replete vit D

## 2022-01-01 NOTE — PROGRESS NOTE ADULT - SUBJECTIVE AND OBJECTIVE BOX
Hematology Oncology Follow-up    INTERVAL HPI/OVERNIGHT EVENTS:  No substantial o/n events.   12/30 was C1 carboplatin+paclitaxel  12/31: subjective: - did not have bowel mvmt despite suppository this morning; having substantial nausea this AM; no pain; breathing is not worse    01/01  subjective  - did not have bowel mvmt despite mineral oil  - continues having intermittent nausea  - breathing is same or better compared to 3 days ago  - wound "hardly draining", not foul smelling anymore  - says she has not been drinking too much water and is somewhat thirsty    VITAL SIGNS:  T(F): 98.1 (12-30-21 @ 06:16)  HR: 98 (12-30-21 @ 06:32)  BP: 146/70 (12-30-21 @ 06:32)  RR: 18 (12-30-21 @ 06:16)  SpO2: 96% (12-30-21 @ 06:16)  Wt(kg): --    12-29-21 @ 07:01  -  12-30-21 @ 07:00  --------------------------------------------------------  IN: 250 mL / OUT: 501 mL / NET: -251 mL          Review of Systems:  General: denies fevers/chills  Respiratory: + shortness of breath  Cardiovascular: denies chest pain, palpitations  Gastrointestinal: denies nausea, vomiting, abdominal pain, constipation, diarrhea, melena, hematochezia  MSK: denies joint pain or muscle pain  Neuro: denies headache, weakness, or parasthesias  Skin: denies rash, petichiae, echymoses  Psych: denies anxiety or sleep disturbances    PHYSICAL EXAM:  Constitutional: NAD  Respiratory: bilateral crackles, symmetric chest rise, with normal respiratory effort  Cardiovascular: RRR  Gastrointestinal: soft, NTND  Extremities:  no edema  MSK: no obvious abnormalities  Neurological: Grossly intact  Skin: no rash, no echymoses, no petichiae  Psych: normal affect    MEDICATIONS  (STANDING):  albuterol/ipratropium for Nebulization 3 milliLiter(s) Nebulizer every 6 hours  amLODIPine   Tablet 10 milliGRAM(s) Oral daily  buDESOnide    Inhalation Suspension 0.5 milliGRAM(s) Inhalation two times a day  cholecalciferol 2000 Unit(s) Oral daily  Dakins Solution - 1/4 Strength 1 Application(s) Topical daily  dextrose 40% Gel 15 Gram(s) Oral once  dextrose 5%. 1000 milliLiter(s) (50 mL/Hr) IV Continuous <Continuous>  dextrose 5%. 1000 milliLiter(s) (100 mL/Hr) IV Continuous <Continuous>  dextrose 50% Injectable 25 Gram(s) IV Push once  dextrose 50% Injectable 12.5 Gram(s) IV Push once  dextrose 50% Injectable 25 Gram(s) IV Push once  enalapril 5 milliGRAM(s) Oral daily  escitalopram 30 milliGRAM(s) Oral daily  furosemide    Tablet 40 milliGRAM(s) Oral daily  gabapentin 400 milliGRAM(s) Oral three times a day  glucagon  Injectable 1 milliGRAM(s) IntraMuscular once  heparin   Injectable 5000 Unit(s) SubCutaneous every 8 hours  influenza  Vaccine (HIGH DOSE) 0.7 milliLiter(s) IntraMuscular once  insulin glargine Injectable (LANTUS) 40 Unit(s) SubCutaneous at bedtime  insulin lispro (ADMELOG) corrective regimen sliding scale   SubCutaneous three times a day before meals  insulin lispro (ADMELOG) corrective regimen sliding scale   SubCutaneous at bedtime  insulin lispro Injectable (ADMELOG) 24 Unit(s) SubCutaneous three times a day with meals  insulin lispro Injectable (ADMELOG). 12 Unit(s) SubCutaneous once  levothyroxine 125 MICROGram(s) Oral daily  methylPREDNISolone sodium succinate Injectable 40 milliGRAM(s) IV Push two times a day  ondansetron   Disintegrating Tablet 4 milliGRAM(s) Oral three times a day  pantoprazole    Tablet 40 milliGRAM(s) Oral before breakfast  polyethylene glycol 3350 17 Gram(s) Oral daily  senna 2 Tablet(s) Oral at bedtime  simvastatin 20 milliGRAM(s) Oral at bedtime  sodium zirconium cyclosilicate 10 Gram(s) Oral once  trimethoprim  160 mG/sulfamethoxazole 800 mG 1 Tablet(s) Oral <User Schedule>    MEDICATIONS  (PRN):  aluminum hydroxide/magnesium hydroxide/simethicone Suspension 30 milliLiter(s) Oral every 6 hours PRN Dyspepsia  bisacodyl Suppository 10 milliGRAM(s) Rectal daily PRN Constipation  diphenhydrAMINE 25 milliGRAM(s) Oral daily PRN Rash and/or Itching  lidocaine/prilocaine Cream 1 Application(s) Topical daily PRN Dressing changes  melatonin 3 milliGRAM(s) Oral at bedtime PRN Insomnia  morphine  - Injectable 3 milliGRAM(s) IV Push every 6 hours PRN mod to severe pain or dyspnea      No Known Allergies      LABS:                        8.2    17.01 )-----------( 245      ( 30 Dec 2021 04:55 )             26.0     12-30    134<L>  |  97<L>  |  29<H>  ----------------------------<  230<H>  5.6<H>   |  28  |  0.81    Ca    8.0<L>      30 Dec 2021 04:55  Phos  2.2     12-30  Mg     1.90     12-30                       RADIOLOGY & ADDITIONAL TESTS:  Studies reviewed.

## 2022-01-01 NOTE — PROGRESS NOTE ADULT - PROBLEM SELECTOR PLAN 1
# Acute hypoxic respiratory failure  -CT without pulmonary embolus. Multiple nodules are noted within both lungs. Primary consideration is   metastasis. Findings suggestive of malignant pleural effusions bilaterally.  -s/p Thoracentesis on 12/15 by IR, drained appr 660cc pleural fluid, exudative, path negative  - Pulm consult appreciated - steroids switched to methylprednisolone on 12/27  -duonebs and budesonide nebs standing   - bedside US by pulm showed improvement in pleural effusion, but patient clinically with SOB.   - prior echo with poor window.   - repeat echo pending.   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

## 2022-01-01 NOTE — PROGRESS NOTE ADULT - SUBJECTIVE AND OBJECTIVE BOX
Chief Complaint: DM2 - uncontrolled with hyperglycemia exacerbated by steroids     History: Reports poor appetite, increased SOB today, RN at bedside, O2 sat 96% -  has not had much to eat today. Continues on Solumedrol 40 mg BID.     MEDICATIONS  (STANDING):  albuterol/ipratropium for Nebulization 3 milliLiter(s) Nebulizer every 6 hours  amLODIPine   Tablet 10 milliGRAM(s) Oral daily  buDESOnide    Inhalation Suspension 0.5 milliGRAM(s) Inhalation two times a day  budesonide 160 MICROgram(s)/formoterol 4.5 MICROgram(s) Inhaler 2 Puff(s) Inhalation two times a day  cholecalciferol 2000 Unit(s) Oral daily  Dakins Solution - 1/4 Strength 1 Application(s) Topical daily  dextrose 40% Gel 15 Gram(s) Oral once  dextrose 5%. 1000 milliLiter(s) (50 mL/Hr) IV Continuous <Continuous>  dextrose 5%. 1000 milliLiter(s) (100 mL/Hr) IV Continuous <Continuous>  dextrose 50% Injectable 25 Gram(s) IV Push once  dextrose 50% Injectable 12.5 Gram(s) IV Push once  dextrose 50% Injectable 25 Gram(s) IV Push once  enalapril 5 milliGRAM(s) Oral daily  escitalopram 30 milliGRAM(s) Oral daily  gabapentin 400 milliGRAM(s) Oral three times a day  glucagon  Injectable 1 milliGRAM(s) IntraMuscular once  heparin   Injectable 5000 Unit(s) SubCutaneous every 8 hours  influenza  Vaccine (HIGH DOSE) 0.7 milliLiter(s) IntraMuscular once  insulin glargine Injectable (LANTUS) 44 Unit(s) SubCutaneous at bedtime  insulin lispro (ADMELOG) corrective regimen sliding scale   SubCutaneous three times a day before meals  insulin lispro (ADMELOG) corrective regimen sliding scale   SubCutaneous at bedtime  insulin lispro Injectable (ADMELOG) 23 Unit(s) SubCutaneous three times a day with meals  levothyroxine 125 MICROGram(s) Oral daily  methylPREDNISolone sodium succinate Injectable 40 milliGRAM(s) IV Push two times a day  nystatin    Suspension 010799 Unit(s) Swish and Swallow four times a day  pantoprazole    Tablet 40 milliGRAM(s) Oral before breakfast  polyethylene glycol 3350 17 Gram(s) Oral daily  senna 2 Tablet(s) Oral at bedtime  simvastatin 20 milliGRAM(s) Oral at bedtime  trimethoprim  160 mG/sulfamethoxazole 800 mG 1 Tablet(s) Oral <User Schedule>          No Known Allergies          Review of Systems:  RESP: SOB with exertion eating   GI: poor appetite, no vomiting   ALL OTHER SYSTEMS REVIEWED AND NEGATIVE    PHYSICAL EXAM:  Vital Signs Last 24 Hrs  T(C): 36.9 (01 Jan 2022 12:59), Max: 36.9 (01 Jan 2022 12:59)  T(F): 98.5 (01 Jan 2022 12:59), Max: 98.5 (01 Jan 2022 12:59)  HR: 109 (01 Jan 2022 12:59) (95 - 109)  BP: 148/54 (01 Jan 2022 12:59) (126/60 - 152/51)  BP(mean): --  RR: 18 (01 Jan 2022 12:59) (17 - 18)  SpO2: 96% (01 Jan 2022 12:59) (96% - 100%)  GENERAL: NAD, sitting up in bed  EYES: No proptosis, no lid lag, anicteric  HEENT:  Atraumatic, Normocephalic, moist mucous membranes, NC in place   RESPIRATORY: Slightly labored respirations, particularly when talking   PSYCH: Alert and oriented x 3, normal affect, normal mood    CAPILLARY BLOOD GLUCOSE      POCT Blood Glucose.: 220 mg/dL (01 Jan 2022 11:29)  POCT Blood Glucose.: 260 mg/dL (01 Jan 2022 07:37)  POCT Blood Glucose.: 158 mg/dL (31 Dec 2021 20:55)  POCT Blood Glucose.: 156 mg/dL (31 Dec 2021 16:45)    01-01    134<L>  |  95<L>  |  47<H>  ----------------------------<  239<H>  5.5<H>   |  26  |  1.13    Ca    7.5<L>      01 Jan 2022 06:25  Phos  1.8     12-31  Mg     1.90     12-31    TPro  6.4  /  Alb  3.6  /  TBili  0.3  /  DBili  x   /  AST  10  /  ALT  12  /  AlkPhos  64  01-01      A1C with Estimated Average Glucose Result: 7.7 % (12-14-21 @ 08:15)  A1C with Estimated Average Glucose Result: 8.2 % (09-04-21 @ 23:11)  A1C with Estimated Average Glucose Result: 7.5 % (09-04-21 @ 09:13)  A1C with Estimated Average Glucose Result: 7.7 % (03-25-21 @ 15:08)      Thyroid Function Tests:  12-16 @ 06:47 TSH 11.94 FreeT4 0.9 T3 -- Anti TPO -- Anti Thyroglobulin Ab -- TSI --  12-14 @ 08:15 TSH 10.03 FreeT4 0.8 T3 -- Anti TPO -- Anti Thyroglobulin Ab -- TSI --      Diet, Regular (12-22-21 @ 10:35) [Available for Activation]  Diet, Regular:   Consistent Carbohydrate Evening Snack (CSTCHOSN)  Supplement Feeding Modality:  Oral  Glucerna Shake Cans or Servings Per Day:  1       Frequency:  Daily (12-21-21 @ 16:23) [Active]

## 2022-01-01 NOTE — PROGRESS NOTE ADULT - ASSESSMENT
67 yo F w/ PMHx of HTN, HLD, DM, fibromyalgia, Vulvar CA s/p vulva surgery in 2020, completed chemo & RT in 9/21, PMR (on chronic steroids, 10 mg BID), now p/w SOB. CTPA with out PE but multiple nodules are noted within both lungs. Primary consideration is metastasis. Findings suggestive of malignant pleural effusions bilaterally, s/p thora this admission by IR 12/15. Now with worsening hypercalcemia. Also on a course of zosyn for inguinal wound (thought to be possible source of fever, but not entirely clear)-- ID recommended 10 day course of augmentin (12/15-12/24). Pulm nodule bx 12/22. Concern for reaccumulation of pleural effusion on CXR 12/20. Pulm recommended IV steroids. s/p chemo on 12/30 with next dose plan for 1/6.

## 2022-01-01 NOTE — PROGRESS NOTE ADULT - SUBJECTIVE AND OBJECTIVE BOX
[facility] Division of Hospital Medicine  Noah Montesinos MD  In House Pager [phone]    Patient is a 66y old  Female who presents with a chief complaint of SOB dizziness & nausea x couple of weeks (01 Jan 2022 08:24)      SUBJECTIVE / OVERNIGHT EVENTS:  No overnight events. Labs and vitals reviewed. hyperkalemia, elevated BUN.   Patient seen and examined at bedside, no acute complaints. still having cp with deep inspiration. otherwise no change in her respiration.   No fever, no chills, no SOB, no CP, no n/v/d, no abd pain, no dysuria      MEDICATIONS  (STANDING):  albuterol/ipratropium for Nebulization 3 milliLiter(s) Nebulizer every 6 hours  amLODIPine   Tablet 10 milliGRAM(s) Oral daily  buDESOnide    Inhalation Suspension 0.5 milliGRAM(s) Inhalation two times a day  budesonide 160 MICROgram(s)/formoterol 4.5 MICROgram(s) Inhaler 2 Puff(s) Inhalation two times a day  cholecalciferol 2000 Unit(s) Oral daily  Dakins Solution - 1/4 Strength 1 Application(s) Topical daily  dextrose 40% Gel 15 Gram(s) Oral once  dextrose 5%. 1000 milliLiter(s) (50 mL/Hr) IV Continuous <Continuous>  dextrose 5%. 1000 milliLiter(s) (100 mL/Hr) IV Continuous <Continuous>  dextrose 50% Injectable 25 Gram(s) IV Push once  dextrose 50% Injectable 12.5 Gram(s) IV Push once  dextrose 50% Injectable 25 Gram(s) IV Push once  enalapril 5 milliGRAM(s) Oral daily  escitalopram 30 milliGRAM(s) Oral daily  gabapentin 400 milliGRAM(s) Oral three times a day  glucagon  Injectable 1 milliGRAM(s) IntraMuscular once  heparin   Injectable 5000 Unit(s) SubCutaneous every 8 hours  influenza  Vaccine (HIGH DOSE) 0.7 milliLiter(s) IntraMuscular once  insulin glargine Injectable (LANTUS) 44 Unit(s) SubCutaneous at bedtime  insulin lispro (ADMELOG) corrective regimen sliding scale   SubCutaneous three times a day before meals  insulin lispro (ADMELOG) corrective regimen sliding scale   SubCutaneous at bedtime  insulin lispro Injectable (ADMELOG) 23 Unit(s) SubCutaneous three times a day with meals  levothyroxine 125 MICROGram(s) Oral daily  methylPREDNISolone sodium succinate Injectable 40 milliGRAM(s) IV Push two times a day  nystatin    Suspension [id number] Unit(s) Swish and Swallow four times a day  pantoprazole    Tablet 40 milliGRAM(s) Oral before breakfast  polyethylene glycol 3350 17 Gram(s) Oral daily  senna 2 Tablet(s) Oral at bedtime  simvastatin 20 milliGRAM(s) Oral at bedtime  trimethoprim  160 mG/sulfamethoxazole 800 mG 1 Tablet(s) Oral <User Schedule>    MEDICATIONS  (PRN):  aluminum hydroxide/magnesium hydroxide/simethicone Suspension 30 milliLiter(s) Oral every 6 hours PRN Dyspepsia  bisacodyl Suppository 10 milliGRAM(s) Rectal daily PRN Constipation  diphenhydrAMINE 25 milliGRAM(s) Oral daily PRN Rash and/or Itching  lactulose Syrup 10 Gram(s) Oral two times a day PRN constipation  lidocaine/prilocaine Cream 1 Application(s) Topical daily PRN Dressing changes  melatonin 3 milliGRAM(s) Oral at bedtime PRN Insomnia  metoclopramide Injectable 10 milliGRAM(s) IV Push every 6 hours PRN nausea or vomiting.  morphine  - Injectable 3 milliGRAM(s) IV Push every 4 hours PRN mod to severe pain or dyspnea  ondansetron Injectable 4 milliGRAM(s) IV Push three times a day PRN Nausea and/or Vomiting    CAPILLARY BLOOD GLUCOSE      POCT Blood Glucose.: 220 mg/dL (01 Jan 2022 11:29)  POCT Blood Glucose.: 260 mg/dL (01 Jan 2022 07:37)  POCT Blood Glucose.: 158 mg/dL (31 Dec 2021 20:55)  POCT Blood Glucose.: 156 mg/dL (31 Dec 2021 16:45)    I&O's Summary    31 Dec 2021 07:01  -  01 Jan 2022 07:00  --------------------------------------------------------  IN: 750 mL / OUT: 1500 mL / NET: -750 mL        PHYSICAL EXAM:  Vital Signs Last 24 Hrs  T(C): 36.9 (01 Jan 2022 12:59), Max: 36.9 (01 Jan 2022 12:59)  T(F): 98.5 (01 Jan 2022 12:59), Max: 98.5 (01 Jan 2022 12:59)  HR: 109 (01 Jan 2022 12:59) (95 - 109)  BP: 148/54 (01 Jan 2022 12:59) (126/60 - 152/51)  BP(mean): --  RR: 18 (01 Jan 2022 12:59) (17 - 18)  SpO2: 96% (01 Jan 2022 12:59) (96% - 100%)    Gen: NAD; resting in bed. awake and alert.   Pulm: no respiratory distress; no accessory muscle use; no wheezing; no crackles.   Cards: RRR, nl S1/S2; no obvious murmurs; no LE edema; no JVD  Abd: soft; NT on exam; +bs  Ext: no cyanosis; no joint effusion; no joint pain on palpation.  Skin: no rash; no cyanosis    LABS:                        7.8    20.52 )-----------( 179      ( 01 Jan 2022 06:25 )             26.1     01-01    134<L>  |  95<L>  |  47<H>  ----------------------------<  239<H>  5.5<H>   |  26  |  1.13    Ca    7.5<L>      01 Jan 2022 06:25  Phos  1.8     12-31  Mg     1.90     12-31    TPro  6.4  /  Alb  3.6  /  TBili  0.3  /  DBili  x   /  AST  10  /  ALT  12  /  AlkPhos  64  01-01                RADIOLOGY & ADDITIONAL TESTS:  Results Reviewed: Y  Imaging Personally Reviewed: Y  Electrocardiogram Personally Reviewed: Y    COORDINATION OF CARE:  Care Discussed with Consultants/Other Providers [Y/N]: Y  Prior or Outpatient Records Reviewed [Y/N]: REMI

## 2022-01-01 NOTE — PROGRESS NOTE ADULT - ASSESSMENT
65 y/o F w/ a PMHx of HTN, HLD, T2DM, fibromyalgia, recurrent vulvar cancer (recently received chemo/RT from 7/2021-9/2021), and anemia (requiring intermittent transfusions recently) who presented with shortness of breath, dizziness, and nausea for multiple weeks, found to have multiple lung nodules and B/L pleural effusions concerning for malignancy, and was admitted to telemetry for further management, s/p thoracentesis on 12/15 (~ 660cc drained). Oncology was consulted for further evaluation.    # Dyspnea  - CTA Chest (12/13): No pulmonary embolus is noted. Multiple nodules are noted within both lungs. Primary consideration is metastasis. Findings suggestive of malignant pleural effusions bilaterally.  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   - bactrim for PCP ppx  - morphine IV PRN for dypsnea  - path from lung nodule biopsy 12/22 consistent with metastatic squamous cell carcinoma (previous vulvar cancer also sq cell ca), sent for NGS and PDL1  - due to concern for recurrence (and now metastatic disease) and significant symptoms (shortness of breath), patient started on carboplatin + paclitaxel on 12/30, which she is tolerating fairly well as of 1/1;   - plan for next dose (C1D8) carplatin+paclitaxel on 1/6/22    # nausea  - 1/1: patient continues having intermittent nausea; as chemo is now 2 days prior, nausea may be more related to constipation than chemotherapy  [] please reinforce to nursing and patient that anti-emetics are available for her as needed    # constipation  - 1/1: patient having constipation in setting of opiates and anti-emetics, has not had bowel mvmt yesterday despite suppository & enema  [ ] please continue to augment stool softener and cathartics in light of above    # Recurrent vulvar cancer  - Last recurrence was earlier this year. She received chemo/RT with weekly Cisplatin from 7/12/21-9/1/21.  - Imaging findings on admission are concerning for progressing malignancy  - Pt s/p thoracentesis as noted above with negative cytopathology  - CT A/P with small collection in R inguinal region (likely known R inguinal wound/tract) but does not appear to show other areas of disease that is amenable to biopsy  - s/p IR biopsy of R lung nodule which showed squamous cell carcinoma, concern for recurrence/metastatic disease; sent for NGS and PDL1  - will follow up with primary Oncologist Dr. Paulo Carpenter as outpatient     #Hypercalcemia   -This is an oncologic emergency and requires urgent management  - Ca increased to 12.5  - PTH and Vit D low  - f/u PTHrP  - appreciate endo consult  - may be related to hypercalcemia of malignancy  - s/p zoledronic acid (discussed risks/benefits including risk of osteonecrosis of jaw) and calcitonin with normalization of calcium    # Microcytic Anemia  - Occasionally requiring transfusions (last on 12/11)  - Recently underwent a hematologic evaluation as an outpatient. Her workup was notable for a hemoglobin electrophoresis which was c/w beta thalassemia minor. She had a BMBx on 11/9/21 (minimal marrow obtained to evaluate) which showed erythroid hyperplasia, megakaryocytes normal, no evidence of dysplasia.   - Monitor CBC and keep active T+S. Transfuse for Hgb < 7 or if symptomatic  - Primary Hematologist: Dr. Leana Auguste. Continue outpatient follow-up    # KRISTIN w hyperkalemia  - given BUN:Cr ratio and patient's subjective thirst suspect pre-renal  [ ] encourage patient to increase PO fluids otherwise consider some IVF    # radiation wound  - WBC remains elevated, probably due to her steroids, but remain vigilant about pelvic area radiation wound     thank you to the primary team    Joon Anderson MD PhD  Oncology Attending providing holiday weekend coverage

## 2022-01-02 NOTE — CHART NOTE - NSCHARTNOTEFT_GEN_A_CORE
pt with increase episodes of PSVT 180, longest ~ 3 min, also episode of sequential APCs < 1 min.  D/w Attg: plan to add metoprolol 12.5 mg BID.  Pt seen and Asx, several episodes while sleeping.    Fredy Sheldon ACNP-C  Pager # [phone]

## 2022-01-02 NOTE — PROGRESS NOTE ADULT - ASSESSMENT
65 yo F w/ PMHx of HTN, HLD, DM, fibromyalgia, Vulvar CA s/p vulva surgery in 2020, completed chemo & RT in 9/21, PMR (on chronic steroids, 10 mg BID), now p/w SOB. CTPA with out PE but multiple nodules are noted within both lungs. Primary consideration is metastasis. Findings suggestive of malignant pleural effusions bilaterally, s/p thora this admission by IR 12/15. Now with worsening hypercalcemia. Also on a course of zosyn for inguinal wound (thought to be possible source of fever, but not entirely clear)-- ID recommended 10 day course of augmentin (12/15-12/24). Pulm nodule bx 12/22. Concern for reaccumulation of pleural effusion on CXR 12/20. Pulm recommended IV steroids. s/p chemo on 12/30 with next dose plan for 1/6.

## 2022-01-02 NOTE — PROGRESS NOTE ADULT - PROBLEM SELECTOR PLAN 2
no BM for over 1 week  on lactulose, miralax, senna, no response.   Abdomen very distended on exam today.   AXR showed dilated bowels, no air-fluid level. recent CT a/p w/o large obstructive abdominal mass, doubt obsturction, but possible ileus or severe complication.   did not have BM with fleet enema, will give SMOG today  c/w bowel regimen.   can give second SMOG if no BM after the first one.   start simethicone.   CT a/p to further assess bowel distension.   - if patient have worsening pain and vomiting, would place NGT and get surgical eval.

## 2022-01-02 NOTE — PROGRESS NOTE ADULT - ASSESSMENT
65 yo F with HTN, HLD, type 2DM, hypothyroidism, fibromyalgia, vulvar cancer s/p surgery 2020, RT/chemo 2021, anemia presenting with HERNANDEZ and dizziness with imaging concerning for metastatic disease and pleural effusions bilaterally. Endocrine was consulted for T2DM management.    1. Type 2 Diabetes Mellitus c/b steroid induced hyperglycemia  A1c 7.7%; goal less than 7%  Home Regimen: Tresiba 60 units QHS, Novolog 15-30 units with meals (ISF 1:30, but usually just estimates), Victoza 1.8 mg daily    While inpatient:   BG target 100-180 mg/dl  Continues on solumedrol with steroid exacerbated hyperglycemia, however, also with poor appetite and food intake. Increased SOB.   Will increase Lantus to 50 units SQ qHS  Will increase Admelog to 26 units SQ TID before meals - please HOLD if she is not eating - cautious with prandial bolus insulin increases because food intake is reported to be minimal   Continue Admelog MODERATE dose correctional scale before meals, moderate dose at bedtime   Please check FSG before meals and QHS  Consistent carbohydrate diet - appreciate RD input, liberalized diet (eliminated sodium restriction, added Glucerna per RD recommendation)  Hypoglycemia protocol     Discharge planning for DM: Resume basal / bolus insulin (dosing TBD), schedule follow up with prior outpatient endocrine provider - Dr Armstrong    2. Chronic steroid use, steroid induced hyperglycemia   On Prednisone 10 mg BID for at least two months, was on Prednisone 40 mg daily, now on solumedrol 40 mg IV BID (12/28)  Insulin dosing as above   Pt cannot stop steroids abruptly as she will have secondary adrenal insufficiency   Patient to follow up in 1/2022 with her doctors with steroid taper      3. Hypothyroidism  12-16 @ 06:47 TSH 11.94 FreeT4 0.9   Levels borderline, would continue with current LT4 dosing for now and followup as outpatient, repeat TFTs in 6-8 weeks  Continue Levothyroxine 125 mcg PO daily, take daily on empty stomach    4. Hypercalcemia   PTH 7  Likely hypercalcemia of malignancy  S/p Zometa 4 mg IV (given on 12/20/21)  S/p Calcitonin 310 IU SQ q12h x 4 doses (completed 12/22)   Corrected calcium 7.5 today - now with hypocalcemia - see below   Vit D 25-OH LOW at 12.2, recommend supplement (see below)  PTH- suppressed    PTHrp less than 2.0  Check Serum albumin and Ca daily    6. Hypocalcemia   Started Ca Carbonate 1250 mg daily   Check Serum albumin and Ca daily    7. Vitamin D deficiency  Vitamin D 25-OH 12.2  Recommend supplement with Cholecalciferol 2000 units daily    8. Hypertension  Goal BP <130/80  Management as per primary team    9. Hyperlipidemia  Continue Simvastatin 20 mg daily  Followup lipid panel annually as outpatient    10. SOB  Defer to primary team   Discussed with ACP today: MARRY Sheldon, medicine attending to see patient today for further assessment   Likely multifactorial, metastatic lung CA/pleural effusion, recent SVT, ABD distention, anxiety over increased work of breathing     35 minutes - Patient is high risk and high level decision making, high dose steroids, high dose insulin     LUIS ALBERTO Marques-BC  Nurse Practitioner   Division of Endocrinology  Pager: ADALBERTO pager [phone]    If out of hospital/unavailable when paged, please note: patient will be cared for by another provider on the endocrine service.  Please call the endocrine answering service for assistance to reach covering provider ([phone]). For non-urgent matters, please email [email or web address] for assistance.      67 yo F with HTN, HLD, type 2DM, hypothyroidism, fibromyalgia, vulvar cancer s/p surgery 2020, RT/chemo 2021, anemia presenting with HERNANDEZ and dizziness with imaging concerning for metastatic disease and pleural effusions bilaterally. Endocrine was consulted for T2DM management.    1. Type 2 Diabetes Mellitus c/b steroid induced hyperglycemia  A1c 7.7%; goal less than 7%  Home Regimen: Tresiba 60 units QHS, Novolog 15-30 units with meals (ISF 1:30, but usually just estimates), Victoza 1.8 mg daily    While inpatient:   BG target 100-180 mg/dl  Continues on solumedrol with steroid exacerbated hyperglycemia, however, also with poor appetite and food intake. Increased SOB.   Will increase Lantus to 50 units SQ qHS  Will increase Admelog to 26 units SQ TID before meals - please HOLD if she is not eating - cautious with prandial bolus insulin increases because food intake is reported to be minimal   Continue Admelog MODERATE dose correctional scale before meals, moderate dose at bedtime   Please check FSG before meals and QHS  Consistent carbohydrate diet - appreciate RD input, liberalized diet (eliminated sodium restriction, added Glucerna per RD recommendation)  Hypoglycemia protocol     Discharge planning for DM: Resume basal / bolus insulin (dosing TBD), schedule follow up with prior outpatient endocrine provider - Dr Armstrong    2. Chronic steroid use, steroid induced hyperglycemia   On Prednisone 10 mg BID for at least two months, was on Prednisone 40 mg daily, now on solumedrol 40 mg IV BID (12/28)  Insulin dosing as above   Pt cannot stop steroids abruptly as she will have secondary adrenal insufficiency   Patient to follow up in 1/2022 with her doctors with steroid taper      3. Hypothyroidism  12-16 @ 06:47 TSH 11.94 Free T4 0.9   Given recent increase in constipation will repeat TFTs - however, steroids will likely contribute to some suppression  Continue Levothyroxine 125 mcg PO daily for now    4. Hypercalcemia   PTH 7  Likely hypercalcemia of malignancy  S/p Zometa 4 mg IV (given on 12/20/21)  S/p Calcitonin 310 IU SQ q12h x 4 doses (completed 12/22)   Corrected calcium 7.5 today - now with hypocalcemia - see below   Vit D 25-OH LOW at 12.2, recommend supplement (see below)  PTH- suppressed    PTHrp less than 2.0  Check Serum albumin and Ca daily    6. Hypocalcemia   Started Ca Carbonate 1250 mg daily   Check Serum albumin and Ca daily    7. Vitamin D deficiency  Vitamin D 25-OH 12.2  Recommend supplement with Cholecalciferol 2000 units daily    8. Hypertension  Goal BP <130/80  Management as per primary team    9. Hyperlipidemia  Continue Simvastatin 20 mg daily  Followup lipid panel annually as outpatient    10. SOB  Defer to primary team   Discussed with ACP today: MARRY Sheldon, medicine attending to see patient today for further assessment   Likely multifactorial, metastatic lung CA/pleural effusion, recent SVT, ABD distention, anxiety over increased work of breathing     35 minutes - Patient is high risk and high level decision making, high dose steroids, high dose insulin     LUIS ALBERTO Marques-BC  Nurse Practitioner   Division of Endocrinology  Pager: ADALBERTO pager [phone]    If out of hospital/unavailable when paged, please note: patient will be cared for by another provider on the endocrine service.  Please call the endocrine answering service for assistance to reach covering provider ([phone]). For non-urgent matters, please email [email or web address] for assistance.      65 yo F with HTN, HLD, type 2DM, hypothyroidism, fibromyalgia, vulvar cancer s/p surgery 2020, RT/chemo 2021, anemia presenting with HERNANDEZ and dizziness with imaging concerning for metastatic disease and pleural effusions bilaterally. Endocrine was consulted for T2DM management.    1. Type 2 Diabetes Mellitus c/b steroid induced hyperglycemia  A1c 7.7%; goal less than 7%  Home Regimen: Tresiba 60 units QHS, Novolog 15-30 units with meals (ISF 1:30, but usually just estimates), Victoza 1.8 mg daily    While inpatient:   BG target 100-180 mg/dl  Continues on solumedrol with steroid exacerbated hyperglycemia, however, also with poor appetite and food intake. Increased SOB.   Will increase Lantus to 50 units SQ qHS  Will increase Admelog to 26 units SQ TID before meals - please HOLD if she is not eating - cautious with prandial bolus insulin increases because food intake is reported to be minimal   Continue Admelog MODERATE dose correctional scale before meals, moderate dose at bedtime   Please check FSG before meals and QHS  Consistent carbohydrate diet - appreciate RD input, liberalized diet (eliminated sodium restriction, added Glucerna per RD recommendation)  Hypoglycemia protocol     Discharge planning for DM: Resume basal / bolus insulin (dosing TBD), schedule follow up with prior outpatient endocrine provider - Dr Armstrong    2. Chronic steroid use, steroid induced hyperglycemia   On Prednisone 10 mg BID for at least two months, was on Prednisone 40 mg daily, now on solumedrol 40 mg IV BID (12/28)  Insulin dosing as above   Pt cannot stop steroids abruptly as she will have secondary adrenal insufficiency   Patient to follow up in 1/2022 with her doctors with steroid taper      3. Hypothyroidism  12-16 @ 06:47 TSH 11.94 Free T4 0.9   Given recent increase in constipation will repeat TFTs for tomorrow AM. Note: Steroids will likely contribute to some suppression - however, patient was on chronic steroids when labs drawn on 12/16   Will increase Levothyroxine 125 to 137 mcg PO daily for now    4. Hypercalcemia   PTH 7  Likely hypercalcemia of malignancy  S/p Zometa 4 mg IV (given on 12/20/21)  S/p Calcitonin 310 IU SQ q12h x 4 doses (completed 12/22)   Corrected calcium 7.5 today - now with hypocalcemia - see below   Vit D 25-OH LOW at 12.2, recommend supplement (see below)  PTH- suppressed    PTHrp less than 2.0  Check Serum albumin and Ca daily    6. Hypocalcemia   Started Ca Carbonate 1250 mg daily   Check Serum albumin and Ca daily    7. Vitamin D deficiency  Vitamin D 25-OH 12.2  Recommend supplement with Cholecalciferol 2000 units daily    8. Hypertension  Goal BP <130/80  Management as per primary team    9. Hyperlipidemia  Continue Simvastatin 20 mg daily  Followup lipid panel annually as outpatient    10. SOB  Defer to primary team   Discussed with ACP today: MARRY Sheldon, medicine attending to see patient today for further assessment   Likely multifactorial, metastatic lung CA/pleural effusion, recent SVT, ABD distention, anxiety over increased work of breathing     35 minutes - Patient is high risk and high level decision making, high dose steroids, high dose insulin     LUIS ALBERTO Marques-BC  Nurse Practitioner   Division of Endocrinology  Pager: ADALBERTO pager [phone]    If out of hospital/unavailable when paged, please note: patient will be cared for by another provider on the endocrine service.  Please call the endocrine answering service for assistance to reach covering provider ([phone]). For non-urgent matters, please email [email or web address] for assistance.

## 2022-01-02 NOTE — PROGRESS NOTE ADULT - SUBJECTIVE AND OBJECTIVE BOX
Chief Complaint: DM2 - uncontrolled with hyperglycemia exacerbated by steroids     History: Reports poor appetite, increased SOB today, still without BM, discussed with ACP, Continues on Solumedrol 40 mg BID. Glucose remains above target - steroid exacerbated hyperglycemia     MEDICATIONS  (STANDING):  albuterol/ipratropium for Nebulization 3 milliLiter(s) Nebulizer every 6 hours  amLODIPine   Tablet 10 milliGRAM(s) Oral daily  buDESOnide    Inhalation Suspension 0.5 milliGRAM(s) Inhalation two times a day  budesonide 160 MICROgram(s)/formoterol 4.5 MICROgram(s) Inhaler 2 Puff(s) Inhalation two times a day  calcium carbonate   1250 mG (OsCal) 1 Tablet(s) Oral daily  cholecalciferol 2000 Unit(s) Oral daily  Dakins Solution - 1/4 Strength 1 Application(s) Topical daily  dextrose 40% Gel 15 Gram(s) Oral once  dextrose 5%. 1000 milliLiter(s) (50 mL/Hr) IV Continuous <Continuous>  dextrose 5%. 1000 milliLiter(s) (100 mL/Hr) IV Continuous <Continuous>  dextrose 50% Injectable 25 Gram(s) IV Push once  dextrose 50% Injectable 12.5 Gram(s) IV Push once  dextrose 50% Injectable 25 Gram(s) IV Push once  enalapril 5 milliGRAM(s) Oral daily  escitalopram 30 milliGRAM(s) Oral daily  gabapentin 400 milliGRAM(s) Oral three times a day  glucagon  Injectable 1 milliGRAM(s) IntraMuscular once  heparin   Injectable 5000 Unit(s) SubCutaneous every 8 hours  influenza  Vaccine (HIGH DOSE) 0.7 milliLiter(s) IntraMuscular once  insulin glargine Injectable (LANTUS) 46 Unit(s) SubCutaneous at bedtime  insulin lispro (ADMELOG) corrective regimen sliding scale   SubCutaneous three times a day before meals  insulin lispro (ADMELOG) corrective regimen sliding scale   SubCutaneous at bedtime  insulin lispro Injectable (ADMELOG) 24 Unit(s) SubCutaneous three times a day with meals  levothyroxine 125 MICROGram(s) Oral daily  methylPREDNISolone sodium succinate Injectable 40 milliGRAM(s) IV Push two times a day  metoprolol tartrate 12.5 milliGRAM(s) Oral two times a day  nystatin    Suspension 235556 Unit(s) Swish and Swallow four times a day  pantoprazole    Tablet 40 milliGRAM(s) Oral before breakfast  polyethylene glycol 3350 17 Gram(s) Oral two times a day  senna 2 Tablet(s) Oral at bedtime  simethicone 80 milliGRAM(s) Chew four times a day  simvastatin 20 milliGRAM(s) Oral at bedtime  trimethoprim  160 mG/sulfamethoxazole 800 mG 1 Tablet(s) Oral <User Schedule>          No Known Allergies          Review of Systems:  RESP: SOB   GI: poor appetite, no vomiting, has not had BM  ALL OTHER SYSTEMS REVIEWED AND NEGATIVE    PHYSICAL EXAM:  Vital Signs Last 24 Hrs  T(C): 36.6 (02 Jan 2022 12:52), Max: 36.9 (01 Jan 2022 17:25)  T(F): 97.9 (02 Jan 2022 12:52), Max: 98.5 (02 Jan 2022 03:25)  HR: 108 (02 Jan 2022 12:52) (98 - 180)  BP: 122/53 (02 Jan 2022 12:52) (104/79 - 144/70)  BP(mean): --  RR: 20 (02 Jan 2022 12:52) (16 - 20)  SpO2: 99% (02 Jan 2022 12:52) (95% - 99%)  GENERAL: NAD, sitting up in bed - HERNANDEZ   EYES: No proptosis, no lid lag, anicteric  HEENT:  Atraumatic, Normocephalic, moist mucous membranes, NC in place   RESPIRATORY: Slightly labored respirations, particularly when talking, HERNANDEZ  ABD: non tender but distended, soft   PSYCH: Alert and oriented x 3, normal affect, normal mood    CAPILLARY BLOOD GLUCOSE  POCT Blood Glucose.: 200 mg/dL (02 Jan 2022 11:43)  POCT Blood Glucose.: 230 mg/dL (02 Jan 2022 07:38)  POCT Blood Glucose.: 293 mg/dL (01 Jan 2022 21:52)  POCT Blood Glucose.: 226 mg/dL (01 Jan 2022 16:53)  POCT Blood Glucose.: 220 mg/dL (01 Jan 2022 11:29)  POCT Blood Glucose.: 260 mg/dL (01 Jan 2022 07:37)  POCT Blood Glucose.: 158 mg/dL (31 Dec 2021 20:55)  POCT Blood Glucose.: 156 mg/dL (31 Dec 2021 16:45)    01-02    131<L>  |  94<L>  |  50<H>  ----------------------------<  220<H>  5.2   |  24  |  0.94    Ca    7.5<L>      02 Jan 2022 01:11  Phos  2.8     01-02  Mg     1.90     01-02    TPro  6.4  /  Alb  3.6  /  TBili  0.3  /  DBili  x   /  AST  10  /  ALT  12  /  AlkPhos  64  01-01      A1C with Estimated Average Glucose Result: 7.7 % (12-14-21 @ 08:15)  A1C with Estimated Average Glucose Result: 8.2 % (09-04-21 @ 23:11)  A1C with Estimated Average Glucose Result: 7.5 % (09-04-21 @ 09:13)  A1C with Estimated Average Glucose Result: 7.7 % (03-25-21 @ 15:08)      Thyroid Function Tests:  12-16 @ 06:47 TSH 11.94 FreeT4 0.9 T3 -- Anti TPO -- Anti Thyroglobulin Ab -- TSI --  12-14 @ 08:15 TSH 10.03 FreeT4 0.8 T3 -- Anti TPO -- Anti Thyroglobulin Ab -- TSI --      Diet, Regular:   Consistent Carbohydrate Evening Snack (CSTCHOSN)  No Caffeine  No Chocolate  Supplement Feeding Modality:  Oral  Glucerna Shake Cans or Servings Per Day:  1       Frequency:  Daily (01-01-22 @ 23:59) [Active]  Diet, Regular (12-22-21 @ 10:35) [Available for Activation]

## 2022-01-02 NOTE — PROGRESS NOTE ADULT - ASSESSMENT
65 y/o F w/ a PMHx of HTN, HLD, T2DM, fibromyalgia, recurrent vulvar cancer (recently received chemo/RT from 7/2021-9/2021), and anemia (requiring intermittent transfusions recently) who presented with shortness of breath, dizziness, and nausea for multiple weeks, found to have multiple lung nodules and B/L pleural effusions concerning for malignancy, and was admitted to telemetry for further management, s/p thoracentesis on 12/15 (~ 660cc drained). Oncology was consulted for further evaluation.    *****  01/02/22 Assessment     She continues having a substantial amount of dyspnea and 4-5L O2 requirement despite chemotherapy. While acknowledging the limitation that a single dose of chemotherapy is insufficient to declare failure, the lack of improvement in her pulmonary function does force me to question the hypothesis that her dyspnea is due to malignant infiltration. For now will still plan to deliver next chemotherapy on 1/6, but would advise repeating imaging to evaluate whether another pathology may now be manifest that could alternatively explain her dyspnea. Would additionally appreciate input from pulmonary to assist my differential, including a bronch with biopsy, if scan is otherwise unrevealing and there remains a window of stability to attempt the procedure.     I am also concerned by her lack of bowel mvmt despite multiple days of cathartics and stimulants; and today she admits that she has not passed gas for several days. Her abdomen is distended, with abdominal pain now, and endorses continued intermittent nausea and early satiety. I am concerned that she may have developed an obstruction, or perhaps at least an ileus, from occult peritoneal metastatic disease. Imaging is indicated for this as well.    Given the respiratory and abdominal pathology above, I think it would be reasonable to repeat CT CAP w contrast now to assess the lungs and concern for obstruction (see below for plan by complaint)  ****    # Dyspnea  - CTA Chest (12/13): No pulmonary embolus is noted. Multiple nodules are noted within both lungs. Primary consideration is metastasis. Findings suggestive of malignant pleural effusions bilaterally.  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   - bactrim for PCP ppx  - morphine IV PRN for dypsnea  - path from lung nodule biopsy 12/22 consistent with metastatic squamous cell carcinoma (previous vulvar cancer also sq cell ca), sent for NGS and PDL1  - due to concern for recurrence (and now metastatic disease) and significant symptoms (shortness of breath), patient started on carboplatin + paclitaxel on 12/30, which she is tolerating fairly well as of 1/1;   - plan for next dose (C1D8) carplatin+paclitaxel on 1/6/22  [] 1/2: repeat CT to re-evaluate differential of dysnpea given lack of improvement following chemotherapy and tenuous respiratory status    # nausea  - 1/2: patient continues having intermittent nausea; as chemo is now 3 days prior, nausea may be more related to constipation than chemotherapy -> continue anti-emetics    # constipation  - 1/2: patient having constipation in setting of opiates and anti-emetics, has not had bowel mvmt yesterday despite suppository & enema   [] CT including abdomen/pelvis to evaluate for obstruction and/or peritoneal carcinomatosis    # Recurrent vulvar cancer  - Last recurrence was earlier this year. She received chemo/RT with weekly Cisplatin from 7/12/21-9/1/21.  - Imaging findings on admission are concerning for progressing malignancy  - Pt s/p thoracentesis as noted above with negative cytopathology  - CT A/P with small collection in R inguinal region (likely known R inguinal wound/tract) but does not appear to show other areas of disease that is amenable to biopsy  - s/p IR biopsy of R lung nodule which showed squamous cell carcinoma, concern for recurrence/metastatic disease; sent for NGS and PDL1  - will follow up with primary Oncologist Dr. Paulo Carpenter as outpatient     #Hypercalcemia   -This is an oncologic emergency and requires urgent management  - Ca increased to 12.5  - PTH and Vit D low  - f/u PTHrP  - appreciate endo consult  - may be related to hypercalcemia of malignancy  - s/p zoledronic acid (discussed risks/benefits including risk of osteonecrosis of jaw) and calcitonin with normalization of calcium    # Microcytic Anemia  - Occasionally requiring transfusions (last on 12/11)  - Recently underwent a hematologic evaluation as an outpatient. Her workup was notable for a hemoglobin electrophoresis which was c/w beta thalassemia minor. She had a BMBx on 11/9/21 (minimal marrow obtained to evaluate) which showed erythroid hyperplasia, megakaryocytes normal, no evidence of dysplasia.   - Monitor CBC and keep active T+S. Transfuse for Hgb < 7 or if symptomatic  - Primary Hematologist: Dr. Leana Auguste. Continue outpatient follow-up    # KRISTIN w hyperkalemia  - given BUN:Cr ratio and patient's subjective thirst suspect pre-renal; resolved with IVF    # radiation wound  - WBC remains elevated, probably due to her steroids, but remain vigilant about pelvic area radiation wound     # SVT   - agree with recommendations from primary team regarding use of metoprolol; note that repeat echo is pending, and CT chest may identify PE as a cause    thank you to the primary team    Joon Anderson MD PhD  Oncology Attending providing holiday weekend coverage

## 2022-01-02 NOTE — CHART NOTE - NSCHARTNOTEFT_GEN_A_CORE
[facility] NIGHT MEDICINE COVERAGE    Notified by RN that pt had bursts of SVT lasting a few seconds, then for ~5 minutes, pt began experiencing shortness of breath at that time.  Pt assessed at bedside, tele noted be showing SVT @ 180 bpm, sustained for ~90 seconds on review.  Pt reports chest pain which she attributes feeling short of breath, and palpitations.  She denies having palpitations previously.  Shortness of breath described as difficulty taking a satisfying breath in.  She also endorses that she has not had a bowel movement in 7 days and her abdomen has been swollen.  Pt is on bowel regimen.  Pt has malignant pleural effusions 2/2 to lung nodules, s/p chemo on 12/30.  Pt was on 4 L NC and had to be increased to 5L, no desaturation events noted.  EKG ordered, sinus tachy to 110, no ST segment changes or TWI noted.  Pt is not on beta blockers, she reports her symptoms have improved within the past few minutes.    Vital Signs Last 24 Hrs  T(C): 36.4 (01 Jan 2022 23:58), Max: 36.9 (01 Jan 2022 12:59)  T(F): 97.5 (01 Jan 2022 23:58), Max: 98.5 (01 Jan 2022 12:59)  HR: 114 (01 Jan 2022 23:58) (95 - 180)  BP: 140/63 (01 Jan 2022 23:58) (104/79 - 148/54)  RR: 20 (01 Jan 2022 23:58) (17 - 20)  SpO2: 96% (01 Jan 2022 23:58) (95% - 99%)    GS: NAD, A&Ox3, appears uncomfortable  Lungs: CTA b/l, w/o wheezes, rales, or rhonchi  Heart: tachycardic, +S1S2, w/o murmur, rub, or gallop  Abdomen: very distended, bowel sounds present over all 4 quadrants mildly tender to palpation (diffuse), w/o rebound or guarding.  Neuro: A&Ox3, moving all 4 extremities spontaneously  Extremities: no LE edema noted b/l, no calf tenderness b/l    Plan:  -Episodes of SVT on tele, resolved  -Check BMP, Mg, Phos, CBC, VBG+Lactate, and cardiac enzymes  -250 cc LR bolus over 2 hours  -Monitor tele  -Call EP for evaluation  -Trial simethicone for abdominal pain 2/2 high stool burden on previous abdomen XR    Plan d/w RN and pt, all in agreement.  Pt stable at this time, will continue to monitor.    Noah Navas PA-C  Department of Medicine - [facility]  In-House Pager: [phone] [facility] NIGHT MEDICINE COVERAGE    Notified by RN that pt had bursts of SVT lasting a few seconds, then for ~5 minutes, pt began experiencing shortness of breath at that time.  Pt assessed at bedside, tele noted be showing SVT @ 180 bpm, sustained for ~90 seconds on review.  Pt reports chest pain which she attributes feeling short of breath, and palpitations.  She denies having palpitations previously.  Shortness of breath described as difficulty taking a satisfying breath in.  She also endorses that she has not had a bowel movement in 7 days and her abdomen has been swollen.  Pt is on bowel regimen.  Pt has malignant pleural effusions 2/2 to lung nodules, s/p chemo on 12/30.  Pt was on 4 L NC and had to be increased to 5L, no desaturation events noted.  EKG ordered, sinus tachy to 110, no ST segment changes or TWI noted.  Pt is not on beta blockers, she reports her symptoms have improved within the past few minutes.    Vital Signs Last 24 Hrs  T(C): 36.4 (01 Jan 2022 23:58), Max: 36.9 (01 Jan 2022 12:59)  T(F): 97.5 (01 Jan 2022 23:58), Max: 98.5 (01 Jan 2022 12:59)  HR: 114 (01 Jan 2022 23:58) (95 - 180)  BP: 140/63 (01 Jan 2022 23:58) (104/79 - 148/54)  RR: 20 (01 Jan 2022 23:58) (17 - 20)  SpO2: 96% (01 Jan 2022 23:58) (95% - 99%)    GS: NAD, A&Ox3, appears uncomfortable  Lungs: CTA b/l, w/o wheezes, rales, or rhonchi  Heart: tachycardic, +S1S2, w/o murmur, rub, or gallop  Abdomen: very distended, bowel sounds present over all 4 quadrants mildly tender to palpation (diffuse), w/o rebound or guarding.  Neuro: A&Ox3, moving all 4 extremities spontaneously  Extremities: no LE edema noted b/l, no calf tenderness b/l    Plan:  -Episodes of SVT on tele, resolved  -Check BMP, Mg, Phos, CBC, VBG+Lactate, and cardiac enzymes  -250 cc LR bolus over 2 hours  -Monitor tele  -Call EP for evaluation  -Trial simethicone for abdominal pain 2/2 high stool burden on previous abdomen XR    Plan d/w RN and pt, all in agreement.  Pt stable at this time, will continue to monitor.    Noah Navas PA-C  Department of Medicine - [facility]  In-House Pager: [phone]    ADDENDUM:  House EP called, will see pt in AM as pt is asymptomatic now, recommendation is to start Metoprolol 12.5 PO BID if pt goes into SVT again.  Will continue to monitor closely.

## 2022-01-02 NOTE — PROGRESS NOTE ADULT - SUBJECTIVE AND OBJECTIVE BOX
[facility] Division of Hospital Medicine  Noah Montesinos MD  In House Pager [phone]    Patient is a 66y old  Female who presents with a chief complaint of SOB dizziness & nausea x couple of weeks (02 Jan 2022 08:33)      SUBJECTIVE / OVERNIGHT EVENTS:  No overnight events. Labs and vitals reviewed. slight increase in O2 needs  Patient seen and examined at bedside, reports abdominal distension, constipation. slight nausea, no vomiting.   No fever, no chills, +SOB, no CP, slight abd pain, no dysuria      MEDICATIONS  (STANDING):  albuterol/ipratropium for Nebulization 3 milliLiter(s) Nebulizer every 6 hours  amLODIPine   Tablet 10 milliGRAM(s) Oral daily  buDESOnide    Inhalation Suspension 0.5 milliGRAM(s) Inhalation two times a day  budesonide 160 MICROgram(s)/formoterol 4.5 MICROgram(s) Inhaler 2 Puff(s) Inhalation two times a day  calcium carbonate   1250 mG (OsCal) 1 Tablet(s) Oral daily  cholecalciferol 2000 Unit(s) Oral daily  Dakins Solution - 1/4 Strength 1 Application(s) Topical daily  dextrose 40% Gel 15 Gram(s) Oral once  dextrose 5%. 1000 milliLiter(s) (50 mL/Hr) IV Continuous <Continuous>  dextrose 5%. 1000 milliLiter(s) (100 mL/Hr) IV Continuous <Continuous>  dextrose 50% Injectable 25 Gram(s) IV Push once  dextrose 50% Injectable 12.5 Gram(s) IV Push once  dextrose 50% Injectable 25 Gram(s) IV Push once  enalapril 5 milliGRAM(s) Oral daily  escitalopram 30 milliGRAM(s) Oral daily  gabapentin 400 milliGRAM(s) Oral three times a day  glucagon  Injectable 1 milliGRAM(s) IntraMuscular once  heparin   Injectable 5000 Unit(s) SubCutaneous every 8 hours  influenza  Vaccine (HIGH DOSE) 0.7 milliLiter(s) IntraMuscular once  insulin glargine Injectable (LANTUS) 46 Unit(s) SubCutaneous at bedtime  insulin lispro (ADMELOG) corrective regimen sliding scale   SubCutaneous at bedtime  insulin lispro (ADMELOG) corrective regimen sliding scale   SubCutaneous three times a day before meals  insulin lispro Injectable (ADMELOG) 24 Unit(s) SubCutaneous three times a day with meals  levothyroxine 125 MICROGram(s) Oral daily  methylPREDNISolone sodium succinate Injectable 40 milliGRAM(s) IV Push two times a day  metoprolol tartrate 12.5 milliGRAM(s) Oral two times a day  nystatin    Suspension [id number] Unit(s) Swish and Swallow four times a day  pantoprazole    Tablet 40 milliGRAM(s) Oral before breakfast  polyethylene glycol 3350 17 Gram(s) Oral two times a day  senna 2 Tablet(s) Oral at bedtime  simethicone 80 milliGRAM(s) Chew four times a day  simvastatin 20 milliGRAM(s) Oral at bedtime  trimethoprim  160 mG/sulfamethoxazole 800 mG 1 Tablet(s) Oral <User Schedule>    MEDICATIONS  (PRN):  aluminum hydroxide/magnesium hydroxide/simethicone Suspension 30 milliLiter(s) Oral every 6 hours PRN Dyspepsia  bisacodyl Suppository 10 milliGRAM(s) Rectal daily PRN Constipation  diphenhydrAMINE 25 milliGRAM(s) Oral daily PRN Rash and/or Itching  lactulose Syrup 20 Gram(s) Oral two times a day PRN constipation  lidocaine/prilocaine Cream 1 Application(s) Topical daily PRN Dressing changes  melatonin 3 milliGRAM(s) Oral at bedtime PRN Insomnia  metoclopramide Injectable 10 milliGRAM(s) IV Push every 6 hours PRN nausea or vomiting.  morphine  - Injectable 3 milliGRAM(s) IV Push every 4 hours PRN mod to severe pain or dyspnea  ondansetron Injectable 4 milliGRAM(s) IV Push three times a day PRN Nausea and/or Vomiting    CAPILLARY BLOOD GLUCOSE      POCT Blood Glucose.: 200 mg/dL (02 Jan 2022 11:43)  POCT Blood Glucose.: 230 mg/dL (02 Jan 2022 07:38)  POCT Blood Glucose.: 293 mg/dL (01 Jan 2022 21:52)  POCT Blood Glucose.: 226 mg/dL (01 Jan 2022 16:53)    I&O's Summary    01 Jan 2022 07:01  -  02 Jan 2022 07:00  --------------------------------------------------------  IN: 2091 mL / OUT: 2035 mL / NET: 56 mL        PHYSICAL EXAM:  Vital Signs Last 24 Hrs  T(C): 36.6 (02 Jan 2022 12:52), Max: 36.9 (01 Jan 2022 17:25)  T(F): 97.9 (02 Jan 2022 12:52), Max: 98.5 (02 Jan 2022 03:25)  HR: 108 (02 Jan 2022 12:52) (98 - 180)  BP: 122/53 (02 Jan 2022 12:52) (104/79 - 144/70)  BP(mean): --  RR: 20 (02 Jan 2022 12:52) (16 - 20)  SpO2: 99% (02 Jan 2022 12:52) (95% - 99%)    Gen: NAD; resting in bed. awake and alert.   Pulm: no respiratory distress; no accessory muscle use; CTA b/l; no wheezing; no crackles.   Cards: RRR, nl S1/S2; no obvious murmurs; no LE edema; no JVD  Abd: distended; tympanic; slight discomfort on deep palpation, non-tender; decrease BS;   Ext: no cyanosis; no joint effusion; no joint pain on palpation.  Skin: no rash; no cyanosis    LABS:                        7.5    17.36 )-----------( 167      ( 02 Jan 2022 01:11 )             23.8     01-02    131<L>  |  94<L>  |  50<H>  ----------------------------<  220<H>  5.2   |  24  |  0.94    Ca    7.5<L>      02 Jan 2022 01:11  Phos  2.8     01-02  Mg     1.90     01-02    TPro  6.4  /  Alb  3.6  /  TBili  0.3  /  DBili  x   /  AST  10  /  ALT  12  /  AlkPhos  64  01-01      CARDIAC MARKERS ( 02 Jan 2022 01:15 )  x     / x     / 45 U/L / x     / 1.2 ng/mL            RADIOLOGY & ADDITIONAL TESTS:  Results Reviewed: Y  Imaging Personally Reviewed: Y  Electrocardiogram Personally Reviewed: Y    COORDINATION OF CARE:  Care Discussed with Consultants/Other Providers [Y/N]: Y  Prior or Outpatient Records Reviewed [Y/N]: Y

## 2022-01-02 NOTE — PROGRESS NOTE ADULT - SUBJECTIVE AND OBJECTIVE BOX
Hematology Oncology Follow-up    INTERVAL HPI/OVERNIGHT EVENTS:  No substantial o/n events.   12/30 was C1 carboplatin+paclitaxel  12/31: subjective: - did not have bowel mvmt despite suppository this morning; having substantial nausea this AM; no pain; breathing is not worse  01/01: subjective: still no bowel mvmt, despite lactulose, thirsty, nauseated  01/02: O/N experienced SVTs w tachycardia to 180-190s c/b worsening dyspnea which resolved spontaneously following relaxation techniques; still no bowel mvmt despite enema, nausea continues, breathing is subjectively worse than 3 days ago, objectively remains on 4-5 L O2 and high O2 sat    VITAL SIGNS:  T(F): 98.1 (12-30-21 @ 06:16)  HR: 98 (12-30-21 @ 06:32)  BP: 146/70 (12-30-21 @ 06:32)  RR: 18 (12-30-21 @ 06:16)  SpO2: 96% (12-30-21 @ 06:16)  Wt(kg): --    12-29-21 @ 07:01  -  12-30-21 @ 07:00  --------------------------------------------------------  IN: 250 mL / OUT: 501 mL / NET: -251 mL          Review of Systems:  General: denies fevers/chills  Respiratory: + shortness of breath  Cardiovascular: denies chest pain, palpitations  Gastrointestinal: denies nausea, vomiting, abdominal pain, constipation, diarrhea, melena, hematochezia  MSK: denies joint pain or muscle pain  Neuro: denies headache, weakness, or parasthesias  Skin: denies rash, petichiae, echymoses  Psych: denies anxiety or sleep disturbances    PHYSICAL EXAM:  Constitutional: NAD  Respiratory: bilateral crackles, symmetric chest rise, with normal respiratory effort  Cardiovascular: RRR  Gastrointestinal: soft, NTND  Extremities:  no edema  MSK: no obvious abnormalities  Neurological: Grossly intact  Skin: no rash, no echymoses, no petichiae  Psych: normal affect    MEDICATIONS  (STANDING):  albuterol/ipratropium for Nebulization 3 milliLiter(s) Nebulizer every 6 hours  amLODIPine   Tablet 10 milliGRAM(s) Oral daily  buDESOnide    Inhalation Suspension 0.5 milliGRAM(s) Inhalation two times a day  cholecalciferol 2000 Unit(s) Oral daily  Dakins Solution - 1/4 Strength 1 Application(s) Topical daily  dextrose 40% Gel 15 Gram(s) Oral once  dextrose 5%. 1000 milliLiter(s) (50 mL/Hr) IV Continuous <Continuous>  dextrose 5%. 1000 milliLiter(s) (100 mL/Hr) IV Continuous <Continuous>  dextrose 50% Injectable 25 Gram(s) IV Push once  dextrose 50% Injectable 12.5 Gram(s) IV Push once  dextrose 50% Injectable 25 Gram(s) IV Push once  enalapril 5 milliGRAM(s) Oral daily  escitalopram 30 milliGRAM(s) Oral daily  furosemide    Tablet 40 milliGRAM(s) Oral daily  gabapentin 400 milliGRAM(s) Oral three times a day  glucagon  Injectable 1 milliGRAM(s) IntraMuscular once  heparin   Injectable 5000 Unit(s) SubCutaneous every 8 hours  influenza  Vaccine (HIGH DOSE) 0.7 milliLiter(s) IntraMuscular once  insulin glargine Injectable (LANTUS) 40 Unit(s) SubCutaneous at bedtime  insulin lispro (ADMELOG) corrective regimen sliding scale   SubCutaneous three times a day before meals  insulin lispro (ADMELOG) corrective regimen sliding scale   SubCutaneous at bedtime  insulin lispro Injectable (ADMELOG) 24 Unit(s) SubCutaneous three times a day with meals  insulin lispro Injectable (ADMELOG). 12 Unit(s) SubCutaneous once  levothyroxine 125 MICROGram(s) Oral daily  methylPREDNISolone sodium succinate Injectable 40 milliGRAM(s) IV Push two times a day  ondansetron   Disintegrating Tablet 4 milliGRAM(s) Oral three times a day  pantoprazole    Tablet 40 milliGRAM(s) Oral before breakfast  polyethylene glycol 3350 17 Gram(s) Oral daily  senna 2 Tablet(s) Oral at bedtime  simvastatin 20 milliGRAM(s) Oral at bedtime  sodium zirconium cyclosilicate 10 Gram(s) Oral once  trimethoprim  160 mG/sulfamethoxazole 800 mG 1 Tablet(s) Oral <User Schedule>    MEDICATIONS  (PRN):  aluminum hydroxide/magnesium hydroxide/simethicone Suspension 30 milliLiter(s) Oral every 6 hours PRN Dyspepsia  bisacodyl Suppository 10 milliGRAM(s) Rectal daily PRN Constipation  diphenhydrAMINE 25 milliGRAM(s) Oral daily PRN Rash and/or Itching  lidocaine/prilocaine Cream 1 Application(s) Topical daily PRN Dressing changes  melatonin 3 milliGRAM(s) Oral at bedtime PRN Insomnia  morphine  - Injectable 3 milliGRAM(s) IV Push every 6 hours PRN mod to severe pain or dyspnea      No Known Allergies      LABS:                        8.2    17.01 )-----------( 245      ( 30 Dec 2021 04:55 )             26.0     12-30    134<L>  |  97<L>  |  29<H>  ----------------------------<  230<H>  5.6<H>   |  28  |  0.81    Ca    8.0<L>      30 Dec 2021 04:55  Phos  2.2     12-30  Mg     1.90     12-30                       RADIOLOGY & ADDITIONAL TESTS:  Studies reviewed.

## 2022-01-03 NOTE — PROGRESS NOTE ADULT - ASSESSMENT
67 yo F w/ PMHx of HTN, HLD, DM, fibromyalgia, Vulvar CA s/p vulva surgery in 2020, completed chemo & RT in 9/21, PMR (on chronic steroids, 10 mg BID), now p/w SOB. CTPA with out PE but multiple nodules are noted within both lungs. Primary consideration is metastasis. Findings suggestive of malignant pleural effusions bilaterally, s/p thora this admission by IR 12/15. Now with worsening hypercalcemia. Also on a course of zosyn for inguinal wound (thought to be possible source of fever, but not entirely clear)-- ID recommended 10 day course of augmentin (12/15-12/24). Pulm nodule bx 12/22. Concern for reaccumulation of pleural effusion on CXR 12/20. Pulm recommended IV steroids. s/p chemo on 12/30 with next dose plan for 1/6. C/b worsening abdominal distention likely 2/2 paralytic ileus

## 2022-01-03 NOTE — CHART NOTE - NSCHARTNOTEFT_GEN_A_CORE
Notified by RN of patient with hypoxia Sp02 80s on 8L NC.      Patient is a 67 yo F w/ PMHx of HTN, HLD, DM, fibromyalgia, Vulvar CA s/p vulva surgery in 2020, completed chemo & RT in 9/21, PMR (on chronic steroids, 10 mg BID), now p/w SOB. CTPA without PE but multiple nodules are noted within both lungs. Findings suggestive of malignant pleural effusions bilaterally, s/p thora by IR 12/15. At bedside patient complains of 10/10 pain in epigastric region worse with inspiration and worsening dyspnea. Reports feeling nauseous. Reports last bowel movement >1 week. Denies fever, chills, cough, chest pain, palpitations, vomiting, lower extremity swelling.       Meds    MEDICATIONS  (STANDING):  amLODIPine   Tablet 10 milliGRAM(s) Oral daily  budesonide 160 MICROgram(s)/formoterol 4.5 MICROgram(s) Inhaler 2 Puff(s) Inhalation two times a day  calcium carbonate   1250 mG (OsCal) 1 Tablet(s) Oral daily  cholecalciferol 2000 Unit(s) Oral daily  Dakins Solution - 1/4 Strength 1 Application(s) Topical daily  dextrose 40% Gel 15 Gram(s) Oral once  dextrose 5%. 1000 milliLiter(s) (50 mL/Hr) IV Continuous <Continuous>  dextrose 5%. 1000 milliLiter(s) (100 mL/Hr) IV Continuous <Continuous>  dextrose 50% Injectable 25 Gram(s) IV Push once  dextrose 50% Injectable 12.5 Gram(s) IV Push once  dextrose 50% Injectable 25 Gram(s) IV Push once  enalapril 5 milliGRAM(s) Oral daily  escitalopram 30 milliGRAM(s) Oral daily  gabapentin 400 milliGRAM(s) Oral three times a day  glucagon  Injectable 1 milliGRAM(s) IntraMuscular once  heparin   Injectable 5000 Unit(s) SubCutaneous every 8 hours  influenza  Vaccine (HIGH DOSE) 0.7 milliLiter(s) IntraMuscular once  insulin glargine Injectable (LANTUS) 25 Unit(s) SubCutaneous at bedtime  insulin lispro (ADMELOG) corrective regimen sliding scale   SubCutaneous every 6 hours  lactulose Retention Enema 200 Gram(s) Rectal daily  levothyroxine 137 MICROGram(s) Oral daily  metoprolol tartrate 12.5 milliGRAM(s) Oral two times a day  naloxegol 25 milliGRAM(s) Oral daily  nystatin    Suspension 876086 Unit(s) Swish and Swallow four times a day  pantoprazole    Tablet 40 milliGRAM(s) Oral before breakfast  polyethylene glycol 3350 17 Gram(s) Oral two times a day  senna 2 Tablet(s) Oral at bedtime  simethicone 80 milliGRAM(s) Chew four times a day  simvastatin 20 milliGRAM(s) Oral at bedtime  sodium chloride 0.9%. 1000 milliLiter(s) (50 mL/Hr) IV Continuous <Continuous>  trimethoprim  160 mG/sulfamethoxazole 800 mG 1 Tablet(s) Oral <User Schedule>    MEDICATIONS  (PRN):  aluminum hydroxide/magnesium hydroxide/simethicone Suspension 30 milliLiter(s) Oral every 6 hours PRN Dyspepsia  bisacodyl Suppository 10 milliGRAM(s) Rectal daily PRN Constipation  diphenhydrAMINE 25 milliGRAM(s) Oral daily PRN Rash and/or Itching  lidocaine/prilocaine Cream 1 Application(s) Topical daily PRN Dressing changes  melatonin 3 milliGRAM(s) Oral at bedtime PRN Insomnia  metoclopramide Injectable 10 milliGRAM(s) IV Push every 6 hours PRN nausea or vomiting.  morphine  - Injectable 3 milliGRAM(s) IV Push every 4 hours PRN mod to severe pain or dyspnea  ondansetron Injectable 4 milliGRAM(s) IV Push three times a day PRN Nausea and/or Vomiting        Vital Signs Last 24 Hrs  T(C): 36.6 (03 Jan 2022 20:20), Max: 36.8 (03 Jan 2022 12:19)  T(F): 97.8 (03 Jan 2022 20:20), Max: 98.2 (03 Jan 2022 12:19)  HR: 90 (03 Jan 2022 20:20) (90 - 99)  BP: 134/53 (03 Jan 2022 20:20) (129/68 - 158/56)  BP(mean): --  RR: 18 (03 Jan 2022 20:20) (17 - 19)  SpO2: 94% (03 Jan 2022 20:20) (94% - 100%)    PHYSICAL EXAM:  GENERAL: Well-groomed, well-developed  HEENT - NC/AT, pupils equal and reactive to light,   NECK: Supple, No JVD  CHEST/LUNG: Decreased breath sounds in b/l bases  HEART: Regular rate and rhythm; +S1S2  ABDOMEN: Distended, decreased bowel sounds   EXTREMITIES:  2+ Peripheral Pulses, No clubbing, cyanosis, or edema  NEURO:  No Focal deficits, sensory and motor intact  SKIN: No rashes or lesions    Consultant(s) Notes Reviewed:  [x ] YES  [ ] NO  Care Discussed with Consultants/Other Providers [ x] YES  [ ] NO      67 yo F w/ PMHx of HTN, HLD, DM, fibromyalgia, Vulvar CA s/p vulva surgery in 2020, completed chemo & RT in 9/21, PMR (on chronic steroids, 10 mg BID), now p/w SOB. CTPA without PE but multiple nodules are noted within both lungs. Findings suggestive of malignant pleural effusions bilaterally, s/p thora by IR 12/15. At bedside patient complains of 10/10 pain in epigastric region worse with inspiration and worsening dyspnea SP02 80s on 8LNC       1. Dyspnea  - pt placed on 15L NRB SP02 100%   - pt with bilateral pulm nodules and pleural effusions   - CXR ordered  - ABG sent ABG - ( 03 Jan 2022 22:01 )  pH, Arterial: 7.38  pH, Blood: x     /  pCO2: 55    /  pO2: 98    / HCO3: 32    / Base Excess: 6.1   /  SaO2: 97.7    - ?bipap for wob and hypercapnia   - monitor on continuous pulse ox     2. Epigastric pain and nausea  - continue IV Zofran/Reglan  - continue aggressive bowel regimen  - ABD distended w/ decreased bs  - ABD xray ordered r/o obstruction    3. Hyperkalemia  - AM BMP K- 5.7  - s/p Lokelma however pt did not have a bm  - repeat BMP sent  - if elevated will check repeat ekg and give cocktail Notified by RN of patient with hypoxia Sp02 80s on 8L NC.      Patient is a 67 yo F w/ PMHx of HTN, HLD, DM, fibromyalgia, Vulvar CA s/p vulva surgery in 2020, completed chemo & RT in 9/21, PMR (on chronic steroids, 10 mg BID), now p/w SOB. CTPA without PE but multiple nodules are noted within both lungs. Findings suggestive of malignant pleural effusions bilaterally, s/p thora by IR 12/15. At bedside patient complains of 10/10 pain in epigastric region worse with inspiration and worsening dyspnea. Reports feeling nauseous. Reports last bowel movement >1 week. Denies fever, chills, cough, chest pain, palpitations, vomiting, lower extremity swelling.       Meds    MEDICATIONS  (STANDING):  amLODIPine   Tablet 10 milliGRAM(s) Oral daily  budesonide 160 MICROgram(s)/formoterol 4.5 MICROgram(s) Inhaler 2 Puff(s) Inhalation two times a day  calcium carbonate   1250 mG (OsCal) 1 Tablet(s) Oral daily  cholecalciferol 2000 Unit(s) Oral daily  Dakins Solution - 1/4 Strength 1 Application(s) Topical daily  dextrose 40% Gel 15 Gram(s) Oral once  dextrose 5%. 1000 milliLiter(s) (50 mL/Hr) IV Continuous <Continuous>  dextrose 5%. 1000 milliLiter(s) (100 mL/Hr) IV Continuous <Continuous>  dextrose 50% Injectable 25 Gram(s) IV Push once  dextrose 50% Injectable 12.5 Gram(s) IV Push once  dextrose 50% Injectable 25 Gram(s) IV Push once  enalapril 5 milliGRAM(s) Oral daily  escitalopram 30 milliGRAM(s) Oral daily  gabapentin 400 milliGRAM(s) Oral three times a day  glucagon  Injectable 1 milliGRAM(s) IntraMuscular once  heparin   Injectable 5000 Unit(s) SubCutaneous every 8 hours  influenza  Vaccine (HIGH DOSE) 0.7 milliLiter(s) IntraMuscular once  insulin glargine Injectable (LANTUS) 25 Unit(s) SubCutaneous at bedtime  insulin lispro (ADMELOG) corrective regimen sliding scale   SubCutaneous every 6 hours  lactulose Retention Enema 200 Gram(s) Rectal daily  levothyroxine 137 MICROGram(s) Oral daily  metoprolol tartrate 12.5 milliGRAM(s) Oral two times a day  naloxegol 25 milliGRAM(s) Oral daily  nystatin    Suspension [id number] Unit(s) Swish and Swallow four times a day  pantoprazole    Tablet 40 milliGRAM(s) Oral before breakfast  polyethylene glycol 3350 17 Gram(s) Oral two times a day  senna 2 Tablet(s) Oral at bedtime  simethicone 80 milliGRAM(s) Chew four times a day  simvastatin 20 milliGRAM(s) Oral at bedtime  sodium chloride 0.9%. 1000 milliLiter(s) (50 mL/Hr) IV Continuous <Continuous>  trimethoprim  160 mG/sulfamethoxazole 800 mG 1 Tablet(s) Oral <User Schedule>    MEDICATIONS  (PRN):  aluminum hydroxide/magnesium hydroxide/simethicone Suspension 30 milliLiter(s) Oral every 6 hours PRN Dyspepsia  bisacodyl Suppository 10 milliGRAM(s) Rectal daily PRN Constipation  diphenhydrAMINE 25 milliGRAM(s) Oral daily PRN Rash and/or Itching  lidocaine/prilocaine Cream 1 Application(s) Topical daily PRN Dressing changes  melatonin 3 milliGRAM(s) Oral at bedtime PRN Insomnia  metoclopramide Injectable 10 milliGRAM(s) IV Push every 6 hours PRN nausea or vomiting.  morphine  - Injectable 3 milliGRAM(s) IV Push every 4 hours PRN mod to severe pain or dyspnea  ondansetron Injectable 4 milliGRAM(s) IV Push three times a day PRN Nausea and/or Vomiting        Vital Signs Last 24 Hrs  T(C): 36.6 (03 Jan 2022 20:20), Max: 36.8 (03 Jan 2022 12:19)  T(F): 97.8 (03 Jan 2022 20:20), Max: 98.2 (03 Jan 2022 12:19)  HR: 90 (03 Jan 2022 20:20) (90 - 99)  BP: 134/53 (03 Jan 2022 20:20) (129/68 - 158/56)  BP(mean): --  RR: 18 (03 Jan 2022 20:20) (17 - 19)  SpO2: 94% (03 Jan 2022 20:20) (94% - 100%)    PHYSICAL EXAM:  GENERAL: Well-groomed, well-developed  HEENT - NC/AT, pupils equal and reactive to light,   NECK: Supple, No JVD  CHEST/LUNG: Decreased breath sounds in b/l bases  HEART: Regular rate and rhythm; +S1S2  ABDOMEN: Distended, decreased bowel sounds   EXTREMITIES:  2+ Peripheral Pulses, No clubbing, cyanosis, or edema  NEURO:  No Focal deficits, sensory and motor intact  SKIN: No rashes or lesions    Consultant(s) Notes Reviewed:  [x ] YES  [ ] NO  Care Discussed with Consultants/Other Providers [ x] YES  [ ] NO      67 yo F w/ PMHx of HTN, HLD, DM, fibromyalgia, Vulvar CA s/p vulva surgery in 2020, completed chemo & RT in 9/21, PMR (on chronic steroids, 10 mg BID), now p/w SOB. CTPA without PE but multiple nodules are noted within both lungs. Findings suggestive of malignant pleural effusions bilaterally, s/p thora by IR 12/15. At bedside patient complains of 10/10 pain in epigastric region worse with inspiration and worsening dyspnea SP02 80s on 8LNC       1. Dyspnea  - pt placed on 15L NRB SP02 100%   - pt with bilateral pulm nodules and pleural effusions   - 12/13 CT Angio no PE   - CXR ordered  - ABG sent ABG - ( 03 Jan 2022 22:01 )  pH, Arterial: 7.38  pH, Blood: x     /  pCO2: 55    /  pO2: 98    / HCO3: 32    / Base Excess: 6.1   /  SaO2: 97.7    - ?bipap for wob and hypercapnia   - monitor on continuous pulse ox     2. Epigastric pain and nausea  - continue IV Zofran/Reglan  - continue aggressive bowel regimen  - ABD distended w/ decreased bs  - ABD xray ordered r/o obstruction    3. Hyperkalemia  - AM BMP K- 5.7  - s/p Lokelma however pt did not have a bm  - repeat BMP sent  - if elevated will check repeat ekg and give cocktail Notified by RN of patient with hypoxia Sp02 80s on 8L NC.      Patient is a 65 yo F w/ PMHx of HTN, HLD, DM, fibromyalgia, Vulvar CA s/p vulva surgery in 2020, completed chemo & RT in 9/21, PMR (on chronic steroids, 10 mg BID), now p/w SOB. CTPA without PE but multiple nodules are noted within both lungs. Findings suggestive of malignant pleural effusions bilaterally, s/p thora by IR 12/15. At bedside patient complains of 10/10 pain in epigastric region worse with inspiration and worsening dyspnea. Reports feeling nauseous. Reports last bowel movement >1 week. Denies fever, chills, cough, chest pain, palpitations, vomiting, lower extremity swelling.       Meds    MEDICATIONS  (STANDING):  amLODIPine   Tablet 10 milliGRAM(s) Oral daily  budesonide 160 MICROgram(s)/formoterol 4.5 MICROgram(s) Inhaler 2 Puff(s) Inhalation two times a day  calcium carbonate   1250 mG (OsCal) 1 Tablet(s) Oral daily  cholecalciferol 2000 Unit(s) Oral daily  Dakins Solution - 1/4 Strength 1 Application(s) Topical daily  dextrose 40% Gel 15 Gram(s) Oral once  dextrose 5%. 1000 milliLiter(s) (50 mL/Hr) IV Continuous <Continuous>  dextrose 5%. 1000 milliLiter(s) (100 mL/Hr) IV Continuous <Continuous>  dextrose 50% Injectable 25 Gram(s) IV Push once  dextrose 50% Injectable 12.5 Gram(s) IV Push once  dextrose 50% Injectable 25 Gram(s) IV Push once  enalapril 5 milliGRAM(s) Oral daily  escitalopram 30 milliGRAM(s) Oral daily  gabapentin 400 milliGRAM(s) Oral three times a day  glucagon  Injectable 1 milliGRAM(s) IntraMuscular once  heparin   Injectable 5000 Unit(s) SubCutaneous every 8 hours  influenza  Vaccine (HIGH DOSE) 0.7 milliLiter(s) IntraMuscular once  insulin glargine Injectable (LANTUS) 25 Unit(s) SubCutaneous at bedtime  insulin lispro (ADMELOG) corrective regimen sliding scale   SubCutaneous every 6 hours  lactulose Retention Enema 200 Gram(s) Rectal daily  levothyroxine 137 MICROGram(s) Oral daily  metoprolol tartrate 12.5 milliGRAM(s) Oral two times a day  naloxegol 25 milliGRAM(s) Oral daily  nystatin    Suspension [id number] Unit(s) Swish and Swallow four times a day  pantoprazole    Tablet 40 milliGRAM(s) Oral before breakfast  polyethylene glycol 3350 17 Gram(s) Oral two times a day  senna 2 Tablet(s) Oral at bedtime  simethicone 80 milliGRAM(s) Chew four times a day  simvastatin 20 milliGRAM(s) Oral at bedtime  sodium chloride 0.9%. 1000 milliLiter(s) (50 mL/Hr) IV Continuous <Continuous>  trimethoprim  160 mG/sulfamethoxazole 800 mG 1 Tablet(s) Oral <User Schedule>    MEDICATIONS  (PRN):  aluminum hydroxide/magnesium hydroxide/simethicone Suspension 30 milliLiter(s) Oral every 6 hours PRN Dyspepsia  bisacodyl Suppository 10 milliGRAM(s) Rectal daily PRN Constipation  diphenhydrAMINE 25 milliGRAM(s) Oral daily PRN Rash and/or Itching  lidocaine/prilocaine Cream 1 Application(s) Topical daily PRN Dressing changes  melatonin 3 milliGRAM(s) Oral at bedtime PRN Insomnia  metoclopramide Injectable 10 milliGRAM(s) IV Push every 6 hours PRN nausea or vomiting.  morphine  - Injectable 3 milliGRAM(s) IV Push every 4 hours PRN mod to severe pain or dyspnea  ondansetron Injectable 4 milliGRAM(s) IV Push three times a day PRN Nausea and/or Vomiting        Vital Signs Last 24 Hrs  T(C): 36.6 (03 Jan 2022 20:20), Max: 36.8 (03 Jan 2022 12:19)  T(F): 97.8 (03 Jan 2022 20:20), Max: 98.2 (03 Jan 2022 12:19)  HR: 90 (03 Jan 2022 20:20) (90 - 99)  BP: 134/53 (03 Jan 2022 20:20) (129/68 - 158/56)  BP(mean): --  RR: 18 (03 Jan 2022 20:20) (17 - 19)  SpO2: 94% (03 Jan 2022 20:20) (94% - 100%)    PHYSICAL EXAM:  GENERAL: Well-groomed, well-developed  HEENT - NC/AT, pupils equal and reactive to light,   NECK: Supple, No JVD  CHEST/LUNG: Decreased breath sounds in b/l bases  HEART: Regular rate and rhythm; +S1S2  ABDOMEN: Distended, decreased bowel sounds   EXTREMITIES:  2+ Peripheral Pulses, No clubbing, cyanosis, or edema  NEURO:  No Focal deficits, sensory and motor intact  SKIN: No rashes or lesions    Consultant(s) Notes Reviewed:  [x ] YES  [ ] NO  Care Discussed with Consultants/Other Providers [ x] YES  [ ] NO      65 yo F w/ PMHx of HTN, HLD, DM, fibromyalgia, Vulvar CA s/p vulva surgery in 2020, completed chemo & RT in 9/21, PMR (on chronic steroids, 10 mg BID), now p/w SOB. CTPA without PE but multiple nodules are noted within both lungs. Findings suggestive of malignant pleural effusions bilaterally, s/p thora by IR 12/15. At bedside patient complains of 10/10 pain in epigastric region worse with inspiration and worsening dyspnea SP02 80s on 8LNC       1. Dyspnea  - pt placed on 15L NRB SP02 100%   - pt with bilateral pulm nodules and pleural effusions   - 12/13 CT Angio no PE   - CXR ordered  - ABG sent ABG - ( 03 Jan 2022 22:01 )  pH, Arterial: 7.38  pH, Blood: x     /  pCO2: 55    /  pO2: 98    / HCO3: 32    / Base Excess: 6.1   /  SaO2: 97.7    - ?bipap for wob and hypercapnia   - monitor on continuous pulse ox     2. Epigastric pain and nausea  - continue IV Zofran/Reglan  - continue aggressive bowel regimen  - ABD distended w/ decreased bs  - ABD xray ordered r/o obstruction    3. Hyperkalemia  - AM BMP K- 5.7  - s/p Lokelma however pt did not have a bm  - repeat BMP sent  - if elevated will check repeat ekg and give cocktail      Addendum: Prelim CXR: Large left effusion with adjacent passive atelectasis and rightward midline shift. Discussed with MICU rec BIPAP and will come to see patient. Notified by RN of patient with hypoxia Sp02 80s on 8L NC.      Patient is a 65 yo F w/ PMHx of HTN, HLD, DM, fibromyalgia, Vulvar CA s/p vulva surgery in 2020, completed chemo & RT in 9/21, PMR (on chronic steroids, 10 mg BID), now p/w SOB. CTPA without PE but multiple nodules are noted within both lungs. Findings suggestive of malignant pleural effusions bilaterally, s/p thora by IR 12/15. At bedside patient complains of 10/10 pain in epigastric region worse with inspiration and worsening dyspnea. Reports feeling nauseous. Reports last bowel movement >1 week. Denies fever, chills, cough, chest pain, palpitations, vomiting, lower extremity swelling.       Meds    MEDICATIONS  (STANDING):  amLODIPine   Tablet 10 milliGRAM(s) Oral daily  budesonide 160 MICROgram(s)/formoterol 4.5 MICROgram(s) Inhaler 2 Puff(s) Inhalation two times a day  calcium carbonate   1250 mG (OsCal) 1 Tablet(s) Oral daily  cholecalciferol 2000 Unit(s) Oral daily  Dakins Solution - 1/4 Strength 1 Application(s) Topical daily  dextrose 40% Gel 15 Gram(s) Oral once  dextrose 5%. 1000 milliLiter(s) (50 mL/Hr) IV Continuous <Continuous>  dextrose 5%. 1000 milliLiter(s) (100 mL/Hr) IV Continuous <Continuous>  dextrose 50% Injectable 25 Gram(s) IV Push once  dextrose 50% Injectable 12.5 Gram(s) IV Push once  dextrose 50% Injectable 25 Gram(s) IV Push once  enalapril 5 milliGRAM(s) Oral daily  escitalopram 30 milliGRAM(s) Oral daily  gabapentin 400 milliGRAM(s) Oral three times a day  glucagon  Injectable 1 milliGRAM(s) IntraMuscular once  heparin   Injectable 5000 Unit(s) SubCutaneous every 8 hours  influenza  Vaccine (HIGH DOSE) 0.7 milliLiter(s) IntraMuscular once  insulin glargine Injectable (LANTUS) 25 Unit(s) SubCutaneous at bedtime  insulin lispro (ADMELOG) corrective regimen sliding scale   SubCutaneous every 6 hours  lactulose Retention Enema 200 Gram(s) Rectal daily  levothyroxine 137 MICROGram(s) Oral daily  metoprolol tartrate 12.5 milliGRAM(s) Oral two times a day  naloxegol 25 milliGRAM(s) Oral daily  nystatin    Suspension 109309 Unit(s) Swish and Swallow four times a day  pantoprazole    Tablet 40 milliGRAM(s) Oral before breakfast  polyethylene glycol 3350 17 Gram(s) Oral two times a day  senna 2 Tablet(s) Oral at bedtime  simethicone 80 milliGRAM(s) Chew four times a day  simvastatin 20 milliGRAM(s) Oral at bedtime  sodium chloride 0.9%. 1000 milliLiter(s) (50 mL/Hr) IV Continuous <Continuous>  trimethoprim  160 mG/sulfamethoxazole 800 mG 1 Tablet(s) Oral <User Schedule>    MEDICATIONS  (PRN):  aluminum hydroxide/magnesium hydroxide/simethicone Suspension 30 milliLiter(s) Oral every 6 hours PRN Dyspepsia  bisacodyl Suppository 10 milliGRAM(s) Rectal daily PRN Constipation  diphenhydrAMINE 25 milliGRAM(s) Oral daily PRN Rash and/or Itching  lidocaine/prilocaine Cream 1 Application(s) Topical daily PRN Dressing changes  melatonin 3 milliGRAM(s) Oral at bedtime PRN Insomnia  metoclopramide Injectable 10 milliGRAM(s) IV Push every 6 hours PRN nausea or vomiting.  morphine  - Injectable 3 milliGRAM(s) IV Push every 4 hours PRN mod to severe pain or dyspnea  ondansetron Injectable 4 milliGRAM(s) IV Push three times a day PRN Nausea and/or Vomiting        Vital Signs Last 24 Hrs  T(C): 36.6 (03 Jan 2022 20:20), Max: 36.8 (03 Jan 2022 12:19)  T(F): 97.8 (03 Jan 2022 20:20), Max: 98.2 (03 Jan 2022 12:19)  HR: 90 (03 Jan 2022 20:20) (90 - 99)  BP: 134/53 (03 Jan 2022 20:20) (129/68 - 158/56)  BP(mean): --  RR: 18 (03 Jan 2022 20:20) (17 - 19)  SpO2: 94% (03 Jan 2022 20:20) (94% - 100%)    PHYSICAL EXAM:  GENERAL: Well-groomed, well-developed  HEENT - NC/AT, pupils equal and reactive to light,   NECK: Supple, No JVD  CHEST/LUNG: Decreased breath sounds b/l Left >RR 30   HEART: Regular rate and rhythm; +S1S2  ABDOMEN: Distended, decreased bowel sounds   EXTREMITIES:  2+ Peripheral Pulses, No clubbing, cyanosis, or edema  NEURO:  No Focal deficits, sensory and motor intact  SKIN: No rashes or lesions    Consultant(s) Notes Reviewed:  [x ] YES  [ ] NO  Care Discussed with Consultants/Other Providers [ x] YES  [ ] NO      65 yo F w/ PMHx of HTN, HLD, DM, fibromyalgia, Vulvar CA s/p vulva surgery in 2020, completed chemo & RT in 9/21, PMR (on chronic steroids, 10 mg BID), now p/w SOB. CTPA without PE but multiple nodules are noted within both lungs. Findings suggestive of malignant pleural effusions bilaterally, s/p thora by IR 12/15. At bedside patient complains of 10/10 pain in epigastric region worse with inspiration and worsening dyspnea SP02 80s on 8LNC       1. Dyspnea  - pt placed on 15L NRB SP02 100%   - pt with bilateral pulm nodules and pleural effusions   - 12/13 CT Angio no PE   - CXR ordered  - ABG sent ABG - ( 03 Jan 2022 22:01 )  pH, Arterial: 7.38  pH, Blood: x     /  pCO2: 55    /  pO2: 98    / HCO3: 32    / Base Excess: 6.1   /  SaO2: 97.7    - ?bipap for wob and hypercapnia   - monitor on continuous pulse ox     2. Epigastric pain and nausea  - continue IV Zofran/Reglan  - continue aggressive bowel regimen  - ABD distended w/ decreased bs  - ABD xray ordered r/o obstruction    3. Hyperkalemia  - AM BMP K- 5.7  - s/p Lokelma however pt did not have a bm  - repeat BMP sent  - if elevated will check repeat ekg and give cocktail      Addendum: Prelim CXR: Large left effusion with adjacent passive atelectasis and rightward midline shift. Discussed with MICU rec BIPAP and will come to see patient. Notified by RN of patient with hypoxia Sp02 80s on 8L NC.      Patient is a 67 yo F w/ PMHx of HTN, HLD, DM, fibromyalgia, Vulvar CA s/p vulva surgery in 2020, completed chemo & RT in 9/21, PMR (on chronic steroids, 10 mg BID), now p/w SOB. CTPA without PE but multiple nodules are noted within both lungs. Findings suggestive of malignant pleural effusions bilaterally, s/p thora by IR 12/15. At bedside patient complains of 10/10 pain in epigastric region worse with inspiration and worsening dyspnea. Reports feeling nauseous. Reports last bowel movement >1 week. Denies fever, chills, cough, chest pain, palpitations, vomiting, lower extremity swelling.       Meds    MEDICATIONS  (STANDING):  amLODIPine   Tablet 10 milliGRAM(s) Oral daily  budesonide 160 MICROgram(s)/formoterol 4.5 MICROgram(s) Inhaler 2 Puff(s) Inhalation two times a day  calcium carbonate   1250 mG (OsCal) 1 Tablet(s) Oral daily  cholecalciferol 2000 Unit(s) Oral daily  Dakins Solution - 1/4 Strength 1 Application(s) Topical daily  dextrose 40% Gel 15 Gram(s) Oral once  dextrose 5%. 1000 milliLiter(s) (50 mL/Hr) IV Continuous <Continuous>  dextrose 5%. 1000 milliLiter(s) (100 mL/Hr) IV Continuous <Continuous>  dextrose 50% Injectable 25 Gram(s) IV Push once  dextrose 50% Injectable 12.5 Gram(s) IV Push once  dextrose 50% Injectable 25 Gram(s) IV Push once  enalapril 5 milliGRAM(s) Oral daily  escitalopram 30 milliGRAM(s) Oral daily  gabapentin 400 milliGRAM(s) Oral three times a day  glucagon  Injectable 1 milliGRAM(s) IntraMuscular once  heparin   Injectable 5000 Unit(s) SubCutaneous every 8 hours  influenza  Vaccine (HIGH DOSE) 0.7 milliLiter(s) IntraMuscular once  insulin glargine Injectable (LANTUS) 25 Unit(s) SubCutaneous at bedtime  insulin lispro (ADMELOG) corrective regimen sliding scale   SubCutaneous every 6 hours  lactulose Retention Enema 200 Gram(s) Rectal daily  levothyroxine 137 MICROGram(s) Oral daily  metoprolol tartrate 12.5 milliGRAM(s) Oral two times a day  naloxegol 25 milliGRAM(s) Oral daily  nystatin    Suspension [id number] Unit(s) Swish and Swallow four times a day  pantoprazole    Tablet 40 milliGRAM(s) Oral before breakfast  polyethylene glycol 3350 17 Gram(s) Oral two times a day  senna 2 Tablet(s) Oral at bedtime  simethicone 80 milliGRAM(s) Chew four times a day  simvastatin 20 milliGRAM(s) Oral at bedtime  sodium chloride 0.9%. 1000 milliLiter(s) (50 mL/Hr) IV Continuous <Continuous>  trimethoprim  160 mG/sulfamethoxazole 800 mG 1 Tablet(s) Oral <User Schedule>    MEDICATIONS  (PRN):  aluminum hydroxide/magnesium hydroxide/simethicone Suspension 30 milliLiter(s) Oral every 6 hours PRN Dyspepsia  bisacodyl Suppository 10 milliGRAM(s) Rectal daily PRN Constipation  diphenhydrAMINE 25 milliGRAM(s) Oral daily PRN Rash and/or Itching  lidocaine/prilocaine Cream 1 Application(s) Topical daily PRN Dressing changes  melatonin 3 milliGRAM(s) Oral at bedtime PRN Insomnia  metoclopramide Injectable 10 milliGRAM(s) IV Push every 6 hours PRN nausea or vomiting.  morphine  - Injectable 3 milliGRAM(s) IV Push every 4 hours PRN mod to severe pain or dyspnea  ondansetron Injectable 4 milliGRAM(s) IV Push three times a day PRN Nausea and/or Vomiting        Vital Signs Last 24 Hrs  T(C): 36.6 (03 Jan 2022 20:20), Max: 36.8 (03 Jan 2022 12:19)  T(F): 97.8 (03 Jan 2022 20:20), Max: 98.2 (03 Jan 2022 12:19)  HR: 90 (03 Jan 2022 20:20) (90 - 99)  BP: 134/53 (03 Jan 2022 20:20) (129/68 - 158/56)  BP(mean): --  RR: 18 (03 Jan 2022 20:20) (17 - 19)  SpO2: 94% (03 Jan 2022 20:20) (94% - 100%)    PHYSICAL EXAM:  GENERAL: Well-groomed, well-developed  HEENT - NC/AT, pupils equal and reactive to light,   NECK: Supple, No JVD  CHEST/LUNG: Decreased breath sounds b/l Left >RR 30   HEART: Regular rate and rhythm; +S1S2  ABDOMEN: Distended, decreased bowel sounds   EXTREMITIES:  2+ Peripheral Pulses, No clubbing, cyanosis, or edema  NEURO:  No Focal deficits, sensory and motor intact  SKIN: No rashes or lesions    Consultant(s) Notes Reviewed:  [x ] YES  [ ] NO  Care Discussed with Consultants/Other Providers [ x] YES  [ ] NO      67 yo F w/ PMHx of HTN, HLD, DM, fibromyalgia, Vulvar CA s/p vulva surgery in 2020, completed chemo & RT in 9/21, PMR (on chronic steroids, 10 mg BID), now p/w SOB. CTPA without PE but multiple nodules are noted within both lungs. Findings suggestive of malignant pleural effusions bilaterally, s/p thora by IR 12/15. At bedside patient complains of 10/10 pain in epigastric region worse with inspiration and worsening dyspnea SP02 80s on 8LNC       1. Dyspnea  - pt placed on 15L NRB SP02 100%   - pt with bilateral pulm nodules and pleural effusions   - 12/13 CT Angio no PE   - CXR ordered  - ABG sent ABG - ( 03 Jan 2022 22:01 )  pH, Arterial: 7.38  pH, Blood: x     /  pCO2: 55    /  pO2: 98    / HCO3: 32    / Base Excess: 6.1   /  SaO2: 97.7    - ?bipap for wob and hypercapnia   - monitor on continuous pulse ox     2. Epigastric pain and nausea  - continue IV Zofran/Reglan  - continue aggressive bowel regimen  - ABD distended w/ decreased bs  - ABD xray ordered r/o obstruction    3. Hyperkalemia  - AM BMP K- 5.8   - s/p Lokelma however pt did not have a bm  - repeat BMP sent  - if elevated will check repeat ekg and give cocktail      Addendum: Prelim CXR: Large left effusion with adjacent passive atelectasis and rightward midline shift. Discussed with MICU rec BIPAP and will come to see patient. Notified by RN of patient with hypoxia Sp02 80s on 8L NC.      Patient is a 67 yo F w/ PMHx of HTN, HLD, DM, fibromyalgia, Vulvar CA s/p vulva surgery in 2020, completed chemo & RT in 9/21, PMR (on chronic steroids, 10 mg BID), now p/w SOB. CTPA without PE but multiple nodules are noted within both lungs. Findings suggestive of malignant pleural effusions bilaterally, s/p thora by IR 12/15. At bedside patient complains of 10/10 pain in epigastric region worse with inspiration and worsening dyspnea. Reports feeling nauseous. Reports last bowel movement >1 week. Denies fever, chills, cough, chest pain, palpitations, vomiting, lower extremity swelling.       Meds    MEDICATIONS  (STANDING):  amLODIPine   Tablet 10 milliGRAM(s) Oral daily  budesonide 160 MICROgram(s)/formoterol 4.5 MICROgram(s) Inhaler 2 Puff(s) Inhalation two times a day  calcium carbonate   1250 mG (OsCal) 1 Tablet(s) Oral daily  cholecalciferol 2000 Unit(s) Oral daily  Dakins Solution - 1/4 Strength 1 Application(s) Topical daily  dextrose 40% Gel 15 Gram(s) Oral once  dextrose 5%. 1000 milliLiter(s) (50 mL/Hr) IV Continuous <Continuous>  dextrose 5%. 1000 milliLiter(s) (100 mL/Hr) IV Continuous <Continuous>  dextrose 50% Injectable 25 Gram(s) IV Push once  dextrose 50% Injectable 12.5 Gram(s) IV Push once  dextrose 50% Injectable 25 Gram(s) IV Push once  enalapril 5 milliGRAM(s) Oral daily  escitalopram 30 milliGRAM(s) Oral daily  gabapentin 400 milliGRAM(s) Oral three times a day  glucagon  Injectable 1 milliGRAM(s) IntraMuscular once  heparin   Injectable 5000 Unit(s) SubCutaneous every 8 hours  influenza  Vaccine (HIGH DOSE) 0.7 milliLiter(s) IntraMuscular once  insulin glargine Injectable (LANTUS) 25 Unit(s) SubCutaneous at bedtime  insulin lispro (ADMELOG) corrective regimen sliding scale   SubCutaneous every 6 hours  lactulose Retention Enema 200 Gram(s) Rectal daily  levothyroxine 137 MICROGram(s) Oral daily  metoprolol tartrate 12.5 milliGRAM(s) Oral two times a day  naloxegol 25 milliGRAM(s) Oral daily  nystatin    Suspension [id number] Unit(s) Swish and Swallow four times a day  pantoprazole    Tablet 40 milliGRAM(s) Oral before breakfast  polyethylene glycol 3350 17 Gram(s) Oral two times a day  senna 2 Tablet(s) Oral at bedtime  simethicone 80 milliGRAM(s) Chew four times a day  simvastatin 20 milliGRAM(s) Oral at bedtime  sodium chloride 0.9%. 1000 milliLiter(s) (50 mL/Hr) IV Continuous <Continuous>  trimethoprim  160 mG/sulfamethoxazole 800 mG 1 Tablet(s) Oral <User Schedule>    MEDICATIONS  (PRN):  aluminum hydroxide/magnesium hydroxide/simethicone Suspension 30 milliLiter(s) Oral every 6 hours PRN Dyspepsia  bisacodyl Suppository 10 milliGRAM(s) Rectal daily PRN Constipation  diphenhydrAMINE 25 milliGRAM(s) Oral daily PRN Rash and/or Itching  lidocaine/prilocaine Cream 1 Application(s) Topical daily PRN Dressing changes  melatonin 3 milliGRAM(s) Oral at bedtime PRN Insomnia  metoclopramide Injectable 10 milliGRAM(s) IV Push every 6 hours PRN nausea or vomiting.  morphine  - Injectable 3 milliGRAM(s) IV Push every 4 hours PRN mod to severe pain or dyspnea  ondansetron Injectable 4 milliGRAM(s) IV Push three times a day PRN Nausea and/or Vomiting        Vital Signs Last 24 Hrs  T(C): 36.6 (03 Jan 2022 20:20), Max: 36.8 (03 Jan 2022 12:19)  T(F): 97.8 (03 Jan 2022 20:20), Max: 98.2 (03 Jan 2022 12:19)  HR: 90 (03 Jan 2022 20:20) (90 - 99)  BP: 134/53 (03 Jan 2022 20:20) (129/68 - 158/56)  BP(mean): --  RR: 18 (03 Jan 2022 20:20) (17 - 19)  SpO2: 94% (03 Jan 2022 20:20) (94% - 100%)    PHYSICAL EXAM:  GENERAL: Well-groomed, well-developed  HEENT - NC/AT, pupils equal and reactive to light,   NECK: Supple, No JVD  CHEST/LUNG: Decreased breath sounds b/l Left >RR 30   HEART: Regular rate and rhythm; +S1S2  ABDOMEN: Distended, decreased bowel sounds   EXTREMITIES:  2+ Peripheral Pulses, No clubbing, cyanosis, or edema  NEURO:  No Focal deficits, sensory and motor intact  SKIN: No rashes or lesions    Consultant(s) Notes Reviewed:  [x ] YES  [ ] NO  Care Discussed with Consultants/Other Providers [ x] YES  [ ] NO      67 yo F w/ PMHx of HTN, HLD, DM, fibromyalgia, Vulvar CA s/p vulva surgery in 2020, completed chemo & RT in 9/21, PMR (on chronic steroids, 10 mg BID), now p/w SOB. CTPA without PE but multiple nodules are noted within both lungs. Findings suggestive of malignant pleural effusions bilaterally, s/p thora by IR 12/15. At bedside patient complains of 10/10 pain in epigastric region worse with inspiration and worsening dyspnea SP02 80s on 8LNC       1. Dyspnea  - pt placed on 15L NRB SP02 100%   - pt with bilateral pulm nodules and pleural effusions   - 12/13 CT Angio no PE   - CXR ordered  - ABG sent ABG - ( 03 Jan 2022 22:01 )  pH, Arterial: 7.38  pH, Blood: x     /  pCO2: 55    /  pO2: 98    / HCO3: 32    / Base Excess: 6.1   /  SaO2: 97.7    - ?bipap for wob and hypercapnia   - monitor on continuous pulse ox     2. Epigastric pain and nausea  - continue IV Zofran/Reglan  - continue aggressive bowel regimen  - ABD xray ordered r/o obstruction - discussed with radiology no change from CT scan  - pt is s/p enema and with 1 large bm afterwards  - Can consider GI c/s in AM     3. Hyperkalemia  - AM BMP K- 5.8   - s/p Lokelma however pt did not have a bm  - repeat 5.7 - as per RN pt had BM   - Will monitor BMP closely         Addendum: Prelim CXR: Large left effusion with adjacent passive atelectasis and rightward midline shift. Discussed with MICU rec BIPAP and will come to see patient.  Above Discussed with [facility]- Dr. Kim Notified by RN of patient with hypoxia Sp02 80s on 8L NC.      Patient is a 67 yo F w/ PMHx of HTN, HLD, DM, fibromyalgia, Vulvar CA s/p vulva surgery in 2020, completed chemo & RT in 9/21, PMR (on chronic steroids, 10 mg BID), now p/w SOB. CTPA without PE but multiple nodules are noted within both lungs. Findings suggestive of malignant pleural effusions bilaterally, s/p thora by IR 12/15. At bedside patient complains of 10/10 pain in epigastric region worse with inspiration and worsening dyspnea. Reports feeling nauseous. Reports last bowel movement >1 week. Denies fever, chills, cough, chest pain, palpitations, vomiting, lower extremity swelling.       Meds    MEDICATIONS  (STANDING):  amLODIPine   Tablet 10 milliGRAM(s) Oral daily  budesonide 160 MICROgram(s)/formoterol 4.5 MICROgram(s) Inhaler 2 Puff(s) Inhalation two times a day  calcium carbonate   1250 mG (OsCal) 1 Tablet(s) Oral daily  cholecalciferol 2000 Unit(s) Oral daily  Dakins Solution - 1/4 Strength 1 Application(s) Topical daily  dextrose 40% Gel 15 Gram(s) Oral once  dextrose 5%. 1000 milliLiter(s) (50 mL/Hr) IV Continuous <Continuous>  dextrose 5%. 1000 milliLiter(s) (100 mL/Hr) IV Continuous <Continuous>  dextrose 50% Injectable 25 Gram(s) IV Push once  dextrose 50% Injectable 12.5 Gram(s) IV Push once  dextrose 50% Injectable 25 Gram(s) IV Push once  enalapril 5 milliGRAM(s) Oral daily  escitalopram 30 milliGRAM(s) Oral daily  gabapentin 400 milliGRAM(s) Oral three times a day  glucagon  Injectable 1 milliGRAM(s) IntraMuscular once  heparin   Injectable 5000 Unit(s) SubCutaneous every 8 hours  influenza  Vaccine (HIGH DOSE) 0.7 milliLiter(s) IntraMuscular once  insulin glargine Injectable (LANTUS) 25 Unit(s) SubCutaneous at bedtime  insulin lispro (ADMELOG) corrective regimen sliding scale   SubCutaneous every 6 hours  lactulose Retention Enema 200 Gram(s) Rectal daily  levothyroxine 137 MICROGram(s) Oral daily  metoprolol tartrate 12.5 milliGRAM(s) Oral two times a day  naloxegol 25 milliGRAM(s) Oral daily  nystatin    Suspension 720249 Unit(s) Swish and Swallow four times a day  pantoprazole    Tablet 40 milliGRAM(s) Oral before breakfast  polyethylene glycol 3350 17 Gram(s) Oral two times a day  senna 2 Tablet(s) Oral at bedtime  simethicone 80 milliGRAM(s) Chew four times a day  simvastatin 20 milliGRAM(s) Oral at bedtime  sodium chloride 0.9%. 1000 milliLiter(s) (50 mL/Hr) IV Continuous <Continuous>  trimethoprim  160 mG/sulfamethoxazole 800 mG 1 Tablet(s) Oral <User Schedule>    MEDICATIONS  (PRN):  aluminum hydroxide/magnesium hydroxide/simethicone Suspension 30 milliLiter(s) Oral every 6 hours PRN Dyspepsia  bisacodyl Suppository 10 milliGRAM(s) Rectal daily PRN Constipation  diphenhydrAMINE 25 milliGRAM(s) Oral daily PRN Rash and/or Itching  lidocaine/prilocaine Cream 1 Application(s) Topical daily PRN Dressing changes  melatonin 3 milliGRAM(s) Oral at bedtime PRN Insomnia  metoclopramide Injectable 10 milliGRAM(s) IV Push every 6 hours PRN nausea or vomiting.  morphine  - Injectable 3 milliGRAM(s) IV Push every 4 hours PRN mod to severe pain or dyspnea  ondansetron Injectable 4 milliGRAM(s) IV Push three times a day PRN Nausea and/or Vomiting        Vital Signs Last 24 Hrs  T(C): 36.6 (03 Jan 2022 20:20), Max: 36.8 (03 Jan 2022 12:19)  T(F): 97.8 (03 Jan 2022 20:20), Max: 98.2 (03 Jan 2022 12:19)  HR: 90 (03 Jan 2022 20:20) (90 - 99)  BP: 134/53 (03 Jan 2022 20:20) (129/68 - 158/56)  BP(mean): --  RR: 18 (03 Jan 2022 20:20) (17 - 19)  SpO2: 94% (03 Jan 2022 20:20) (94% - 100%)    PHYSICAL EXAM:  GENERAL: Well-groomed, well-developed  HEENT - NC/AT, pupils equal and reactive to light,   NECK: Supple, No JVD  CHEST/LUNG: Decreased breath sounds b/l Left >RR 30   HEART: Regular rate and rhythm; +S1S2  ABDOMEN: Distended, decreased bowel sounds   EXTREMITIES:  2+ Peripheral Pulses, No clubbing, cyanosis, or edema  NEURO:  No Focal deficits, sensory and motor intact  SKIN: No rashes or lesions    Consultant(s) Notes Reviewed:  [x ] YES  [ ] NO  Care Discussed with Consultants/Other Providers [ x] YES  [ ] NO      67 yo F w/ PMHx of HTN, HLD, DM, fibromyalgia, Vulvar CA s/p vulva surgery in 2020, completed chemo & RT in 9/21, PMR (on chronic steroids, 10 mg BID), now p/w SOB. CTPA without PE but multiple nodules are noted within both lungs. Findings suggestive of malignant pleural effusions bilaterally, s/p thora by IR 12/15. At bedside patient complains of 10/10 pain in epigastric region worse with inspiration and worsening dyspnea SP02 80s on 8LNC       1. Dyspnea  - pt placed on 15L NRB SP02 100%   - pt with Hx of bilateral pulm nodules and pleural effusions   - 12/13 CT Angio no PE   - CXR ordered  - ABG sent ABG - ( 03 Jan 2022 22:01 )  pH, Arterial: 7.38  pH, Blood: x     /  pCO2: 55    /  pO2: 98    / HCO3: 32    / Base Excess: 6.1   /  SaO2: 97.7    - ?bipap for wob and hypercapnia   - monitor on continuous pulse ox     2. Epigastric pain and nausea  - continue IV Zofran/Reglan  - continue aggressive bowel regimen  - ABD xray ordered r/o obstruction - discussed with radiology no change from CT scan  - pt is s/p enema and with 1 large bm afterwards  - Can consider GI c/s in AM     3. Hyperkalemia  - AM BMP K- 5.8   - s/p Lokelma   - repeat 5.7 -  had 2 BM's overnight   - Will monitor BMP closely         Addendum: Prelim CXR: Large left effusion with adjacent passive atelectasis and rightward midline shift. Discussed with MICU rec BIPAP and will come to see patient.  Above Discussed with [facility]- Dr. Kim    03:30 patient had RRT called for WOB while on BIPAP. Pt to be transferred to MICU. Attempted to update Daughter/primary contact (Juju) at [phone] however no answer.  - Sachin reached at [phone] and updated.

## 2022-01-03 NOTE — PROGRESS NOTE ADULT - PROBLEM SELECTOR PLAN 12
continue lexapro continue lexapro    #Hyperkalemia  -2/2 malignancy, ileus?  treat with lokelma  -f/u PM BMP. If continues to uptrend, check EKG and treat with temporizing measures

## 2022-01-03 NOTE — PROGRESS NOTE ADULT - PROBLEM SELECTOR PLAN 11
continue lexapro    #Constipation:   -c/w miralax, initiated senna. Mineral oil enema ordered.    # Nausea  - scheduled zofran
continue levothyroxine dose increased to 125mcg  tsh elevated, mildly low free t4
continue levothyroxine dose increased to 125mcg  tsh elevated, mildly low free t4
continue lexapro    #Constipation:   -c/w miralax, initiated senna. Mineral oil enema ordered.    # Nausea  - scheduled zofran
continue levothyroxine dose increased to 125mcg  tsh elevated, mildly low free t4
continue levothyroxine dose increased to 125mcg  tsh elevated, mildly low free t4
continue lexapro    #Constipation:   -c/w miralax, initiated senna. Mineral oil enema ordered.    # Nausea  - scheduled zofran
continue levothyroxine dose increased to 125mcg  tsh elevated, mildly low free t4
continue levothyroxine dose increased to 125mcg  tsh elevated, mildly low free t4
continue lexapro
continue levothyroxine dose increased to 125mcg  tsh elevated, mildly low free t4
continue lexapro    #Constipation:   -c/w miralax, initiated senna. Mineral oil enema ordered.    # Nausea  - scheduled zofran
continue levothyroxine dose increased to 125mcg  tsh elevated, mildly low free t4
continue lexapro    #Constipation:   -c/w miralax, initiated senna. Mineral oil enema ordered.    # Nausea  - scheduled zofran
continue lexapro    #Constipation:   -c/w miralax, initiated senna. Mineral oil enema ordered.    # Nausea  - scheduled zofran
continue levothyroxine dose increased to 125mcg  tsh elevated, mildly low free t4
continue levothyroxine dose increased to 125mcg  tsh elevated, mildly low free t4

## 2022-01-03 NOTE — PROGRESS NOTE ADULT - SUBJECTIVE AND OBJECTIVE BOX
Hematology Oncology Follow-up    INTERVAL HPI/OVERNIGHT EVENTS:  No o/n events, patient resting comfortably.   States shortness of breath is mildly improved.   Endorses abdominal discomfort, last BM >1wk ago.    VITAL SIGNS:  T(F): 97.8 (01-03-22 @ 05:40)  HR: 98 (01-03-22 @ 05:40)  BP: 130/62 (01-03-22 @ 05:40)  RR: 19 (01-03-22 @ 05:40)  SpO2: 100% (01-03-22 @ 05:40)  Wt(kg): --    01-02-22 @ 07:01  -  01-03-22 @ 07:00  --------------------------------------------------------  IN: 795 mL / OUT: 800 mL / NET: -5 mL      Review of Systems:  General: denies fevers/chills  Respiratory: +shortness of breath  Cardiovascular: denies chest pain, palpitations  Gastrointestinal: + nausea, +constipation + abdominal discomfort   MSK: denies joint pain or muscle pain  Neuro: denies headache, weakness, or parasthesias  Skin: denies rash, petichiae, echymoses  Psych: denies anxiety or sleep disturbances    PHYSICAL EXAM:  Constitutional: NAD  Respiratory: symmetric chest rise, with normal respiratory effort  Cardiovascular: RRR  Gastrointestinal: + distended, not tender to palpation  Extremities:  no edema  MSK: no obvious abnormalities  Neurological: Grossly intact  Skin: no rash, no echymoses, no petichiae  Psych: normal affect    MEDICATIONS  (STANDING):  amLODIPine   Tablet 10 milliGRAM(s) Oral daily  budesonide 160 MICROgram(s)/formoterol 4.5 MICROgram(s) Inhaler 2 Puff(s) Inhalation two times a day  calcium carbonate   1250 mG (OsCal) 1 Tablet(s) Oral daily  cholecalciferol 2000 Unit(s) Oral daily  Dakins Solution - 1/4 Strength 1 Application(s) Topical daily  dextrose 40% Gel 15 Gram(s) Oral once  dextrose 5%. 1000 milliLiter(s) (50 mL/Hr) IV Continuous <Continuous>  dextrose 5%. 1000 milliLiter(s) (100 mL/Hr) IV Continuous <Continuous>  dextrose 50% Injectable 25 Gram(s) IV Push once  dextrose 50% Injectable 12.5 Gram(s) IV Push once  dextrose 50% Injectable 25 Gram(s) IV Push once  enalapril 5 milliGRAM(s) Oral daily  escitalopram 30 milliGRAM(s) Oral daily  gabapentin 400 milliGRAM(s) Oral three times a day  glucagon  Injectable 1 milliGRAM(s) IntraMuscular once  heparin   Injectable 5000 Unit(s) SubCutaneous every 8 hours  influenza  Vaccine (HIGH DOSE) 0.7 milliLiter(s) IntraMuscular once  insulin glargine Injectable (LANTUS) 50 Unit(s) SubCutaneous at bedtime  insulin lispro (ADMELOG) corrective regimen sliding scale   SubCutaneous three times a day before meals  insulin lispro (ADMELOG) corrective regimen sliding scale   SubCutaneous at bedtime  insulin lispro Injectable (ADMELOG) 26 Unit(s) SubCutaneous three times a day with meals  levothyroxine 137 MICROGram(s) Oral daily  methylPREDNISolone sodium succinate Injectable 40 milliGRAM(s) IV Push two times a day  metoprolol tartrate 12.5 milliGRAM(s) Oral two times a day  nystatin    Suspension [id number] Unit(s) Swish and Swallow four times a day  pantoprazole    Tablet 40 milliGRAM(s) Oral before breakfast  polyethylene glycol 3350 17 Gram(s) Oral two times a day  senna 2 Tablet(s) Oral at bedtime  simethicone 80 milliGRAM(s) Chew four times a day  simvastatin 20 milliGRAM(s) Oral at bedtime  sodium zirconium cyclosilicate 10 Gram(s) Oral once  trimethoprim  160 mG/sulfamethoxazole 800 mG 1 Tablet(s) Oral <User Schedule>    MEDICATIONS  (PRN):  aluminum hydroxide/magnesium hydroxide/simethicone Suspension 30 milliLiter(s) Oral every 6 hours PRN Dyspepsia  bisacodyl Suppository 10 milliGRAM(s) Rectal daily PRN Constipation  diphenhydrAMINE 25 milliGRAM(s) Oral daily PRN Rash and/or Itching  lactulose Syrup 20 Gram(s) Oral two times a day PRN constipation  lidocaine/prilocaine Cream 1 Application(s) Topical daily PRN Dressing changes  melatonin 3 milliGRAM(s) Oral at bedtime PRN Insomnia  metoclopramide Injectable 10 milliGRAM(s) IV Push every 6 hours PRN nausea or vomiting.  morphine  - Injectable 3 milliGRAM(s) IV Push every 4 hours PRN mod to severe pain or dyspnea  ondansetron Injectable 4 milliGRAM(s) IV Push three times a day PRN Nausea and/or Vomiting      No Known Allergies      LABS:                        7.9    16.27 )-----------( 142      ( 03 Jan 2022 07:22 )             26.3     01-03    134<L>  |  96<L>  |  54<H>  ----------------------------<  142<H>  5.8<H>   |  28  |  0.75    Ca    8.5      03 Jan 2022 07:22  Phos  2.8     01-02  Mg     1.90     01-02    TPro  6.2  /  Alb  3.2<L>  /  TBili  0.2  /  DBili  x   /  AST  9   /  ALT  12  /  AlkPhos  62  01-03                     RADIOLOGY & ADDITIONAL TESTS:  Studies reviewed.

## 2022-01-03 NOTE — PROVIDER CONTACT NOTE (OTHER) - BACKGROUND
66 year old female admitted with shortness of breath. PMH of TIA, Obesity, Vulva Cancer, DM Type 2, Anxiety, Hypothyroid, HTN, and Fibromyaglia.

## 2022-01-03 NOTE — PROGRESS NOTE ADULT - SUBJECTIVE AND OBJECTIVE BOX
CHIEF COMPLAINT:Patient is a 66y old  Female who presents with a chief complaint of SOB dizziness & nausea x couple of weeks (02 Jan 2022 15:28)      Interval Events:    REVIEW OF SYSTEMS:  [x] All other systems negative  [ ] Unable to assess ROS because ________    OBJECTIVE:  ICU Vital Signs Last 24 Hrs  T(C): 36.6 (03 Jan 2022 05:40), Max: 36.7 (02 Jan 2022 22:34)  T(F): 97.8 (03 Jan 2022 05:40), Max: 98 (02 Jan 2022 22:34)  HR: 98 (03 Jan 2022 05:40) (84 - 110)  BP: 130/62 (03 Jan 2022 05:40) (122/51 - 145/66)  BP(mean): --  ABP: --  ABP(mean): --  RR: 19 (03 Jan 2022 05:40) (16 - 20)  SpO2: 100% (03 Jan 2022 05:40) (96% - 100%)        01-02 @ 07:01  -  01-03 @ 07:00  --------------------------------------------------------  IN: 795 mL / OUT: 800 mL / NET: -5 mL        PHYSICAL EXAM:  GENERAL: NAD, well-groomed, well-developed  HEAD:  Atraumatic, Normocephalic  EYES: EOMI, PERRLA, conjunctiva and sclera clear  ENMT: No tonsillar erythema, exudates, or enlargement; Moist mucous membranes, Good dentition, No lesions  NECK: Supple, No JVD, Normal thyroid  CHEST/LUNG: Clear to auscultation bilaterally; No rales, rhonchi, wheezing, or rubs  HEART: Regular rate and rhythm; No murmurs, rubs, or gallops  ABDOMEN: Soft, Nontender, Nondistended; Bowel sounds present  VASCULAR:  2+ Peripheral Pulses, No clubbing, cyanosis, or edema  LYMPH: No lymphadenopathy noted  SKIN: No rashes or lesions  NERVOUS SYSTEM:  Alert & Oriented X3, Good concentration; Motor Strength 5/5 B/L upper and lower extremities; DTRs 2+ intact and symmetric    HOSPITAL MEDICATIONS:  MEDICATIONS  (STANDING):  amLODIPine   Tablet 10 milliGRAM(s) Oral daily  budesonide 160 MICROgram(s)/formoterol 4.5 MICROgram(s) Inhaler 2 Puff(s) Inhalation two times a day  calcium carbonate   1250 mG (OsCal) 1 Tablet(s) Oral daily  cholecalciferol 2000 Unit(s) Oral daily  Dakins Solution - 1/4 Strength 1 Application(s) Topical daily  dextrose 40% Gel 15 Gram(s) Oral once  dextrose 5%. 1000 milliLiter(s) (50 mL/Hr) IV Continuous <Continuous>  dextrose 5%. 1000 milliLiter(s) (100 mL/Hr) IV Continuous <Continuous>  dextrose 50% Injectable 25 Gram(s) IV Push once  dextrose 50% Injectable 12.5 Gram(s) IV Push once  dextrose 50% Injectable 25 Gram(s) IV Push once  enalapril 5 milliGRAM(s) Oral daily  escitalopram 30 milliGRAM(s) Oral daily  gabapentin 400 milliGRAM(s) Oral three times a day  glucagon  Injectable 1 milliGRAM(s) IntraMuscular once  heparin   Injectable 5000 Unit(s) SubCutaneous every 8 hours  influenza  Vaccine (HIGH DOSE) 0.7 milliLiter(s) IntraMuscular once  insulin glargine Injectable (LANTUS) 50 Unit(s) SubCutaneous at bedtime  insulin lispro (ADMELOG) corrective regimen sliding scale   SubCutaneous three times a day before meals  insulin lispro (ADMELOG) corrective regimen sliding scale   SubCutaneous at bedtime  insulin lispro Injectable (ADMELOG) 26 Unit(s) SubCutaneous three times a day with meals  levothyroxine 137 MICROGram(s) Oral daily  methylPREDNISolone sodium succinate Injectable 40 milliGRAM(s) IV Push two times a day  metoprolol tartrate 12.5 milliGRAM(s) Oral two times a day  nystatin    Suspension 910507 Unit(s) Swish and Swallow four times a day  pantoprazole    Tablet 40 milliGRAM(s) Oral before breakfast  polyethylene glycol 3350 17 Gram(s) Oral two times a day  senna 2 Tablet(s) Oral at bedtime  simethicone 80 milliGRAM(s) Chew four times a day  simvastatin 20 milliGRAM(s) Oral at bedtime  trimethoprim  160 mG/sulfamethoxazole 800 mG 1 Tablet(s) Oral <User Schedule>    MEDICATIONS  (PRN):  aluminum hydroxide/magnesium hydroxide/simethicone Suspension 30 milliLiter(s) Oral every 6 hours PRN Dyspepsia  bisacodyl Suppository 10 milliGRAM(s) Rectal daily PRN Constipation  diphenhydrAMINE 25 milliGRAM(s) Oral daily PRN Rash and/or Itching  lactulose Syrup 20 Gram(s) Oral two times a day PRN constipation  lidocaine/prilocaine Cream 1 Application(s) Topical daily PRN Dressing changes  melatonin 3 milliGRAM(s) Oral at bedtime PRN Insomnia  metoclopramide Injectable 10 milliGRAM(s) IV Push every 6 hours PRN nausea or vomiting.  morphine  - Injectable 3 milliGRAM(s) IV Push every 4 hours PRN mod to severe pain or dyspnea  ondansetron Injectable 4 milliGRAM(s) IV Push three times a day PRN Nausea and/or Vomiting      LABS:    The Labs were reviewed by me   The Radiology was reviewed by me    EKG tracing reviewed by me    01-03    134<L>  |  96<L>  |  54<H>  ----------------------------<  142<H>  5.8<H>   |  28  |  0.75  01-02    131<L>  |  94<L>  |  50<H>  ----------------------------<  220<H>  5.2   |  24  |  0.94  01-01    133<L>  |  96<L>  |  53<H>  ----------------------------<  291<H>  5.5<H>   |  24  |  1.11    Ca    8.5      03 Jan 2022 07:22  Ca    7.5<L>      02 Jan 2022 01:11  Ca    7.2<L>      01 Jan 2022 18:40  Phos  2.8     01-02  Mg     1.90     01-02    TPro  6.2  /  Alb  3.2<L>  /  TBili  0.2  /  DBili  x   /  AST  9   /  ALT  12  /  AlkPhos  62  01-03  TPro  6.4  /  Alb  3.6  /  TBili  0.3  /  DBili  x   /  AST  10  /  ALT  12  /  AlkPhos  64  01-01    Magnesium, Serum: 1.90 mg/dL (01-02-22 @ 01:11)    Phosphorus Level, Serum: 2.8 mg/dL (01-02-22 @ 01:11)                                              7.9    16.27 )-----------( 142      ( 03 Jan 2022 07:22 )             26.3                         7.5    17.36 )-----------( 167      ( 02 Jan 2022 01:11 )             23.8                         7.8    20.52 )-----------( 179      ( 01 Jan 2022 06:25 )             26.1     CAPILLARY BLOOD GLUCOSE      POCT Blood Glucose.: 116 mg/dL (02 Jan 2022 21:03)  POCT Blood Glucose.: 166 mg/dL (02 Jan 2022 18:10)  POCT Blood Glucose.: 80 mg/dL (02 Jan 2022 17:30)  POCT Blood Glucose.: 79 mg/dL (02 Jan 2022 17:12)  POCT Blood Glucose.: 200 mg/dL (02 Jan 2022 11:43)        MICROBIOLOGY:     RADIOLOGY:  [ ] Reviewed and interpreted by me    Point of Care Ultrasound Findings:    PFT:    EKG:

## 2022-01-03 NOTE — PROVIDER CONTACT NOTE (OTHER) - SITUATION
Pt noted to be disoriented to situation at times. Spoke with daughter Tania who is concerned about patient's disorientation.

## 2022-01-03 NOTE — PROGRESS NOTE ADULT - ASSESSMENT
67 yo F with HTN, HLD, type 2DM, hypothyroidism, fibromyalgia, vulvar cancer s/p surgery 2020, RT/chemo 2021, anemia presenting with HERNANDEZ and dizziness with imaging concerning for metastatic disease and pleural effusions bilaterally. Endocrine was consulted for T2DM management.    1. Type 2 Diabetes Mellitus c/b steroid induced hyperglycemia  A1c 7.7%; goal less than 7%  Home Regimen: Tresiba 60 units QHS, Novolog 15-30 units with meals (ISF 1:30, but usually just estimates), Victoza 1.8 mg daily    While inpatient:   BG target 100-180 mg/dl  Continues on solumedrol with steroid exacerbated hyperglycemia, however, changed to once daily from BID  also with poor appetite and food intake.   Will continue Lantus to 50 units SQ qHS  Will continue Admelog to 26 units SQ TID before meals - please HOLD if she is not eating - cautious with prandial bolus insulin increases because food intake is reported to be minimal   Continue Admelog MODERATE dose correctional scale before meals, moderate dose at bedtime   Please check FSG before meals and QHS  Consistent carbohydrate diet - appreciate RD input, liberalized diet (eliminated sodium restriction, added Glucerna per RD recommendation)  Hypoglycemia protocol     Discharge planning for DM: Resume basal / bolus insulin (dosing TBD), schedule follow up with prior outpatient endocrine provider - Dr Armstrong    2. Chronic steroid use, steroid induced hyperglycemia   On Prednisone 10 mg BID for at least two months, was on Prednisone 40 mg daily, now on solumedrol 40 mg IV daily starting 1/4/2021  Insulin dosing as above   Pt cannot stop steroids abruptly as she will have secondary adrenal insufficiency   Patient to follow up in 1/2022 with her doctors with steroid taper      3. Hypothyroidism  12-16 @ 06:47 TSH 11.94 Free T4 0.9   Will continue Levothyroxine 137 mcg PO daily - repeat TFT's in one week    4. Hypercalcemia   PTH 7  Likely hypercalcemia of malignancy  S/p Zometa 4 mg IV (given on 12/20/21)  S/p Calcitonin 310 IU SQ q12h x 4 doses (completed 12/22)   Corrected calcium 9.2 today -improved  Vit D 25-OH LOW at 12.2, recommend supplement (see below)  PTH- suppressed    PTHrp less than 2.0  Check Serum albumin and Ca daily    6. Hypocalcemia   Started Ca Carbonate 1250 mg daily   Check Serum albumin and Ca daily    7. Vitamin D deficiency  Vitamin D 25-OH 12.2  Recommend supplement with Cholecalciferol 2000 units daily    8. Hypertension  Goal BP <130/80  Management as per primary team    9. Hyperlipidemia  Continue Simvastatin 20 mg daily  Followup lipid panel annually as outpatient    10. SOB  Defer to primary team   Discussed with ACP today: MARRY Sheldon, medicine attending to see patient today for further assessment   Likely multifactorial, metastatic lung CA/pleural effusion, recent SVT, ABD distention, anxiety over increased work of breathing   Consider palliative consult for symptom management         Madiha Ontiveros  Nurse Practitioner  Division of Endocrinology & Diabetes  Pager # [phone]      If after 6PM or before 9AM, or on weekends/holidays, please call endocrine answering service for assistance ([phone]).  For nonurgent matters email [email or web address] for assistance.

## 2022-01-03 NOTE — PROGRESS NOTE ADULT - PROBLEM SELECTOR PROBLEM 12
Fibromyalgia

## 2022-01-03 NOTE — PROGRESS NOTE ADULT - ASSESSMENT
67 y/o F w/ a PMHx of HTN, HLD, T2DM, fibromyalgia, recurrent vulvar cancer (recently received chemo/RT from 7/2021-9/2021), and anemia (requiring intermittent transfusions recently) who presented with shortness of breath, dizziness, and nausea for multiple weeks, found to have multiple lung nodules and B/L pleural effusions concerning for malignancy, and was admitted to telemetry for further management, s/p thoracentesis on 12/15 (~ 660cc drained). Oncology was consulted for further evaluation.    # Dyspnea  - CTA Chest (12/13): No pulmonary embolus is noted. Multiple nodules are noted within both lungs. Primary consideration is metastasis. Findings suggestive of malignant pleural effusions bilaterally.  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   - bactrim for PCP ppx  - morphine IV PRN for dypsnea  - path from lung nodule biopsy 12/22 consistent with metastatic squamous cell carcinoma (previous vulvar cancer also sq cell ca), sent for NGS and PDL1  - due to concern for recurrence (and now metastatic disease) and significant symptoms (shortness of breath), s/p carboplatin + paclitaxel on 12/30    #Constipation  - in setting of opiates and anti-emetics  - f/u CT A/P to rule out obstruction     # SVT   - started on metoprolol  - f/u repeat Echo  - would consider repeat CT chest to rule out PE as a cause    # Recurrent vulvar cancer  - Last recurrence was earlier this year. She received chemo/RT with weekly Cisplatin from 7/12/21-9/1/21.  - Imaging findings on admission are concerning for progressing malignancy  - Pt s/p thoracentesis as noted above with negative cytopathology  - CT A/P with small collection in R inguinal region (likely known R inguinal wound/tract) but does not appear to show other areas of disease that is amenable to biopsy  - s/p IR biopsy of R lung nodule which showed squamous cell carcinoma, concern for recurrence/metastatic disease; sent for NGS and PDL1  - will follow up with primary Oncologist Dr. Paulo Carpenter as outpatient     #Hypercalcemia   -This is an oncologic emergency and requires urgent management  - Ca increased to 12.5  - PTH and Vit D low  - f/u PTHrP  - appreciate endo consult  - may be related to hypercalcemia of malignancy  - s/p zoledronic acid (discussed risks/benefits including risk of osteonecrosis of jaw) and calcitonin with normalization of calcium    # Microcytic Anemia  - Occasionally requiring transfusions (last on 12/11)  - Recently underwent a hematologic evaluation as an outpatient. Her workup was notable for a hemoglobin electrophoresis which was c/w beta thalassemia minor. She had a BMBx on 11/9/21 (minimal marrow obtained to evaluate) which showed erythroid hyperplasia, megakaryocytes normal, no evidence of dysplasia.   - Monitor CBC and keep active T+S. Transfuse for Hgb < 7 or if symptomatic  - Primary Hematologist: Dr. Leana Auguste. Continue outpatient follow-up    #Radiation wound  - c/w wound care in R pelvic area       65 y/o F w/ a PMHx of HTN, HLD, T2DM, fibromyalgia, recurrent vulvar cancer (recently received chemo/RT from 7/2021-9/2021), and anemia (requiring intermittent transfusions recently) who presented with shortness of breath, dizziness, and nausea for multiple weeks, found to have multiple lung nodules and B/L pleural effusions concerning for malignancy, and was admitted to telemetry for further management, s/p thoracentesis on 12/15 (~ 660cc drained). Oncology was consulted for further evaluation.    # Dyspnea  - CTA Chest (12/13): No pulmonary embolus is noted. Multiple nodules are noted within both lungs. Primary consideration is metastasis. Findings suggestive of malignant pleural effusions bilaterally.  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   - bactrim for PCP ppx  - morphine IV PRN for dypsnea  - path from lung nodule biopsy 12/22 consistent with metastatic squamous cell carcinoma (previous vulvar cancer also sq cell ca), sent for NGS and PDL1  - due to concern for recurrence (and now metastatic disease) and significant symptoms (shortness of breath), s/p carboplatin + paclitaxel on 12/30    #Constipation  - in setting of opiates and anti-emetics  - c/w aggressive bowel regimen (on senna, miralax, lactulose)   - f/u CT A/P to rule out obstruction     # SVT   - started on metoprolol  - f/u repeat Echo  - would consider repeat CT chest to rule out PE as a cause    # Recurrent vulvar cancer  - Last recurrence was earlier this year. She received chemo/RT with weekly Cisplatin from 7/12/21-9/1/21.  - Imaging findings on admission are concerning for progressing malignancy  - Pt s/p thoracentesis as noted above with negative cytopathology  - CT A/P with small collection in R inguinal region (likely known R inguinal wound/tract) but does not appear to show other areas of disease that is amenable to biopsy  - s/p IR biopsy of R lung nodule which showed squamous cell carcinoma, concern for recurrence/metastatic disease; sent for NGS and PDL1  - will follow up with primary Oncologist Dr. Paulo Carpenter as outpatient     #Hypercalcemia   -This is an oncologic emergency and requires urgent management  - Ca increased to 12.5  - PTH and Vit D low  - f/u PTHrP  - appreciate endo consult  - may be related to hypercalcemia of malignancy  - s/p zoledronic acid (discussed risks/benefits including risk of osteonecrosis of jaw) and calcitonin with normalization of calcium    # Microcytic Anemia  - Occasionally requiring transfusions (last on 12/11)  - Recently underwent a hematologic evaluation as an outpatient. Her workup was notable for a hemoglobin electrophoresis which was c/w beta thalassemia minor. She had a BMBx on 11/9/21 (minimal marrow obtained to evaluate) which showed erythroid hyperplasia, megakaryocytes normal, no evidence of dysplasia.   - Monitor CBC and keep active T+S. Transfuse for Hgb < 7 or if symptomatic  - Primary Hematologist: Dr. Leana Auguste. Continue outpatient follow-up    #Radiation wound  - c/w wound care in R pelvic area

## 2022-01-03 NOTE — PROGRESS NOTE ADULT - ASSESSMENT
65 yo F with HTN, HLD, DM, fibromyalgia, Vulvar CA s/p vulva surgery in 2020, completed chemo & RT in 9/21 admitted for shortness of breath, found to have bilateral pleural effusions, L>R and pulmonary nodules, pulmonary consulted for pulmonary nodules and dyspnea.     RECS:  - persistent o2 require of 3L NC  - Confirmed metastatic SCC of GYN origin in the lung   - Chemo given on 12/23     - c/w Symbicort 16/0-4.5 BID   - c/w solumedrol 40mg IV BID. Will plan for a steroid taper   - c/w Bactrim for PCP PPX. Bactrim DS 3 times a week   - c/w morphine IV q6h PRN for Dyspnea and Severe pain   - Discussed Code status with patient and her daughter patient is a full code     Sam Anders MD   Pulmonary/Critical Care Fellow PGY-6   [facility] Pager #: [phone]  Spectra #: [phone] 65 yo F with HTN, HLD, DM, fibromyalgia, Vulvar CA s/p vulva surgery in 2020, completed chemo & RT in 9/21 admitted for shortness of breath, found to have bilateral pleural effusions, L>R and pulmonary nodules, pulmonary consulted for pulmonary nodules and dyspnea.     RECS:  - persistent o2 require of 3L NC  - Confirmed metastatic SCC of GYN origin in the lung   - Chemo given on 12/23     - c/w Symbicort 16/0-4.5 BID   - decrease solumedrol to 40mg once a day   - c/w Bactrim for PCP PPX. Bactrim DS 3 times a week   - c/w morphine IV q6h PRN for Dyspnea and Severe pain   - Discussed Code status with patient and her daughter patient is a full code     Sam Anders MD   Pulmonary/Critical Care Fellow PGY-6   [facility] Pager #: [phone]  Spectra #: [phone]

## 2022-01-03 NOTE — PROGRESS NOTE ADULT - SUBJECTIVE AND OBJECTIVE BOX
BALJINDER GERMAN  66y  Female      Patient is a 66y old  Female who presents with a chief complaint of SOB dizziness & nausea x couple of weeks (03 Jan 2022 19:08)      INTERVAL HPI/OVERNIGHT EVENTS: Pt continues to have worsening abdominal distention with RUQ discomfort. Not able to pass gas. Denies nausea or vomiting, tolerating PO. Denies fevers or chills.        REVIEW OF SYSTEMS:  As per hpi    MEDICATIONS (STANDING):   aluminum hydroxide/magnesium hydroxide/simethicone Suspension 30 milliLiter(s) Oral every 6 hours PRN  amLODIPine   Tablet 10 milliGRAM(s) Oral daily  bisacodyl Suppository 10 milliGRAM(s) Rectal daily PRN  budesonide 160 MICROgram(s)/formoterol 4.5 MICROgram(s) Inhaler 2 Puff(s) Inhalation two times a day  calcium carbonate   1250 mG (OsCal) 1 Tablet(s) Oral daily  cholecalciferol 2000 Unit(s) Oral daily  Dakins Solution - 1/4 Strength 1 Application(s) Topical daily  dextrose 40% Gel 15 Gram(s) Oral once  dextrose 5%. 1000 milliLiter(s) IV Continuous <Continuous>  dextrose 5%. 1000 milliLiter(s) IV Continuous <Continuous>  dextrose 50% Injectable 25 Gram(s) IV Push once  dextrose 50% Injectable 12.5 Gram(s) IV Push once  dextrose 50% Injectable 25 Gram(s) IV Push once  diphenhydrAMINE 25 milliGRAM(s) Oral daily PRN  enalapril 5 milliGRAM(s) Oral daily  escitalopram 30 milliGRAM(s) Oral daily  gabapentin 400 milliGRAM(s) Oral three times a day  glucagon  Injectable 1 milliGRAM(s) IntraMuscular once  heparin   Injectable 5000 Unit(s) SubCutaneous every 8 hours  influenza  Vaccine (HIGH DOSE) 0.7 milliLiter(s) IntraMuscular once  insulin glargine Injectable (LANTUS) 25 Unit(s) SubCutaneous at bedtime  insulin lispro (ADMELOG) corrective regimen sliding scale   SubCutaneous every 6 hours  lactulose Retention Enema 200 Gram(s) Rectal daily  levothyroxine 137 MICROGram(s) Oral daily  lidocaine/prilocaine Cream 1 Application(s) Topical daily PRN  melatonin 3 milliGRAM(s) Oral at bedtime PRN  metoclopramide Injectable 10 milliGRAM(s) IV Push every 6 hours PRN  metoprolol tartrate 12.5 milliGRAM(s) Oral two times a day  morphine  - Injectable 3 milliGRAM(s) IV Push every 4 hours PRN  naloxegol 25 milliGRAM(s) Oral daily  nystatin    Suspension 758032 Unit(s) Swish and Swallow four times a day  ondansetron Injectable 4 milliGRAM(s) IV Push three times a day PRN  pantoprazole    Tablet 40 milliGRAM(s) Oral before breakfast  polyethylene glycol 3350 17 Gram(s) Oral two times a day  senna 2 Tablet(s) Oral at bedtime  simethicone 80 milliGRAM(s) Chew four times a day  simvastatin 20 milliGRAM(s) Oral at bedtime  sodium chloride 0.9%. 1000 milliLiter(s) IV Continuous <Continuous>  trimethoprim  160 mG/sulfamethoxazole 800 mG 1 Tablet(s) Oral <User Schedule>      T(C): 36.8 (01-03-22 @ 12:19), Max: 36.8 (01-03-22 @ 12:19)  HR: 99 (01-03-22 @ 18:33) (90 - 99)  BP: 144/61 (01-03-22 @ 18:33) (129/68 - 158/56)  RR: 17 (01-03-22 @ 18:33) (17 - 19)  SpO2: 94% (01-03-22 @ 18:33) (94% - 100%)  Wt(kg): --Vital Signs Last 24 Hrs  T(C): 36.8 (03 Jan 2022 12:19), Max: 36.8 (03 Jan 2022 12:19)  T(F): 98.2 (03 Jan 2022 12:19), Max: 98.2 (03 Jan 2022 12:19)  HR: 99 (03 Jan 2022 18:33) (90 - 99)  BP: 144/61 (03 Jan 2022 18:33) (129/68 - 158/56)  BP(mean): --  RR: 17 (03 Jan 2022 18:33) (17 - 19)  SpO2: 94% (03 Jan 2022 18:33) (94% - 100%)    Gen: NAD; resting in bed. awake and alert.   Pulm: no respiratory distress; no accessory muscle use; CTA b/l; no wheezing; no crackles.   Cards: RRR, nl S1/S2; no obvious murmurs; no LE edema; no JVD  Abd: distended; tympanic; slight discomfort on deep palpation; decreased BS;   Ext: no cyanosis; no joint effusion; no joint pain on palpation.  Skin: no rash; no cyanosis    Consultant(s) Notes Reviewed:  [x ] YES  [ ] NO  Care Discussed with Consultants/Other Providers [ x] YES  [ ] NO    LABS:                        7.9    16.27 )-----------( 142      ( 03 Jan 2022 07:22 )             26.3     01-03    134<L>  |  96<L>  |  54<H>  ----------------------------<  142<H>  5.8<H>   |  28  |  0.75    Ca    8.5      03 Jan 2022 07:22  Phos  2.8     01-02  Mg     1.90     01-02    TPro  6.2  /  Alb  3.2<L>  /  TBili  0.2  /  DBili  x   /  AST  9   /  ALT  12  /  AlkPhos  62  01-03        CAPILLARY BLOOD GLUCOSE      POCT Blood Glucose.: 135 mg/dL (03 Jan 2022 17:02)  POCT Blood Glucose.: 237 mg/dL (03 Jan 2022 12:00)  POCT Blood Glucose.: 116 mg/dL (02 Jan 2022 21:03)            RADIOLOGY & ADDITIONAL TESTS:    < from: CT Abdomen and Pelvis w/ Oral Cont (01.03.22 @ 11:16) >  IMPRESSION:  Mildly distended right colon with stool and mild gaseous distention of   the transverse colon and splenic flexure. Caliber change in the proximal   descending colon without discrete obstructing mass identified, and is of   uncertain etiology. No dilated small bowel to suggest small bowel   obstruction.    Right inguinal wound with subjacent cavity/collection containing air,   debris, and possibly packing material, which is similar to mildly   decreased in size since 12/17/2021.    Small right and moderate left loculated pleural effusions. Redemonstrated   nodular pleural thickening at the bilateral lung bases, suggestive of   metastatic disease.    Mild increase in size of a nonspecific periceliac lymph node since   12/17/2021.    < end of copied text >      Imaging Personally Reviewed:  [ ] YES  [ ] NO

## 2022-01-03 NOTE — PROGRESS NOTE ADULT - PROBLEM SELECTOR PLAN 2
# Acute hypoxic respiratory failure  -CT without pulmonary embolus. Multiple nodules are noted within both lungs. Primary consideration is   metastasis. Findings suggestive of malignant pleural effusions bilaterally.  -s/p Thoracentesis on 12/15 by IR, drained appr 660cc pleural fluid, exudative, path negative  - Pulm consult appreciated - steroids switched to methylprednisolone on 12/27, decreased to daily dose  -duonebs and budesonide nebs standing   - bedside US by pulm showed improvement in pleural effusion and no pocket to tap. Patient clinically with SOB however may be in the setting of worsening abdominal distention   - prior echo with poor window.   - repeat echo pending.   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

## 2022-01-03 NOTE — CHART NOTE - NSCHARTNOTEFT_GEN_A_CORE
Late Entry:    Pt with K 5.8 and without BM x 5-6 days, pt on bowel regiment, recent imagaing review ed with attg, not bowel obstruction. Lokelma and enema orders with goal to BM and decrease K.   With review BMP and any BM Hx in AM of 1/4.    Fredy Sheldon ACNP-C  Pager # [phone]

## 2022-01-03 NOTE — PROGRESS NOTE ADULT - SUBJECTIVE AND OBJECTIVE BOX
Chief Complaint: DM2 - uncontrolled with hyperglycemia exacerbated by steroids     History: Reports poor appetite, increased SOB today. Continues on Solumedrol now changed to 40 mg daily starting tomorrow. Glucose remains above target - steroid exacerbated hyperglycemia     MEDICATIONS  (STANDING):  albuterol/ipratropium for Nebulization 3 milliLiter(s) Nebulizer every 6 hours  amLODIPine   Tablet 10 milliGRAM(s) Oral daily  buDESOnide    Inhalation Suspension 0.5 milliGRAM(s) Inhalation two times a day  budesonide 160 MICROgram(s)/formoterol 4.5 MICROgram(s) Inhaler 2 Puff(s) Inhalation two times a day  calcium carbonate   1250 mG (OsCal) 1 Tablet(s) Oral daily  cholecalciferol 2000 Unit(s) Oral daily  Dakins Solution - 1/4 Strength 1 Application(s) Topical daily  dextrose 40% Gel 15 Gram(s) Oral once  dextrose 5%. 1000 milliLiter(s) (50 mL/Hr) IV Continuous <Continuous>  dextrose 5%. 1000 milliLiter(s) (100 mL/Hr) IV Continuous <Continuous>  dextrose 50% Injectable 25 Gram(s) IV Push once  dextrose 50% Injectable 12.5 Gram(s) IV Push once  dextrose 50% Injectable 25 Gram(s) IV Push once  enalapril 5 milliGRAM(s) Oral daily  escitalopram 30 milliGRAM(s) Oral daily  gabapentin 400 milliGRAM(s) Oral three times a day  glucagon  Injectable 1 milliGRAM(s) IntraMuscular once  heparin   Injectable 5000 Unit(s) SubCutaneous every 8 hours  influenza  Vaccine (HIGH DOSE) 0.7 milliLiter(s) IntraMuscular once  insulin glargine Injectable (LANTUS) 46 Unit(s) SubCutaneous at bedtime  insulin lispro (ADMELOG) corrective regimen sliding scale   SubCutaneous three times a day before meals  insulin lispro (ADMELOG) corrective regimen sliding scale   SubCutaneous at bedtime  insulin lispro Injectable (ADMELOG) 24 Unit(s) SubCutaneous three times a day with meals  levothyroxine 125 MICROGram(s) Oral daily  methylPREDNISolone sodium succinate Injectable 40 milliGRAM(s) IV Push two times a day  metoprolol tartrate 12.5 milliGRAM(s) Oral two times a day  nystatin    Suspension 615878 Unit(s) Swish and Swallow four times a day  pantoprazole    Tablet 40 milliGRAM(s) Oral before breakfast  polyethylene glycol 3350 17 Gram(s) Oral two times a day  senna 2 Tablet(s) Oral at bedtime  simethicone 80 milliGRAM(s) Chew four times a day  simvastatin 20 milliGRAM(s) Oral at bedtime  trimethoprim  160 mG/sulfamethoxazole 800 mG 1 Tablet(s) Oral <User Schedule>          No Known Allergies          Review of Systems:  RESP: SOB   GI: poor appetite, no vomiting, has not had BM  ALL OTHER SYSTEMS REVIEWED AND NEGATIVE    PHYSICAL EXAM:  Vital Signs Last 24 Hrs  T(C): 36.8 (03 Jan 2022 12:19), Max: 36.8 (03 Jan 2022 12:19)  T(F): 98.2 (03 Jan 2022 12:19), Max: 98.2 (03 Jan 2022 12:19)  HR: 99 (03 Jan 2022 18:33) (90 - 99)  BP: 144/61 (03 Jan 2022 18:33) (129/68 - 158/56)  BP(mean): --  RR: 17 (03 Jan 2022 18:33) (17 - 19)  SpO2: 94% (03 Jan 2022 18:33) (94% - 100%)  GENERAL: NAD, sitting up in bed - HERNANDEZ   EYES: No proptosis, no lid lag, anicteric  HEENT:  Atraumatic, Normocephalic, moist mucous membranes, NC in place   RESPIRATORY: Slightly labored respirations, particularly when talking, HERNANDEZ  ABD: non tender but distended, soft   PSYCH: Alert and oriented x 3, normal affect, normal mood    CAPILLARY BLOOD GLUCOSE    POCT Blood Glucose.: 135 mg/dL (03 Jan 2022 17:02)  POCT Blood Glucose.: 237 mg/dL (03 Jan 2022 12:00)  POCT Blood Glucose.: 116 mg/dL (02 Jan 2022 21:03)  POCT Blood Glucose.: 200 mg/dL (02 Jan 2022 11:43)  POCT Blood Glucose.: 230 mg/dL (02 Jan 2022 07:38)  POCT Blood Glucose.: 293 mg/dL (01 Jan 2022 21:52)  POCT Blood Glucose.: 226 mg/dL (01 Jan 2022 16:53)  POCT Blood Glucose.: 220 mg/dL (01 Jan 2022 11:29)  POCT Blood Glucose.: 260 mg/dL (01 Jan 2022 07:37)  POCT Blood Glucose.: 158 mg/dL (31 Dec 2021 20:55)  POCT Blood Glucose.: 156 mg/dL (31 Dec 2021 16:45)    01-03    134<L>  |  96<L>  |  54<H>  ----------------------------<  142<H>  5.8<H>   |  28  |  0.75    Ca    8.5      03 Jan 2022 07:22  Phos  2.8     01-02  Mg     1.90     01-02    TPro  6.2  /  Alb  3.2<L>  /  TBili  0.2  /  DBili  x   /  AST  9   /  ALT  12  /  AlkPhos  62  01-03      A1C with Estimated Average Glucose Result: 7.7 % (12-14-21 @ 08:15)  A1C with Estimated Average Glucose Result: 8.2 % (09-04-21 @ 23:11)  A1C with Estimated Average Glucose Result: 7.5 % (09-04-21 @ 09:13)  A1C with Estimated Average Glucose Result: 7.7 % (03-25-21 @ 15:08)      Thyroid Function Tests:  12-16 @ 06:47 TSH 11.94 FreeT4 0.9 T3 -- Anti TPO -- Anti Thyroglobulin Ab -- TSI --  12-14 @ 08:15 TSH 10.03 FreeT4 0.8 T3 -- Anti TPO -- Anti Thyroglobulin Ab -- TSI --      Diet, Regular:   Consistent Carbohydrate Evening Snack (CSTCHOSN)  No Caffeine  No Chocolate  Supplement Feeding Modality:  Oral  Glucerna Shake Cans or Servings Per Day:  1       Frequency:  Daily (01-01-22 @ 23:59) [Active]  Diet, Regular (12-22-21 @ 10:35) [Available for Activation]

## 2022-01-03 NOTE — PROVIDER CONTACT NOTE (OTHER) - ASSESSMENT
Pt noted to be constipated since it 12/28. Abdomen firm and distended. Pt noted to be hyperkalemic today.

## 2022-01-03 NOTE — PROGRESS NOTE ADULT - PROBLEM SELECTOR PROBLEM 11
Fibromyalgia
Fibromyalgia
Hypothyroidism
Fibromyalgia
Hypothyroidism
Fibromyalgia
Hypothyroidism
Fibromyalgia
Hypothyroidism
Fibromyalgia
Hypothyroidism
Fibromyalgia
Hypothyroidism

## 2022-01-04 NOTE — CHART NOTE - NSCHARTNOTEFT_GEN_A_CORE
MICU Accept Note    CHIEF COMPLAINT: work of breathing    HPI / INTERVAL HISTORY:    67 yo F w/ PMHx of HTN, HLD, DM, fibromyalgia, Vulvar CA s/p vulva surgery in 2020, completed chemo & RT in 9/21, found to be anemic Hgb 8.2 so she received 1U PRBC on saturday, 12/11 prior to that received PRBC in 9/21 now p/w SOB, dizziness & nausea for the last couple of weeks. Pt states she initially started experiencing an intermittent steady jabbing type of pain in her upper abdomen/ epigastric region, described as feeling like a gas pocket non-radiating, 9/10 severity, which can last for several hours at a time. Pt feels pain worsen with inspiration or rolling onto her side or when getting up. Pain eases up when she takes motrin & resting. ALso admits to a pleuritic type of chest pain at times. Pt also reports feeling accompanying lightheadedness, generalized weakness, dizziness, nausea & SOB. Pt reports HERNANDEZ after taking a couple of steps and feels the SOB has become constant over the last couple of days. Since thanksgiving she also reports on 2 occasions she felt like she'd pass out when symptoms got bad.      Patient with malignant pleural effusions b/l s/p thora 12/15. MICU called for increasing work of breathing, tachypneic to 40s. On BIPAP currently.       PAST MEDICAL & SURGICAL HISTORY:  Lichen sclerosus of female genitalia    Vulva cancer  bilateral 2014 reoccurance 2017    HTN (hypertension)    TIA (transient ischemic attack)  8/2013  blurred vision/dizziness no treatment    DM type 2 (diabetes mellitus, type 2)  1995    Hypothyroid    Thalassemia minor    Strabismus    Fibromyalgia    HLD (hyperlipidemia)    Anxiety and depression    GERD (gastroesophageal reflux disease)    Obesity    S/P laparoscopic cholecystectomy  2000    S/P eye surgery  June 2014, for strabismus    Vulvar neoplasm  s/p radical anterior vulvectomy in 2014 with radical excision in 2017    H/O total knee replacement, right  2016        FAMILY HISTORY:  Family history of colon cancer (Aunt)    Family history of diabetes mellitus in mother (Aunt, Mother, Sibling)        SOCIAL HISTORY:  Smoking: [  ] Never Smoked  [ x ] Former Smoker (# packs x # years)  [  ] Current Smoker (# packs x # years) 50 py  Substance Use: no  EtOH Use: no  Marital Status: [  ] Single  [ x ]   [  ]   [  ]       HOME MEDICATIONS:      Allergies    No Known Allergies    Intolerances          REVIEW OF SYSTEMS:  CONSTITUTIONAL: No fever, chills, night sweats, or fatigue  EYES: No eye pain  ENT:  No difficulty hearing, tinnitus, vertigo; No sinus or throat pain  NECK: No pain  RESPIRATORY: +SOB   CARDIOVASCULAR: No chest pain, palpitations, dizziness, or leg swelling  GASTROINTESTINAL: No abdominal pain.  GENITOURINARY: No dysuria, frequency, hematuria, or incontinence  NEUROLOGICAL: No headaches, memory loss  SKIN: No itching, burning, rashes, or lesions   ENDOCRINE: No heat or cold intolerance; No hair loss  MUSCULOSKELETAL: No joint pain or swelling  HEME/LYMPH: No easy bruising, or bleeding gums    OBJECTIVE:  ICU Vital Signs Last 24 Hrs  T(C): 37.1 (04 Jan 2022 03:56), Max: 37.1 (04 Jan 2022 03:56)  T(F): 98.8 (04 Jan 2022 03:56), Max: 98.8 (04 Jan 2022 03:56)  HR: 110 (04 Jan 2022 04:00) (80 - 112)  BP: 145/65 (04 Jan 2022 04:00) (130/62 - 174/71)  BP(mean): 82 (04 Jan 2022 04:00) (82 - 94)  ABP: --  ABP(mean): --  RR: 24 (04 Jan 2022 03:56) (17 - 35)  SpO2: 100% (04 Jan 2022 03:56) (93% - 100%)        01-02 @ 07:01 - 01-03 @ 07:00  --------------------------------------------------------  IN: 795 mL / OUT: 800 mL / NET: -5 mL    01-03 @ 07:01  -  01-04 @ 04:06  --------------------------------------------------------  IN: 1270 mL / OUT: 600 mL / NET: 670 mL      CAPILLARY BLOOD GLUCOSE      POCT Blood Glucose.: 231 mg/dL (04 Jan 2022 03:09)      PHYSICAL EXAM:  General:   HEENT:   Neck:   Chest/Lungs:  Heart:  Abdomen:   Extremities:   Skin:   Neuro:   Psych:     LINES:     HOSPITAL MEDICATIONS:  MEDICATIONS  (STANDING):  amLODIPine   Tablet 10 milliGRAM(s) Oral daily  budesonide 160 MICROgram(s)/formoterol 4.5 MICROgram(s) Inhaler 2 Puff(s) Inhalation two times a day  calcium carbonate   1250 mG (OsCal) 1 Tablet(s) Oral daily  cholecalciferol 2000 Unit(s) Oral daily  Dakins Solution - 1/4 Strength 1 Application(s) Topical daily  dextrose 40% Gel 15 Gram(s) Oral once  dextrose 5%. 1000 milliLiter(s) (50 mL/Hr) IV Continuous <Continuous>  dextrose 5%. 1000 milliLiter(s) (100 mL/Hr) IV Continuous <Continuous>  dextrose 50% Injectable 25 Gram(s) IV Push once  dextrose 50% Injectable 12.5 Gram(s) IV Push once  dextrose 50% Injectable 25 Gram(s) IV Push once  enalapril 5 milliGRAM(s) Oral daily  escitalopram 30 milliGRAM(s) Oral daily  gabapentin 400 milliGRAM(s) Oral three times a day  glucagon  Injectable 1 milliGRAM(s) IntraMuscular once  heparin   Injectable 5000 Unit(s) SubCutaneous every 8 hours  influenza  Vaccine (HIGH DOSE) 0.7 milliLiter(s) IntraMuscular once  insulin glargine Injectable (LANTUS) 25 Unit(s) SubCutaneous at bedtime  insulin lispro (ADMELOG) corrective regimen sliding scale   SubCutaneous every 6 hours  lactulose Retention Enema 200 Gram(s) Rectal daily  levothyroxine 137 MICROGram(s) Oral daily  methylPREDNISolone sodium succinate Injectable 40 milliGRAM(s) IV Push daily  metoprolol tartrate 12.5 milliGRAM(s) Oral two times a day  naloxegol 25 milliGRAM(s) Oral daily  nystatin    Suspension 480760 Unit(s) Swish and Swallow four times a day  pantoprazole    Tablet 40 milliGRAM(s) Oral before breakfast  polyethylene glycol 3350 17 Gram(s) Oral two times a day  senna 2 Tablet(s) Oral at bedtime  simethicone 80 milliGRAM(s) Chew four times a day  simvastatin 20 milliGRAM(s) Oral at bedtime  sodium chloride 0.9%. 1000 milliLiter(s) (50 mL/Hr) IV Continuous <Continuous>  trimethoprim  160 mG/sulfamethoxazole 800 mG 1 Tablet(s) Oral <User Schedule>    MEDICATIONS  (PRN):  aluminum hydroxide/magnesium hydroxide/simethicone Suspension 30 milliLiter(s) Oral every 6 hours PRN Dyspepsia  bisacodyl Suppository 10 milliGRAM(s) Rectal daily PRN Constipation  diphenhydrAMINE 25 milliGRAM(s) Oral daily PRN Rash and/or Itching  lidocaine/prilocaine Cream 1 Application(s) Topical daily PRN Dressing changes  melatonin 3 milliGRAM(s) Oral at bedtime PRN Insomnia  metoclopramide Injectable 10 milliGRAM(s) IV Push every 6 hours PRN nausea or vomiting.  morphine  - Injectable 3 milliGRAM(s) IV Push every 4 hours PRN mod to severe pain or dyspnea  ondansetron Injectable 4 milliGRAM(s) IV Push three times a day PRN Nausea and/or Vomiting      LABS:                        7.9    16.27 )-----------( 142      ( 03 Jan 2022 07:22 )             26.3     Hgb Trend: 7.9<--, 7.5<--, 7.8<--, 8.4<--, 8.2<--  01-03    133<L>  |  95<L>  |  51<H>  ----------------------------<  159<H>  5.7<H>   |  29  |  0.71    Ca    8.5      03 Jan 2022 22:01  Phos  2.9     01-03  Mg     2.20     01-03    TPro  6.2  /  Alb  3.2<L>  /  TBili  0.2  /  DBili  x   /  AST  9   /  ALT  12  /  AlkPhos  62  01-03    Creatinine Trend: 0.71<--, 0.75<--, 0.94<--, 1.11<--, 1.13<--, 0.78<--      Arterial Blood Gas:  01-03 @ 22:01  7.38/55/98/32/97.7/6.1  ABG lactate: --        MICROBIOLOGY:     RADIOLOGY & ADDITIONAL TESTS:    CXR 1/3/22:  Large left effusion with adjacent passive atelectasis   and rightward midline shift.      Assessment and Plan    This is a 67 yo F PMHx of HTN, HLD, DM, fibromyalgia, vulvar cancer s/p vulva surgery in 2020 (s/p chemo and RT) presents with malignant pleural effusions and ileus.     ===Neuro===  #mental status  - AAOx3  - no sedation    #fibromyalgia  - continue lexapro     ===Cardiovascular===  #angina pectoris  - new TWI inversions on lateral leads, L pleural effusion related likely  - no chest pain  - trop negative  - monitor on tele  - TTE 2/20: inconclusive, grossly normal LV  - repeat echo pending    #htn  - c/w amlodipine, enalapril, lasix, hold hctz        ===Pulm===  #malignant pleural effusion  - CT: without pulmonary embolus. Multiple nodules are noted within both lungs. Primary consideration is   metastasis. Findings suggestive of malignant pleural effusions bilaterally.  - thoracentesis 12/15 by IR: drained 660 cc pleural fluid, exudative  - duonebs, budesonide nebs standing   - bedside US by pulm showed improvement in pleural effusion and no pocket to tap. Patient clinically with SOB however may be in the setting of worsening abdominal distention   - prior echo with poor window.   - repeat echo pending.   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  - repeat CXR 1/4 shows worsening L pleural effusion, patient having increased work of breathing, tachypneic to 40s  - currently on BIPAP, low threshold to intubate  - thoracentesis to evacuate fluid 1/4     ===GI===  #ileus  - had BM 1/4  -CT showing Mildly distended right colon with stool and mild gaseous distention of the transverse colon and splenic flexure. Caliber change in the proximal descending colon without discrete obstructing mass identified. No signs of mechanical obstruction. Likely with ileus 2/2 opioid use  - NPO, bowel rest, IVF  - movantik  - serial abd exams  - abd xray if worsening pain      ===Endocrine===  #hypercalcemia  - daily Ca and albumin  -could be 2/2 hypercalcemia of malignancy;  low PTH,  low vitamin D 25, 1, 25, and PTHRP neg  - F/u w Endo  - Calcitonin q 12 hrs x 4 doses, Zometa 12/20  - Replete vit D.    #Diabetes mellitus.   - continue Basal Insulin, will reduce given npo   - c/w mod ISS  - HgbA1C 7.7  - f/u endo recs    #Hypothyroidism.   - continue levothyroxine dose increased to 125mcg  - tsh elevated, mildly low free t4.    ===Renal===  #KRISTIN (acute kidney injury).   - improved  cont to monitor renal function  renal sono done showed no hydronephrosis    #Hyponatremia.   - vacillating between 128--132.   - Monitor  - related to poor oral intake.   - IVF prn    #Hyperkalemia  -2/2 malignancy, ileus?  treat with lokelma  -f/u PM BMP. If continues to uptrend, check EKG and treat with temporizing measures.    ===ID===  #Right groin wound.   - wound care consulted, cont dressing  - zosyn renal dosing, s/p 1 dose of vanco,   - blood culture neg to date  - ID consult appreciated  - wound culture with strep mitis/oralis  - per ID cont zosyn 12/14- 12/23 for a 10 day course (finished)    ===Heme/Onc===  #anemia.   - monitor CBC  - ferritin elevated, b12 and folate    #DVT  - heparin MICU Accept Note    CHIEF COMPLAINT: work of breathing    HPI / INTERVAL HISTORY:    67 yo F w/ PMHx of HTN, HLD, DM, fibromyalgia, Vulvar CA s/p vulva surgery in 2020, completed chemo & RT in 9/21, found to be anemic Hgb 8.2 so she received 1U PRBC on saturday, 12/11 prior to that received PRBC in 9/21 now p/w SOB, dizziness & nausea for the last couple of weeks. Pt states she initially started experiencing an intermittent steady jabbing type of pain in her upper abdomen/ epigastric region, described as feeling like a gas pocket non-radiating, 9/10 severity, which can last for several hours at a time. Pt feels pain worsen with inspiration or rolling onto her side or when getting up. Pain eases up when she takes motrin & resting. ALso admits to a pleuritic type of chest pain at times. Pt also reports feeling accompanying lightheadedness, generalized weakness, dizziness, nausea & SOB. Pt reports HERNANDEZ after taking a couple of steps and feels the SOB has become constant over the last couple of days. Since thanksgiving she also reports on 2 occasions she felt like she'd pass out when symptoms got bad.      Patient with malignant pleural effusions b/l s/p thora 12/15. MICU called for increasing work of breathing, tachypneic to 40s. On BIPAP currently.       PAST MEDICAL & SURGICAL HISTORY:  Lichen sclerosus of female genitalia    Vulva cancer  bilateral 2014 reoccurance 2017    HTN (hypertension)    TIA (transient ischemic attack)  8/2013  blurred vision/dizziness no treatment    DM type 2 (diabetes mellitus, type 2)  1995    Hypothyroid    Thalassemia minor    Strabismus    Fibromyalgia    HLD (hyperlipidemia)    Anxiety and depression    GERD (gastroesophageal reflux disease)    Obesity    S/P laparoscopic cholecystectomy  2000    S/P eye surgery  June 2014, for strabismus    Vulvar neoplasm  s/p radical anterior vulvectomy in 2014 with radical excision in 2017    H/O total knee replacement, right  2016        FAMILY HISTORY:  Family history of colon cancer (Aunt)    Family history of diabetes mellitus in mother (Aunt, Mother, Sibling)        SOCIAL HISTORY:  Smoking: [  ] Never Smoked  [ x ] Former Smoker (# packs x # years)  [  ] Current Smoker (# packs x # years) 50 py  Substance Use: no  EtOH Use: no  Marital Status: [  ] Single  [ x ]   [  ]   [  ]       HOME MEDICATIONS:      Allergies    No Known Allergies    Intolerances          REVIEW OF SYSTEMS:  CONSTITUTIONAL: No fever, chills, night sweats, or fatigue  EYES: No eye pain  ENT:  No difficulty hearing, tinnitus, vertigo; No sinus or throat pain  NECK: No pain  RESPIRATORY: +SOB   CARDIOVASCULAR: No chest pain, palpitations, dizziness, or leg swelling  GASTROINTESTINAL: No abdominal pain.  GENITOURINARY: No dysuria, frequency, hematuria, or incontinence  NEUROLOGICAL: No headaches, memory loss  SKIN: No itching, burning, rashes, or lesions   ENDOCRINE: No heat or cold intolerance; No hair loss  MUSCULOSKELETAL: No joint pain or swelling  HEME/LYMPH: No easy bruising, or bleeding gums    OBJECTIVE:  ICU Vital Signs Last 24 Hrs  T(C): 37.1 (04 Jan 2022 03:56), Max: 37.1 (04 Jan 2022 03:56)  T(F): 98.8 (04 Jan 2022 03:56), Max: 98.8 (04 Jan 2022 03:56)  HR: 110 (04 Jan 2022 04:00) (80 - 112)  BP: 145/65 (04 Jan 2022 04:00) (130/62 - 174/71)  BP(mean): 82 (04 Jan 2022 04:00) (82 - 94)  ABP: --  ABP(mean): --  RR: 24 (04 Jan 2022 03:56) (17 - 35)  SpO2: 100% (04 Jan 2022 03:56) (93% - 100%)        01-02 @ 07:01 - 01-03 @ 07:00  --------------------------------------------------------  IN: 795 mL / OUT: 800 mL / NET: -5 mL    01-03 @ 07:01  -  01-04 @ 04:06  --------------------------------------------------------  IN: 1270 mL / OUT: 600 mL / NET: 670 mL      CAPILLARY BLOOD GLUCOSE      POCT Blood Glucose.: 231 mg/dL (04 Jan 2022 03:09)      PHYSICAL EXAM:  General:   HEENT:   Neck:   Chest/Lungs:  Heart:  Abdomen:   Extremities:   Skin:   Neuro:   Psych:     LINES:     HOSPITAL MEDICATIONS:  MEDICATIONS  (STANDING):  amLODIPine   Tablet 10 milliGRAM(s) Oral daily  budesonide 160 MICROgram(s)/formoterol 4.5 MICROgram(s) Inhaler 2 Puff(s) Inhalation two times a day  calcium carbonate   1250 mG (OsCal) 1 Tablet(s) Oral daily  cholecalciferol 2000 Unit(s) Oral daily  Dakins Solution - 1/4 Strength 1 Application(s) Topical daily  dextrose 40% Gel 15 Gram(s) Oral once  dextrose 5%. 1000 milliLiter(s) (50 mL/Hr) IV Continuous <Continuous>  dextrose 5%. 1000 milliLiter(s) (100 mL/Hr) IV Continuous <Continuous>  dextrose 50% Injectable 25 Gram(s) IV Push once  dextrose 50% Injectable 12.5 Gram(s) IV Push once  dextrose 50% Injectable 25 Gram(s) IV Push once  enalapril 5 milliGRAM(s) Oral daily  escitalopram 30 milliGRAM(s) Oral daily  gabapentin 400 milliGRAM(s) Oral three times a day  glucagon  Injectable 1 milliGRAM(s) IntraMuscular once  heparin   Injectable 5000 Unit(s) SubCutaneous every 8 hours  influenza  Vaccine (HIGH DOSE) 0.7 milliLiter(s) IntraMuscular once  insulin glargine Injectable (LANTUS) 25 Unit(s) SubCutaneous at bedtime  insulin lispro (ADMELOG) corrective regimen sliding scale   SubCutaneous every 6 hours  lactulose Retention Enema 200 Gram(s) Rectal daily  levothyroxine 137 MICROGram(s) Oral daily  methylPREDNISolone sodium succinate Injectable 40 milliGRAM(s) IV Push daily  metoprolol tartrate 12.5 milliGRAM(s) Oral two times a day  naloxegol 25 milliGRAM(s) Oral daily  nystatin    Suspension [id number] Unit(s) Swish and Swallow four times a day  pantoprazole    Tablet 40 milliGRAM(s) Oral before breakfast  polyethylene glycol 3350 17 Gram(s) Oral two times a day  senna 2 Tablet(s) Oral at bedtime  simethicone 80 milliGRAM(s) Chew four times a day  simvastatin 20 milliGRAM(s) Oral at bedtime  sodium chloride 0.9%. 1000 milliLiter(s) (50 mL/Hr) IV Continuous <Continuous>  trimethoprim  160 mG/sulfamethoxazole 800 mG 1 Tablet(s) Oral <User Schedule>    MEDICATIONS  (PRN):  aluminum hydroxide/magnesium hydroxide/simethicone Suspension 30 milliLiter(s) Oral every 6 hours PRN Dyspepsia  bisacodyl Suppository 10 milliGRAM(s) Rectal daily PRN Constipation  diphenhydrAMINE 25 milliGRAM(s) Oral daily PRN Rash and/or Itching  lidocaine/prilocaine Cream 1 Application(s) Topical daily PRN Dressing changes  melatonin 3 milliGRAM(s) Oral at bedtime PRN Insomnia  metoclopramide Injectable 10 milliGRAM(s) IV Push every 6 hours PRN nausea or vomiting.  morphine  - Injectable 3 milliGRAM(s) IV Push every 4 hours PRN mod to severe pain or dyspnea  ondansetron Injectable 4 milliGRAM(s) IV Push three times a day PRN Nausea and/or Vomiting      LABS:                        7.9    16.27 )-----------( 142      ( 03 Jan 2022 07:22 )             26.3     Hgb Trend: 7.9<--, 7.5<--, 7.8<--, 8.4<--, 8.2<--  01-03    133<L>  |  95<L>  |  51<H>  ----------------------------<  159<H>  5.7<H>   |  29  |  0.71    Ca    8.5      03 Jan 2022 22:01  Phos  2.9     01-03  Mg     2.20     01-03    TPro  6.2  /  Alb  3.2<L>  /  TBili  0.2  /  DBili  x   /  AST  9   /  ALT  12  /  AlkPhos  62  01-03    Creatinine Trend: 0.71<--, 0.75<--, 0.94<--, 1.11<--, 1.13<--, 0.78<--      Arterial Blood Gas:  01-03 @ 22:01  7.38/55/98/32/97.7/6.1  ABG lactate: --        MICROBIOLOGY:     RADIOLOGY & ADDITIONAL TESTS:    CXR 1/3/22:  Large left effusion with adjacent passive atelectasis   and rightward midline shift.      Assessment and Plan    This is a 67 yo F PMHx of HTN, HLD, DM, fibromyalgia, vulvar cancer s/p vulva surgery in 2020 (s/p chemo and RT) presents with malignant pleural effusions and ileus.     ===Neuro===  #mental status  - AAOx3  - no sedation    #fibromyalgia  - continue lexapro     ===Cardiovascular===  #angina pectoris  - new TWI inversions on lateral leads, L pleural effusion related likely  - no chest pain  - trop negative  - monitor on tele  - TTE 2/20: inconclusive, grossly normal LV  - repeat echo pending    #htn  - c/w amlodipine, enalapril, lasix, hold hctz        ===Pulm===  #malignant pleural effusion  - CT: without pulmonary embolus. Multiple nodules are noted within both lungs. Primary consideration is   metastasis. Findings suggestive of malignant pleural effusions bilaterally.  - thoracentesis 12/15 by IR: drained 660 cc pleural fluid, exudative  - duonebs, budesonide nebs standing   - bedside US by pulm showed improvement in pleural effusion and no pocket to tap. Patient clinically with SOB however may be in the setting of worsening abdominal distention   - prior echo with poor window.   - repeat echo pending.   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  - repeat CXR 1/4 shows worsening L pleural effusion, patient having increased work of breathing, tachypneic to 40s  - currently on BIPAP, low threshold to intubate  - s/p thoracentesis to evacuate fluid 1/4, follow up results  - follow up CT surgery recs     ===GI===  #ileus  - had BM 1/4  -CT showing Mildly distended right colon with stool and mild gaseous distention of the transverse colon and splenic flexure. Caliber change in the proximal descending colon without discrete obstructing mass identified. No signs of mechanical obstruction. Likely with ileus 2/2 opioid use  - NPO, bowel rest, IVF  - movantik  - serial abd exams  - abd xray if worsening pain      ===Endocrine===  #hypercalcemia  - daily Ca and albumin  -could be 2/2 hypercalcemia of malignancy;  low PTH,  low vitamin D 25, 1, 25, and PTHRP neg  - F/u w Endo  - Calcitonin q 12 hrs x 4 doses, Zometa 12/20  - Replete vit D.    #Diabetes mellitus.   - continue Basal Insulin, will reduce given npo   - c/w mod ISS  - HgbA1C 7.7  - f/u endo recs    #Hypothyroidism.   - continue levothyroxine dose increased to 125mcg  - tsh elevated, mildly low free t4.    ===Renal===  #KRISTIN (acute kidney injury).   - improved  cont to monitor renal function  renal sono done showed no hydronephrosis    #Hyponatremia.   - vacillating between 128--132.   - Monitor  - related to poor oral intake.   - IVF prn    #Hyperkalemia  -2/2 malignancy, ileus?  treat with lokelma  -f/u PM BMP. If continues to uptrend, check EKG and treat with temporizing measures.    ===ID===  #Right groin wound.   - wound care consulted, cont dressing  - zosyn renal dosing, s/p 1 dose of vanco,   - blood culture neg to date  - ID consult appreciated  - wound culture with strep mitis/oralis  - per ID cont zosyn 12/14- 12/23 for a 10 day course (finished)    ===Heme/Onc===  #anemia.   - monitor CBC  - ferritin elevated, b12 and folate    #DVT  - heparin MICU Accept Note    CHIEF COMPLAINT: work of breathing    HPI / INTERVAL HISTORY:    67 yo F w/ PMHx of HTN, HLD, DM, fibromyalgia, Vulvar CA s/p vulva surgery in 2020, completed chemo & RT in 9/21, found to be anemic Hgb 8.2 so she received 1U PRBC on saturday, 12/11 prior to that received PRBC in 9/21 now p/w SOB, dizziness & nausea for the last couple of weeks. Pt states she initially started experiencing an intermittent steady jabbing type of pain in her upper abdomen/ epigastric region, described as feeling like a gas pocket non-radiating, 9/10 severity, which can last for several hours at a time. Pt feels pain worsen with inspiration or rolling onto her side or when getting up. Pain eases up when she takes motrin & resting. ALso admits to a pleuritic type of chest pain at times. Pt also reports feeling accompanying lightheadedness, generalized weakness, dizziness, nausea & SOB. Pt reports HERNANDEZ after taking a couple of steps and feels the SOB has become constant over the last couple of days. Since thanksgiving she also reports on 2 occasions she felt like she'd pass out when symptoms got bad.      Patient with malignant pleural effusions b/l s/p thora 12/15. MICU called for increasing work of breathing, tachypneic to 40s. On BIPAP currently.       PAST MEDICAL & SURGICAL HISTORY:  Lichen sclerosus of female genitalia    Vulva cancer  bilateral 2014 reoccurance 2017    HTN (hypertension)    TIA (transient ischemic attack)  8/2013  blurred vision/dizziness no treatment    DM type 2 (diabetes mellitus, type 2)  1995    Hypothyroid    Thalassemia minor    Strabismus    Fibromyalgia    HLD (hyperlipidemia)    Anxiety and depression    GERD (gastroesophageal reflux disease)    Obesity    S/P laparoscopic cholecystectomy  2000    S/P eye surgery  June 2014, for strabismus    Vulvar neoplasm  s/p radical anterior vulvectomy in 2014 with radical excision in 2017    H/O total knee replacement, right  2016        FAMILY HISTORY:  Family history of colon cancer (Aunt)    Family history of diabetes mellitus in mother (Aunt, Mother, Sibling)        SOCIAL HISTORY:  Smoking: [  ] Never Smoked  [ x ] Former Smoker (# packs x # years)  [  ] Current Smoker (# packs x # years) 50 py  Substance Use: no  EtOH Use: no  Marital Status: [  ] Single  [ x ]   [  ]   [  ]       HOME MEDICATIONS:      Allergies    No Known Allergies    Intolerances          REVIEW OF SYSTEMS:  CONSTITUTIONAL: No fever, chills, night sweats, or fatigue  EYES: No eye pain  ENT:  No difficulty hearing, tinnitus, vertigo; No sinus or throat pain  NECK: No pain  RESPIRATORY: +SOB   CARDIOVASCULAR: No chest pain, palpitations, dizziness, or leg swelling  GASTROINTESTINAL: No abdominal pain.  GENITOURINARY: No dysuria, frequency, hematuria, or incontinence  NEUROLOGICAL: No headaches, memory loss  SKIN: No itching, burning, rashes, or lesions   ENDOCRINE: No heat or cold intolerance; No hair loss  MUSCULOSKELETAL: No joint pain or swelling  HEME/LYMPH: No easy bruising, or bleeding gums    OBJECTIVE:  ICU Vital Signs Last 24 Hrs  T(C): 37.1 (04 Jan 2022 03:56), Max: 37.1 (04 Jan 2022 03:56)  T(F): 98.8 (04 Jan 2022 03:56), Max: 98.8 (04 Jan 2022 03:56)  HR: 110 (04 Jan 2022 04:00) (80 - 112)  BP: 145/65 (04 Jan 2022 04:00) (130/62 - 174/71)  BP(mean): 82 (04 Jan 2022 04:00) (82 - 94)  ABP: --  ABP(mean): --  RR: 24 (04 Jan 2022 03:56) (17 - 35)  SpO2: 100% (04 Jan 2022 03:56) (93% - 100%)        01-02 @ 07:01 - 01-03 @ 07:00  --------------------------------------------------------  IN: 795 mL / OUT: 800 mL / NET: -5 mL    01-03 @ 07:01  -  01-04 @ 04:06  --------------------------------------------------------  IN: 1270 mL / OUT: 600 mL / NET: 670 mL      CAPILLARY BLOOD GLUCOSE      POCT Blood Glucose.: 231 mg/dL (04 Jan 2022 03:09)      PHYSICAL EXAM:  GENERAL: +fatigued, non-toxic appearing  HEAD: normocephalic, atraumatic  HEENT: normal conjunctiva, oral mucosa moist, neck supple  CARDIAC: +tachycardic, regular rate and rhythm, normal S1 and S2,  no appreciable murmurs  PULM: +tachypneic, clear to ascultation bilaterally, no crackles, rales, rhonchi, or wheezing  GI: abdomen nondistended, soft, nontender, no guarding or rebound tenderness  NEURO: alert and oriented x 3, normal speech, no focal motor or sensory deficits  MSK: no visible deformities, no peripheral edema, calf tenderness/redness/swelling  SKIN: +ecchymoses hands bilaterally, no visible rashes, dry, well-perfused    LINES:     HOSPITAL MEDICATIONS:  MEDICATIONS  (STANDING):  amLODIPine   Tablet 10 milliGRAM(s) Oral daily  budesonide 160 MICROgram(s)/formoterol 4.5 MICROgram(s) Inhaler 2 Puff(s) Inhalation two times a day  calcium carbonate   1250 mG (OsCal) 1 Tablet(s) Oral daily  cholecalciferol 2000 Unit(s) Oral daily  Dakins Solution - 1/4 Strength 1 Application(s) Topical daily  dextrose 40% Gel 15 Gram(s) Oral once  dextrose 5%. 1000 milliLiter(s) (50 mL/Hr) IV Continuous <Continuous>  dextrose 5%. 1000 milliLiter(s) (100 mL/Hr) IV Continuous <Continuous>  dextrose 50% Injectable 25 Gram(s) IV Push once  dextrose 50% Injectable 12.5 Gram(s) IV Push once  dextrose 50% Injectable 25 Gram(s) IV Push once  enalapril 5 milliGRAM(s) Oral daily  escitalopram 30 milliGRAM(s) Oral daily  gabapentin 400 milliGRAM(s) Oral three times a day  glucagon  Injectable 1 milliGRAM(s) IntraMuscular once  heparin   Injectable 5000 Unit(s) SubCutaneous every 8 hours  influenza  Vaccine (HIGH DOSE) 0.7 milliLiter(s) IntraMuscular once  insulin glargine Injectable (LANTUS) 25 Unit(s) SubCutaneous at bedtime  insulin lispro (ADMELOG) corrective regimen sliding scale   SubCutaneous every 6 hours  lactulose Retention Enema 200 Gram(s) Rectal daily  levothyroxine 137 MICROGram(s) Oral daily  methylPREDNISolone sodium succinate Injectable 40 milliGRAM(s) IV Push daily  metoprolol tartrate 12.5 milliGRAM(s) Oral two times a day  naloxegol 25 milliGRAM(s) Oral daily  nystatin    Suspension 926122 Unit(s) Swish and Swallow four times a day  pantoprazole    Tablet 40 milliGRAM(s) Oral before breakfast  polyethylene glycol 3350 17 Gram(s) Oral two times a day  senna 2 Tablet(s) Oral at bedtime  simethicone 80 milliGRAM(s) Chew four times a day  simvastatin 20 milliGRAM(s) Oral at bedtime  sodium chloride 0.9%. 1000 milliLiter(s) (50 mL/Hr) IV Continuous <Continuous>  trimethoprim  160 mG/sulfamethoxazole 800 mG 1 Tablet(s) Oral <User Schedule>    MEDICATIONS  (PRN):  aluminum hydroxide/magnesium hydroxide/simethicone Suspension 30 milliLiter(s) Oral every 6 hours PRN Dyspepsia  bisacodyl Suppository 10 milliGRAM(s) Rectal daily PRN Constipation  diphenhydrAMINE 25 milliGRAM(s) Oral daily PRN Rash and/or Itching  lidocaine/prilocaine Cream 1 Application(s) Topical daily PRN Dressing changes  melatonin 3 milliGRAM(s) Oral at bedtime PRN Insomnia  metoclopramide Injectable 10 milliGRAM(s) IV Push every 6 hours PRN nausea or vomiting.  morphine  - Injectable 3 milliGRAM(s) IV Push every 4 hours PRN mod to severe pain or dyspnea  ondansetron Injectable 4 milliGRAM(s) IV Push three times a day PRN Nausea and/or Vomiting      LABS:                        7.9    16.27 )-----------( 142      ( 03 Jan 2022 07:22 )             26.3     Hgb Trend: 7.9<--, 7.5<--, 7.8<--, 8.4<--, 8.2<--  01-03    133<L>  |  95<L>  |  51<H>  ----------------------------<  159<H>  5.7<H>   |  29  |  0.71    Ca    8.5      03 Jan 2022 22:01  Phos  2.9     01-03  Mg     2.20     01-03    TPro  6.2  /  Alb  3.2<L>  /  TBili  0.2  /  DBili  x   /  AST  9   /  ALT  12  /  AlkPhos  62  01-03    Creatinine Trend: 0.71<--, 0.75<--, 0.94<--, 1.11<--, 1.13<--, 0.78<--      Arterial Blood Gas:  01-03 @ 22:01  7.38/55/98/32/97.7/6.1  ABG lactate: --        MICROBIOLOGY:     RADIOLOGY & ADDITIONAL TESTS:    CXR 1/3/22:  Large left effusion with adjacent passive atelectasis   and rightward midline shift.      Assessment and Plan    This is a 67 yo F PMHx of HTN, HLD, DM, fibromyalgia, vulvar cancer s/p vulva surgery in 2020 (s/p chemo and RT) presents with malignant pleural effusions and ileus. Admitted 12/13/21.     ===Neuro===  #mental status  - AAOx3  - no sedation    #fibromyalgia  - continue lexapro     ===Cardiovascular===  #angina pectoris  - new TWI inversions on lateral leads, L pleural effusion related likely  - no chest pain  - trop negative  - monitor on tele  - TTE 2/20: inconclusive, grossly normal LV  - repeat echo pending    #htn  - c/w amlodipine, enalapril, lasix, hold hctz        ===Pulm===  #malignant pleural effusion  - CT: without pulmonary embolus. Multiple nodules are noted within both lungs. Primary consideration is   metastasis. Findings suggestive of malignant pleural effusions bilaterally.  - thoracentesis 12/15 by IR: drained 660 cc pleural fluid, exudative  - duonebs, budesonide nebs standing   - bedside US by pulm showed improvement in pleural effusion and no pocket to tap. Patient clinically with SOB however may be in the setting of worsening abdominal distention   - prior echo with poor window.   - repeat echo pending.   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  - repeat CXR 1/4 shows worsening L pleural effusion, patient having increased work of breathing, tachypneic to 40s  - currently on BIPAP, low threshold to intubate  - s/p thoracentesis to evacuate fluid 1/4, follow up results  - follow up CT surgery recs     ===GI===  #ileus  - had BM 1/4  -CT showing Mildly distended right colon with stool and mild gaseous distention of the transverse colon and splenic flexure. Caliber change in the proximal descending colon without discrete obstructing mass identified. No signs of mechanical obstruction. Likely with ileus 2/2 opioid use  - NPO, bowel rest, IVF  - movantik  - serial abd exams  - abd xray if worsening pain      ===Endocrine===  #hypercalcemia  - daily Ca and albumin  -could be 2/2 hypercalcemia of malignancy;  low PTH,  low vitamin D 25, 1, 25, and PTHRP neg  - F/u w Endo  - Calcitonin q 12 hrs x 4 doses, Zometa 12/20  - Replete vit D.    #Diabetes mellitus.   - continue Basal Insulin, will reduce given npo   - c/w mod ISS  - HgbA1C 7.7  - f/u endo recs    #Hypothyroidism.   - continue levothyroxine dose increased to 125mcg  - tsh elevated, mildly low free t4.    ===Renal===  #KRISTIN (acute kidney injury).   - improved  cont to monitor renal function  renal sono done showed no hydronephrosis    #Hyponatremia.   - vacillating between 128--132.   - Monitor  - related to poor oral intake.   - IVF prn    #Hyperkalemia  -2/2 malignancy, ileus?  treat with lokelma  -f/u PM BMP. If continues to uptrend, check EKG and treat with temporizing measures.    ===ID===  #Right groin wound.   - wound care consulted, cont dressing  - zosyn renal dosing, s/p 1 dose of vanco,   - blood culture neg to date  - ID consult appreciated  - wound culture with strep mitis/oralis  - per ID cont zosyn 12/14- 12/23 for a 10 day course (finished)    ===Heme/Onc===  #anemia.   - monitor CBC  - ferritin elevated, b12 and folate    #DVT  - heparin MICU Accept Note    CHIEF COMPLAINT: work of breathing    HPI / INTERVAL HISTORY:    65 yo F w/ PMHx of HTN, HLD, DM, fibromyalgia, Vulvar CA s/p vulva surgery in 2020, completed chemo & RT in 9/21, found to be anemic Hgb 8.2 so she received 1U PRBC on saturday, 12/11 prior to that received PRBC in 9/21 now p/w SOB, dizziness & nausea for the last couple of weeks. Pt states she initially started experiencing an intermittent steady jabbing type of pain in her upper abdomen/ epigastric region, described as feeling like a gas pocket non-radiating, 9/10 severity, which can last for several hours at a time. Pt feels pain worsen with inspiration or rolling onto her side or when getting up. Pain eases up when she takes motrin & resting. ALso admits to a pleuritic type of chest pain at times. Pt also reports feeling accompanying lightheadedness, generalized weakness, dizziness, nausea & SOB. Pt reports HERNANDEZ after taking a couple of steps and feels the SOB has become constant over the last couple of days. Since thanksgiving she also reports on 2 occasions she felt like she'd pass out when symptoms got bad.      Patient with malignant pleural effusions b/l s/p thora 12/15. MICU called for increasing work of breathing, tachypneic to 40s. On BIPAP currently.       PAST MEDICAL & SURGICAL HISTORY:  Lichen sclerosus of female genitalia    Vulva cancer  bilateral 2014 reoccurance 2017    HTN (hypertension)    TIA (transient ischemic attack)  8/2013  blurred vision/dizziness no treatment    DM type 2 (diabetes mellitus, type 2)  1995    Hypothyroid    Thalassemia minor    Strabismus    Fibromyalgia    HLD (hyperlipidemia)    Anxiety and depression    GERD (gastroesophageal reflux disease)    Obesity    S/P laparoscopic cholecystectomy  2000    S/P eye surgery  June 2014, for strabismus    Vulvar neoplasm  s/p radical anterior vulvectomy in 2014 with radical excision in 2017    H/O total knee replacement, right  2016        FAMILY HISTORY:  Family history of colon cancer (Aunt)    Family history of diabetes mellitus in mother (Aunt, Mother, Sibling)        SOCIAL HISTORY:  Smoking: [  ] Never Smoked  [ x ] Former Smoker (# packs x # years)  [  ] Current Smoker (# packs x # years) 50 py  Substance Use: no  EtOH Use: no  Marital Status: [  ] Single  [ x ]   [  ]   [  ]       HOME MEDICATIONS:      Allergies    No Known Allergies    Intolerances          REVIEW OF SYSTEMS:  CONSTITUTIONAL: No fever, chills, night sweats, or fatigue  EYES: No eye pain  ENT:  No difficulty hearing, tinnitus, vertigo; No sinus or throat pain  NECK: No pain  RESPIRATORY: +SOB   CARDIOVASCULAR: No chest pain, palpitations, dizziness, or leg swelling  GASTROINTESTINAL: No abdominal pain.  GENITOURINARY: No dysuria, frequency, hematuria, or incontinence  NEUROLOGICAL: No headaches, memory loss  SKIN: No itching, burning, rashes, or lesions   ENDOCRINE: No heat or cold intolerance; No hair loss  MUSCULOSKELETAL: No joint pain or swelling  HEME/LYMPH: No easy bruising, or bleeding gums    OBJECTIVE:  ICU Vital Signs Last 24 Hrs  T(C): 37.1 (04 Jan 2022 03:56), Max: 37.1 (04 Jan 2022 03:56)  T(F): 98.8 (04 Jan 2022 03:56), Max: 98.8 (04 Jan 2022 03:56)  HR: 110 (04 Jan 2022 04:00) (80 - 112)  BP: 145/65 (04 Jan 2022 04:00) (130/62 - 174/71)  BP(mean): 82 (04 Jan 2022 04:00) (82 - 94)  ABP: --  ABP(mean): --  RR: 24 (04 Jan 2022 03:56) (17 - 35)  SpO2: 100% (04 Jan 2022 03:56) (93% - 100%)        01-02 @ 07:01 - 01-03 @ 07:00  --------------------------------------------------------  IN: 795 mL / OUT: 800 mL / NET: -5 mL    01-03 @ 07:01  -  01-04 @ 04:06  --------------------------------------------------------  IN: 1270 mL / OUT: 600 mL / NET: 670 mL      CAPILLARY BLOOD GLUCOSE      POCT Blood Glucose.: 231 mg/dL (04 Jan 2022 03:09)      PHYSICAL EXAM:  GENERAL: +fatigued, non-toxic appearing  HEAD: normocephalic, atraumatic  HEENT: normal conjunctiva, oral mucosa moist, neck supple  CARDIAC: +tachycardic, regular rate and rhythm, normal S1 and S2,  no appreciable murmurs  PULM: +tachypneic, clear to ascultation bilaterally, no crackles, rales, rhonchi, or wheezing  GI: abdomen nondistended, soft, nontender, no guarding or rebound tenderness  NEURO: alert and oriented x 3, normal speech, no focal motor or sensory deficits  MSK: no visible deformities, no peripheral edema, calf tenderness/redness/swelling  SKIN: +ecchymoses hands bilaterally, no visible rashes, dry, well-perfused    LINES:     HOSPITAL MEDICATIONS:  MEDICATIONS  (STANDING):  amLODIPine   Tablet 10 milliGRAM(s) Oral daily  budesonide 160 MICROgram(s)/formoterol 4.5 MICROgram(s) Inhaler 2 Puff(s) Inhalation two times a day  calcium carbonate   1250 mG (OsCal) 1 Tablet(s) Oral daily  cholecalciferol 2000 Unit(s) Oral daily  Dakins Solution - 1/4 Strength 1 Application(s) Topical daily  dextrose 40% Gel 15 Gram(s) Oral once  dextrose 5%. 1000 milliLiter(s) (50 mL/Hr) IV Continuous <Continuous>  dextrose 5%. 1000 milliLiter(s) (100 mL/Hr) IV Continuous <Continuous>  dextrose 50% Injectable 25 Gram(s) IV Push once  dextrose 50% Injectable 12.5 Gram(s) IV Push once  dextrose 50% Injectable 25 Gram(s) IV Push once  enalapril 5 milliGRAM(s) Oral daily  escitalopram 30 milliGRAM(s) Oral daily  gabapentin 400 milliGRAM(s) Oral three times a day  glucagon  Injectable 1 milliGRAM(s) IntraMuscular once  heparin   Injectable 5000 Unit(s) SubCutaneous every 8 hours  influenza  Vaccine (HIGH DOSE) 0.7 milliLiter(s) IntraMuscular once  insulin glargine Injectable (LANTUS) 25 Unit(s) SubCutaneous at bedtime  insulin lispro (ADMELOG) corrective regimen sliding scale   SubCutaneous every 6 hours  lactulose Retention Enema 200 Gram(s) Rectal daily  levothyroxine 137 MICROGram(s) Oral daily  methylPREDNISolone sodium succinate Injectable 40 milliGRAM(s) IV Push daily  metoprolol tartrate 12.5 milliGRAM(s) Oral two times a day  naloxegol 25 milliGRAM(s) Oral daily  nystatin    Suspension 258054 Unit(s) Swish and Swallow four times a day  pantoprazole    Tablet 40 milliGRAM(s) Oral before breakfast  polyethylene glycol 3350 17 Gram(s) Oral two times a day  senna 2 Tablet(s) Oral at bedtime  simethicone 80 milliGRAM(s) Chew four times a day  simvastatin 20 milliGRAM(s) Oral at bedtime  sodium chloride 0.9%. 1000 milliLiter(s) (50 mL/Hr) IV Continuous <Continuous>  trimethoprim  160 mG/sulfamethoxazole 800 mG 1 Tablet(s) Oral <User Schedule>    MEDICATIONS  (PRN):  aluminum hydroxide/magnesium hydroxide/simethicone Suspension 30 milliLiter(s) Oral every 6 hours PRN Dyspepsia  bisacodyl Suppository 10 milliGRAM(s) Rectal daily PRN Constipation  diphenhydrAMINE 25 milliGRAM(s) Oral daily PRN Rash and/or Itching  lidocaine/prilocaine Cream 1 Application(s) Topical daily PRN Dressing changes  melatonin 3 milliGRAM(s) Oral at bedtime PRN Insomnia  metoclopramide Injectable 10 milliGRAM(s) IV Push every 6 hours PRN nausea or vomiting.  morphine  - Injectable 3 milliGRAM(s) IV Push every 4 hours PRN mod to severe pain or dyspnea  ondansetron Injectable 4 milliGRAM(s) IV Push three times a day PRN Nausea and/or Vomiting      LABS:                        7.9    16.27 )-----------( 142      ( 03 Jan 2022 07:22 )             26.3     Hgb Trend: 7.9<--, 7.5<--, 7.8<--, 8.4<--, 8.2<--  01-03    133<L>  |  95<L>  |  51<H>  ----------------------------<  159<H>  5.7<H>   |  29  |  0.71    Ca    8.5      03 Jan 2022 22:01  Phos  2.9     01-03  Mg     2.20     01-03    TPro  6.2  /  Alb  3.2<L>  /  TBili  0.2  /  DBili  x   /  AST  9   /  ALT  12  /  AlkPhos  62  01-03    Creatinine Trend: 0.71<--, 0.75<--, 0.94<--, 1.11<--, 1.13<--, 0.78<--      Arterial Blood Gas:  01-03 @ 22:01  7.38/55/98/32/97.7/6.1  ABG lactate: --        MICROBIOLOGY:     RADIOLOGY & ADDITIONAL TESTS:    CXR 1/3/22:  Large left effusion with adjacent passive atelectasis   and rightward midline shift.      Assessment and Plan    This is a 65 yo F PMHx of HTN, HLD, DM, fibromyalgia, vulvar cancer s/p vulva surgery in 2020 (s/p chemo and RT) presents with malignant pleural effusions and ileus. Admitted 12/13/21.     ===Neuro===  #mental status  - AAOx3  - no sedation    #fibromyalgia  - continue lexapro     ===Cardiovascular===  #angina pectoris  - new TWI inversions on lateral leads, L pleural effusion related likely  - no chest pain  - trop negative  - monitor on tele  - TTE 2/20: inconclusive, grossly normal LV  - repeat echo pending    #htn  - c/w amlodipine, enalapril, lasix, hold hctz        ===Pulm===  #malignant pleural effusion 2/2 mets from vulvar carcinoma  - CT: without pulmonary embolus. Multiple nodules are noted within both lungs. Primary consideration is   metastasis. Findings suggestive of malignant pleural effusions bilaterally.  - thoracentesis 12/15 by IR: drained 660 cc pleural fluid, exudative, negative cytopathology  - IR biopsy of R lung nodule which showed squamous cell carcinoma, concern for recurrence/metastatic disease; sent for NGS and PDL1  - duonebs, budesonide nebs standing   - bedside US by pulm showed improvement in pleural effusion and no pocket to tap. Patient clinically with SOB however may be in the setting of worsening abdominal distention   - prior echo with poor window.   - repeat echo pending.   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  - repeat CXR 1/4 shows worsening L pleural effusion, patient having increased work of breathing, tachypneic to 40s  - currently on BIPAP, low threshold to intubate  - s/p thoracentesis to evacuate fluid 1/4, follow up results  - follow up CT surgery recs       ===GI===  #ileus  - had BM 1/4  -CT showing Mildly distended right colon with stool and mild gaseous distention of the transverse colon and splenic flexure. Caliber change in the proximal descending colon without discrete obstructing mass identified. No signs of mechanical obstruction. Likely with ileus 2/2 opioid use  - NPO, bowel rest, IVF  - movantik  - serial abd exams  - abd xray if worsening pain      ===Endocrine===  #hypercalcemia  - daily Ca and albumin  -could be 2/2 hypercalcemia of malignancy;  low PTH,  low vitamin D 25, 1, 25, and PTHRP neg  - F/u w Endo  - Calcitonin q 12 hrs x 4 doses, Zometa 12/20  - Replete vit D.    #Diabetes mellitus.   - continue Basal Insulin, will reduce given npo   - c/w mod ISS  - HgbA1C 7.7  - f/u endo recs    #Hypothyroidism.   - continue levothyroxine dose increased to 125mcg  - tsh elevated, mildly low free t4.    ===Renal===  #KRISTIN (acute kidney injury).   - improved  cont to monitor renal function  renal sono done showed no hydronephrosis    #Hyponatremia.   - vacillating between 128--132.   - Monitor  - related to poor oral intake.   - IVF prn    #Hyperkalemia  -2/2 malignancy, ileus?  treat with lokelma  -f/u PM BMP. If continues to uptrend, check EKG and treat with temporizing measures.    ===ID===  #Right groin wound.   - wound care consulted, cont dressing  - zosyn renal dosing, s/p 1 dose of vanco,   - blood culture neg to date  - ID consult appreciated  - wound culture with strep mitis/oralis  - per ID cont zosyn 12/14- 12/23 for a 10 day course (finished)    ======  # Recurrent vulvar cancer  - Last recurrence was earlier this year. She received chemo/RT with weekly Cisplatin from 7/12/21-9/1/21.  - Imaging findings on admission are concerning for progressing malignancy  - Pt s/p thoracentesis as noted above with negative cytopathology  - CT A/P with small collection in R inguinal region (likely known R inguinal wound/tract) but does not appear to show other areas of disease that is amenable to biopsy  - s/p IR biopsy of R lung nodule which showed squamous cell carcinoma, concern for recurrence/metastatic disease; sent for NGS and PDL1  - will follow up with primary Oncologist Dr. Paulo Carpenter as outpatient     ===Heme/Onc===  #anemia.   - monitor CBC  - ferritin elevated, b12 and folate    #DVT  - heparin

## 2022-01-04 NOTE — CONSULT NOTE ADULT - SUBJECTIVE AND OBJECTIVE BOX
HPI:  This 66 year old female with PMH of vulvar cancer was found to have bilateral pleural effusions.  She had a left thoracenteses on 12/15/21 by IR after being admitted to medicine on 12/13/21 c/o SOB, HERNANDEZ, dizziness, weakness, nausea, sharp, intermittent epigastric pain, and pleuritic chest pain for several days.  The cytology was negative for malignancy despite a CT scan on 12/13/21 that showed pulmonary nodules that were suggestive of metastasis.  However, a repeat CXR on 12/28/21 showed that the left effusion was increasing in size.  Since yesterday, she has been having increased work of breathing and has been having increasing oxygen requirements from NC to NRB to BiPap.  A CXR  on 1/3/22 showed a large L effusion with adjacent passive atelectasis and a R midline shift.  She decompensated on 1/4/22 and was transferred to MICU for an emergent therapeutic L thoracentesis.  Additionally, she was treated for an ileus and after one week of no BM, she had a BM yesterday,    PAST MEDICAL & SURGICAL HISTORY:  Anemia (blood transfusions of 1 unit on 12/11/21 and in 9/2021)  Lichen sclerosus of female genitalia  Vulva cancer: radical anterior vulvectomy in 2014, radical excision in 2017, chemo/ RT [id number]  HTN  TIA   DM 2   Hypothyroid  Thalassemia minor  Strabismus  Fibromyalgia  HLD   Anxiety and depression  GERD   Obesity  S/P laparoscopic cholecystectomy  S/P eye surgery for strabismus  S/P total knee replacement, right    SOCIAL HISTORY: Former smoker 1 PPD x 50 years; Quit 1 year ago; No EtOH, No recreational drugs, Lives with     FAMILY HISTORY:  Family history of colon cancer (Aunt)  Family history of diabetes mellitus (Aunt, Mother, Sibling)    ALLERGIES: No Known Allergies    Current Medications:   acetaminophen     Tablet .. 650 milliGRAM(s) Oral every 6 hours PRN  amLODIPine   Tablet 10 milliGRAM(s) Oral daily  enalapril 2.5 milliGRAM(s) Oral daily  escitalopram 30 milliGRAM(s) Oral daily  gabapentin 400 milliGRAM(s) Oral three times a day  heparin   Injectable 5000 Unit(s) SubCutaneous every 8 hours  insulin glargine Injectable (LANTUS) 56 Unit(s) SubCutaneous at bedtime  insulin lispro (ADMELOG) corrective regimen sliding scale   SubCutaneous three times a day before meals  levothyroxine 112 MICROGram(s) Oral daily  melatonin 3 milliGRAM(s) Oral at bedtime PRN  pantoprazole    Tablet 40 milliGRAM(s) Oral before breakfast  piperacillin/tazobactam IVPB.. 3.375 Gram(s) IV Intermittent every 8 hours  predniSONE   Tablet 10 milliGRAM(s) Oral two times a day  simvastatin 20 milliGRAM(s) Oral at bedtime  triamterene 37.5 mG/hydrochlorothiazide 25 mG Tablet 1 Tablet(s) Oral daily    REVIEW OF SYSTEMS  Non-contributory except as above    Vital Signs Last 24 Hrs  T(C): 37.1 (04 Jan 2022 03:56), Max: 37.1 (04 Jan 2022 03:56)  T(F): 98.8 (04 Jan 2022 03:56), Max: 98.8 (04 Jan 2022 03:56)  HR: 109 (04 Jan 2022 04:30) (80 - 112)  BP: 150/67 (04 Jan 2022 04:30) (130/62 - 174/71)  BP(mean): 86 (04 Jan 2022 04:30) (77 - 94)  RR: 33 (04 Jan 2022 04:30) (17 - 35)  SpO2: 100% (04 Jan 2022 04:30) (93% - 100%)    General: WN. WD Moderate distress but speaking well on BiPap  Neurology: Awake, nonfocal, CANNON x 4  Eyes: Gross vision intact  ENT :Gross hearing intact, grossly patent pharynx, no stridor  Neck: Neck supple, trachea midline, No JVD,   Respiratory: Decreased BS on the left; No wheezing, rales, rhonchi, crackles  CV: RRR, S1S2, no murmurs,  Abdominal: Soft, NT, ND +BS,   Extremities: No edema, + peripheral pulses  Skin: No Rashes, Hematoma, Ecchymosis  Psych: Oriented x 3, normal affect    LABS:                        8.0    15.15 )-----------( 164      ( 04 Jan 2022 03:58 )             26.1     01-04    135  |  97<L>  |  48<H>  ----------------------------<  226<H>  5.9<H>   |  28  |  0.65    Ca    8.8      04 Jan 2022 03:58  Phos  3.1     01-04  Mg     2.10     01-04    TPro  6.2  /  Alb  3.2<L>  /  TBili  0.2  /  DBili  x   /  AST  10  /  ALT  15  /  AlkPhos  68  01-04    PT/INR - ( 04 Jan 2022 03:58 )   PT: 11.8 sec;   INR: 1.03 ratio    PTT - ( 04 Jan 2022 03:58 )  PTT:22.5 sec    RADIOLOGY & ADDITIONAL STUDIES:  CXR: See above  CT PA CHEST 12/13/2021    IMPRESSION: No pulmonary embolus is noted.  Multiple nodules are noted within both lungs. Primary consideration is   metastasis. Findings suggestive of malignant pleural effusions bilaterally.    ASSESSMENT:   Recurrent left pleural effusion with h/o vulva cancer    PLAN:  Medicine's request for consideration of a PleurX catheter to be discussed with Dr Brown  Thoracic surgery to follow up

## 2022-01-04 NOTE — CONSULT NOTE ADULT - CONSULT REASON
right groin wound
R groin wound infection
pulmonary nodule
work of breathing
History of vulvar cancer
Left pleural effusion
Pulm nodule bx
Thoracentesis
T2DM
symptom management for comfort measures

## 2022-01-04 NOTE — PROVIDER CONTACT NOTE (OTHER) - BACKGROUND
Vulvar CA, anemia. p/w bilateral pulm nodules with concern for mets of vulvar CA Vulvar CA, anemia. p/w bilateral pulm nodules with concern for mets of vulvar CA.

## 2022-01-04 NOTE — CHART NOTE - NSCHARTNOTEFT_GEN_A_CORE
: Juan José Gleason    INDICATION:  Respiratory failure    PROCEDURE:  [x ] LIMITED ECHO  [x ] LIMITED CHEST  [ ] LIMITED RETROPERITONEAL  [ ] LIMITED ABDOMINAL  [ ] LIMITED DVT  [ ] NEEDLE GUIDANCE VASCULAR  [ ] NEEDLE GUIDANCE THORACENTESIS  [ ] NEEDLE GUIDANCE PARACENTESIS  [ ] NEEDLE GUIDANCE PERICARDIOCENTESIS  [ ] OTHER    FINDINGS:    Lungs: Scattered B lines anteriorly with irregular pleural line; small pleural effusions b/l w/ associated consolidations  Heart: Grossly normal LVSF     INTERPRETATION:    Improvement in pleural effusion s/p thoracentesis.   Bibasilar consolidations.   Grossly normal LVSF.     Images uploaded on [facility] : Juan José Gleason    INDICATION:  Respiratory failure    PROCEDURE:  [x ] LIMITED ECHO  [x ] LIMITED CHEST  [ ] LIMITED RETROPERITONEAL  [ ] LIMITED ABDOMINAL  [ ] LIMITED DVT  [ ] NEEDLE GUIDANCE VASCULAR  [ ] NEEDLE GUIDANCE THORACENTESIS  [ ] NEEDLE GUIDANCE PARACENTESIS  [ ] NEEDLE GUIDANCE PERICARDIOCENTESIS  [ ] OTHER    FINDINGS:    Lungs: Scattered B lines anteriorly with irregular pleural line; small pleural effusions b/l w/ associated consolidations  Heart: Grossly normal LVSF     INTERPRETATION:    Improvement in pleural effusion s/p thoracentesis.   Bibasilar consolidations.   Grossly normal LVSF.     Images uploaded on [facility]      I was present during the key portions of the procedure and immediately available during the entire procedure.  Juan RODRIGUEZ

## 2022-01-04 NOTE — CONSULT NOTE ADULT - REASON FOR ADMISSION
SOB dizziness & nausea x couple of weeks

## 2022-01-04 NOTE — PROVIDER CONTACT NOTE (OTHER) - SITUATION
Patient c/o shortness of breath on 6L NC. Patient c/o shortness of breath on 6L NC, increase work of breathing noted.

## 2022-01-04 NOTE — CONSULT NOTE ADULT - ASSESSMENT
This is a 65 yo F with HTN, HLD, DM, fibromyalgia, Vulvar CA s/p vulva surgery in 2020, completed chemo & RT in 9/21 admitted for shortness of breath, found to have bilateral pleural effusions, L>R and pulmonary nodules. MICU consulted for increased work of breathing, likely in the setting of metastatic lung disease, lymphangitic spread, and ileus. Pt tachypneic to the low 30s on NRB, speaking in full sentences.     Recommendations:   - Place on BiPAP   - Continuous POX monitoring  - Aggressive bowel regimen    Patient is NOT a MICU candidate at this time. Please reconsult PRN  This is a 65 yo F with HTN, HLD, DM, fibromyalgia, Vulvar CA s/p vulva surgery in 2020, completed chemo & RT in 9/21 admitted for shortness of breath, found to have bilateral pleural effusions, L>R and pulmonary nodules. MICU consulted for increased work of breathing, likely in the setting of metastatic disease, lymphangitic spread, and ileus. Pt tachypneic to the low 30s on NRB, speaking in full sentences.     Recommendations:   - Place on BiPAP   - Continuous POX monitoring  - Aggressive bowel regimen    Patient is NOT a MICU candidate at this time. Please reconsult PRN

## 2022-01-04 NOTE — PROGRESS NOTE ADULT - ASSESSMENT
The patient is a 66-year-old woman who is followed for metastatic vulvar cancer, hypertension, hyperlipidemia, and type II diabetes mellitus who was admitted to the micu for management of hypoxic respiratory failure in the setting of progressive metastatic disease with likely lymphangitic spread of disease in the lungs, but whose goals of care have now been transitioned to do not resuscitate, do not intubate, and comfort measures only.     Neuro: -Alert and oriented at this time but with the help of the palliative care team, will administer morphine for management of dyspnea and of any breakthrough pain     CV: -Hemodynamically stable at this time; holding all antihypertensive medications; no known history of coronary artery disease or of arrhythmia     Respiratory: -Respiratory failure with hypoxia most likely secondary to metastatic disease to lungs, with likely lymphangitic spread and with possible superimposed bacterial pneumonia   -Although patient's respiratory failure would make her a candidate for endotracheal intubation, the etiology of her respiratory failure is most likely secondary to her metastatic disease and is therefore not reversible; per goals-of-care discussion with the patient and her family today, her care has been transitioned to dnr/dni and comfort measures only   -Administering methylprednisolone for management of lymphangitic spread of metastatic disease   -Will manage with bipap vs. high-flow nasal cannula per patient comfort; morphine as above and per recommendations from palliative care team to ease respiratory distress    GI: -Diet is npo except medications at this time in setting of need for non-invasive ventilation; can administer food or drink per comfort as requested;   -Senna and miralax administered in setting of opiates     Endocrine: -History of diabetes mellitus noted; hemoglobin a1c elevated to greater than 7 at time of admission   -Will continue at this time with administration of 25 units of basal insulin and with insulin sliding scale in setting of administration of corticosteroids     ID: -Concern for possible superimposed bacterial pneumonia; administering vancomycin and cefepime; despite transition to comfort measures; will administer antibiotics in hopes that this may ease respiratory distress     Heme/Onc: -Case discussed with oncology team; agree with transition of care to full comfort measures; will hold heparin dvt prophylaxis at this time

## 2022-01-04 NOTE — CONSULT NOTE ADULT - SUBJECTIVE AND OBJECTIVE BOX
CHIEF COMPLAINT: work of breathing     HPI: 65 yo F w/ PMHx of HTN, HLD, DM, fibromyalgia, Vulvar CA s/p vulva surgery in 2020, completed chemo & RT in 9/21, found to be anemic Hgb 8.2 so she received 1U PRBC on saturday, 12/11 prior to that received PRBC in 9/21 now p/w SOB, dizziness & nausea for the last couple of weeks. Pt states she initially started experiencing an intermittent steady jabbing type of pain in her upper abdomen/ epigastric region, described as feeling like a gas pocket non-radiating, 9/10 severity, which can last for several hours at a time. Pt feels pain worsen with inspiration or rolling onto her side or when getting up. Pain eases up when she takes motrin & resting. ALso admits to a pleuritic type of chest pain at times. Pt also reports feeling accompanying lightheadedness, generalized weakness, dizziness, nausea & SOB. Pt reports HERNANDEZ after taking a couple of steps and feels the SOB has become constant over the last couple of days. Since thanksgiving she also reports on 2 occasions she felt like she'd pass out when symptoms got bad.      Patient with malignant pleural effusions b/l s/p thora 12/15. MICU called for work of breathing. Pt had been on 3L NC which was escalated to NRB saturating 100%. Pt tachypneic to the low 30s. Pt with ileus without BM for 1 week but had a BM earlier tonight. Saturating well on NRB, denies any complaints.      PAST MEDICAL & SURGICAL HISTORY:  Lichen sclerosus of female genitalia    Vulva cancer  bilateral 2014 reoccurance 2017    HTN (hypertension)    TIA (transient ischemic attack)  8/2013  blurred vision/dizziness no treatment    DM type 2 (diabetes mellitus, type 2)  1995    Hypothyroid    Thalassemia minor    Strabismus    Fibromyalgia    HLD (hyperlipidemia)    Anxiety and depression    GERD (gastroesophageal reflux disease)    Obesity    S/P laparoscopic cholecystectomy  2000    S/P eye surgery  June 2014, for strabismus    Vulvar neoplasm  s/p radical anterior vulvectomy in 2014 with radical excision in 2017    H/O total knee replacement, right  2016        FAMILY HISTORY:  Family history of colon cancer (Aunt)    Family history of diabetes mellitus in mother (Aunt, Mother, Sibling)        SOCIAL HISTORY: lives at home with      Allergies    No Known Allergies    Intolerances      HOME MEDICATIONS: Home Medications:  amLODIPine 10 mg oral tablet: 1 tab(s) orally once a day (13 Dec 2021 19:09)  enalapril 2.5 mg oral tablet: 1 tab(s) orally once a day    **Per pharmacy, rx last dispensed November 2020 (13 Dec 2021 19:09)  escitalopram 20 mg oral tablet: 1.5 tabs (30 mg) orally once a day (13 Dec 2021 19:09)  gabapentin 400 mg oral capsule: 1 cap(s) orally 3 times a day    **Per pharmacy, most recent rx written for gabapentin 600 mg (1 tab orally three times daily) on 9/6/21 x 30 day supply (13 Dec 2021 19:09)  ibuprofen 600 mg oral tablet: 1 tab(s) orally every 6 hours, As Needed - for mild pain (13 Dec 2021 19:09)  levothyroxine 112 mcg (0.112 mg) oral tablet: 1 tab(s) orally once a day (13 Dec 2021 19:09)  NovoLOG 100 units/mL subcutaneous solution: 15 - 20 unit(s) subcutaneous 3 times a day (with meals)  (13 Dec 2021 19:09)  omeprazole 40 mg oral delayed release capsule: 1 cap(s) orally once a day (13 Dec 2021 19:09)  predniSONE 10 mg oral tablet: 1 tab(s) orally 2 times a day  (13 Dec 2021 19:09)  prochlorperazine 10 mg oral tablet: 1 tab(s) orally , As Needed for Nausea (13 Dec 2021 19:09)  simvastatin 20 mg oral tablet: 1 tab(s) orally once a day (at bedtime)    **Per pharmacy, rx filled 3/26/21 x 30 day supply (13 Dec 2021 19:09)  Tresiba FlexTouch 200 units/mL subcutaneous solution: 70 unit(s) subcutaneous once a day (at bedtime) (13 Dec 2021 19:09)  triamterene-hydrochlorothiazide 37.5 mg-25 mg oral tablet: 1 tab(s) orally once a day (13 Dec 2021 19:09)  Victoza 18 mg/3 mL subcutaneous solution: 1.8 milligram(s) subcutaneous once a day    **Per pharmacy, rx filled 11/3/21 x 30 day supply (13 Dec 2021 19:09)      REVIEW OF SYSTEMS:  CONSTITUTIONAL: No fever, chills, night sweats, or fatigue  EYES: No eye pain  ENT:  No difficulty hearing, tinnitus, vertigo; No sinus or throat pain  NECK: No pain  RESPIRATORY: +SOB   CARDIOVASCULAR: No chest pain, palpitations, dizziness, or leg swelling  GASTROINTESTINAL: No abdominal pain.  GENITOURINARY: No dysuria, frequency, hematuria, or incontinence  NEUROLOGICAL: No headaches, memory loss  SKIN: No itching, burning, rashes, or lesions   ENDOCRINE: No heat or cold intolerance; No hair loss  MUSCULOSKELETAL: No joint pain or swelling  HEME/LYMPH: No easy bruising, or bleeding gums    OBJECTIVE:  ICU Vital Signs Last 24 Hrs  T(C): 36.6 (03 Jan 2022 20:20), Max: 36.8 (03 Jan 2022 12:19)  T(F): 97.8 (03 Jan 2022 20:20), Max: 98.2 (03 Jan 2022 12:19)  HR: 80 (04 Jan 2022 00:03) (80 - 99)  BP: 134/60 (03 Jan 2022 23:15) (130/62 - 158/56)  BP(mean): --  ABP: --  ABP(mean): --  RR: 20 (04 Jan 2022 00:11) (17 - 30)  SpO2: 96% (04 Jan 2022 00:11) (94% - 100%)        01-02 @ 07:01 - 01-03 @ 07:00  --------------------------------------------------------  IN: 795 mL / OUT: 800 mL / NET: -5 mL    01-03 @ 07:01 - 01-04 @ 01:08  --------------------------------------------------------  IN: 820 mL / OUT: 300 mL / NET: 520 mL      CAPILLARY BLOOD GLUCOSE      POCT Blood Glucose.: 169 mg/dL (03 Jan 2022 23:35)      PHYSICAL EXAM:  Gen: tachypneic to the low 30s on NRB   Pulm: accessory muscle use; decreased breath sounds on L, no wheezing or crackles    Cards: RRR, nl S1/S2; no obvious murmurs; no LE edema; no JVD  Abd: soft, nontender, no rebound or guarding   Ext: no cyanosis; no joint effusion; no joint pain on palpation.  Skin: no rash; no cyanosis    HOSPITAL MEDICATIONS:  MEDICATIONS  (STANDING):  amLODIPine   Tablet 10 milliGRAM(s) Oral daily  budesonide 160 MICROgram(s)/formoterol 4.5 MICROgram(s) Inhaler 2 Puff(s) Inhalation two times a day  calcium carbonate   1250 mG (OsCal) 1 Tablet(s) Oral daily  cholecalciferol 2000 Unit(s) Oral daily  Dakins Solution - 1/4 Strength 1 Application(s) Topical daily  dextrose 40% Gel 15 Gram(s) Oral once  dextrose 5%. 1000 milliLiter(s) (50 mL/Hr) IV Continuous <Continuous>  dextrose 5%. 1000 milliLiter(s) (100 mL/Hr) IV Continuous <Continuous>  dextrose 50% Injectable 25 Gram(s) IV Push once  dextrose 50% Injectable 12.5 Gram(s) IV Push once  dextrose 50% Injectable 25 Gram(s) IV Push once  enalapril 5 milliGRAM(s) Oral daily  escitalopram 30 milliGRAM(s) Oral daily  gabapentin 400 milliGRAM(s) Oral three times a day  glucagon  Injectable 1 milliGRAM(s) IntraMuscular once  heparin   Injectable 5000 Unit(s) SubCutaneous every 8 hours  influenza  Vaccine (HIGH DOSE) 0.7 milliLiter(s) IntraMuscular once  insulin glargine Injectable (LANTUS) 25 Unit(s) SubCutaneous at bedtime  insulin lispro (ADMELOG) corrective regimen sliding scale   SubCutaneous every 6 hours  lactulose Retention Enema 200 Gram(s) Rectal daily  levothyroxine 137 MICROGram(s) Oral daily  methylPREDNISolone sodium succinate Injectable 40 milliGRAM(s) IV Push daily  metoprolol tartrate 12.5 milliGRAM(s) Oral two times a day  naloxegol 25 milliGRAM(s) Oral daily  nystatin    Suspension 178713 Unit(s) Swish and Swallow four times a day  pantoprazole    Tablet 40 milliGRAM(s) Oral before breakfast  polyethylene glycol 3350 17 Gram(s) Oral two times a day  senna 2 Tablet(s) Oral at bedtime  simethicone 80 milliGRAM(s) Chew four times a day  simvastatin 20 milliGRAM(s) Oral at bedtime  sodium chloride 0.9%. 1000 milliLiter(s) (50 mL/Hr) IV Continuous <Continuous>  trimethoprim  160 mG/sulfamethoxazole 800 mG 1 Tablet(s) Oral <User Schedule>    MEDICATIONS  (PRN):  aluminum hydroxide/magnesium hydroxide/simethicone Suspension 30 milliLiter(s) Oral every 6 hours PRN Dyspepsia  bisacodyl Suppository 10 milliGRAM(s) Rectal daily PRN Constipation  diphenhydrAMINE 25 milliGRAM(s) Oral daily PRN Rash and/or Itching  lidocaine/prilocaine Cream 1 Application(s) Topical daily PRN Dressing changes  melatonin 3 milliGRAM(s) Oral at bedtime PRN Insomnia  metoclopramide Injectable 10 milliGRAM(s) IV Push every 6 hours PRN nausea or vomiting.  morphine  - Injectable 3 milliGRAM(s) IV Push every 4 hours PRN mod to severe pain or dyspnea  ondansetron Injectable 4 milliGRAM(s) IV Push three times a day PRN Nausea and/or Vomiting      LABS:                        7.9    16.27 )-----------( 142      ( 03 Jan 2022 07:22 )             26.3     01-03    133<L>  |  95<L>  |  51<H>  ----------------------------<  159<H>  5.7<H>   |  29  |  0.71    Ca    8.5      03 Jan 2022 22:01  Phos  2.9     01-03  Mg     2.20     01-03    TPro  6.2  /  Alb  3.2<L>  /  TBili  0.2  /  DBili  x   /  AST  9   /  ALT  12  /  AlkPhos  62  01-03        Arterial Blood Gas:  01-03 @ 22:01  7.38/55/98/32/97.7/6.1  ABG lactate: --    Venous Blood Gas:  01-02 @ 01:11  7.33/54/58/28/90.5  VBG Lactate: 2.0    RADIOLOGY:  [x] Reviewed and interpreted by me    < from: Xray Chest 1 View- PORTABLE-Urgent (Xray Chest 1 View- PORTABLE-Urgent .) (01.03.22 @ 22:32) >    INTERPRETATION:  Large left effusion with adjacent passive atelectasis   and rightward midline shift.    < end of copied text >

## 2022-01-04 NOTE — CONSULT NOTE ADULT - CONSULT REQUESTED BY NAME
BRITTNEY Mcgarry
[facility]
Primary team
[facility]
Dr. Allen
Dr. Magali Allen
Medicine
Dr. Maravilla
team
Primary Team

## 2022-01-04 NOTE — CONSULT NOTE ADULT - PROBLEM SELECTOR RECOMMENDATION 5
- patient with hx of recurrent vulvar cancer (recently received chemo/RT from 7/2021-9/2021). Patient found to have multiple lung nodules and B/L pleural effusions.   - path from lung nodule biopsy 12/22 consistent with metastatic squamous cell carcinoma (previous vulvar cancer also sq cell ca), sent for NGS and PDL1.   - due to concern for recurrence (and now metastatic disease) and significant symptoms (shortness of breath), s/p C1 carboplatin + paclitaxel on 12/30.   - oncology recommendations appreciated

## 2022-01-04 NOTE — CONSULT NOTE ADULT - PROVIDER SPECIALTY LIST ADULT
Intervent Radiology
Pulmonology
Heme/Onc
Wound Care
Endocrinology
Thoracic Surgery
Infectious Disease
Palliative Care
Intervent Radiology
MICU

## 2022-01-04 NOTE — CONSULT NOTE ADULT - PROBLEM/RECOMMENDATION-4
HISTORY OF PRESENT ILLNESS:    Patient presents today with 2 week history of cough, wheezing, post nasal drip, and chest tightness. She denies fevers, chills, ear pain, nasal congestion, chest pain, shortness of breath, nausea, vomiting, diarrhea, or abdominal pain. Denies sick contacts. She has a history of asthma. She has been using her albuterol inhaler. She has not been vaccinated for COVID.      Past Medical History:   Diagnosis Date   • Colon polyp 2015   • High cholesterol    • History of repair of rotator cuff, Right 2017: Arthroscopic right rotator cuff repair, subacromial decompression, distal clavicle excision by Dr. Russo    • Keratosis, actinic    • RAD (reactive airway disease)     sports induced asthma       Past Surgical History:   Procedure Laterality Date   •  section, low transverse      x2   • Cholecystectomy     • Colonoscopy  2015    1 polyp   • Colonoscopy  2020    follow up in 10 years for normal screening   • Esophagogastroduodenoscopy  10/03/2017    mild gastritits/reflux   • Shoulder arthroscopy w/ rotator cuff repair Right 2017       Current Outpatient Medications   Medication Sig Dispense Refill   • LORazepam (ATIVAN) 1 MG tablet Take 1 tablet by mouth nightly as needed (sleep). Do not start before 2021. 30 tablet 2   • escitalopram (LEXAPRO) 10 MG tablet Take 1 tablet by mouth daily. 90 tablet 3   • omeprazole (PriLOSEC) 40 MG capsule TAKE ONE CAPSULE BY MOUTH DAILY BEFORE BREAKFAST 90 capsule 3   • estriol 0.5 mg/g (0.05 %) cream (in natural base) Apply 1 gram (1 mL) into the vagina nightly. 30 g 11   • albuterol 108 (90 Base) MCG/ACT inhaler Inhale 2 puffs into the lungs every 4 hours as needed for Shortness of Breath or Wheezing. 6.7 g 3   • cetirizine (ZyrTEC Allergy) 10 MG tablet Take 1 tablet by mouth daily. 30 tablet 11   • hydroCORTisone (CORTIZONE) 2.5 % ointment Apply up to four times a day to affected area on the 
lip 30 g 0   • KRILL OIL PO Take by mouth daily.     • Multiple Vitamins-Minerals (MULTIVITAMIN PO) Take 1 tablet by mouth daily.     • Cholecalciferol (VITAMIN D) 2000 UNITS CAPS Take 1 tablet by mouth daily.     • B Complex-C-Folic Acid (HM VITAMIN B COMPLEX/VITAMIN C PO) Take 1 tablet by mouth daily.     • benzonatate (Tessalon Perles) 100 MG capsule Take 1 capsule by mouth 3 times daily as needed for Cough. 20 capsule 0   • azithromycin (Zithromax Z-Lee) 250 MG tablet 2 tablets day one, then 1 tablet days 2-5 6 tablet 0   • predniSONE (DELTASONE) 20 MG tablet Take 2 tablets by mouth daily. 10 tablet 0     No current facility-administered medications for this visit.       Allergies as of 12/08/2021 - Reviewed 12/08/2021   Allergen Reaction Noted   • Hydrocodone-apap INSOMNIA 03/16/2017   • Percocet [oxycodone-acetaminophen] INSOMNIA 03/13/2017   • Codeine ANXIETY 03/16/2017       Social History     Socioeconomic History   • Marital status: /Civil Union     Spouse name: Not on file   • Number of children: Not on file   • Years of education: Not on file   • Highest education level: Not on file   Occupational History   • Not on file   Tobacco Use   • Smoking status: Never Smoker   • Smokeless tobacco: Never Used   • Tobacco comment: [employer]   Substance and Sexual Activity   • Alcohol use: Yes     Alcohol/week: 2.0 standard drinks     Types: 2 Glasses of wine per week   • Drug use: No   • Sexual activity: Yes     Partners: Male     Birth control/protection: Post-menopausal   Other Topics Concern   • Not on file   Social History Narrative   • Not on file     Social Determinants of Health     Financial Resource Strain:    • Social Determinants: Financial Resource Strain: Not on file   Food Insecurity:    • Social Determinants: Food Insecurity: Not on file   Transportation Needs:    • Lack of Transportation (Medical): Not on file   • Lack of Transportation (Non-Medical): Not on file   Physical Activity:  
  • Days of Exercise per Week: Not on file   • Minutes of Exercise per Session: Not on file   Stress:    • Social Determinants: Stress: Not on file   Social Connections:    • Social Determinants: Social Connections: Not on file   Intimate Partner Violence:    • Social Determinants: Intimate Partner Violence Past Fear: Not on file   • Social Determinants: Intimate Partner Violence Current Fear: Not on file       Family History   Problem Relation Age of Onset   • Heart disease Mother    • High cholesterol Mother    • Skin Father         skin cancer   • COPD Father    • Cancer Father         skin cancer    • Kidney disease Father    • Skin Sister         skin cancer   • High cholesterol Sister    • High blood pressure Sister    • Cancer Sister         skin cancer   • Skin Sister         skin cancer   • Cancer Sister         skin cancer   • Thyroid Sister    • Arthritis Sister    • Thyroid Daughter         Hashimoto's   • Depression Sister    • Depression Sister        I have reviewed the past medical history, family history, social history, medications and allergies listed in the medical record as obtained by my nursing staff and support staff and agree with their documentation    ROS:  Limited by:none  As per HPI unless otherwise noted    PHYSICAL EXAM:  VITAL SIGNS:      Visit Vitals  /88   Pulse 76   Temp 97.9 °F (36.6 °C) (Other (comment))   Resp (!) 24   Ht 5' 2\" (1.575 m)   Wt 52.8 kg (116 lb 8 oz)   LMP 06/30/2014   BMI 21.31 kg/m²     GENERAL:   Eda Kramer is alert, mildly ill -appearing, and in no distress.  HEENT:  Eyes:  Normal lids, sclerae and conjunctivae bilaterally.  Pupils equally reactive to light.  Extraocular motions intact.  EARS:  Normal pinnae and canals bilaterally.  Left tympanic membrane: neutral position normal appearing and translucent grey in color  Right tympanic membrane:  neutral position normal appearing and translucent grey in color    NOSE:   Septum normal. Turbinates are 
erythematous and congested.  Nasal discharge: moderate  THROAT:  erythematous  no exudate present.    NECK:  Supple  Left anterior cervical normal sized and nontender  Right anterior cervical normal sized and nontender  HEART:  Regular rate and rhythm, no murmurs/gallops/rubs,  LUNGS:  Wheezing noted throughout lung fields. No increased respiratory effort, no rales or rhonchi.    SKIN:  Warm and dry, well perfused.  NEUROLOGICAL:  Alert and awake. Cranial nerves II through XII grossly intact  PSYCHIATRIC:  Speech and behavior appropriate.     Clinical Course:  COVID: pending    Orders Placed This Encounter   • 2019 Novel Coronavirus (SARS-CoV-2)   • benzonatate (Tessalon Perles) 100 MG capsule   • azithromycin (Zithromax Z-Lee) 250 MG tablet   • predniSONE (DELTASONE) 20 MG tablet     Eda was seen today for cough.    Diagnoses and all orders for this visit:    Acute asthmatic bronchitis  -     benzonatate (Tessalon Perles) 100 MG capsule; Take 1 capsule by mouth 3 times daily as needed for Cough.  -     azithromycin (Zithromax Z-Lee) 250 MG tablet; 2 tablets day one, then 1 tablet days 2-5  -     predniSONE (DELTASONE) 20 MG tablet; Take 2 tablets by mouth daily.    Cough  -     2019 NOVEL CORONAVIRUS (SARS-COV-2)         Discussed with patient the physical exam findings  History and physical consistent with acute asthmatic bronchitis. Will treat with zpak, benzonatate, and prednisone as she does have a hx of asthma. Benefits and risks of antibiotic discussed to include nausea, vomiting, abdominal pain, diarrhea, yeast infection, and allergic reactions.  Patient should stop medication if develops a rash.  Should take probiotic (such as acidophilus, Florajen, Florastor, or lactobacillus) for 2 weeks.  We also obtain COVID test, she will be contacted with results once available.     Patient will be called with results of COVID in 24 hours    Patient declined the following testing: CXR    Patient provided with list 
of over the counter medications and home remedies that may help with symptoms.  Written Instructions Provided in after visit summary to the patient and reviewed in detail, all questions answered.    Follow up with Primary MD in the next 5-7 days if no improvement -sooner if worse. Return or go to the ER with any worsening symptoms, problems, concerns.    Patient acknowledged shared decision making at the end of our conversation.  Discharge plan: Attached and as above    Please note  that [employer] dictation software was used to transcribe this note. Attempts are made to proofread but transcription errors can and do occur.    Total time I spent today on this appointment is 25 minutes.  
DISPLAY PLAN FREE TEXT
DISPLAY PLAN FREE TEXT

## 2022-01-04 NOTE — CONSULT NOTE ADULT - PROBLEM SELECTOR RECOMMENDATION 4
- s/p thoracentesis x2   - symptom managements for dyspnea as above   - management as per primary team

## 2022-01-04 NOTE — PROGRESS NOTE ADULT - ASSESSMENT
67 y/o F w/ a PMHx of HTN, HLD, T2DM, fibromyalgia, recurrent vulvar cancer (recently received chemo/RT from 7/2021-9/2021), and anemia (requiring intermittent transfusions recently) who presented with shortness of breath, dizziness, and nausea for multiple weeks, found to have multiple lung nodules and B/L pleural effusions concerning for malignancy, and was admitted to telemetry for further management, s/p thoracentesis on 12/15 (~ 660cc drained). Oncology was consulted for further evaluation.    # Dyspnea  - CTA Chest (12/13): No pulmonary embolus is noted. Multiple nodules are noted within both lungs. Primary consideration is metastasis. Findings suggestive of malignant pleural effusions bilaterally.  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   - bactrim for PCP ppx  - morphine IV PRN for dypsnea  - path from lung nodule biopsy 12/22 consistent with metastatic squamous cell carcinoma (previous vulvar cancer also sq cell ca), sent for NGS and PDL1  - due to concern for recurrence (and now metastatic disease) and significant symptoms (shortness of breath), s/p C1 carboplatin + paclitaxel on 12/30. Next chemotherapy would be 01/20.  - RRT 01/03 with white-out of L lung. S/p thoracentesis with removal of 750cc fluid  - f/u CT sugery recs re: PleurX    #Constipation  - in setting of opiates and anti-emetics  - c/w aggressive bowel regimen (on senna, miralax, lactulose)   - CT A/P 01/03 neg for obstruction     # SVT   - started on metoprolol  - f/u repeat Echo  - would consider repeat CT chest to rule out PE as a cause    # Recurrent vulvar cancer  - Last recurrence was earlier this year. She received chemo/RT with weekly Cisplatin from 7/12/21-9/1/21.  - Imaging findings on admission are concerning for progressing malignancy  - Pt s/p thoracentesis as noted above with negative cytopathology  - CT A/P with small collection in R inguinal region (likely known R inguinal wound/tract) but does not appear to show other areas of disease that is amenable to biopsy  - s/p IR biopsy of R lung nodule which showed squamous cell carcinoma, concern for recurrence/metastatic disease; sent for NGS and PDL1  - will follow up with primary Oncologist Dr. Paulo Carpenter as outpatient     #Hypercalcemia   -This is an oncologic emergency and requires urgent management  - Ca increased to 12.5  - PTH and Vit D low  - f/u PTHrP  - appreciate endo consult  - may be related to hypercalcemia of malignancy  - s/p zoledronic acid (discussed risks/benefits including risk of osteonecrosis of jaw) and calcitonin with normalization of calcium    # Microcytic Anemia  - Occasionally requiring transfusions (last on 12/11)  - Recently underwent a hematologic evaluation as an outpatient. Her workup was notable for a hemoglobin electrophoresis which was c/w beta thalassemia minor. She had a BMBx on 11/9/21 (minimal marrow obtained to evaluate) which showed erythroid hyperplasia, megakaryocytes normal, no evidence of dysplasia.   - Monitor CBC and keep active T+S. Transfuse for Hgb < 7 or if symptomatic  - Primary Hematologist: Dr. Leana Auguste. Continue outpatient follow-up    #Radiation wound  - c/w wound care in R pelvic area     65 y/o F w/ a PMHx of HTN, HLD, T2DM, fibromyalgia, recurrent vulvar cancer (recently received chemo/RT from 7/2021-9/2021), and anemia (requiring intermittent transfusions recently) who presented with shortness of breath, dizziness, and nausea for multiple weeks, found to have multiple lung nodules and B/L pleural effusions concerning for malignancy, and was admitted to telemetry for further management, s/p thoracentesis on 12/15 (~ 660cc drained). Oncology was consulted for further evaluation.    # Dyspnea  - CTA Chest (12/13): No pulmonary embolus is noted. Multiple nodules are noted within both lungs. Primary consideration is metastasis. Findings suggestive of malignant pleural effusions bilaterally.  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   - bactrim for PCP ppx  - morphine IV PRN for dypsnea  - path from lung nodule biopsy 12/22 consistent with metastatic squamous cell carcinoma (previous vulvar cancer also sq cell ca), sent for NGS and PDL1  - due to concern for recurrence (and now metastatic disease) and significant symptoms (shortness of breath), s/p C1 carboplatin + paclitaxel on 12/30. Next chemotherapy would be 01/20.  - RRT 01/03 with white-out of L lung. S/p thoracentesis with removal of 750cc fluid  - f/u CT sugery recs re: PleurX    #Constipation  - in setting of opiates and anti-emetics  - c/w aggressive bowel regimen (on senna, miralax, lactulose)   - CT A/P 01/03 neg for obstruction     # SVT   - started on metoprolol  - f/u repeat Echo  - would consider repeat CT chest to rule out PE as a cause    # Recurrent vulvar cancer  - Last recurrence was earlier this year. She received chemo/RT with weekly Cisplatin from 7/12/21-9/1/21.  - Imaging findings on admission are concerning for progressing malignancy  - Pt s/p thoracentesis as noted above with negative cytopathology  - CT A/P with small collection in R inguinal region (likely known R inguinal wound/tract) but does not appear to show other areas of disease that is amenable to biopsy  - s/p IR biopsy of R lung nodule which showed squamous cell carcinoma, concern for recurrence/metastatic disease; sent for NGS and PDL1  - will follow up with primary Oncologist Dr. Paulo Carpenter as outpatient     #Hypercalcemia   -This is an oncologic emergency and requires urgent management  - Ca increased to 12.5  - PTH and Vit D low  - f/u PTHrP  - appreciate endo consult  - may be related to hypercalcemia of malignancy  - s/p zoledronic acid (discussed risks/benefits including risk of osteonecrosis of jaw) and calcitonin with normalization of calcium    # Microcytic Anemia  - Occasionally requiring transfusions (last on 12/11)  - Recently underwent a hematologic evaluation as an outpatient. Her workup was notable for a hemoglobin electrophoresis which was c/w beta thalassemia minor. She had a BMBx on 11/9/21 (minimal marrow obtained to evaluate) which showed erythroid hyperplasia, megakaryocytes normal, no evidence of dysplasia.   - Monitor CBC and keep active T+S. Transfuse for Hgb < 7 or if symptomatic  - Primary Hematologist: Dr. Leana Auguste. Continue outpatient follow-up    #Radiation wound  - c/w wound care in R pelvic area     67 y/o F w/ a PMHx of HTN, HLD, T2DM, fibromyalgia, recurrent vulvar cancer (recently received chemo/RT from 7/2021-9/2021), and anemia (requiring intermittent transfusions recently) who presented with shortness of breath, dizziness, and nausea for multiple weeks, found to have multiple lung nodules and B/L pleural effusions concerning for malignancy, and was admitted to telemetry for further management, s/p thoracentesis on 12/15 (~ 660cc drained). Oncology was consulted for further evaluation.    # Dyspnea  - CTA Chest (12/13): No pulmonary embolus is noted. Multiple nodules are noted within both lungs. Primary consideration is metastasis. Findings suggestive of malignant pleural effusions bilaterally.  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   - bactrim for PCP ppx  - morphine IV PRN for dypsnea  - path from lung nodule biopsy 12/22 consistent with metastatic squamous cell carcinoma (previous vulvar cancer also sq cell ca), sent for NGS and PDL1  - due to concern for recurrence (and now metastatic disease) and significant symptoms (shortness of breath), s/p C1 carboplatin + paclitaxel on 12/30. Next chemotherapy would be 01/20.  - RRT 01/03 with white-out of L lung. S/p thoracentesis with removal of 750cc fluid  - MICU team had GOC discussion with patient and family 01/04 and they decided to focus on comfort care and defer all disease modifying therapy. Oncology team met patient and family at bedside and answered all of their questions   - appreciate palliative care consult    #Constipation  - in setting of opiates and anti-emetics  - c/w aggressive bowel regimen (on senna, miralax, lactulose)   - CT A/P 01/03 neg for obstruction     # SVT   - started on metoprolol    # Recurrent vulvar cancer  - Last recurrence was earlier this year. She received chemo/RT with weekly Cisplatin from 7/12/21-9/1/21.  - Imaging findings on admission are concerning for progressing malignancy  - Pt s/p thoracentesis as noted above with negative cytopathology  - CT A/P with small collection in R inguinal region (likely known R inguinal wound/tract) but does not appear to show other areas of disease that is amenable to biopsy  - s/p IR biopsy of R lung nodule which showed squamous cell carcinoma, concern for recurrence/metastatic disease; sent for NGS and PDL1  - will follow up with primary Oncologist Dr. Paulo Carpenter as outpatient     #Hypercalcemia   -This is an oncologic emergency and requires urgent management  - Ca increased to 12.5  - PTH and Vit D low  - f/u PTHrP  - appreciate endo consult  - may be related to hypercalcemia of malignancy  - s/p zoledronic acid (discussed risks/benefits including risk of osteonecrosis of jaw) and calcitonin with normalization of calcium    # Microcytic Anemia  - Occasionally requiring transfusions (last on 12/11)  - Recently underwent a hematologic evaluation as an outpatient. Her workup was notable for a hemoglobin electrophoresis which was c/w beta thalassemia minor. She had a BMBx on 11/9/21 (minimal marrow obtained to evaluate) which showed erythroid hyperplasia, megakaryocytes normal, no evidence of dysplasia.   - Monitor CBC and keep active T+S. Transfuse for Hgb < 7 or if symptomatic  - Primary Hematologist: Dr. Leana Auguste. Continue outpatient follow-up    #Radiation wound  - c/w wound care in R pelvic area

## 2022-01-04 NOTE — RAPID RESPONSE TEAM SUMMARY - NSSITUATIONBACKGROUNDRRT_GEN_ALL_CORE
65 yo F w/ PMHx of HTN, HLD, DM, fibromyalgia, Vulvar CA s/p vulva surgery in 2020, completed chemo & RT in 9/21, found to be anemic Hgb 8.2 so she received 1U PRBC on saturday, 12/11 prior to that received PRBC in 9/21 now p/w SOB, dizziness & nausea for the last couple of weeks.     RRT called for increased WOB. Upon initial evaluation, pt already on BiPAP as per Pulm/MICU recommendations. Pt now using accessory muscles to breathe but SpO2 still 100%. On last ABG drawn earlier in the evening, pCO2 55. BiPAP started not for the hypercapnia but for the increased WOB. MICU at bedside to re-evaluate pt, will need closer monitoring and thoracentesis vs. chest tube. Additional access (PIV) placed in right hand and labs were drawn: CBC, CMP, coags, T&S. Pt transferred to MICU

## 2022-01-04 NOTE — CONSULT NOTE ADULT - ASSESSMENT
67 yo F w/ PMHx of HTN, HLD, DM, fibromyalgia, Vulvar CA s/p vulva surgery in 2020, completed chemo & RT in 9/21, PMR (on chronic steroids, 10 mg BID) presented with with shortness of breath, dizziness, and nausea for multiple weeks, found to have multiple lung nodules and B/L pleural effusions concerning for malignancy, and was admitted to telemetry for further management, s/p thoracentesis on 12/15 (~ 660cc drained). Found on path from lung nodule biopsy 12/22 consistent with metastatic squamous cell carcinoma (previous vulvar cancer also sq cell ca).   Palliative Care consulted for evaluation and management of pain/ symptoms

## 2022-01-04 NOTE — CONSULT NOTE ADULT - SUBJECTIVE AND OBJECTIVE BOX
HPI:  Interventional Radiology  Pre-Procedure Note  This is a 66y  Female  with left pleural effusion  HPI:  67 yo F w/ PMHx of HTN, HLD, DM, fibromyalgia, Vulvar CA s/p vulva surgery in 2020, completed chemo & RT in 9/21, found to be anemic Hgb 8.2 so she received 1U PRBC on saturday, 12/11 prior to that received PRBC in 9/21 now p/w SOB, dizziness & nausea for the last couple of weeks. Pt states she initially started experiencing an intermittent steady jabbing type of pain in her upper abdomen/ epigastric region, described as feeling like a gas pocket non-radiating, 9/10 severity, which can last for several hours at a time. Pt feels pain worsen with inspiration or rolling onto her side or when getting up. Pain eases up when she takes motrin & resting. ALso admits to a pleuritic type of chest pain at times. Pt also reports feeling accompanying lightheadedness, generalized weakness, dizziness, nausea & SOB. Pt reports HERNANDEZ after taking a couple of steps and feels the SOB has become constant over the last couple of days. Since thanksgiving she also reports on 2 occasions she felt like she'd pass out when symptoms got bad.     (13 Dec 2021 17:15)    Interval History:   Per primary team's discussion with family, patient is DNR/DNI and comfort measures.  Patient seen and examined at bedside. Patient on Bipap with tachypnea. She states she feels very short of breath and has abdominal pain, but unable to elaborate more due to her dyspnea. Patient asking for "Morphine" to help with her symptoms.       PERTINENT PM/SXH:   Lichen sclerosus of female genitalia  Vulva cancer  HTN (hypertension)  TIA (transient ischemic attack)  DM type 2 (diabetes mellitus, type 2)  Hypothyroid  Thalassemia minor  Strabismus  Fibromyalgia  HLD (hyperlipidemia)  Anxiety and depression  GERD (gastroesophageal reflux disease)  Obesity  S/P laparoscopic cholecystectomy  S/P eye surgery  Vulvar neoplasm  H/O total knee replacement, right    FAMILY HISTORY:  Family history of colon cancer (Aunt)  Family history of diabetes mellitus in mother (Aunt, Mother, Sibling)    ITEMS NOT CHECKED ARE NOT PRESENT    SOCIAL HISTORY:   Significant other/partner[ x]  Children[x ]  Protestant/Spirituality: Yarsanism  Substance hx:  [ ]   Tobacco hx:  [x- Smoked 1ppd x 50 years on/off, stopped 1 year ago ]   Alcohol hx: [ ]   Home Opioid hx:  [ ] I-Stop Reference No: [id number]  Living Situation: [ x]Home  [ ]Long term care  [ ]Rehab [ ]Other      ADVANCE DIRECTIVES:    MOLST  [x ]  Living Will  [ ]   DECISION MAKER(s):  [ ] Health Care Proxy(s)  [x ] Surrogate(s)  [ ] Guardian           Name(s): Phone Number(s):   Sachin Irizarryty: [phone]    BASELINE (I)ADL(s) (prior to admission):  [place]: [x]Total  [ ] Moderate [ ]Dependent    Allergies    No Known Allergies    Intolerances    MEDICATIONS  (STANDING):  amLODIPine   Tablet 10 milliGRAM(s) Oral daily  atovaquone  Suspension 1500 milliGRAM(s) Oral daily  budesonide 160 MICROgram(s)/formoterol 4.5 MICROgram(s) Inhaler 2 Puff(s) Inhalation two times a day  calcium carbonate   1250 mG (OsCal) 1 Tablet(s) Oral daily  cefepime   IVPB      cefepime   IVPB 2000 milliGRAM(s) IV Intermittent every 8 hours  chlorhexidine 2% Cloths 1 Application(s) Topical <User Schedule>  cholecalciferol 2000 Unit(s) Oral daily  Dakins Solution - 1/4 Strength 1 Application(s) Topical daily  dextrose 40% Gel 15 Gram(s) Oral once  dextrose 5%. 1000 milliLiter(s) (50 mL/Hr) IV Continuous <Continuous>  dextrose 5%. 1000 milliLiter(s) (100 mL/Hr) IV Continuous <Continuous>  dextrose 50% Injectable 25 Gram(s) IV Push once  dextrose 50% Injectable 12.5 Gram(s) IV Push once  dextrose 50% Injectable 25 Gram(s) IV Push once  escitalopram 30 milliGRAM(s) Oral daily  gabapentin 400 milliGRAM(s) Oral three times a day  glucagon  Injectable 1 milliGRAM(s) IntraMuscular once  glycopyrrolate Injectable 0.2 milliGRAM(s) IV Push every 4 hours  heparin   Injectable 5000 Unit(s) SubCutaneous every 8 hours  influenza  Vaccine (HIGH DOSE) 0.7 milliLiter(s) IntraMuscular once  insulin glargine Injectable (LANTUS) 25 Unit(s) SubCutaneous at bedtime  insulin lispro (ADMELOG) corrective regimen sliding scale   SubCutaneous every 6 hours  lactulose Retention Enema 200 Gram(s) Rectal daily  levothyroxine 137 MICROGram(s) Oral daily  methylPREDNISolone sodium succinate Injectable 40 milliGRAM(s) IV Push daily  metoprolol tartrate 12.5 milliGRAM(s) Oral two times a day  naloxegol 25 milliGRAM(s) Oral daily  nystatin    Suspension 085574 Unit(s) Swish and Swallow four times a day  pantoprazole    Tablet 40 milliGRAM(s) Oral before breakfast  polyethylene glycol 3350 17 Gram(s) Oral two times a day  senna 2 Tablet(s) Oral at bedtime  simethicone 80 milliGRAM(s) Chew four times a day  simvastatin 20 milliGRAM(s) Oral at bedtime  sodium chloride 0.9%. 1000 milliLiter(s) (50 mL/Hr) IV Continuous <Continuous>  vancomycin  IVPB 1000 milliGRAM(s) IV Intermittent every 12 hours  vancomycin  IVPB        MEDICATIONS  (PRN):  aluminum hydroxide/magnesium hydroxide/simethicone Suspension 30 milliLiter(s) Oral every 6 hours PRN Dyspepsia  bisacodyl Suppository 10 milliGRAM(s) Rectal daily PRN Constipation  diphenhydrAMINE 25 milliGRAM(s) Oral daily PRN Rash and/or Itching  lidocaine/prilocaine Cream 1 Application(s) Topical daily PRN Dressing changes  melatonin 3 milliGRAM(s) Oral at bedtime PRN Insomnia  metoclopramide Injectable 10 milliGRAM(s) IV Push every 6 hours PRN nausea or vomiting.  morphine  - Injectable 2 milliGRAM(s) IV Push every 2 hours PRN mod to severe pain or dyspnea  ondansetron Injectable 4 milliGRAM(s) IV Push three times a day PRN Nausea and/or Vomiting      PRESENT SYMPTOMS: [ x] limited due to dyspnea/tachypnea   Source if other than patient:  [ ]Family   [ ]Team     Pain: [x ]yes [ ]no  QOL impact - unable to elaborate  Location -    abdomen                 Aggravating factors - none  Quality - unable to elaborate  Radiation - none   Timing-unable to elaborate  Severity (0-10 scale):  Minimal acceptable level (0-10 scale):     CPOT:    https://www.[facility].org/getattachment/[email or web address]/Critical-Care-Pain-Observation-Tool-(CPOT)      PAIN AD Score:     [email or web address]/painad.pdf (press ctrl +  left click to view)    Dyspnea:                           [ ]Mild [ x]Moderate [ ]Severe  Anxiety:                             [ ]Mild [ ]Moderate [ ]Severe  Agitation:                          [ ]Mild [ ]Moderate [ ]Severe  Fatigue:                             [ ]Mild [ ]Moderate [ ]Severe  Nausea:                             [ ]Mild [ ]Moderate [ ]Severe  Loss of appetite:              [ ]Mild [ ]Moderate [ ]Severe  Constipation:                   [ ]Mild [ ]Moderate [ ]Severe  Diarrhea:                          [ ]Mild [ ]Moderate [ ]Severe      Other Symptoms:  [ x]All other review of systems negative - limited due to dyspnea/tachypnea    Palliative Performance Status Version 2: 40-50        %    http://npcrc.org/files/news/palliative_performance_scale_ppsv2.pdf    PHYSICAL EXAM:  Vital Signs Last 24 Hrs  T(C): 37.1 (04 Jan 2022 03:56), Max: 37.1 (04 Jan 2022 03:56)  T(F): 98.8 (04 Jan 2022 03:56), Max: 98.8 (04 Jan 2022 03:56)  HR: 92 (04 Jan 2022 11:00) (80 - 112)  BP: 108/41 (04 Jan 2022 11:00) (108/41 - 174/71)  BP(mean): 47 (04 Jan 2022 11:00) (47 - 94)  RR: 24 (04 Jan 2022 10:00) (17 - 35)  SpO2: 100% (04 Jan 2022 11:00) (90% - 100%)     I&O's Summary    03 Jan 2022 07:01  -  04 Jan 2022 07:00  --------------------------------------------------------  IN: 1370 mL / OUT: 1600 mL / NET: -230 mL    04 Jan 2022 07:01  -  04 Jan 2022 12:22  --------------------------------------------------------  IN: 300 mL / OUT: 0 mL / NET: 300 mL    GENERAL:  [ x]Alert  [ x]Oriented x 2-3  [ ]Lethargic  [ ]Cachexia  [ ]Unarousable  [x ]Verbal  [ ]Non-Verbal  [ x] Respiratory Distress  Behavioral:   [ x] Anxiety  [ ] Delirium [ ] Agitation [ ] Calm  [ ] Other  HEENT:  [x ]Normal  [ ] Temporal Wasting  [ ]Dry mouth   [ ]ET Tube/Trach  [ ]Oral lesions  [ ] Mucositis  PULMONARY: on Bipap  [ ]Clear [ x]Tachypnea- difficulty speaking in full sentences  [ ]Audible excessive secretions   [ ]Rhonchi        [ ]Right [ ]Left [ ]Bilateral  [ ]Crackles        [ ]Right [ ]Left [ ]Bilateral  [ ]Wheezing     [ ]Right [ ]Left [ ]Bilateral  [x ]Diminished breath sounds [ ]right [ ]left [x ]bilateral  CARDIOVASCULAR:    [ x]Regular [ ]Irregular [ ]Tachy  [ ]Jose [ ]Murmur [ ]Other  GASTROINTESTINAL: central obesity   [x ]Soft  [ ]Distended   [ ]+BS  [x ]Non tender [ ]Tender  [ ]PEG [ ]OGT/ NGT  Last BM: 1/4  GENITOURINARY:  [ x]Normal [ ] Incontinent   [ ]Oliguria/Anuria   [ ][place]  MUSCULOSKELETAL:   [ ]Normal   [ x]Weakness  [ ]Bed/Wheelchair bound [ ]Edema  [  ] amputation  [  ] contraction  NEUROLOGIC:   [x ]No focal deficits  [ ]Cognitive impairment  [ ]Dysphagia [ ]Dysarthria [ ]Paresis [ ]Other   SKIN: Moisture Associated Dermatitis. See Nursing Skin Assessment for further details  [ ]Normal    [ ]Rash  [ ]Pressure ulcer(s)       Present on admission [ ]y [ ]n   [  ]  Wound    [  ] hyperpigmentation    CRITICAL CARE:  [ ] Shock Present  [ ]Septic [ ]Cardiogenic [ ]Neurologic [ ]Hypovolemic  [ ]  Vasopressors [ ]  Inotropes   [ x]Respiratory failure present [ ]Mechanical ventilation [x ]Non-invasive ventilatory support- Bipap [ ]High flow    [x ]Acute  [ ]Chronic [x ]Hypoxic  [ ]Hypercarbic [ ]Other  [ ]Other organ failure     LABS:                        8.0    15.15 )-----------( 164      ( 04 Jan 2022 03:58 )             26.1   01-04    135  |  97<L>  |  48<H>  ----------------------------<  226<H>  5.9<H>   |  28  |  0.65    Ca    8.8      04 Jan 2022 03:58  Phos  3.1     01-04  Mg     2.10     01-04    TPro  6.2  /  Alb  3.2<L>  /  TBili  0.2  /  DBili  x   /  AST  10  /  ALT  15  /  AlkPhos  68  01-04  PT/INR - ( 04 Jan 2022 03:58 )   PT: 11.8 sec;   INR: 1.03 ratio         PTT - ( 04 Jan 2022 03:58 )  PTT:22.5 sec    CAPILLARY BLOOD GLUCOSE  POCT Blood Glucose.: 268 mg/dL (04 Jan 2022 06:59)  POCT Blood Glucose.: 265 mg/dL (04 Jan 2022 06:24)  POCT Blood Glucose.: 231 mg/dL (04 Jan 2022 03:09)  POCT Blood Glucose.: 169 mg/dL (03 Jan 2022 23:35)  POCT Blood Glucose.: 168 mg/dL (03 Jan 2022 21:26)  POCT Blood Glucose.: 135 mg/dL (03 Jan 2022 17:02)      RADIOLOGY & ADDITIONAL STUDIES:  CXRAY 1/4/2022  IMPRESSION:  Large left pleural effusion with partial removal of the fluid post thoracentesis without complicating pneumothorax.    XRAY Abdomen 1/4/2022  IMPRESSION:  Redemonstration of moderate dilation of the ascending colon and cecum filled with previously administered contrast and stool.    CT Abd/ Pelvis 1/3/2022  Mildly distended right colon with stool and mild gaseous distention of the transverse colon and splenic flexure. Caliber change in the proximal descending colon without discrete obstructing mass identified, and is of uncertain etiology. No dilated small bowel to suggest small bowel obstruction.  Right inguinal wound with subjacent cavity/collection containing air, debris, and possibly packing material, which is similar to mildly decreased in size since 12/17/2021.  Small right and moderate left loculated pleural effusions. Redemonstrated nodular pleural thickening at the bilateral lung bases, suggestive of metastatic disease.    US Abdomen 1/2/2022  IMPRESSION:  No evidence of ascites.  Mild increase in size of a nonspecific periceliac lymph node since   12/17/2021.    CT Chest 12/24/2021  In comparison with 12/13/2021,  Stable trace right and small-moderate loculated left pleural effusions.  Near complete atelectasis of left lower lobe is new from prior.  3 cm spiculated mass within right lower lobe is slightly enlarged;   previously 2.6 cm. The other smaller spiculated metastatic nodules are unchanged. Mildly enlarged mediastinal lymph nodes slightly increased from prior.    CTA Chest 12/13/2021  IMPRESSION: No pulmonary embolus is noted.  Multiple nodules are noted within both lungs. Primary consideration is metastasis.  Findings suggestive of malignant pleural effusions bilaterally.    PROTEIN CALORIE MALNUTRITION PRESENT: [ ]mild [ ]moderate [ ]severe [ ]underweight [ ]morbid obesity  https://www.andeal.org/vault/2440/web/files/ONC/Table_Clinical%20Characteristics%20to%20Document%20Malnutrition-White%20JV%20et%20al%202012.pdf    Height (cm): 160 (12-29-21 @ 14:48), 160 (09-03-21 @ 11:09), 160.5 (07-15-21 @ 11:40)  Weight (kg): 73.8 (12-29-21 @ 17:51), 80.998 (12-09-21 @ 16:00), 83.5 (09-03-21 @ 11:09)  BMI (kg/m2): 28.8 (12-29-21 @ 17:51), 31.6 (12-29-21 @ 14:48), 31.6 (12-09-21 @ 16:00)    [ ]PPSV2 < or = to 30% [ ]significant weight loss  [ ]poor nutritional intake  [ ]anasarca [ ]Artificial Nutrition      REFERRALS:   [ ]Chaplaincy  [ ]Hospice  [ ]Child Life  [ ]Social Work  [x ]Case management [ ]Holistic Therapy     ************************************************************************  PALLIATIVE MEDICINE COORDINATION OF CARE DOCUMENTATION  [x] Inpatient Consult  [ ] Other:  ************************************************************************    HPI reviewed   ------------------------------------------------------------------------    MEDICATION REVIEW:  --- Pls refer to current medicatons in the body of this note  ISTOP REFERENCE:[id number]  Others' Prescriptions  Patient Name: Leana Arias Date: 1955  Address: [address] [place], [place] [address]Sex: Female  Rx Written	Rx Dispensed	Drug	Quantity	Days Supply	Prescriber Name	Prescriber Carolyn #	Payment Method	Dispenser  10/11/2021	10/13/2021	alprazolam 0.25 mg tablet	30	7	Jessica Sales	[id number]	Medicare	[id number]  09/09/2021	09/09/2021	oxycodone hcl 5 mg tablet	60	10	Paulo Carpenter)	[id number]	Medicare	[id number]  09/06/2021	09/06/2021	oxycodone hcl 15 mg tablet	60	15	Paulo Carpenter)	[id number]	Medicare	[id number]  08/12/2021	08/13/2021	oxycodone hcl 10 mg tablet	60	15	John Maldonado	[id number]	[facility]  07/29/2021	07/29/2021	oxycodone hcl 5 mg tablet	60	5	Paulo Carpenter)	[id number]	[facility]  07/14/2021	07/17/2021	alprazolam 0.25 mg tablet	30	7	Jessica Sales	[id number]	Medicare	[id number]  06/24/2021	06/25/2021	oxycodone-acetaminophen 5-325 mg tab	28	7	Mary Lucas (NP)	[id number]	Medicare	[id number]  04/05/2021	04/06/2021	oxycodone-acetaminophen 5-325 mg tablet	10	2	[facility]	[id number]	Medicare	[id number]  03/29/2021	03/30/2021	alprazolam 0.25 mg tablet	30	8	Mónica Jessica	[id number]	Medicare	[id number]  02/04/2021	02/11/2021	alprazolam 0.25 mg tablet	30	8	Mónica Jessica	[id number]	Medicare	[id number]  --- PRN usage: The patient HAS used 15mg IV Morphine in the last 24h.  ------------------------------------------------------------------------  COORDINATION OF CARE:  --- Palliative Care consulted for: symptom management   --- Patient assessed: 1/4/2022  --- Patient previously seen by Palliative Care service: NO  ADVANCE CARE PLANNING  --- Code status: DNR/DNI  --- MOLST reviewed in chart: Yes. completed by primary team  --- HCP/ Surrogate:  Sachin Carranza  --- [facility] document found in Alpha: NONE  --- HCP/ Living will/ Other advanced directives in Alpha: NONE  ------------------------------------------------------------------------  CARE PROVIDER DOCUMENTATION:  --- SW/VICKI notes:  --- PT recs:  --- Sp/Sw recs:   --- Nutrition recs:   PLAN OF CARE  --- Known admissions in past year: 1   --- Current admit date: 12/13/2021  --- LOS: 23 days   --- LACE score: 17  --- Current dispo plan: TO BE DETERMINED  ------------------------------------------------------------------------  --- Chart reviewed: 30 Minutes [including time used to gather, review and transfer data to this note]    Prolonged services rendered, as part of this patient's care provided by Palliative Medicine, include: i.chart review for provider and ancillary service documentation, ii.pertinent diagnostics including laboratory and imaging studies,iii. medication review including PRN use, iv. admission history including previous palliative care encounters and GOC notes, v.advance care planning documents including HCP and MOLST forms in Alpha. Part of Palliative Medicine extended evaluation and management also involves coordination of care with our IDT, the primary and consulting vanessa, and unit /SW and Hospice if eligible. Recommendations based on the information gathered and discussed are outline in the AP of our notes.    ************************************************************************ HPI:  Interventional Radiology  Pre-Procedure Note  This is a 66y  Female  with left pleural effusion  HPI:  67 yo F w/ PMHx of HTN, HLD, DM, fibromyalgia, Vulvar CA s/p vulva surgery in 2020, completed chemo & RT in 9/21, found to be anemic Hgb 8.2 so she received 1U PRBC on saturday, 12/11 prior to that received PRBC in 9/21 now p/w SOB, dizziness & nausea for the last couple of weeks. Pt states she initially started experiencing an intermittent steady jabbing type of pain in her upper abdomen/ epigastric region, described as feeling like a gas pocket non-radiating, 9/10 severity, which can last for several hours at a time. Pt feels pain worsen with inspiration or rolling onto her side or when getting up. Pain eases up when she takes motrin & resting. ALso admits to a pleuritic type of chest pain at times. Pt also reports feeling accompanying lightheadedness, generalized weakness, dizziness, nausea & SOB. Pt reports HERNANDEZ after taking a couple of steps and feels the SOB has become constant over the last couple of days. Since thanksgiving she also reports on 2 occasions she felt like she'd pass out when symptoms got bad.     (13 Dec 2021 17:15)    Interval History:   Per primary team's discussion with family, patient is DNR/DNI and comfort measures.  Patient seen and examined at bedside. Patient on Bipap with tachypnea. She states she feels very short of breath and has abdominal pain, but unable to elaborate more due to her dyspnea. Patient asking for "Morphine" to help with her symptoms.       PERTINENT PM/SXH:   Lichen sclerosus of female genitalia  Vulva cancer  HTN (hypertension)  TIA (transient ischemic attack)  DM type 2 (diabetes mellitus, type 2)  Hypothyroid  Thalassemia minor  Strabismus  Fibromyalgia  HLD (hyperlipidemia)  Anxiety and depression  GERD (gastroesophageal reflux disease)  Obesity  S/P laparoscopic cholecystectomy  S/P eye surgery  Vulvar neoplasm  H/O total knee replacement, right    FAMILY HISTORY:  Family history of colon cancer (Aunt)  Family history of diabetes mellitus in mother (Aunt, Mother, Sibling)    ITEMS NOT CHECKED ARE NOT PRESENT    SOCIAL HISTORY:   Significant other/partner[ x]  Children[x ]  Mormon/Spirituality: Jehovah's witness  Substance hx:  [ ]   Tobacco hx:  [x- Smoked 1ppd x 50 years on/off, stopped 1 year ago ]   Alcohol hx: [ ]   Home Opioid hx:  [ ] I-Stop Reference No: [id number]  Living Situation: [ x]Home  [ ]Long term care  [ ]Rehab [ ]Other      ADVANCE DIRECTIVES:    MOLST  [x ]  Living Will  [ ]   DECISION MAKER(s):  [ ] Health Care Proxy(s)  [x ] Surrogate(s)  [ ] Guardian           Name(s): Phone Number(s):   Sachin Irizarryty: [phone]    BASELINE (I)ADL(s) (prior to admission):  [place]: [x]Total  [ ] Moderate [ ]Dependent    Allergies    No Known Allergies    Intolerances    MEDICATIONS  (STANDING):  amLODIPine   Tablet 10 milliGRAM(s) Oral daily  atovaquone  Suspension 1500 milliGRAM(s) Oral daily  budesonide 160 MICROgram(s)/formoterol 4.5 MICROgram(s) Inhaler 2 Puff(s) Inhalation two times a day  calcium carbonate   1250 mG (OsCal) 1 Tablet(s) Oral daily  cefepime   IVPB      cefepime   IVPB 2000 milliGRAM(s) IV Intermittent every 8 hours  chlorhexidine 2% Cloths 1 Application(s) Topical <User Schedule>  cholecalciferol 2000 Unit(s) Oral daily  Dakins Solution - 1/4 Strength 1 Application(s) Topical daily  dextrose 40% Gel 15 Gram(s) Oral once  dextrose 5%. 1000 milliLiter(s) (50 mL/Hr) IV Continuous <Continuous>  dextrose 5%. 1000 milliLiter(s) (100 mL/Hr) IV Continuous <Continuous>  dextrose 50% Injectable 25 Gram(s) IV Push once  dextrose 50% Injectable 12.5 Gram(s) IV Push once  dextrose 50% Injectable 25 Gram(s) IV Push once  escitalopram 30 milliGRAM(s) Oral daily  gabapentin 400 milliGRAM(s) Oral three times a day  glucagon  Injectable 1 milliGRAM(s) IntraMuscular once  glycopyrrolate Injectable 0.2 milliGRAM(s) IV Push every 4 hours  heparin   Injectable 5000 Unit(s) SubCutaneous every 8 hours  influenza  Vaccine (HIGH DOSE) 0.7 milliLiter(s) IntraMuscular once  insulin glargine Injectable (LANTUS) 25 Unit(s) SubCutaneous at bedtime  insulin lispro (ADMELOG) corrective regimen sliding scale   SubCutaneous every 6 hours  lactulose Retention Enema 200 Gram(s) Rectal daily  levothyroxine 137 MICROGram(s) Oral daily  methylPREDNISolone sodium succinate Injectable 40 milliGRAM(s) IV Push daily  metoprolol tartrate 12.5 milliGRAM(s) Oral two times a day  naloxegol 25 milliGRAM(s) Oral daily  nystatin    Suspension 252434 Unit(s) Swish and Swallow four times a day  pantoprazole    Tablet 40 milliGRAM(s) Oral before breakfast  polyethylene glycol 3350 17 Gram(s) Oral two times a day  senna 2 Tablet(s) Oral at bedtime  simethicone 80 milliGRAM(s) Chew four times a day  simvastatin 20 milliGRAM(s) Oral at bedtime  sodium chloride 0.9%. 1000 milliLiter(s) (50 mL/Hr) IV Continuous <Continuous>  vancomycin  IVPB 1000 milliGRAM(s) IV Intermittent every 12 hours  vancomycin  IVPB        MEDICATIONS  (PRN):  aluminum hydroxide/magnesium hydroxide/simethicone Suspension 30 milliLiter(s) Oral every 6 hours PRN Dyspepsia  bisacodyl Suppository 10 milliGRAM(s) Rectal daily PRN Constipation  diphenhydrAMINE 25 milliGRAM(s) Oral daily PRN Rash and/or Itching  lidocaine/prilocaine Cream 1 Application(s) Topical daily PRN Dressing changes  melatonin 3 milliGRAM(s) Oral at bedtime PRN Insomnia  metoclopramide Injectable 10 milliGRAM(s) IV Push every 6 hours PRN nausea or vomiting.  morphine  - Injectable 2 milliGRAM(s) IV Push every 2 hours PRN mod to severe pain or dyspnea  ondansetron Injectable 4 milliGRAM(s) IV Push three times a day PRN Nausea and/or Vomiting      PRESENT SYMPTOMS: [ x] limited due to dyspnea/tachypnea   Source if other than patient:  [ ]Family   [ ]Team     Pain: [x ]yes [ ]no  QOL impact - unable to elaborate  Location -    abdomen                 Aggravating factors - none  Quality - unable to elaborate  Radiation - none   Timing-unable to elaborate  Severity (0-10 scale):  Minimal acceptable level (0-10 scale):     CPOT:    https://www.[facility].org/getattachment/[email or web address]/Critical-Care-Pain-Observation-Tool-(CPOT)      PAIN AD Score:     [email or web address]/cog/[email or web address].pdf (press ctrl +  left click to view)    Dyspnea:                           [ ]Mild [ x]Moderate [ ]Severe  Anxiety:                             [ ]Mild [ ]Moderate [ ]Severe  Agitation:                          [ ]Mild [ ]Moderate [ ]Severe  Fatigue:                             [ ]Mild [ ]Moderate [ ]Severe  Nausea:                             [ ]Mild [ ]Moderate [ ]Severe  Loss of appetite:              [ ]Mild [ ]Moderate [ ]Severe  Constipation:                   [ ]Mild [ ]Moderate [ ]Severe  Diarrhea:                          [ ]Mild [ ]Moderate [ ]Severe      Other Symptoms:  [ x]All other review of systems negative - limited due to dyspnea/tachypnea    Palliative Performance Status Version 2: 40-50        %    http://npcrc.org/files/news/palliative_performance_scale_ppsv2.pdf    PHYSICAL EXAM:  Vital Signs Last 24 Hrs  T(C): 37.1 (04 Jan 2022 03:56), Max: 37.1 (04 Jan 2022 03:56)  T(F): 98.8 (04 Jan 2022 03:56), Max: 98.8 (04 Jan 2022 03:56)  HR: 92 (04 Jan 2022 11:00) (80 - 112)  BP: 108/41 (04 Jan 2022 11:00) (108/41 - 174/71)  BP(mean): 47 (04 Jan 2022 11:00) (47 - 94)  RR: 24 (04 Jan 2022 10:00) (17 - 35)  SpO2: 100% (04 Jan 2022 11:00) (90% - 100%)     I&O's Summary    03 Jan 2022 07:01  -  04 Jan 2022 07:00  --------------------------------------------------------  IN: 1370 mL / OUT: 1600 mL / NET: -230 mL    04 Jan 2022 07:01  -  04 Jan 2022 12:22  --------------------------------------------------------  IN: 300 mL / OUT: 0 mL / NET: 300 mL    GENERAL:  [ x]Alert  [ x]Oriented x 2-3  [ ]Lethargic  [ ]Cachexia  [ ]Unarousable  [x ]Verbal  [ ]Non-Verbal  [ x] Respiratory Distress  Behavioral:   [ x] Anxiety  [ ] Delirium [ ] Agitation [ ] Calm  [ ] Other  HEENT:  [x ]Normal  [ ] Temporal Wasting  [ ]Dry mouth   [ ]ET Tube/Trach  [ ]Oral lesions  [ ] Mucositis  PULMONARY: on Bipap  [ ]Clear [ x]Tachypnea- difficulty speaking in full sentences  [ ]Audible excessive secretions   [ ]Rhonchi        [ ]Right [ ]Left [ ]Bilateral  [ ]Crackles        [ ]Right [ ]Left [ ]Bilateral  [ ]Wheezing     [ ]Right [ ]Left [ ]Bilateral  [x ]Diminished breath sounds [ ]right [ ]left [x ]bilateral  CARDIOVASCULAR:    [ x]Regular [ ]Irregular [ ]Tachy  [ ]Jose [ ]Murmur [ ]Other  GASTROINTESTINAL: central obesity   [x ]Soft  [ ]Distended   [ ]+BS  [x ]Non tender [ ]Tender  [ ]PEG [ ]OGT/ NGT  Last BM: 1/4  GENITOURINARY:  [ x]Normal [ ] Incontinent   [ ]Oliguria/Anuria   [ ][place]  MUSCULOSKELETAL:   [ ]Normal   [ x]Weakness  [ ]Bed/Wheelchair bound [ ]Edema  [  ] amputation  [  ] contraction  NEUROLOGIC:   [x ]No focal deficits  [ ]Cognitive impairment  [ ]Dysphagia [ ]Dysarthria [ ]Paresis [ ]Other   SKIN: Moisture Associated Dermatitis. See Nursing Skin Assessment for further details  [ ]Normal    [ ]Rash  [ ]Pressure ulcer(s)       Present on admission [ ]y [ ]n   [  x]  Wound  - right groin  [  ] hyperpigmentation    CRITICAL CARE:  [ ] Shock Present  [ ]Septic [ ]Cardiogenic [ ]Neurologic [ ]Hypovolemic  [ ]  Vasopressors [ ]  Inotropes   [ x]Respiratory failure present [ ]Mechanical ventilation [x ]Non-invasive ventilatory support- Bipap [ ]High flow    [x ]Acute  [ ]Chronic [x ]Hypoxic  [ ]Hypercarbic [ ]Other  [ ]Other organ failure     LABS:                        8.0    15.15 )-----------( 164      ( 04 Jan 2022 03:58 )             26.1   01-04    135  |  97<L>  |  48<H>  ----------------------------<  226<H>  5.9<H>   |  28  |  0.65    Ca    8.8      04 Jan 2022 03:58  Phos  3.1     01-04  Mg     2.10     01-04    TPro  6.2  /  Alb  3.2<L>  /  TBili  0.2  /  DBili  x   /  AST  10  /  ALT  15  /  AlkPhos  68  01-04  PT/INR - ( 04 Jan 2022 03:58 )   PT: 11.8 sec;   INR: 1.03 ratio         PTT - ( 04 Jan 2022 03:58 )  PTT:22.5 sec    CAPILLARY BLOOD GLUCOSE  POCT Blood Glucose.: 268 mg/dL (04 Jan 2022 06:59)  POCT Blood Glucose.: 265 mg/dL (04 Jan 2022 06:24)  POCT Blood Glucose.: 231 mg/dL (04 Jan 2022 03:09)  POCT Blood Glucose.: 169 mg/dL (03 Jan 2022 23:35)  POCT Blood Glucose.: 168 mg/dL (03 Jan 2022 21:26)  POCT Blood Glucose.: 135 mg/dL (03 Jan 2022 17:02)      RADIOLOGY & ADDITIONAL STUDIES:  CXRAY 1/4/2022  IMPRESSION:  Large left pleural effusion with partial removal of the fluid post thoracentesis without complicating pneumothorax.    XRAY Abdomen 1/4/2022  IMPRESSION:  Redemonstration of moderate dilation of the ascending colon and cecum filled with previously administered contrast and stool.    CT Abd/ Pelvis 1/3/2022  Mildly distended right colon with stool and mild gaseous distention of the transverse colon and splenic flexure. Caliber change in the proximal descending colon without discrete obstructing mass identified, and is of uncertain etiology. No dilated small bowel to suggest small bowel obstruction.  Right inguinal wound with subjacent cavity/collection containing air, debris, and possibly packing material, which is similar to mildly decreased in size since 12/17/2021.  Small right and moderate left loculated pleural effusions. Redemonstrated nodular pleural thickening at the bilateral lung bases, suggestive of metastatic disease.    US Abdomen 1/2/2022  IMPRESSION:  No evidence of ascites.  Mild increase in size of a nonspecific periceliac lymph node since   12/17/2021.    CT Chest 12/24/2021  In comparison with 12/13/2021,  Stable trace right and small-moderate loculated left pleural effusions.  Near complete atelectasis of left lower lobe is new from prior.  3 cm spiculated mass within right lower lobe is slightly enlarged;   previously 2.6 cm. The other smaller spiculated metastatic nodules are unchanged. Mildly enlarged mediastinal lymph nodes slightly increased from prior.    CTA Chest 12/13/2021  IMPRESSION: No pulmonary embolus is noted.  Multiple nodules are noted within both lungs. Primary consideration is metastasis.  Findings suggestive of malignant pleural effusions bilaterally.    PROTEIN CALORIE MALNUTRITION PRESENT: [ ]mild [ ]moderate [ ]severe [ ]underweight [ ]morbid obesity  https://www.andeal.org/vault/2440/web/files/ONC/Table_Clinical%20Characteristics%20to%20Document%20Malnutrition-White%20JV%20et%20al%202012.pdf    Height (cm): 160 (12-29-21 @ 14:48), 160 (09-03-21 @ 11:09), 160.5 (07-15-21 @ 11:40)  Weight (kg): 73.8 (12-29-21 @ 17:51), 80.998 (12-09-21 @ 16:00), 83.5 (09-03-21 @ 11:09)  BMI (kg/m2): 28.8 (12-29-21 @ 17:51), 31.6 (12-29-21 @ 14:48), 31.6 (12-09-21 @ 16:00)    [ ]PPSV2 < or = to 30% [ ]significant weight loss  [ ]poor nutritional intake  [ ]anasarca [ ]Artificial Nutrition      REFERRALS:   [ ]Chaplaincy  [ ]Hospice  [ ]Child Life  [ ]Social Work  [x ]Case management [ ]Holistic Therapy     ************************************************************************  PALLIATIVE MEDICINE COORDINATION OF CARE DOCUMENTATION  [x] Inpatient Consult  [ ] Other:  ************************************************************************    HPI reviewed   ------------------------------------------------------------------------    MEDICATION REVIEW:  --- Pls refer to current medicatons in the body of this note  ISTOP REFERENCE:[id number]  Others' Prescriptions  Patient Name: Leana Arias Date: 1955  Address: [address] [place], [place] [address]Sex: Female  Rx Written	Rx Dispensed	Drug	Quantity	Days Supply	Prescriber Name	Prescriber Carolyn #	Payment Method	Dispenser  10/11/2021	10/13/2021	alprazolam 0.25 mg tablet	30	7	Jessica Sales	[id number]	Medicare	[id number]  09/09/2021	09/09/2021	oxycodone hcl 5 mg tablet	60	10	Paulo Carpenter)	[id number]	Medicare	[id number]  09/06/2021	09/06/2021	oxycodone hcl 15 mg tablet	60	15	Paulo Carpenter)	[id number]	Medicare	[id number]  08/12/2021	08/13/2021	oxycodone hcl 10 mg tablet	60	15	John Maldonado	[id number]	[facility]  07/29/2021	07/29/2021	oxycodone hcl 5 mg tablet	60	5	Paulo Carpenter)	[id number]	[facility]  07/14/2021	07/17/2021	alprazolam 0.25 mg tablet	30	7	Jessica Sales	[id number]	Medicare	[id number]  06/24/2021	06/25/2021	oxycodone-acetaminophen 5-325 mg tab	28	7	Mary Lucas (NP)	[id number]	Medicare	[id number]  04/05/2021	04/06/2021	oxycodone-acetaminophen 5-325 mg tablet	10	2	[facility]	[id number]	Medicare	[id number]  03/29/2021	03/30/2021	alprazolam 0.25 mg tablet	30	8	Jessica Sales	[id number]	Medicare	[id number]  02/04/2021	02/11/2021	alprazolam 0.25 mg tablet	30	8	Jessica Sales	[id number]	Medicare	[id number]  --- PRN usage: The patient HAS used 15mg IV Morphine in the last 24h.  ------------------------------------------------------------------------  COORDINATION OF CARE:  --- Palliative Care consulted for: symptom management   --- Patient assessed: 1/4/2022  --- Patient previously seen by Palliative Care service: NO  ADVANCE CARE PLANNING  --- Code status: DNR/DNI  --- MOLST reviewed in chart: Yes. completed by primary team  --- HCP/ Surrogate:  Sachin Carranza  --- [facility] document found in Alpha: NONE  --- HCP/ Living will/ Other advanced directives in Alpha: NONE  ------------------------------------------------------------------------  CARE PROVIDER DOCUMENTATION:  --- SW/CM notes: Patient  and living at home with . Functional status prior to admission: Independent  --- Medicine and ICU notes reviewed  --- Oncology notes reviewed: recurrent vulvar cancer (recently received chemo/RT from 7/2021-9/2021). Patient found ot have multiple lung nodules and B/L pleural effusions.  path from lung nodule biopsy 12/22 consistent with metastatic squamous cell carcinoma (previous vulvar cancer also sq cell ca), sent for NGS and PDL1. due to concern for recurrence (and now metastatic disease) and significant symptoms (shortness of breath), s/p C1 carboplatin + paclitaxel on 12/30. 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 Would slowly start tapering steroid dose"   --- Endocrinology notes reviewed     PLAN OF CARE  --- Known admissions in past year: 1   --- Current admit date: 12/13/2021  --- LOS: 23 days   --- LACE score: 17  --- Current dispo plan: TO BE DETERMINED  ------------------------------------------------------------------------  --- Chart reviewed: 30 Minutes [including time used to gather, review and transfer data to this note]    Prolonged services rendered, as part of this patient's care provided by Palliative Medicine, include: i.chart review for provider and ancillary service documentation, ii.pertinent diagnostics including laboratory and imaging studies,iii. medication review including PRN use, iv. admission history including previous palliative care encounters and GOC notes, v.advance care planning documents including HCP and MOLST forms in Alpha. Part of Palliative Medicine extended evaluation and management also involves coordination of care with our IDT, the primary and consulting vanessa, and unit CM/SW and Hospice if eligible. Recommendations based on the information gathered and discussed are outline in the AP of our notes.    ************************************************************************ HPI:  Interventional Radiology  Pre-Procedure Note  This is a 66y  Female  with left pleural effusion  HPI:  67 yo F w/ PMHx of HTN, HLD, DM, fibromyalgia, Vulvar CA s/p vulva surgery in 2020, completed chemo & RT in 9/21, found to be anemic Hgb 8.2 so she received 1U PRBC on saturday, 12/11 prior to that received PRBC in 9/21 now p/w SOB, dizziness & nausea for the last couple of weeks. Pt states she initially started experiencing an intermittent steady jabbing type of pain in her upper abdomen/ epigastric region, described as feeling like a gas pocket non-radiating, 9/10 severity, which can last for several hours at a time. Pt feels pain worsen with inspiration or rolling onto her side or when getting up. Pain eases up when she takes motrin & resting. ALso admits to a pleuritic type of chest pain at times. Pt also reports feeling accompanying lightheadedness, generalized weakness, dizziness, nausea & SOB. Pt reports HERNANDEZ after taking a couple of steps and feels the SOB has become constant over the last couple of days. Since thanksgiving she also reports on 2 occasions she felt like she'd pass out when symptoms got bad.     (13 Dec 2021 17:15)    Interval History:   Per primary team's discussion with family, patient is DNR/DNI and comfort measures.  Patient seen and examined at bedside. Patient on Bipap with tachypnea. She states she feels very short of breath and has abdominal pain, but unable to elaborate more due to her dyspnea. Patient asking for "Morphine" to help with her symptoms.       PERTINENT PM/SXH:   Lichen sclerosus of female genitalia  Vulva cancer  HTN (hypertension)  TIA (transient ischemic attack)  DM type 2 (diabetes mellitus, type 2)  Hypothyroid  Thalassemia minor  Strabismus  Fibromyalgia  HLD (hyperlipidemia)  Anxiety and depression  GERD (gastroesophageal reflux disease)  Obesity  S/P laparoscopic cholecystectomy  S/P eye surgery  Vulvar neoplasm  H/O total knee replacement, right    FAMILY HISTORY:  Family history of colon cancer (Aunt)  Family history of diabetes mellitus in mother (Aunt, Mother, Sibling)    ITEMS NOT CHECKED ARE NOT PRESENT    SOCIAL HISTORY:   Significant other/partner[ x]  Children[x ]  Hinduism/Spirituality: Taoism  Substance hx:  [ ]   Tobacco hx:  [x- Smoked 1ppd x 50 years on/off, stopped 1 year ago ]   Alcohol hx: [ ]   Home Opioid hx:  [ ] I-Stop Reference No: [id number]  Living Situation: [ x]Home  [ ]Long term care  [ ]Rehab [ ]Other      ADVANCE DIRECTIVES:    MOLST  [x ]  Living Will  [ ]   DECISION MAKER(s):  [ ] Health Care Proxy(s)  [x ] Surrogate(s)  [ ] Guardian           Name(s): Phone Number(s):   Sachin Irizarryty: [phone]    BASELINE (I)ADL(s) (prior to admission):  [place]: [x]Total  [ ] Moderate [ ]Dependent    Allergies    No Known Allergies    Intolerances    MEDICATIONS  (STANDING):  amLODIPine   Tablet 10 milliGRAM(s) Oral daily  atovaquone  Suspension 1500 milliGRAM(s) Oral daily  budesonide 160 MICROgram(s)/formoterol 4.5 MICROgram(s) Inhaler 2 Puff(s) Inhalation two times a day  calcium carbonate   1250 mG (OsCal) 1 Tablet(s) Oral daily  cefepime   IVPB      cefepime   IVPB 2000 milliGRAM(s) IV Intermittent every 8 hours  chlorhexidine 2% Cloths 1 Application(s) Topical <User Schedule>  cholecalciferol 2000 Unit(s) Oral daily  Dakins Solution - 1/4 Strength 1 Application(s) Topical daily  dextrose 40% Gel 15 Gram(s) Oral once  dextrose 5%. 1000 milliLiter(s) (50 mL/Hr) IV Continuous <Continuous>  dextrose 5%. 1000 milliLiter(s) (100 mL/Hr) IV Continuous <Continuous>  dextrose 50% Injectable 25 Gram(s) IV Push once  dextrose 50% Injectable 12.5 Gram(s) IV Push once  dextrose 50% Injectable 25 Gram(s) IV Push once  escitalopram 30 milliGRAM(s) Oral daily  gabapentin 400 milliGRAM(s) Oral three times a day  glucagon  Injectable 1 milliGRAM(s) IntraMuscular once  glycopyrrolate Injectable 0.2 milliGRAM(s) IV Push every 4 hours  heparin   Injectable 5000 Unit(s) SubCutaneous every 8 hours  influenza  Vaccine (HIGH DOSE) 0.7 milliLiter(s) IntraMuscular once  insulin glargine Injectable (LANTUS) 25 Unit(s) SubCutaneous at bedtime  insulin lispro (ADMELOG) corrective regimen sliding scale   SubCutaneous every 6 hours  lactulose Retention Enema 200 Gram(s) Rectal daily  levothyroxine 137 MICROGram(s) Oral daily  methylPREDNISolone sodium succinate Injectable 40 milliGRAM(s) IV Push daily  metoprolol tartrate 12.5 milliGRAM(s) Oral two times a day  naloxegol 25 milliGRAM(s) Oral daily  nystatin    Suspension 370365 Unit(s) Swish and Swallow four times a day  pantoprazole    Tablet 40 milliGRAM(s) Oral before breakfast  polyethylene glycol 3350 17 Gram(s) Oral two times a day  senna 2 Tablet(s) Oral at bedtime  simethicone 80 milliGRAM(s) Chew four times a day  simvastatin 20 milliGRAM(s) Oral at bedtime  sodium chloride 0.9%. 1000 milliLiter(s) (50 mL/Hr) IV Continuous <Continuous>  vancomycin  IVPB 1000 milliGRAM(s) IV Intermittent every 12 hours  vancomycin  IVPB        MEDICATIONS  (PRN):  aluminum hydroxide/magnesium hydroxide/simethicone Suspension 30 milliLiter(s) Oral every 6 hours PRN Dyspepsia  bisacodyl Suppository 10 milliGRAM(s) Rectal daily PRN Constipation  diphenhydrAMINE 25 milliGRAM(s) Oral daily PRN Rash and/or Itching  lidocaine/prilocaine Cream 1 Application(s) Topical daily PRN Dressing changes  melatonin 3 milliGRAM(s) Oral at bedtime PRN Insomnia  metoclopramide Injectable 10 milliGRAM(s) IV Push every 6 hours PRN nausea or vomiting.  morphine  - Injectable 2 milliGRAM(s) IV Push every 2 hours PRN mod to severe pain or dyspnea  ondansetron Injectable 4 milliGRAM(s) IV Push three times a day PRN Nausea and/or Vomiting      PRESENT SYMPTOMS: [ x] limited due to dyspnea/tachypnea   Source if other than patient:  [ ]Family   [ ]Team     Pain: [x ]yes [ ]no  QOL impact - unable to elaborate  Location -    abdomen                 Aggravating factors - none  Quality - unable to elaborate  Radiation - none   Timing-unable to elaborate  Severity (0-10 scale):  Minimal acceptable level (0-10 scale):     CPOT:    https://www.[facility].org/getattachment/wtz85m05-6k7c-[email or web address]-0015b3697b9z/Critical-Care-Pain-Observation-Tool-(CPOT)      PAIN AD Score:     http://geriatrictoolkit.[place].[facility]/cog/painad.pdf (press ctrl +  left click to view)    Dyspnea:                           [ ]Mild [ x]Moderate [ ]Severe  Anxiety:                             [ ]Mild [ ]Moderate [ ]Severe  Agitation:                          [ ]Mild [ ]Moderate [ ]Severe  Fatigue:                             [ ]Mild [ ]Moderate [ ]Severe  Nausea:                             [ ]Mild [ ]Moderate [ ]Severe  Loss of appetite:              [ ]Mild [ ]Moderate [ ]Severe  Constipation:                   [ ]Mild [ ]Moderate [ ]Severe  Diarrhea:                          [ ]Mild [ ]Moderate [ ]Severe      Other Symptoms:  [ x]All other review of systems negative - limited due to dyspnea/tachypnea    Palliative Performance Status Version 2: 40-50        %    http://npcrc.org/files/news/palliative_performance_scale_ppsv2.pdf    PHYSICAL EXAM:  Vital Signs Last 24 Hrs  T(C): 37.1 (04 Jan 2022 03:56), Max: 37.1 (04 Jan 2022 03:56)  T(F): 98.8 (04 Jan 2022 03:56), Max: 98.8 (04 Jan 2022 03:56)  HR: 92 (04 Jan 2022 11:00) (80 - 112)  BP: 108/41 (04 Jan 2022 11:00) (108/41 - 174/71)  BP(mean): 47 (04 Jan 2022 11:00) (47 - 94)  RR: 24 (04 Jan 2022 10:00) (17 - 35)  SpO2: 100% (04 Jan 2022 11:00) (90% - 100%)     I&O's Summary    03 Jan 2022 07:01  -  04 Jan 2022 07:00  --------------------------------------------------------  IN: 1370 mL / OUT: 1600 mL / NET: -230 mL    04 Jan 2022 07:01  -  04 Jan 2022 12:22  --------------------------------------------------------  IN: 300 mL / OUT: 0 mL / NET: 300 mL    GENERAL:  [ x]Alert  [ x]Oriented x 2-3  [ ]Lethargic  [ ]Cachexia  [ ]Unarousable  [x ]Verbal  [ ]Non-Verbal  [ x] Respiratory Distress  Behavioral:   [ x] Anxiety  [ ] Delirium [ ] Agitation [ ] Calm  [ ] Other  HEENT:  [x ]Normal  [ ] Temporal Wasting  [ ]Dry mouth   [ ]ET Tube/Trach  [ ]Oral lesions  [ ] Mucositis  PULMONARY: on Bipap  [ ]Clear [ x]Tachypnea- difficulty speaking in full sentences  [ ]Audible excessive secretions   [ ]Rhonchi        [ ]Right [ ]Left [ ]Bilateral  [ ]Crackles        [ ]Right [ ]Left [ ]Bilateral  [ ]Wheezing     [ ]Right [ ]Left [ ]Bilateral  [x ]Diminished breath sounds [ ]right [ ]left [x ]bilateral  CARDIOVASCULAR:    [ x]Regular [ ]Irregular [ ]Tachy  [ ]Jose [ ]Murmur [ ]Other  GASTROINTESTINAL: central obesity   [x ]Soft  [ ]Distended   [ ]+BS  [x ]Non tender [ ]Tender  [ ]PEG [ ]OGT/ NGT  Last BM: 1/4  GENITOURINARY:  [ x]Normal [ ] Incontinent   [ ]Oliguria/Anuria   [ ][place]  MUSCULOSKELETAL:   [ ]Normal   [ x]Weakness  [ ]Bed/Wheelchair bound [ ]Edema  [  ] amputation  [  ] contraction  NEUROLOGIC:   [x ]No focal deficits  [ ]Cognitive impairment  [ ]Dysphagia [ ]Dysarthria [ ]Paresis [ ]Other   SKIN: Moisture Associated Dermatitis. See Nursing Skin Assessment for further details  [ ]Normal    [ ]Rash  [ ]Pressure ulcer(s)       Present on admission [ ]y [ ]n   [  x]  Wound  - right groin  [  ] hyperpigmentation    CRITICAL CARE:  [ ] Shock Present  [ ]Septic [ ]Cardiogenic [ ]Neurologic [ ]Hypovolemic  [ ]  Vasopressors [ ]  Inotropes   [ x]Respiratory failure present [ ]Mechanical ventilation [x ]Non-invasive ventilatory support- Bipap [ ]High flow    [x ]Acute  [ ]Chronic [x ]Hypoxic  [ ]Hypercarbic [ ]Other  [ ]Other organ failure     LABS:                        8.0    15.15 )-----------( 164      ( 04 Jan 2022 03:58 )             26.1   01-04    135  |  97<L>  |  48<H>  ----------------------------<  226<H>  5.9<H>   |  28  |  0.65    Ca    8.8      04 Jan 2022 03:58  Phos  3.1     01-04  Mg     2.10     01-04    TPro  6.2  /  Alb  3.2<L>  /  TBili  0.2  /  DBili  x   /  AST  10  /  ALT  15  /  AlkPhos  68  01-04  PT/INR - ( 04 Jan 2022 03:58 )   PT: 11.8 sec;   INR: 1.03 ratio         PTT - ( 04 Jan 2022 03:58 )  PTT:22.5 sec    CAPILLARY BLOOD GLUCOSE  POCT Blood Glucose.: 268 mg/dL (04 Jan 2022 06:59)  POCT Blood Glucose.: 265 mg/dL (04 Jan 2022 06:24)  POCT Blood Glucose.: 231 mg/dL (04 Jan 2022 03:09)  POCT Blood Glucose.: 169 mg/dL (03 Jan 2022 23:35)  POCT Blood Glucose.: 168 mg/dL (03 Jan 2022 21:26)  POCT Blood Glucose.: 135 mg/dL (03 Jan 2022 17:02)      RADIOLOGY & ADDITIONAL STUDIES:  CXRAY 1/4/2022  IMPRESSION:  Large left pleural effusion with partial removal of the fluid post thoracentesis without complicating pneumothorax.    XRAY Abdomen 1/4/2022  IMPRESSION:  Redemonstration of moderate dilation of the ascending colon and cecum filled with previously administered contrast and stool.    CT Abd/ Pelvis 1/3/2022  Mildly distended right colon with stool and mild gaseous distention of the transverse colon and splenic flexure. Caliber change in the proximal descending colon without discrete obstructing mass identified, and is of uncertain etiology. No dilated small bowel to suggest small bowel obstruction.  Right inguinal wound with subjacent cavity/collection containing air, debris, and possibly packing material, which is similar to mildly decreased in size since 12/17/2021.  Small right and moderate left loculated pleural effusions. Redemonstrated nodular pleural thickening at the bilateral lung bases, suggestive of metastatic disease.    US Abdomen 1/2/2022  IMPRESSION:  No evidence of ascites.  Mild increase in size of a nonspecific periceliac lymph node since   12/17/2021.    CT Chest 12/24/2021  In comparison with 12/13/2021,  Stable trace right and small-moderate loculated left pleural effusions.  Near complete atelectasis of left lower lobe is new from prior.  3 cm spiculated mass within right lower lobe is slightly enlarged;   previously 2.6 cm. The other smaller spiculated metastatic nodules are unchanged. Mildly enlarged mediastinal lymph nodes slightly increased from prior.    CTA Chest 12/13/2021  IMPRESSION: No pulmonary embolus is noted.  Multiple nodules are noted within both lungs. Primary consideration is metastasis.  Findings suggestive of malignant pleural effusions bilaterally.    PROTEIN CALORIE MALNUTRITION PRESENT: [ ]mild [ ]moderate [ ]severe [ ]underweight [ ]morbid obesity  https://www.andeal.org/vault/2440/web/files/ONC/Table_Clinical%20Characteristics%20to%20Document%20Malnutrition-White%20JV%20et%20al%202012.pdf    Height (cm): 160 (12-29-21 @ 14:48), 160 (09-03-21 @ 11:09), 160.5 (07-15-21 @ 11:40)  Weight (kg): 73.8 (12-29-21 @ 17:51), 80.998 (12-09-21 @ 16:00), 83.5 (09-03-21 @ 11:09)  BMI (kg/m2): 28.8 (12-29-21 @ 17:51), 31.6 (12-29-21 @ 14:48), 31.6 (12-09-21 @ 16:00)    [ ]PPSV2 < or = to 30% [ ]significant weight loss  [ ]poor nutritional intake  [ ]anasarca [ ]Artificial Nutrition      REFERRALS:   [ ]Chaplaincy  [ ]Hospice  [ ]Child Life  [ ]Social Work  [x ]Case management [ ]Holistic Therapy     ************************************************************************  PALLIATIVE MEDICINE COORDINATION OF CARE DOCUMENTATION  [x] Inpatient Consult  [ ] Other:  ************************************************************************    HPI reviewed   ------------------------------------------------------------------------    MEDICATION REVIEW:  --- Pls refer to current medicatons in the body of this note  ISTOP REFERENCE:[id number]  Others' Prescriptions  Patient Name: Leana Arias Date: 1955  Address: [address] [place], [place] [address]Sex: Female  Rx Written	Rx Dispensed	Drug	Quantity	Days Supply	Prescriber Name	Prescriber Carolyn #	Payment Method	Dispenser  10/11/2021	10/13/2021	alprazolam 0.25 mg tablet	30	7	Jessica Sales	[id number]	Medicare	[id number]  09/09/2021	09/09/2021	oxycodone hcl 5 mg tablet	60	10	Paulo Carpenter)	[id number]	Medicare	[id number]  09/06/2021	09/06/2021	oxycodone hcl 15 mg tablet	60	15	Paulo Carpenter)	[id number]	Medicare	[facility] #[id number]  08/12/2021	08/13/2021	oxycodone hcl 10 mg tablet	60	15	John Maldonado	[id number]	[facility]  07/29/2021	07/29/2021	oxycodone hcl 5 mg tablet	60	5	Paulo Carpenter)	[id number]	[facility]  07/14/2021	07/17/2021	alprazolam 0.25 mg tablet	30	7	Jessica Sales	[id number]	Medicare	[id number]  06/24/2021	06/25/2021	oxycodone-acetaminophen 5-325 mg tab	28	7	Mary Lucas (NP)	[id number]	Medicare	[id number]  04/05/2021	04/06/2021	oxycodone-acetaminophen 5-325 mg tablet	10	2	[facility]	[id number]	Medicare	[id number]  03/29/2021	03/30/2021	alprazolam 0.25 mg tablet	30	8	Jessica Sales	[id number]	Medicare	[id number]  02/04/2021	02/11/2021	alprazolam 0.25 mg tablet	30	8	Jessica Sales	[id number]	Medicare	[id number]  --- PRN usage: The patient HAS used 15mg IV Morphine in the last 24h.  ------------------------------------------------------------------------  COORDINATION OF CARE:  --- Palliative Care consulted for: symptom management   --- Patient assessed: 1/4/2022  --- Patient previously seen by Palliative Care service: NO  ADVANCE CARE PLANNING  --- Code status: DNR/DNI  --- MOLST reviewed in chart: Yes. completed by primary team  --- HCP/ Surrogate:  Sachin Carranza  --- [facility] document found in Alpha: NONE  --- HCP/ Living will/ Other advanced directives in Alpha: NONE  ------------------------------------------------------------------------  CARE PROVIDER DOCUMENTATION:  --- SW/CM notes: Patient  and living at home with . Functional status prior to admission: Independent  --- Medicine and ICU notes reviewed  --- Oncology notes reviewed: recurrent vulvar cancer (recently received chemo/RT from 7/2021-9/2021). Patient found ot have multiple lung nodules and B/L pleural effusions.  path from lung nodule biopsy 12/22 consistent with metastatic squamous cell carcinoma (previous vulvar cancer also sq cell ca), sent for NGS and PDL1. due to concern for recurrence (and now metastatic disease) and significant symptoms (shortness of breath), s/p C1 carboplatin + paclitaxel on 12/30. 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 Would slowly start tapering steroid dose"   --- Endocrinology notes reviewed   --- Infectious Disease notes reviewed     PLAN OF CARE  --- Known admissions in past year: 1   --- Current admit date: 12/13/2021  --- LOS: 23 days   --- LACE score: 17  --- Current dispo plan: TO BE DETERMINED  ------------------------------------------------------------------------  --- Chart reviewed: 30 Minutes [including time used to gather, review and transfer data to this note]    Prolonged services rendered, as part of this patient's care provided by Palliative Medicine, include: i.chart review for provider and ancillary service documentation, ii.pertinent diagnostics including laboratory and imaging studies,iii. medication review including PRN use, iv. admission history including previous palliative care encounters and GOC notes, v.advance care planning documents including HCP and MOLST forms in Alpha. Part of Palliative Medicine extended evaluation and management also involves coordination of care with our IDT, the primary and consulting vanessa, and unit CM/SW and Hospice if eligible. Recommendations based on the information gathered and discussed are outline in the AP of our notes.    ************************************************************************

## 2022-01-04 NOTE — CONSULT NOTE ADULT - PROBLEM SELECTOR RECOMMENDATION 9
- likely multifactorial from pleural effusions + metastatic squamous cell cancer with multiple lung nodules   - In past 24 hours, received IV Morphine 15mg.   - Start IV Morphine gtt @ 0.5mg/hour  - Start IV Morphine 2mg q1 PRN pain/dyspnea  - Bowel regimen while on opiates - likely multifactorial from pleural effusions + metastatic squamous cell cancer with multiple lung nodules   - on steroids as per pulmonary team for lymphangitic carcinomatosis  - In past 24 hours, received IV Morphine 15mg.   - Start IV Morphine gtt @ 0.5mg/hour  - Start IV Morphine 2mg q1 PRN pain/dyspnea  - Bowel regimen while on opiates

## 2022-01-04 NOTE — PROGRESS NOTE ADULT - SUBJECTIVE AND OBJECTIVE BOX
[facility]   [phone]    INTERVAL HPI/OVERNIGHT EVENTS: The patient was admitted to the micu overnight for management of acute hypoxic respiratory failure. In brief, she is a 66-year-woman who is followed for HTN, HLD, T2DM, fibromyalgia, recurrent vulvar cancer and who presented initially for evaluation of shortness of breath and was found to have bilateral pulmonary nodules.     SUBJECTIVE: Patient seen and examined at bedside.     CONSTITUTIONAL: No weakness, fevers, or chills  EYES/ENT: No visual changes;  No vertigo or throat pain   NECK: No pain, no stiffness  RESPIRATORY: No cough, no shortness of breath  CARDIOVASCULAR: No chest pain, no palpitations  GASTROINTESTINAL: No abdominal or epigastric pain. No nausea, vomiting, or hematemesis; No diarrhea or constipation. No melena or hematochezia.  GENITOURINARY: No dysuria, frequency, or hematuria  NEUROLOGICAL: No numbness, no weakness  SKIN: No itching, no rashes    OBJECTIVE:    VITAL SIGNS:  ICU Vital Signs Last 24 Hrs  T(C): 37.3 (04 Jan 2022 16:00), Max: 37.4 (04 Jan 2022 12:00)  T(F): 99.2 (04 Jan 2022 16:00), Max: 99.3 (04 Jan 2022 12:00)  HR: 108 (04 Jan 2022 18:00) (80 - 112)  BP: 122/103 (04 Jan 2022 18:00) (108/41 - 174/71)  BP(mean): 107 (04 Jan 2022 18:00) (47 - 137)  ABP: --  ABP(mean): --  RR: 23 (04 Jan 2022 17:00) (17 - 35)  SpO2: 95% (04 Jan 2022 17:00) (90% - 100%)        01-03 @ 07:01  -  01-04 @ 07:00  --------------------------------------------------------  IN: 1370 mL / OUT: 1600 mL / NET: -230 mL    01-04 @ 07:01  -  01-04 @ 19:01  --------------------------------------------------------  IN: 353 mL / OUT: 1700 mL / NET: -1347 mL      CAPILLARY BLOOD GLUCOSE      POCT Blood Glucose.: 182 mg/dL (04 Jan 2022 18:58)      PHYSICAL EXAM:    General: NAD; awake and alert   HEENT: PERRL grossly, clear conjunctiva  Neck: No jvd, no lymphadenopathy  Respiratory: CTA b/l, no rales, no wheezes; equal respiratory excursion   Cardiovascular: +S1/S2; RRR  Abdomen: soft, NT/ND; +BS x4  Extremities: WWP, 2+ peripheral pulses b/l; no LE edema  Skin: normal color and turgor; no rash  Neurological: No gross motor or sensory deficits; coordination intact     MEDICATIONS:  MEDICATIONS  (STANDING):  amLODIPine   Tablet 10 milliGRAM(s) Oral daily  atovaquone  Suspension 1500 milliGRAM(s) Oral daily  budesonide 160 MICROgram(s)/formoterol 4.5 MICROgram(s) Inhaler 2 Puff(s) Inhalation two times a day  calcium carbonate   1250 mG (OsCal) 1 Tablet(s) Oral daily  cefepime   IVPB      cefepime   IVPB 2000 milliGRAM(s) IV Intermittent every 8 hours  chlorhexidine 2% Cloths 1 Application(s) Topical <User Schedule>  cholecalciferol 2000 Unit(s) Oral daily  Dakins Solution - 1/4 Strength 1 Application(s) Topical daily  dextrose 40% Gel 15 Gram(s) Oral once  dextrose 5%. 1000 milliLiter(s) (50 mL/Hr) IV Continuous <Continuous>  dextrose 5%. 1000 milliLiter(s) (100 mL/Hr) IV Continuous <Continuous>  dextrose 50% Injectable 25 Gram(s) IV Push once  dextrose 50% Injectable 12.5 Gram(s) IV Push once  dextrose 50% Injectable 25 Gram(s) IV Push once  escitalopram 30 milliGRAM(s) Oral daily  gabapentin 400 milliGRAM(s) Oral three times a day  glucagon  Injectable 1 milliGRAM(s) IntraMuscular once  glycopyrrolate Injectable 0.2 milliGRAM(s) IV Push every 4 hours  heparin   Injectable 5000 Unit(s) SubCutaneous every 8 hours  influenza  Vaccine (HIGH DOSE) 0.7 milliLiter(s) IntraMuscular once  insulin glargine Injectable (LANTUS) 25 Unit(s) SubCutaneous at bedtime  insulin lispro (ADMELOG) corrective regimen sliding scale   SubCutaneous every 6 hours  lactulose Retention Enema 200 Gram(s) Rectal daily  levothyroxine 137 MICROGram(s) Oral daily  methylPREDNISolone sodium succinate Injectable 40 milliGRAM(s) IV Push daily  metoprolol tartrate 12.5 milliGRAM(s) Oral two times a day  morphine  Infusion 0.5 mG/Hr (0.5 mL/Hr) IV Continuous <Continuous>  naloxegol 25 milliGRAM(s) Oral daily  nystatin    Suspension 392391 Unit(s) Swish and Swallow four times a day  pantoprazole    Tablet 40 milliGRAM(s) Oral before breakfast  polyethylene glycol 3350 17 Gram(s) Oral two times a day  senna 2 Tablet(s) Oral at bedtime  simvastatin 20 milliGRAM(s) Oral at bedtime  sodium chloride 0.9%. 1000 milliLiter(s) (50 mL/Hr) IV Continuous <Continuous>  vancomycin  IVPB 1000 milliGRAM(s) IV Intermittent every 12 hours  vancomycin  IVPB        MEDICATIONS  (PRN):  aluminum hydroxide/magnesium hydroxide/simethicone Suspension 30 milliLiter(s) Oral every 6 hours PRN Dyspepsia  bisacodyl Suppository 10 milliGRAM(s) Rectal daily PRN Constipation  diphenhydrAMINE 25 milliGRAM(s) Oral daily PRN Rash and/or Itching  lidocaine/prilocaine Cream 1 Application(s) Topical daily PRN Dressing changes  melatonin 3 milliGRAM(s) Oral at bedtime PRN Insomnia  metoclopramide Injectable 10 milliGRAM(s) IV Push every 6 hours PRN nausea or vomiting.  morphine  - Injectable 2 milliGRAM(s) IV Push every 1 hour PRN shortness of breath and/or pain  ondansetron Injectable 4 milliGRAM(s) IV Push three times a day PRN Nausea and/or Vomiting      ALLERGIES:  Allergies    No Known Allergies    Intolerances        LABS:                        8.0    15.15 )-----------( 164      ( 04 Jan 2022 03:58 )             26.1     01-04    135  |  97<L>  |  48<H>  ----------------------------<  226<H>  5.9<H>   |  28  |  0.65    Ca    8.8      04 Jan 2022 03:58  Phos  3.1     01-04  Mg     2.10     01-04    TPro  6.2  /  Alb  3.2<L>  /  TBili  0.2  /  DBili  x   /  AST  10  /  ALT  15  /  AlkPhos  68  01-04    PT/INR - ( 04 Jan 2022 03:58 )   PT: 11.8 sec;   INR: 1.03 ratio         PTT - ( 04 Jan 2022 03:58 )  PTT:22.5 sec      RADIOLOGY & ADDITIONAL TESTS: Reviewed. [facility]   [phone]    INTERVAL HPI/OVERNIGHT EVENTS: The patient was admitted to the micu overnight for management of acute hypoxic respiratory failure. In brief, she is a 66-year-woman who is followed for HTN, HLD, T2DM, fibromyalgia, recurrent vulvar cancer and who presented initially for evaluation of shortness of breath and was found to have bilateral pulmonary nodules.     SUBJECTIVE: Patient seen and examined at bedside.     CONSTITUTIONAL: No weakness, fevers, or chills  EYES/ENT: No visual changes;  No vertigo or throat pain   NECK: No pain, no stiffness  RESPIRATORY: No cough, shortness of breath evident   CARDIOVASCULAR: No chest pain, no palpitations  GASTROINTESTINAL: No abdominal or epigastric pain. No nausea, vomiting, or hematemesis; No diarrhea or constipation. No melena or hematochezia.  GENITOURINARY: No dysuria, frequency, or hematuria  NEUROLOGICAL: No numbness, no weakness  SKIN: No itching, no rashes    OBJECTIVE:    VITAL SIGNS:  ICU Vital Signs Last 24 Hrs  T(C): 37.3 (04 Jan 2022 16:00), Max: 37.4 (04 Jan 2022 12:00)  T(F): 99.2 (04 Jan 2022 16:00), Max: 99.3 (04 Jan 2022 12:00)  HR: 108 (04 Jan 2022 18:00) (80 - 112)  BP: 122/103 (04 Jan 2022 18:00) (108/41 - 174/71)  BP(mean): 107 (04 Jan 2022 18:00) (47 - 137)  ABP: --  ABP(mean): --  RR: 23 (04 Jan 2022 17:00) (17 - 35)  SpO2: 95% (04 Jan 2022 17:00) (90% - 100%)        01-03 @ 07:01  -  01-04 @ 07:00  --------------------------------------------------------  IN: 1370 mL / OUT: 1600 mL / NET: -230 mL    01-04 @ 07:01  -  01-04 @ 19:01  --------------------------------------------------------  IN: 353 mL / OUT: 1700 mL / NET: -1347 mL      CAPILLARY BLOOD GLUCOSE      POCT Blood Glucose.: 182 mg/dL (04 Jan 2022 18:58)      PHYSICAL EXAM:    General: Tachypneic on bipap; using accessory muscles of respiration   HEENT: PERRL grossly, clear conjunctiva  Neck: No jvd, no lymphadenopathy  Respiratory: Diffuse rales bilaterally, no wheezes; decreased breath sounds--more diminished on right than on left   Cardiovascular: +S1/S2; RRR  Abdomen: soft, NT/ND; +BS x4  Extremities: WWP, 2+ peripheral pulses b/l; no LE edema  Skin: normal color and turgor; no rash  Neurological: No gross motor or sensory deficits; coordination intact     MEDICATIONS:  MEDICATIONS  (STANDING):  amLODIPine   Tablet 10 milliGRAM(s) Oral daily  atovaquone  Suspension 1500 milliGRAM(s) Oral daily  budesonide 160 MICROgram(s)/formoterol 4.5 MICROgram(s) Inhaler 2 Puff(s) Inhalation two times a day  calcium carbonate   1250 mG (OsCal) 1 Tablet(s) Oral daily  cefepime   IVPB      cefepime   IVPB 2000 milliGRAM(s) IV Intermittent every 8 hours  chlorhexidine 2% Cloths 1 Application(s) Topical <User Schedule>  cholecalciferol 2000 Unit(s) Oral daily  Dakins Solution - 1/4 Strength 1 Application(s) Topical daily  dextrose 40% Gel 15 Gram(s) Oral once  dextrose 5%. 1000 milliLiter(s) (50 mL/Hr) IV Continuous <Continuous>  dextrose 5%. 1000 milliLiter(s) (100 mL/Hr) IV Continuous <Continuous>  dextrose 50% Injectable 25 Gram(s) IV Push once  dextrose 50% Injectable 12.5 Gram(s) IV Push once  dextrose 50% Injectable 25 Gram(s) IV Push once  escitalopram 30 milliGRAM(s) Oral daily  gabapentin 400 milliGRAM(s) Oral three times a day  glucagon  Injectable 1 milliGRAM(s) IntraMuscular once  glycopyrrolate Injectable 0.2 milliGRAM(s) IV Push every 4 hours  heparin   Injectable 5000 Unit(s) SubCutaneous every 8 hours  influenza  Vaccine (HIGH DOSE) 0.7 milliLiter(s) IntraMuscular once  insulin glargine Injectable (LANTUS) 25 Unit(s) SubCutaneous at bedtime  insulin lispro (ADMELOG) corrective regimen sliding scale   SubCutaneous every 6 hours  lactulose Retention Enema 200 Gram(s) Rectal daily  levothyroxine 137 MICROGram(s) Oral daily  methylPREDNISolone sodium succinate Injectable 40 milliGRAM(s) IV Push daily  metoprolol tartrate 12.5 milliGRAM(s) Oral two times a day  morphine  Infusion 0.5 mG/Hr (0.5 mL/Hr) IV Continuous <Continuous>  naloxegol 25 milliGRAM(s) Oral daily  nystatin    Suspension 372149 Unit(s) Swish and Swallow four times a day  pantoprazole    Tablet 40 milliGRAM(s) Oral before breakfast  polyethylene glycol 3350 17 Gram(s) Oral two times a day  senna 2 Tablet(s) Oral at bedtime  simvastatin 20 milliGRAM(s) Oral at bedtime  sodium chloride 0.9%. 1000 milliLiter(s) (50 mL/Hr) IV Continuous <Continuous>  vancomycin  IVPB 1000 milliGRAM(s) IV Intermittent every 12 hours  vancomycin  IVPB        MEDICATIONS  (PRN):  aluminum hydroxide/magnesium hydroxide/simethicone Suspension 30 milliLiter(s) Oral every 6 hours PRN Dyspepsia  bisacodyl Suppository 10 milliGRAM(s) Rectal daily PRN Constipation  diphenhydrAMINE 25 milliGRAM(s) Oral daily PRN Rash and/or Itching  lidocaine/prilocaine Cream 1 Application(s) Topical daily PRN Dressing changes  melatonin 3 milliGRAM(s) Oral at bedtime PRN Insomnia  metoclopramide Injectable 10 milliGRAM(s) IV Push every 6 hours PRN nausea or vomiting.  morphine  - Injectable 2 milliGRAM(s) IV Push every 1 hour PRN shortness of breath and/or pain  ondansetron Injectable 4 milliGRAM(s) IV Push three times a day PRN Nausea and/or Vomiting      ALLERGIES:  Allergies    No Known Allergies    Intolerances        LABS:                        8.0    15.15 )-----------( 164      ( 04 Jan 2022 03:58 )             26.1     01-04    135  |  97<L>  |  48<H>  ----------------------------<  226<H>  5.9<H>   |  28  |  0.65    Ca    8.8      04 Jan 2022 03:58  Phos  3.1     01-04  Mg     2.10     01-04    TPro  6.2  /  Alb  3.2<L>  /  TBili  0.2  /  DBili  x   /  AST  10  /  ALT  15  /  AlkPhos  68  01-04    PT/INR - ( 04 Jan 2022 03:58 )   PT: 11.8 sec;   INR: 1.03 ratio         PTT - ( 04 Jan 2022 03:58 )  PTT:22.5 sec      RADIOLOGY & ADDITIONAL TESTS: Reviewed.

## 2022-01-04 NOTE — CONSULT NOTE ADULT - ATTENDING COMMENTS
Patient seen and examined agree with above note as modified, where appropriate, by me. metastatic vulvar cancer, right effusion s/p thorocentesis. Consulted for pleurX placement. Pt now on comfort care only and will hold off on placement. Reconsult PRN
67 y/o F with fibromyalgia and recurrent vulvar cancer s/p chemoradiation from 7/2021 - 9/2021, history of hemolytic anemia, admitted with complaints of SOB and dizziness. Imaging studies reviewed, c/w bilateral pleural effusions and lung nodules concerning for metastatic disease. Appreciate pulmonary evaluation and plan for thoracentesis. Recommend biopsy of one of the lung nodules for confirmation of metastatic disease and tumor NGS testing. CT A/P for R sided RUQ pain and for complete extent of disease workup. Would care consult for R inguinal open wound. Will continue to follow.
This is a 67 yo F with HTN, HLD, DM, fibromyalgia, Vulvar CA s/p vulva surgery in 2020, completed chemo & RT in 9/21 admitted for shortness of breath, found to have bilateral pleural effusions, L>R and pulmonary nodules. MICU consulted for increased work of breathing, likely in the setting of metastatic disease, lymphangitic spread, and ileus c/b abdominal pain and increasing distension   pt initially on NRB, placed on Bipap 10/5 100% for increased WOB   will admit to MICU for thoracentesis, if no improvement may need intubation  bowel regimen   POCUS large L pleural effusion, small R pleural effusion, RV<LV preserved LV function   Full code  DVT ppx heparin sq
67 year old with vulvar cancer s/p surgery and chemotherapy.    She presented with shortness of breath and was found to have new PEs.     She has a chronic groin wound.  She had recent fever  Prior PET scan with some avid lymph nodes to right groin (noted in May)    If is metastatic disease involving local lymph nodes are contributing to this wound  May want to consider biopsy of these lymph nodes.     Radiation injury may also be contributing.    May be superinfected.  Follow up wound culture  Continue zosyn for now  Can stop vancomycin    Doubt infection is the primary cause of the wound.
Patient with vulvar cancer with pulmonary nodules new compared to 2-3 months ago prior imaging, with pleural effusion concerning for metastatic disease. IR already consulted for thoracentesis. f/u cytopath, if non-diagnostic, will reassess for nodule biopsy.
67 yo F w/ PMHx of HTN, HLD, DM, fibromyalgia, Vulvar CA s/p vulva surgery in 2020, completed chemo & RT in 9/21, now p/w SOB, dizziness N/V for weeks. CTPA - w/multiple nodules also suggestive of malignant pleural effusions bilaterally. Now with worsening hypercalcemia. IR c/s for pulm nodule bx which may direct treatment.    Plan:  - plan for pulm nodule bx on Wed 12/22/21  - NPO@MN   -  hold AM anticoagulation, obtain 4 AM labs (CBC, CMP, coags)

## 2022-01-04 NOTE — CONSULT NOTE ADULT - PROBLEM SELECTOR RECOMMENDATION 6
Emotional support provided, questions answered.    Discussed with primary team regarding symptom management     Thank you for allowing us to participate in your patient's care. Please page [phone] for any questions/concerns. Emotional support provided, questions answered.    Discussed with primary team regarding symptom management   Discussed with primary team to amend MOLST with second witness for MOLST form to be valid.     Thank you for allowing us to participate in your patient's care. Please page [phone] for any questions/concerns.

## 2022-01-04 NOTE — CONSULT NOTE ADULT - PROBLEM SELECTOR PROBLEM 1
Dyspnea
Uncontrolled type 2 diabetes mellitus with hyperglycemia, with long-term current use of insulin

## 2022-01-04 NOTE — PROGRESS NOTE ADULT - SUBJECTIVE AND OBJECTIVE BOX
Hematology Oncology Follow-up    INTERVAL HPI/OVERNIGHT EVENTS:  RRT called overnight for increased work of breathing.   Transferred to MICU and s/p thoracentesis.     VITAL SIGNS:  T(F): 98.8 (01-04-22 @ 03:56)  HR: 85 (01-04-22 @ 08:02)  BP: 147/55 (01-04-22 @ 07:00)  RR: 21 (01-04-22 @ 07:00)  SpO2: 100% (01-04-22 @ 08:02)  Wt(kg): --    01-03-22 @ 07:01  -  01-04-22 @ 07:00  --------------------------------------------------------  IN: 1370 mL / OUT: 1600 mL / NET: -230 mL          Review of Systems:  General: denies fevers/chills  Respiratory: denies cough, shortness of breath  Cardiovascular: denies chest pain, palpitations  Gastrointestinal: denies nausea, vomiting, abdominal pain, constipation, diarrhea, melena, hematochezia  MSK: denies joint pain or muscle pain  Neuro: denies headache, weakness, or parasthesias  Skin: denies rash, petichiae, echymoses  Psych: denies anxiety or sleep disturbances    PHYSICAL EXAM:  Constitutional: NAD  Respiratory: CTA b/l, symmetric chest rise, with normal respiratory effort  Cardiovascular: RRR  Gastrointestinal: soft, NTND  Extremities:  no edema  MSK: no obvious abnormalities  Neurological: Grossly intact  Skin: no rash, no echymoses, no petichiae  Psych: normal affect    MEDICATIONS  (STANDING):  amLODIPine   Tablet 10 milliGRAM(s) Oral daily  atovaquone  Suspension 1500 milliGRAM(s) Oral daily  budesonide 160 MICROgram(s)/formoterol 4.5 MICROgram(s) Inhaler 2 Puff(s) Inhalation two times a day  calcium carbonate   1250 mG (OsCal) 1 Tablet(s) Oral daily  cefepime   IVPB      chlorhexidine 2% Cloths 1 Application(s) Topical <User Schedule>  cholecalciferol 2000 Unit(s) Oral daily  Dakins Solution - 1/4 Strength 1 Application(s) Topical daily  dextrose 40% Gel 15 Gram(s) Oral once  dextrose 5%. 1000 milliLiter(s) (50 mL/Hr) IV Continuous <Continuous>  dextrose 5%. 1000 milliLiter(s) (100 mL/Hr) IV Continuous <Continuous>  dextrose 50% Injectable 25 Gram(s) IV Push once  dextrose 50% Injectable 12.5 Gram(s) IV Push once  dextrose 50% Injectable 25 Gram(s) IV Push once  escitalopram 30 milliGRAM(s) Oral daily  gabapentin 400 milliGRAM(s) Oral three times a day  glucagon  Injectable 1 milliGRAM(s) IntraMuscular once  heparin   Injectable 5000 Unit(s) SubCutaneous every 8 hours  influenza  Vaccine (HIGH DOSE) 0.7 milliLiter(s) IntraMuscular once  insulin glargine Injectable (LANTUS) 25 Unit(s) SubCutaneous at bedtime  insulin lispro (ADMELOG) corrective regimen sliding scale   SubCutaneous every 6 hours  lactulose Retention Enema 200 Gram(s) Rectal daily  levothyroxine 137 MICROGram(s) Oral daily  methylPREDNISolone sodium succinate Injectable 40 milliGRAM(s) IV Push daily  metoprolol tartrate 12.5 milliGRAM(s) Oral two times a day  naloxegol 25 milliGRAM(s) Oral daily  nystatin    Suspension 831626 Unit(s) Swish and Swallow four times a day  pantoprazole    Tablet 40 milliGRAM(s) Oral before breakfast  polyethylene glycol 3350 17 Gram(s) Oral two times a day  senna 2 Tablet(s) Oral at bedtime  simethicone 80 milliGRAM(s) Chew four times a day  simvastatin 20 milliGRAM(s) Oral at bedtime  sodium chloride 0.9%. 1000 milliLiter(s) (50 mL/Hr) IV Continuous <Continuous>  vancomycin  IVPB      vancomycin  IVPB 1000 milliGRAM(s) IV Intermittent once  vancomycin  IVPB 1000 milliGRAM(s) IV Intermittent every 12 hours    MEDICATIONS  (PRN):  aluminum hydroxide/magnesium hydroxide/simethicone Suspension 30 milliLiter(s) Oral every 6 hours PRN Dyspepsia  bisacodyl Suppository 10 milliGRAM(s) Rectal daily PRN Constipation  diphenhydrAMINE 25 milliGRAM(s) Oral daily PRN Rash and/or Itching  lidocaine/prilocaine Cream 1 Application(s) Topical daily PRN Dressing changes  melatonin 3 milliGRAM(s) Oral at bedtime PRN Insomnia  metoclopramide Injectable 10 milliGRAM(s) IV Push every 6 hours PRN nausea or vomiting.  morphine  - Injectable 3 milliGRAM(s) IV Push every 4 hours PRN mod to severe pain or dyspnea  ondansetron Injectable 4 milliGRAM(s) IV Push three times a day PRN Nausea and/or Vomiting      No Known Allergies      LABS:                        8.0    15.15 )-----------( 164      ( 04 Jan 2022 03:58 )             26.1     01-04    135  |  97<L>  |  48<H>  ----------------------------<  226<H>  5.9<H>   |  28  |  0.65    Ca    8.8      04 Jan 2022 03:58  Phos  3.1     01-04  Mg     2.10     01-04    TPro  6.2  /  Alb  3.2<L>  /  TBili  0.2  /  DBili  x   /  AST  10  /  ALT  15  /  AlkPhos  68  01-04    PT/INR - ( 04 Jan 2022 03:58 )   PT: 11.8 sec;   INR: 1.03 ratio         PTT - ( 04 Jan 2022 03:58 )  PTT:22.5 sec                 RADIOLOGY & ADDITIONAL TESTS:  Studies reviewed. Hematology Oncology Follow-up    INTERVAL HPI/OVERNIGHT EVENTS:  Had large BM overnight.   RRT called overnight for increased work of breathing.   Transferred to MICU and s/p thoracentesis.     VITAL SIGNS:  T(F): 98.8 (01-04-22 @ 03:56)  HR: 85 (01-04-22 @ 08:02)  BP: 147/55 (01-04-22 @ 07:00)  RR: 21 (01-04-22 @ 07:00)  SpO2: 100% (01-04-22 @ 08:02)  Wt(kg): --    01-03-22 @ 07:01  -  01-04-22 @ 07:00  --------------------------------------------------------  IN: 1370 mL / OUT: 1600 mL / NET: -230 mL          Review of Systems:  General: denies fevers/chills  Respiratory: + shortness of breath  Cardiovascular: denies chest pain, palpitations  Gastrointestinal: + constipation  MSK: denies joint pain or muscle pain  Neuro: denies headache, weakness, or parasthesias  Skin: denies rash, petichiae, echymoses  Psych: denies anxiety or sleep disturbances    PHYSICAL EXAM:  Constitutional: NAD  Respiratory: +BIPAP, symmetric chest rise  Cardiovascular: RRR  Gastrointestinal: less distended, not tender   Extremities:  no edema  MSK: no obvious abnormalities  Neurological: Grossly intact  Skin: no rash, no echymoses, no petichiae  Psych: normal affect    MEDICATIONS  (STANDING):  amLODIPine   Tablet 10 milliGRAM(s) Oral daily  atovaquone  Suspension 1500 milliGRAM(s) Oral daily  budesonide 160 MICROgram(s)/formoterol 4.5 MICROgram(s) Inhaler 2 Puff(s) Inhalation two times a day  calcium carbonate   1250 mG (OsCal) 1 Tablet(s) Oral daily  cefepime   IVPB      chlorhexidine 2% Cloths 1 Application(s) Topical <User Schedule>  cholecalciferol 2000 Unit(s) Oral daily  Dakins Solution - 1/4 Strength 1 Application(s) Topical daily  dextrose 40% Gel 15 Gram(s) Oral once  dextrose 5%. 1000 milliLiter(s) (50 mL/Hr) IV Continuous <Continuous>  dextrose 5%. 1000 milliLiter(s) (100 mL/Hr) IV Continuous <Continuous>  dextrose 50% Injectable 25 Gram(s) IV Push once  dextrose 50% Injectable 12.5 Gram(s) IV Push once  dextrose 50% Injectable 25 Gram(s) IV Push once  escitalopram 30 milliGRAM(s) Oral daily  gabapentin 400 milliGRAM(s) Oral three times a day  glucagon  Injectable 1 milliGRAM(s) IntraMuscular once  heparin   Injectable 5000 Unit(s) SubCutaneous every 8 hours  influenza  Vaccine (HIGH DOSE) 0.7 milliLiter(s) IntraMuscular once  insulin glargine Injectable (LANTUS) 25 Unit(s) SubCutaneous at bedtime  insulin lispro (ADMELOG) corrective regimen sliding scale   SubCutaneous every 6 hours  lactulose Retention Enema 200 Gram(s) Rectal daily  levothyroxine 137 MICROGram(s) Oral daily  methylPREDNISolone sodium succinate Injectable 40 milliGRAM(s) IV Push daily  metoprolol tartrate 12.5 milliGRAM(s) Oral two times a day  naloxegol 25 milliGRAM(s) Oral daily  nystatin    Suspension [id number] Unit(s) Swish and Swallow four times a day  pantoprazole    Tablet 40 milliGRAM(s) Oral before breakfast  polyethylene glycol 3350 17 Gram(s) Oral two times a day  senna 2 Tablet(s) Oral at bedtime  simethicone 80 milliGRAM(s) Chew four times a day  simvastatin 20 milliGRAM(s) Oral at bedtime  sodium chloride 0.9%. 1000 milliLiter(s) (50 mL/Hr) IV Continuous <Continuous>  vancomycin  IVPB      vancomycin  IVPB 1000 milliGRAM(s) IV Intermittent once  vancomycin  IVPB 1000 milliGRAM(s) IV Intermittent every 12 hours    MEDICATIONS  (PRN):  aluminum hydroxide/magnesium hydroxide/simethicone Suspension 30 milliLiter(s) Oral every 6 hours PRN Dyspepsia  bisacodyl Suppository 10 milliGRAM(s) Rectal daily PRN Constipation  diphenhydrAMINE 25 milliGRAM(s) Oral daily PRN Rash and/or Itching  lidocaine/prilocaine Cream 1 Application(s) Topical daily PRN Dressing changes  melatonin 3 milliGRAM(s) Oral at bedtime PRN Insomnia  metoclopramide Injectable 10 milliGRAM(s) IV Push every 6 hours PRN nausea or vomiting.  morphine  - Injectable 3 milliGRAM(s) IV Push every 4 hours PRN mod to severe pain or dyspnea  ondansetron Injectable 4 milliGRAM(s) IV Push three times a day PRN Nausea and/or Vomiting      No Known Allergies      LABS:                        8.0    15.15 )-----------( 164      ( 04 Jan 2022 03:58 )             26.1     01-04    135  |  97<L>  |  48<H>  ----------------------------<  226<H>  5.9<H>   |  28  |  0.65    Ca    8.8      04 Jan 2022 03:58  Phos  3.1     01-04  Mg     2.10     01-04    TPro  6.2  /  Alb  3.2<L>  /  TBili  0.2  /  DBili  x   /  AST  10  /  ALT  15  /  AlkPhos  68  01-04    PT/INR - ( 04 Jan 2022 03:58 )   PT: 11.8 sec;   INR: 1.03 ratio         PTT - ( 04 Jan 2022 03:58 )  PTT:22.5 sec                 RADIOLOGY & ADDITIONAL TESTS:  Studies reviewed.

## 2022-01-04 NOTE — CONSULT NOTE ADULT - PROBLEM SELECTOR RECOMMENDATION 2
- Patient complaining of abdominal pain  - - Patient complaining of abdominal pain  - CT Abd/ Pelvis - Mildly distended right colon with stool and mild gaseous distention of the transverse colon and splenic flexure. Caliber change in the proximal descending colon without discrete obstructing mass identified, and is of uncertain etiology. No dilated small bowel to suggest small bowel obstruction.  - Start IV Morphine gtt @ 0.5mg/hour  - Start IV Morphine 2mg q1 PRN pain/dyspnea  - Bowel regimen while on opiates

## 2022-01-05 NOTE — PROGRESS NOTE ADULT - PROBLEM SELECTOR PLAN 2
- metastatic, c/b malignant pleural effusions c/b AHRF  - patient downgraded from MICU 1/5  - patient made full comfort care, DNR/DNI

## 2022-01-05 NOTE — PROGRESS NOTE ADULT - ASSESSMENT
The patient is a 66-year-old woman who is followed for metastatic vulvar cancer, hypertension, hyperlipidemia, and type II diabetes mellitus who was admitted to the micu for management of hypoxic respiratory failure in the setting of progressive metastatic disease with likely lymphangitic spread of disease in the lungs, but whose goals of care have now been transitioned to do not resuscitate, do not intubate, and comfort measures only.     Neuro: -Lethargic; administering morphine for management of dyspnea and of any breakthrough pain     CV: -Hemodynamically stable at this time; holding all antihypertensive medications; no known history of coronary artery disease or of arrhythmia     Respiratory: -Respiratory failure with hypoxia most likely secondary to metastatic disease to lungs, with likely lymphangitic spread and with possible superimposed bacterial pneumonia   -Although patient's respiratory failure would make her a candidate for endotracheal intubation, the etiology of her respiratory failure is most likely secondary to her metastatic disease and is therefore not reversible; per goals-of-care discussion with the patient and her family today, her care has been transitioned to dnr/dni and comfort measures only   -Administering methylprednisolone for management of lymphangitic spread of metastatic disease   -Will manage with nasal cannula per patient comfort; morphine as above and per recommendations from palliative care team to ease respiratory distress    GI: -Diet is npo except medications at this time in setting of need for non-invasive ventilation; can administer food or drink per comfort as requested;   -Senna and miralax administered in setting of opiates     Endocrine: -History of diabetes mellitus noted; hemoglobin a1c elevated to greater than 7 at time of admission   -Will continue at this time with administration of 25 units of basal insulin and with insulin sliding scale in setting of administration of corticosteroids     ID: -Concern for possible superimposed bacterial pneumonia; administering vancomycin and cefepime; despite transition to comfort measures; will administer antibiotics in hopes that this may ease respiratory distress     Heme/Onc: -Case discussed with oncology team; agree with transition of care to full comfort measures; will hold heparin dvt prophylaxis at this time

## 2022-01-05 NOTE — PROGRESS NOTE ADULT - SUBJECTIVE AND OBJECTIVE BOX
Internal Medicine   Lolly Guevara | PGY-1    OVERNIGHT/INTERVAL EVENTS: Patient made comfort care, DNR/DNI.      SUBJECTIVE: Patient was seen and examined at bedside this morning. Reports difficulty breathing, denies any pain at present. She keeps asking to let her die, and to make it quick. I reassured her that we will do everything to keep her comfortable. Patient responding appropriately to questions and able to make needs known.       MEDICATIONS  (STANDING):  atovaquone  Suspension 1500 milliGRAM(s) Oral daily  budesonide 160 MICROgram(s)/formoterol 4.5 MICROgram(s) Inhaler 2 Puff(s) Inhalation two times a day  cefepime   IVPB      cefepime   IVPB 2000 milliGRAM(s) IV Intermittent every 8 hours  Dakins Solution - 1/4 Strength 1 Application(s) Topical daily  dextrose 40% Gel 15 Gram(s) Oral once  dextrose 5%. 1000 milliLiter(s) (50 mL/Hr) IV Continuous <Continuous>  dextrose 5%. 1000 milliLiter(s) (100 mL/Hr) IV Continuous <Continuous>  dextrose 50% Injectable 25 Gram(s) IV Push once  dextrose 50% Injectable 12.5 Gram(s) IV Push once  dextrose 50% Injectable 25 Gram(s) IV Push once  escitalopram 30 milliGRAM(s) Oral daily  gabapentin 400 milliGRAM(s) Oral three times a day  glucagon  Injectable 1 milliGRAM(s) IntraMuscular once  influenza  Vaccine (HIGH DOSE) 0.7 milliLiter(s) IntraMuscular once  insulin glargine Injectable (LANTUS) 25 Unit(s) SubCutaneous at bedtime  insulin lispro (ADMELOG) corrective regimen sliding scale   SubCutaneous every 6 hours  lactulose Retention Enema 200 Gram(s) Rectal daily  levothyroxine 137 MICROGram(s) Oral daily  methylPREDNISolone sodium succinate Injectable 40 milliGRAM(s) IV Push daily  metoprolol tartrate 12.5 milliGRAM(s) Oral two times a day  morphine  Infusion 0.5 mG/Hr (0.5 mL/Hr) IV Continuous <Continuous>  naloxegol 25 milliGRAM(s) Oral daily  nystatin    Suspension [id number] Unit(s) Swish and Swallow four times a day  pantoprazole    Tablet 40 milliGRAM(s) Oral before breakfast  polyethylene glycol 3350 17 Gram(s) Oral two times a day  senna 2 Tablet(s) Oral at bedtime  vancomycin  IVPB 1000 milliGRAM(s) IV Intermittent every 12 hours  vancomycin  IVPB        MEDICATIONS  (PRN):  aluminum hydroxide/magnesium hydroxide/simethicone Suspension 30 milliLiter(s) Oral every 6 hours PRN Dyspepsia  bisacodyl Suppository 10 milliGRAM(s) Rectal daily PRN Constipation  diphenhydrAMINE 25 milliGRAM(s) Oral daily PRN Rash and/or Itching  glycopyrrolate Injectable 0.2 milliGRAM(s) IV Push every 4 hours PRN secretions  lidocaine/prilocaine Cream 1 Application(s) Topical daily PRN Dressing changes  melatonin 3 milliGRAM(s) Oral at bedtime PRN Insomnia  metoclopramide Injectable 10 milliGRAM(s) IV Push every 6 hours PRN nausea or vomiting.  morphine  - Injectable 2 milliGRAM(s) IV Push every 1 hour PRN shortness of breath and/or pain  ondansetron Injectable 4 milliGRAM(s) IV Push three times a day PRN Nausea and/or Vomiting        T(F): 97.7 (01-05-22 @ 12:00), Max: 99.2 (01-04-22 @ 16:00)  HR: 93 (01-05-22 @ 14:00) (76 - 116)  BP: 137/51 (01-05-22 @ 14:00) (118/51 - 166/124)  BP(mean): 72 (01-05-22 @ 14:00) (65 - 137)  RR: 19 (01-05-22 @ 14:00) (16 - 28)  SpO2: 91% (01-05-22 @ 14:00) (91% - 100%)    PHYSICAL EXAM:     GENERAL: NAD, uncomfortably laying in bed.  HEAD:  Atraumatic, normocephalic.  EYES: EOMI, PERRLA, conjunctiva and sclera clear, no nystagmus noted.  CHEST/LUNG: Diffuse rales. Unlabored respirations.  HEART: Tachycardic, no M/R/G, normal S1/S2.  ABDOMEN: Soft, nontender, nondistended. Normoactive bowel sounds.  EXTREMITIES:  WWP.  PSYCH: Depressed mood.        LABS:                        8.0    15.15 )-----------( 164      ( 04 Jan 2022 03:58 )             26.1     01-04    135  |  97<L>  |  48<H>  ----------------------------<  226<H>  5.9<H>   |  28  |  0.65    Ca    8.8      04 Jan 2022 03:58  Phos  3.1     01-04  Mg     2.10     01-04    TPro  6.2  /  Alb  3.2<L>  /  TBili  0.2  /  DBili  x   /  AST  10  /  ALT  15  /  AlkPhos  68  01-04        PT/INR - ( 04 Jan 2022 03:58 )   PT: 11.8 sec;   INR: 1.03 ratio         PTT - ( 04 Jan 2022 03:58 )  PTT:22.5 sec    Creatinine Trend: 0.65<--, 0.71<--, 0.75<--, 0.94<--, 1.11<--, 1.13<--  I&O's Summary    04 Jan 2022 07:01  -  05 Jan 2022 07:00  --------------------------------------------------------  IN: 709 mL / OUT: 2300 mL / NET: -1591 mL    05 Jan 2022 07:01  -  05 Jan 2022 15:32  --------------------------------------------------------  IN: 304 mL / OUT: 250 mL / NET: 54 mL      BNP    RADIOLOGY & ADDITIONAL STUDIES:

## 2022-01-05 NOTE — PROGRESS NOTE ADULT - PROBLEM SELECTOR PLAN 1
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

## 2022-01-05 NOTE — CHART NOTE - NSCHARTNOTEFT_GEN_A_CORE
NUTRITION FOLLOW-UP      65yo F w PMH HTN, HLD, DM, fibromyalgia, vulva CA s/p surgery chemo and RT, anemia, presenting with nausea and SOB.  Found to have malignant pleural effusion. Pt. hypoxic and requiring BiPAP, hence NPO.  To provide PO for comfort, as requested.  Pt. now comfort measures only        _________________Diet___________________  Diet, NPO:   Except Medications  With Ice Chips/Sips of Water (01-03-22 @ 16:15)          Weight:                                         Height:   63"                 77.1kg (1/4)  73.8kg (12/28)  78.0kg (12/13 on admit)      Edema:  None noted.     Skin:  No pressure injuries.  Right ground malignancy wound.       ________________________Pertinent Medications____________   MEDICATIONS  (STANDING):  amLODIPine   Tablet 10 milliGRAM(s) Oral daily  atovaquone  Suspension 1500 milliGRAM(s) Oral daily  budesonide 160 MICROgram(s)/formoterol 4.5 MICROgram(s) Inhaler 2 Puff(s) Inhalation two times a day  calcium carbonate   1250 mG (OsCal) 1 Tablet(s) Oral daily  cefepime   IVPB      cefepime   IVPB 2000 milliGRAM(s) IV Intermittent every 8 hours  chlorhexidine 2% Cloths 1 Application(s) Topical <User Schedule>  cholecalciferol 2000 Unit(s) Oral daily  Dakins Solution - 1/4 Strength 1 Application(s) Topical daily  dextrose 40% Gel 15 Gram(s) Oral once  dextrose 5%. 1000 milliLiter(s) (50 mL/Hr) IV Continuous <Continuous>  dextrose 5%. 1000 milliLiter(s) (100 mL/Hr) IV Continuous <Continuous>  dextrose 50% Injectable 25 Gram(s) IV Push once  dextrose 50% Injectable 12.5 Gram(s) IV Push once  dextrose 50% Injectable 25 Gram(s) IV Push once  escitalopram 30 milliGRAM(s) Oral daily  gabapentin 400 milliGRAM(s) Oral three times a day  glucagon  Injectable 1 milliGRAM(s) IntraMuscular once  influenza  Vaccine (HIGH DOSE) 0.7 milliLiter(s) IntraMuscular once  insulin glargine Injectable (LANTUS) 25 Unit(s) SubCutaneous at bedtime  insulin lispro (ADMELOG) corrective regimen sliding scale   SubCutaneous every 6 hours  lactulose Retention Enema 200 Gram(s) Rectal daily  levothyroxine 137 MICROGram(s) Oral daily  methylPREDNISolone sodium succinate Injectable 40 milliGRAM(s) IV Push daily  metoprolol tartrate 12.5 milliGRAM(s) Oral two times a day  morphine  Infusion 0.5 mG/Hr (0.5 mL/Hr) IV Continuous <Continuous>  naloxegol 25 milliGRAM(s) Oral daily  nystatin    Suspension [id number] Unit(s) Swish and Swallow four times a day  pantoprazole    Tablet 40 milliGRAM(s) Oral before breakfast  polyethylene glycol 3350 17 Gram(s) Oral two times a day  senna 2 Tablet(s) Oral at bedtime  simvastatin 20 milliGRAM(s) Oral at bedtime  vancomycin  IVPB 1000 milliGRAM(s) IV Intermittent every 12 hours  vancomycin  IVPB        MEDICATIONS  (PRN):  aluminum hydroxide/magnesium hydroxide/simethicone Suspension 30 milliLiter(s) Oral every 6 hours PRN Dyspepsia  bisacodyl Suppository 10 milliGRAM(s) Rectal daily PRN Constipation  diphenhydrAMINE 25 milliGRAM(s) Oral daily PRN Rash and/or Itching  glycopyrrolate Injectable 0.2 milliGRAM(s) IV Push every 4 hours PRN secretions  lidocaine/prilocaine Cream 1 Application(s) Topical daily PRN Dressing changes  melatonin 3 milliGRAM(s) Oral at bedtime PRN Insomnia  metoclopramide Injectable 10 milliGRAM(s) IV Push every 6 hours PRN nausea or vomiting.  morphine  - Injectable 2 milliGRAM(s) IV Push every 1 hour PRN shortness of breath and/or pain  ondansetron Injectable 4 milliGRAM(s) IV Push three times a day PRN Nausea and/or Vomiting          _________________________Pertinent Labs____________________     01-04    135  |  97<L>  |  48<H>  ----------------------------<  226<H>  5.9<H>   |  28  |  0.65    Ca    8.8      04 Jan 2022 03:58  Phos  3.1     01-04  Mg     2.10     01-04    TPro  6.2  /  Alb  3.2<L>  /  TBili  0.2  /  DBili  x   /  AST  10  /  ALT  15  /  AlkPhos  68  01-04                                                                   8.0    15.15 )-----------( 164      ( 04 Jan 2022 03:58 )             26.1         CAPILLARY BLOOD GLUCOSE      POCT Blood Glucose.: 175 mg/dL (05 Jan 2022 05:43)    POCT Blood Glucose.: 175 mg/dL (01-05-22 @ 05:43)  POCT Blood Glucose.: 159 mg/dL (01-04-22 @ 22:32)  POCT Blood Glucose.: 182 mg/dL (01-04-22 @ 18:58)  POCT Blood Glucose.: 254 mg/dL (01-04-22 @ 13:00)              PLAN/RECOMMENDATIONS:  1) Current medical condition precludes any further acute nutrition intervention @ this.  Maintain comfort measures as consistent with Pt. family wishes      RDN remains available and will f/u PRN.          Zamzam Mcgrath RDN, CDN pager [phone]

## 2022-01-05 NOTE — PROGRESS NOTE ADULT - PROBLEM SELECTOR PLAN 1
- CT without pulmonary embolus. Multiple nodules are noted within both lungs. Primary consideration is   metastasis. Findings suggestive of malignant pleural effusions bilaterally.  - s/p Thoracentesis on 12/15 by IR, drained appr 660cc pleural fluid, exudative, path negative  - Pulm consult appreciated - steroids switched to methylprednisolone on 12/27, decreased to daily dose  - bedside US by pulm showed improvement in pleural effusion and no pocket to tap. Patient clinically with SOB however may be in the setting of worsening abdominal distention   - prior echo with poor window  - currently on Symbicort, NC  - c/w vanc/cefepime started for c/f superimposed bacterial PNA  - Pt recently transferred to MICU, where pt made full comfort care, DNR/DNI - now downgraded to floors   - Palliative c/s, IV morphine with PRN pushes of morphine for pain or respiratory distress

## 2022-01-05 NOTE — PROGRESS NOTE ADULT - SUBJECTIVE AND OBJECTIVE BOX
[id number]   [phone]    INTERVAL HPI/OVERNIGHT EVENTS: The patient's goals of care were transitioned to comfort measures only.     SUBJECTIVE: Patient seen and examined at bedside.     The patient was noted to be very drowsy, so I did not obtain a complete or thorough review of systems.     OBJECTIVE:    VITAL SIGNS:  ICU Vital Signs Last 24 Hrs  T(C): 36.3 (05 Jan 2022 19:16), Max: 37.3 (05 Jan 2022 04:00)  T(F): 97.3 (05 Jan 2022 19:16), Max: 99.1 (05 Jan 2022 04:00)  HR: 82 (05 Jan 2022 20:47) (76 - 989)  BP: 126/52 (05 Jan 2022 19:16) (118/51 - 158/60)  BP(mean): 84 (05 Jan 2022 16:00) (65 - 84)  ABP: --  ABP(mean): --  RR: 18 (05 Jan 2022 19:16) (16 - 20)  SpO2: 96% (05 Jan 2022 20:47) (91% - 99%)        01-04 @ 07:01 - 01-05 @ 07:00  --------------------------------------------------------  IN: 709 mL / OUT: 2300 mL / NET: -1591 mL    01-05 @ 07:01 - 01-05 @ 21:07  --------------------------------------------------------  IN: 304 mL / OUT: 250 mL / NET: 54 mL      CAPILLARY BLOOD GLUCOSE      POCT Blood Glucose.: 231 mg/dL (05 Jan 2022 17:58)      PHYSICAL EXAM:    General: Resting in bed comfortably; not using accessory muscles of respiration   HEENT: PERRL grossly, clear conjunctiva  Neck: No jvd, no lymphadenopathy  Respiratory: CTA b/l, no rales, no wheezes; equal respiratory excursion   Cardiovascular: +S1/S2; RRR  Abdomen: soft, NT/ND; +BS x4  Extremities: WWP, 2+ peripheral pulses b/l; no LE edema  Skin: normal color and turgor; no rash  Neurological: No gross motor or sensory deficits; coordination intact     MEDICATIONS:  MEDICATIONS  (STANDING):  atovaquone  Suspension 1500 milliGRAM(s) Oral daily  budesonide 160 MICROgram(s)/formoterol 4.5 MICROgram(s) Inhaler 2 Puff(s) Inhalation two times a day  cefepime   IVPB 2000 milliGRAM(s) IV Intermittent every 8 hours  cefepime   IVPB      Dakins Solution - 1/4 Strength 1 Application(s) Topical daily  dextrose 40% Gel 15 Gram(s) Oral once  dextrose 5%. 1000 milliLiter(s) (50 mL/Hr) IV Continuous <Continuous>  dextrose 5%. 1000 milliLiter(s) (100 mL/Hr) IV Continuous <Continuous>  dextrose 50% Injectable 25 Gram(s) IV Push once  dextrose 50% Injectable 12.5 Gram(s) IV Push once  dextrose 50% Injectable 25 Gram(s) IV Push once  escitalopram 30 milliGRAM(s) Oral daily  gabapentin 400 milliGRAM(s) Oral three times a day  glucagon  Injectable 1 milliGRAM(s) IntraMuscular once  influenza  Vaccine (HIGH DOSE) 0.7 milliLiter(s) IntraMuscular once  insulin glargine Injectable (LANTUS) 25 Unit(s) SubCutaneous at bedtime  insulin lispro (ADMELOG) corrective regimen sliding scale   SubCutaneous every 6 hours  lactulose Retention Enema 200 Gram(s) Rectal daily  levothyroxine 137 MICROGram(s) Oral daily  methylPREDNISolone sodium succinate Injectable 40 milliGRAM(s) IV Push daily  metoprolol tartrate 12.5 milliGRAM(s) Oral two times a day  morphine  Infusion 1 mG/Hr (1 mL/Hr) IV Continuous <Continuous>  naloxegol 25 milliGRAM(s) Oral daily  nystatin    Suspension 003300 Unit(s) Swish and Swallow four times a day  pantoprazole    Tablet 40 milliGRAM(s) Oral before breakfast  polyethylene glycol 3350 17 Gram(s) Oral two times a day  senna 2 Tablet(s) Oral at bedtime  vancomycin  IVPB      vancomycin  IVPB 1000 milliGRAM(s) IV Intermittent every 12 hours    MEDICATIONS  (PRN):  aluminum hydroxide/magnesium hydroxide/simethicone Suspension 30 milliLiter(s) Oral every 6 hours PRN Dyspepsia  bisacodyl Suppository 10 milliGRAM(s) Rectal daily PRN Constipation  diphenhydrAMINE 25 milliGRAM(s) Oral daily PRN Rash and/or Itching  glycopyrrolate Injectable 0.2 milliGRAM(s) IV Push every 4 hours PRN secretions  lidocaine/prilocaine Cream 1 Application(s) Topical daily PRN Dressing changes  melatonin 3 milliGRAM(s) Oral at bedtime PRN Insomnia  metoclopramide Injectable 10 milliGRAM(s) IV Push every 6 hours PRN nausea or vomiting.  morphine  - Injectable 2 milliGRAM(s) IV Push every 1 hour PRN shortness of breath and/or pain  ondansetron Injectable 4 milliGRAM(s) IV Push three times a day PRN Nausea and/or Vomiting      ALLERGIES:  Allergies    No Known Allergies    Intolerances        LABS:                        8.0    15.15 )-----------( 164      ( 04 Jan 2022 03:58 )             26.1     01-04    135  |  97<L>  |  48<H>  ----------------------------<  226<H>  5.9<H>   |  28  |  0.65    Ca    8.8      04 Jan 2022 03:58  Phos  3.1     01-04  Mg     2.10     01-04    TPro  6.2  /  Alb  3.2<L>  /  TBili  0.2  /  DBili  x   /  AST  10  /  ALT  15  /  AlkPhos  68  01-04    PT/INR - ( 04 Jan 2022 03:58 )   PT: 11.8 sec;   INR: 1.03 ratio         PTT - ( 04 Jan 2022 03:58 )  PTT:22.5 sec      RADIOLOGY & ADDITIONAL TESTS: Reviewed.

## 2022-01-05 NOTE — CHART NOTE - NSCHARTNOTEFT_GEN_A_CORE
MICU Transfer Note    Transfer from: MICU  Transfer to:  (x) Medicine    (  ) Telemetry    (  ) RCU    (  ) Palliative    (  ) Stroke Unit    (  ) _______________  Accepting physican: Dr. Kevin     MICU COURSE: The patient is a 66-year-old woman who is followed for metastatic cancer of the vulva, hypertension, and hyperlipidemia who presented initially in mid-December for evaluation of nausea and weakness. She subsequently was noted to have shortness of breath secondary to bilateral malignant pleural effusions. The patient was evaluated by the oncology and pulmonology teams. A diagnostic, left-sided thoracentesis was performed and demonstrated evidence of a malignant pleural effusion. Progressively, the patient developed dyspnea on the floor and, on the morning of 1/4, was transferred to the micu for management of hypoxic respiratory failure. A goals-of-care discussion was initiated with the patient and her family. Understanding that the patient's respiratory failure was most likely secondary to her progressive metastatic disease, the patient's goals of care were transitioned to full comfort care and dnr/dni status. Per the guidance of the palliative care team, the patient was administered intravenous morphine both through an infusion and with prn pushes of morphine.     ASSESSMENT & PLAN: The patient is a 66-year-old woman who is followed for metastatic cancer of the vulva who was admitted to the micu for management of hypoxic respiratory failure secondary to her metastatic disease and whose goals of care have now been transitioned to comfort measures only.     1. Acute respiratory failure with hypoxia: -The patient has developed progressive difficulty breathing; clinical assessment of the patient for alternative etiologies of the patient's respiratory failure has been negative (no evidence of pulmonary embolus, pneumonia unlikely, no cardiomyopathy, no arrhythmia, no ketacidosis, no chest wall trauma); the patient's clinical presentation is most consistent with respiratory failure from metastatic disease with bilateral malignant pleural effusions  -Per goals-of-care discussion with the patient and with her family, the patient's goals of care have been transitioned to dnr/dni status and full comfort care   -Per recommendations from palliative care team, managing at this time with an intravenous infusion of morphine and with prn pushes of morphine for pain or respiratory distress    For Follow-Up: [ ] Monitor the patient for signs or symptoms of respiratory distress and administer additional morphine prn as needed and/or adjust rate of morphine infusion for any respiratory distress   [ ] Follow palliative care recommendations for management of respiratory distress on morphine infusion and with prn pushes of morphine     Vital Signs Last 24 Hrs  T(C): 37.3 (05 Jan 2022 04:00), Max: 37.3 (04 Jan 2022 16:00)  T(F): 99.1 (05 Jan 2022 04:00), Max: 99.2 (04 Jan 2022 16:00)  HR: 82 (05 Jan 2022 12:00) (76 - 116)  BP: 123/46 (05 Jan 2022 12:00) (118/51 - 166/124)  BP(mean): 65 (05 Jan 2022 12:00) (65 - 137)  RR: 16 (05 Jan 2022 12:00) (16 - 29)  SpO2: 93% (05 Jan 2022 12:00) (93% - 100%)  I&O's Summary    04 Jan 2022 07:01  -  05 Jan 2022 07:00  --------------------------------------------------------  IN: 709 mL / OUT: 2300 mL / NET: -1591 mL    05 Jan 2022 07:01  -  05 Jan 2022 13:00  --------------------------------------------------------  IN: 253 mL / OUT: 0 mL / NET: 253 mL          MEDICATIONS  (STANDING):  amLODIPine   Tablet 10 milliGRAM(s) Oral daily  atovaquone  Suspension 1500 milliGRAM(s) Oral daily  budesonide 160 MICROgram(s)/formoterol 4.5 MICROgram(s) Inhaler 2 Puff(s) Inhalation two times a day  calcium carbonate   1250 mG (OsCal) 1 Tablet(s) Oral daily  cefepime   IVPB 2000 milliGRAM(s) IV Intermittent every 8 hours  cefepime   IVPB      chlorhexidine 2% Cloths 1 Application(s) Topical <User Schedule>  cholecalciferol 2000 Unit(s) Oral daily  Dakins Solution - 1/4 Strength 1 Application(s) Topical daily  dextrose 40% Gel 15 Gram(s) Oral once  dextrose 5%. 1000 milliLiter(s) (50 mL/Hr) IV Continuous <Continuous>  dextrose 5%. 1000 milliLiter(s) (100 mL/Hr) IV Continuous <Continuous>  dextrose 50% Injectable 25 Gram(s) IV Push once  dextrose 50% Injectable 12.5 Gram(s) IV Push once  dextrose 50% Injectable 25 Gram(s) IV Push once  escitalopram 30 milliGRAM(s) Oral daily  gabapentin 400 milliGRAM(s) Oral three times a day  glucagon  Injectable 1 milliGRAM(s) IntraMuscular once  influenza  Vaccine (HIGH DOSE) 0.7 milliLiter(s) IntraMuscular once  insulin glargine Injectable (LANTUS) 25 Unit(s) SubCutaneous at bedtime  insulin lispro (ADMELOG) corrective regimen sliding scale   SubCutaneous every 6 hours  lactulose Retention Enema 200 Gram(s) Rectal daily  levothyroxine 137 MICROGram(s) Oral daily  methylPREDNISolone sodium succinate Injectable 40 milliGRAM(s) IV Push daily  metoprolol tartrate 12.5 milliGRAM(s) Oral two times a day  morphine  Infusion 0.5 mG/Hr (0.5 mL/Hr) IV Continuous <Continuous>  naloxegol 25 milliGRAM(s) Oral daily  nystatin    Suspension 137759 Unit(s) Swish and Swallow four times a day  pantoprazole    Tablet 40 milliGRAM(s) Oral before breakfast  polyethylene glycol 3350 17 Gram(s) Oral two times a day  senna 2 Tablet(s) Oral at bedtime  simvastatin 20 milliGRAM(s) Oral at bedtime  vancomycin  IVPB 1000 milliGRAM(s) IV Intermittent every 12 hours  vancomycin  IVPB        MEDICATIONS  (PRN):  aluminum hydroxide/magnesium hydroxide/simethicone Suspension 30 milliLiter(s) Oral every 6 hours PRN Dyspepsia  bisacodyl Suppository 10 milliGRAM(s) Rectal daily PRN Constipation  diphenhydrAMINE 25 milliGRAM(s) Oral daily PRN Rash and/or Itching  glycopyrrolate Injectable 0.2 milliGRAM(s) IV Push every 4 hours PRN secretions  lidocaine/prilocaine Cream 1 Application(s) Topical daily PRN Dressing changes  melatonin 3 milliGRAM(s) Oral at bedtime PRN Insomnia  metoclopramide Injectable 10 milliGRAM(s) IV Push every 6 hours PRN nausea or vomiting.  morphine  - Injectable 2 milliGRAM(s) IV Push every 1 hour PRN shortness of breath and/or pain  ondansetron Injectable 4 milliGRAM(s) IV Push three times a day PRN Nausea and/or Vomiting        LABS

## 2022-01-05 NOTE — PROGRESS NOTE ADULT - PROBLEM SELECTOR PLAN 2
- Continue IV Morphine gtt @ 0.5mg/hour  - Continue IV Morphine 2mg q1 PRN pain/dyspnea  - can consider transitioning to oral regimen tomorrow if patient remains stable and able to tolerate PO  - Bowel regimen while on opiates.

## 2022-01-05 NOTE — CHART NOTE - NSCHARTNOTEFT_GEN_A_CORE
MAR Accept Note    Transfer from: MICU    Transfer to: ( x ) Medicine    (  ) Telemetry     (   ) RCU        (    ) Palliative         (   ) Stroke Unit          (   ) __________________    Accepting Physician: Dr. Samuel Kevin  Signout given to: Dr. Samuel Kevin ([facility]), [place]  (MAR)  Destination: [id number]    HPI/ICU COURSE: The patient is a 66-year-old woman who is followed for metastatic cancer of the vulva, hypertension, and hyperlipidemia who presented initially in mid-December for evaluation of nausea and weakness. She subsequently was noted to have shortness of breath secondary to bilateral malignant pleural effusions. The patient was evaluated by the oncology and pulmonology teams. A diagnostic, left-sided thoracentesis was performed and demonstrated evidence of a malignant pleural effusion. Progressively, the patient developed dyspnea on the floor and, on the morning of 1/4, was transferred to the micu for management of hypoxic respiratory failure. A goals-of-care discussion was initiated with the patient and her family. Understanding that the patient's respiratory failure was most likely secondary to her progressive metastatic disease, the patient's goals of care were transitioned to full comfort care and dnr/dni status. Per the guidance of the palliative care team, the patient was administered intravenous morphine both through an infusion and with prn pushes of morphine. She is being transitioned to the medicine floor      ASSESSMENT & PLAN: The patient is a 66-year-old woman who is followed for metastatic cancer of the vulva who was admitted to the micu for management of hypoxic respiratory failure secondary to her metastatic disease and whose goals of care have now been transitioned to comfort measures only.       FOR FOLLOW UP:  [ ] Monitor the patient for signs or symptoms of respiratory distress and administer additional morphine prn as needed and/or adjust rate of morphine infusion for any respiratory distress   [ ] Follow palliative care recommendations for management of respiratory distress on morphine infusion and with prn pushes of morphine

## 2022-01-06 NOTE — PROVIDER CONTACT NOTE (OTHER) - ACTION/TREATMENT ORDERED:
MD made aware. Will notify family.
Nonrebreather, bloodwork, IV steriods, IV morphine, MICU consult, BIPAP, administer enema for constipation. Keep patient NPO.
provider aware and at bedside. EKG done. will continue to monitor.
CT abdomen and pelvis with oral contrast ordered to rule out obstruction. PO Lokelma ordered.

## 2022-01-06 NOTE — PROGRESS NOTE ADULT - PROBLEM SELECTOR PROBLEM 6
Hypertension
Orthostatic hypotension
Anemia
Hypertension
Orthostatic hypotension
Anemia
Diabetes mellitus
Hypertension
Orthostatic hypotension
Anemia
Anemia
Hypertension
Hypercalcemia
Hypertension
Neuropathy
Anemia
Hypertension
Anemia
Hypertension
Encounter for palliative care
Hypertension
Orthostatic hypotension
Hypertension
Anemia
Anemia
Hypertension
Metastatic squamous cell carcinoma
Orthostatic hypotension
Hypertension
Hyponatremia
Hyponatremia
Angina pectoris
Orthostatic hypotension

## 2022-01-06 NOTE — PROGRESS NOTE ADULT - PROBLEM SELECTOR PLAN 3
wound care consulted, cont dressing  cont zosyn renal dosing, s/p 1 dose of vanco,    blood culture neg to date  ID consult appreciated  wound culture with strep mitis/oralis  per ID cont zosyn 12/14- 12/23 for a 10 day course
wound care consulted, cont dressing  cont zosyn renal dosing, s/p 1 dose of vanco,    blood culture neg to date  ID consult appreciated  wound culture with strep mitis/oralis  per ID cont zosyn, can complete with augmentin--> end date of 12/14- 12/23 for a 10 day course
wound care consulted, cont dressing  cont zosyn renal dosing, s/p 1 dose of vanco,    blood culture neg to date  ID consult appreciated  wound culture with strep mitis/oralis  per ID cont zosyn, can complete with augmentin--> end date of 12/14- 12/23 for a 10 day course
Patient requires support with ADLs. Please assist with ADLs PRN.  Skin care as per protocol.
-daily Ca and albumin  -could be 2/2 hypercalcemia of malignancy;  low PTH,  low vitamin D 25, 1, 25, and PTHRP neg  - F/u w Endo  - Calcitonin q 12 hrs x 4 doses, Zometa 12/20  - Replete vit D
- Pt p/w worsening abdominal distention, constipation and inability to pass gas  - CT showing Mildly distended right colon with stool and mild gaseous distention of the transverse colon and splenic flexure. Caliber change in the proximal descending colon without discrete obstructing mass identified. No signs of mechanical obstruction. Likely with ileus 2/2 opioid use  - NPO, bowel rest  - IVF  - no nausea or vomiting therefore will hold on NGT for now  - start on movantik, c/w bowel regimen  - serial abdominal exams.
Creatinine improved to 1 today, possibly secondary to zosyn and vanco  no further vanco  cont to monitor renal function  renal sono done showed no hydronephrosis
wound care consulted, cont dressing  cont zosyn renal dosing, s/p 1 dose of vanco,    blood culture neg to date  ID consult appreciated  wound culture with strep mitis/oralis  per ID cont zosyn, can complete with augmentin
Has new TWI inversions on lateral leads. Likely related to L pleural effusion  -pt with no chest pain  -Trop negative so far  -cont to monitor on tele
Creatinine improved to 1 today, possibly secondary to zosyn and vanco  no further vanco  cont to monitor renal function  renal sono done showed no hydronephrosis
wound care consulted, cont dressing  cont zosyn renal dosing, s/p 1 dose of vanco,    blood culture neg to date  ID consult appreciated  wound culture with strep mitis/oralis  per ID cont zosyn 12/14- 12/23 for a 10 day course
- Start IV Robinul 0.4mg q4 PRN
wound care consulted, cont dressing  cont zosyn renal dosing, s/p 1 dose of vanco,    blood culture neg to date  ID consult appreciated  wound culture with strep mitis/oralis  per ID cont zosyn, can complete with augmentin
wound care consulted, cont dressing  cont zosyn renal dosing, s/p 1 dose of vanco,    blood culture neg to date  ID consult appreciated  wound culture with strep mitis/oralis  per ID cont zosyn 12/14- 12/23 for a 10 day course
wound care consulted, cont dressing  cont zosyn renal dosing, s/p 1 dose of vanco,    blood culture neg to date  ID consult appreciated  wound culture with strep mitis/oralis  per ID cont zosyn, can complete with augmentin--> end date of 12/14- 12/23 for a 10 day course
wound care consulted, cont dressing  cont zosyn renal dosing, s/p 1 dose of vanco,    blood culture neg to date  ID consult appreciated  wound culture with strep mitis/oralis  per ID cont zosyn 12/14- 12/23 for a 10 day course
wound care consulted, cont dressing  cont zosyn renal dosing, s/p 1 dose of vanco,    blood culture neg to date  ID consult appreciated  wound culture with strep mitis/oralis  per ID cont zosyn  end date of 12/14- 12/23 for a 10 day course
Creatinine at 1.6 today, possibly secondary to zosyn and vanco  no further vanco  cont to monitor renal function  renal sono done showed no hydronephrosis
- Pt p/w worsening abdominal distention, constipation and inability to pass gas   - CT showing Mildly distended right colon with stool and mild gaseous distention of the transverse colon and splenic flexure. Caliber change in the proximal descending colon without discrete obstructing mass identified. No signs of mechanical obstruction. Likely with ileus 2/2 opioid use  - NPO, bowel rest  - IVF  - no nausea or vomiting therefore will hold on NGT for now  - start on movantik, c/w bowel regimen   - serial abdominal exams
wound care consulted, cont dressing  cont zosyn renal dosing, s/p 1 dose of vanco,    blood culture neg to date  ID consult appreciated  wound culture with strep mitis/oralis  per ID cont zosyn 12/14- 12/23 for a 10 day course
Creatinine improved to 1.2 today, possibly secondary to zosyn and vanco  no further vanco  cont to monitor renal function  renal sono done showed no hydronephrosis
wound care consulted, cont dressing  cont zosyn renal dosing, s/p 1 dose of vanco,    blood culture neg to date  ID consult appreciated  wound culture with strep mitis/oralis  per ID cont zosyn, can complete with augmentin
-daily Ca and albumin  -could be 2/2 hypercalcemia of malignancy;  low PTH,  low vitamin D 25, 1, 25, and PTHRP neg  - F/u w Endo  - Calcitonin q 12 hrs x 4 doses, Zometa 12/20  - Replete vit D

## 2022-01-06 NOTE — PROVIDER CONTACT NOTE (OTHER) - ASSESSMENT
/53  RR25 O2Sat 82% Patient currently on nasal cannula 6LPM. Patient does not report difficulty breathing or pain. Oral suctioning provided. MOLST signed in chart, patient is DNR/DNI. Comfort care measures in active orders. MD at bedside to assess patient.

## 2022-01-06 NOTE — PROGRESS NOTE ADULT - ATTENDING COMMENTS
Patient examined and case reviewed in detail on bedside rounds  Critically ill and unstable on NIV Will observe on nasal 02 and NIV is not appropriate given the focus on palliation    Frequent bedside visits with therapy change today.   I have personally provided 35+ minutes of critical care time concurrently with the resident/fellow; this excludes time spent on separate procedures
Patient examined and case reviewed in detail on bedside rounds  Critically ill and unstable on NIV with advanced stage vulval ca D/W family re goals of care  Frequent bedside visits with therapy change today.   I have personally provided 35+ minutes of critical care time concurrently with the resident/fellow; this excludes time spent on separate procedures.
Patient seen and examined. Case discussed with the medical team on rounds. I agree with the findings and the plan above.    Patient actively dying as a result of metastatic vulvar cancer, placed on comfort care and transferred out of the MICU.  Will continue keep the patient as comfortable as possible at this time.  Continue the rest of the work up and management as stated above.
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 Cytology from effusion negative for malignancy. S/p lung lesions biopsy for further evaluation. Meanwhile, she has had respiratory decompensation now with supplemental O2 requirement and dyspnea of unclear etiology. Parenchymal lung involvement from malignancy is in the differential but degree of dysnpea seems to be above that expected from parenchymal involvement, and thus lymphangitic carcinomatosis and tumor emboli are being considered. If lung lesion nodule negative for malignancy would ask pulm for bronch with cytology +/- biopsy for further evaluation. If pulmonary metastatic involvement confirmed would consider chemotherapy urgently, perhaps in an in-patient setting, to reverse this potentially life-threatening respiratory decompensation. Meanwhile, pulmonary colleagues also considering other differentials including infectious and inflammatory etiologies. Echocardiogram should be repeated - if signs of pulmonary HTN would consider anticoagulation for chance of tumor emboli. Pleural effusion should be evaluated and drained if contributing - send repeat cytology if performed.  Joon Anderson MD PhD  Oncology Attending
67 y/o woman with vulvar SCC s/p definitive treatment but now with suspected metastatic relapse in the lung. Cytology from effusion negative for malignancy. S/p lung lesions biopsy for further evaluation. Meanwhile, she has had respiratory decompensation now with supplemental O2 requirement and dyspnea of unclear etiology. Parenchymal lung involvement from malignancy is in the differential but degree of dysnpea seems to be above that expected from parenchymal involvement, and thus lymphangitic carcinomatosis and tumor emboli are being considered. If lung lesion nodule negative for malignancy would ask pulm for bronch with cytology +/- biopsy for further evaluation. If pulmonary metastatic involvement confirmed would consider chemotherapy urgently, perhaps in an in-patient setting, to reverse this potentially life-threatening respiratory decompensation. Meanwhile, pulmonary colleagues also considering other differentials including infectious and inflammatory etiologies. Echocardiogram should be repeated - if signs of pulmonary HTN would consider anticoagulation for chance of tumor emboli. Pleural effusion should be evaluated and drained if contributing - send repeat cytology if performed.  Joon Anderson MD PhD  Oncology Attending
Agree with above  Patient with worsening hypoxemic respiratory failure  No pocket to safely drain on POCUS  Follow up IR biopsy today  Trial of diuresis with minimal improvement  Can consider a trial of steroids as symptoms maybe related to spread of disease
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 Now improved with higher dose steroids, morphine, and nebs.    - Continue supplemental oxygen for goal SpO2>90%  - S/p CT-guided biopsy of R lung mass on 12/22, follow-up cytopath (email sent to cyto team)  - He had a left thoracentesis on 12/15 by IR, fluid found to be exudative but cytopath negative  - Currently, effusion on recent CT chest from 12/24 is small and loculated with pleural nodularity, not large enough for drainage. 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 Now improved with higher dose steroids, morphine, and nebs.    - Continue supplemental oxygen for goal SpO2>90%  - Pathology of RLL spiculated lung mass consistent with squamous cell carcinoma, likely metastases from underlying vulvar squamous cell cancer  - Follow-up with oncology regarding initiation of chemotherapy as intpatient  - He had a left thoracentesis on 12/15 by IR, fluid found to be exudative but cytopath negative (?paramalignant)  - Currently, effusion on recent CT chest from 12/24 and on bedside ultrasound today is small and loculated with pleural nodularity, too small for drainage. 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
pt with new pleural effusion, pulmonary nodules concerning for metastatic disease. s/p thoracentesis by IR, exudative. f/u path, if non-diagnostic may need nodule biopsy which can also be accessible by IR. signing off. please reconsult pulmonary if needed.
65 y/o F w/ a PMHx of HTN, HLD, T2DM, fibromyalgia, recurrent vulvar cancer (recently received chemo/RT from 7/2021-9/2021), and anemia (requiring intermittent transfusions) presented with shortness of breath, dizziness, and nausea for multiple weeks, found to have multiple lung nodules and B/L pleural effusions concerning for malignancy, and was admitted to telemetry for further management, s/p thoracentesis on 12/15 (~ 660cc drained). CTA Chest (12/13): No pulmonary embolus is noted. Multiple nodules are noted within both lungs. Primary consideration is metastasis. Findings suggestive of malignant pleural effusions bilaterally. Would recommend Left PleurX catheter to be placed this week if there is any reaccumulation of her effusion. If possible would get lung biopsy to confirm metastatic disease but if needed this can also be done as an outpatient.  She is also hypercalcemic to 12.5  which is an oncologic emergency and requires bisphosphonate such as pamidronate or zoledronic acid today.  She [place] [facility] [place].  Discussed goals with patient which include managing hypercalcemia, getting PleurX placed and having her home by the end of the week for Xmas.
65 yo F with DM, fibromyalgia, Vulvar CA s/p vulva surgery p/w SOB. Pt with  pleural effusions s/p thoracentesis as well as pulmonary nodules. Bx + for metastatic disease and started on chemo. 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 Would slowly start tapering steroid dose as noted above. Cont symbicort. Cont pcp ppx. She does have a progressively large abd which is likely causing significant atelectasis and contributing to her dyspnea. f/u CT for obstruction. Cont DVT ppx
Feeling much better after thoracentesis with 660cc drained. F/u cytology.   Await CT A/P for complete extent of disease workup. PT evaluation. Will continue to follow.
Patient examined and case reviewed in detail on bedside rounds  Pt with multifactorial dyspnea and metastatic vulvar ca
agree with Fellow note above.  67 y/o woman with lung metastatic relapse of vulvar SCC c/b respiratory compromise thought due to malignant infiltration starting on weekly carboplatin + paclitaxel today 12/30/21  Joon Anderson MD PhD  Oncology Attending
65 y/o woman with hx of vulvar SCC now found to have lung recurrence c/b respiratory decompensation thought from either tumor emboli or lymphangitic carcinomatosis, planning for urgent in-patient chemo now underway  Joon Anderson MD PhD  Oncology Attending
65 y/o woman with metastatic relapse of vulvar SCC on carboplatin+paclitaxel, persistent dyspnea. Scan shows heavy stool burden without obstruction on scan. Next chemo due 1/6. Meanwhile, appreciate continued involvement by pulm to optimize respiratory status, perhaps with thoracentesis if pocket of fluid. At this point, would recommend assistance from palliative care to help manage refractory constipation and dyspnea. Bronch w biopsy may be useful if respiratory status not improving despite chemotherapy and relief of constipation to evaluate other etiologies.  Joon Anderson MD PhD  Oncology Attending
Patient seen at bedside with Fellow and .    Lightheaded - had been orthostatic on admission, and is now more anemic than prior (despite h/o Thalessemia).  Would like to transfuse 1 or 2 units of blood, especially in setting of low BP and SOB.    Lung - await biopsy for possible metastatic disease.  She could follow-up as an outpatient if relatively stable on home O2 as well.    Vulvar carcinoma - if already relapsed in lung despite recent chemotherapy (cisplatin) and radiation, she would be considered platinum-refractory.  Alternative chemotherapy to be considered.  Can also discuss and decide as outpatient, with timely follow-up with Dr Paulo Carpenter at the [facility] [place].    Mark Ambrocio,  Oncology Attending  [phone]
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     - Continue supplemental oxygen for goal SpO2>90%  - S/p CT-guided biopsy of R lung mass on 12/22, follow-up cytopath  - He had a left thoracentesis on 12/15 by IR, fluid found to be exudative but cytopath negative  - Currently, effusion on recent CT chest from 12/24 is small and loculated with pleural nodularity, not large enough for drainage. 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 Please add Bactrim DS 1 tab MWF for PCP prophylaxis  - Given associated chest pain due to malignancy, would initiate therapy with opioids (oral oxycodone or IV morphine) to help with the dyspnea and pain  - Please start albuterol-ipratropium nebs q6h and budesonide 0.5 mg nebs q12h  - Agree with obtaining 2D-echo to evaluate for underlying valvular disease or systolic/diastolic dysfunction  - Discussed with patient and daughter Julia by phone, remains full code
Agree with above  Patient with worsening hypoxemic respiratory failure  No pocket to safely drain on POCUS  Follow up IR biopsy  Trial of diuresis with minimal improvement  Can consider a trial of steroids as symptoms maybe related to spread of disease  Also with abdominal distention and no BM in a few days--start aggressive bowel regimen
Patient initially felt better after thoracentesis on 12/15 but again feeling HERNANDEZ. CXR reviewed, c/w new R pleural effusion and persistent L sided effusion. On supplemental oxygen for comfort. Pulmonary evaluation for management of HERNANDEZ and pleural effusions. CT was negative for PE. Await CT a/p for full extent of disease and pending findings, recommend biopsy of a metastatic site for confirmation of disease and tumor NGS testing. Please add bowel regimen for constipation which may be adding to abdominal discomfort. Will follow with you.
seen in MICU with family at bedside, morphine drip recently adjusted , appreciate MICU care  family and patient at peace with comfort care approach
Agree with above  Patient with worsening hypoxemic respiratory failure  Trial of gentle diuresis and repeat ECHO  No pocket to safely drain on POCUS  Follow up IR biopsy tomorrow  Can consider a trial of steroids if symptoms are related to spread of disease

## 2022-01-06 NOTE — PROGRESS NOTE ADULT - PROBLEM SELECTOR PLAN 7
Educated patient/family about EOL in terms of what to expect.  Questions answered.  Emotional support provided.    Discussed with primary team regarding symptom management     Thank you for allowing us to participate in your patient's care. Please page [phone] for any questions/concerns.
monitor CBC  ferritin elevated, b12 and folate wnl
continue amlodipine, enalapril, lasix (new med); holding hctz
Has new TWI inversions on lateral leads. Likely related to L pleural effusion  -pt with no chest pain  -Trop negative so far  -cont to monitor on tele
monitor CBC  ferritin elevated, b12 and folate wnl
Has new TWI inversions on lateral leads. Likely related to L pleural effusion  -pt with no chest pain  -Trop negative so far  -cont to monitor on tele
- Continue Basal Insulin, will reduce given npo  - c/w mod ISS  - Hgb A1C 7.7.
monitor CBC  ferritin elevated, b12 and folate wnl
- Continue Basal Insulin, will reduce given npo   - c/w mod ISS  - Hgb A1C 7.7
Continue Basal Insulin, Check Fingersticks with Meals and cover with ISS TID,    HgbA1C 7.7
monitor CBC  ferritin elevated, b12 and folate wnl
continue amlodipine, enalapril, lasix (new med); holding hctz
monitor CBC  ferritin elevated, b12 and folate wnl
continue amlodipine, enalapril, lasix (new med); holding hctz
monitor CBC, check stool for occult blood  ferritin elevated, b12 and folate wnl
continue levothyroxine  tsh elevated, mildly low free t4, will rpt in am
continue amlodipine, enalapril, lasix (new med); holding hctz
continue amlodipine, enalapril, lasix (new med); holding hctz
monitor CBC  ferritin elevated, b12 and folate wnl
continue amlodipine, enalapril, lasix (new med); holding hctz
continue amlodipine, enalapril, Triamterene
monitor CBC  ferritin elevated, b12 and folate wnl
monitor CBC  ferritin elevated, b12 and folate wnl
monitor CBC, check stool for occult blood  ferritin elevated, b12 and folate wnl

## 2022-01-06 NOTE — PROGRESS NOTE ADULT - PROBLEM SELECTOR PROBLEM 7
Hyperlipidemia
Anemia
Hyperlipidemia
Hyperlipidemia
Hypothyroidism
Diabetes mellitus
Hyperlipidemia
Hyperlipidemia
Hypertension
Hypertension
Hyperlipidemia
Hyperlipidemia
Encounter for palliative care
Angina pectoris
Hypertension
Anemia
Anemia
Hypertension
Hyperlipidemia
Hyperlipidemia
Anemia
Anemia
Hypertension
Anemia
Anemia
Hypertension
Diabetes mellitus
Anemia
Hypertension
Diabetes mellitus
Anemia
Anemia
Angina pectoris

## 2022-01-06 NOTE — PROGRESS NOTE ADULT - PROBLEM SELECTOR PLAN 1
- CT without pulmonary embolus. Multiple nodules are noted within both lungs. Primary consideration is  metastasis. Findings suggestive of malignant pleural effusions bilaterally.  - s/p Thoracentesis on 12/15 by IR, drained appr 660cc pleural fluid, exudative, path negative  - Pulm consult appreciated - steroids switched to methylprednisolone on 12/27, decreased to daily dose  - bedside US by pulm showed improvement in pleural effusion and no pocket to tap. Patient clinically with SOB however may be in the setting of worsening abdominal distention  - prior echo with poor window  - currently on Symbicort, NC  - c/w vanc/cefepime started for c/f superimposed bacterial PNA  - Pt recently transferred to MICU, where pt made full comfort care, DNR/DNI - now downgraded to floors  - Palliative c/s, IV morphine with PRN pushes of morphine for pain or respiratory distress.

## 2022-01-06 NOTE — PROGRESS NOTE ADULT - ASSESSMENT
The patient is a 66-year-old woman who is followed for metastatic vulvar cancer, hypertension, hyperlipidemia, and type II diabetes mellitus who was admitted to the micu for management of hypoxic respiratory failure in the setting of progressive metastatic disease with likely lymphangitic spread of disease in the lungs, but whose goals of care have now been transitioned to do not resuscitate, do not intubate, and comfort measures only.

## 2022-01-06 NOTE — PROGRESS NOTE ADULT - PROBLEM SELECTOR PLAN 4
improved  cont to monitor renal function  renal sono done showed no hydronephrosis
Creatinine improved to 1 today, possibly secondary to zosyn and vanco  no further vanco  cont to monitor renal function  renal sono done showed no hydronephrosis
- daily Ca and albumin  - could be 2/2 hypercalcemia of malignancy;  low PTH,  low vitamin D 25, 1, 25, and PTHRP neg  - Calcitonin q 12 hrs x 4 doses, Zometa 12/20
- s/p thoracentesis x2   - symptom managements for dyspnea as above   - management as per primary team.
Continue Basal Insulin, Check Fingersticks with Meals and cover with ISS TID,    HgbA1C 7.7
improved  cont to monitor renal function  renal sono done showed no hydronephrosis
- daily Ca and albumin  - could be 2/2 hypercalcemia of malignancy;  low PTH,  low vitamin D 25, 1, 25, and PTHRP neg  - Calcitonin q 12 hrs x 4 doses, Zometa 12/20.
Creatinine improved to 1 today, possibly secondary to zosyn and vanco  no further vanco  cont to monitor renal function  renal sono done showed no hydronephrosis
wound care consulted, cont dressing  cont zosyn renal dosing, s/p 1 dose of vanco,    blood culture neg to date  ID consult appreciated  wound culture with strep mitis/oralis  per ID cont zosyn 12/14- 12/23 for a 10 day course
improved  cont to monitor renal function  renal sono done showed no hydronephrosis
Creatinine improved to 1 today, possibly secondary to zosyn and vanco  no further vanco  cont to monitor renal function  renal sono done showed no hydronephrosis
Creatinine improved to 1 today, possibly secondary to zosyn and vanco  no further vanco  cont to monitor renal function  renal sono done showed no hydronephrosis
- Start IV Ativan 0.2mg q1 PRN anxiety/agitation
Creatinine improved to 1 today, possibly secondary to zosyn and vanco  no further vanco  cont to monitor renal function  renal sono done showed no hydronephrosis
Will give gentle hydration  cont to monitor orthostatics
improved  cont to monitor renal function  renal sono done showed no hydronephrosis
wound care consulted, cont dressing  cont zosyn renal dosing, s/p 1 dose of vanco,    blood culture neg to date  ID consult appreciated  wound culture with strep mitis/oralis  per ID cont zosyn 12/14- 12/23 for a 10 day course
Creatinine improved to 1 today, possibly secondary to zosyn and vanco  no further vanco  cont to monitor renal function  renal sono done showed no hydronephrosis
improved  cont to monitor renal function  renal sono done showed no hydronephrosis
monitor CBC, check stool for occult blood  ferritin elevated, b12 and folate wnl
Improved to 132 today   Rpt bmp later today, if worsens will get Nephrology consult  Plan discussed with ACP
improved  cont to monitor renal function  renal sono done showed no hydronephrosis
Creatinine improved to 1 today, possibly secondary to zosyn and vanco  no further vanco  cont to monitor renal function  renal sono done showed no hydronephrosis
Creatinine improved to 1 today, possibly secondary to zosyn and vanco  no further vanco  cont to monitor renal function  renal sono done showed no hydronephrosis
Na at 128 today, will check urine lytes  Rpt bmp later today, if worsens will get Nephrology consult  Plan discussed with ACP

## 2022-01-06 NOTE — PROGRESS NOTE ADULT - PROBLEM SELECTOR PLAN 5
+ orthostasis, had ivf, will dc ivf given hyponatremia, compression stockings  cont to monitor orthostatics
Patient requires support with ADLs. Please assist with ADLs PRN.  Skin care as per protocol.
improved  cont to monitor renal function  renal sono done showed no hydronephrosis
vacillating between 128--132.   Monitor
vascilating between 128--132.   F/u urine lytes, serum osm, urine osm
- patient with hx of recurrent vulvar cancer (recently received chemo/RT from 7/2021-9/2021). Patient found to have multiple lung nodules and B/L pleural effusions.   - path from lung nodule biopsy 12/22 consistent with metastatic squamous cell carcinoma (previous vulvar cancer also sq cell ca), sent for NGS and PDL1.   - due to concern for recurrence (and now metastatic disease) and significant symptoms (shortness of breath), s/p C1 carboplatin + paclitaxel on 12/30.   - oncology recommendations appreciated.
vascilating between 128--132.   F/u urine lytes, serum osm, urine osm
vascilating between 128--132.   F/u urine lytes, serum osm, urine osm
- ferritin elevated, b12 and folate wnl.
vascilating between 128--132.   F/u urine lytes, serum osm, urine osm
monitor CBC, check stool for occult blood  ferritin elevated, b12 and folate wnl
+ orthostasis, had ivf, will dc ivf given hyponatremia, compression stockings  cont to monitor orthostatics
vacillating between 128--132.   Monitor
Improved to 132 today   Rpt bmp later today, if worsens will get Nephrology consult  Plan discussed with ACP
continue amlodipine, enalapril, Triamterene
improved  cont to monitor renal function  renal sono done showed no hydronephrosis
vascilating between 128--132.   F/u urine lytes, serum osm, urine osm
vacillating between 128--132.   Monitor
vacillating between 128--132.   Monitor
Has new TWI inversions on lateral leads. Likely related to L pleural effusion  -pt with no chest pain  -Trop negative so far  -cont to monitor on tele
vacillating between 128--132.   Monitor  related to poor oral intake.   IVF prn.
- ferritin elevated, b12 and folate wnl
vacillating between 128--132.   Monitor

## 2022-01-06 NOTE — PROGRESS NOTE ADULT - SUBJECTIVE AND OBJECTIVE BOX
SUBJECTIVE AND OBJECTIVE:  Patient seen and examined at bedside. Patient being followed up for symptom management for comfort measures.  Patient lethargic today but opens her eyes intermittently to voice and then closes them. Patient not in acute distress but has some gurgling breath sounds. Patient's family: , 2 sisters : Sussy Millan, and daughter Juju at bedside. Family very tearful about patient's decline. Family state that she seems anxious at times when she's awake. Their main hope is for patient to be comfortable with her symptoms well controlled. Educated patient/family about EOL in terms of what to expect.  Questions answered.  Emotional support provided.  Family asking for chaplaincy for last rites.     INTERVAL HPI/OVERNIGHT EVENTS:  In past 24 hours, received 7 prn doses of IV Morphine 2mg.     Allergies    No Known Allergies    Intolerances    MEDICATIONS  (STANDING):  atovaquone  Suspension 1500 milliGRAM(s) Oral daily  budesonide 160 MICROgram(s)/formoterol 4.5 MICROgram(s) Inhaler 2 Puff(s) Inhalation two times a day  cefepime   IVPB      cefepime   IVPB 2000 milliGRAM(s) IV Intermittent every 8 hours  Dakins Solution - 1/4 Strength 1 Application(s) Topical daily  dextrose 40% Gel 15 Gram(s) Oral once  dextrose 5%. 1000 milliLiter(s) (50 mL/Hr) IV Continuous <Continuous>  dextrose 5%. 1000 milliLiter(s) (100 mL/Hr) IV Continuous <Continuous>  dextrose 50% Injectable 25 Gram(s) IV Push once  dextrose 50% Injectable 12.5 Gram(s) IV Push once  dextrose 50% Injectable 25 Gram(s) IV Push once  escitalopram 30 milliGRAM(s) Oral daily  gabapentin 400 milliGRAM(s) Oral three times a day  glucagon  Injectable 1 milliGRAM(s) IntraMuscular once  influenza  Vaccine (HIGH DOSE) 0.7 milliLiter(s) IntraMuscular once  insulin glargine Injectable (LANTUS) 25 Unit(s) SubCutaneous at bedtime  insulin lispro (ADMELOG) corrective regimen sliding scale   SubCutaneous every 6 hours  lactulose Retention Enema 200 Gram(s) Rectal daily  levothyroxine 137 MICROGram(s) Oral daily  methylPREDNISolone sodium succinate Injectable 40 milliGRAM(s) IV Push daily  metoprolol tartrate 12.5 milliGRAM(s) Oral two times a day  morphine  Infusion 1 mG/Hr (1 mL/Hr) IV Continuous <Continuous>  naloxegol 25 milliGRAM(s) Oral daily  nystatin    Suspension 829869 Unit(s) Swish and Swallow four times a day  pantoprazole    Tablet 40 milliGRAM(s) Oral before breakfast  polyethylene glycol 3350 17 Gram(s) Oral two times a day  senna 2 Tablet(s) Oral at bedtime  vancomycin  IVPB 1000 milliGRAM(s) IV Intermittent every 12 hours  vancomycin  IVPB        MEDICATIONS  (PRN):  aluminum hydroxide/magnesium hydroxide/simethicone Suspension 30 milliLiter(s) Oral every 6 hours PRN Dyspepsia  bisacodyl Suppository 10 milliGRAM(s) Rectal daily PRN Constipation  diphenhydrAMINE 25 milliGRAM(s) Oral daily PRN Rash and/or Itching  glycopyrrolate Injectable 0.4 milliGRAM(s) IV Push every 4 hours PRN secretions  lidocaine/prilocaine Cream 1 Application(s) Topical daily PRN Dressing changes  melatonin 3 milliGRAM(s) Oral at bedtime PRN Insomnia  metoclopramide Injectable 10 milliGRAM(s) IV Push every 6 hours PRN nausea or vomiting.  morphine  - Injectable 2 milliGRAM(s) IV Push every 1 hour PRN shortness of breath and/or pain  ondansetron Injectable 4 milliGRAM(s) IV Push three times a day PRN Nausea and/or Vomiting      ITEMS UNCHECKED ARE NOT PRESENT    PRESENT SYMPTOMS: [ x] unable to obtain due to mental status   Source if other than patient:  [ ]Family   [ ]Team     Pain: [ ]yes [ ]no  QOL impact -   Location -      Aggravating factors -   Quality -   Radiation -   Timing-  Severity (0-10 scale):  Minimal acceptable level (0-10 scale):     Dyspnea:                           [ x]Mild [ ]Moderate [ ]Severe  Anxiety:                             [ ]Mild [ ]Moderate [ ]Severe  Agitation:                          [ ]Mild [ ]Moderate [ ]Severe  Fatigue:                             [ ]Mild [ ]Moderate [ ]Severe  Nausea:                             [ ]Mild [ ]Moderate [ ]Severe  Loss of appetite:              [ ]Mild [ ]Moderate [ ]Severe  Constipation:                   [ ]Mild [ ]Moderate [ ]Severe  Diarrhea:                          [ ]Mild [ ]Moderate [ ]Severe      CPOT:    https://[email or web address]/[email or web address]/Critical-Care-Pain-Observation-Tool-(CPOT)    PAIN AD Score:	  [email or web address].[email or web address]/painad.pdf (Ctrl + left click to view)    Other Symptoms:  [ x]All other review of systems negative - unable to obtain due to mental status     Palliative Performance Status Version 2:  40       %      [email or web address]/[email or web address]_performance_[email or web address]_[email or web address]    PHYSICAL EXAM:  Vital Signs Last 24 Hrs  T(C): 36.7 (06 Jan 2022 10:00), Max: 37 (05 Jan 2022 16:00)  T(F): 98.1 (06 Jan 2022 10:00), Max: 98.6 (05 Jan 2022 16:00)  HR: 116 (06 Jan 2022 10:00) (80 - 116)  BP: 124/53 (06 Jan 2022 10:00) (124/53 - 157/65)  BP(mean): 84 (05 Jan 2022 16:00) (84 - 84)  RR: 25 (06 Jan 2022 10:00) (17 - 25)  SpO2: 82% (06 Jan 2022 10:00) (82% - 99%)    GENERAL:  [ ]Alert  [ ]Oriented   [ x]Lethargic but opens eyes to voice intermittently and smiles  [ ]Cachexia  [ ]Unarousable  [x ]Verbal  [ ]Non-Verbal  [ x] No acute Distress  Behavioral:   [ x] Anxiety  [ ] Delirium [ ] Agitation [ ] Calm  [ ] Other  HEENT:  [x ]Normal  [ ] Temporal Wasting  [ ]Dry mouth   [ ]ET Tube/Trach  [ ]Oral lesions  [ ] Mucositis  PULMONARY:   [ ]Clear [ ]Tachypnea [ ]Audible excessive secretions   [ ]Rhonchi        [ ]Right [ ]Left [ ]Bilateral  [ ]Crackles        [ ]Right [ ]Left [ ]Bilateral  [ ]Wheezing     [ ]Right [ ]Left [ ]Bilateral  [x ]Diminished breath sounds [ ]right [ ]left [x ]bilateral  CARDIOVASCULAR:    [ x]Regular [ ]Irregular [ ]Tachy  [ ]Jose [ ]Murmur [ ]Other  GASTROINTESTINAL: central obesity   [x ]Soft  [ ]Distended   [ ]+BS  [x ]Non tender [ ]Tender  [ ]PEG [ ]OGT/ NGT  Last BM: 1/4  GENITOURINARY:  [ x]Normal [ ] Incontinent   [ ]Oliguria/Anuria   [ ][place]  MUSCULOSKELETAL:   [ ]Normal   [ x]Weakness  [ ]Bed/Wheelchair bound [ ]Edema  [  ] amputation  [  ] contraction  NEUROLOGIC:   [x ]No focal deficits  [ ]Cognitive impairment  [ ]Dysphagia [ ]Dysarthria [ ]Paresis [ ]Other   SKIN: Moisture Associated Dermatitis. See Nursing Skin Assessment for further details  [ ]Normal    [ ]Rash  [ ]Pressure ulcer(s)       Present on admission [ ]y [ ]n   [  x]  Wound  - right groin  [  ] hyperpigmentation    CRITICAL CARE:  [ ] Shock Present  [ ]Septic [ ]Cardiogenic [ ]Neurologic [ ]Hypovolemic  [ ]  Vasopressors [ ]  Inotropes   [ x]Respiratory failure present [ ]Mechanical ventilation [ ]Non-invasive ventilatory support-  [ ]High flow  [x ] Nasal canula  [x ]Acute  [ ]Chronic [x ]Hypoxic  [ ]Hypercarbic [ ]Other  [ ]Other organ failure       LABS: reviewed                    RADIOLOGY & ADDITIONAL STUDIES: reviewed    Protein Calorie Malnutrition Present: [ ]mild [ ]moderate [ ]severe [ ]underweight [ ]morbid obesity  https://www.andeal.org/vault/2440/web/files/ONC/Table_Clinical%20Characteristics%20to%20Document%20Malnutrition-White%20JV%20et%20al%498414.pdf    Height (cm): 160 (12-29-21 @ 14:48), 160 (09-03-21 @ 11:09), 160.5 (07-15-21 @ 11:40)  Weight (kg): 73.8 (12-29-21 @ 17:51), 80.998 (12-09-21 @ 16:00), 83.5 (09-03-21 @ 11:09)  BMI (kg/m2): 28.8 (12-29-21 @ 17:51), 31.6 (12-29-21 @ 14:48), 31.6 (12-09-21 @ 16:00)    [ ]PPSV2 < or = 30%  [ ]significant weight loss [ ]poor nutritional intake [ ]anasarca   [ ]Artificial Nutrition    REFERRALS:   [ ]Chaplaincy  [ ]Hospice  [ ]Child Life  [ ]Social Work  [ x]Case management [ ]Holistic Therapy

## 2022-01-06 NOTE — PROGRESS NOTE ADULT - PROBLEM SELECTOR PROBLEM 2
Constipation
Current chronic use of systemic steroids
Current chronic use of systemic steroids
Hyperlipidemia
Current chronic use of systemic steroids
Current chronic use of systemic steroids
Anemia
Hypercalcemia
Hypercalcemia
Right groin wound
Hypercalcemia
Current chronic use of systemic steroids
Hypercalcemia
Pain
Right groin wound
Current chronic use of systemic steroids
Pain
Vulvar cancer
Hypercalcemia
Vulvar cancer
Right groin wound
Hypercalcemia
Hypercalcemia
Right groin wound
Hypercalcemia
Malignant pleural effusion
Hypercalcemia

## 2022-01-06 NOTE — PROVIDER CONTACT NOTE (OTHER) - RECOMMENDATIONS
notify provider. do an EKG
CT abdomen and pelvis with oral contrast ordered to rule out obstruction. PO Lokelma ordered. Pt to be watched for bowel movement.
Nonrebreather, bloodwork, IV steriods, IV morphine, MICU consult, BIPAP, administer enema for constipation. Keep patient NPO.
Notify MD. Notify family of current situation.

## 2022-01-06 NOTE — PROGRESS NOTE ADULT - PROBLEM SELECTOR PROBLEM 4
KRISTIN (acute kidney injury)
Hypercalcemia
KRISTIN (acute kidney injury)
Anemia
KRISTIN (acute kidney injury)
Orthostatic hypotension
Hypercalcemia
Hypothyroidism
KRISTIN (acute kidney injury)
Hypercalcemia
Hypercalcemia
KRISTIN (acute kidney injury)
Hypercalcemia
KRISTIN (acute kidney injury)
Right groin wound
Hypercalcemia
Malignant pleural effusion
Hypercalcemia
Hyponatremia
Hypercalcemia
Hypercalcemia
KRISTIN (acute kidney injury)
Diabetes mellitus
Hypercalcemia
KRISTIN (acute kidney injury)
Anxiety
Hyponatremia
KRISTIN (acute kidney injury)
Right groin wound
KRISTIN (acute kidney injury)

## 2022-01-06 NOTE — PROGRESS NOTE ADULT - PROBLEM SELECTOR PLAN 10
continue levothyroxine dose increased to 125mcg  tsh elevated, mildly low free t4
Continue Basal Insulin, Check Fingersticks with Meals and cover with ISS TID,    HgbA1C 7.7  glucose elevated pt also on steroids, will get endo consult, insulin per endo recs  f/u endo recs
continue levothyroxine dose increased to 125mcg  tsh elevated, mildly low free t4
Continue Basal Insulin, Check Fingersticks with Meals and cover with ISS TID,    HgbA1C 7.7  glucose elevated pt also on steroids, will get endo consult, insulin per endo recs  f/u endo recs
continue levothyroxine dose increased to 125mcg  tsh elevated, mildly low free t4
continue levothyroxine dose increased to 125mcg  tsh elevated, mildly low free t4
Continue Basal Insulin, Check Fingersticks with Meals and cover with ISS TID,    HgbA1C 7.7  glucose elevated due to steroids. Endo following. Adjust basal/bolus insulin   f/u endo recs
DVT PPx: holding for full comfort care  Diet: NPO
Continue Basal Insulin, Check Fingersticks with Meals and cover with ISS TID,    HgbA1C 7.7  glucose elevated pt also on steroids, will get endo consult, insulin per endo recs  f/u endo recs
DVT PPx: holding for full comfort care  Diet: NPO    Full comfort care/DNR/DNI
Continue Basal Insulin, will reduce given npo   c/w mod ISS   HgbA1C 7.7  f/u endo recs
continue gabapentin, prednisone
Continue Basal Insulin, Check Fingersticks with Meals and cover with ISS TID,    HgbA1C 7.7  glucose elevated pt also on steroids, will get endo consult, insulin per endo recs  f/u endo recs
continue levothyroxine dose increased to 125mcg  tsh elevated, mildly low free t4
continue lexapro
continue levothyroxine dose increased to 125mcg  tsh elevated, mildly low free t4
Continue Basal Insulin, Check Fingersticks with Meals and cover with ISS TID,    HgbA1C 7.7  glucose elevated pt also on steroids, will get endo consult, insulin per endo recs  f/u endo recs
continue levothyroxine dose increased to 125mcg  tsh elevated, mildly low free t4
continue gabapentin, prednisone
Continue Basal Insulin, Check Fingersticks with Meals and cover with ISS TID,    HgbA1C 7.7  glucose elevated pt also on steroids, will get endo consult, insulin per endo recs  f/u endo recs

## 2022-01-06 NOTE — PROGRESS NOTE ADULT - PROBLEM SELECTOR PLAN 1
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   - Increase to IV Morphine gtt @ 1 mg/hour  - Continue IV Morphine 2mg q1 PRN pain/dyspnea  - Bowel regimen while on opiates.

## 2022-01-06 NOTE — PROGRESS NOTE ADULT - ATTENDING SUPERVISION STATEMENT
Fellow
Fellow
Resident
Resident
Fellow
Resident
Resident
Fellow
Fellow
Resident
Resident
Fellow
Fellow
Resident

## 2022-01-06 NOTE — PROGRESS NOTE ADULT - ASSESSMENT
65 yo F w/ PMHx of HTN, HLD, DM, fibromyalgia, Vulvar CA s/p vulva surgery in 2020, completed chemo & RT in 9/21, PMR (on chronic steroids, 10 mg BID) presented with with shortness of breath, dizziness, and nausea for multiple weeks, found to have multiple lung nodules and B/L pleural effusions concerning for malignancy, and was admitted to telemetry for further management, s/p thoracentesis on 12/15 (~ 660cc drained). Found on path from lung nodule biopsy 12/22 consistent with metastatic squamous cell carcinoma (previous vulvar cancer also sq cell ca).   Palliative Care consulted for evaluation and management of pain/ symptoms

## 2022-01-06 NOTE — PROGRESS NOTE ADULT - PROBLEM SELECTOR PROBLEM 9
Angina pectoris
Hypertension
Angina pectoris
Hypothyroidism
Neuropathy
Diabetes mellitus
Diabetes mellitus
Fibromyalgia
Diabetes mellitus
Hypertension
Fibromyalgia
Diabetes mellitus
Angina pectoris
Angina pectoris

## 2022-01-06 NOTE — PROGRESS NOTE ADULT - SUBJECTIVE AND OBJECTIVE BOX
Internal Medicine   Lolly Guevara | PGY-1    OVERNIGHT EVENTS:      SUBJECTIVE:       MEDICATIONS  (STANDING):  atovaquone  Suspension 1500 milliGRAM(s) Oral daily  budesonide 160 MICROgram(s)/formoterol 4.5 MICROgram(s) Inhaler 2 Puff(s) Inhalation two times a day  cefepime   IVPB 2000 milliGRAM(s) IV Intermittent every 8 hours  cefepime   IVPB      Dakins Solution - 1/4 Strength 1 Application(s) Topical daily  dextrose 40% Gel 15 Gram(s) Oral once  dextrose 5%. 1000 milliLiter(s) (50 mL/Hr) IV Continuous <Continuous>  dextrose 5%. 1000 milliLiter(s) (100 mL/Hr) IV Continuous <Continuous>  dextrose 50% Injectable 25 Gram(s) IV Push once  dextrose 50% Injectable 12.5 Gram(s) IV Push once  dextrose 50% Injectable 25 Gram(s) IV Push once  escitalopram 30 milliGRAM(s) Oral daily  gabapentin 400 milliGRAM(s) Oral three times a day  glucagon  Injectable 1 milliGRAM(s) IntraMuscular once  influenza  Vaccine (HIGH DOSE) 0.7 milliLiter(s) IntraMuscular once  insulin glargine Injectable (LANTUS) 25 Unit(s) SubCutaneous at bedtime  insulin lispro (ADMELOG) corrective regimen sliding scale   SubCutaneous every 6 hours  lactulose Retention Enema 200 Gram(s) Rectal daily  levothyroxine 137 MICROGram(s) Oral daily  methylPREDNISolone sodium succinate Injectable 40 milliGRAM(s) IV Push daily  metoprolol tartrate 12.5 milliGRAM(s) Oral two times a day  morphine  Infusion 1 mG/Hr (1 mL/Hr) IV Continuous <Continuous>  naloxegol 25 milliGRAM(s) Oral daily  nystatin    Suspension 531200 Unit(s) Swish and Swallow four times a day  pantoprazole    Tablet 40 milliGRAM(s) Oral before breakfast  polyethylene glycol 3350 17 Gram(s) Oral two times a day  senna 2 Tablet(s) Oral at bedtime  vancomycin  IVPB      vancomycin  IVPB 1000 milliGRAM(s) IV Intermittent every 12 hours    MEDICATIONS  (PRN):  aluminum hydroxide/magnesium hydroxide/simethicone Suspension 30 milliLiter(s) Oral every 6 hours PRN Dyspepsia  bisacodyl Suppository 10 milliGRAM(s) Rectal daily PRN Constipation  diphenhydrAMINE 25 milliGRAM(s) Oral daily PRN Rash and/or Itching  glycopyrrolate Injectable 0.2 milliGRAM(s) IV Push every 4 hours PRN secretions  lidocaine/prilocaine Cream 1 Application(s) Topical daily PRN Dressing changes  melatonin 3 milliGRAM(s) Oral at bedtime PRN Insomnia  metoclopramide Injectable 10 milliGRAM(s) IV Push every 6 hours PRN nausea or vomiting.  morphine  - Injectable 2 milliGRAM(s) IV Push every 1 hour PRN shortness of breath and/or pain  ondansetron Injectable 4 milliGRAM(s) IV Push three times a day PRN Nausea and/or Vomiting        T(F): 97.3 (01-05-22 @ 19:16), Max: 98.6 (01-05-22 @ 16:00)  HR: 91 (01-06-22 @ 04:59) (76 - 989)  BP: 141/61 (01-06-22 @ 04:59) (118/51 - 157/65)  BP(mean): 84 (01-05-22 @ 16:00) (65 - 84)  RR: 22 (01-06-22 @ 04:59) (16 - 22)  SpO2: 95% (01-06-22 @ 04:59) (91% - 99%)    PHYSICAL EXAM:     GENERAL: NAD, lying in bed comfortably.  HEAD:  Atraumatic, normocephalic.  EYES: EOMI, PERRLA, conjunctiva and sclera clear, no nystagmus noted.  ENT: Moist mucous membranes,   NECK: Supple. No JVD. Trachea midline.  CHEST/LUNG: CTAB. No rales, rhonchi, wheezing, or rubs. Unlabored respirations.  HEART: RRR, no M/R/G, normal S1/S2.  ABDOMEN: Soft, nontender, nondistended, no organomegaly. Normoactive bowel sounds.  EXTREMITIES:  2+ peripheral pulses b/l, brisk capillary refill. No clubbing, cyanosis, or edema.  MSK: No gross deformities noted.   NEURO:  AAOx3, no focal deficits.   SKIN: No rashes or lesions.  PSYCH: Normal mood, affect.    TELEMETRY:    LABS:                  Creatinine Trend: 0.65<--, 0.71<--, 0.75<--, 0.94<--, 1.11<--, 1.13<--  I&O's Summary    05 Jan 2022 07:01  -  06 Jan 2022 07:00  --------------------------------------------------------  IN: 304 mL / OUT: 250 mL / NET: 54 mL      BNP    RADIOLOGY & ADDITIONAL STUDIES:             Internal Medicine   Lolly Guevara | PGY-1    OVERNIGHT EVENTS: Morphine increased to 1g.      SUBJECTIVE: Patient was seen and examined at bedside this morning. Patient appears to be breathing agonally, but does not appear uncomfortable. ROS unable to be obtained as patient is obtunded.      MEDICATIONS  (STANDING):  atovaquone  Suspension 1500 milliGRAM(s) Oral daily  budesonide 160 MICROgram(s)/formoterol 4.5 MICROgram(s) Inhaler 2 Puff(s) Inhalation two times a day  cefepime   IVPB 2000 milliGRAM(s) IV Intermittent every 8 hours  cefepime   IVPB      Dakins Solution - 1/4 Strength 1 Application(s) Topical daily  dextrose 40% Gel 15 Gram(s) Oral once  dextrose 5%. 1000 milliLiter(s) (50 mL/Hr) IV Continuous <Continuous>  dextrose 5%. 1000 milliLiter(s) (100 mL/Hr) IV Continuous <Continuous>  dextrose 50% Injectable 25 Gram(s) IV Push once  dextrose 50% Injectable 12.5 Gram(s) IV Push once  dextrose 50% Injectable 25 Gram(s) IV Push once  escitalopram 30 milliGRAM(s) Oral daily  gabapentin 400 milliGRAM(s) Oral three times a day  glucagon  Injectable 1 milliGRAM(s) IntraMuscular once  influenza  Vaccine (HIGH DOSE) 0.7 milliLiter(s) IntraMuscular once  insulin glargine Injectable (LANTUS) 25 Unit(s) SubCutaneous at bedtime  insulin lispro (ADMELOG) corrective regimen sliding scale   SubCutaneous every 6 hours  lactulose Retention Enema 200 Gram(s) Rectal daily  levothyroxine 137 MICROGram(s) Oral daily  methylPREDNISolone sodium succinate Injectable 40 milliGRAM(s) IV Push daily  metoprolol tartrate 12.5 milliGRAM(s) Oral two times a day  morphine  Infusion 1 mG/Hr (1 mL/Hr) IV Continuous <Continuous>  naloxegol 25 milliGRAM(s) Oral daily  nystatin    Suspension 717265 Unit(s) Swish and Swallow four times a day  pantoprazole    Tablet 40 milliGRAM(s) Oral before breakfast  polyethylene glycol 3350 17 Gram(s) Oral two times a day  senna 2 Tablet(s) Oral at bedtime  vancomycin  IVPB      vancomycin  IVPB 1000 milliGRAM(s) IV Intermittent every 12 hours    MEDICATIONS  (PRN):  aluminum hydroxide/magnesium hydroxide/simethicone Suspension 30 milliLiter(s) Oral every 6 hours PRN Dyspepsia  bisacodyl Suppository 10 milliGRAM(s) Rectal daily PRN Constipation  diphenhydrAMINE 25 milliGRAM(s) Oral daily PRN Rash and/or Itching  glycopyrrolate Injectable 0.2 milliGRAM(s) IV Push every 4 hours PRN secretions  lidocaine/prilocaine Cream 1 Application(s) Topical daily PRN Dressing changes  melatonin 3 milliGRAM(s) Oral at bedtime PRN Insomnia  metoclopramide Injectable 10 milliGRAM(s) IV Push every 6 hours PRN nausea or vomiting.  morphine  - Injectable 2 milliGRAM(s) IV Push every 1 hour PRN shortness of breath and/or pain  ondansetron Injectable 4 milliGRAM(s) IV Push three times a day PRN Nausea and/or Vomiting        T(F): 97.3 (01-05-22 @ 19:16), Max: 98.6 (01-05-22 @ 16:00)  HR: 91 (01-06-22 @ 04:59) (76 - 989)  BP: 141/61 (01-06-22 @ 04:59) (118/51 - 157/65)  BP(mean): 84 (01-05-22 @ 16:00) (65 - 84)  RR: 22 (01-06-22 @ 04:59) (16 - 22)  SpO2: 95% (01-06-22 @ 04:59) (91% - 99%)    PHYSICAL EXAM:     GENERAL: NAD, lying in bed comfortably.  HEAD:  Atraumatic, normocephalic.  CHEST/LUNG: CTAB. No rales, rhonchi, wheezing, or rubs. Unlabored respirations.  HEART: +Tachycardic, no M/R/G, prominent S2.  ABDOMEN: Distended abdomen. Normoactive bowel sounds.  EXTREMITIES:  2+ peripheral pulses b/l. No clubbing, cyanosis, or edema.  NEURO:  AAOx0, obtunded.  SKIN: No rashes or lesions.      LABS:                  Creatinine Trend: 0.65<--, 0.71<--, 0.75<--, 0.94<--, 1.11<--, 1.13<--  I&O's Summary    05 Jan 2022 07:01  -  06 Jan 2022 07:00  --------------------------------------------------------  IN: 304 mL / OUT: 250 mL / NET: 54 mL      BNP    RADIOLOGY & ADDITIONAL STUDIES:

## 2022-01-06 NOTE — PROGRESS NOTE ADULT - PROBLEM SELECTOR PROBLEM 1
Malignant pleural effusion
Uncontrolled type 2 diabetes mellitus with hyperglycemia, with long-term current use of insulin
Malignant pleural effusion
Uncontrolled type 2 diabetes mellitus with hyperglycemia, with long-term current use of insulin
Uncontrolled type 2 diabetes mellitus with hyperglycemia, with long-term current use of insulin
Malignant pleural effusion
Uncontrolled type 2 diabetes mellitus with hyperglycemia, with long-term current use of insulin
Dyspnea
Malignant pleural effusion
Malignant pleural effusion
Uncontrolled type 2 diabetes mellitus with hyperglycemia, with long-term current use of insulin
Malignant pleural effusion
Dyspnea
Uncontrolled type 2 diabetes mellitus with hyperglycemia, with long-term current use of insulin
Malignant pleural effusion
Acute respiratory failure with hypoxia
Malignant pleural effusion
Malignant pleural effusion
Ileus
Malignant pleural effusion
Acute respiratory failure with hypoxia
Malignant pleural effusion
Malignant pleural effusion

## 2022-01-06 NOTE — PROGRESS NOTE ADULT - PROBLEM SELECTOR PLAN 2
- Increase to IV Morphine gtt @ 1 mg/hour  - Continue IV Morphine 2mg q1 PRN pain/dyspnea  - Bowel regimen while on opiates.

## 2022-01-06 NOTE — PROGRESS NOTE ADULT - PROBLEM SELECTOR PLAN 2
- metastatic, c/b malignant pleural effusions c/b AHRF  - patient downgraded from MICU 1/5  - patient made full comfort care, DNR/DNI.

## 2022-01-06 NOTE — PROGRESS NOTE ADULT - PROBLEM SELECTOR PLAN 6
+ orthostasis, had ivf, will dc ivf given hyponatremia, compression stockings  cont to monitor orthostatics
+ orthostasis, had ivf, will dc ivf given hyponatremia, compression stockings  cont to monitor orthostatics
monitor CBC  ferritin elevated, b12 and folate wnl
monitor CBC, check stool for occult blood  ferritin elevated, b12 and folate wnl
- currently hemodynamically stable  - c/w lopressor PRN
monitor CBC  ferritin elevated, b12 and folate wnl
monitor CBC  ferritin elevated, b12 and folate wnl
+ orthostasis, had ivf, will dc ivf given hyponatremia, compression stockings  cont to monitor orthostatics
monitor CBC  ferritin elevated, b12 and folate wnl
monitor CBC, check stool for occult blood  ferritin elevated, b12 and folate wnl
Emotional support provided, questions answered.    Discussed with primary team regarding symptom management     Thank you for allowing us to participate in your patient's care. Please page [phone] for any questions/concerns.
+ orthostasis, had ivf, will dc ivf given hyponatremia, compression stockings  cont to monitor orthostatics
+ orthostasis, had ivf, will dc ivf given hyponatremia, compression stockings  cont to monitor orthostatics
- currently hemodynamically stable  - c/w lopressor PRN.
+ orthostasis, had ivf, will dc ivf given hyponatremia, compression stockings  cont to monitor orthostatics
continue gabapentin, prednisone
monitor CBC  ferritin elevated, b12 and folate wnl
monitor CBC  ferritin elevated, b12 and folate wnl
- patient with hx of recurrent vulvar cancer (recently received chemo/RT from 7/2021-9/2021). Patient found to have multiple lung nodules and B/L pleural effusions.   - path from lung nodule biopsy 12/22 consistent with metastatic squamous cell carcinoma (previous vulvar cancer also sq cell ca), sent for NGS and PDL1.   - due to concern for recurrence (and now metastatic disease) and significant symptoms (shortness of breath), s/p C1 carboplatin + paclitaxel on 12/30.   - oncology recommendations appreciated.
+ orthostasis, had ivf, will dc ivf given hyponatremia, compression stockings  cont to monitor orthostatics
Has new TWI inversions on lateral leads. Likely related to L pleural effusion  -pt with no chest pain  -Trop negative so far  -cont to monitor on tele
vacillating between 128--132.   Monitor  related to poor oral intake.   IVF prn.
Continue Basal Insulin, Check Fingersticks with Meals and cover with ISS TID,    HgbA1C 7.7
vacillating between 128--132.   Monitor  related to poor oral intake.   IVF prn.
+ orthostasis, had ivf, will dc ivf given hyponatremia, compression stockings  cont to monitor orthostatics

## 2022-01-06 NOTE — PROGRESS NOTE ADULT - PROBLEM SELECTOR PROBLEM 10
Diabetes mellitus
Fibromyalgia
Hypothyroidism
Hypothyroidism
Diabetes mellitus
Hypothyroidism
Diabetes mellitus
Diabetes mellitus
Hypothyroidism
Prophylactic measure
Diabetes mellitus
Neuropathy
Diabetes mellitus
Diabetes mellitus
Neuropathy
Hypothyroidism
Prophylactic measure
Diabetes mellitus
Hypothyroidism
Diabetes mellitus
Hypothyroidism
Diabetes mellitus

## 2022-01-06 NOTE — PROGRESS NOTE ADULT - PROBLEM SELECTOR PROBLEM 3
Hypothyroidism
Right groin wound
Right groin wound
Hypothyroidism
Right groin wound
Hypothyroidism
KRISTIN (acute kidney injury)
Hypothyroidism
KRISTIN (acute kidney injury)
Right groin wound
Right groin wound
Hypothyroidism
Hypothyroidism
Hypertension
Hypothyroidism
Right groin wound
Hypothyroidism
Angina pectoris
Excessive oral secretions
Right groin wound
Hypercalcemia
Debility
Hypothyroidism
Hypothyroidism
Right groin wound
Right groin wound
Hypothyroidism
Right groin wound
Ileus
KRISTIN (acute kidney injury)
Right groin wound
Ileus
Right groin wound
Hypercalcemia
KRISTIN (acute kidney injury)

## 2022-01-06 NOTE — PROGRESS NOTE ADULT - PROBLEM SELECTOR PROBLEM 8
Angina pectoris
Hypertension
Angina pectoris
Hypertension
Neuropathy
Angina pectoris
Fibromyalgia
Hypertension
Hypertension
Hypothyroidism
Hypothyroidism
Angina pectoris
Angina pectoris
Hypertension
Angina pectoris
Diabetes mellitus
Hypertension
Diabetes mellitus

## 2022-01-07 NOTE — CHART NOTE - NSCHARTNOTESELECT_GEN_ALL_CORE
Death/Event Note
Endocrine/Event Note
Event Note
IR
POCUS Note/Event Note
PRE-INTERVENTIONAL RADIOLOGY PROCEDURE NOTE/Event Note
transfer note/Event Note
Event Note
Follow Up/Nutrition Services
MAR Accept Note/Event Note
Nutrition Services
Transfer Note
pocus

## 2022-01-07 NOTE — DISCHARGE NOTE FOR THE EXPIRED PATIENT - HOSPITAL COURSE
Patient is a 66-year-old woman who is followed for metastatic cancer of the vulva, hypertension, and hyperlipidemia who presented initially in mid-December for evaluation of nausea and weakness. She subsequently was noted to have shortness of breath secondary to bilateral malignant pleural effusions. The patient was evaluated by the oncology and pulmonology teams. A diagnostic, left-sided thoracentesis was performed and demonstrated evidence of a malignant pleural effusion. Progressively, the patient developed dyspnea on the floor and, on the morning of 1/4, was transferred to the micu for management of hypoxic respiratory failure. A goals-of-care discussion was initiated with the patient and her family. Understanding that the patient's respiratory failure was most likely secondary to her progressive metastatic disease, the patient's goals of care were transitioned to full comfort care and dnr/dni status. Per the guidance of the palliative care team, the patient was administered intravenous morphine both through an infusion and with prn pushes of morphine. She was being transitioned to the medicine floor for continued comfort care.     Called by bedside by nurse for loss of carotid pulse.     On physical exam, no spontaneous movements were present. Patient did not respond to verbal or physical stimuli. No breath sounds were appreciated over either lung field. No carotid pulses were palpable. No heart sounds were auscultated.   Patient pronounced dead at 8:35am. Attending notified.  Family was notified.

## 2022-01-07 NOTE — CHART NOTE - NSCHARTNOTEFT_GEN_A_CORE
Called by bedside by nurse for loss of carotid pulse.     On physical exam, no spontaneous movements were present. Patient did not respond to verbal or physical stimuli. No breath sounds were appreciated over either lung field. No carotid pulses were palpable. No heart sounds were auscultated.   Patient pronounced dead at ___. Attending notified.  Family was notified. Family ___ autopsy. Called by bedside by nurse for loss of carotid pulse.     On physical exam, no spontaneous movements were present. Patient did not respond to verbal or physical stimuli. No breath sounds were appreciated over either lung field. No carotid pulses were palpable. No heart sounds were auscultated.   Patient pronounced dead at 8:35am. Attending notified.  Family was notified.

## 2022-01-07 NOTE — PROGRESS NOTE ADULT - PROVIDER SPECIALTY LIST ADULT
Heme/Onc
MICU
Pulmonology
Heme/Onc
Heme/Onc
Hospitalist
Infectious Disease
Internal Medicine
MICU
Pulmonology
Wound Care
Endocrinology
Endocrinology
Heme/Onc
Internal Medicine
Pulmonology
Heme/Onc
Pulmonology
Pulmonology
Palliative Care
Hospitalist
Endocrinology
Hospitalist
Endocrinology
Endocrinology
Hospitalist
Endocrinology
Endocrinology
Hospitalist
Endocrinology
Hospitalist
Endocrinology
Endocrinology
Palliative Care
Hospitalist
Internal Medicine
Hospitalist

## 2022-01-07 NOTE — PROGRESS NOTE ADULT - REASON FOR ADMISSION
SOB dizziness & nausea x couple of weeks

## 2022-01-08 LAB — NON-GYNECOLOGICAL CYTOLOGY STUDY: SIGNIFICANT CHANGE UP

## 2022-01-09 LAB
CULTURE RESULTS: SIGNIFICANT CHANGE UP
SPECIMEN SOURCE: SIGNIFICANT CHANGE UP

## 2022-01-21 ENCOUNTER — APPOINTMENT (OUTPATIENT)
Dept: HEMATOLOGY ONCOLOGY | Facility: CLINIC | Age: 67
End: 2022-01-21

## 2022-01-22 LAB
CULTURE RESULTS: SIGNIFICANT CHANGE UP
SPECIMEN SOURCE: SIGNIFICANT CHANGE UP

## 2022-01-31 NOTE — PROGRESS NOTE ADULT - SUBJECTIVE AND OBJECTIVE BOX
SUBJECTIVE AND OBJECTIVE:  Patient seen and examined at bedside. Patient being followed up for symptom management for comfort measures.  Patient reports feeling better today since initiation of Morphine gtt. She feels pain is better controlled and dyspnea have improve as well. Denies any other acute complaints.   Discussed with bedside RN and primary team who both agree patient's symptoms better controlled today.     INTERVAL HPI/OVERNIGHT EVENTS:  In past 24 hours, received 4 prn doses of IV Morphine 2mg.     Allergies    No Known Allergies    Intolerances    MEDICATIONS  (STANDING):  atovaquone  Suspension 1500 milliGRAM(s) Oral daily  budesonide 160 MICROgram(s)/formoterol 4.5 MICROgram(s) Inhaler 2 Puff(s) Inhalation two times a day  cefepime   IVPB      cefepime   IVPB 2000 milliGRAM(s) IV Intermittent every 8 hours  Dakins Solution - 1/4 Strength 1 Application(s) Topical daily  dextrose 40% Gel 15 Gram(s) Oral once  dextrose 5%. 1000 milliLiter(s) (50 mL/Hr) IV Continuous <Continuous>  dextrose 5%. 1000 milliLiter(s) (100 mL/Hr) IV Continuous <Continuous>  dextrose 50% Injectable 25 Gram(s) IV Push once  dextrose 50% Injectable 12.5 Gram(s) IV Push once  dextrose 50% Injectable 25 Gram(s) IV Push once  escitalopram 30 milliGRAM(s) Oral daily  gabapentin 400 milliGRAM(s) Oral three times a day  glucagon  Injectable 1 milliGRAM(s) IntraMuscular once  influenza  Vaccine (HIGH DOSE) 0.7 milliLiter(s) IntraMuscular once  insulin glargine Injectable (LANTUS) 25 Unit(s) SubCutaneous at bedtime  insulin lispro (ADMELOG) corrective regimen sliding scale   SubCutaneous every 6 hours  lactulose Retention Enema 200 Gram(s) Rectal daily  levothyroxine 137 MICROGram(s) Oral daily  methylPREDNISolone sodium succinate Injectable 40 milliGRAM(s) IV Push daily  metoprolol tartrate 12.5 milliGRAM(s) Oral two times a day  morphine  Infusion 1 mG/Hr (1 mL/Hr) IV Continuous <Continuous>  naloxegol 25 milliGRAM(s) Oral daily  nystatin    Suspension 856131 Unit(s) Swish and Swallow four times a day  pantoprazole    Tablet 40 milliGRAM(s) Oral before breakfast  polyethylene glycol 3350 17 Gram(s) Oral two times a day  senna 2 Tablet(s) Oral at bedtime  vancomycin  IVPB 1000 milliGRAM(s) IV Intermittent every 12 hours  vancomycin  IVPB        MEDICATIONS  (PRN):  aluminum hydroxide/magnesium hydroxide/simethicone Suspension 30 milliLiter(s) Oral every 6 hours PRN Dyspepsia  bisacodyl Suppository 10 milliGRAM(s) Rectal daily PRN Constipation  diphenhydrAMINE 25 milliGRAM(s) Oral daily PRN Rash and/or Itching  glycopyrrolate Injectable 0.2 milliGRAM(s) IV Push every 4 hours PRN secretions  lidocaine/prilocaine Cream 1 Application(s) Topical daily PRN Dressing changes  melatonin 3 milliGRAM(s) Oral at bedtime PRN Insomnia  metoclopramide Injectable 10 milliGRAM(s) IV Push every 6 hours PRN nausea or vomiting.  morphine  - Injectable 2 milliGRAM(s) IV Push every 1 hour PRN shortness of breath and/or pain  ondansetron Injectable 4 milliGRAM(s) IV Push three times a day PRN Nausea and/or Vomiting      ITEMS UNCHECKED ARE NOT PRESENT    PRESENT SYMPTOMS: [ x] limited due to patient being slightly confused and a poor historian  Source if other than patient:  [ ]Family   [ ]Team     Pain: [x ]yes [ ]no  QOL impact - unable to elaborate  Location -    abdomen                 Aggravating factors - none  Quality - unable to elaborate  Radiation - none   Timing-unable to elaborate  Severity (0-10 scale):  Minimal acceptable level (0-10 scale):     Dyspnea:                           [ x]Mild [ ]Moderate [ ]Severe  Anxiety:                             [ ]Mild [ ]Moderate [ ]Severe  Agitation:                          [ ]Mild [ ]Moderate [ ]Severe  Fatigue:                             [ ]Mild [ ]Moderate [ ]Severe  Nausea:                             [ ]Mild [ ]Moderate [ ]Severe  Loss of appetite:              [ ]Mild [ ]Moderate [ ]Severe  Constipation:                   [ ]Mild [ ]Moderate [ ]Severe  Diarrhea:                          [ ]Mild [ ]Moderate [ ]Severe      CPOT:    https://www.[facility].org/getattachment/[email or web address]/Critical-Care-Pain-Observation-Tool-(CPOT)    PAIN AD Score:	  http://geriatrictoolkit.[facility]/cog/painad.pdf (Ctrl + left click to view)    Other Symptoms:  [ x]All other review of systems negative - limited due to patient being slightly confused and a poor historian     Palliative Performance Status Version 2:  40-50       %      http://[facility].org/files/news/palliative_performance_scale_ppsv2.pdf    PHYSICAL EXAM:  Vital Signs Last 24 Hrs  T(C): 36.3 (05 Jan 2022 19:16), Max: 37.3 (05 Jan 2022 04:00)  T(F): 97.3 (05 Jan 2022 19:16), Max: 99.1 (05 Jan 2022 04:00)  HR: 82 (05 Jan 2022 20:47) (76 - 989)  BP: 126/52 (05 Jan 2022 19:16) (118/51 - 158/60)  BP(mean): 84 (05 Jan 2022 16:00) (65 - 84)  RR: 18 (05 Jan 2022 19:16) (16 - 20)  SpO2: 96% (05 Jan 2022 20:47) (91% - 99%)     I&O's Summary    04 Jan 2022 07:01  -  05 Jan 2022 07:00  --------------------------------------------------------  IN: 709 mL / OUT: 2300 mL / NET: -1591 mL    05 Jan 2022 07:01  -  05 Jan 2022 21:03  --------------------------------------------------------  IN: 304 mL / OUT: 250 mL / NET: 54 mL    GENERAL:  [ x]Alert  [ x]Oriented x 2-3  [ ]Lethargic  [ ]Cachexia  [ ]Unarousable  [x ]Verbal  [ ]Non-Verbal  [ x] No acute Distress  Behavioral:   [ x] Anxiety  [ ] Delirium [ ] Agitation [ ] Calm  [ ] Other  HEENT:  [x ]Normal  [ ] Temporal Wasting  [ ]Dry mouth   [ ]ET Tube/Trach  [ ]Oral lesions  [ ] Mucositis  PULMONARY:   [ ]Clear [ ]Tachypnea [ ]Audible excessive secretions   [ ]Rhonchi        [ ]Right [ ]Left [ ]Bilateral  [ ]Crackles        [ ]Right [ ]Left [ ]Bilateral  [ ]Wheezing     [ ]Right [ ]Left [ ]Bilateral  [x ]Diminished breath sounds [ ]right [ ]left [x ]bilateral  CARDIOVASCULAR:    [ x]Regular [ ]Irregular [ ]Tachy  [ ]Jose [ ]Murmur [ ]Other  GASTROINTESTINAL: central obesity   [x ]Soft  [ ]Distended   [ ]+BS  [x ]Non tender [ ]Tender  [ ]PEG [ ]OGT/ NGT  Last BM: 1/4  GENITOURINARY:  [ x]Normal [ ] Incontinent   [ ]Oliguria/Anuria   [ ][place]  MUSCULOSKELETAL:   [ ]Normal   [ x]Weakness  [ ]Bed/Wheelchair bound [ ]Edema  [  ] amputation  [  ] contraction  NEUROLOGIC:   [x ]No focal deficits  [ ]Cognitive impairment  [ ]Dysphagia [ ]Dysarthria [ ]Paresis [ ]Other   SKIN: Moisture Associated Dermatitis. See Nursing Skin Assessment for further details  [ ]Normal    [ ]Rash  [ ]Pressure ulcer(s)       Present on admission [ ]y [ ]n   [  x]  Wound  - right groin  [  ] hyperpigmentation    CRITICAL CARE:  [ ] Shock Present  [ ]Septic [ ]Cardiogenic [ ]Neurologic [ ]Hypovolemic  [ ]  Vasopressors [ ]  Inotropes   [ x]Respiratory failure present [ ]Mechanical ventilation [ ]Non-invasive ventilatory support-  [ ]High flow  [x ] Nasal canula  [x ]Acute  [ ]Chronic [x ]Hypoxic  [ ]Hypercarbic [ ]Other  [ ]Other organ failure       LABS:                        8.0    15.15 )-----------( 164      ( 04 Jan 2022 03:58 )             26.1   01-04    135  |  97<L>  |  48<H>  ----------------------------<  226<H>  5.9<H>   |  28  |  0.65    Ca    8.8      04 Jan 2022 03:58  Phos  3.1     01-04  Mg     2.10     01-04    TPro  6.2  /  Alb  3.2<L>  /  TBili  0.2  /  DBili  x   /  AST  10  /  ALT  15  /  AlkPhos  68  01-04  PT/INR - ( 04 Jan 2022 03:58 )   PT: 11.8 sec;   INR: 1.03 ratio         PTT - ( 04 Jan 2022 03:58 )  PTT:22.5 sec    CAPILLARY BLOOD GLUCOSE      POCT Blood Glucose.: 231 mg/dL (05 Jan 2022 17:58)  POCT Blood Glucose.: 257 mg/dL (05 Jan 2022 12:20)  POCT Blood Glucose.: 175 mg/dL (05 Jan 2022 05:43)  POCT Blood Glucose.: 159 mg/dL (04 Jan 2022 22:32)      RADIOLOGY & ADDITIONAL STUDIES: reviewed    Protein Calorie Malnutrition Present: [ ]mild [ ]moderate [ ]severe [ ]underweight [ ]morbid obesity  https://www.andeal.org/vault/2440/web/files/ONC/Table_Clinical%20Characteristics%20to%20Document%20Malnutrition-White%20JV%20et%20al%783647.pdf    Height (cm): 160 (12-29-21 @ 14:48), 160 (09-03-21 @ 11:09), 160.5 (07-15-21 @ 11:40)  Weight (kg): 73.8 (12-29-21 @ 17:51), 80.998 (12-09-21 @ 16:00), 83.5 (09-03-21 @ 11:09)  BMI (kg/m2): 28.8 (12-29-21 @ 17:51), 31.6 (12-29-21 @ 14:48), 31.6 (12-09-21 @ 16:00)    [ ]PPSV2 < or = 30%  [ ]significant weight loss [ ]poor nutritional intake [ ]anasarca   [ ]Artificial Nutrition    REFERRALS:   [ ]Chaplaincy  [ ]Hospice  [ ]Child Life  [ ]Social Work  [ x]Case management [ ]Holistic Therapy      Normal

## 2022-02-02 LAB
CULTURE RESULTS: SIGNIFICANT CHANGE UP
SPECIMEN SOURCE: SIGNIFICANT CHANGE UP

## 2022-02-12 LAB
CULTURE RESULTS: SIGNIFICANT CHANGE UP
SPECIMEN SOURCE: SIGNIFICANT CHANGE UP

## 2022-02-18 ENCOUNTER — APPOINTMENT (OUTPATIENT)
Dept: RHEUMATOLOGY | Facility: CLINIC | Age: 67
End: 2022-02-18

## 2022-03-17 ENCOUNTER — APPOINTMENT (OUTPATIENT)
Dept: RHEUMATOLOGY | Facility: CLINIC | Age: 67
End: 2022-03-17

## 2022-04-12 NOTE — ED ADULT TRIAGE NOTE - AS TEMP SITE
Discharge instructions reviewed with patient et wife. Both report understanding. Band-aid to left thigh remains CDI. Patient ambulatory upon discharge. oral

## 2022-04-21 ENCOUNTER — APPOINTMENT (OUTPATIENT)
Dept: RHEUMATOLOGY | Facility: CLINIC | Age: 67
End: 2022-04-21

## 2022-05-19 ENCOUNTER — APPOINTMENT (OUTPATIENT)
Dept: RHEUMATOLOGY | Facility: CLINIC | Age: 67
End: 2022-05-19

## 2022-09-19 NOTE — PHYSICAL EXAM
[Restricted in physically strenuous activity but ambulatory and able to carry out work of a light or sedentary nature] : Status 1- Restricted in physically strenuous activity but ambulatory and able to carry out work of a light or sedentary nature, e.g., light house work, office work [Normal] : affect appropriate [de-identified] : s/p post-surgical changes s/p vulvectomies  [de-identified] : +inguinal adenopathy, mildly tender on palpation  72.6

## 2023-01-01 NOTE — H&P PST ADULT - GASTROINTESTINAL
Pt placed on pulse ox at this time.   details… Soft, non-tender, no hepatosplenomegaly, normal bowel sounds

## 2023-02-07 NOTE — ASU PATIENT PROFILE, ADULT - HEALTHCARE QUESTIONS, PROFILE
Patient Quality Outreach    Patient is due for the following:   Physical Well Child Check      Topic Date Due     COVID-19 Vaccine (1) Never done     Flu Vaccine (1 of 2) Never done       Next Steps:   Schedule a Well Child Check    Type of outreach:    Call to schedule well child check. If message is left and no call back in 1 week send letter and close.      Questions for provider review:    None     Nargis Singh, [facility]  Chart routed to Care Team.       nono

## 2023-03-22 NOTE — H&P PST ADULT - PRO ARRIVE FROM
----- Message from Ely Baca sent at 3/22/2023  9:53 AM CDT -----  Contact: Junito  Patient is calling to speak with nurse regarding orders. Reports urine Is yellow and cloudy and would like a urine test ordered so she can come to the [place] location today and get it completed while she's there for her husbands appt. Please return patents call at [phone]    
Order placed and pt notified   
home

## 2023-06-13 NOTE — PROGRESS NOTE ADULT - ASSESSMENT
67 yo F w/ PMHx of HTN, HLD, DM, fibromyalgia, Vulvar CA s/p vulva surgery in 2020, completed chemo & RT in 9/21, PMR (on chronic steroids, 10 mg BID), now p/w SOB. CTPA with out PE but multiple nodules are noted within both lungs. Primary consideration is metastasis. Findings suggestive of malignant pleural effusions bilaterally, s/p thora this admission by IR 12/15. Now with worsening hypercalcemia. Also on a course of zosyn for inguinal wound (thought to be possible source of fever, but not entirely clear)-- ID recommended 10 day course of augmentin (12/15-12/24). Pulm nodule bx 12/22. Concern for reaccumulation of pleural effusion on CXR 12/20. Pulm recommended IV steroids to see improvement.  Bleeding that does not stop/Fever greater than (need to indicate Fahrenheit or Celsius)/Wound/Surgical Site with redness, or foul smelling discharge or pus

## 2023-07-19 NOTE — PROGRESS NOTE ADULT - ASSESSMENT
Brief Postoperative Note      Patient: Tete Carpio  YOB: 1969  MRN: [id number]    Date of Procedure: 7/19/2023    Pre-Op Diagnosis Codes:     * Other cervical disc degeneration, cervicothoracic region [M50.33]     * Radiculopathy of cervicothoracic region [M54.13]     * Spinal stenosis, cervicothoracic region [M48.03]    Post-Op Diagnosis: Same       Procedure(s):  ACDF C3-T1 -No Plating    Surgeon(s):  Fani Beavers MD    Assistant:  Kae Kelly [facility]    Anesthesia: General    Estimated Blood Loss (mL): less than 50     Complications: None    Specimens:   * No specimens in log *    Implants:  Implant Name Type Inv.  Item Serial No.  Lot No. LRB No. Used Action   GRAFT BNE 2 CC DBM FIBREX - [id number]  GRAFT BNE 2 CC DBM FIBREX  [employer] [id number] N/A 1 Implanted   GRAFT HUM TISS [id number] STEFFEN INTBDY FUS FRZ DRY BLK - [id number]  GRAFT HUM TISS [id number] STEFFNE INTBDY FUS FRZ DRY BLK  [employer] SPINALGRAFT TECH-WD [id number] N/A 1 Implanted   ALLOGRAFT BNE SPACER SM 0 [id number] MM [employer]ORT[employer] ELEMAX - [id number]  ALLOGRAFT BNE SPACER SM 0 [id number] MM [employer]ORT[employer] ELEMAX  [employer] [id number] N/A 1 Implanted   ALLOGRAFT BNE SPACER SM 0 [id number] MM [employer] CORTICAL ELEMAX - [id number]  ALLOGRAFT BNE SPACER SM 0 [id number] MM [employer] CORTICAL ELEMAX  [employer] [id number] N/A 1 Implanted   ALLOGRAFT BNE SPACER SM 0 [id number] MM [employer] CORTICAL ELEMAX - [id number]  ALLOGRAFT BNE SPACER SM 0 [id number] MM PAR[employer] CORTICAL ELEMAX  [employer] [id number] N/A 1 Implanted   ALLOGRAFT BNE SPACER SM 0 [id number] MM PAR[employer] CORTICAL ELEMAX - [id number]  ALLOGRAFT BNE SPACER SM 0 [id number] MM PAR[employer] CORTICAL ELEMAX  [employer] [id number] N/A 1 Implanted         Drains:   Closed/Suction Drain Left Neck Bulb (Active)       Findings: same      Electronically signed by Fani Beavers MD on 7/19/2023 at 10:30 Patient is a 67 y/o F w/ a PMHx of HTN, HLD, T2DM, fibromyalgia, recurrent vulvar cancer (recently received chemo/RT from 7/2021-9/2021), and anemia (requiring intermittent transfusions recently) who presented with shortness of breath, dizziness, and nausea for multiple weeks, found to have multiple lung nodules and B/L pleural effusions concerning for malignancy, and was admitted to telemetry for further management, s/p thoracentesis on 12/15 (~ 660cc drained). Oncology was consulted for further evaluation.    # Dyspnea  - CTA Chest (12/13): No pulmonary embolus is noted. Multiple nodules are noted within both lungs. Primary consideration is metastasis. Findings suggestive of malignant pleural effusions bilaterally.  - Appreciate IR evaluation. Pt is s/p thoracentesis earlier today (~ 660cc drained). Follow-up fluid studies  - Appreciate Pulmonary evaluation. Continue to follow-up recs  - TTE (12/14) showed a hyperdynamic left ventricle  - Appreciate PT evaluation  - Continue supportive care as per primary team  - Pt is currently saturating well on 2L NC. Wean oxygen as tolerated    # Recurrent vulvar cancer  - History as above. Last recurrence was earlier this year. She received chemo/RT with weekly Cisplatin from 7/12/21-9/1/21.  - Imaging findings on admission are concerning for progressing malignancy  - Pt s/p thoracentesis as noted above. Follow-up pleural studies  - CT A+P currently on hold 2/2 KRISTIN. Monitor BMP  - Pending CT A+P results, recommend obtaining a biopsy of one of the lung nodules (if feasible) as this can help establish a diagnosis and can be sent for molecular testing if cancer is confirmed  - Pt with a R inguinal wound/tract on exam. Appreciate Wound Care and ID evaluations. Continue to follow-up recs  - There are no plans for systemic therapy while inpatient  - Continue supportive care as per primary team  - Primary Oncologist: Dr. Paulo Carpenter. Continue outpatient follow-up  - Will continue to follow    # Microcytic Anemia  - Occasionally requiring transfusions (last on 12/11)  - Recently underwent a hematologic evaluation as an outpatient. Her workup was notable for a hemoglobin electrophoresis which was c/w beta thalassemia minor. She had a BMBx on 11/9/21 (minimal marrow obtained to evaluate) which showed erythroid hyperplasia, megakaryocytes normal, no evidence of dysplasia.   - Monitor CBC and keep active T+S. Transfuse for Hgb < 7 or if symptomatic  - Primary Hematologist: Dr. Leana Auguste. Continue outpatient follow-up    Harris Dudley, PGY6  Hematology-Oncology Fellow  Pager: [phone] / [phone]

## 2024-02-13 NOTE — PROGRESS NOTE ADULT - PROBLEM SELECTOR PROBLEM 5
Hyponatremia
KRISTIN (acute kidney injury)
Render Note In Bullet Format When Appropriate: No
Vitamin D deficiency
Detail Level: Detailed
Treatment Number (Will Not Render If 0): 0
Vitamin D deficiency
Debility
Hypertension
Post-Care Instructions: I reviewed with the patient in detail post-care instructions. Patient is to wear sunprotection, and avoid picking at any of the treated lesions. Pt may apply Vaseline to crusted or scabbing areas.
Hyponatremia
Medical Necessity Clause: This procedure was medically necessary because the lesions that were treated were:
Angina pectoris
Hyponatremia
Hyponatremia
Medical Necessity Information: It is in your best interest to select a reason for this procedure from the list below. All of these items fulfill various CMS LCD requirements except the new and changing color options.
Vitamin D deficiency
Consent: The patient's consent was obtained including but not limited to risks of crusting, scabbing, blistering, scarring, darker or lighter pigmentary change, recurrence, incomplete removal and infection.
Vitamin D deficiency
Anesthesia Volume In Cc: 0.5
Hyponatremia
Vitamin D deficiency
Current chronic use of systemic steroids
Vitamin D deficiency
Hyponatremia
Vitamin D deficiency
Metastatic squamous cell carcinoma
Vitamin D deficiency
Vitamin D deficiency
Anemia
Anemia
Hyponatremia
Orthostatic hypotension
KRISTIN (acute kidney injury)
Hyponatremia
Orthostatic hypotension
Anemia

## 2024-07-26 NOTE — ASU PATIENT PROFILE, ADULT - HAS THE PATIENT USED TOBACCO IN THE PAST 30 DAYS?
Attempted to reach patient for:  Routine follow up.    Outcome:  Left message to call back.    Next Follow Up:  In 1 week if no return call in the interim.  
No

## 2024-08-22 NOTE — ED CDU PROVIDER INITIAL DAY NOTE - RESPIRATORY, MLM
Breath sounds clear and equal bilaterally. **CDU ATTENDING ADDENDUM (Dr. Noah Zuniga): NO wheezing, rales, rhonchi, crackles, stridor, drooling, retractions, nasal flaring, or tripoding. oriented to person, place, time and situation

## 2025-05-22 NOTE — PROGRESS NOTE ADULT - SUBJECTIVE AND OBJECTIVE BOX
Let Nickolas's mother know she is only positive for yeast. I sent rx Diflucan to her pharmacy to treat.    Hematology Oncology Follow-up    INTERVAL HPI/OVERNIGHT EVENTS:  Still endorsing shortness of breath over the weekend unchanged.     VITAL SIGNS:  T(F): 98.3 (12-20-21 @ 05:47)  HR: 79 (12-20-21 @ 05:47)  BP: 158/57 (12-20-21 @ 05:47)  RR: 18 (12-20-21 @ 05:47)  SpO2: 99% (12-20-21 @ 05:47)  Wt(kg): --        Review of Systems:  General: denies fevers/chills  Respiratory: + SOB   Cardiovascular: denies chest pain, palpitations  Gastrointestinal: denies nausea, vomiting, abdominal pain, constipation, diarrhea, melena, hematochezia  MSK: denies joint pain or muscle pain  Neuro: denies headache, weakness, or parasthesias  Skin: denies rash, petichiae, echymoses  Psych: denies anxiety or sleep disturbances    PHYSICAL EXAM:  Constitutional: NAD  Respiratory: crackles L lung base, symmetric chest rise, with normal respiratory effort  Cardiovascular: RRR  Gastrointestinal: soft, NTND  Extremities:  no edema  MSK: no obvious abnormalities  Neurological: Grossly intact  Skin: no rash, no echymoses, no petichiae  Psych: normal affect    MEDICATIONS  (STANDING):  amLODIPine   Tablet 10 milliGRAM(s) Oral daily  Dakins Solution - 1/4 Strength 1 Application(s) Topical daily  dextrose 40% Gel 15 Gram(s) Oral once  dextrose 5%. 1000 milliLiter(s) (50 mL/Hr) IV Continuous <Continuous>  dextrose 5%. 1000 milliLiter(s) (100 mL/Hr) IV Continuous <Continuous>  dextrose 50% Injectable 25 Gram(s) IV Push once  dextrose 50% Injectable 12.5 Gram(s) IV Push once  dextrose 50% Injectable 25 Gram(s) IV Push once  enalapril 5 milliGRAM(s) Oral daily  escitalopram 30 milliGRAM(s) Oral daily  gabapentin 400 milliGRAM(s) Oral three times a day  glucagon  Injectable 1 milliGRAM(s) IntraMuscular once  heparin   Injectable 5000 Unit(s) SubCutaneous every 8 hours  influenza  Vaccine (HIGH DOSE) 0.7 milliLiter(s) IntraMuscular once  insulin glargine Injectable (LANTUS) 54 Unit(s) SubCutaneous at bedtime  insulin lispro (ADMELOG) corrective regimen sliding scale   SubCutaneous three times a day before meals  insulin lispro (ADMELOG) corrective regimen sliding scale   SubCutaneous at bedtime  insulin lispro Injectable (ADMELOG) 20 Unit(s) SubCutaneous three times a day with meals  levothyroxine 125 MICROGram(s) Oral daily  pantoprazole    Tablet 40 milliGRAM(s) Oral before breakfast  piperacillin/tazobactam IVPB.. 3.375 Gram(s) IV Intermittent every 12 hours  polyethylene glycol 3350 17 Gram(s) Oral daily  predniSONE   Tablet 10 milliGRAM(s) Oral two times a day  senna 2 Tablet(s) Oral at bedtime  simvastatin 20 milliGRAM(s) Oral at bedtime  sodium chloride 0.9%. 1000 milliLiter(s) (75 mL/Hr) IV Continuous <Continuous>    MEDICATIONS  (PRN):  acetaminophen     Tablet .. 650 milliGRAM(s) Oral every 6 hours PRN Temp greater or equal to 38C (100.4F), Mild Pain (1 - 3), Moderate Pain (4 - 6)  bisacodyl Suppository 10 milliGRAM(s) Rectal daily PRN Constipation  lidocaine/prilocaine Cream 1 Application(s) Topical daily PRN Dressing changes  melatonin 3 milliGRAM(s) Oral at bedtime PRN Insomnia  oxycodone    5 mG/acetaminophen 325 mG 1 Tablet(s) Oral every 6 hours PRN Severe Pain (7 - 10)      No Known Allergies      LABS:                        8.3    11.65 )-----------( 337      ( 19 Dec 2021 01:49 )             26.5     12-20    128<L>  |  92<L>  |  30<H>  ----------------------------<  110<H>  5.2   |  29  |  0.85    Ca    12.3<H>      20 Dec 2021 07:42  Phos  3.2     12-19  Mg     1.50     12-20    TPro  6.7  /  Alb  3.1<L>  /  TBili  0.3  /  DBili  x   /  AST  10  /  ALT  12  /  AlkPhos  65  12-19             Folate, Serum: 15.1 ng/mL (12-14-21 @ 08:15)  Vitamin B12, Serum: 502 pg/mL (12-14-21 @ 08:15)          RADIOLOGY & ADDITIONAL TESTS:  Studies reviewed.    CT A/P  IMPRESSION:    New small collection measuring 4.3 x 2.3 cm in the right inguinal region   containing foci of air.    Redemonstration of partially imaged bibasilar pulmonary nodules and  pleural nodularity/thickening.    Loculated small left and trace right pleural effusions with interval   decrease in size.

## 2025-07-17 NOTE — ED CDU PROVIDER DISPOSITION NOTE - NS ED MD DISPO DISCHARGE
Patient's HM shows they are overdue for Colorectal Screening.   Colonoscopy received. HM updated.    
Home